# Patient Record
Sex: FEMALE | Race: WHITE | Employment: OTHER | URBAN - METROPOLITAN AREA
[De-identification: names, ages, dates, MRNs, and addresses within clinical notes are randomized per-mention and may not be internally consistent; named-entity substitution may affect disease eponyms.]

---

## 2017-01-03 DIAGNOSIS — E03.9 ACQUIRED HYPOTHYROIDISM: ICD-10-CM

## 2017-01-03 RX ORDER — LEVOTHYROXINE SODIUM 0.05 MG/1
50 TABLET ORAL DAILY
Qty: 90 TABLET | Refills: 2 | Status: SHIPPED | OUTPATIENT
Start: 2017-01-03 | End: 2017-05-17 | Stop reason: SDUPTHER

## 2017-01-04 ENCOUNTER — TELEPHONE (OUTPATIENT)
Dept: FAMILY MEDICINE CLINIC | Age: 82
End: 2017-01-04

## 2017-01-04 DIAGNOSIS — E03.9 ACQUIRED HYPOTHYROIDISM: ICD-10-CM

## 2017-01-04 RX ORDER — LEVOTHYROXINE SODIUM 0.05 MG/1
TABLET ORAL
Qty: 90 TABLET | OUTPATIENT
Start: 2017-01-04

## 2017-01-16 ENCOUNTER — OFFICE VISIT (OUTPATIENT)
Dept: FAMILY MEDICINE CLINIC | Age: 82
End: 2017-01-16

## 2017-01-16 VITALS
WEIGHT: 176 LBS | HEART RATE: 64 BPM | RESPIRATION RATE: 16 BRPM | BODY MASS INDEX: 29.32 KG/M2 | HEIGHT: 65 IN | SYSTOLIC BLOOD PRESSURE: 110 MMHG | DIASTOLIC BLOOD PRESSURE: 60 MMHG

## 2017-01-16 DIAGNOSIS — I10 ESSENTIAL HYPERTENSION: ICD-10-CM

## 2017-01-16 DIAGNOSIS — J47.9 BRONCHIECTASIS WITHOUT COMPLICATION (HCC): Primary | ICD-10-CM

## 2017-01-16 DIAGNOSIS — E78.00 PURE HYPERCHOLESTEROLEMIA: ICD-10-CM

## 2017-01-16 PROCEDURE — 99214 OFFICE O/P EST MOD 30 MIN: CPT | Performed by: FAMILY MEDICINE

## 2017-01-16 RX ORDER — AMOXICILLIN 500 MG/1
500 CAPSULE ORAL
COMMUNITY
End: 2017-01-16 | Stop reason: SDUPTHER

## 2017-01-16 RX ORDER — AMLODIPINE BESYLATE 10 MG/1
10 TABLET ORAL DAILY
Qty: 90 TABLET | Refills: 3 | Status: SHIPPED | OUTPATIENT
Start: 2017-01-16 | End: 2017-09-24 | Stop reason: SDUPTHER

## 2017-01-16 RX ORDER — AMOXICILLIN 500 MG/1
CAPSULE ORAL
Qty: 4 CAPSULE | Refills: 3 | Status: SHIPPED | OUTPATIENT
Start: 2017-01-16 | End: 2017-04-03

## 2017-01-16 RX ORDER — SPIRONOLACTONE 50 MG/1
50 TABLET, FILM COATED ORAL DAILY
Qty: 90 TABLET | Refills: 3 | Status: SHIPPED | OUTPATIENT
Start: 2017-01-16 | End: 2017-09-24 | Stop reason: SDUPTHER

## 2017-01-16 RX ORDER — SIMVASTATIN 20 MG
20 TABLET ORAL NIGHTLY
Qty: 90 TABLET | Refills: 3 | Status: SHIPPED | OUTPATIENT
Start: 2017-01-16 | End: 2017-09-24 | Stop reason: SDUPTHER

## 2017-01-16 ASSESSMENT — ENCOUNTER SYMPTOMS
CONSTIPATION: 0
SINUS PRESSURE: 0
RHINORRHEA: 1
BACK PAIN: 1
COUGH: 1
SHORTNESS OF BREATH: 1

## 2017-01-16 ASSESSMENT — PATIENT HEALTH QUESTIONNAIRE - PHQ9
2. FEELING DOWN, DEPRESSED OR HOPELESS: 0
SUM OF ALL RESPONSES TO PHQ QUESTIONS 1-9: 0
SUM OF ALL RESPONSES TO PHQ9 QUESTIONS 1 & 2: 0
1. LITTLE INTEREST OR PLEASURE IN DOING THINGS: 0

## 2017-01-31 ENCOUNTER — TELEPHONE (OUTPATIENT)
Dept: FAMILY MEDICINE CLINIC | Age: 82
End: 2017-01-31

## 2017-01-31 ENCOUNTER — OFFICE VISIT (OUTPATIENT)
Dept: FAMILY MEDICINE CLINIC | Age: 82
End: 2017-01-31

## 2017-01-31 VITALS
RESPIRATION RATE: 16 BRPM | WEIGHT: 174.5 LBS | HEART RATE: 64 BPM | HEIGHT: 65 IN | DIASTOLIC BLOOD PRESSURE: 60 MMHG | BODY MASS INDEX: 29.07 KG/M2 | SYSTOLIC BLOOD PRESSURE: 122 MMHG

## 2017-01-31 DIAGNOSIS — M53.3 SACRO ILIAL PAIN: Primary | ICD-10-CM

## 2017-01-31 DIAGNOSIS — H81.90 VESTIBULAR DISORDER, UNSPECIFIED LATERALITY: ICD-10-CM

## 2017-01-31 PROCEDURE — 99213 OFFICE O/P EST LOW 20 MIN: CPT | Performed by: FAMILY MEDICINE

## 2017-01-31 PROCEDURE — 96372 THER/PROPH/DIAG INJ SC/IM: CPT | Performed by: FAMILY MEDICINE

## 2017-01-31 RX ORDER — METHYLPREDNISOLONE ACETATE 80 MG/ML
120 INJECTION, SUSPENSION INTRA-ARTICULAR; INTRALESIONAL; INTRAMUSCULAR; SOFT TISSUE ONCE
Status: COMPLETED | OUTPATIENT
Start: 2017-01-31 | End: 2017-01-31

## 2017-01-31 RX ADMIN — METHYLPREDNISOLONE ACETATE 120 MG: 80 INJECTION, SUSPENSION INTRA-ARTICULAR; INTRALESIONAL; INTRAMUSCULAR; SOFT TISSUE at 11:17

## 2017-01-31 ASSESSMENT — ENCOUNTER SYMPTOMS
CONSTIPATION: 0
SHORTNESS OF BREATH: 0
SINUS PRESSURE: 0

## 2017-04-03 ENCOUNTER — OFFICE VISIT (OUTPATIENT)
Dept: PULMONOLOGY | Age: 82
End: 2017-04-03

## 2017-04-03 VITALS
DIASTOLIC BLOOD PRESSURE: 62 MMHG | WEIGHT: 176 LBS | SYSTOLIC BLOOD PRESSURE: 122 MMHG | HEIGHT: 65 IN | RESPIRATION RATE: 12 BRPM | TEMPERATURE: 98.5 F | HEART RATE: 64 BPM | OXYGEN SATURATION: 96 % | BODY MASS INDEX: 29.32 KG/M2

## 2017-04-03 DIAGNOSIS — J84.9 ILD (INTERSTITIAL LUNG DISEASE) (HCC): ICD-10-CM

## 2017-04-03 DIAGNOSIS — R93.89 ABNORMAL CT SCAN, CHEST: Primary | ICD-10-CM

## 2017-04-03 DIAGNOSIS — R06.02 SOB (SHORTNESS OF BREATH): ICD-10-CM

## 2017-04-03 DIAGNOSIS — J30.89 PERENNIAL ALLERGIC RHINITIS, UNSPECIFIED ALLERGIC RHINITIS TRIGGER: ICD-10-CM

## 2017-04-03 DIAGNOSIS — R05.9 COUGH: ICD-10-CM

## 2017-04-03 PROCEDURE — 1036F TOBACCO NON-USER: CPT | Performed by: INTERNAL MEDICINE

## 2017-04-03 PROCEDURE — 1090F PRES/ABSN URINE INCON ASSESS: CPT | Performed by: INTERNAL MEDICINE

## 2017-04-03 PROCEDURE — G8427 DOCREV CUR MEDS BY ELIG CLIN: HCPCS | Performed by: INTERNAL MEDICINE

## 2017-04-03 PROCEDURE — G8399 PT W/DXA RESULTS DOCUMENT: HCPCS | Performed by: INTERNAL MEDICINE

## 2017-04-03 PROCEDURE — 4040F PNEUMOC VAC/ADMIN/RCVD: CPT | Performed by: INTERNAL MEDICINE

## 2017-04-03 PROCEDURE — G8599 NO ASA/ANTIPLAT THER USE RNG: HCPCS | Performed by: INTERNAL MEDICINE

## 2017-04-03 PROCEDURE — 1123F ACP DISCUSS/DSCN MKR DOCD: CPT | Performed by: INTERNAL MEDICINE

## 2017-04-03 PROCEDURE — 99205 OFFICE O/P NEW HI 60 MIN: CPT | Performed by: INTERNAL MEDICINE

## 2017-04-03 PROCEDURE — G8420 CALC BMI NORM PARAMETERS: HCPCS | Performed by: INTERNAL MEDICINE

## 2017-04-03 RX ORDER — FLUTICASONE PROPIONATE 50 MCG
2 SPRAY, SUSPENSION (ML) NASAL DAILY
Qty: 1 BOTTLE | Refills: 6 | Status: SHIPPED | OUTPATIENT
Start: 2017-04-03 | End: 2017-07-12 | Stop reason: SDUPTHER

## 2017-04-03 RX ORDER — LORATADINE 10 MG/1
10 TABLET ORAL DAILY
COMMUNITY
End: 2017-11-13

## 2017-04-06 DIAGNOSIS — J84.9 ILD (INTERSTITIAL LUNG DISEASE) (HCC): ICD-10-CM

## 2017-04-06 DIAGNOSIS — R05.9 COUGH: ICD-10-CM

## 2017-04-06 DIAGNOSIS — R06.02 SOB (SHORTNESS OF BREATH): ICD-10-CM

## 2017-04-06 DIAGNOSIS — R93.89 ABNORMAL CT SCAN, CHEST: ICD-10-CM

## 2017-04-21 ENCOUNTER — TELEPHONE (OUTPATIENT)
Dept: PULMONOLOGY | Age: 82
End: 2017-04-21

## 2017-04-21 DIAGNOSIS — J14 PNEUMONIA OF RIGHT UPPER LOBE DUE TO HAEMOPHILUS INFLUENZAE (HCC): Primary | ICD-10-CM

## 2017-04-21 RX ORDER — LEVOFLOXACIN 500 MG/1
500 TABLET, FILM COATED ORAL DAILY
Qty: 7 TABLET | Refills: 0 | Status: SHIPPED | OUTPATIENT
Start: 2017-04-21 | End: 2017-04-28

## 2017-04-21 RX ORDER — LEVOFLOXACIN 500 MG/1
500 TABLET, FILM COATED ORAL DAILY
Qty: 7 TABLET | Refills: 0 | Status: SHIPPED | OUTPATIENT
Start: 2017-04-21 | End: 2017-04-21 | Stop reason: DRUGHIGH

## 2017-05-01 ENCOUNTER — TELEPHONE (OUTPATIENT)
Dept: FAMILY MEDICINE CLINIC | Age: 82
End: 2017-05-01

## 2017-05-01 DIAGNOSIS — E78.5 DYSLIPIDEMIA: Primary | ICD-10-CM

## 2017-05-08 ENCOUNTER — TELEPHONE (OUTPATIENT)
Dept: PULMONOLOGY | Age: 82
End: 2017-05-08

## 2017-05-08 ENCOUNTER — OFFICE VISIT (OUTPATIENT)
Dept: PULMONOLOGY | Age: 82
End: 2017-05-08

## 2017-05-08 VITALS
OXYGEN SATURATION: 97 % | HEART RATE: 76 BPM | TEMPERATURE: 98.2 F | HEIGHT: 65 IN | BODY MASS INDEX: 30.16 KG/M2 | DIASTOLIC BLOOD PRESSURE: 72 MMHG | WEIGHT: 181 LBS | SYSTOLIC BLOOD PRESSURE: 126 MMHG

## 2017-05-08 DIAGNOSIS — A31.9 ATYPICAL MYCOBACTERIAL INFECTION: ICD-10-CM

## 2017-05-08 DIAGNOSIS — R93.89 ABNORMAL CT SCAN, CHEST: ICD-10-CM

## 2017-05-08 DIAGNOSIS — R89.9 ABNORMAL LABORATORY TEST: Primary | ICD-10-CM

## 2017-05-08 DIAGNOSIS — J98.4 RESTRICTIVE LUNG DISEASE: ICD-10-CM

## 2017-05-08 PROCEDURE — G8427 DOCREV CUR MEDS BY ELIG CLIN: HCPCS | Performed by: INTERNAL MEDICINE

## 2017-05-08 PROCEDURE — G8399 PT W/DXA RESULTS DOCUMENT: HCPCS | Performed by: INTERNAL MEDICINE

## 2017-05-08 PROCEDURE — 99215 OFFICE O/P EST HI 40 MIN: CPT | Performed by: INTERNAL MEDICINE

## 2017-05-08 PROCEDURE — 1036F TOBACCO NON-USER: CPT | Performed by: INTERNAL MEDICINE

## 2017-05-08 PROCEDURE — 4040F PNEUMOC VAC/ADMIN/RCVD: CPT | Performed by: INTERNAL MEDICINE

## 2017-05-08 PROCEDURE — G8417 CALC BMI ABV UP PARAM F/U: HCPCS | Performed by: INTERNAL MEDICINE

## 2017-05-08 PROCEDURE — 1123F ACP DISCUSS/DSCN MKR DOCD: CPT | Performed by: INTERNAL MEDICINE

## 2017-05-08 PROCEDURE — G8599 NO ASA/ANTIPLAT THER USE RNG: HCPCS | Performed by: INTERNAL MEDICINE

## 2017-05-08 PROCEDURE — 1090F PRES/ABSN URINE INCON ASSESS: CPT | Performed by: INTERNAL MEDICINE

## 2017-05-09 ENCOUNTER — NURSE ONLY (OUTPATIENT)
Dept: FAMILY MEDICINE CLINIC | Age: 82
End: 2017-05-09

## 2017-05-09 ENCOUNTER — OFFICE VISIT (OUTPATIENT)
Dept: OTOLARYNGOLOGY | Age: 82
End: 2017-05-09

## 2017-05-09 VITALS
DIASTOLIC BLOOD PRESSURE: 64 MMHG | RESPIRATION RATE: 20 BRPM | BODY MASS INDEX: 29.85 KG/M2 | TEMPERATURE: 98.2 F | WEIGHT: 179.2 LBS | HEART RATE: 64 BPM | HEIGHT: 65 IN | SYSTOLIC BLOOD PRESSURE: 120 MMHG

## 2017-05-09 DIAGNOSIS — J33.9 NASAL POLYP: Primary | ICD-10-CM

## 2017-05-09 DIAGNOSIS — M19.90 ARTHRITIS: Primary | ICD-10-CM

## 2017-05-09 PROCEDURE — 4040F PNEUMOC VAC/ADMIN/RCVD: CPT | Performed by: OTOLARYNGOLOGY

## 2017-05-09 PROCEDURE — 1036F TOBACCO NON-USER: CPT | Performed by: OTOLARYNGOLOGY

## 2017-05-09 PROCEDURE — G8399 PT W/DXA RESULTS DOCUMENT: HCPCS | Performed by: OTOLARYNGOLOGY

## 2017-05-09 PROCEDURE — 1090F PRES/ABSN URINE INCON ASSESS: CPT | Performed by: OTOLARYNGOLOGY

## 2017-05-09 PROCEDURE — 31231 NASAL ENDOSCOPY DX: CPT | Performed by: OTOLARYNGOLOGY

## 2017-05-09 PROCEDURE — G8420 CALC BMI NORM PARAMETERS: HCPCS | Performed by: OTOLARYNGOLOGY

## 2017-05-09 PROCEDURE — 99203 OFFICE O/P NEW LOW 30 MIN: CPT | Performed by: OTOLARYNGOLOGY

## 2017-05-09 PROCEDURE — G8599 NO ASA/ANTIPLAT THER USE RNG: HCPCS | Performed by: OTOLARYNGOLOGY

## 2017-05-09 PROCEDURE — G8427 DOCREV CUR MEDS BY ELIG CLIN: HCPCS | Performed by: OTOLARYNGOLOGY

## 2017-05-09 PROCEDURE — 96372 THER/PROPH/DIAG INJ SC/IM: CPT | Performed by: FAMILY MEDICINE

## 2017-05-09 PROCEDURE — 1123F ACP DISCUSS/DSCN MKR DOCD: CPT | Performed by: OTOLARYNGOLOGY

## 2017-05-09 RX ORDER — METHYLPREDNISOLONE ACETATE 40 MG/ML
40 INJECTION, SUSPENSION INTRA-ARTICULAR; INTRALESIONAL; INTRAMUSCULAR; SOFT TISSUE ONCE
Status: COMPLETED | OUTPATIENT
Start: 2017-05-09 | End: 2017-05-09

## 2017-05-09 RX ORDER — METHYLPREDNISOLONE ACETATE 80 MG/ML
80 INJECTION, SUSPENSION INTRA-ARTICULAR; INTRALESIONAL; INTRAMUSCULAR; SOFT TISSUE ONCE
Status: COMPLETED | OUTPATIENT
Start: 2017-05-09 | End: 2017-05-09

## 2017-05-09 RX ADMIN — METHYLPREDNISOLONE ACETATE 80 MG: 80 INJECTION, SUSPENSION INTRA-ARTICULAR; INTRALESIONAL; INTRAMUSCULAR; SOFT TISSUE at 10:16

## 2017-05-09 RX ADMIN — METHYLPREDNISOLONE ACETATE 40 MG: 40 INJECTION, SUSPENSION INTRA-ARTICULAR; INTRALESIONAL; INTRAMUSCULAR; SOFT TISSUE at 10:16

## 2017-05-09 ASSESSMENT — ENCOUNTER SYMPTOMS
WHEEZING: 0
TROUBLE SWALLOWING: 0
EYE REDNESS: 0
EYE ITCHING: 0
RECTAL PAIN: 0
SINUS PRESSURE: 0
PHOTOPHOBIA: 0
NAUSEA: 0
SORE THROAT: 0
FACIAL SWELLING: 0
VOICE CHANGE: 0
BLOOD IN STOOL: 0
SHORTNESS OF BREATH: 1
VOMITING: 0
APNEA: 0
BACK PAIN: 1
RHINORRHEA: 0
ANAL BLEEDING: 0
COUGH: 1
ABDOMINAL PAIN: 0
COLOR CHANGE: 0
CHEST TIGHTNESS: 0
EYE PAIN: 0
STRIDOR: 0
DIARRHEA: 0
ABDOMINAL DISTENTION: 0
EYE DISCHARGE: 0
CONSTIPATION: 0
CHOKING: 0

## 2017-05-16 ENCOUNTER — TELEPHONE (OUTPATIENT)
Dept: ENDOCRINOLOGY | Age: 82
End: 2017-05-16

## 2017-05-17 ENCOUNTER — OFFICE VISIT (OUTPATIENT)
Dept: ENDOCRINOLOGY | Age: 82
End: 2017-05-17

## 2017-05-17 VITALS
SYSTOLIC BLOOD PRESSURE: 130 MMHG | HEIGHT: 65 IN | RESPIRATION RATE: 14 BRPM | HEART RATE: 59 BPM | BODY MASS INDEX: 29.49 KG/M2 | WEIGHT: 177 LBS | DIASTOLIC BLOOD PRESSURE: 79 MMHG

## 2017-05-17 DIAGNOSIS — E04.1 THYROID NODULE: ICD-10-CM

## 2017-05-17 DIAGNOSIS — E21.3 HYPERPARATHYROIDISM (HCC): ICD-10-CM

## 2017-05-17 DIAGNOSIS — E03.9 HYPOTHYROIDISM (ACQUIRED): Primary | ICD-10-CM

## 2017-05-17 PROCEDURE — 1036F TOBACCO NON-USER: CPT | Performed by: CLINICAL NURSE SPECIALIST

## 2017-05-17 PROCEDURE — 99214 OFFICE O/P EST MOD 30 MIN: CPT | Performed by: CLINICAL NURSE SPECIALIST

## 2017-05-17 PROCEDURE — 1123F ACP DISCUSS/DSCN MKR DOCD: CPT | Performed by: CLINICAL NURSE SPECIALIST

## 2017-05-17 PROCEDURE — G8420 CALC BMI NORM PARAMETERS: HCPCS | Performed by: CLINICAL NURSE SPECIALIST

## 2017-05-17 PROCEDURE — 1090F PRES/ABSN URINE INCON ASSESS: CPT | Performed by: CLINICAL NURSE SPECIALIST

## 2017-05-17 PROCEDURE — G8599 NO ASA/ANTIPLAT THER USE RNG: HCPCS | Performed by: CLINICAL NURSE SPECIALIST

## 2017-05-17 PROCEDURE — 4040F PNEUMOC VAC/ADMIN/RCVD: CPT | Performed by: CLINICAL NURSE SPECIALIST

## 2017-05-17 PROCEDURE — G8399 PT W/DXA RESULTS DOCUMENT: HCPCS | Performed by: CLINICAL NURSE SPECIALIST

## 2017-05-17 PROCEDURE — G8427 DOCREV CUR MEDS BY ELIG CLIN: HCPCS | Performed by: CLINICAL NURSE SPECIALIST

## 2017-05-17 RX ORDER — LEVOTHYROXINE SODIUM 0.05 MG/1
50 TABLET ORAL DAILY
Qty: 90 TABLET | Refills: 1 | Status: SHIPPED | OUTPATIENT
Start: 2017-05-17 | End: 2017-09-19 | Stop reason: SDUPTHER

## 2017-05-17 ASSESSMENT — ENCOUNTER SYMPTOMS
ABDOMINAL PAIN: 0
CHEST TIGHTNESS: 0
DIARRHEA: 0
NAUSEA: 0
VOICE CHANGE: 0
SHORTNESS OF BREATH: 0
CONSTIPATION: 0
WHEEZING: 0
EYE REDNESS: 0
COUGH: 0

## 2017-05-26 ENCOUNTER — TELEPHONE (OUTPATIENT)
Dept: OTOLARYNGOLOGY | Age: 82
End: 2017-05-26

## 2017-05-26 DIAGNOSIS — J33.9 NASAL POLYP: Primary | ICD-10-CM

## 2017-06-08 ENCOUNTER — OFFICE VISIT (OUTPATIENT)
Dept: OTOLARYNGOLOGY | Age: 82
End: 2017-06-08

## 2017-06-08 VITALS
HEIGHT: 65 IN | BODY MASS INDEX: 30.12 KG/M2 | DIASTOLIC BLOOD PRESSURE: 84 MMHG | WEIGHT: 180.8 LBS | HEART RATE: 78 BPM | SYSTOLIC BLOOD PRESSURE: 126 MMHG | RESPIRATION RATE: 16 BRPM | TEMPERATURE: 98.3 F

## 2017-06-08 DIAGNOSIS — J33.9 NASAL POLYP: Primary | ICD-10-CM

## 2017-06-08 PROCEDURE — G8427 DOCREV CUR MEDS BY ELIG CLIN: HCPCS | Performed by: OTOLARYNGOLOGY

## 2017-06-08 PROCEDURE — 4040F PNEUMOC VAC/ADMIN/RCVD: CPT | Performed by: OTOLARYNGOLOGY

## 2017-06-08 PROCEDURE — 1123F ACP DISCUSS/DSCN MKR DOCD: CPT | Performed by: OTOLARYNGOLOGY

## 2017-06-08 PROCEDURE — 1036F TOBACCO NON-USER: CPT | Performed by: OTOLARYNGOLOGY

## 2017-06-08 PROCEDURE — G8399 PT W/DXA RESULTS DOCUMENT: HCPCS | Performed by: OTOLARYNGOLOGY

## 2017-06-08 PROCEDURE — 99212 OFFICE O/P EST SF 10 MIN: CPT | Performed by: OTOLARYNGOLOGY

## 2017-06-08 PROCEDURE — G8599 NO ASA/ANTIPLAT THER USE RNG: HCPCS | Performed by: OTOLARYNGOLOGY

## 2017-06-08 PROCEDURE — G8417 CALC BMI ABV UP PARAM F/U: HCPCS | Performed by: OTOLARYNGOLOGY

## 2017-06-08 PROCEDURE — 1090F PRES/ABSN URINE INCON ASSESS: CPT | Performed by: OTOLARYNGOLOGY

## 2017-06-08 ASSESSMENT — ENCOUNTER SYMPTOMS
VOICE CHANGE: 0
EYE PAIN: 0
SINUS PRESSURE: 0
RHINORRHEA: 0
CHEST TIGHTNESS: 0
EYE ITCHING: 0
CONSTIPATION: 0
EYE DISCHARGE: 0
WHEEZING: 0
NAUSEA: 0
COUGH: 0
VOMITING: 0
STRIDOR: 0
ABDOMINAL PAIN: 0
PHOTOPHOBIA: 0
BLOOD IN STOOL: 0
ABDOMINAL DISTENTION: 0
FACIAL SWELLING: 0
COLOR CHANGE: 0
ANAL BLEEDING: 0
DIARRHEA: 0
SORE THROAT: 0
EYE REDNESS: 0
SHORTNESS OF BREATH: 0
APNEA: 0
RECTAL PAIN: 0
CHOKING: 0
TROUBLE SWALLOWING: 0
BACK PAIN: 0

## 2017-06-21 ENCOUNTER — OFFICE VISIT (OUTPATIENT)
Dept: FAMILY MEDICINE CLINIC | Age: 82
End: 2017-06-21

## 2017-06-21 ENCOUNTER — TELEPHONE (OUTPATIENT)
Dept: FAMILY MEDICINE CLINIC | Age: 82
End: 2017-06-21

## 2017-06-21 VITALS
DIASTOLIC BLOOD PRESSURE: 82 MMHG | HEART RATE: 68 BPM | SYSTOLIC BLOOD PRESSURE: 132 MMHG | RESPIRATION RATE: 20 BRPM | HEIGHT: 65 IN

## 2017-06-21 DIAGNOSIS — M19.90 ACUTE ARTHRITIS: Primary | ICD-10-CM

## 2017-06-21 PROCEDURE — 99213 OFFICE O/P EST LOW 20 MIN: CPT | Performed by: FAMILY MEDICINE

## 2017-06-21 RX ORDER — AZITHROMYCIN 600 MG/1
1 TABLET, FILM COATED ORAL DAILY
Refills: 0 | COMMUNITY
Start: 2017-06-05 | End: 2018-10-25 | Stop reason: ALTCHOICE

## 2017-06-21 RX ORDER — ETHAMBUTOL HYDROCHLORIDE 400 MG/1
3 TABLET, FILM COATED ORAL DAILY
Refills: 0 | COMMUNITY
Start: 2017-06-05 | End: 2018-10-25 | Stop reason: ALTCHOICE

## 2017-06-21 ASSESSMENT — ENCOUNTER SYMPTOMS
SHORTNESS OF BREATH: 0
SINUS PRESSURE: 0
CONSTIPATION: 0

## 2017-07-12 ENCOUNTER — OFFICE VISIT (OUTPATIENT)
Dept: OTOLARYNGOLOGY | Age: 82
End: 2017-07-12

## 2017-07-12 VITALS
HEART RATE: 54 BPM | WEIGHT: 180.3 LBS | HEIGHT: 65 IN | RESPIRATION RATE: 20 BRPM | TEMPERATURE: 98.4 F | BODY MASS INDEX: 30.04 KG/M2 | DIASTOLIC BLOOD PRESSURE: 74 MMHG | SYSTOLIC BLOOD PRESSURE: 116 MMHG

## 2017-07-12 DIAGNOSIS — J33.9 NASAL POLYP: Primary | ICD-10-CM

## 2017-07-12 PROCEDURE — 1090F PRES/ABSN URINE INCON ASSESS: CPT | Performed by: OTOLARYNGOLOGY

## 2017-07-12 PROCEDURE — G8599 NO ASA/ANTIPLAT THER USE RNG: HCPCS | Performed by: OTOLARYNGOLOGY

## 2017-07-12 PROCEDURE — 99212 OFFICE O/P EST SF 10 MIN: CPT | Performed by: OTOLARYNGOLOGY

## 2017-07-12 PROCEDURE — 1123F ACP DISCUSS/DSCN MKR DOCD: CPT | Performed by: OTOLARYNGOLOGY

## 2017-07-12 PROCEDURE — 4040F PNEUMOC VAC/ADMIN/RCVD: CPT | Performed by: OTOLARYNGOLOGY

## 2017-07-12 PROCEDURE — G8427 DOCREV CUR MEDS BY ELIG CLIN: HCPCS | Performed by: OTOLARYNGOLOGY

## 2017-07-12 PROCEDURE — 1036F TOBACCO NON-USER: CPT | Performed by: OTOLARYNGOLOGY

## 2017-07-12 PROCEDURE — G8417 CALC BMI ABV UP PARAM F/U: HCPCS | Performed by: OTOLARYNGOLOGY

## 2017-07-12 PROCEDURE — G8399 PT W/DXA RESULTS DOCUMENT: HCPCS | Performed by: OTOLARYNGOLOGY

## 2017-07-12 RX ORDER — FLUTICASONE PROPIONATE 50 MCG
2 SPRAY, SUSPENSION (ML) NASAL DAILY
Qty: 1 BOTTLE | Refills: 5 | Status: SHIPPED | OUTPATIENT
Start: 2017-07-12 | End: 2018-10-25 | Stop reason: ALTCHOICE

## 2017-07-12 ASSESSMENT — ENCOUNTER SYMPTOMS
RHINORRHEA: 0
EYE ITCHING: 0
APNEA: 0
DIARRHEA: 0
CHOKING: 0
ABDOMINAL DISTENTION: 0
STRIDOR: 0
VOMITING: 0
TROUBLE SWALLOWING: 0
BLOOD IN STOOL: 0
BACK PAIN: 0
EYE DISCHARGE: 0
FACIAL SWELLING: 0
CONSTIPATION: 0
COUGH: 0
RECTAL PAIN: 0
ANAL BLEEDING: 0
SHORTNESS OF BREATH: 0
SINUS PRESSURE: 0
ABDOMINAL PAIN: 0
PHOTOPHOBIA: 0
VOICE CHANGE: 0
EYE REDNESS: 0
SORE THROAT: 0
COLOR CHANGE: 0
WHEEZING: 0
EYE PAIN: 0
CHEST TIGHTNESS: 0
NAUSEA: 0

## 2017-07-24 ENCOUNTER — TELEPHONE (OUTPATIENT)
Dept: FAMILY MEDICINE CLINIC | Age: 82
End: 2017-07-24

## 2017-07-24 ENCOUNTER — OFFICE VISIT (OUTPATIENT)
Dept: FAMILY MEDICINE CLINIC | Age: 82
End: 2017-07-24

## 2017-07-24 VITALS
BODY MASS INDEX: 29.99 KG/M2 | SYSTOLIC BLOOD PRESSURE: 134 MMHG | RESPIRATION RATE: 16 BRPM | WEIGHT: 180 LBS | HEIGHT: 65 IN | HEART RATE: 76 BPM | DIASTOLIC BLOOD PRESSURE: 80 MMHG

## 2017-07-24 DIAGNOSIS — R10.84 GENERALIZED ABDOMINAL PAIN: Primary | ICD-10-CM

## 2017-07-24 PROCEDURE — 99213 OFFICE O/P EST LOW 20 MIN: CPT | Performed by: FAMILY MEDICINE

## 2017-07-24 RX ORDER — OMEPRAZOLE 20 MG/1
20 CAPSULE, DELAYED RELEASE ORAL DAILY
Qty: 30 CAPSULE | Refills: 3 | Status: SHIPPED | OUTPATIENT
Start: 2017-07-24 | End: 2017-11-13 | Stop reason: SDUPTHER

## 2017-07-24 ASSESSMENT — ENCOUNTER SYMPTOMS
SHORTNESS OF BREATH: 0
SINUS PRESSURE: 0
ABDOMINAL PAIN: 1
CONSTIPATION: 0
NAUSEA: 1
COUGH: 1
ABDOMINAL DISTENTION: 1
VOMITING: 0
DIARRHEA: 1

## 2017-07-26 ENCOUNTER — OFFICE VISIT (OUTPATIENT)
Dept: FAMILY MEDICINE CLINIC | Age: 82
End: 2017-07-26

## 2017-07-26 VITALS
SYSTOLIC BLOOD PRESSURE: 134 MMHG | HEART RATE: 76 BPM | HEIGHT: 65 IN | WEIGHT: 179.5 LBS | RESPIRATION RATE: 16 BRPM | DIASTOLIC BLOOD PRESSURE: 68 MMHG | BODY MASS INDEX: 29.91 KG/M2

## 2017-07-26 DIAGNOSIS — R10.84 GENERALIZED ABDOMINAL PAIN: Primary | ICD-10-CM

## 2017-07-26 PROCEDURE — 99213 OFFICE O/P EST LOW 20 MIN: CPT | Performed by: FAMILY MEDICINE

## 2017-07-26 ASSESSMENT — ENCOUNTER SYMPTOMS
ABDOMINAL PAIN: 1
SHORTNESS OF BREATH: 1
SINUS PRESSURE: 0
NAUSEA: 1

## 2017-07-28 ENCOUNTER — OFFICE VISIT (OUTPATIENT)
Dept: FAMILY MEDICINE CLINIC | Age: 82
End: 2017-07-28

## 2017-07-28 VITALS
RESPIRATION RATE: 20 BRPM | SYSTOLIC BLOOD PRESSURE: 112 MMHG | DIASTOLIC BLOOD PRESSURE: 62 MMHG | HEIGHT: 65 IN | HEART RATE: 80 BPM | WEIGHT: 179.13 LBS | BODY MASS INDEX: 29.84 KG/M2

## 2017-07-28 DIAGNOSIS — R10.84 GENERALIZED ABDOMINAL PAIN: Primary | ICD-10-CM

## 2017-07-28 PROCEDURE — 99213 OFFICE O/P EST LOW 20 MIN: CPT | Performed by: FAMILY MEDICINE

## 2017-07-28 ASSESSMENT — ENCOUNTER SYMPTOMS
COUGH: 1
CONSTIPATION: 0
ABDOMINAL PAIN: 1
SHORTNESS OF BREATH: 1
SINUS PRESSURE: 0

## 2017-08-08 ENCOUNTER — TELEPHONE (OUTPATIENT)
Dept: FAMILY MEDICINE CLINIC | Age: 82
End: 2017-08-08

## 2017-08-10 ENCOUNTER — NURSE ONLY (OUTPATIENT)
Dept: FAMILY MEDICINE CLINIC | Age: 82
End: 2017-08-10

## 2017-08-10 DIAGNOSIS — M25.50 POLYARTHRALGIA: Primary | ICD-10-CM

## 2017-08-10 PROCEDURE — 96372 THER/PROPH/DIAG INJ SC/IM: CPT | Performed by: FAMILY MEDICINE

## 2017-08-10 RX ORDER — METHYLPREDNISOLONE ACETATE 40 MG/ML
40 INJECTION, SUSPENSION INTRA-ARTICULAR; INTRALESIONAL; INTRAMUSCULAR; SOFT TISSUE ONCE
Status: COMPLETED | OUTPATIENT
Start: 2017-08-10 | End: 2017-08-10

## 2017-08-10 RX ORDER — METHYLPREDNISOLONE ACETATE 80 MG/ML
80 INJECTION, SUSPENSION INTRA-ARTICULAR; INTRALESIONAL; INTRAMUSCULAR; SOFT TISSUE ONCE
Status: COMPLETED | OUTPATIENT
Start: 2017-08-10 | End: 2017-08-10

## 2017-08-10 RX ADMIN — METHYLPREDNISOLONE ACETATE 40 MG: 40 INJECTION, SUSPENSION INTRA-ARTICULAR; INTRALESIONAL; INTRAMUSCULAR; SOFT TISSUE at 13:23

## 2017-08-10 RX ADMIN — METHYLPREDNISOLONE ACETATE 80 MG: 80 INJECTION, SUSPENSION INTRA-ARTICULAR; INTRALESIONAL; INTRAMUSCULAR; SOFT TISSUE at 13:24

## 2017-08-17 ENCOUNTER — HOSPITAL ENCOUNTER (OUTPATIENT)
Dept: PULMONOLOGY | Age: 82
Discharge: HOME | End: 2017-08-17
Payer: MEDICARE

## 2017-08-17 ENCOUNTER — HOSPITAL ENCOUNTER (OUTPATIENT)
Age: 82
Discharge: HOME | End: 2017-08-17
Payer: MEDICARE

## 2017-08-17 LAB
ALBUMIN SERPL-MCNC: 4.3 G/DL (ref 3.5–5.1)
ALP BLD-CCNC: 59 U/L (ref 38–126)
ALT SERPL-CCNC: 36 U/L (ref 11–66)
ANION GAP SERPL CALCULATED.3IONS-SCNC: 15 MEQ/L (ref 8–16)
ANISOCYTOSIS: ABNORMAL
AST SERPL-CCNC: 44 U/L (ref 5–40)
BASOPHILS # BLD: 0.3 %
BASOPHILS ABSOLUTE: 0 THOU/MM3 (ref 0–0.1)
BILIRUB SERPL-MCNC: 0.2 MG/DL (ref 0.3–1.2)
BILIRUBIN DIRECT: < 0.2 MG/DL (ref 0–0.3)
BUN BLDV-MCNC: 14 MG/DL (ref 7–22)
CALCIUM SERPL-MCNC: 10.1 MG/DL (ref 8.5–10.5)
CHLORIDE BLD-SCNC: 103 MEQ/L (ref 98–111)
CO2: 22 MEQ/L (ref 23–33)
CREAT SERPL-MCNC: 0.8 MG/DL (ref 0.4–1.2)
EOSINOPHIL # BLD: 4.1 %
EOSINOPHILS ABSOLUTE: 0.3 THOU/MM3 (ref 0–0.4)
GFR SERPL CREATININE-BSD FRML MDRD: 69 ML/MIN/1.73M2
GLUCOSE BLD-MCNC: 129 MG/DL (ref 70–108)
HCT VFR BLD CALC: 44.4 % (ref 37–47)
HEMOGLOBIN: 15.1 GM/DL (ref 12–16)
LYMPHOCYTES # BLD: 15.3 %
LYMPHOCYTES ABSOLUTE: 1.2 THOU/MM3 (ref 1–4.8)
MCH RBC QN AUTO: 29.3 PG (ref 27–31)
MCHC RBC AUTO-ENTMCNC: 34.1 GM/DL (ref 33–37)
MCV RBC AUTO: 86 FL (ref 81–99)
MONOCYTES # BLD: 8.8 %
MONOCYTES ABSOLUTE: 0.7 THOU/MM3 (ref 0.4–1.3)
NUCLEATED RED BLOOD CELLS: 0 /100 WBC
PDW BLD-RTO: 15.2 % (ref 11.5–14.5)
PLATELET # BLD: 214 THOU/MM3 (ref 130–400)
PMV BLD AUTO: 8.6 MCM (ref 7.4–10.4)
POTASSIUM SERPL-SCNC: 5 MEQ/L (ref 3.5–5.2)
RBC # BLD: 5.16 MILL/MM3 (ref 4.2–5.4)
RBC # BLD: NORMAL 10*6/UL
SEG NEUTROPHILS: 71.5 %
SEGMENTED NEUTROPHILS ABSOLUTE COUNT: 5.4 THOU/MM3 (ref 1.8–7.7)
SODIUM BLD-SCNC: 140 MEQ/L (ref 135–145)
TOTAL PROTEIN: 7.2 G/DL (ref 6.1–8)
WBC # BLD: 7.6 THOU/MM3 (ref 4.8–10.8)

## 2017-08-17 PROCEDURE — 36415 COLL VENOUS BLD VENIPUNCTURE: CPT

## 2017-08-17 PROCEDURE — 82248 BILIRUBIN DIRECT: CPT

## 2017-08-17 PROCEDURE — 80053 COMPREHEN METABOLIC PANEL: CPT

## 2017-08-17 PROCEDURE — 85025 COMPLETE CBC W/AUTO DIFF WBC: CPT

## 2017-09-05 ENCOUNTER — HOSPITAL ENCOUNTER (OUTPATIENT)
Age: 82
Discharge: HOME | End: 2017-09-05
Payer: MEDICARE

## 2017-09-05 DIAGNOSIS — E21.3 HYPERPARATHYROIDISM (HCC): ICD-10-CM

## 2017-09-05 DIAGNOSIS — E03.9 HYPOTHYROIDISM (ACQUIRED): ICD-10-CM

## 2017-09-05 LAB
ANION GAP SERPL CALCULATED.3IONS-SCNC: 15 MEQ/L (ref 8–16)
BUN BLDV-MCNC: 17 MG/DL (ref 7–22)
CALCIUM SERPL-MCNC: 10.4 MG/DL (ref 8.5–10.5)
CALCIUM SERPL-MCNC: 10.6 MG/DL (ref 8.5–10.5)
CHLORIDE BLD-SCNC: 103 MEQ/L (ref 98–111)
CO2: 25 MEQ/L (ref 23–33)
CREAT SERPL-MCNC: 0.9 MG/DL (ref 0.4–1.2)
GFR SERPL CREATININE-BSD FRML MDRD: 60 ML/MIN/1.73M2
GLUCOSE BLD-MCNC: 114 MG/DL (ref 70–108)
POTASSIUM SERPL-SCNC: 4.1 MEQ/L (ref 3.5–5.2)
PTH INTACT: 90.2 PG/ML (ref 15–65)
SODIUM BLD-SCNC: 143 MEQ/L (ref 135–145)
T4 FREE: 1.05 NG/DL (ref 0.93–1.76)
TSH SERPL DL<=0.05 MIU/L-ACNC: 5.09 UIU/ML (ref 0.4–4.2)
VITAMIN D 25-HYDROXY: 36 NG/ML (ref 30–100)

## 2017-09-05 PROCEDURE — 36415 COLL VENOUS BLD VENIPUNCTURE: CPT

## 2017-09-05 PROCEDURE — 83970 ASSAY OF PARATHORMONE: CPT

## 2017-09-05 PROCEDURE — 82306 VITAMIN D 25 HYDROXY: CPT

## 2017-09-05 PROCEDURE — 84443 ASSAY THYROID STIM HORMONE: CPT

## 2017-09-05 PROCEDURE — 84439 ASSAY OF FREE THYROXINE: CPT

## 2017-09-05 PROCEDURE — 82310 ASSAY OF CALCIUM: CPT

## 2017-09-05 PROCEDURE — 80048 BASIC METABOLIC PNL TOTAL CA: CPT

## 2017-09-05 PROCEDURE — 82652 VIT D 1 25-DIHYDROXY: CPT

## 2017-09-07 ENCOUNTER — HOSPITAL ENCOUNTER (OUTPATIENT)
Dept: ULTRASOUND IMAGING | Age: 82
Discharge: HOME | End: 2017-09-07
Payer: MEDICARE

## 2017-09-07 DIAGNOSIS — H81.10 BENIGN PAROXYSMAL VERTIGO: ICD-10-CM

## 2017-09-07 DIAGNOSIS — E04.1 THYROID NODULE: ICD-10-CM

## 2017-09-07 PROCEDURE — 76536 US EXAM OF HEAD AND NECK: CPT

## 2017-09-08 LAB — VITAMIN D 1,25-DIHYDROXY: 30.9 PG/ML (ref 19.9–79.3)

## 2017-09-19 ENCOUNTER — OFFICE VISIT (OUTPATIENT)
Dept: ENDOCRINOLOGY | Age: 82
End: 2017-09-19
Payer: MEDICARE

## 2017-09-19 VITALS
HEIGHT: 65 IN | BODY MASS INDEX: 28.74 KG/M2 | DIASTOLIC BLOOD PRESSURE: 62 MMHG | SYSTOLIC BLOOD PRESSURE: 138 MMHG | HEART RATE: 74 BPM | RESPIRATION RATE: 16 BRPM | WEIGHT: 172.5 LBS

## 2017-09-19 DIAGNOSIS — E04.1 LEFT THYROID NODULE: ICD-10-CM

## 2017-09-19 DIAGNOSIS — E21.3 HYPERPARATHYROIDISM (HCC): ICD-10-CM

## 2017-09-19 DIAGNOSIS — E03.9 ACQUIRED HYPOTHYROIDISM: Primary | ICD-10-CM

## 2017-09-19 PROCEDURE — 99214 OFFICE O/P EST MOD 30 MIN: CPT | Performed by: INTERNAL MEDICINE

## 2017-09-19 PROCEDURE — G8399 PT W/DXA RESULTS DOCUMENT: HCPCS | Performed by: INTERNAL MEDICINE

## 2017-09-19 PROCEDURE — G8427 DOCREV CUR MEDS BY ELIG CLIN: HCPCS | Performed by: INTERNAL MEDICINE

## 2017-09-19 PROCEDURE — 1036F TOBACCO NON-USER: CPT | Performed by: INTERNAL MEDICINE

## 2017-09-19 PROCEDURE — 1123F ACP DISCUSS/DSCN MKR DOCD: CPT | Performed by: INTERNAL MEDICINE

## 2017-09-19 PROCEDURE — 1090F PRES/ABSN URINE INCON ASSESS: CPT | Performed by: INTERNAL MEDICINE

## 2017-09-19 PROCEDURE — G8599 NO ASA/ANTIPLAT THER USE RNG: HCPCS | Performed by: INTERNAL MEDICINE

## 2017-09-19 PROCEDURE — G8417 CALC BMI ABV UP PARAM F/U: HCPCS | Performed by: INTERNAL MEDICINE

## 2017-09-19 PROCEDURE — 4040F PNEUMOC VAC/ADMIN/RCVD: CPT | Performed by: INTERNAL MEDICINE

## 2017-09-19 RX ORDER — LEVOTHYROXINE SODIUM 0.12 MG/1
62.5 TABLET ORAL DAILY
Qty: 30 TABLET | Refills: 2 | Status: SHIPPED | OUTPATIENT
Start: 2017-09-19 | End: 2017-11-13

## 2017-09-19 ASSESSMENT — ENCOUNTER SYMPTOMS
VOMITING: 0
CONSTIPATION: 0
NAUSEA: 0
HEMOPTYSIS: 0
BLURRED VISION: 0
WHEEZING: 0
COUGH: 1
EYE PAIN: 0
DOUBLE VISION: 0

## 2017-09-24 DIAGNOSIS — I10 ESSENTIAL HYPERTENSION: ICD-10-CM

## 2017-09-24 DIAGNOSIS — E78.00 PURE HYPERCHOLESTEROLEMIA: ICD-10-CM

## 2017-09-26 RX ORDER — SIMVASTATIN 20 MG
TABLET ORAL
Qty: 90 TABLET | Refills: 3 | Status: SHIPPED | OUTPATIENT
Start: 2017-09-26 | End: 2018-09-16 | Stop reason: SDUPTHER

## 2017-09-26 RX ORDER — AMLODIPINE BESYLATE 10 MG/1
TABLET ORAL
Qty: 90 TABLET | Refills: 3 | Status: SHIPPED | OUTPATIENT
Start: 2017-09-26 | End: 2018-09-16 | Stop reason: SDUPTHER

## 2017-09-26 RX ORDER — SPIRONOLACTONE 50 MG/1
TABLET, FILM COATED ORAL
Qty: 90 TABLET | Refills: 3 | Status: SHIPPED | OUTPATIENT
Start: 2017-09-26 | End: 2018-09-16 | Stop reason: SDUPTHER

## 2017-10-16 ENCOUNTER — TELEPHONE (OUTPATIENT)
Dept: ENDOCRINOLOGY | Age: 82
End: 2017-10-16

## 2017-10-16 DIAGNOSIS — E03.9 ACQUIRED HYPOTHYROIDISM: Primary | ICD-10-CM

## 2017-10-16 NOTE — TELEPHONE ENCOUNTER
Pt notified of Dr. Alla Shah recommendations.  Lab order faxed to [facility]. Martina's per patient request.

## 2017-10-20 ENCOUNTER — HOSPITAL ENCOUNTER (OUTPATIENT)
Age: 82
Discharge: HOME | End: 2017-10-20
Payer: MEDICARE

## 2017-10-20 LAB
ALBUMIN SERPL-MCNC: 4.3 G/DL (ref 3.5–5.1)
ALP BLD-CCNC: 60 U/L (ref 38–126)
ALT SERPL-CCNC: 31 U/L (ref 11–66)
ANION GAP SERPL CALCULATED.3IONS-SCNC: 15 MEQ/L (ref 8–16)
ANISOCYTOSIS: ABNORMAL
AST SERPL-CCNC: 39 U/L (ref 5–40)
AVERAGE GLUCOSE: 105 MG/DL (ref 70–126)
BASOPHILS # BLD: 0.1 %
BASOPHILS ABSOLUTE: 0 THOU/MM3 (ref 0–0.1)
BILIRUB SERPL-MCNC: 0.4 MG/DL (ref 0.3–1.2)
BILIRUBIN DIRECT: < 0.2 MG/DL (ref 0–0.3)
BUN BLDV-MCNC: 15 MG/DL (ref 7–22)
CALCIUM SERPL-MCNC: 10 MG/DL (ref 8.5–10.5)
CHLORIDE BLD-SCNC: 103 MEQ/L (ref 98–111)
CO2: 25 MEQ/L (ref 23–33)
CREAT SERPL-MCNC: 0.8 MG/DL (ref 0.4–1.2)
EOSINOPHIL # BLD: 5.3 %
EOSINOPHILS ABSOLUTE: 0.3 THOU/MM3 (ref 0–0.4)
GFR SERPL CREATININE-BSD FRML MDRD: 69 ML/MIN/1.73M2
GLUCOSE BLD-MCNC: 98 MG/DL (ref 70–108)
HBA1C MFR BLD: 5.5 % (ref 4.4–6.4)
HCT VFR BLD CALC: 42.7 % (ref 37–47)
HEMOGLOBIN: 14.2 GM/DL (ref 12–16)
LYMPHOCYTES # BLD: 20.1 %
LYMPHOCYTES ABSOLUTE: 1 THOU/MM3 (ref 1–4.8)
MCH RBC QN AUTO: 28.3 PG (ref 27–31)
MCHC RBC AUTO-ENTMCNC: 33.3 GM/DL (ref 33–37)
MCV RBC AUTO: 85 FL (ref 81–99)
MONOCYTES # BLD: 12.8 %
MONOCYTES ABSOLUTE: 0.6 THOU/MM3 (ref 0.4–1.3)
NUCLEATED RED BLOOD CELLS: 0 /100 WBC
PDW BLD-RTO: 15.5 % (ref 11.5–14.5)
PLATELET # BLD: 187 THOU/MM3 (ref 130–400)
PMV BLD AUTO: 8.7 MCM (ref 7.4–10.4)
POTASSIUM SERPL-SCNC: 4.4 MEQ/L (ref 3.5–5.2)
RBC # BLD: 5.02 MILL/MM3 (ref 4.2–5.4)
RBC # BLD: NORMAL 10*6/UL
SEG NEUTROPHILS: 61.7 %
SEGMENTED NEUTROPHILS ABSOLUTE COUNT: 3.1 THOU/MM3 (ref 1.8–7.7)
SODIUM BLD-SCNC: 143 MEQ/L (ref 135–145)
T4 FREE: 1.34 NG/DL (ref 0.93–1.76)
TOTAL PROTEIN: 6.9 G/DL (ref 6.1–8)
TSH SERPL DL<=0.05 MIU/L-ACNC: 5.07 UIU/ML (ref 0.4–4.2)
WBC # BLD: 5 THOU/MM3 (ref 4.8–10.8)

## 2017-10-20 PROCEDURE — 36415 COLL VENOUS BLD VENIPUNCTURE: CPT

## 2017-10-20 PROCEDURE — 80053 COMPREHEN METABOLIC PANEL: CPT

## 2017-10-20 PROCEDURE — 84439 ASSAY OF FREE THYROXINE: CPT

## 2017-10-20 PROCEDURE — 84443 ASSAY THYROID STIM HORMONE: CPT

## 2017-10-20 PROCEDURE — 85025 COMPLETE CBC W/AUTO DIFF WBC: CPT

## 2017-10-20 PROCEDURE — 82248 BILIRUBIN DIRECT: CPT

## 2017-10-20 PROCEDURE — 83036 HEMOGLOBIN GLYCOSYLATED A1C: CPT

## 2017-10-26 ENCOUNTER — TELEPHONE (OUTPATIENT)
Dept: ENDOCRINOLOGY | Age: 82
End: 2017-10-26

## 2017-10-26 NOTE — TELEPHONE ENCOUNTER
----- Message from Nando Carballo MD sent at 10/20/2017  2:09 PM EDT -----  Increase Synthroid to 75 µg daily. Get free T4 and TSH in a month.

## 2017-11-03 ENCOUNTER — TELEPHONE (OUTPATIENT)
Dept: ENDOCRINOLOGY | Age: 82
End: 2017-11-03

## 2017-11-03 DIAGNOSIS — E03.9 HYPOTHYROIDISM, UNSPECIFIED TYPE: Primary | ICD-10-CM

## 2017-11-03 RX ORDER — LEVOTHYROXINE SODIUM 0.07 MG/1
75 TABLET ORAL DAILY
Qty: 30 TABLET | Refills: 3 | Status: SHIPPED | OUTPATIENT
Start: 2017-11-03 | End: 2018-02-19 | Stop reason: SDUPTHER

## 2017-11-03 NOTE — TELEPHONE ENCOUNTER
----- Message from Jerrell Gibbons MD sent at 10/20/2017  2:09 PM EDT -----  Increase Synthroid to 75 µg daily. Get free T4 and TSH in a month.

## 2017-11-03 NOTE — TELEPHONE ENCOUNTER
Spoke with pt and notified her of recommendations. rx is pending please approve. Lab order mailed to pt.

## 2017-11-13 ENCOUNTER — OFFICE VISIT (OUTPATIENT)
Dept: FAMILY MEDICINE CLINIC | Age: 82
End: 2017-11-13

## 2017-11-13 VITALS
SYSTOLIC BLOOD PRESSURE: 122 MMHG | HEIGHT: 65 IN | BODY MASS INDEX: 28.57 KG/M2 | HEART RATE: 64 BPM | WEIGHT: 171.5 LBS | DIASTOLIC BLOOD PRESSURE: 78 MMHG | RESPIRATION RATE: 18 BRPM

## 2017-11-13 DIAGNOSIS — R10.84 GENERALIZED ABDOMINAL PAIN: Primary | ICD-10-CM

## 2017-11-13 DIAGNOSIS — G89.29 CHRONIC MIDLINE LOW BACK PAIN WITHOUT SCIATICA: ICD-10-CM

## 2017-11-13 DIAGNOSIS — M54.50 CHRONIC MIDLINE LOW BACK PAIN WITHOUT SCIATICA: ICD-10-CM

## 2017-11-13 PROCEDURE — 96372 THER/PROPH/DIAG INJ SC/IM: CPT | Performed by: FAMILY MEDICINE

## 2017-11-13 PROCEDURE — 99213 OFFICE O/P EST LOW 20 MIN: CPT | Performed by: FAMILY MEDICINE

## 2017-11-13 RX ORDER — OMEPRAZOLE 20 MG/1
20 CAPSULE, DELAYED RELEASE ORAL DAILY
Qty: 30 CAPSULE | Refills: 11 | Status: SHIPPED | OUTPATIENT
Start: 2017-11-13 | End: 2018-09-27 | Stop reason: ALTCHOICE

## 2017-11-13 RX ORDER — METHYLPREDNISOLONE ACETATE 80 MG/ML
120 INJECTION, SUSPENSION INTRA-ARTICULAR; INTRALESIONAL; INTRAMUSCULAR; SOFT TISSUE ONCE
Status: COMPLETED | OUTPATIENT
Start: 2017-11-13 | End: 2017-11-13

## 2017-11-13 RX ADMIN — METHYLPREDNISOLONE ACETATE 120 MG: 80 INJECTION, SUSPENSION INTRA-ARTICULAR; INTRALESIONAL; INTRAMUSCULAR; SOFT TISSUE at 11:09

## 2017-11-13 ASSESSMENT — ENCOUNTER SYMPTOMS
SHORTNESS OF BREATH: 0
SINUS PRESSURE: 0
CONSTIPATION: 0
BACK PAIN: 1

## 2017-11-13 NOTE — PROGRESS NOTES
Subjective:      Patient ID: Laurence Ladd is a 80 y.o. female. HPI  1. She states that the abd problem is better  2. There is help with the chronic lower back pain with the depo medrol. It begins to wear off after 2 months  3. The omperazole is helpful in tolerating the TB meds  Review of Systems   Constitutional: Negative for fatigue. HENT: Negative for sinus pressure. Eyes: Negative for visual disturbance. Respiratory: Negative for shortness of breath. Cardiovascular: Negative for chest pain. Gastrointestinal: Negative for constipation. Genitourinary: Negative. Musculoskeletal: Positive for arthralgias and back pain. Skin: Negative for rash. Neurological: Negative for headaches. The patient's medications, allergies, past medical problems, surgical, social, and family histories were reviewed and updated as needed. Objective:   Physical Exam   Constitutional: She is oriented to person, place, and time. She appears well-developed and well-nourished. No distress. HENT:   Head: Normocephalic and atraumatic. Eyes: Conjunctivae are normal. No scleral icterus. Neck: No tracheal deviation present. Cardiovascular: Normal rate. Pulmonary/Chest: Effort normal.   Musculoskeletal: She exhibits no edema. Neurological: She is alert and oriented to person, place, and time. Skin: Skin is warm and dry. Psychiatric: She has a normal mood and affect. Her behavior is normal.   Blood pressure 122/78, pulse 64, resp. rate 18, height 5' 5\" (1.651 m), weight 171 lb 8 oz (77.8 kg), not currently breastfeeding. Assessment:      1. Generalized abdominal pain  omeprazole (PRILOSEC) 20 MG delayed release capsule   2.  Chronic midline low back pain without sciatica  methylPREDNISolone acetate (DEPO-MEDROL) injection 120 mg           Plan:      See me in 6 months

## 2017-11-15 ENCOUNTER — HOSPITAL ENCOUNTER (OUTPATIENT)
Dept: CT IMAGING | Age: 82
Discharge: HOME | End: 2017-11-15
Payer: MEDICARE

## 2017-11-15 DIAGNOSIS — H81.10 BENIGN PAROXYSMAL VERTIGO: ICD-10-CM

## 2017-11-15 DIAGNOSIS — J98.4 RESTRICTIVE LUNG DISEASE: ICD-10-CM

## 2017-11-15 DIAGNOSIS — R93.89 ABNORMAL CT SCAN, CHEST: ICD-10-CM

## 2017-11-15 DIAGNOSIS — R89.9 ABNORMAL LABORATORY TEST: ICD-10-CM

## 2017-11-15 PROCEDURE — 71250 CT THORAX DX C-: CPT

## 2017-12-04 ENCOUNTER — OFFICE VISIT (OUTPATIENT)
Dept: PULMONOLOGY | Age: 82
End: 2017-12-04
Payer: MEDICARE

## 2017-12-04 VITALS
TEMPERATURE: 97.9 F | SYSTOLIC BLOOD PRESSURE: 124 MMHG | HEIGHT: 65 IN | OXYGEN SATURATION: 97 % | BODY MASS INDEX: 28.32 KG/M2 | DIASTOLIC BLOOD PRESSURE: 69 MMHG | RESPIRATION RATE: 14 BRPM | HEART RATE: 65 BPM | WEIGHT: 170 LBS

## 2017-12-04 DIAGNOSIS — A31.0 MYCOBACTERIUM AVIUM INFECTION (HCC): ICD-10-CM

## 2017-12-04 DIAGNOSIS — R93.89 ABNORMAL CT OF THE CHEST: ICD-10-CM

## 2017-12-04 DIAGNOSIS — J84.9 ILD (INTERSTITIAL LUNG DISEASE) (HCC): Primary | ICD-10-CM

## 2017-12-04 PROBLEM — R06.02 SOB (SHORTNESS OF BREATH): Status: RESOLVED | Noted: 2017-04-03 | Resolved: 2017-12-04

## 2017-12-04 PROCEDURE — 4040F PNEUMOC VAC/ADMIN/RCVD: CPT | Performed by: INTERNAL MEDICINE

## 2017-12-04 PROCEDURE — G8417 CALC BMI ABV UP PARAM F/U: HCPCS | Performed by: INTERNAL MEDICINE

## 2017-12-04 PROCEDURE — 1036F TOBACCO NON-USER: CPT | Performed by: INTERNAL MEDICINE

## 2017-12-04 PROCEDURE — G8427 DOCREV CUR MEDS BY ELIG CLIN: HCPCS | Performed by: INTERNAL MEDICINE

## 2017-12-04 PROCEDURE — G8399 PT W/DXA RESULTS DOCUMENT: HCPCS | Performed by: INTERNAL MEDICINE

## 2017-12-04 PROCEDURE — 1123F ACP DISCUSS/DSCN MKR DOCD: CPT | Performed by: INTERNAL MEDICINE

## 2017-12-04 PROCEDURE — 1090F PRES/ABSN URINE INCON ASSESS: CPT | Performed by: INTERNAL MEDICINE

## 2017-12-04 PROCEDURE — G8599 NO ASA/ANTIPLAT THER USE RNG: HCPCS | Performed by: INTERNAL MEDICINE

## 2017-12-04 PROCEDURE — G8482 FLU IMMUNIZE ORDER/ADMIN: HCPCS | Performed by: INTERNAL MEDICINE

## 2017-12-04 PROCEDURE — 99214 OFFICE O/P EST MOD 30 MIN: CPT | Performed by: INTERNAL MEDICINE

## 2017-12-04 NOTE — PROGRESS NOTES
[facility]                                                 Pulmonary medicine clinic follow up note. Patient: Johanne Mims  : 1935      Lung Nodule Screening    [] Qualifies [x] Does not qualify [] Declined [] Completed    Johanne Mims is a 80 y. o.oldfemale came for follow up regarding her chronic cough and abnormal CT chest after having a HRCT of chest. She under went Flexible diagnostic fiberoptic bronchoscopy with fluoroscopy on 2017. She was diagnosed with Mycobacterium avium/intracellulare infection of lungs. She was referred to Dr. Gianni Holcomb. She is currently on treatment. She is currently not using any Oxygen. No recent hospitalizations or emergency room visits. Her cough is associated with clear mucus. No hx of hemoptysis. She never on any inhalers in the past.     She was treated with Levaquin for 7days for her lung infection wtih Haemophilus species along with Viridans streptococcus group. She tolerated Levaquin well. She was initially referred from Dr. Brenda Hartman. She was told by Dr. Peter Trammell in 2017 that she had Bronchiectasis ([facility] [address] She was also evaluated by Lona Brito MD on 2016. She  denies to TB exposure in the past.  She denies any history of exposure to patients with tuberculosis in the past. She denies any recent travel history to endemic places to tuberculosis. No family hx of bronchial asthma. 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 Patient denies history of tobacco smoking. She denies history of exposure to coal, foundry, [place], asbestos or significant farm dust exposure. Patient denies any recent travel. Patient denies history of exposure to birds, pigeons, or chickens in the past. The patient denies to pet animals at home.   She denies to history of recreational or IV drug use. Shedenies history of pulmonary embolism or DVT in the past.    Her PPD test is negative in . Review of Systems:   General/Constitutional: She lost 11lbs of weight from last visit with poor appetite due to her current antibiotic therapy. No fever or chills. HENT: Negative. Eyes: Negative. Upper respiratory tract: No nasal stuffiness with no post nasal drip. She is currently using Flonase  Lower respiratory tract/ lungs: Chronic minimal cough. No hemoptysis. Cardiovascular: No palpitations or chest pain. Gastrointestinal: No nausea or vomiting. Neurological: No focal neurologiacal weakness. Extremities: No edema. Musculoskeletal: No complaints. Genitourinary: No complaints. Hematological: Negative. Psychiatric/Behavioral: Negative. Skin: No itching. Current Medications:          Past Medical History:   Diagnosis Date    Anemia     normal on pre-op 2012 and 13    Backache     h/o    Bronchiectasis ([address] 2013    Bursitis     bilateral shoulders    Constipation     Diverticulosis of colon     HTN (hypertension)     Hypercalcemia     slightly elevated at 10.8 pre-op 13    Hyperlipidemia     Nodular goiter     bx negative    OA (osteoarthritis)     bilateral thumbs - sees Dr. Diehl Daughters Osteopenia 2013    Pneumonia of right upper lobe due to infectious organism ([address]     Pneumonitis     Dr. Buzz Bundy in 2010 - bx negative    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 ([address]     had one parathyroid removed - now follows with Dr. Sascha Rios    Sinusitis     with seasonal allergies       Past Surgical History:   Procedure Laterality Date    ABDOMINAL EXPLORATION SURGERY  2013    Release of KATHERINE TAMAYO-Dr. Harlan Garcia    APPENDECTOMY      CATARACT REMOVAL WITH IMPLANT Bilateral  ?      SECTION  0194,4007    DILATION AND CURETTAGE OF UTERUS  7621,5826,0726    EXCISION OF PARATHYROID MASS Right 2013    rt Pressure Mother     Arthritis Father     High Blood Pressure Father     Other Father      hay fever           Physical Exam     VITALS:    /69   Pulse 65   Temp 97.9 °F (36.6 °C) (Oral)   Resp 14   Ht 5' 5\" (1.651 m)   Wt 170 lb (77.1 kg)   SpO2 97% Comment: R/A at rest  BMI 28.29 kg/m² Neck Circumference - 13.25   Mallampati - I     Nursing note and vitals reviewed. Constitutional: Patient appears moderately built and moderately nourished. No distress. Patient is oriented to person, place, and time. HENT: Hypertrophied nasal turbinates with pale nasal mucosa bilaterally. Head: Normocephalic and atraumatic. Right Ear: External ear normal.   Left Ear: External ear normal.   Mouth/Throat: Oropharynx is clear and moist.  No oral thrush. Eyes: Conjunctivae are normal. Pupils are equal, round, and reactive to light. No scleral icterus. Neck: Neck supple. No JVD present. No tracheal deviation present. Cardiovascular: Normal rate, regular rhythm, normal heart sounds. No murmur heard. Pulmonary/Chest: Effort normal and breath sounds normal. No stridor. No respiratory distress. No wheezes. No rales. Patient exhibits no tenderness. Abdominal: Soft. Patient exhibits no distension. No tenderness. Musculoskeletal: Normal range of motion. Extremities: Patient exhibits no edema and no tenderness. Lymphadenopathy:  No cervical adenopathy. Neurological: Patient is alert and oriented to person, place, and time. Skin: Skin is warm and dry. Patient is not diaphoretic. Psychiatric: Patient  has a normal mood and affect. Patient behavior is normal.     Neck Circumference - 13.25   Mallampati - I    Diagnostic Data:    Radiological Data:  CT chest done on: 5/18/2012          CT chest:  Accession Number:  Procedure Name:             Exam Date/Time:     [id number]      CTA Chest W/WO Contr        1/10/2014 10:15:24 PM    REPORT     CHEST CTA    IMPRESSION:     1.    Chronic biapical air space 4/19/2017  Operation: Flexible diagnostic fiberoptic bronchoscopy with fluoroscopy. During procedure Bronch washings obtained from right from right upper lobe segments including anterior, posterior and apical. Bronchioalveolar lavage obtained from  right upper lobe anterior segment. Cytology brush specimen was obtained from from right upper lobe anterior segment under fluroscopy guidance. Microbiology brush specimen was obtained from from right upper lobe anterior segment under fluroscopy guidence. Bronch  bronchial washings :  5/7/2017 12:46 PM - [facility], Lab Incoming [facility]   5/30/2017 10:15 AM - [facility], Lab [employer]  Source: bronchial washings   Mycobacterium avium/intracellulare     4/25/2017 11:30 AM - [facility], Lab Incoming [place]   Organism (Abnormal) gram negative cocci Aerobic Culture 200 cfu/ml   If further workup is indicated, contact the microbiology   department. Organism (Abnormal) Viridans streptococcus group Aerobic Culture 400 cfu/ml   If further workup is indicated, contact the microbiology   department. Organism (Abnormal) Haemophilus species Aerobic Culture 200 cfu/ml   If further workup is indicated, contact the microbiology   department. 4/20/2017  7:47 AM - [facility], Lab Incoming [place]   Component Results   Component Value Ref Range & Units Status   Macrophages, BAL 23 % Final   Lymphocytes, BAL 14 % Final   Segmented Neutrophils, BAL 41 % Final   Monocytes, BAL 22 % Final   Performed at [facility]       Connective tissue work up results:  SABAS( Antinuclear antibody): Negative  RA ( Rheumatoid factor): <10  ACE( Angiotensin converting enzyme level):22 ( N)  ESR ( Sed rate):9      Component Results   Component Value Ref Range & Units Status   Aspergillus Ab CF < 1 <1:8 Final   INTERPRETIVE INFORMATION: Aspergillus Ab by Complement   Fixation (CF)   Cross-reactions with dimorphic fungi are not unusual within the   genus Aspergillus.  A negative test does not exclude infection, especially in immuno- compromised patients. Best use of test is   with paired sera taken three weeks apart to detect a rise in titer   against a single antigen. Performed by [address], [address] [facility] [phone]   www. Shanna Jimenes MD - Lab. Director      Blastomyces Antibody CF < 1 <1:8 Final   INTERPRETIVE INFORMATION: Blastomyces Ab by Complement   Fixation(CF)   Less than 40 percent of patients have positive tests when active   disease is present. Frequent cross-reactions occur in patients   with histoplasmosis or coccidioidomycosis. Paired sera may detect   rise in titer to single antigen. COCCIDIODES AB CF < 1 <1:2 Final   INTERPRETIVE INFORMATION: Coccidioides Ab by Complement Fixation   (CF)   Any titer suggests past or current infection. However, greater   than 30% of cases with chronic residual pulmonary disease have   negative CF tests. Titers of less than 1:32 (even as low as 1:2)   may indicate past infection or self-limited disease; titers   greater than or equal to 1:32 may indicate disseminated infection. CF serology may be used to follow therapy. Ab in CSF is   considered diagnostic for coccidioidal meningitis, although 10% of   patients with coccidioidal meningitis will not have antibody in   CSF. Histoplasma Ab Mycelial CF < 1 <1:8 Final   Histoplasma Ab Yeast CF < 1 <1:8 Final   INTERPRETIVE INFORMATION: Histoplasma Abs by                             Complement Fixation (CF)   An antibody titer greater than or equal to 1:8 is generally   considered presumptive evidence of histoplasmosis. Greater than   1:32 or rising titers indicate strong presumptive evidence of   histoplasmosis. The yeast phase is regarded as more sensitive. Approximate 90-95   percent of cases have positive titers to one or both antigens. Titers to mycelial antigen are higher in chronic infection.  Cross   reactions, usually at lower titers, may occur with other

## 2017-12-06 ENCOUNTER — HOSPITAL ENCOUNTER (OUTPATIENT)
Age: 82
Discharge: HOME | End: 2017-12-06
Payer: MEDICARE

## 2017-12-06 LAB
ALBUMIN SERPL-MCNC: 4.4 G/DL (ref 3.5–5.1)
ALP BLD-CCNC: 58 U/L (ref 38–126)
ALT SERPL-CCNC: 28 U/L (ref 11–66)
ANION GAP SERPL CALCULATED.3IONS-SCNC: 17 MEQ/L (ref 8–16)
ANISOCYTOSIS: ABNORMAL
AST SERPL-CCNC: 34 U/L (ref 5–40)
BASOPHILS # BLD: 0.6 %
BASOPHILS ABSOLUTE: 0 THOU/MM3 (ref 0–0.1)
BILIRUB SERPL-MCNC: 0.5 MG/DL (ref 0.3–1.2)
BILIRUBIN DIRECT: < 0.2 MG/DL (ref 0–0.3)
BUN BLDV-MCNC: 19 MG/DL (ref 7–22)
CALCIUM SERPL-MCNC: 10.3 MG/DL (ref 8.5–10.5)
CHLORIDE BLD-SCNC: 102 MEQ/L (ref 98–111)
CO2: 26 MEQ/L (ref 23–33)
CREAT SERPL-MCNC: 0.9 MG/DL (ref 0.4–1.2)
EOSINOPHIL # BLD: 5.9 %
EOSINOPHILS ABSOLUTE: 0.3 THOU/MM3 (ref 0–0.4)
GFR SERPL CREATININE-BSD FRML MDRD: 60 ML/MIN/1.73M2
GLUCOSE BLD-MCNC: 106 MG/DL (ref 70–108)
HCT VFR BLD CALC: 44.2 % (ref 37–47)
HEMOGLOBIN: 14.8 GM/DL (ref 12–16)
LYMPHOCYTES # BLD: 17 %
LYMPHOCYTES ABSOLUTE: 1 THOU/MM3 (ref 1–4.8)
MCH RBC QN AUTO: 28.8 PG (ref 27–31)
MCHC RBC AUTO-ENTMCNC: 33.4 GM/DL (ref 33–37)
MCV RBC AUTO: 86.1 FL (ref 81–99)
MONOCYTES # BLD: 11.1 %
MONOCYTES ABSOLUTE: 0.6 THOU/MM3 (ref 0.4–1.3)
NUCLEATED RED BLOOD CELLS: 0 /100 WBC
PDW BLD-RTO: 14.9 % (ref 11.5–14.5)
PLATELET # BLD: 170 THOU/MM3 (ref 130–400)
PMV BLD AUTO: 8.6 MCM (ref 7.4–10.4)
POTASSIUM SERPL-SCNC: 4.7 MEQ/L (ref 3.5–5.2)
RBC # BLD: 5.14 MILL/MM3 (ref 4.2–5.4)
SEG NEUTROPHILS: 65.4 %
SEGMENTED NEUTROPHILS ABSOLUTE COUNT: 3.7 THOU/MM3 (ref 1.8–7.7)
SODIUM BLD-SCNC: 145 MEQ/L (ref 135–145)
TOTAL PROTEIN: 7.2 G/DL (ref 6.1–8)
WBC # BLD: 5.6 THOU/MM3 (ref 4.8–10.8)

## 2017-12-06 PROCEDURE — 80053 COMPREHEN METABOLIC PANEL: CPT

## 2017-12-06 PROCEDURE — 85025 COMPLETE CBC W/AUTO DIFF WBC: CPT

## 2017-12-06 PROCEDURE — 82248 BILIRUBIN DIRECT: CPT

## 2017-12-06 PROCEDURE — 36415 COLL VENOUS BLD VENIPUNCTURE: CPT

## 2018-02-19 DIAGNOSIS — E03.9 HYPOTHYROIDISM, UNSPECIFIED TYPE: ICD-10-CM

## 2018-02-19 RX ORDER — LEVOTHYROXINE SODIUM 0.07 MG/1
75 TABLET ORAL DAILY
Qty: 30 TABLET | Refills: 3 | Status: SHIPPED | OUTPATIENT
Start: 2018-02-19 | End: 2018-04-29 | Stop reason: SDUPTHER

## 2018-02-21 ENCOUNTER — TELEPHONE (OUTPATIENT)
Dept: FAMILY MEDICINE CLINIC | Age: 83
End: 2018-02-21

## 2018-02-21 DIAGNOSIS — H91.13 PRESBYCUSIS OF BOTH EARS: Primary | ICD-10-CM

## 2018-02-28 ENCOUNTER — HOSPITAL ENCOUNTER (OUTPATIENT)
Dept: AUDIOLOGY | Age: 83
Discharge: HOME | End: 2018-02-28
Payer: MEDICARE

## 2018-02-28 PROCEDURE — 92557 COMPREHENSIVE HEARING TEST: CPT | Performed by: AUDIOLOGIST

## 2018-02-28 PROCEDURE — 92567 TYMPANOMETRY: CPT | Performed by: AUDIOLOGIST

## 2018-03-12 ENCOUNTER — HOSPITAL ENCOUNTER (OUTPATIENT)
Dept: AUDIOLOGY | Age: 83
Discharge: HOME | End: 2018-03-12

## 2018-03-12 PROCEDURE — V5261 HEARING AID, DIGIT, BIN, BTE: HCPCS | Performed by: AUDIOLOGIST

## 2018-03-12 PROCEDURE — V5160 DISPENSING FEE BINAURAL: HCPCS | Performed by: AUDIOLOGIST

## 2018-03-12 NOTE — PROGRESS NOTES
ACCOUNT #: [de-identified]    DIAGNOSIS: H90.3    NEW HEARING AID FITTING: Dispensed Ph[employer] B-50 65 T hearing aid(s) for the right and left ear(s). Explained care, use and insertion. Programmed. Two week check scheduled for 03/26/2018.

## 2018-03-20 ENCOUNTER — OFFICE VISIT (OUTPATIENT)
Dept: INTERNAL MEDICINE CLINIC | Age: 83
End: 2018-03-20
Payer: MEDICARE

## 2018-03-20 VITALS
SYSTOLIC BLOOD PRESSURE: 120 MMHG | BODY MASS INDEX: 28.16 KG/M2 | DIASTOLIC BLOOD PRESSURE: 72 MMHG | RESPIRATION RATE: 16 BRPM | WEIGHT: 169 LBS | HEART RATE: 63 BPM | HEIGHT: 65 IN

## 2018-03-20 DIAGNOSIS — E03.9 HYPOTHYROIDISM (ACQUIRED): Primary | ICD-10-CM

## 2018-03-20 DIAGNOSIS — E04.2 MULTINODULAR GOITER: ICD-10-CM

## 2018-03-20 PROCEDURE — 1090F PRES/ABSN URINE INCON ASSESS: CPT | Performed by: CLINICAL NURSE SPECIALIST

## 2018-03-20 PROCEDURE — G8417 CALC BMI ABV UP PARAM F/U: HCPCS | Performed by: CLINICAL NURSE SPECIALIST

## 2018-03-20 PROCEDURE — G8599 NO ASA/ANTIPLAT THER USE RNG: HCPCS | Performed by: CLINICAL NURSE SPECIALIST

## 2018-03-20 PROCEDURE — 1036F TOBACCO NON-USER: CPT | Performed by: CLINICAL NURSE SPECIALIST

## 2018-03-20 PROCEDURE — 99214 OFFICE O/P EST MOD 30 MIN: CPT | Performed by: CLINICAL NURSE SPECIALIST

## 2018-03-20 PROCEDURE — 4040F PNEUMOC VAC/ADMIN/RCVD: CPT | Performed by: CLINICAL NURSE SPECIALIST

## 2018-03-20 PROCEDURE — 1123F ACP DISCUSS/DSCN MKR DOCD: CPT | Performed by: CLINICAL NURSE SPECIALIST

## 2018-03-20 PROCEDURE — G8399 PT W/DXA RESULTS DOCUMENT: HCPCS | Performed by: CLINICAL NURSE SPECIALIST

## 2018-03-20 PROCEDURE — G8427 DOCREV CUR MEDS BY ELIG CLIN: HCPCS | Performed by: CLINICAL NURSE SPECIALIST

## 2018-03-20 PROCEDURE — G8482 FLU IMMUNIZE ORDER/ADMIN: HCPCS | Performed by: CLINICAL NURSE SPECIALIST

## 2018-03-20 ASSESSMENT — ENCOUNTER SYMPTOMS
SHORTNESS OF BREATH: 1
ABDOMINAL PAIN: 0
EYE REDNESS: 0
DIARRHEA: 0
CONSTIPATION: 0
VOICE CHANGE: 0
WHEEZING: 0
COUGH: 0
CHEST TIGHTNESS: 0
NAUSEA: 0

## 2018-03-20 NOTE — PROGRESS NOTES
EXPLORATION SURGERY  2013    Release of KATHERINE TAMAYO-Dr. Young Later    APPENDECTOMY      CATARACT REMOVAL WITH IMPLANT Bilateral  ?   SECTION  1852,2653    DILATION AND CURETTAGE OF UTERUS  3317,8467,1243    EXCISION OF PARATHYROID MASS Right 2013    rt parathyroidectomy (excision of rt inferior parathyroid mass) exploration of neck     FOOT SURGERY      left    FOOT SURGERY Left 2017    Dr Kavon Horton  6-4-12    left knee    JOINT REPLACEMENT  2014    right knee    MALIGNANT SKIN LESION EXCISION Left 2017    [facility] DORSAL FOOT WITH GRAFT & FS    GOSIA AND BSO  1982    TONSILLECTOMY AND ADENOIDECTOMY  1940 ?     TOTAL KNEE ARTHROPLASTY Right 2014    OIO     Family History   Problem Relation Age of Onset    Diabetes Mother     Thyroid Disease Mother     Arthritis Mother     High Blood Pressure Mother     Arthritis Father     High Blood Pressure Father     Other Father      hay fever     Social History   Substance Use Topics    Smoking status: Never Smoker    Smokeless tobacco: Never Used    Alcohol use 0.0 oz/week      Comment: socially-1 glassof wine 2-3 times a month      Current Outpatient Prescriptions   Medication Sig Dispense Refill    levothyroxine (SYNTHROID) 75 MCG tablet Take 1 tablet by mouth Daily 30 tablet 3    omeprazole (PRILOSEC) 20 MG delayed release capsule Take 1 capsule by mouth daily 30 capsule 11    amLODIPine (NORVASC) 10 MG tablet TAKE 1 TABLET BY MOUTH  DAILY 90 tablet 3    simvastatin (ZOCOR) 20 MG tablet TAKE 1 TABLET BY MOUTH  NIGHTLY 90 tablet 3    spironolactone (ALDACTONE) 50 MG tablet TAKE 1 TABLET BY MOUTH  DAILY 90 tablet 3    Probiotic CAPS Take 2 capsules by mouth daily      fluticasone (FLONASE) 50 MCG/ACT nasal spray 2 sprays by Nasal route daily 1 Bottle 5    rifampin (RIFADIN) 300 MG capsule Take 1 capsule by mouth 2 times daily   0    ethambutol (MYAMBUTOL) 400 MG tablet Take 3 tablets by mouth daily  0    azithromycin (ZITHROMAX) 600 MG tablet Take 1 tablet by mouth daily  0    MULTIPLE VITAMIN PO Take  by mouth daily.  Wheat Dextrin (BENEFIBER PO) Take  by mouth daily. No current facility-administered medications for this visit. Allergies   Allergen Reactions    Percocet [Oxycodone-Acetaminophen] Nausea Only and Other (See Comments)     Affects memory    Sulfa Antibiotics Other (See Comments)     hallucinations    Cefuroxime Diarrhea, Nausea And Vomiting and Other (See Comments)     cramping       Subjective:      Review of Systems   Constitutional: Positive for fatigue. Negative for appetite change, chills and unexpected weight change. HENT: Negative for hearing loss, tinnitus and voice change. Eyes: Negative for redness and visual disturbance. Respiratory: Positive for shortness of breath (with exertion - following with pulmonary & ID - on antibotics ). Negative for cough, chest tightness and wheezing. Cardiovascular: Negative for chest pain, palpitations and leg swelling. Gastrointestinal: Negative for abdominal pain, constipation, diarrhea and nausea. Genitourinary: Negative for difficulty urinating, dysuria, frequency and hematuria. Musculoskeletal: Negative for gait problem, myalgias and neck pain. Skin: Negative for rash. Neurological: Negative for dizziness, tremors, facial asymmetry, weakness, numbness and headaches. Hematological: Negative for adenopathy. Psychiatric/Behavioral: Negative for agitation, confusion, sleep disturbance and suicidal ideas. The patient is not nervous/anxious and is not hyperactive. Objective:     Physical Exam   Constitutional: She is oriented to person, place, and time. She appears well-developed and well-nourished. No distress. HENT:   Head: Normocephalic and atraumatic.    Right Ear: External ear normal.   Left Ear: External ear normal.   Nose: Nose normal.   Eyes: cinacalcet levels get worse. I reviewed pathology report obtained from consultation with the [facility] pathologist.  He was no evidence of malignancy. \"  Most recent calcium level in goal times two    Greater than 50% of visit discussing diagnoses as above, reviewing/ordering labs, addressing medication, answering questions. Plan:      Continue levothyroxine 75 mcg daily. Take in AM with water at least one half hour before food or other medication    Repeat thyroid labs now  Will call patient pending lab work  Will repeat ultrasound one year from previous ultrasound completed 9/2017    Will repeat US prior to next visit    Return in about 6 months (around 9/20/2018).        Electronically signed by JASMEET Luke on 3/20/2018 at 4:38 PM

## 2018-03-26 ENCOUNTER — HOSPITAL ENCOUNTER (OUTPATIENT)
Age: 83
Discharge: HOME | End: 2018-03-26
Payer: MEDICARE

## 2018-03-26 ENCOUNTER — HOSPITAL ENCOUNTER (OUTPATIENT)
Dept: AUDIOLOGY | Age: 83
Discharge: HOME | End: 2018-03-26
Payer: MEDICARE

## 2018-03-26 DIAGNOSIS — E03.9 HYPOTHYROIDISM (ACQUIRED): ICD-10-CM

## 2018-03-26 LAB
T4 FREE: 1.22 NG/DL (ref 0.93–1.76)
TSH SERPL DL<=0.05 MIU/L-ACNC: 5.35 UIU/ML (ref 0.4–4.2)

## 2018-03-26 PROCEDURE — 84443 ASSAY THYROID STIM HORMONE: CPT

## 2018-03-26 PROCEDURE — 9990000010 HC NO CHARGE VISIT: Performed by: AUDIOLOGIST

## 2018-03-26 PROCEDURE — 84439 ASSAY OF FREE THYROXINE: CPT

## 2018-03-26 PROCEDURE — 36415 COLL VENOUS BLD VENIPUNCTURE: CPT

## 2018-03-26 NOTE — PROGRESS NOTES
2 week Hearing Aid Check:  The patient is not doing well with the hearing aids. She feels like her left ear is plugged and and experiencing occlusion in both ears. She thinks that she can hear somewhat better but cannot understand any better. We discussed how far the settings are below target. The hearing aids were completely reset. The patient was much happier with the output and quality. Aided testing was completed and revealed fair outcomes. The push button was activated so that long push attenuates the tiffanie 12 dB for loud situations. Also changed to large open domes in the right ear and medium open domes in the left ear. Follow up to be completed in 2 weeks. In the ear verification to be completed in 2 weeks.

## 2018-03-27 ENCOUNTER — TELEPHONE (OUTPATIENT)
Dept: ENDOCRINOLOGY | Age: 83
End: 2018-03-27

## 2018-03-27 ENCOUNTER — TELEPHONE (OUTPATIENT)
Dept: FAMILY MEDICINE CLINIC | Age: 83
End: 2018-03-27

## 2018-03-27 DIAGNOSIS — E03.9 HYPOTHYROIDISM, UNSPECIFIED TYPE: Primary | ICD-10-CM

## 2018-03-27 RX ORDER — LEVOTHYROXINE SODIUM 0.03 MG/1
TABLET ORAL
Qty: 12 TABLET | Refills: 3 | Status: SHIPPED | OUTPATIENT
Start: 2018-03-27 | End: 2018-04-29 | Stop reason: SDUPTHER

## 2018-03-28 ENCOUNTER — NURSE ONLY (OUTPATIENT)
Dept: FAMILY MEDICINE CLINIC | Age: 83
End: 2018-03-28

## 2018-03-28 DIAGNOSIS — M15.9 OSTEOARTHRITIS OF MULTIPLE JOINTS, UNSPECIFIED OSTEOARTHRITIS TYPE: Primary | ICD-10-CM

## 2018-03-28 PROCEDURE — 96372 THER/PROPH/DIAG INJ SC/IM: CPT | Performed by: FAMILY MEDICINE

## 2018-03-28 RX ORDER — METHYLPREDNISOLONE ACETATE 40 MG/ML
40 INJECTION, SUSPENSION INTRA-ARTICULAR; INTRALESIONAL; INTRAMUSCULAR; SOFT TISSUE ONCE
Status: COMPLETED | OUTPATIENT
Start: 2018-03-28 | End: 2018-03-28

## 2018-03-28 RX ORDER — METHYLPREDNISOLONE ACETATE 80 MG/ML
80 INJECTION, SUSPENSION INTRA-ARTICULAR; INTRALESIONAL; INTRAMUSCULAR; SOFT TISSUE ONCE
Status: COMPLETED | OUTPATIENT
Start: 2018-03-28 | End: 2018-03-28

## 2018-03-28 RX ADMIN — METHYLPREDNISOLONE ACETATE 40 MG: 40 INJECTION, SUSPENSION INTRA-ARTICULAR; INTRALESIONAL; INTRAMUSCULAR; SOFT TISSUE at 10:07

## 2018-03-28 RX ADMIN — METHYLPREDNISOLONE ACETATE 80 MG: 80 INJECTION, SUSPENSION INTRA-ARTICULAR; INTRALESIONAL; INTRAMUSCULAR; SOFT TISSUE at 10:08

## 2018-04-09 ENCOUNTER — HOSPITAL ENCOUNTER (OUTPATIENT)
Dept: AUDIOLOGY | Age: 83
Discharge: HOME | End: 2018-04-09

## 2018-04-09 PROCEDURE — 9990000010 HC NO CHARGE VISIT: Performed by: AUDIOLOGIST

## 2018-04-25 ENCOUNTER — HOSPITAL ENCOUNTER (OUTPATIENT)
Age: 83
Discharge: HOME | End: 2018-04-25
Payer: MEDICARE

## 2018-04-25 DIAGNOSIS — E03.9 HYPOTHYROIDISM, UNSPECIFIED TYPE: ICD-10-CM

## 2018-04-25 LAB
T4 FREE: 1.23 NG/DL (ref 0.93–1.76)
TSH SERPL DL<=0.05 MIU/L-ACNC: 4.53 UIU/ML (ref 0.4–4.2)

## 2018-04-25 PROCEDURE — 84439 ASSAY OF FREE THYROXINE: CPT

## 2018-04-25 PROCEDURE — 82306 VITAMIN D 25 HYDROXY: CPT

## 2018-04-25 PROCEDURE — 36415 COLL VENOUS BLD VENIPUNCTURE: CPT

## 2018-04-25 PROCEDURE — 84443 ASSAY THYROID STIM HORMONE: CPT

## 2018-04-26 LAB — VITAMIN D 25-HYDROXY: 35 NG/ML (ref 30–100)

## 2018-04-29 DIAGNOSIS — E03.9 HYPOTHYROIDISM, UNSPECIFIED TYPE: ICD-10-CM

## 2018-04-29 RX ORDER — LEVOTHYROXINE SODIUM 0.03 MG/1
TABLET ORAL
Qty: 12 TABLET | Refills: 3
Start: 2018-04-29 | End: 2018-05-21 | Stop reason: SDUPTHER

## 2018-04-29 RX ORDER — LEVOTHYROXINE SODIUM 0.07 MG/1
75 TABLET ORAL DAILY
Qty: 30 TABLET | Refills: 3
Start: 2018-04-29 | End: 2018-07-16

## 2018-04-30 ENCOUNTER — TELEPHONE (OUTPATIENT)
Dept: ENDOCRINOLOGY | Age: 83
End: 2018-04-30

## 2018-04-30 DIAGNOSIS — E03.9 HYPOTHYROIDISM, UNSPECIFIED TYPE: Primary | ICD-10-CM

## 2018-05-02 ENCOUNTER — TELEPHONE (OUTPATIENT)
Dept: FAMILY MEDICINE CLINIC | Age: 83
End: 2018-05-02

## 2018-05-02 DIAGNOSIS — I10 ESSENTIAL HYPERTENSION: Primary | ICD-10-CM

## 2018-05-08 ENCOUNTER — HOSPITAL ENCOUNTER (OUTPATIENT)
Age: 83
Discharge: HOME | End: 2018-05-08
Payer: MEDICARE

## 2018-05-08 DIAGNOSIS — I10 ESSENTIAL HYPERTENSION: ICD-10-CM

## 2018-05-08 LAB
CHOLESTEROL, TOTAL: 216 MG/DL (ref 100–199)
HDLC SERPL-MCNC: 143 MG/DL
LDL CHOLESTEROL CALCULATED: 62 MG/DL
TRIGL SERPL-MCNC: 57 MG/DL (ref 0–199)

## 2018-05-08 PROCEDURE — 80061 LIPID PANEL: CPT

## 2018-05-08 PROCEDURE — 36415 COLL VENOUS BLD VENIPUNCTURE: CPT

## 2018-05-09 ENCOUNTER — TELEPHONE (OUTPATIENT)
Dept: FAMILY MEDICINE CLINIC | Age: 83
End: 2018-05-09

## 2018-05-09 DIAGNOSIS — E78.5 DYSLIPIDEMIA: Primary | ICD-10-CM

## 2018-05-09 DIAGNOSIS — E55.9 VITAMIN D DEFICIENCY: ICD-10-CM

## 2018-05-16 ENCOUNTER — OFFICE VISIT (OUTPATIENT)
Dept: FAMILY MEDICINE CLINIC | Age: 83
End: 2018-05-16

## 2018-05-16 VITALS
HEIGHT: 65 IN | WEIGHT: 168.25 LBS | HEART RATE: 68 BPM | DIASTOLIC BLOOD PRESSURE: 78 MMHG | SYSTOLIC BLOOD PRESSURE: 138 MMHG | RESPIRATION RATE: 16 BRPM | BODY MASS INDEX: 28.03 KG/M2

## 2018-05-16 DIAGNOSIS — I10 ESSENTIAL HYPERTENSION: Primary | ICD-10-CM

## 2018-05-16 PROCEDURE — 99214 OFFICE O/P EST MOD 30 MIN: CPT | Performed by: FAMILY MEDICINE

## 2018-05-16 RX ORDER — AMOXICILLIN 500 MG/1
CAPSULE ORAL
Qty: 4 CAPSULE | Refills: 3 | Status: SHIPPED | OUTPATIENT
Start: 2018-05-16 | End: 2018-10-25 | Stop reason: ALTCHOICE

## 2018-05-16 RX ORDER — LORATADINE 10 MG/1
10 TABLET ORAL DAILY
COMMUNITY
End: 2018-10-25 | Stop reason: ALTCHOICE

## 2018-05-16 ASSESSMENT — ENCOUNTER SYMPTOMS
CONSTIPATION: 0
SHORTNESS OF BREATH: 1
COUGH: 1
SINUS PRESSURE: 0

## 2018-05-16 ASSESSMENT — PATIENT HEALTH QUESTIONNAIRE - PHQ9
2. FEELING DOWN, DEPRESSED OR HOPELESS: 1
1. LITTLE INTEREST OR PLEASURE IN DOING THINGS: 0
SUM OF ALL RESPONSES TO PHQ QUESTIONS 1-9: 1
SUM OF ALL RESPONSES TO PHQ9 QUESTIONS 1 & 2: 1

## 2018-05-21 ENCOUNTER — TELEPHONE (OUTPATIENT)
Dept: INTERNAL MEDICINE CLINIC | Age: 83
End: 2018-05-21

## 2018-05-21 RX ORDER — LEVOTHYROXINE SODIUM 0.03 MG/1
TABLET ORAL
Qty: 24 TABLET | Refills: 1 | Status: SHIPPED | OUTPATIENT
Start: 2018-05-21 | End: 2018-10-25 | Stop reason: ALTCHOICE

## 2018-05-23 ENCOUNTER — TELEPHONE (OUTPATIENT)
Dept: FAMILY MEDICINE CLINIC | Age: 83
End: 2018-05-23

## 2018-06-11 ENCOUNTER — TELEPHONE (OUTPATIENT)
Dept: INTERNAL MEDICINE CLINIC | Age: 83
End: 2018-06-11

## 2018-06-11 DIAGNOSIS — Z86.39 PERSONAL HISTORY OF ENDOCRINE DISORDER: Primary | ICD-10-CM

## 2018-06-13 ENCOUNTER — HOSPITAL ENCOUNTER (OUTPATIENT)
Age: 83
Discharge: HOME | End: 2018-06-13
Payer: MEDICARE

## 2018-06-13 ENCOUNTER — TELEPHONE (OUTPATIENT)
Dept: INTERNAL MEDICINE CLINIC | Age: 83
End: 2018-06-13

## 2018-06-13 DIAGNOSIS — E03.9 HYPOTHYROIDISM, UNSPECIFIED TYPE: Primary | ICD-10-CM

## 2018-06-13 DIAGNOSIS — Z86.39 PERSONAL HISTORY OF ENDOCRINE DISORDER: ICD-10-CM

## 2018-06-13 DIAGNOSIS — E03.9 HYPOTHYROIDISM, UNSPECIFIED TYPE: ICD-10-CM

## 2018-06-13 LAB
AVERAGE GLUCOSE: 111 MG/DL (ref 70–126)
HBA1C MFR BLD: 5.7 % (ref 4.4–6.4)
T4 FREE: 1.33 NG/DL (ref 0.93–1.76)
TSH SERPL DL<=0.05 MIU/L-ACNC: 3.07 UIU/ML (ref 0.4–4.2)

## 2018-06-13 PROCEDURE — 83036 HEMOGLOBIN GLYCOSYLATED A1C: CPT

## 2018-06-13 PROCEDURE — 84443 ASSAY THYROID STIM HORMONE: CPT

## 2018-06-13 PROCEDURE — 84439 ASSAY OF FREE THYROXINE: CPT

## 2018-06-13 PROCEDURE — 36415 COLL VENOUS BLD VENIPUNCTURE: CPT

## 2018-06-26 ENCOUNTER — TELEPHONE (OUTPATIENT)
Dept: FAMILY MEDICINE CLINIC | Age: 83
End: 2018-06-26

## 2018-07-14 DIAGNOSIS — E03.9 HYPOTHYROIDISM, UNSPECIFIED TYPE: ICD-10-CM

## 2018-07-16 ENCOUNTER — HOSPITAL ENCOUNTER (OUTPATIENT)
Dept: MRI IMAGING | Age: 83
Discharge: HOME | End: 2018-07-16
Payer: MEDICARE

## 2018-07-16 DIAGNOSIS — H54.7 UNSPECIFIED VISUAL LOSS: ICD-10-CM

## 2018-07-16 DIAGNOSIS — H53.40 FUNCTIONAL VISUAL FIELD LOSS: ICD-10-CM

## 2018-07-16 LAB
CREATININE, WHOLE BLOOD: 1 MG/DL (ref 0.5–1.2)
ESTIMATED GFR, PCACC: 56 ML/MIN/1.73M2

## 2018-07-16 PROCEDURE — 70543 MRI ORBT/FAC/NCK W/O &W/DYE: CPT

## 2018-07-16 PROCEDURE — A9579 GAD-BASE MR CONTRAST NOS,1ML: HCPCS | Performed by: OPHTHALMOLOGY

## 2018-07-16 PROCEDURE — 82565 ASSAY OF CREATININE: CPT

## 2018-07-16 PROCEDURE — 70553 MRI BRAIN STEM W/O & W/DYE: CPT

## 2018-07-16 PROCEDURE — 6360000004 HC RX CONTRAST MEDICATION: Performed by: OPHTHALMOLOGY

## 2018-07-16 RX ORDER — LEVOTHYROXINE SODIUM 0.07 MG/1
TABLET ORAL
Qty: 90 TABLET | Refills: 0 | Status: SHIPPED | OUTPATIENT
Start: 2018-07-16 | End: 2018-09-27 | Stop reason: SDUPTHER

## 2018-07-16 RX ADMIN — GADOTERIDOL 15 ML: 279.3 INJECTION, SOLUTION INTRAVENOUS at 16:00

## 2018-08-03 ENCOUNTER — OFFICE VISIT (OUTPATIENT)
Dept: FAMILY MEDICINE CLINIC | Age: 83
End: 2018-08-03

## 2018-08-03 ENCOUNTER — TELEPHONE (OUTPATIENT)
Dept: FAMILY MEDICINE CLINIC | Age: 83
End: 2018-08-03

## 2018-08-03 VITALS
BODY MASS INDEX: 27.18 KG/M2 | HEART RATE: 62 BPM | SYSTOLIC BLOOD PRESSURE: 128 MMHG | WEIGHT: 163.13 LBS | RESPIRATION RATE: 14 BRPM | HEIGHT: 65 IN | DIASTOLIC BLOOD PRESSURE: 60 MMHG

## 2018-08-03 DIAGNOSIS — I10 ESSENTIAL HYPERTENSION: Primary | ICD-10-CM

## 2018-08-03 DIAGNOSIS — I20.9 ANGINA PECTORIS (HCC): ICD-10-CM

## 2018-08-03 DIAGNOSIS — N25.81 SECONDARY HYPERPARATHYROIDISM (HCC): ICD-10-CM

## 2018-08-03 PROCEDURE — 99213 OFFICE O/P EST LOW 20 MIN: CPT | Performed by: FAMILY MEDICINE

## 2018-08-03 PROCEDURE — 1101F PT FALLS ASSESS-DOCD LE1/YR: CPT | Performed by: FAMILY MEDICINE

## 2018-08-03 RX ORDER — ASPIRIN 81 MG/1
81 TABLET ORAL DAILY
COMMUNITY
Start: 2018-08-03 | End: 2018-09-27

## 2018-08-03 ASSESSMENT — ENCOUNTER SYMPTOMS
CONSTIPATION: 0
SHORTNESS OF BREATH: 1
SINUS PRESSURE: 0

## 2018-08-03 NOTE — PROGRESS NOTES
Subjective:      Patient ID: Anne-Marie Kraus is a 80 y.o. female. HPI  1. She has had vision loss due to the ATB she was on for her lung trouble  2. We reviewed how she has seen several doctors  3. We looked at her results from the [address]   Constitutional: Positive for fatigue. HENT: Negative for sinus pressure. Eyes: Positive for visual disturbance. Respiratory: Positive for shortness of breath. Cardiovascular: Negative for chest pain. Gastrointestinal: Negative for constipation. Genitourinary: Negative. Musculoskeletal: Positive for arthralgias. Skin: Negative for rash. Neurological: Positive for weakness. Negative for headaches. The patient's medications, allergies, past medical problems, surgical, social, and family histories were reviewed and updated as needed. Objective:   Physical Exam   Constitutional: She is oriented to person, place, and time. She appears well-developed and well-nourished. No distress. HENT:   Head: Normocephalic and atraumatic. Eyes: Conjunctivae are normal. No scleral icterus. Neck: No tracheal deviation present. Cardiovascular: Normal rate, regular rhythm and normal heart sounds. Pulmonary/Chest: Effort normal and breath sounds normal.   Musculoskeletal: She exhibits no edema. Neurological: She is alert and oriented to person, place, and time. Skin: Skin is warm and dry. Psychiatric: She has a normal mood and affect. Her behavior is normal.   Blood pressure 128/60, pulse 62, resp. rate 14, height 5' 5\" (1.651 m), weight 163 lb 2 oz (74 kg), not currently breastfeeding. Assessment:      Diagnosis Orders   1.  Essential hypertension  aspirin EC 81 MG EC tablet           Plan:      Begin the coated baby Aspirin   See me back as needed

## 2018-08-22 ENCOUNTER — HOSPITAL ENCOUNTER (OUTPATIENT)
Dept: ULTRASOUND IMAGING | Age: 83
Discharge: HOME | End: 2018-08-22
Payer: MEDICARE

## 2018-08-22 DIAGNOSIS — E04.2 MULTINODULAR GOITER: ICD-10-CM

## 2018-08-22 PROCEDURE — 76536 US EXAM OF HEAD AND NECK: CPT

## 2018-09-05 ENCOUNTER — NURSE ONLY (OUTPATIENT)
Dept: FAMILY MEDICINE CLINIC | Age: 83
End: 2018-09-05

## 2018-09-05 DIAGNOSIS — M13.0 POLYARTHRITIS, UNSPECIFIED: Primary | ICD-10-CM

## 2018-09-05 PROCEDURE — 96372 THER/PROPH/DIAG INJ SC/IM: CPT | Performed by: FAMILY MEDICINE

## 2018-09-05 RX ORDER — METHYLPREDNISOLONE ACETATE 80 MG/ML
80 INJECTION, SUSPENSION INTRA-ARTICULAR; INTRALESIONAL; INTRAMUSCULAR; SOFT TISSUE ONCE
Status: COMPLETED | OUTPATIENT
Start: 2018-09-05 | End: 2018-09-05

## 2018-09-05 RX ORDER — METHYLPREDNISOLONE ACETATE 40 MG/ML
40 INJECTION, SUSPENSION INTRA-ARTICULAR; INTRALESIONAL; INTRAMUSCULAR; SOFT TISSUE ONCE
Status: COMPLETED | OUTPATIENT
Start: 2018-09-05 | End: 2018-09-05

## 2018-09-05 RX ADMIN — METHYLPREDNISOLONE ACETATE 80 MG: 80 INJECTION, SUSPENSION INTRA-ARTICULAR; INTRALESIONAL; INTRAMUSCULAR; SOFT TISSUE at 15:32

## 2018-09-05 RX ADMIN — METHYLPREDNISOLONE ACETATE 40 MG: 40 INJECTION, SUSPENSION INTRA-ARTICULAR; INTRALESIONAL; INTRAMUSCULAR; SOFT TISSUE at 15:31

## 2018-09-05 NOTE — PROGRESS NOTES
Administrations This Visit     methylPREDNISolone acetate (DEPO-MEDROL) injection 40 mg     Admin Date  09/05/2018  15:31 Action  Given Dose  40 mg Route  Intramuscular Site  Dorsogluteal Left Administered By  Jeffrey Dear, RONALDO    Ordering Provider:  Rhianna Clancy MD    Lot#:  [id number]    Patient Supplied?:  No    Comments:  [facility] [id number]

## 2018-09-16 DIAGNOSIS — I10 ESSENTIAL HYPERTENSION: ICD-10-CM

## 2018-09-16 DIAGNOSIS — E78.00 PURE HYPERCHOLESTEROLEMIA: ICD-10-CM

## 2018-09-17 RX ORDER — SPIRONOLACTONE 50 MG/1
TABLET, FILM COATED ORAL
Qty: 90 TABLET | Refills: 3 | Status: SHIPPED | OUTPATIENT
Start: 2018-09-17 | End: 2019-10-09 | Stop reason: SDUPTHER

## 2018-09-17 RX ORDER — AMLODIPINE BESYLATE 10 MG/1
TABLET ORAL
Qty: 90 TABLET | Refills: 3 | Status: SHIPPED | OUTPATIENT
Start: 2018-09-17 | End: 2019-10-09 | Stop reason: SDUPTHER

## 2018-09-17 RX ORDER — SIMVASTATIN 20 MG
TABLET ORAL
Qty: 90 TABLET | Refills: 3 | Status: SHIPPED | OUTPATIENT
Start: 2018-09-17 | End: 2019-10-09 | Stop reason: SDUPTHER

## 2018-09-19 ENCOUNTER — HOSPITAL ENCOUNTER (OUTPATIENT)
Age: 83
Discharge: HOME | End: 2018-09-19
Payer: MEDICARE

## 2018-09-19 DIAGNOSIS — Z86.39 PERSONAL HISTORY OF ENDOCRINE DISORDER: ICD-10-CM

## 2018-09-19 DIAGNOSIS — E03.9 HYPOTHYROIDISM, UNSPECIFIED TYPE: ICD-10-CM

## 2018-09-19 LAB
AVERAGE GLUCOSE: 96 MG/DL (ref 70–126)
HBA1C MFR BLD: 5.2 % (ref 4.4–6.4)
T4 FREE: 2.01 NG/DL (ref 0.93–1.76)
TSH SERPL DL<=0.05 MIU/L-ACNC: 0.31 UIU/ML (ref 0.4–4.2)

## 2018-09-19 PROCEDURE — 36415 COLL VENOUS BLD VENIPUNCTURE: CPT

## 2018-09-19 PROCEDURE — 83036 HEMOGLOBIN GLYCOSYLATED A1C: CPT

## 2018-09-19 PROCEDURE — 84439 ASSAY OF FREE THYROXINE: CPT

## 2018-09-19 PROCEDURE — 84443 ASSAY THYROID STIM HORMONE: CPT

## 2018-09-24 ENCOUNTER — TELEPHONE (OUTPATIENT)
Dept: PULMONOLOGY | Age: 83
End: 2018-09-24

## 2018-09-24 NOTE — TELEPHONE ENCOUNTER
Patient needs to have a test done to see her if she has latent TB. Patient is seeing a specialist in [place] [id number] [phone]. Patient states she has lost most of her eyesight due to the medication that Dr. Gabe Evans put her on. Patient states if you need additional information Teo Vaz the physician would be more than happy to speak with you.

## 2018-09-24 NOTE — TELEPHONE ENCOUNTER
I called Ashley Franco at given [employer] Home/mobile phone number. Unfortunately she  is not available to speak with me at this time. I left a message on her answering service to call back to our office as soon as possible.     -Discussed with MD Chet regarding patient condition and management plan. He is aware of the side effect. He stopped her medication.

## 2018-09-24 NOTE — TELEPHONE ENCOUNTER
She had this test done in the past I.e   PPD test: 4/3/17       If she want to have a new testing to check the current status of her PPD she can have it done from Health department near to her home or she can contact Dr. Annamaria Hinojosa office.

## 2018-09-26 PROBLEM — R05.9 COUGH: Status: RESOLVED | Noted: 2017-04-03 | Resolved: 2018-09-26

## 2018-09-27 ENCOUNTER — OFFICE VISIT (OUTPATIENT)
Dept: INTERNAL MEDICINE CLINIC | Age: 83
End: 2018-09-27
Payer: MEDICARE

## 2018-09-27 ENCOUNTER — TELEPHONE (OUTPATIENT)
Dept: INTERNAL MEDICINE CLINIC | Age: 83
End: 2018-09-27

## 2018-09-27 VITALS
HEART RATE: 56 BPM | HEIGHT: 65 IN | WEIGHT: 162 LBS | SYSTOLIC BLOOD PRESSURE: 120 MMHG | DIASTOLIC BLOOD PRESSURE: 76 MMHG | BODY MASS INDEX: 26.99 KG/M2

## 2018-09-27 DIAGNOSIS — I10 ESSENTIAL HYPERTENSION: ICD-10-CM

## 2018-09-27 DIAGNOSIS — E03.9 HYPOTHYROIDISM, UNSPECIFIED TYPE: ICD-10-CM

## 2018-09-27 DIAGNOSIS — E21.0 PRIMARY HYPERPARATHYROIDISM (HCC): Primary | ICD-10-CM

## 2018-09-27 DIAGNOSIS — E83.52 HYPERCALCEMIA: ICD-10-CM

## 2018-09-27 DIAGNOSIS — E78.5 DYSLIPIDEMIA: ICD-10-CM

## 2018-09-27 DIAGNOSIS — Z23 NEED FOR INFLUENZA VACCINATION: ICD-10-CM

## 2018-09-27 DIAGNOSIS — E04.9 NON-TOXIC NODULAR GOITER: ICD-10-CM

## 2018-09-27 DIAGNOSIS — E89.2 S/P PARATHYROIDECTOMY (HCC): ICD-10-CM

## 2018-09-27 DIAGNOSIS — D35.1 PARATHYROID ADENOMA: ICD-10-CM

## 2018-09-27 DIAGNOSIS — E55.9 VITAMIN D DEFICIENCY: ICD-10-CM

## 2018-09-27 PROCEDURE — G8598 ASA/ANTIPLAT THER USED: HCPCS | Performed by: INTERNAL MEDICINE

## 2018-09-27 PROCEDURE — G8417 CALC BMI ABV UP PARAM F/U: HCPCS | Performed by: INTERNAL MEDICINE

## 2018-09-27 PROCEDURE — 1090F PRES/ABSN URINE INCON ASSESS: CPT | Performed by: INTERNAL MEDICINE

## 2018-09-27 PROCEDURE — 4040F PNEUMOC VAC/ADMIN/RCVD: CPT | Performed by: INTERNAL MEDICINE

## 2018-09-27 PROCEDURE — G8399 PT W/DXA RESULTS DOCUMENT: HCPCS | Performed by: INTERNAL MEDICINE

## 2018-09-27 PROCEDURE — 1101F PT FALLS ASSESS-DOCD LE1/YR: CPT | Performed by: INTERNAL MEDICINE

## 2018-09-27 PROCEDURE — G0008 ADMIN INFLUENZA VIRUS VAC: HCPCS | Performed by: INTERNAL MEDICINE

## 2018-09-27 PROCEDURE — G8427 DOCREV CUR MEDS BY ELIG CLIN: HCPCS | Performed by: INTERNAL MEDICINE

## 2018-09-27 PROCEDURE — 1123F ACP DISCUSS/DSCN MKR DOCD: CPT | Performed by: INTERNAL MEDICINE

## 2018-09-27 PROCEDURE — 1036F TOBACCO NON-USER: CPT | Performed by: INTERNAL MEDICINE

## 2018-09-27 PROCEDURE — 99204 OFFICE O/P NEW MOD 45 MIN: CPT | Performed by: INTERNAL MEDICINE

## 2018-09-27 PROCEDURE — 90662 IIV NO PRSV INCREASED AG IM: CPT | Performed by: INTERNAL MEDICINE

## 2018-09-27 RX ORDER — LEVOTHYROXINE SODIUM 0.07 MG/1
TABLET ORAL
Qty: 90 TABLET | Refills: 3 | Status: SHIPPED | OUTPATIENT
Start: 2018-09-27 | End: 2019-08-06 | Stop reason: SDUPTHER

## 2018-09-27 RX ORDER — LEVOTHYROXINE SODIUM 0.03 MG/1
TABLET ORAL
Qty: 24 TABLET | Refills: 3 | Status: CANCELLED | OUTPATIENT
Start: 2018-09-27

## 2018-09-27 NOTE — PROGRESS NOTES
[facility] PROFESSIONAL SERVS  PHYSICIANS [address] [place] [address], [address]  Dept: [phone]  Dept Fax: [phone]      Chief Complaint   Patient presents with    Hypothyroidism       Patient presents for evaluation of Multiple endocrine issues. I have never seen the patient before. This patient is followed regularly by Dr. Jacqueline Riley  Patient used to see Dr. Estrella Cabrales. 51-year-old female followed for hyperparathyroidism, hypothyroidism and multinodular goiter. This patient was originally diagnosed with hyperparathyroidism in 2013 and underwent surgery for removal of the parathyroid adenoma. She did well until 3 years ago  when it was noted that her calcium was elevated. The patient denies having kidney stones and there is no history of hypercalcemia in her family. She had a DEXA scan last year which was normal.  There is no history of fracture. There is no bone pain.     History of multinodular goiter  She is on Synthroid for hypothyroidism  Past Medical History:   Diagnosis Date    Anemia     normal on pre-op 5/2012 and 12/2/13    Backache     h/o    Bronchiectasis ([address] 2013    Bursitis     bilateral shoulders    Constipation     Diverticulosis of colon     HTN (hypertension)     Hypercalcemia     slightly elevated at 10.8 pre-op 12/2/13    Hyperlipidemia     Nodular goiter     bx negative    OA (osteoarthritis)     bilateral thumbs - sees Dr. Akil Nieves Osteopenia 11/6/2013    Pneumonia of right upper lobe due to infectious organism ([address]     Pneumonitis     Dr. Joe Castelan in 7/2010 - bx negative    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 ([address]     had one parathyroid removed - now follows with Dr. Yana Luz    Sinusitis     with seasonal allergies    Vitamin D deficiency 05/2018       Current Outpatient Prescriptions   Medication Sig Dispense Refill    Cholecalciferol (VITAMIN D3) 5000 units TABS Take by mouth daily      levothyroxine (SYNTHROID) 75 MCG tablet take 1 tablet by mouth once daily 90 tablet 3    amLODIPine (NORVASC) 10 MG tablet TAKE 1 TABLET BY MOUTH  DAILY 90 tablet 3    spironolactone (ALDACTONE) 50 MG tablet TAKE 1 TABLET BY MOUTH  DAILY 90 tablet 3    simvastatin (ZOCOR) 20 MG tablet TAKE 1 TABLET BY MOUTH  NIGHTLY 90 tablet 3    levothyroxine (SYNTHROID) 25 MCG tablet 75 mcg daily with extra 25 mcg two days per week 24 tablet 1    loratadine (CLARITIN) 10 MG tablet Take 10 mg by mouth daily      Probiotic CAPS Take 2 capsules by mouth daily      rifampin (RIFADIN) 300 MG capsule Take 1 capsule by mouth 2 times daily   0    ethambutol (MYAMBUTOL) 400 MG tablet Take 3 tablets by mouth daily  0    azithromycin (ZITHROMAX) 600 MG tablet Take 1 tablet by mouth daily  0    MULTIPLE VITAMIN PO Take  by mouth daily.  Wheat Dextrin (BENEFIBER PO) Take  by mouth daily.  amoxicillin (AMOXIL) 500 MG capsule 4 pills 1 hr before dental procedures 4 capsule 3    fluticasone (FLONASE) 50 MCG/ACT nasal spray 2 sprays by Nasal route daily 1 Bottle 5     No current facility-administered medications for this visit. Past Surgical History:   Procedure Laterality Date    ABDOMINAL EXPLORATION SURGERY  2013    Release of KATHERINE TAMAYO-Dr. Terry Franz    APPENDECTOMY      CATARACT REMOVAL WITH IMPLANT Bilateral  ?   SECTION  9168,4769    DILATION AND CURETTAGE OF UTERUS  [id number],[id number],[id number]    EXCISION OF PARATHYROID MASS Right 2013    rt parathyroidectomy (excision of rt inferior parathyroid mass) exploration of neck     FOOT SURGERY      left    FOOT SURGERY Left 2017    Dr Dumas Needs  6-    left knee    JOINT REPLACEMENT  2014    right knee    MALIGNANT SKIN LESION EXCISION Left 2017    [facility] DORSAL FOOT WITH GRAFT & FS    GOSIA AND BSO  1982    TONSILLECTOMY AND ADENOIDECTOMY  1940 ?     TOTAL KNEE ARTHROPLASTY Right 2014    OIO       Allergies Allergen Reactions    Percocet [Oxycodone-Acetaminophen] Nausea Only and Other (See Comments)     Affects memory    Sulfa Antibiotics Other (See Comments)     hallucinations    Cefuroxime Diarrhea, Nausea And Vomiting and Other (See Comments)     cramping       Social History     Social History    Marital status:      Spouse name: N/A    Number of children: N/A    Years of education: N/A     Occupational History    retired      Social History Main Topics    Smoking status: Never Smoker    Smokeless tobacco: Never Used    Alcohol use 0.0 oz/week      Comment: socially-1 glassof wine 2-3 times a month    Drug use: No    Sexual activity: No     Other Topics Concern    Not on file     Social History Narrative    No narrative on file   2 sons  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 [employer]    Family History   Problem Relation Age of Onset    Diabetes Mother     Thyroid Disease Mother     Arthritis Mother     High Blood Pressure Mother     Arthritis Father     High Blood Pressure Father     Other Father         hay fever       Review of Systems - General ROS: negative  Psychological ROS: negative  Hematological and Lymphatic ROS: negative  Respiratory ROS: no cough, shortness of breath, or wheezing  Cardiovascular ROS: no chest pain or dyspnea on exertion  Gastrointestinal ROS: no abdominal pain, change in bowel habits, or black or bloody stools  Genito-Urinary ROS: no dysuria, trouble voiding, or hematuria  Musculoskeletal ROS: negative  Neurological ROS: no TIA or stroke symptoms  Dermatological ROS: negative    Blood pressure 120/76, pulse 56, height 5' 5\" (1.651 m), weight 162 lb (73.5 kg), not currently breastfeeding.     Physical Examination: General appearance - alert, well appearing, and in no distress  HEENT-head normocephalic, atraumatic  Mental status - alert, oriented to person, place, and time  Neck - supple, no significant adenopathy  Chest - clear to auscultation, no wheezes, rales PTH.  It looks like she may have recurrent parathyroid adenoma or incomplete resection  Recent thyroid ultrasound shows multinodular goiter. None of the nodules, meet the size/morphologic criteria for fine-needle aspiration. I told her I would see her every 6 months  I recommend the flu shot for the patient today.  Risks and benefits were discussed and patient was given the vaccine

## 2018-10-12 ENCOUNTER — TELEPHONE (OUTPATIENT)
Dept: PULMONOLOGY | Age: 83
End: 2018-10-12

## 2018-10-18 DIAGNOSIS — J84.9 ILD (INTERSTITIAL LUNG DISEASE) (HCC): ICD-10-CM

## 2018-10-18 DIAGNOSIS — A31.0 MYCOBACTERIUM AVIUM INFECTION (HCC): ICD-10-CM

## 2018-10-18 DIAGNOSIS — R93.89 ABNORMAL CT OF THE CHEST: ICD-10-CM

## 2018-10-22 ENCOUNTER — HOSPITAL ENCOUNTER (OUTPATIENT)
Dept: AUDIOLOGY | Age: 83
Discharge: HOME | End: 2018-10-22

## 2018-10-22 PROCEDURE — 9990000010 HC NO CHARGE VISIT: Performed by: AUDIOLOGIST

## 2018-10-24 ENCOUNTER — TELEPHONE (OUTPATIENT)
Dept: PULMONOLOGY | Age: 83
End: 2018-10-24

## 2018-10-25 ENCOUNTER — OFFICE VISIT (OUTPATIENT)
Dept: PULMONOLOGY | Age: 83
End: 2018-10-25
Payer: MEDICARE

## 2018-10-25 VITALS
BODY MASS INDEX: 26.33 KG/M2 | HEIGHT: 65 IN | HEART RATE: 111 BPM | SYSTOLIC BLOOD PRESSURE: 116 MMHG | TEMPERATURE: 97.7 F | DIASTOLIC BLOOD PRESSURE: 60 MMHG | WEIGHT: 158 LBS | OXYGEN SATURATION: 96 %

## 2018-10-25 DIAGNOSIS — A31.0 MYCOBACTERIUM AVIUM INFECTION (HCC): ICD-10-CM

## 2018-10-25 DIAGNOSIS — R06.02 SOB (SHORTNESS OF BREATH): Primary | ICD-10-CM

## 2018-10-25 DIAGNOSIS — J84.9 ILD (INTERSTITIAL LUNG DISEASE) (HCC): ICD-10-CM

## 2018-10-25 DIAGNOSIS — J47.1 BRONCHIECTASIS WITH (ACUTE) EXACERBATION (HCC): ICD-10-CM

## 2018-10-25 PROCEDURE — G8417 CALC BMI ABV UP PARAM F/U: HCPCS | Performed by: NURSE PRACTITIONER

## 2018-10-25 PROCEDURE — 4040F PNEUMOC VAC/ADMIN/RCVD: CPT | Performed by: NURSE PRACTITIONER

## 2018-10-25 PROCEDURE — G8399 PT W/DXA RESULTS DOCUMENT: HCPCS | Performed by: NURSE PRACTITIONER

## 2018-10-25 PROCEDURE — 1036F TOBACCO NON-USER: CPT | Performed by: NURSE PRACTITIONER

## 2018-10-25 PROCEDURE — 99214 OFFICE O/P EST MOD 30 MIN: CPT | Performed by: NURSE PRACTITIONER

## 2018-10-25 PROCEDURE — G8598 ASA/ANTIPLAT THER USED: HCPCS | Performed by: NURSE PRACTITIONER

## 2018-10-25 PROCEDURE — 1090F PRES/ABSN URINE INCON ASSESS: CPT | Performed by: NURSE PRACTITIONER

## 2018-10-25 PROCEDURE — 1101F PT FALLS ASSESS-DOCD LE1/YR: CPT | Performed by: NURSE PRACTITIONER

## 2018-10-25 PROCEDURE — G8482 FLU IMMUNIZE ORDER/ADMIN: HCPCS | Performed by: NURSE PRACTITIONER

## 2018-10-25 PROCEDURE — G8427 DOCREV CUR MEDS BY ELIG CLIN: HCPCS | Performed by: NURSE PRACTITIONER

## 2018-10-25 PROCEDURE — 1123F ACP DISCUSS/DSCN MKR DOCD: CPT | Performed by: NURSE PRACTITIONER

## 2018-10-25 RX ORDER — AZITHROMYCIN 250 MG/1
TABLET, FILM COATED ORAL
Qty: 1 PACKET | Refills: 0 | Status: SHIPPED | OUTPATIENT
Start: 2018-10-25 | End: 2018-10-29

## 2018-10-25 RX ORDER — PREDNISONE 10 MG/1
TABLET ORAL
Qty: 30 TABLET | Refills: 0 | Status: SHIPPED | OUTPATIENT
Start: 2018-10-25 | End: 2019-01-22

## 2018-10-25 ASSESSMENT — ENCOUNTER SYMPTOMS
WHEEZING: 1
SHORTNESS OF BREATH: 1
COUGH: 1

## 2018-12-03 ENCOUNTER — HOSPITAL ENCOUNTER (OUTPATIENT)
Age: 83
Discharge: HOME | End: 2018-12-03
Payer: MEDICARE

## 2018-12-03 ENCOUNTER — HOSPITAL ENCOUNTER (OUTPATIENT)
Dept: CT IMAGING | Age: 83
Discharge: HOME | End: 2018-12-03
Payer: MEDICARE

## 2018-12-03 ENCOUNTER — HOSPITAL ENCOUNTER (OUTPATIENT)
Dept: PULMONOLOGY | Age: 83
Discharge: HOME | End: 2018-12-03
Payer: MEDICARE

## 2018-12-03 DIAGNOSIS — R93.89 ABNORMAL CT OF THE CHEST: ICD-10-CM

## 2018-12-03 DIAGNOSIS — J84.9 ILD (INTERSTITIAL LUNG DISEASE) (HCC): ICD-10-CM

## 2018-12-03 DIAGNOSIS — E03.9 HYPOTHYROIDISM, UNSPECIFIED TYPE: ICD-10-CM

## 2018-12-03 DIAGNOSIS — E83.52 HYPERCALCEMIA: ICD-10-CM

## 2018-12-03 DIAGNOSIS — I10 ESSENTIAL HYPERTENSION: ICD-10-CM

## 2018-12-03 DIAGNOSIS — A31.0 MYCOBACTERIUM AVIUM INFECTION (HCC): ICD-10-CM

## 2018-12-03 LAB
CALCIUM IONIZED: 1.31 MMOL/L (ref 1.12–1.32)
PTH INTACT: 67.9 PG/ML (ref 15–65)
TSH SERPL DL<=0.05 MIU/L-ACNC: 1.19 UIU/ML (ref 0.4–4.2)

## 2018-12-03 PROCEDURE — 83970 ASSAY OF PARATHORMONE: CPT

## 2018-12-03 PROCEDURE — 36415 COLL VENOUS BLD VENIPUNCTURE: CPT

## 2018-12-03 PROCEDURE — 71250 CT THORAX DX C-: CPT

## 2018-12-03 PROCEDURE — 82330 ASSAY OF CALCIUM: CPT

## 2018-12-03 PROCEDURE — 94726 PLETHYSMOGRAPHY LUNG VOLUMES: CPT

## 2018-12-03 PROCEDURE — 94010 BREATHING CAPACITY TEST: CPT

## 2018-12-03 PROCEDURE — 84443 ASSAY THYROID STIM HORMONE: CPT

## 2018-12-03 PROCEDURE — 94729 DIFFUSING CAPACITY: CPT

## 2018-12-04 ENCOUNTER — OFFICE VISIT (OUTPATIENT)
Dept: PULMONOLOGY | Age: 83
End: 2018-12-04
Payer: MEDICARE

## 2018-12-04 VITALS
BODY MASS INDEX: 26.66 KG/M2 | HEART RATE: 88 BPM | WEIGHT: 160 LBS | SYSTOLIC BLOOD PRESSURE: 118 MMHG | OXYGEN SATURATION: 95 % | HEIGHT: 65 IN | DIASTOLIC BLOOD PRESSURE: 62 MMHG | TEMPERATURE: 97 F

## 2018-12-04 DIAGNOSIS — J84.10 PULMONARY FIBROSIS (HCC): Primary | ICD-10-CM

## 2018-12-04 DIAGNOSIS — J47.9 BRONCHIECTASIS WITHOUT COMPLICATION (HCC): ICD-10-CM

## 2018-12-04 DIAGNOSIS — A31.0 MYCOBACTERIUM AVIUM INFECTION (HCC): ICD-10-CM

## 2018-12-04 DIAGNOSIS — J30.9 ALLERGIC RHINITIS, UNSPECIFIED SEASONALITY, UNSPECIFIED TRIGGER: ICD-10-CM

## 2018-12-04 DIAGNOSIS — J84.10 PULMONARY FIBROSIS (HCC): ICD-10-CM

## 2018-12-04 PROCEDURE — G8598 ASA/ANTIPLAT THER USED: HCPCS | Performed by: INTERNAL MEDICINE

## 2018-12-04 PROCEDURE — 1036F TOBACCO NON-USER: CPT | Performed by: INTERNAL MEDICINE

## 2018-12-04 PROCEDURE — 4040F PNEUMOC VAC/ADMIN/RCVD: CPT | Performed by: INTERNAL MEDICINE

## 2018-12-04 PROCEDURE — G8417 CALC BMI ABV UP PARAM F/U: HCPCS | Performed by: INTERNAL MEDICINE

## 2018-12-04 PROCEDURE — G8427 DOCREV CUR MEDS BY ELIG CLIN: HCPCS | Performed by: INTERNAL MEDICINE

## 2018-12-04 PROCEDURE — 94618 PULMONARY STRESS TESTING: CPT | Performed by: INTERNAL MEDICINE

## 2018-12-04 PROCEDURE — 1090F PRES/ABSN URINE INCON ASSESS: CPT | Performed by: INTERNAL MEDICINE

## 2018-12-04 PROCEDURE — 99215 OFFICE O/P EST HI 40 MIN: CPT | Performed by: INTERNAL MEDICINE

## 2018-12-04 PROCEDURE — G8399 PT W/DXA RESULTS DOCUMENT: HCPCS | Performed by: INTERNAL MEDICINE

## 2018-12-04 PROCEDURE — G8482 FLU IMMUNIZE ORDER/ADMIN: HCPCS | Performed by: INTERNAL MEDICINE

## 2018-12-04 PROCEDURE — 1123F ACP DISCUSS/DSCN MKR DOCD: CPT | Performed by: INTERNAL MEDICINE

## 2018-12-04 PROCEDURE — 1101F PT FALLS ASSESS-DOCD LE1/YR: CPT | Performed by: INTERNAL MEDICINE

## 2018-12-04 RX ORDER — FLUTICASONE PROPIONATE 50 MCG
2 SPRAY, SUSPENSION (ML) NASAL DAILY
Qty: 1 BOTTLE | Refills: 11 | Status: SHIPPED | OUTPATIENT
Start: 2018-12-04 | End: 2019-03-28

## 2018-12-04 RX ORDER — ALBUTEROL SULFATE 90 UG/1
2 AEROSOL, METERED RESPIRATORY (INHALATION) EVERY 6 HOURS PRN
Qty: 1 INHALER | Refills: 11 | Status: SHIPPED | OUTPATIENT
Start: 2018-12-04 | End: 2019-12-08 | Stop reason: SDUPTHER

## 2018-12-04 NOTE — PROGRESS NOTES
hay fever           Physical Exam     VITALS:    /62 (Site: Left Upper Arm, Position: Sitting, Cuff Size: Large Adult)   Pulse 88   Temp 97 °F (36.1 °C)   Ht 5' 5\" (1.651 m)   Wt 160 lb (72.6 kg)   SpO2 95% Comment: on RA  BMI 26.63 kg/m² Neck Circumference - 13.25   Mallampati - I  Nursing note and vitals reviewed. Constitutional: Patient appears moderately built and moderately nourished. No distress. Patient is oriented to person, place, and time. HENT:   Head: Normocephalic and atraumatic. Right Ear: External ear normal.   Left Ear: External ear normal.   Mouth/Throat: Oropharynx is clear and moist.  No oral thrush. Eyes: Conjunctivae are normal. Pupils are equal, round, and reactive to light. No scleral icterus. Neck: Neck supple. No JVD present. No tracheal deviation present. Cardiovascular: Normal rate, regular rhythm, normal heart sounds. No murmur heard. Pulmonary/Chest: Effort normal and breath sounds normal. No stridor. No respiratory distress. Occasional expiratory wheezes. Bilateral basal rales. Patient exhibits no tenderness. Abdominal: Soft. Patient exhibits no distension. No tenderness. Musculoskeletal: Normal range of motion. Extremities: Patient exhibits no edema and no tenderness. Lymphadenopathy:  No cervical adenopathy. Neurological: Patient is alert and oriented to person, place, and time. Skin: Skin is warm and dry. Patient is not diaphoretic. Psychiatric: Patient  has a normal mood and affect. Patient behavior is normal.       Neck Circumference - 13.25   Mallampati - I    Diagnostic Data:    Radiological Data:  CT chest done on: 5/18/2012          CT chest:  Accession Number:  Procedure Name:             Exam Date/Time:     [id number]      CTA Chest W/WO Contr        1/10/2014 10:15:24 PM    REPORT     CHEST CTA    IMPRESSION:     1. Chronic biapical air space disease, most consistent with scar/atelectasis.      2.   No evidence for paired sera taken three weeks apart to detect a rise in titer   against a single antigen. Performed by [facility],   [facility], [address] [facility] [phone]   www. Michela Sofia MD - Lab. Director      Blastomyces Antibody CF < 1 <1:8 Final   INTERPRETIVE INFORMATION: Blastomyces Ab by Complement   Fixation(CF)   Less than 40 percent of patients have positive tests when active   disease is present. Frequent cross-reactions occur in patients   with histoplasmosis or coccidioidomycosis. Paired sera may detect   rise in titer to single antigen. COCCIDIODES AB CF < 1 <1:2 Final   INTERPRETIVE INFORMATION: Coccidioides Ab by Complement Fixation   (CF)   Any titer suggests past or current infection. However, greater   than 30% of cases with chronic residual pulmonary disease have   negative CF tests. Titers of less than 1:32 (even as low as 1:2)   may indicate past infection or self-limited disease; titers   greater than or equal to 1:32 may indicate disseminated infection. CF serology may be used to follow therapy. Ab in CSF is   considered diagnostic for coccidioidal meningitis, although 10% of   patients with coccidioidal meningitis will not have antibody in   CSF. Histoplasma Ab Mycelial CF < 1 <1:8 Final   Histoplasma Ab Yeast CF < 1 <1:8 Final   INTERPRETIVE INFORMATION: Histoplasma Abs by                             Complement Fixation (CF)   An antibody titer greater than or equal to 1:8 is generally   considered presumptive evidence of histoplasmosis. Greater than   1:32 or rising titers indicate strong presumptive evidence of   histoplasmosis. The yeast phase is regarded as more sensitive. Approximate 90-95   percent of cases have positive titers to one or both antigens. Titers to mycelial antigen are higher in chronic infection. Cross   reactions, usually at lower titers, may occur with other fungal   disease. Rising titers suggest progression of infection.  Skin   tests function is normal.   7. This is a low risk scan. Gated Stress FBP Gated Rest FBP   LVEF % 71      Echocardiogram: 8/2/18:    CONCLUSIONS:  - Exam indication: Shortness of Breath  - The left ventricle is normal in size. Left ventricular systolic function is   hyperdynamic. EF = 77 ± 5% (2D biplane) Normal left ventricular diastolic   function.  - The right ventricle is normal in size. Right ventricular systolic function is   normal.  - The patient has not had a prior CC echocardiographic exam for comparison. Electronically signed by Dom Leyva MD on 8/2/2018 at 4:56:15 PM    Six Minute Walk Test done on 67/6/45 at 6:74 PM    Philomena Radha 8[birth year]8/0236    Six minute walk test done in my office today by my medical assistant Miss. Perkins: Sandra's oxygen saturation at rest on room air was 95%. Her oxygen saturation dropped to 92% on room air with exertion after 6 minute 0 seconds. Patient don't need any oxygen supplementation. 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 She was treated by Dr. Radha Jackson for 13months. Her antibiotics were discontinued due to development of optic neutitis. She is currently not on any treatment. Her pulmonary fibrosis is stable by CT chest. How ever her RLC and DLCO getting worse. -Bronchiectasis noted on HRCT. 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  -Chronic cough due to ILD Vs Bronchiectasis Vs atypical mycobacterial infection-   -Allergic rhinitis under control  -HTN (hypertension)-under control.  -Nodular goiter.  -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 ([address] -Hx of sinusitis. Recommendations/Plan:  -Continue Flonase 50mcg/INH 2sprays each nostril daily. Refills given. -Start patient on Q lanette-Redi Haler 80mcg per actuation inhaler 2 puffs bid. Patient educated about the side effects of Qvar-Redi Haler side effects and mechanism of action.  She

## 2018-12-05 ENCOUNTER — TELEPHONE (OUTPATIENT)
Dept: PULMONOLOGY | Age: 83
End: 2018-12-05

## 2018-12-05 DIAGNOSIS — J47.9 BRONCHIECTASIS WITHOUT COMPLICATION (HCC): Primary | ICD-10-CM

## 2018-12-06 ENCOUNTER — TELEPHONE (OUTPATIENT)
Dept: PULMONOLOGY | Age: 83
End: 2018-12-06

## 2018-12-06 DIAGNOSIS — J47.9 BRONCHIECTASIS WITHOUT COMPLICATION (HCC): Primary | ICD-10-CM

## 2018-12-07 ENCOUNTER — TELEPHONE (OUTPATIENT)
Dept: PULMONOLOGY | Age: 83
End: 2018-12-07

## 2018-12-07 DIAGNOSIS — J47.9 BRONCHIECTASIS WITHOUT COMPLICATION (HCC): ICD-10-CM

## 2019-01-22 ENCOUNTER — HOSPITAL ENCOUNTER (OUTPATIENT)
Dept: CARDIAC REHAB | Age: 84
Setting detail: THERAPIES SERIES
Discharge: HOME | End: 2019-01-22
Payer: MEDICARE

## 2019-01-22 VITALS — WEIGHT: 160.5 LBS | HEIGHT: 64 IN | BODY MASS INDEX: 27.4 KG/M2

## 2019-01-22 PROCEDURE — 97530 THERAPEUTIC ACTIVITIES: CPT

## 2019-01-22 PROCEDURE — 97750 PHYSICAL PERFORMANCE TEST: CPT

## 2019-01-29 ENCOUNTER — HOSPITAL ENCOUNTER (OUTPATIENT)
Dept: CARDIAC REHAB | Age: 84
Setting detail: THERAPIES SERIES
End: 2019-01-29
Payer: MEDICARE

## 2019-01-31 ENCOUNTER — HOSPITAL ENCOUNTER (OUTPATIENT)
Dept: CARDIAC REHAB | Age: 84
Setting detail: THERAPIES SERIES
End: 2019-01-31
Payer: MEDICARE

## 2019-02-05 ENCOUNTER — HOSPITAL ENCOUNTER (OUTPATIENT)
Dept: CARDIAC REHAB | Age: 84
Setting detail: THERAPIES SERIES
Discharge: HOME | End: 2019-02-05
Payer: MEDICARE

## 2019-02-05 PROCEDURE — 97150 GROUP THERAPEUTIC PROCEDURES: CPT

## 2019-02-07 ENCOUNTER — HOSPITAL ENCOUNTER (OUTPATIENT)
Dept: CARDIAC REHAB | Age: 84
Setting detail: THERAPIES SERIES
Discharge: HOME | End: 2019-02-07
Payer: MEDICARE

## 2019-02-07 PROCEDURE — 97750 PHYSICAL PERFORMANCE TEST: CPT

## 2019-02-07 PROCEDURE — 97150 GROUP THERAPEUTIC PROCEDURES: CPT

## 2019-02-12 ENCOUNTER — HOSPITAL ENCOUNTER (OUTPATIENT)
Dept: CARDIAC REHAB | Age: 84
Setting detail: THERAPIES SERIES
Discharge: HOME | End: 2019-02-12
Payer: MEDICARE

## 2019-02-12 PROCEDURE — 97150 GROUP THERAPEUTIC PROCEDURES: CPT

## 2019-02-14 ENCOUNTER — HOSPITAL ENCOUNTER (OUTPATIENT)
Dept: CARDIAC REHAB | Age: 84
Setting detail: THERAPIES SERIES
Discharge: HOME | End: 2019-02-14
Payer: MEDICARE

## 2019-02-14 PROCEDURE — 97150 GROUP THERAPEUTIC PROCEDURES: CPT

## 2019-02-19 ENCOUNTER — HOSPITAL ENCOUNTER (OUTPATIENT)
Dept: CARDIAC REHAB | Age: 84
Setting detail: THERAPIES SERIES
Discharge: HOME | End: 2019-02-19
Payer: MEDICARE

## 2019-02-19 PROCEDURE — 97150 GROUP THERAPEUTIC PROCEDURES: CPT

## 2019-02-19 PROCEDURE — 97530 THERAPEUTIC ACTIVITIES: CPT

## 2019-02-21 ENCOUNTER — HOSPITAL ENCOUNTER (OUTPATIENT)
Dept: CARDIAC REHAB | Age: 84
Setting detail: THERAPIES SERIES
Discharge: HOME | End: 2019-02-21
Payer: MEDICARE

## 2019-02-21 PROCEDURE — 99407 BEHAV CHNG SMOKING > 10 MIN: CPT

## 2019-02-21 PROCEDURE — 97150 GROUP THERAPEUTIC PROCEDURES: CPT

## 2019-02-26 ENCOUNTER — HOSPITAL ENCOUNTER (OUTPATIENT)
Dept: CARDIAC REHAB | Age: 84
Setting detail: THERAPIES SERIES
Discharge: HOME | End: 2019-02-26
Payer: MEDICARE

## 2019-02-26 PROCEDURE — 97150 GROUP THERAPEUTIC PROCEDURES: CPT

## 2019-02-28 ENCOUNTER — HOSPITAL ENCOUNTER (OUTPATIENT)
Dept: CARDIAC REHAB | Age: 84
Setting detail: THERAPIES SERIES
End: 2019-02-28
Payer: MEDICARE

## 2019-03-05 ENCOUNTER — HOSPITAL ENCOUNTER (OUTPATIENT)
Dept: CARDIAC REHAB | Age: 84
Setting detail: THERAPIES SERIES
End: 2019-03-05
Payer: MEDICARE

## 2019-03-05 ENCOUNTER — HOSPITAL ENCOUNTER (OUTPATIENT)
Dept: CARDIAC REHAB | Age: 84
Setting detail: THERAPIES SERIES
Discharge: HOME | End: 2019-03-05
Payer: MEDICARE

## 2019-03-05 PROCEDURE — 97150 GROUP THERAPEUTIC PROCEDURES: CPT

## 2019-03-07 ENCOUNTER — HOSPITAL ENCOUNTER (OUTPATIENT)
Dept: CARDIAC REHAB | Age: 84
Setting detail: THERAPIES SERIES
Discharge: HOME | End: 2019-03-07
Payer: MEDICARE

## 2019-03-07 ENCOUNTER — APPOINTMENT (OUTPATIENT)
Dept: CARDIAC REHAB | Age: 84
End: 2019-03-07
Payer: MEDICARE

## 2019-03-07 PROCEDURE — 97150 GROUP THERAPEUTIC PROCEDURES: CPT

## 2019-03-12 ENCOUNTER — APPOINTMENT (OUTPATIENT)
Dept: CARDIAC REHAB | Age: 84
End: 2019-03-12
Payer: MEDICARE

## 2019-03-12 ENCOUNTER — HOSPITAL ENCOUNTER (OUTPATIENT)
Dept: CARDIAC REHAB | Age: 84
Setting detail: THERAPIES SERIES
Discharge: HOME | End: 2019-03-12
Payer: MEDICARE

## 2019-03-12 PROCEDURE — 97150 GROUP THERAPEUTIC PROCEDURES: CPT

## 2019-03-14 ENCOUNTER — HOSPITAL ENCOUNTER (OUTPATIENT)
Dept: CARDIAC REHAB | Age: 84
Setting detail: THERAPIES SERIES
Discharge: HOME | End: 2019-03-14
Payer: MEDICARE

## 2019-03-14 ENCOUNTER — APPOINTMENT (OUTPATIENT)
Dept: CARDIAC REHAB | Age: 84
End: 2019-03-14
Payer: MEDICARE

## 2019-03-14 PROCEDURE — 97150 GROUP THERAPEUTIC PROCEDURES: CPT

## 2019-03-19 ENCOUNTER — HOSPITAL ENCOUNTER (OUTPATIENT)
Dept: CARDIAC REHAB | Age: 84
Setting detail: THERAPIES SERIES
Discharge: HOME | End: 2019-03-19
Payer: MEDICARE

## 2019-03-19 ENCOUNTER — APPOINTMENT (OUTPATIENT)
Dept: CARDIAC REHAB | Age: 84
End: 2019-03-19
Payer: MEDICARE

## 2019-03-19 PROCEDURE — 97150 GROUP THERAPEUTIC PROCEDURES: CPT

## 2019-03-19 PROCEDURE — 99407 BEHAV CHNG SMOKING > 10 MIN: CPT

## 2019-03-21 ENCOUNTER — HOSPITAL ENCOUNTER (OUTPATIENT)
Dept: CARDIAC REHAB | Age: 84
Setting detail: THERAPIES SERIES
End: 2019-03-21
Payer: MEDICARE

## 2019-03-21 ENCOUNTER — APPOINTMENT (OUTPATIENT)
Dept: CARDIAC REHAB | Age: 84
End: 2019-03-21
Payer: MEDICARE

## 2019-03-26 ENCOUNTER — APPOINTMENT (OUTPATIENT)
Dept: CARDIAC REHAB | Age: 84
End: 2019-03-26
Payer: MEDICARE

## 2019-03-26 ENCOUNTER — HOSPITAL ENCOUNTER (OUTPATIENT)
Dept: CARDIAC REHAB | Age: 84
Setting detail: THERAPIES SERIES
Discharge: HOME | End: 2019-03-26
Payer: MEDICARE

## 2019-03-26 PROCEDURE — 97150 GROUP THERAPEUTIC PROCEDURES: CPT

## 2019-03-26 PROCEDURE — 99407 BEHAV CHNG SMOKING > 10 MIN: CPT

## 2019-03-28 ENCOUNTER — OFFICE VISIT (OUTPATIENT)
Dept: INTERNAL MEDICINE CLINIC | Age: 84
End: 2019-03-28
Payer: MEDICARE

## 2019-03-28 ENCOUNTER — APPOINTMENT (OUTPATIENT)
Dept: CARDIAC REHAB | Age: 84
End: 2019-03-28
Payer: MEDICARE

## 2019-03-28 ENCOUNTER — HOSPITAL ENCOUNTER (OUTPATIENT)
Dept: CARDIAC REHAB | Age: 84
Setting detail: THERAPIES SERIES
Discharge: HOME | End: 2019-03-28
Payer: MEDICARE

## 2019-03-28 ENCOUNTER — HOSPITAL ENCOUNTER (OUTPATIENT)
Age: 84
Discharge: HOME | End: 2019-03-28
Payer: MEDICARE

## 2019-03-28 VITALS
DIASTOLIC BLOOD PRESSURE: 80 MMHG | OXYGEN SATURATION: 98 % | SYSTOLIC BLOOD PRESSURE: 126 MMHG | HEART RATE: 76 BPM | BODY MASS INDEX: 28 KG/M2 | HEIGHT: 64 IN | WEIGHT: 164 LBS

## 2019-03-28 DIAGNOSIS — E89.2 S/P PARATHYROIDECTOMY (HCC): ICD-10-CM

## 2019-03-28 DIAGNOSIS — E83.52 HYPERCALCEMIA: ICD-10-CM

## 2019-03-28 DIAGNOSIS — E04.9 NON-TOXIC NODULAR GOITER: ICD-10-CM

## 2019-03-28 DIAGNOSIS — E03.9 HYPOTHYROIDISM, UNSPECIFIED TYPE: ICD-10-CM

## 2019-03-28 DIAGNOSIS — E55.9 VITAMIN D DEFICIENCY: ICD-10-CM

## 2019-03-28 DIAGNOSIS — D35.1 PARATHYROID ADENOMA: ICD-10-CM

## 2019-03-28 DIAGNOSIS — E21.0 PRIMARY HYPERPARATHYROIDISM (HCC): Primary | ICD-10-CM

## 2019-03-28 DIAGNOSIS — E21.0 PRIMARY HYPERPARATHYROIDISM (HCC): ICD-10-CM

## 2019-03-28 LAB
CALCIUM IONIZED: 1.3 MMOL/L (ref 1.12–1.32)
CALCIUM SERPL-MCNC: 10.9 MG/DL (ref 8.5–10.5)
PTH INTACT: 73.7 PG/ML (ref 15–65)
T4 FREE: 1.67 NG/DL (ref 0.93–1.76)
TSH SERPL DL<=0.05 MIU/L-ACNC: 0.67 UIU/ML (ref 0.4–4.2)
VITAMIN D 25-HYDROXY: 84 NG/ML (ref 30–100)

## 2019-03-28 PROCEDURE — 82306 VITAMIN D 25 HYDROXY: CPT

## 2019-03-28 PROCEDURE — 97150 GROUP THERAPEUTIC PROCEDURES: CPT

## 2019-03-28 PROCEDURE — 84443 ASSAY THYROID STIM HORMONE: CPT

## 2019-03-28 PROCEDURE — 83970 ASSAY OF PARATHORMONE: CPT

## 2019-03-28 PROCEDURE — 36415 COLL VENOUS BLD VENIPUNCTURE: CPT

## 2019-03-28 PROCEDURE — 84439 ASSAY OF FREE THYROXINE: CPT

## 2019-03-28 PROCEDURE — 82330 ASSAY OF CALCIUM: CPT

## 2019-03-28 PROCEDURE — 82310 ASSAY OF CALCIUM: CPT

## 2019-03-28 PROCEDURE — 99214 OFFICE O/P EST MOD 30 MIN: CPT | Performed by: NURSE PRACTITIONER

## 2019-03-28 RX ORDER — LORATADINE 10 MG/1
10 TABLET ORAL DAILY
COMMUNITY
End: 2020-01-02

## 2019-03-28 ASSESSMENT — PATIENT HEALTH QUESTIONNAIRE - PHQ9
SUM OF ALL RESPONSES TO PHQ QUESTIONS 1-9: 0
SUM OF ALL RESPONSES TO PHQ9 QUESTIONS 1 & 2: 0
2. FEELING DOWN, DEPRESSED OR HOPELESS: 0
1. LITTLE INTEREST OR PLEASURE IN DOING THINGS: 0
SUM OF ALL RESPONSES TO PHQ QUESTIONS 1-9: 0

## 2019-04-04 ENCOUNTER — HOSPITAL ENCOUNTER (OUTPATIENT)
Dept: CARDIAC REHAB | Age: 84
Setting detail: THERAPIES SERIES
Discharge: HOME | End: 2019-04-04
Payer: MEDICARE

## 2019-04-04 PROCEDURE — 97530 THERAPEUTIC ACTIVITIES: CPT

## 2019-04-05 ENCOUNTER — HOSPITAL ENCOUNTER (OUTPATIENT)
Dept: WOMENS IMAGING | Age: 84
Discharge: HOME | End: 2019-04-05
Payer: MEDICARE

## 2019-04-05 DIAGNOSIS — E21.0 PRIMARY HYPERPARATHYROIDISM (HCC): ICD-10-CM

## 2019-04-05 DIAGNOSIS — Z13.9 VISIT FOR SCREENING: ICD-10-CM

## 2019-04-05 DIAGNOSIS — E55.9 VITAMIN D DEFICIENCY: ICD-10-CM

## 2019-04-05 DIAGNOSIS — D35.1 PARATHYROID ADENOMA: ICD-10-CM

## 2019-04-05 DIAGNOSIS — E89.2 S/P PARATHYROIDECTOMY (HCC): ICD-10-CM

## 2019-04-05 DIAGNOSIS — E83.52 HYPERCALCEMIA: ICD-10-CM

## 2019-04-05 PROCEDURE — 77080 DXA BONE DENSITY AXIAL: CPT

## 2019-04-05 PROCEDURE — 77063 BREAST TOMOSYNTHESIS BI: CPT

## 2019-04-09 ENCOUNTER — HOSPITAL ENCOUNTER (OUTPATIENT)
Dept: CARDIAC REHAB | Age: 84
Setting detail: THERAPIES SERIES
Discharge: HOME | End: 2019-04-09
Payer: MEDICARE

## 2019-04-09 PROCEDURE — 97150 GROUP THERAPEUTIC PROCEDURES: CPT

## 2019-04-11 ENCOUNTER — OFFICE VISIT (OUTPATIENT)
Dept: FAMILY MEDICINE CLINIC | Age: 84
End: 2019-04-11

## 2019-04-11 ENCOUNTER — HOSPITAL ENCOUNTER (OUTPATIENT)
Dept: CARDIAC REHAB | Age: 84
Setting detail: THERAPIES SERIES
Discharge: HOME | End: 2019-04-11
Payer: MEDICARE

## 2019-04-11 VITALS
RESPIRATION RATE: 14 BRPM | HEIGHT: 64 IN | WEIGHT: 165.5 LBS | SYSTOLIC BLOOD PRESSURE: 138 MMHG | BODY MASS INDEX: 28.25 KG/M2 | HEART RATE: 80 BPM | DIASTOLIC BLOOD PRESSURE: 76 MMHG

## 2019-04-11 DIAGNOSIS — M13.0 POLYARTHRITIS, UNSPECIFIED: Primary | ICD-10-CM

## 2019-04-11 DIAGNOSIS — F41.9 ANXIETY: ICD-10-CM

## 2019-04-11 DIAGNOSIS — I20.9 ANGINA PECTORIS (HCC): ICD-10-CM

## 2019-04-11 PROCEDURE — 99213 OFFICE O/P EST LOW 20 MIN: CPT | Performed by: FAMILY MEDICINE

## 2019-04-11 PROCEDURE — G8399 PT W/DXA RESULTS DOCUMENT: HCPCS | Performed by: FAMILY MEDICINE

## 2019-04-11 PROCEDURE — 4040F PNEUMOC VAC/ADMIN/RCVD: CPT | Performed by: FAMILY MEDICINE

## 2019-04-11 PROCEDURE — 1036F TOBACCO NON-USER: CPT | Performed by: FAMILY MEDICINE

## 2019-04-11 PROCEDURE — G8417 CALC BMI ABV UP PARAM F/U: HCPCS | Performed by: FAMILY MEDICINE

## 2019-04-11 PROCEDURE — 97150 GROUP THERAPEUTIC PROCEDURES: CPT

## 2019-04-11 PROCEDURE — G8427 DOCREV CUR MEDS BY ELIG CLIN: HCPCS | Performed by: FAMILY MEDICINE

## 2019-04-11 PROCEDURE — 1123F ACP DISCUSS/DSCN MKR DOCD: CPT | Performed by: FAMILY MEDICINE

## 2019-04-11 PROCEDURE — 1090F PRES/ABSN URINE INCON ASSESS: CPT | Performed by: FAMILY MEDICINE

## 2019-04-11 PROCEDURE — 96372 THER/PROPH/DIAG INJ SC/IM: CPT | Performed by: FAMILY MEDICINE

## 2019-04-11 RX ORDER — BUSPIRONE HYDROCHLORIDE 5 MG/1
5 TABLET ORAL 2 TIMES DAILY
Qty: 60 TABLET | Refills: 11 | Status: SHIPPED | OUTPATIENT
Start: 2019-04-11 | End: 2019-05-11

## 2019-04-11 RX ORDER — METHYLPREDNISOLONE ACETATE 80 MG/ML
120 INJECTION, SUSPENSION INTRA-ARTICULAR; INTRALESIONAL; INTRAMUSCULAR; SOFT TISSUE ONCE
Status: COMPLETED | OUTPATIENT
Start: 2019-04-11 | End: 2019-04-11

## 2019-04-11 RX ADMIN — METHYLPREDNISOLONE ACETATE 120 MG: 80 INJECTION, SUSPENSION INTRA-ARTICULAR; INTRALESIONAL; INTRAMUSCULAR; SOFT TISSUE at 17:01

## 2019-04-11 ASSESSMENT — ENCOUNTER SYMPTOMS
SINUS PRESSURE: 0
CONSTIPATION: 0
SHORTNESS OF BREATH: 1
COUGH: 1

## 2019-04-11 NOTE — PROGRESS NOTES
Administrations This Visit     methylPREDNISolone acetate (DEPO-MEDROL) injection 120 mg     Admin Date  04/11/2019  17:01 Action  Given Dose  120 mg Route  Intramuscular Site  Dorsogluteal Right Administered By  Dio Foster    Ordering Provider:  Sangeeta Lambert MD    NDC:  2227-2042-37    Lot#:  [id number]    :  [employer]    Patient Supplied?:  No
Plan:      Begin the generic buspar and see me in a week        Brenton Montes MD

## 2019-04-12 ENCOUNTER — HOSPITAL ENCOUNTER (OUTPATIENT)
Dept: WOMENS IMAGING | Age: 84
Discharge: HOME | End: 2019-04-12
Payer: MEDICARE

## 2019-04-12 DIAGNOSIS — R92.1 CALCIFICATION OF LEFT BREAST: ICD-10-CM

## 2019-04-12 PROCEDURE — G0279 TOMOSYNTHESIS, MAMMO: HCPCS

## 2019-04-12 PROCEDURE — 77065 DX MAMMO INCL CAD UNI: CPT

## 2019-04-16 ENCOUNTER — HOSPITAL ENCOUNTER (OUTPATIENT)
Dept: CARDIAC REHAB | Age: 84
Setting detail: THERAPIES SERIES
Discharge: HOME | End: 2019-04-16
Payer: MEDICARE

## 2019-04-16 PROCEDURE — 99407 BEHAV CHNG SMOKING > 10 MIN: CPT

## 2019-04-16 PROCEDURE — 97150 GROUP THERAPEUTIC PROCEDURES: CPT

## 2019-04-16 NOTE — PLAN OF CARE
[address] Facility-Based Therapy for Chronic Lung Conditions  INDIVIDUAL TREATMENT PLAN (ITP)      Name: Nancy Reyes. O.B: 1935  Account Number: [de-identified]  AGE: 80 y.o. Diagnosis:  ILD.   PFT:n/a  Patient Goals:  (x) Breathe better  (x) Increase my endurance  (x) Have more energy  (x) Rely less on others  (x) Ease ADLs  (x) Understand & Use meds correctly  (x) Control cough  (x) Make fewer visits to hospital  (n/a) Quit smoking  (x) Feel less anxious/ have more confidence     Glossary: IN=Pulmonary Rehab  PF= Physical Fitness  TM=Treadmill  AD= [employer] Bike  UBE=Upperbody Ergometry  RT=Resistance Training   PLB=Pursed Lip Breathing     INDIVIDUAL TREATMENT PLAN        INITIAL ASSESSMENT     Day #1 INDIVIDUAL TREATMENT PLAN           PLAN     Day #2-30 INDIVIDUAL TREATMENT PLAN        RE ASSESSMENT     Day #31-60 INDIVIDUAL TREATMENT PLAN        RE ASSESSMENT     Day #61-90 INDIVIDUAL TREATMENT PLAN        RE ASSESSMENT     Day # INDIVIDUAL TREATMENT PLAN           DISCHARGE     Last Day      Date: 1/22/19    Date: 2/21/19    Date: 3/19/19    Date: 4/16/19    Date:     Date:    EXERCISE   INITIAL ASSESSMENT     Prescribed Oxygen Use  None, No home O2     Oxygen Titration  (x) Assess for desaturation        Current Home Exercise:  None    EXERCISE  PLAN        Current Oxygen Use  None     Oxygen Titration  (x) Maintaining >87% Spo2  () Not maintaining -  L/min needed on      Current Home Exercise:  None EXERCISE  RE ASSESSMENT     Current Oxygen Use  None     Oxygen Titration  (x) Maintaining >87% Spo2   () Not maintaining -  L/min needed on      Current Home Exercise:  Frequency: 4 x/wk  Intensity: 4 /10  Duration:  20min  Mode: stretching and marching in place    EXERCISE  RE ASSESSMENT     Current Oxygen Use  None    Oxygen Titration  (x) Maintaining >87% Spo2  () Not maintaining -  L/min needed on      Current Home Exercise:  Frequency: within [facility] hospital above. Exercise Prescription     (x) Pt exercising within [facility]        Frequency:  2-3x/wk in [place], 2-5x/wk home activity     Intensity:  3-5 on [place] Dyspnea/ Fatigue scale     Time:  30-45 total minutes up to 55 minutes max     Type:  Aerobic (TM, AD, UBE, NuStep), 6 ST, RT 1-10# 8-15 reps     Progression:  Increase 30s - 2 min/mode for Aerobic activity, and increase Intensity 2-5% each week if RPE <5, RPD <5, SpO2 >87% and pt is within Critical access hospital above. Exercise Prescription     (x) Pt exercising within [facility]        Frequency:  2-3x/wk in [place], 2-5x/wk home activity     Intensity:  3-5 on [place] Dyspnea/ Fatigue scale     Time:  30-45 total minutes up to 55 minutes max     Type:  Aerobic (TM, AD, UBE, NuStep), 6 ST, RT 1-10# 8-15 reps     Progression:  Increase 30s - 2 min/mode for Aerobic activity up to 35 minutes, and increase Intensity 2-5% each week if RPE <5, RPD <5, SpO2 >87% and pt is within Critical [facility] hospital above. Exercise Prescription     () Pt exercising within [facility]        Frequency:  2-3x/wk in [place], 2-5x/wk home activity     Intensity:  3-5 on [place] Dyspnea/ Fatigue scale     Time:  30-45 total minutes up to 55 minutes max     Type:  Aerobic (TM, AD, UBE, NuStep), 6 ST, RT 1-10# 8-15 reps     Progression:  Increase 30s - 2 min/mode for Aerobic activity up to 35 minutes, and increase Intensity 2-5% each week if RPE <5, RPD <5, SpO2 >87% and pt is within Critical access hospital above. Home Program              (x) Given to patient with current levels, recommendation and guidelines.                                                          Initial Levels:  TM:0.7mph,3min  AD:  level,  min  NuStep:28W, 8 min  UBE:  22W,5min  RT  1#, 8 reps    Current Levels:  AD:0.4 level, 5 min  NuStep: 28W,  10min  UBE: 22W level,  5min  RT  1#, 8 reps    Current Levels:  TM:  N/A  AD:  N/A  NuStep:34W,15 min  UBE:26W,5 min  RT  2#,8-15reps Current Levels:  TM: NA  AD: NA  NuStep: 38W, 15min  UBE: 24W, 5min  RT NA Current Levels:  TM:  mph,  min  AD:  level, min  NuStep:  W,  min  UBE:  level,  min  ST:  min  RT  #,  reps    Final Levels:  TM:  mph,  min  AD:  level,  min  NuStep:  W,  min  UBE:  level,  min  ST:  min  RT  #,  reps      Physical Limitations  Initial Assessment     (x) Weakness  () Inflexible  () Poor posture  () Gait abnormality  () Balance  () Orthopedic Issues    Physical Limitation  Plan        (x) Strengthen through squats and RT  (x) Appropriate stretches shown  ()Verbal cues and reminders for posture and gait given  (x) Proper modalities chosen for ortho issues  (x) Give Home activity / stretches/ Warmup/ [place] info    Physical Limitations Reassessment        (x) Pt progressing  () Pt not progressing Physical Limitations Reassessment        (x) Pt progressing  () Pt not progressing Physical Limitations Reassessment        () Pt progressing  () Pt not progressing       Physical Limitations  Discharge        () Issues Addressed  () PT/OT suggested         Exercise Goals   Initial Assessment:     (x) Start to, and commit to exercising regularly        (x) Learn about home activity recommendations              (x) Increase Functional Capacity shown by increasing 6MWD    Exercise Goals   Plan        -Initiate RTR 2x/week  -Encourage home exercise     -Attend Home Activity EDUCATION class              -Slowly, safely, progress in RTR     Exercise Goals   Reassessment        (x) Pt is coming regularly  () Pt is sporadic        () Pt has had class  (x) Pt has not had class              (x) Pt is progressing  () Pt is not progressing Exercise Goals   Reassessment        (x) Pt is coming regularly  () Pt is sporadic        () Pt has had class  () Pt has not had class              (x) Pt is progressing  () Pt is not progressing Exercise Goals   Reassessment        () Pt is coming regularly  () Pt is sporadic        () Pt has had class  () Pt has not had class              () Pt is progressing  () Pt is not progressing    Exercise Goals Discharge        () GOAL Met?  () GOAL not met -           () Pt had EDUCATION class  Date:   () Pt did not have class        () GOAL Met  See 6MWD above  () GOAL not Met:      Exercise Intervention Initial Assessment        () Transportation needed           (x) EDUCATION needed              (x) Motivation needed    Exercise Intervention/ Education   Plan        () [facility] [employer] set up           (x) EDUCATION:  Home activity class to be given           (x) Positive encouragement, support and motivation to be given    Exercise Intervention/ Education Reassessment        () Pt is attending  () Pt is not attending        () Pt has had class  (x) Pt has not had class        (x) Pt is progressing  () Pt not progressing Exercise Intervention/ Education Reassessment        (x) Pt is attending  () Pt is not attending        (x) Pt has had class  () Pt has not had class        (x) Pt is progressing  () Pt not progressing Exercise Intervention/ Education Reassessment        () Pt is attending  () Pt is not attending        () Pt has had class  () Pt has not had class        () Pt is progressing  () Pt not progressing Exercise Intervention/ Edcuation Discharge        () Pt attended most  () Pt cancelled a lot     () Pt has had class  Date: See above  () Pt did not have class     () Pt progressed and did well  () Pt had difficulty-                       NUTRITION   INITIAL ASSESSMENT     Height:  54   Weight: 160.8 lbs  BMI: 27.6        30 day goal: 159 Lbs (2-4lbs)     (x) Overweight  () Underweight  () Normal  () Teeth issues  () GI issues  (x) Nutrition knowledge needed  () DM    NUTRITION   PLAN        Current Weight:  167lbs           Goal achieved?: N  Next 30 day goal: 165 Lbs    NUTRITION  RE ASSESSMENT     Current Weight:  165.4lbs           Goal achieved?:Y  Next 30 day goal: 163Lbs NUTRITION  RE ASSESSMENT     Current Weight:168.6 lbs           Goal achieved?: N  Next 30 day goal: 165 Lbs NUTRITION  RE ASSESSMENT     Current Weight: lbs           Goal achieved?:  Next 30 day goal: Lbs    NUTRITION DISCHARGE        Current Weight:  lbs  BMI:        Goal achieved?:      Nutrition Goals  Initial Assessment        () Pt is DM.   Increase nutrition knowledge and glucose control through diet and exercise        (x) Learn weight strategies to lose weight           (x) Learn about general nutrition              -Achievable weight goal for the end of the program: 150   Lbs (2-4lbs per month recommended)    Nutrition Goals   Plan        Pt is not diabetic  () DM:  Attend nutrition class and learn about quality carbohydrates and exercises role in DM     (x) Attend nutrition class              (xx) Attend nutrition class              (x) Initiate exercise and give pt hints and tips for weight and will have class for information    Nutrition Goals  Reassessment           () DM: Pt has had class  (x) DM: Pt has not had class               () Pt has had class  (x) Pt has not had class         () Pt has had class  (x) Pt has not had class         () Pt progressing  () Pt not progressing       Nutrition Goals  Reassessment        Pt in not diabetic   () DM: Pt has had class  () DM: Pt has not had class               () Pt has had class  () Pt has not had class         () Pt has had class  () Pt has not had class         () Pt progressing  () Pt not progressing Nutrition Goals  Reassessment           () DM: Pt has had class  () DM: Pt has not had class               () Pt has had class  () Pt has not had class         () Pt has had class  () Pt has not had class         () Pt progressing  () Pt not progressing       Nutrition Goals  Discharge           () Pt had class  Date:  () DM: Pt has not had class              () Pt has had class  Date: See above  () Pt has not had class     () Pt has class  Date: See above  () Pt has not had class        () Final weight goal achieved  () Pt did not reach desired weight goal - (readdressed nutrition and exercise strategies score:  21/40 Psychosocial Goals  Reassessment        () Pt has had class  (x) Pt has not had class           () Pt has had class  (x) Pt has not had class        () Pt has had class  (x) Pt has not had class        () Pt is progressing  () Pt is not progressing           60 day CAT score:  24/40    Psychosocial Goals  Reassessment        (x) Pt has had class  () Pt has not had class           (x) Pt has had class  () Pt has not had class        (x) Pt has had class  () Pt has not had class        (x) Pt is progressing  () Pt is not progressing           90 day CAT score:   20/40    Psychosocial Goals  Reassessment        () Pt has had class  () Pt has not had class           () Pt has had class  () Pt has not had class        () Pt has had class  () Pt has not had class        () Pt is progressing  () Pt is not progressing           120 day CAT score:  /40    Psychosocial Goals Discharge                                   () Pt had class  Date: See above  () Pt did not have class        Post Rehab CAT score below                                Post Rehab  CAT score:   / 40                   OUTCOMES     PHQ-9:  Did pt drop a severity level or maintain in the minimal range?  () Y  () N     UCSD SOB  Did pt decrease their number by 5 or more?  () Y  () N     CAT scores:  Did pt drop by 2 or more points from Pre to Post?  () Y  () N      Psychosocial Intervention/ Education Initial Assessment     () Pt is being treated for depression/ anxiety        (x) Pt would benefit from RTRs Psychosocial Issues Class (Stress, Depression, Anxiety, and Intimacy    Psychosocial  Intervention/ Education Plan        () Pt to contact physician if any severe changes occur in mood        (x) Pt will attend RTRs class    Psychosocial Intervention/ Education Re assessment     (x) Pt is coping well  () Pt is not coping well            () Pt has had class  (x) Pt has not had class Psychosocial Intervention/ Education Re assessment     (x) company contacted for change in Rx   Oxygen Goals   Initial Assessment     (x) Wean, Titrate down, or get off O2 if possible    Oxygen Goals   Plan        (x) Closely monitor SpO2 and HR using pulse oximetry for saturation and HR response and titrate if appropriate    Oxygen Goals  Reassessment        (x) RA  () Pt maintaining FiO2  () Pt tolerating lower O2 needs  () Pt needing more O2    Oxygen Goals  Reassessment        (x) RA  () Pt maintaining FiO2  () Pt tolerating lower O2 needs  () Pt needing more O2    Oxygen Goals  Reassessment        () RA  () Pt maintaining FiO2  () Pt tolerating lower O2 needs  () Pt needing more O2       Oxygen Goals   Discharge        () RA  () Pt maintained FiO2  () Pt was found to need less O2 - notification sent to physician  () Pt was found to need more O2, notification sent to physician      Oxygen Intervention/ Education  Initial Assessment     (x) Learn / Review about O2 use, safety and travel        () Pt does not wear or have home oxygen     Oxygen Intervention/ Education  Plan        (x) Attend O2 Use, safety and travel class           (x) Assess for SpO2 with each exercise    Oxygen Intervention/ Education  Reassessment        () Pt has had class  (x) Pt has not had class        (x) Pt is >87%  () Pt has been found to desaturate - physician notified Oxygen Intervention/ Education  Reassessment        () Pt has had class  (x) Pt has not had class        (x) Pt is >87%  () Pt has been found to desaturate - physician notified Oxygen Intervention/ Education  Reassessment        () Pt has had class  () Pt has not had class        () Pt is >87%  () Pt has been found to desaturate - physician notified    Oxygen Intervention/ Education  Discharge        () Pt had class  Date:   () Pt did not have class        () Pt did well  () Pt needed to be put on oxygen                 TOBACCO USE  INITIAL ASSESSMENT     (x) Pt currently not smoking     () Pt currently smoking  () Pt needs Tobacco Cessation counseling     Pt never smoked.      TOBACCO USE  PLAN        (x) N/A        Does pt want to quit:   Does pt have a quit date:        () Tobacco Cessation counseling Education if applicable        () Encouraged to contact physician for tools/ nicotine replacement etc.    TOBACCO USE  RE ASSESSMENT     (x) N/A        Any change in Tobacco use status (x) N  ()Y           () Pt attended counseling education session              () Pt has contacted physician TOBACCO USE  RE ASSESSMENT     (x) N/A        Any change in Tobacco use status () N  ()Y           () Pt attended counseling education session              () Pt has contacted physician TOBACCO USE  RE ASSESSMENT     () N/A        Any change in Tobacco use status () N  ()Y           () Pt attended counseling education session              () Pt has contacted physician TOBACCO USE  DISCHARGE                 Current Tobacco Use Status:               () Pt attended TC counseling education session  Date:            () Pt contacted physician        NRT product/ medication  :      Tobacco Use Goal  Initial Assessment     (x) Complete Cessation or Maintain Cessation of tobacco products    Tobacco Use Goal Intervention and Education Plan     (x) Encourage pt to fight the urge, call quit line, use techniques learned from friends/ class     () Attend Tobacco Cessation class if needed    Tobacco Use  Reassessment           (x) Pt remains tobacco free                 () Pt has had TC counseling session           () Pt still smoking Tobacco Use  Reassessment           (x) Pt remains tobacco free                 () Pt has had TC counseling session           () Pt still smoking Tobacco Use  Reassessment           () Pt remains tobacco free                 () Pt has had TC counseling session           () Pt still smoking Tobacco Use  Discharge           () Pt remains tobacco free                 () Pt had TC counseling  Date:  See above           () Pt still smoking - gave resources for quitting                    MEDICATIONS  (Oral & Inhaled)  INITIAL ASSESSMENT     Pt reports taking meds properly 100% of the time           (x) Pt is on inhaled medications    MEDICATIONS  PLAN           -Review Rxs purpose, schedule, side effects and importance of compliance during Medication class. Also address Cpap, Vents.     MEDICATIONS  RE ASSESSMENT        Pt reports taking meds properly  100% of the time           () Pt has had class  (x) Pt has not had class MEDICATIONS  RE ASSESSMENT        Pt reports taking meds properly 100% of the time           () Pt has had class  (x) Pt has not had class MEDICATIONS  RE ASSESSMENT        Pt reports taking meds properly  % of the time           () Pt has had class  () Pt has not had class MEDICATIONS  DISCHARGE           Pt reports taking meds properly  % of the time           ()Pt had Med class  Date:  () Pt has not had class         Pt has:  (x) Metered Dose Inhaler  (x) Dry Powder Inhaler  (x) Nebulizer- pt has machine but waiting on meds to arrive.    -Review correct use, timing, technique and cleaning of equipment during Medication class () Pt has had class  (x) Pt has not had class () Pt has had class  (x) Pt has not had class () Pt has had class  () Pt has not had class () Pt had class  Date: See above  () Pt has not had class   Medication Goals  Initial Assessment           (x) Take meds properly 100% of the time     (x) Learn about / Review Rxs, and devices Medication Goals  Intervention & Education  Plan        -Follow Rx instructions and ask if questions     -Attend Medication Education class    Medication Goals  Reassessment              () Readdressed questions on any medications     () Pt has had class  (x) Pt has not had class Medication Goals  Reassessment              () Readdressed questions on any medications     () Pt has had class  (x) Pt has not had class Medication Goals  Reassessment              () Readdressed easier/ able to go longer/ not get as SOB ADL Goals Reassessment           () ADLs getting easier  () ADLs not getting easier        () Pt states activities are getting easier/ able to go longer/ not get as SOB    ADL Goals   Discharge           Goal achieved  () Yes  () No           Goal achieved  () Yes  () No                Outcomes:  See Health and Functioning from the CAT tool and Strength and Endurance from 6 MWD above                       EXACERBATION PREVENTION & MANAGEMENT  INITIAL ASSESSMENT     Pt reports;  (x) 0 respiratory infections in last year  () 1   () >2  () Hospitalized in last 12 months    EXACERBATION PREVENTION & MANAGEMENT  PLAN        Address via Disease Self Management and Exacerbation prevention class:     -Hydration for mucus     -Hand Hygiene              -Evaluation of Sputum     -When to call MD  -S/Sx to report  -Decrease exposure to irritants/ exacerbators     -Cleaning of respiratory equipment    EXACERBATION PREVENTION & MANAGEMENT  RE ASSESSMENT     () Pt has had class  (x) Pt has not had class        () Improved hydration     () Correct hand washing techs and alcohol gel usage     () Sputum assessment     () Ability to recognize exacerbation and when to call MD           () Cleaning of respiratory equipment EXACERBATION PREVENTION & MANAGEMENT  RE ASSESSMENT     () Pt has had class  (x) Pt has not had class        () Improved hydration     () Correct hand washing techs and alcohol gel usage     () Sputum assessment     () Ability to recognize exacerbation and when to call MD           () Cleaning of respiratory equipment EXACERBATION PREVENTION & MANAGEMENT  RE ASSESSMENT     () Pt has had class  () Pt has not had class        () Improved hydration     () Correct hand washing techs and alcohol gel usage     () Sputum assessment     () Ability to recognize exacerbation and when to call MD           () Cleaning of respiratory equipment EXACERBATION PREVENTION & MANAGEMENT  DISCHARGE        () Pt has had class  Date:  () Pt did not have class                    () Addressed questions and pt feels comfortable with hydration, hand washing, sputum assessment, exacerbation knowledge, and cleaning of equipment   Exacerbation Prevention & Management Goals  Initial Assessment           (x) Learn ways to stay healthy and not get admitted              (x) Increase knowledge of Lungs, Breathing, Exercise, Nutrition, Mood and controlling anxiety, and stopping the Dyspnea Cycle     Exacerbation Prevention & Management Goals Intervention/ Education  Plan        -Attend class and demonstrate disease self management strategies        -Attend all appropriate classes                            Exacerbation Prevention & Management Goals  Reassessment              () Pt has had class  (x) Pt has not had class           () Pt has had all classes  () Pt has had most classes  (x) Pt has had little/ not staying for education Exacerbation Prevention & Management Goals  Reassessment              () Pt has had class  (x) Pt has not had class           () Pt has had all classes  (x) Pt has had most classes  () Pt has had little/ not staying for education Exacerbation Prevention & Management Goals  Reassessment              () Pt has had class  () Pt has not had class           () Pt has had all classes  () Pt has had most classes  () Pt has had little/ not staying for education Exacerbation Prevention & Management Goals  Discharge              () Pt had class  Date:  See above  () Pt did not have class           () Pts attended all relevant classes and all  Questions were answered                               PHYSICIAN INTERACTION     MD Initial Assessment Review     (x) Initial  Assessment Reviewed  (x) Treatment Plan and Goals support patient needs/ abilities                          Cosigned and dated below:    PHYSICIAN INTERACTION     MD 30 day and Plan Review:        (x) I have seen the patient in rehab during this 30 day reassessment  (x) I agree with the plan  (x) Continue with progression and instruction  () Continue, but with the following changes:        Cosigned and dated below: Maryann Akers MD 30 day Reassessment Review:     (x) I have seen the patient in rehab during this 30 day  reassessment  (x) Continue with the current program  () Continue, but with the following changes:  () Discharge patient        Cosigned and dated below: Maryann Akers MD 30 day Reassessment Review:     (x) I have seen the patient in rehab during this 30 day  reassessment  (x) Continue with the current program  () Continue, but with the following changes:  () Discharge patient        Cosigned and dated below: PHYSICIAN INTERACTION     MD 30 day Reassessment Review:     (x) I have seen the patient in rehab during this 30 day reassessment  (x) Continue with the current program  () Continue, but with the following changes:  () Discharge patient        Cosigned and dated below: Maryann Akers MD 30 day   Discharge Review:     (x) Discharge patient                                         Cosigned and dated below:      Date/Time User Provider Type Action   1/22/2019  4:28 PM Татьяна German MD Physician Cosign   1/22/2019  1:47 PM Joann Tate RCP Respiratory Therapist Sign   1/22/2019  1:40 PM Joann Tate RCP Respiratory Therapist Sign      Cosigned by: Татьяна German MD at 3/4/2019 11:04 AM                 Cosigned by: Татьяна German MD at 3/20/2019 10:41 AM

## 2019-04-18 ENCOUNTER — HOSPITAL ENCOUNTER (OUTPATIENT)
Dept: CARDIAC REHAB | Age: 84
Setting detail: THERAPIES SERIES
Discharge: HOME | End: 2019-04-18
Payer: MEDICARE

## 2019-04-18 ENCOUNTER — OFFICE VISIT (OUTPATIENT)
Dept: FAMILY MEDICINE CLINIC | Age: 84
End: 2019-04-18

## 2019-04-18 VITALS
BODY MASS INDEX: 28.21 KG/M2 | RESPIRATION RATE: 13 BRPM | WEIGHT: 165.25 LBS | DIASTOLIC BLOOD PRESSURE: 68 MMHG | SYSTOLIC BLOOD PRESSURE: 128 MMHG | HEIGHT: 64 IN | HEART RATE: 84 BPM

## 2019-04-18 DIAGNOSIS — I20.9 ANGINA PECTORIS (HCC): ICD-10-CM

## 2019-04-18 DIAGNOSIS — I10 ESSENTIAL HYPERTENSION: Primary | ICD-10-CM

## 2019-04-18 DIAGNOSIS — F41.9 ANXIETY: ICD-10-CM

## 2019-04-18 PROCEDURE — G8399 PT W/DXA RESULTS DOCUMENT: HCPCS | Performed by: FAMILY MEDICINE

## 2019-04-18 PROCEDURE — 4040F PNEUMOC VAC/ADMIN/RCVD: CPT | Performed by: FAMILY MEDICINE

## 2019-04-18 PROCEDURE — 1123F ACP DISCUSS/DSCN MKR DOCD: CPT | Performed by: FAMILY MEDICINE

## 2019-04-18 PROCEDURE — 1090F PRES/ABSN URINE INCON ASSESS: CPT | Performed by: FAMILY MEDICINE

## 2019-04-18 PROCEDURE — G8417 CALC BMI ABV UP PARAM F/U: HCPCS | Performed by: FAMILY MEDICINE

## 2019-04-18 PROCEDURE — 1036F TOBACCO NON-USER: CPT | Performed by: FAMILY MEDICINE

## 2019-04-18 PROCEDURE — G8427 DOCREV CUR MEDS BY ELIG CLIN: HCPCS | Performed by: FAMILY MEDICINE

## 2019-04-18 PROCEDURE — 99213 OFFICE O/P EST LOW 20 MIN: CPT | Performed by: FAMILY MEDICINE

## 2019-04-18 ASSESSMENT — ENCOUNTER SYMPTOMS
SHORTNESS OF BREATH: 0
CONSTIPATION: 0
SINUS PRESSURE: 0

## 2019-04-23 ENCOUNTER — HOSPITAL ENCOUNTER (OUTPATIENT)
Dept: CARDIAC REHAB | Age: 84
Setting detail: THERAPIES SERIES
Discharge: HOME | End: 2019-04-23
Payer: MEDICARE

## 2019-04-23 PROCEDURE — 97150 GROUP THERAPEUTIC PROCEDURES: CPT

## 2019-04-24 ENCOUNTER — HOSPITAL ENCOUNTER (OUTPATIENT)
Dept: WOMENS IMAGING | Age: 84
Discharge: HOME | End: 2019-04-24
Payer: MEDICARE

## 2019-04-24 ENCOUNTER — HOSPITAL ENCOUNTER (OUTPATIENT)
Dept: WOMENS IMAGING | Age: 84
End: 2019-04-24
Payer: MEDICARE

## 2019-04-24 DIAGNOSIS — R92.1 BREAST CALCIFICATIONS: ICD-10-CM

## 2019-04-24 DIAGNOSIS — N63.0 BREAST NODULE: ICD-10-CM

## 2019-04-24 PROCEDURE — 2709999900 HC NON-CHARGEABLE SUPPLY

## 2019-04-24 PROCEDURE — A4648 IMPLANTABLE TISSUE MARKER: HCPCS

## 2019-04-24 PROCEDURE — 88305 TISSUE EXAM BY PATHOLOGIST: CPT

## 2019-04-24 PROCEDURE — 2720000010 HC SURG SUPPLY STERILE

## 2019-04-24 PROCEDURE — 19081 BX BREAST 1ST LESION STRTCTC: CPT

## 2019-04-24 PROCEDURE — G0279 TOMOSYNTHESIS, MAMMO: HCPCS

## 2019-04-24 NOTE — PROGRESS NOTES
[facility] [address]  Pre-Biopsy Assessment      Patient Education    Written information about procedure Yes  left   Procedural steps explained Yes Stereotactic Biopsy   Post-op potential: bruising, hematoma, pain Yes    Self-care: activity, care of dressing Yes    Patient verbalized understanding Yes    Consent signed and witnessed Yes      Hormone Therapy Status: post-menopausal    Recent Medication: N/A                                      Hormone Replacement Therapy: no, hx 20 yrs, last age 79    Previous Breast Biopsy: yes - yrs ago    Previous Diagnosis Cancer: no    Hysterectomy:yes - with ovaries removed age 52    Emotional Status: Nervous    Language or Physical Barriers: none    Comments: n/a      Electronically signed by John Lopez RN on 4/24/2019 at 2:05 PM

## 2019-04-24 NOTE — PROGRESS NOTES
Breast Biopsy Flowsheet/Post-Operative Care    Date of Procedure: 4/24/2019  Physician: Dr. Joe Cha  Technologist: VILLA Henning RT (R)(M)    Biopsy:stereotactic breast biopsy  Lesion type: Non-palpable  Breast: left    Clock face position: Site #1: upper outer, anterior         Primary Method of Detection: Mammogram      Microcalcification's: yes,Increasing Number   Distribution: Grouped    Asymmetry: n/a    Biopsy Method:   Mammotome:    Site # 1    Gauge: 10    # of Passes: 4     Clip: Xiang        Pre-Op Assessment: (BI-RADS)   4. Suspicious Abnormality    Patient Tolerated Procedure: fair, complaints of neck pain and positional vertigo  Complications: minimal bleeding, cancelled 2nd biopsy, nodule not visualized  Comments: n/a    Post Operative Care  Steri strips: Yes  Dressing: Gauze, Tape   Ice Applied to Site:  Yes  Evidence of Bleeding:  No    Pain Verbalized: No      Written Discharge Instructions: Yes  Condition at Discharge: good  Time of Discharge: 1355    Electronically signed by Renetta Manning RN on 4/24/2019 at 2:07 PM

## 2019-04-24 NOTE — PROGRESS NOTES
Formulation and discussion of sedation / procedure plans, risks, benefits, side effects and alternatives with patient and/or responsible adult completed.     Electronically signed by Deejay Baer MD on 4/24/2019 at 1:10 PM

## 2019-04-25 ENCOUNTER — HOSPITAL ENCOUNTER (OUTPATIENT)
Dept: CARDIAC REHAB | Age: 84
Setting detail: THERAPIES SERIES
End: 2019-04-25
Payer: MEDICARE

## 2019-04-30 ENCOUNTER — HOSPITAL ENCOUNTER (OUTPATIENT)
Dept: CARDIAC REHAB | Age: 84
Setting detail: THERAPIES SERIES
Discharge: HOME | End: 2019-04-30
Payer: MEDICARE

## 2019-04-30 PROCEDURE — 97150 GROUP THERAPEUTIC PROCEDURES: CPT

## 2019-04-30 PROCEDURE — 99407 BEHAV CHNG SMOKING > 10 MIN: CPT

## 2019-05-02 ENCOUNTER — HOSPITAL ENCOUNTER (OUTPATIENT)
Dept: CARDIAC REHAB | Age: 84
Setting detail: THERAPIES SERIES
Discharge: HOME | End: 2019-05-02
Payer: MEDICARE

## 2019-05-02 PROCEDURE — 97150 GROUP THERAPEUTIC PROCEDURES: CPT

## 2019-05-06 ENCOUNTER — TELEPHONE (OUTPATIENT)
Dept: FAMILY MEDICINE CLINIC | Age: 84
End: 2019-05-06

## 2019-05-06 NOTE — TELEPHONE ENCOUNTER
I spoke with Isabel Le and will write a note to send to [facility]

## 2019-05-06 NOTE — TELEPHONE ENCOUNTER
Pt dropped off a letter stating that she is going on a trip to [facility] next week and is unable to get a portable nebulizer unit from [address] without a physicians authorization. The original script for her nebulizer is from [facility] but she is not assigned a physician there and her next appt with them isn't until mid-July. She's requesting you fax an approval to [address] at [phone] Attn: Pablo Wei. Or contact her at [phone].

## 2019-05-07 ENCOUNTER — HOSPITAL ENCOUNTER (OUTPATIENT)
Dept: CARDIAC REHAB | Age: 84
Setting detail: THERAPIES SERIES
Discharge: HOME | End: 2019-05-07
Payer: MEDICARE

## 2019-05-07 PROCEDURE — 99407 BEHAV CHNG SMOKING > 10 MIN: CPT

## 2019-05-07 PROCEDURE — 97150 GROUP THERAPEUTIC PROCEDURES: CPT

## 2019-05-07 NOTE — PROGRESS NOTES
PULMONARY REHABILITATION / RTR PROGRAM  EDUCATION SESSION  [address] on [address]  Session: _____    PREVENTING INFECTION - I went over proper handwashing technique and its importance. We also discussed the importance of the flu and pneumonia vaccine and the warning signs of an infection. [address]  and I went over many tips to help him/her be able to do more of the activities they want and need to do. I stressed the importance of \"Plan, Prioritize, Position and Pace\". Also reminded the pt to remember to do  their pursed lip and diaphragmatic breathing as needed to help ease shortness of breath. TRAVEL - We discussed things they need to remember when traveling with chronic lung disease and/or Oxygen. Pt actually has a flight planned in a couple weeks and she feels she is ready. We already had previously discussed her getting a portable nebulizer for her aerosol txs. SLEEP - We discussed tips for sleeping better, signs of a possible sleep disorder and the use of CPAP/BiPAP to treat some sleep disorders. Pt will finishing with rehab next week so I asked her if she had any questions and pt had no questions at this time.        Time Spent:  35 minutes

## 2019-05-09 ENCOUNTER — HOSPITAL ENCOUNTER (OUTPATIENT)
Dept: CARDIAC REHAB | Age: 84
Setting detail: THERAPIES SERIES
Discharge: HOME | End: 2019-05-09
Payer: MEDICARE

## 2019-05-14 ENCOUNTER — HOSPITAL ENCOUNTER (OUTPATIENT)
Dept: CARDIAC REHAB | Age: 84
Setting detail: THERAPIES SERIES
Discharge: HOME | End: 2019-05-14
Payer: MEDICARE

## 2019-05-14 PROCEDURE — 97150 GROUP THERAPEUTIC PROCEDURES: CPT

## 2019-05-14 NOTE — PLAN OF CARE
EXERCISE  DISCHARGE        Current Oxygen Use  None    Oxygen Titration  (x) Maintaining >87% Spo2  () Not maintaining -  L/min needed on      Current Home Exercise:  Frequency: 4x/wk  Intensity: 4 /10  Duration: 15-20 min  Mode: walking          6 Minute Walk Test (6MWT):  O2 used: none, on room air. 675 ft walked = 1.97 METs  SpO2: 92-94%  HR:    RPD: 4  RPE: 4  Number of Breaks:   2   Avg time for break:  1 break 65 sec and other break 58 sec Assist device used:  none              6 Minute Walk Test (6MWT):  O2 used:   ft walked =  METs  SpO2:  %  HR:    RPD:   RPE:  Number of Breaks: Avg time for break:  secs  Assist device used:                  OUTCOMES:  6MWD Improvement:  > 98'?  () Yes  () No      Exercise Prescription     THR: 68 - 109 bpm           Frequency:  2-3x/wk in [place], 1-3x/wk home activity     Intensity:  METs (from 6MWT)        Time:  15-30 total minutes up to 55 minutes max     Type:  Aerobic (TM, AD, UBE, NuStep), 6 ST, RT 1-10# 8-15 reps     Progression:  Increase 30s - 2 min/mode for Aerobic activity, and increase Intensity 2-5% each week if RPE <5, RPD <5, SpO2 >87% and pt is within [facility] above. Exercise Prescription     (x) Pt exercising within THR        Frequency:  2-3x/wk in [place], 1-3x/wk home activity     Intensity:  3-5 on [place] Dyspnea/ Fatigue scale     Time:  15-30 total minutes up to 55 minutes max     Type:  Aerobic (TM, AD, UBE, NuStep), 6 ST, RT 1-10# 8-15 reps     Progression:  Increase 30s - 2 min/mode for Aerobic activity, and increase Intensity 2-5% each week if RPE <5, RPD <5, SpO2 >87% and pt is within [facility] above.  Exercise Prescription     (x) Pt exercising within THR        Frequency:  2-3x/wk in [place], 2-5x/wk home activity     Intensity:  3-5 on [place] Dyspnea/ Fatigue scale     Time:  30-45 total minutes up to 55 minutes max     Type:  Aerobic (TM, AD, UBE, NuStep), 6 ST, RT 1-10# 8-15 reps     Progression:  Increase 30s - 2 min/mode for Aerobic activity, and increase Intensity 2-5% each week if RPE <5, RPD <5, SpO2 >87% and pt is within [facility] above. Exercise Prescription     (x) Pt exercising within THR        Frequency:  2-3x/wk in [place], 2-5x/wk home activity     Intensity:  3-5 on [place] Dyspnea/ Fatigue scale     Time:  30-45 total minutes up to 55 minutes max     Type:  Aerobic (TM, AD, UBE, NuStep), 6 ST, RT 1-10# 8-15 reps     Progression:  Increase 30s - 2 min/mode for Aerobic activity up to 35 minutes, and increase Intensity 2-5% each week if RPE <5, RPD <5, SpO2 >87% and pt is within [facility] above. Home Program              (x) Given to patient with current levels, recommendation and guidelines.                                                          Initial Levels:  TM:0.7mph,3min  AD:  level,  min  NuStep:28W, 8 min  UBE:  22W,5min  RT  1#, 8 reps    Current Levels:  AD:0.4 level, 5 min  NuStep: 28W,  10min  UBE: 22W level,  5min  RT  1#, 8 reps    Current Levels:  TM:  N/A  AD:  N/A  NuStep:34W,15 min  UBE:26W,5 min  RT  2#,8-15reps Current Levels:  TM: NA  AD: NA  NuStep: 38W, 15min  UBE: 24W, 5min  RT NA Final Levels:  TM: NA  AD: NA  NuStep: 46W, 15min  UBE: 28W, 5min  RT NA      Physical Limitations  Initial Assessment     (x) Weakness  () Inflexible  () Poor posture  () Gait abnormality  () Balance  () Orthopedic Issues    Physical Limitation  Plan        (x) Strengthen through squats and RT  (x) Appropriate stretches shown  ()Verbal cues and reminders for posture and gait given  (x) Proper modalities chosen for ortho issues  (x) Give Home activity / stretches/ Warmup/ [place] info    Physical Limitations Reassessment        (x) Pt progressing  () Pt not progressing Physical Limitations Reassessment        (x) Pt progressing  () Pt not progressing Physical Limitations  Discharge        (x) Issues Addressed  () PT/OT suggested         Exercise Goals   Initial Assessment:     (x) Start to, and commit to exercising regularly        (x) Learn about home activity recommendations              (x) Increase Functional Capacity shown by increasing 6MWD    Exercise Goals   Plan        -Initiate RTR 2x/week  -Encourage home exercise     -Attend Home Activity EDUCATION class              -Slowly, safely, progress in RTR     Exercise Goals   Reassessment        (x) Pt is coming regularly  () Pt is sporadic        () Pt has had class  (x) Pt has not had class              (x) Pt is progressing  () Pt is not progressing Exercise Goals   Reassessment        (x) Pt is coming regularly  () Pt is sporadic        () Pt has had class  () Pt has not had class              (x) Pt is progressing  () Pt is not progressing Exercise Goals Discharge        (x) GOAL Met?  () GOAL not met -           (x) Pt had EDUCATION class  Date: 3/26/19  () Pt did not have class        (x) GOAL Met  See 6MWD above  () GOAL not Met:      Exercise Intervention Initial Assessment        () Transportation needed           (x) EDUCATION needed              (x) Motivation needed    Exercise Intervention/ Education   Plan        () [facility] set up           (x) EDUCATION:  Home activity class to be given           (x) Positive encouragement, support and motivation to be given    Exercise Intervention/ Education Reassessment        () Pt is attending  () Pt is not attending        () Pt has had class  (x) Pt has not had class        (x) Pt is progressing  () Pt not progressing Exercise Intervention/ Education Reassessment        (x) Pt is attending  () Pt is not attending        (x) Pt has had class  () Pt has not had class        (x) Pt is progressing  () Pt not progressing Exercise Intervention/ Edcuation Discharge        (x) Pt attended most  () Pt cancelled a lot     (x) Pt has had class  Date: See above  () Pt did not have class     (x) Pt progressed and did well  () Pt had difficulty-                     NUTRITION   INITIAL ASSESSMENT     Height:  54   Weight: 160.8 lbs  BMI: 27.6        30 day goal: 159 Lbs (2-4lbs)     (x) Overweight  () Underweight  () Normal  () Teeth issues  () GI issues  (x) Nutrition knowledge needed  () DM    NUTRITION   PLAN        Current Weight:  167lbs           Goal achieved?: N  Next 30 day goal: 165 Lbs    NUTRITION  RE ASSESSMENT     Current Weight:  165.4lbs           Goal achieved?:Y  Next 30 day goal: 163Lbs NUTRITION  RE ASSESSMENT     Current Weight:168.6 lbs           Goal achieved?: N  Next 30 day goal: 165 Lbs NUTRITION DISCHARGE        Current Weight:  lbs  BMI:        Goal achieved?:      Nutrition Goals  Initial Assessment        () Pt is DM.   Increase nutrition knowledge and glucose control through diet and exercise        (x) Learn weight strategies to lose weight           (x) Learn about general nutrition              -Achievable weight goal for the end of the program: 150   Lbs (2-4lbs per month recommended)    Nutrition Goals   Plan        Pt is not diabetic  () DM:  Attend nutrition class and learn about quality carbohydrates and exercises role in DM     (x) Attend nutrition class              (xx) Attend nutrition class              (x) Initiate exercise and give pt hints and tips for weight and will have class for information    Nutrition Goals  Reassessment           () DM: Pt has had class  (x) DM: Pt has not had class               () Pt has had class  (x) Pt has not had class         () Pt has had class  (x) Pt has not had class         () Pt progressing  () Pt not progressing       Nutrition Goals  Reassessment        Pt in not diabetic   () DM: Pt has had class  () DM: Pt has not had class               () Pt has had class  () Pt has not had class         () Pt has had class  () Pt has not had class         () Pt progressing  () Pt not progressing Nutrition Goals  Discharge           (x) Pt had class  Date: 3/26/19  () DM: Pt has not had class              (x) Pt has had class  Date: See above  () Pt has not had class     (x) Pt has class  Date: See above  () Pt has not had class        () Final weight goal achieved  () Pt did not reach desired weight goal - (readdressed nutrition and exercise strategies for future goal attainment)         Nutrition Intervention/ Education   Initial Assessment     (x) Pt needs Nutrition education class           () Pt states does not need class          Nutrition Intervention/ Education  Plan        (x) Pt will have Nutrition class              () Encourage pt to continue to learn and incorporate self knowledge in to diet choices    Nutrition Intervention/ Education  Reassessment        () Pt has had class  (x) Pt has not had class        () Pt had questions  (x) Pt denies having questions Nutrition Intervention/ Education  Reassessment        (x) Pt has had class  () Pt has not had class        () Pt had questions  (x) Pt denies having questions Nutrition Intervention/ Education   Discharge        (x) Pt has had class  Date: See above  () Pt has not had class - Reason:     (x) Pt had questions that were answered   () Pt denies having questions                  PSYCHOSOCIAL INITIAL ASSESSMENT     Depression:  () Self Reported  () In History  () S/Sx noted  (x) Denies  () On Medication  () Follows physician           (x) Give PHQ-9 Depression screening tool                       Dyspnea:  (x) Give pt UCSD SOB survey     (x) Pt gets SOB during activity and with ADLs           (x) Pt has support from home for the program        () Pt does not have support for the program    PSYCHOSOCIAL  PLAN        (x) Attend Stress/ Depression/ Anxiety Class                          Pre Rehab PHQ-9   Score: 6  1-4 Normal  5-9 Mild  10-14 Mod  15-19 Mod-Sev  >19 Severe        Pre Rehab UCSD SOB Score: 75     (x) Attend classes and attend rehab to increase strength and endurance     -Attend Psychosocial class           () Speak with the patient/ family about ability to commit to the program with undue stress/ anxiety    PSYCHOSOCIAL  RE (x) Pt has had class  () Pt has not had class           (x) Pt has had class  () Pt has not had class        (x) Pt has had class  () Pt has not had class        (x) Pt is progressing  () Pt is not progressing           90 day CAT score:   20/40    Psychosocial Goals Discharge                                   (x) Pt had class  Date: See above  () Pt did not have class        Post Rehab CAT score below                                Post Rehab  CAT score:   18/ 40                 OUTCOMES     PHQ-9:  Did pt drop a severity level or maintain in the minimal range?  (x) Y  () N     UCSD SOB  Did pt decrease their number by 5 or more?  (x) Y  () N     CAT scores:  Did pt drop by 2 or more points from Pre to Post?  (x) Y  () N      Psychosocial Intervention/ Education Initial Assessment     () Pt is being treated for depression/ anxiety        (x) Pt would benefit from RTRs Psychosocial Issues Class (Stress, Depression, Anxiety, and Intimacy    Psychosocial  Intervention/ Education Plan        () Pt to contact physician if any severe changes occur in mood        (x) Pt will attend RTRs class    Psychosocial Intervention/ Education Re assessment     (x) Pt is coping well  () Pt is not coping well            () Pt has had class  (x) Pt has not had class Psychosocial Intervention/ Education Re assessment     (x) Pt is coping well  () Pt is not coping well            (x) Pt has had class  () Pt has not had class Psychosocial Intervention/ Education Discharge     (x) Pt did not need any changes   () Pt needed changes to treatment     (x) Pt had class  Date:  3/26/19  () Pt did not have class            Pain   Initial Assessment     () N/A  Location: arthritis pain in hands  Duration: pretty much all the time  Intensity:  8/10  Type: ache with sharp pain at times    Pain   Plan        () N/A     (x) Pts pain is under control     () Pt encouraged to contact physician for pain control    Pain   Reassessment        () N/A (x) Pts pain is under control     () Pt encouraged to contact physician for pain control Pain   Reassessment        () N/A     (x) Pts pain is under control     () Pt encouraged to contact physician for pain control Pain   Discharge        () N/A     (x) Pts pain is under control     () Pt encouraged to contact physician for pain control               OXYGEN   INITIAL ASSESSMENT     RESTING:  (x) RA  () O2:  L/min  () Continuous  () Breath Actuated  94 % SpO2 / 16 bpm     Prescribed Oxygen Use:  none     DME Company for O2:  n/a       OXYGEN   PLAN        RESTING:  (x) Monitor needs, administer supplemental oxygen if SpO2 <88% OXYGEN   RE ASSESSMENT     RESTING:  (x) Pt SpO2 >87%  () Pt needs higher flow  () Pt needs less flow OXYGEN   RE ASSESSMENT     RESTING:  (x) Pt SpO2 >87%  () Pt needs higher flow  () Pt needs less flow OXYGEN   DISCHARGE        RESTING:  (x) Pt SpO2 remained >87% on * L/min at rest     () Pts doctor and company contacted for change in Rx   Oxygen Goals   Initial Assessment     (x) Wean, Titrate down, or get off O2 if possible    Oxygen Goals   Plan        (x) Closely monitor SpO2 and HR using pulse oximetry for saturation and HR response and titrate if appropriate    Oxygen Goals  Reassessment        (x) RA  () Pt maintaining FiO2  () Pt tolerating lower O2 needs  () Pt needing more O2    Oxygen Goals  Reassessment        (x) RA  () Pt maintaining FiO2  () Pt tolerating lower O2 needs  () Pt needing more O2    Oxygen Goals   Discharge        (x) RA  () Pt maintained FiO2  () Pt was found to need less O2 - notification sent to physician  () Pt was found to need more O2, notification sent to physician      Oxygen Intervention/ Education  Initial Assessment     (x) Learn / Review about O2 use, safety and travel        () Pt does not wear or have home oxygen     Oxygen Intervention/ Education  Plan        (x) Attend O2 Use, safety and travel class           (x) Assess for SpO2 with each exercise    Oxygen Intervention/ Education  Reassessment        () Pt has had class  (x) Pt has not had class        (x) Pt is >87%  () Pt has been found to desaturate - physician notified Oxygen Intervention/ Education  Reassessment        () Pt has had class  (x) Pt has not had class        (x) Pt is >87%  () Pt has been found to desaturate - physician notified Oxygen Intervention/ Education  Discharge        () Pt had class  Date:   (x) Pt did not have class NA        (x) Pt did well  () Pt needed to be put on oxygen               TOBACCO USE  INITIAL ASSESSMENT     (x) Pt currently not smoking     () Pt currently smoking  () Pt needs Tobacco Cessation counseling     Pt never smoked.      TOBACCO USE  PLAN        (x) N/A        Does pt want to quit:   Does pt have a quit date:        () Tobacco Cessation counseling Education if applicable        () Encouraged to contact physician for tools/ nicotine replacement etc.    TOBACCO USE  RE ASSESSMENT     (x) N/A        Any change in Tobacco use status (x) N  ()Y           () Pt attended counseling education session              () Pt has contacted physician TOBACCO USE  RE ASSESSMENT     (x) N/A        Any change in Tobacco use status () N  ()Y           () Pt attended counseling education session              () Pt has contacted physician TOBACCO USE  DISCHARGE                 Current Tobacco Use Status: NA              () Pt attended TC counseling education session  Date:            () Pt contacted physician        NRT product/ medication  :      Tobacco Use Goal  Initial Assessment     (x) Complete Cessation or Maintain Cessation of tobacco products    Tobacco Use Goal Intervention and Education Plan     (x) Encourage pt to fight the urge, call quit line, use techniques learned from friends/ class     () Attend Tobacco Cessation class if needed    Tobacco Use  Reassessment           (x) Pt remains tobacco free                 () Pt has had TC counseling session () Pt still smoking Tobacco Use  Reassessment           (x) Pt remains tobacco free                 () Pt has had TC counseling session           () Pt still smoking Tobacco Use  Discharge           (x) Pt remains tobacco free                 () Pt had TC counseling  Date:  See above           () Pt still smoking - gave resources for quitting                  MEDICATIONS  (Oral & Inhaled)  INITIAL ASSESSMENT     Pt reports taking meds properly 100% of the time           (x) Pt is on inhaled medications    MEDICATIONS  PLAN           -Review Rxs purpose, schedule, side effects and importance of compliance during Medication class. Also address Cpap, Vents.     MEDICATIONS  RE ASSESSMENT        Pt reports taking meds properly  100% of the time           () Pt has had class  (x) Pt has not had class MEDICATIONS  RE ASSESSMENT        Pt reports taking meds properly 100% of the time           () Pt has had class  (x) Pt has not had class MEDICATIONS  DISCHARGE           Pt reports taking meds properly 100 % of the time           (x)Pt had Med class  Date: 4/30/19  () Pt has not had class         Pt has:  (x) Metered Dose Inhaler  (x) Dry Powder Inhaler  (x) Nebulizer- pt has machine but waiting on meds to arrive.    -Review correct use, timing, technique and cleaning of equipment during Medication class () Pt has had class  (x) Pt has not had class () Pt has had class  (x) Pt has not had class (x) Pt had class  Date: See above  () Pt has not had class   Medication Goals  Initial Assessment           (x) Take meds properly 100% of the time     (x) Learn about / Review Rxs, and devices Medication Goals  Intervention & Education  Plan        -Follow Rx instructions and ask if questions     -Attend Medication Education class    Medication Goals  Reassessment              () Readdressed questions on any medications     () Pt has had class  (x) Pt has not had class Medication Goals  Reassessment              () Readdressed questions on any medications     () Pt has had class  (x) Pt has not had class Medication Goals  Discharge              % proper med usage see above         (x) Pt had class  Date: See above  () Pt has not had class                  ADL  INITIAL ASSESSMENT     (x) Impaired ADL ability  () Need for Assist Devices  (x) Generalized weakness, low functional capacity  () Stairs in house                ADL  PLAN        -Initiate NE, RT, and chair squats  -Breathing retraining, PLB, and pacing        -Encourage home activity                   ADL  RE ASSESSMENT     (x) Pt is progressing  () Pt is not progressing           (x) Pt has initiated home activity  () Pt has not yet initiated  home activity-     (x) ADLs getting easier  () ADLs not getting easier    ADL  RE ASSESSMENT     (x) Pt is progressing  () Pt is not progressing           (x) Pt has initiated home activity  () Pt has not yet initiated  home activity-     (x) ADLs getting easier  () ADLs not getting easier ADL  DISCHARGE        (x) Pt showed strength and endurance gains  () Pt did not progress        (x) Pt doing recommended home activity  () Pt not doing home activitiy         ADL Goals   Initial Assessment        (x) Decrease SOB with ADLs              (x) Decreased SOB overall        ADL Goals Intervention/ Education Plan        -Initiate RTR, RT and chair squats and increase pts strength and endurance     -Pacing, Breathing retraining          ADL Goals Reassessment           (x) ADLs getting easier  () ADLs not getting easier        (x) Pt states activities are getting easier/ able to go longer/ not get as SOB ADL Goals Reassessment           (x) ADLs getting easier  () ADLs not getting easier        (x) Pt states activities are getting easier/ able to go longer/ not get as SOB ADL Goals   Discharge           Goal achieved  (x) Yes  () No           Goal achieved  (x) Yes  () No              Outcomes:  See Health and Functioning from the CAT tool and Strength and Endurance from 6 MWD above                     EXACERBATION PREVENTION & MANAGEMENT  INITIAL ASSESSMENT     Pt reports;  (x) 0 respiratory infections in last year  () 1   () >2  () Hospitalized in last 12 months    EXACERBATION PREVENTION & MANAGEMENT  PLAN        Address via Disease Self Management and Exacerbation prevention class:     -Hydration for mucus     -Hand Hygiene              -Evaluation of Sputum     -When to call MD  -S/Sx to report  -Decrease exposure to irritants/ exacerbators     -Cleaning of respiratory equipment    EXACERBATION PREVENTION & MANAGEMENT  RE ASSESSMENT     () Pt has had class  (x) Pt has not had class        () Improved hydration     () Correct hand washing techs and alcohol gel usage     () Sputum assessment     () Ability to recognize exacerbation and when to call MD           () Cleaning of respiratory equipment EXACERBATION PREVENTION & MANAGEMENT  RE ASSESSMENT     () Pt has had class  (x) Pt has not had class        () Improved hydration     () Correct hand washing techs and alcohol gel usage     () Sputum assessment     () Ability to recognize exacerbation and when to call MD           () Cleaning of respiratory equipment EXACERBATION PREVENTION & MANAGEMENT  DISCHARGE        (x) Pt has had class  Date: 5/7/19  () Pt did not have class                    (x) Addressed questions and pt feels comfortable with hydration, hand washing, sputum assessment, exacerbation knowledge, and cleaning of equipment   Exacerbation Prevention & Management Goals  Initial Assessment           (x) Learn ways to stay healthy and not get admitted              (x) Increase knowledge of Lungs, Breathing, Exercise, Nutrition, Mood and controlling anxiety, and stopping the Dyspnea Cycle     Exacerbation Prevention & Management Goals Intervention/ Education  Plan        -Attend class and demonstrate disease self management strategies        -Attend all appropriate classes

## 2019-05-16 ENCOUNTER — HOSPITAL ENCOUNTER (OUTPATIENT)
Dept: CARDIAC REHAB | Age: 84
Setting detail: THERAPIES SERIES
Discharge: HOME | End: 2019-05-16
Payer: MEDICARE

## 2019-05-16 VITALS — HEIGHT: 64 IN | WEIGHT: 165.25 LBS | BODY MASS INDEX: 28.21 KG/M2

## 2019-05-16 PROCEDURE — 97150 GROUP THERAPEUTIC PROCEDURES: CPT

## 2019-05-16 PROCEDURE — 99407 BEHAV CHNG SMOKING > 10 MIN: CPT

## 2019-05-16 NOTE — PLAN OF CARE
[address] Facility-Based Therapy for Chronic Lung Conditions  INDIVIDUAL TREATMENT PLAN (ITP)      Name: Rommel Huerta. O.B: 1935  Account Number: [de-identified]  AGE: 80 y.o. Diagnosis:  ILD.   PFT:n/a  Patient Goals:  (x) Breathe better  (x) Increase my endurance  (x) Have more energy  (x) Rely less on others  (x) Ease ADLs  (x) Understand & Use meds correctly  (x) Control cough  (x) Make fewer visits to hospital  (n/a) Quit smoking  (x) Feel less anxious/ have more confidence     Glossary: NM=Pulmonary Rehab  PF= Physical Fitness  TM=Treadmill  AD= [employer] Bike  UBE=Upperbody Ergometry  RT=Resistance Training   PLB=Pursed Lip Breathing     INDIVIDUAL TREATMENT PLAN        INITIAL ASSESSMENT     Day #1 INDIVIDUAL TREATMENT PLAN           PLAN     Day #2-30 INDIVIDUAL TREATMENT PLAN        RE ASSESSMENT     Day #31-60 INDIVIDUAL TREATMENT PLAN        RE ASSESSMENT     Day #61-90 INDIVIDUAL TREATMENT PLAN           DISCHARGE     Last Day      Date: 1/22/19    Date: 2/21/19    Date: 3/19/19    Date: 4/16/19    Date: 5/14/19   EXERCISE   INITIAL ASSESSMENT     Prescribed Oxygen Use  None, No home O2     Oxygen Titration  (x) Assess for desaturation        Current Home Exercise:  None    EXERCISE  PLAN        Current Oxygen Use  None     Oxygen Titration  (x) Maintaining >87% Spo2  () Not maintaining -  L/min needed on      Current Home Exercise:  None EXERCISE  RE ASSESSMENT     Current Oxygen Use  None     Oxygen Titration  (x) Maintaining >87% Spo2   () Not maintaining -  L/min needed on      Current Home Exercise:  Frequency: 4 x/wk  Intensity: 4 /10  Duration:  20min  Mode: stretching and marching in place    EXERCISE  RE ASSESSMENT     Current Oxygen Use  None    Oxygen Titration  (x) Maintaining >87% Spo2  () Not maintaining -  L/min needed on      Current Home Exercise:  Frequency: 4x/wk  Intensity: 4 /10  Duration: 10-15 min  Mode: walking EXERCISE  DISCHARGE        Current Oxygen Use  None    Oxygen Titration  (x) Maintaining >87% Spo2  () Not maintaining -  L/min needed on      Current Home Exercise:  Frequency: 4x/wk  Intensity: 4 /10  Duration: 15-20 min  Mode: walking          6 Minute Walk Test (6MWT):  O2 used: none, on room air. 675 ft walked = 1.97 METs  SpO2: 92-94%  HR:    RPD: 4  RPE: 4  Number of Breaks:   2   Avg time for break:  1 break 65 sec and other break 58 sec Assist device used:  none              6 Minute Walk Test (6MWT):  O2 used:RA   880 ft walked = 2.27  METs  SpO2: 91 %  HR:  121 b/min  RPD: 3  RPE:4  Number of Breaks: 0  Avg time for break: na secs  Assist device used: na                  OUTCOMES:  6MWD Improvement:  > 98'? (x) Yes  () No      Exercise Prescription     THR: 68 - 109 bpm           Frequency:  2-3x/wk in [place], 1-3x/wk home activity     Intensity:  METs (from 6MWT)        Time:  15-30 total minutes up to 55 minutes max     Type:  Aerobic (TM, AD, UBE, NuStep), 6 ST, RT 1-10# 8-15 reps     Progression:  Increase 30s - 2 min/mode for Aerobic activity, and increase Intensity 2-5% each week if RPE <5, RPD <5, SpO2 >87% and pt is within [facility] above. Exercise Prescription     (x) Pt exercising within THR        Frequency:  2-3x/wk in [place], 1-3x/wk home activity     Intensity:  3-5 on [place] Dyspnea/ Fatigue scale     Time:  15-30 total minutes up to 55 minutes max     Type:  Aerobic (TM, AD, UBE, NuStep), 6 ST, RT 1-10# 8-15 reps     Progression:  Increase 30s - 2 min/mode for Aerobic activity, and increase Intensity 2-5% each week if RPE <5, RPD <5, SpO2 >87% and pt is within [facility] above.  Exercise Prescription     (x) Pt exercising within THR        Frequency:  2-3x/wk in [place], 2-5x/wk home activity     Intensity:  3-5 on [place] Dyspnea/ Fatigue scale     Time:  30-45 total minutes up to 55 minutes max     Type:  Aerobic (TM, AD, UBE, NuStep), 6 ST, RT 1-10# 8-15 reps     Progression:  Increase 30s - 2 min/mode for Aerobic activity, and increase Intensity 2-5% each week if RPE <5, RPD <5, SpO2 >87% and pt is within [facility] above. Exercise Prescription     (x) Pt exercising within THR        Frequency:  2-3x/wk in [place], 2-5x/wk home activity     Intensity:  3-5 on [place] Dyspnea/ Fatigue scale     Time:  30-45 total minutes up to 55 minutes max     Type:  Aerobic (TM, AD, UBE, NuStep), 6 ST, RT 1-10# 8-15 reps     Progression:  Increase 30s - 2 min/mode for Aerobic activity up to 35 minutes, and increase Intensity 2-5% each week if RPE <5, RPD <5, SpO2 >87% and pt is within [facility] above. Home Program              (x) Given to patient with current levels, recommendation and guidelines.                                                          Initial Levels:  TM:0.7mph,3min  AD:  level,  min  NuStep:28W, 8 min  UBE:  22W,5min  RT  1#, 8 reps    Current Levels:  AD:0.4 level, 5 min  NuStep: 28W,  10min  UBE: 22W level,  5min  RT  1#, 8 reps    Current Levels:  TM:  N/A  AD:  N/A  NuStep:34W,15 min  UBE:26W,5 min  RT  2#,8-15reps Current Levels:  TM: NA  AD: NA  NuStep: 38W, 15min  UBE: 24W, 5min  RT NA Final Levels:  TM: NA  AD: NA  NuStep: 46W, 15min  UBE: 28W, 5min  RT NA      Physical Limitations  Initial Assessment     (x) Weakness  () Inflexible  () Poor posture  () Gait abnormality  () Balance  () Orthopedic Issues    Physical Limitation  Plan        (x) Strengthen through squats and RT  (x) Appropriate stretches shown  ()Verbal cues and reminders for posture and gait given  (x) Proper modalities chosen for ortho issues  (x) Give Home activity / stretches/ Warmup/ [place] info    Physical Limitations Reassessment        (x) Pt progressing  () Pt not progressing Physical Limitations Reassessment        (x) Pt progressing  () Pt not progressing Physical Limitations  Discharge        (x) Issues Addressed  () PT/OT suggested         Exercise Goals   Initial Assessment:     (x) Start to, and commit to exercising regularly        (x) Learn about home activity recommendations              (x) Increase Functional Capacity shown by increasing 6MWD    Exercise Goals   Plan        -Initiate RTR 2x/week  -Encourage home exercise     -Attend Home Activity EDUCATION class              -Slowly, safely, progress in RTR     Exercise Goals   Reassessment        (x) Pt is coming regularly  () Pt is sporadic        () Pt has had class  (x) Pt has not had class              (x) Pt is progressing  () Pt is not progressing Exercise Goals   Reassessment        (x) Pt is coming regularly  () Pt is sporadic        () Pt has had class  () Pt has not had class              (x) Pt is progressing  () Pt is not progressing Exercise Goals Discharge        (x) GOAL Met?  () GOAL not met -           (x) Pt had EDUCATION class  Date: 3/26/19  () Pt did not have class        (x) GOAL Met  See 6MWD above  () GOAL not Met:      Exercise Intervention Initial Assessment        () Transportation needed           (x) EDUCATION needed              (x) Motivation needed    Exercise Intervention/ Education   Plan        () [facility] set up           (x) EDUCATION:  Home activity class to be given           (x) Positive encouragement, support and motivation to be given    Exercise Intervention/ Education Reassessment        () Pt is attending  () Pt is not attending        () Pt has had class  (x) Pt has not had class        (x) Pt is progressing  () Pt not progressing Exercise Intervention/ Education Reassessment        (x) Pt is attending  () Pt is not attending        (x) Pt has had class  () Pt has not had class        (x) Pt is progressing  () Pt not progressing Exercise Intervention/ Edcuation Discharge        (x) Pt attended most  () Pt cancelled a lot     (x) Pt has had class  Date: See above  () Pt did not have class     (x) Pt progressed and did well  () Pt had difficulty-                     NUTRITION   INITIAL ASSESSMENT     Height:  54   Weight: 160.8 lbs  BMI: 27.6        30 day goal: 159 Lbs (2-4lbs)     (x) Overweight  () Underweight  () Normal  () Teeth issues  () GI issues  (x) Nutrition knowledge needed  () DM    NUTRITION   PLAN        Current Weight:  167lbs           Goal achieved?: N  Next 30 day goal: 165 Lbs    NUTRITION  RE ASSESSMENT     Current Weight:  165.4lbs           Goal achieved?:Y  Next 30 day goal: 163Lbs NUTRITION  RE ASSESSMENT     Current Weight:168.6 lbs           Goal achieved?: N  Next 30 day goal: 165 Lbs NUTRITION DISCHARGE        Current Weight: 165.4  lbs  BMI:28.4        Goal achieved?:      Nutrition Goals  Initial Assessment        () Pt is DM.   Increase nutrition knowledge and glucose control through diet and exercise        (x) Learn weight strategies to lose weight           (x) Learn about general nutrition              -Achievable weight goal for the end of the program: 150   Lbs (2-4lbs per month recommended)    Nutrition Goals   Plan        Pt is not diabetic  () DM:  Attend nutrition class and learn about quality carbohydrates and exercises role in DM     (x) Attend nutrition class              (xx) Attend nutrition class              (x) Initiate exercise and give pt hints and tips for weight and will have class for information    Nutrition Goals  Reassessment           () DM: Pt has had class  (x) DM: Pt has not had class               () Pt has had class  (x) Pt has not had class         () Pt has had class  (x) Pt has not had class         () Pt progressing  () Pt not progressing       Nutrition Goals  Reassessment        Pt in not diabetic   () DM: Pt has had class  () DM: Pt has not had class               () Pt has had class  () Pt has not had class         () Pt has had class  () Pt has not had class         () Pt progressing  () Pt not progressing Nutrition Goals  Discharge           (x) Pt had class  Date: 3/26/19  () DM: Pt has not had class              (x) Pt has had class  Date: See above  () Pt has not had class     (x) Pt has class  Date: See above  () Pt has not had class        () Final weight goal achieved  () Pt did not reach desired weight goal - (readdressed nutrition and exercise strategies for future goal attainment)         Nutrition Intervention/ Education   Initial Assessment     (x) Pt needs Nutrition education class           () Pt states does not need class          Nutrition Intervention/ Education  Plan        (x) Pt will have Nutrition class              () Encourage pt to continue to learn and incorporate self knowledge in to diet choices    Nutrition Intervention/ Education  Reassessment        () Pt has had class  (x) Pt has not had class        () Pt had questions  (x) Pt denies having questions Nutrition Intervention/ Education  Reassessment        (x) Pt has had class  () Pt has not had class        () Pt had questions  (x) Pt denies having questions Nutrition Intervention/ Education   Discharge        (x) Pt has had class  Date: See above  () Pt has not had class - Reason:     (x) Pt had questions that were answered   () Pt denies having questions                  PSYCHOSOCIAL INITIAL ASSESSMENT     Depression:  () Self Reported  () In History  () S/Sx noted  (x) Denies  () On Medication  () Follows physician           (x) Give PHQ-9 Depression screening tool                       Dyspnea:  (x) Give pt UCSD SOB survey     (x) Pt gets SOB during activity and with ADLs           (x) Pt has support from home for the program        () Pt does not have support for the program    PSYCHOSOCIAL  PLAN        (x) Attend Stress/ Depression/ Anxiety Class                          Pre Rehab PHQ-9   Score: 6  1-4 Normal  5-9 Mild  10-14 Mod  15-19 Mod-Sev  >19 Severe        Pre Rehab UCSD SOB Score: 75     (x) Attend classes and attend rehab to increase strength and endurance     -Attend Psychosocial class           () Speak with the patient/ family about ability to commit to the program with undue stress/ Psychosocial Goals  Reassessment        (x) Pt has had class  () Pt has not had class           (x) Pt has had class  () Pt has not had class        (x) Pt has had class  () Pt has not had class        (x) Pt is progressing  () Pt is not progressing           90 day CAT score:   20/40    Psychosocial Goals Discharge                                   (x) Pt had class  Date: See above  () Pt did not have class        Post Rehab CAT score below                                Post Rehab  CAT score:   18/ 40                 OUTCOMES     PHQ-9:  Did pt drop a severity level or maintain in the minimal range?  (x) Y  () N     UCSD SOB  Did pt decrease their number by 5 or more?  (x) Y  () N     CAT scores:  Did pt drop by 2 or more points from Pre to Post?  (x) Y  () N      Psychosocial Intervention/ Education Initial Assessment     () Pt is being treated for depression/ anxiety        (x) Pt would benefit from RTRs Psychosocial Issues Class (Stress, Depression, Anxiety, and Intimacy    Psychosocial  Intervention/ Education Plan        () Pt to contact physician if any severe changes occur in mood        (x) Pt will attend RTRs class    Psychosocial Intervention/ Education Re assessment     (x) Pt is coping well  () Pt is not coping well            () Pt has had class  (x) Pt has not had class Psychosocial Intervention/ Education Re assessment     (x) Pt is coping well  () Pt is not coping well            (x) Pt has had class  () Pt has not had class Psychosocial Intervention/ Education Discharge     (x) Pt did not need any changes   () Pt needed changes to treatment     (x) Pt had class  Date:  3/26/19  () Pt did not have class            Pain   Initial Assessment     () N/A  Location: arthritis pain in hands  Duration: pretty much all the time  Intensity:  8/10  Type: ache with sharp pain at times    Pain   Plan        () N/A     (x) Pts pain is under control     () Pt encouraged to contact physician for pain control Pain   Reassessment        () N/A     (x) Pts pain is under control     () Pt encouraged to contact physician for pain control Pain   Reassessment        () N/A     (x) Pts pain is under control     () Pt encouraged to contact physician for pain control Pain   Discharge        () N/A     (x) Pts pain is under control     () Pt encouraged to contact physician for pain control               OXYGEN   INITIAL ASSESSMENT     RESTING:  (x) RA  () O2:  L/min  () Continuous  () Breath Actuated  94 % SpO2 / 16 bpm     Prescribed Oxygen Use:  none     DME Company for O2:  n/a       OXYGEN   PLAN        RESTING:  (x) Monitor needs, administer supplemental oxygen if SpO2 <88% OXYGEN   RE ASSESSMENT     RESTING:  (x) Pt SpO2 >87%  () Pt needs higher flow  () Pt needs less flow OXYGEN   RE ASSESSMENT     RESTING:  (x) Pt SpO2 >87%  () Pt needs higher flow  () Pt needs less flow OXYGEN   DISCHARGE        RESTING:  (x) Pt SpO2 remained >87% on * L/min at rest     () Pts doctor and company contacted for change in Rx   Oxygen Goals   Initial Assessment     (x) Wean, Titrate down, or get off O2 if possible    Oxygen Goals   Plan        (x) Closely monitor SpO2 and HR using pulse oximetry for saturation and HR response and titrate if appropriate    Oxygen Goals  Reassessment        (x) RA  () Pt maintaining FiO2  () Pt tolerating lower O2 needs  () Pt needing more O2    Oxygen Goals  Reassessment        (x) RA  () Pt maintaining FiO2  () Pt tolerating lower O2 needs  () Pt needing more O2    Oxygen Goals   Discharge        (x) RA  () Pt maintained FiO2  () Pt was found to need less O2 - notification sent to physician  () Pt was found to need more O2, notification sent to physician      Oxygen Intervention/ Education  Initial Assessment     (x) Learn / Review about O2 use, safety and travel        () Pt does not wear or have home oxygen     Oxygen Intervention/ Education  Plan        (x) Attend O2 Use, safety and travel class (x) Assess for SpO2 with each exercise    Oxygen Intervention/ Education  Reassessment        () Pt has had class  (x) Pt has not had class        (x) Pt is >87%  () Pt has been found to desaturate - physician notified Oxygen Intervention/ Education  Reassessment        () Pt has had class  (x) Pt has not had class        (x) Pt is >87%  () Pt has been found to desaturate - physician notified Oxygen Intervention/ Education  Discharge        () Pt had class  Date:   (x) Pt did not have class NA        (x) Pt did well  () Pt needed to be put on oxygen               TOBACCO USE  INITIAL ASSESSMENT     (x) Pt currently not smoking     () Pt currently smoking  () Pt needs Tobacco Cessation counseling     Pt never smoked.      TOBACCO USE  PLAN        (x) N/A        Does pt want to quit:   Does pt have a quit date:        () Tobacco Cessation counseling Education if applicable        () Encouraged to contact physician for tools/ nicotine replacement etc.    TOBACCO USE  RE ASSESSMENT     (x) N/A        Any change in Tobacco use status (x) N  ()Y           () Pt attended counseling education session              () Pt has contacted physician TOBACCO USE  RE ASSESSMENT     (x) N/A        Any change in Tobacco use status () N  ()Y           () Pt attended counseling education session              () Pt has contacted physician TOBACCO USE  DISCHARGE                 Current Tobacco Use Status: NA              () Pt attended TC counseling education session  Date:            () Pt contacted physician        NRT product/ medication  :      Tobacco Use Goal  Initial Assessment     (x) Complete Cessation or Maintain Cessation of tobacco products    Tobacco Use Goal Intervention and Education Plan     (x) Encourage pt to fight the urge, call quit line, use techniques learned from friends/ class     () Attend Tobacco Cessation class if needed    Tobacco Use  Reassessment           (x) Pt remains tobacco free                 () Pt has had TC counseling session           () Pt still smoking Tobacco Use  Reassessment           (x) Pt remains tobacco free                 () Pt has had TC counseling session           () Pt still smoking Tobacco Use  Discharge           (x) Pt remains tobacco free                 () Pt had TC counseling  Date:  See above           () Pt still smoking - gave resources for quitting                  MEDICATIONS  (Oral & Inhaled)  INITIAL ASSESSMENT     Pt reports taking meds properly 100% of the time           (x) Pt is on inhaled medications    MEDICATIONS  PLAN           -Review Rxs purpose, schedule, side effects and importance of compliance during Medication class. Also address Cpap, Vents.     MEDICATIONS  RE ASSESSMENT        Pt reports taking meds properly  100% of the time           () Pt has had class  (x) Pt has not had class MEDICATIONS  RE ASSESSMENT        Pt reports taking meds properly 100% of the time           () Pt has had class  (x) Pt has not had class MEDICATIONS  DISCHARGE           Pt reports taking meds properly 100 % of the time           (x)Pt had Med class  Date: 4/30/19  () Pt has not had class         Pt has:  (x) Metered Dose Inhaler  (x) Dry Powder Inhaler  (x) Nebulizer- pt has machine but waiting on meds to arrive.    -Review correct use, timing, technique and cleaning of equipment during Medication class () Pt has had class  (x) Pt has not had class () Pt has had class  (x) Pt has not had class (x) Pt had class  Date: See above  () Pt has not had class   Medication Goals  Initial Assessment           (x) Take meds properly 100% of the time     (x) Learn about / Review Rxs, and devices Medication Goals  Intervention & Education  Plan        -Follow Rx instructions and ask if questions     -Attend Medication Education class    Medication Goals  Reassessment              () Readdressed questions on any medications     () Pt has had class  (x) Pt has not had class Medication See Health and Functioning from the CAT tool and Strength and Endurance from 6 MWD above                     EXACERBATION PREVENTION & MANAGEMENT  INITIAL ASSESSMENT     Pt reports;  (x) 0 respiratory infections in last year  () 1   () >2  () Hospitalized in last 12 months    EXACERBATION PREVENTION & MANAGEMENT  PLAN        Address via Disease Self Management and Exacerbation prevention class:     -Hydration for mucus     -Hand Hygiene              -Evaluation of Sputum     -When to call MD  -S/Sx to report  -Decrease exposure to irritants/ exacerbators     -Cleaning of respiratory equipment    EXACERBATION PREVENTION & MANAGEMENT  RE ASSESSMENT     () Pt has had class  (x) Pt has not had class        () Improved hydration     () Correct hand washing techs and alcohol gel usage     () Sputum assessment     () Ability to recognize exacerbation and when to call MD           () Cleaning of respiratory equipment EXACERBATION PREVENTION & MANAGEMENT  RE ASSESSMENT     () Pt has had class  (x) Pt has not had class        () Improved hydration     () Correct hand washing techs and alcohol gel usage     () Sputum assessment     () Ability to recognize exacerbation and when to call MD           () Cleaning of respiratory equipment EXACERBATION PREVENTION & MANAGEMENT  DISCHARGE        (x) Pt has had class  Date: 5/7/19  () Pt did not have class                    (x) Addressed questions and pt feels comfortable with hydration, hand washing, sputum assessment, exacerbation knowledge, and cleaning of equipment   Exacerbation Prevention & Management Goals  Initial Assessment           (x) Learn ways to stay healthy and not get admitted              (x) Increase knowledge of Lungs, Breathing, Exercise, Nutrition, Mood and controlling anxiety, and stopping the Dyspnea Cycle     Exacerbation Prevention & Management Goals Intervention/ Education  Plan        -Attend class and demonstrate disease self management strategies -Attend all appropriate classes                            Exacerbation Prevention & Management Goals  Reassessment              () Pt has had class  (x) Pt has not had class           () Pt has had all classes  () Pt has had most classes  (x) Pt has had little/ not staying for education Exacerbation Prevention & Management Goals  Reassessment              () Pt has had class  (x) Pt has not had class           () Pt has had all classes  (x) Pt has had most classes  () Pt has had little/ not staying for education Exacerbation Prevention & Management Goals  Discharge              (x) Pt had class  Date:  See above  () Pt did not have class           (x) Pts attended all relevant classes and all  Questions were answered                           PHYSICIAN INTERACTION     MD Initial Assessment Review     (x) Initial  Assessment Reviewed  (x) Treatment Plan and Goals support patient needs/ abilities                          Cosigned and dated below:    Nino Carrizales MD 30 day and Plan Review:        (x) I have seen the patient in rehab during this 30 day reassessment  (x) I agree with the plan  (x) Continue with progression and instruction  () Continue, but with the following changes:        Cosigned and dated below: Nino Carrizales MD 30 day Reassessment Review:     (x) I have seen the patient in rehab during this 30 day  reassessment  (x) Continue with the current program  () Continue, but with the following changes:  () Discharge patient        Cosigned and dated below: Nino Carrizales MD 30 day Reassessment Review:     (x) I have seen the patient in rehab during this 30 day  reassessment  (x) Continue with the current program  () Continue, but with the following changes:  () Discharge patient        Cosigned and dated below: Nino Carrizales MD 30 day   Discharge Review:     (x) Discharge patient                                         Cosigned and dated below: Date/Time User Provider Type Action   1/22/2019  4:28 PM Lilian Singh MD Physician Cosign   1/22/2019  1:47 PM Rafael Archer RCP Respiratory Therapist Sign   1/22/2019  1:40 PM Rafael Archer RCP Respiratory Therapist Sign      Cosigned by: Lilian Singh MD at 3/4/2019 11:04 AM                 Cosigned by: Lilian Singh MD at 3/20/2019 10:41 AM       Date/Time User Provider Type Action   4/22/2019  1:32 PM Lilian Singh MD Physician Cosign   4/16/2019  3:41 PM Taylor Morales Exercise Physiologist Sign

## 2019-05-21 ENCOUNTER — HOSPITAL ENCOUNTER (OUTPATIENT)
Dept: CARDIAC REHAB | Age: 84
Setting detail: THERAPIES SERIES
End: 2019-05-21
Payer: MEDICARE

## 2019-05-23 ENCOUNTER — APPOINTMENT (OUTPATIENT)
Dept: CARDIAC REHAB | Age: 84
End: 2019-05-23
Payer: MEDICARE

## 2019-05-28 ENCOUNTER — APPOINTMENT (OUTPATIENT)
Dept: CARDIAC REHAB | Age: 84
End: 2019-05-28
Payer: MEDICARE

## 2019-05-30 ENCOUNTER — APPOINTMENT (OUTPATIENT)
Dept: CARDIAC REHAB | Age: 84
End: 2019-05-30
Payer: MEDICARE

## 2019-07-13 ENCOUNTER — HOSPITAL ENCOUNTER (EMERGENCY)
Age: 84
Discharge: HOME | End: 2019-07-13
Payer: MEDICARE

## 2019-07-13 VITALS
DIASTOLIC BLOOD PRESSURE: 73 MMHG | OXYGEN SATURATION: 94 % | RESPIRATION RATE: 16 BRPM | BODY MASS INDEX: 27.98 KG/M2 | TEMPERATURE: 97.9 F | HEART RATE: 94 BPM | SYSTOLIC BLOOD PRESSURE: 126 MMHG | WEIGHT: 163 LBS

## 2019-07-13 DIAGNOSIS — R10.9 RIGHT FLANK PAIN: Primary | ICD-10-CM

## 2019-07-13 LAB
BILIRUBIN URINE: NEGATIVE
BLOOD, URINE: NEGATIVE
CHARACTER, URINE: CLEAR
COLOR: YELLOW
GLUCOSE, URINE: NEGATIVE MG/DL
KETONES, URINE: NEGATIVE
LEUKOCYTES, UA: ABNORMAL
NITRATE, UA: NEGATIVE
PH UA: 6 (ref 5–9)
PROTEIN UA: NEGATIVE MG/DL
REFLEX TO URINE C & S: ABNORMAL
SPECIFIC GRAVITY UA: 1.01 (ref 1–1.03)
UROBILINOGEN, URINE: 0.2 EU/DL (ref 0–1)

## 2019-07-13 PROCEDURE — 99214 OFFICE O/P EST MOD 30 MIN: CPT

## 2019-07-13 PROCEDURE — 87086 URINE CULTURE/COLONY COUNT: CPT

## 2019-07-13 PROCEDURE — 81003 URINALYSIS AUTO W/O SCOPE: CPT

## 2019-07-13 PROCEDURE — 99213 OFFICE O/P EST LOW 20 MIN: CPT | Performed by: NURSE PRACTITIONER

## 2019-07-13 RX ORDER — BUSPIRONE HYDROCHLORIDE 10 MG/1
5 TABLET ORAL 3 TIMES DAILY
COMMUNITY
End: 2020-04-13

## 2019-07-13 RX ORDER — NAPROXEN 500 MG/1
500 TABLET ORAL 2 TIMES DAILY WITH MEALS
Qty: 20 TABLET | Refills: 0 | Status: SHIPPED | OUTPATIENT
Start: 2019-07-13 | End: 2019-09-30

## 2019-07-13 ASSESSMENT — ENCOUNTER SYMPTOMS
NAUSEA: 0
BACK PAIN: 1
CONSTIPATION: 0
ABDOMINAL PAIN: 0
VOMITING: 0
DIARRHEA: 0

## 2019-07-13 ASSESSMENT — PAIN SCALES - GENERAL: PAINLEVEL_OUTOF10: 7

## 2019-07-13 ASSESSMENT — PAIN DESCRIPTION - DESCRIPTORS: DESCRIPTORS: ACHING

## 2019-07-13 ASSESSMENT — PAIN DESCRIPTION - ORIENTATION: ORIENTATION: RIGHT

## 2019-07-13 ASSESSMENT — PAIN DESCRIPTION - LOCATION: LOCATION: FLANK

## 2019-07-13 ASSESSMENT — PAIN DESCRIPTION - FREQUENCY: FREQUENCY: CONTINUOUS

## 2019-07-13 ASSESSMENT — PAIN DESCRIPTION - PAIN TYPE: TYPE: ACUTE PAIN

## 2019-07-15 ENCOUNTER — TELEPHONE (OUTPATIENT)
Dept: FAMILY MEDICINE CLINIC | Age: 84
End: 2019-07-15

## 2019-07-15 LAB
ORGANISM: ABNORMAL
URINE CULTURE REFLEX: ABNORMAL

## 2019-07-28 ENCOUNTER — HOSPITAL ENCOUNTER (EMERGENCY)
Age: 84
Discharge: HOME | End: 2019-07-28
Payer: MEDICARE

## 2019-07-28 VITALS
WEIGHT: 163 LBS | TEMPERATURE: 98.5 F | DIASTOLIC BLOOD PRESSURE: 76 MMHG | SYSTOLIC BLOOD PRESSURE: 143 MMHG | OXYGEN SATURATION: 96 % | RESPIRATION RATE: 16 BRPM | BODY MASS INDEX: 27.98 KG/M2 | HEART RATE: 99 BPM

## 2019-07-28 DIAGNOSIS — N30.01 ACUTE CYSTITIS WITH HEMATURIA: Primary | ICD-10-CM

## 2019-07-28 LAB
BILIRUBIN URINE: NEGATIVE
BLOOD, URINE: ABNORMAL
CHARACTER, URINE: ABNORMAL
COLOR: YELLOW
GLUCOSE, URINE: NEGATIVE MG/DL
KETONES, URINE: NEGATIVE
LEUKOCYTES, UA: ABNORMAL
NITRATE, UA: NEGATIVE
PH UA: 5.5 (ref 5–9)
PROTEIN UA: 100 MG/DL
REFLEX TO URINE C & S: ABNORMAL
SPECIFIC GRAVITY UA: 1.01 (ref 1–1.03)
UROBILINOGEN, URINE: 0.2 EU/DL (ref 0–1)

## 2019-07-28 PROCEDURE — 99213 OFFICE O/P EST LOW 20 MIN: CPT

## 2019-07-28 PROCEDURE — 87077 CULTURE AEROBIC IDENTIFY: CPT

## 2019-07-28 PROCEDURE — 81003 URINALYSIS AUTO W/O SCOPE: CPT

## 2019-07-28 PROCEDURE — 87186 SC STD MICRODIL/AGAR DIL: CPT

## 2019-07-28 PROCEDURE — 87086 URINE CULTURE/COLONY COUNT: CPT

## 2019-07-28 PROCEDURE — 99214 OFFICE O/P EST MOD 30 MIN: CPT | Performed by: NURSE PRACTITIONER

## 2019-07-28 RX ORDER — CIPROFLOXACIN 500 MG/1
500 TABLET, FILM COATED ORAL 2 TIMES DAILY
Qty: 20 TABLET | Refills: 0 | Status: SHIPPED | OUTPATIENT
Start: 2019-07-28 | End: 2019-08-07

## 2019-07-28 ASSESSMENT — PAIN SCALES - GENERAL: PAINLEVEL_OUTOF10: 6

## 2019-07-28 ASSESSMENT — ENCOUNTER SYMPTOMS
EYES NEGATIVE: 1
ABDOMINAL PAIN: 1
RESPIRATORY NEGATIVE: 1

## 2019-07-28 ASSESSMENT — PAIN DESCRIPTION - PAIN TYPE: TYPE: ACUTE PAIN

## 2019-07-28 ASSESSMENT — PAIN DESCRIPTION - DESCRIPTORS: DESCRIPTORS: DISCOMFORT

## 2019-07-28 NOTE — ED TRIAGE NOTES
Pt walked to room 7. Pt here with complaints of burning when urinating and urgency. . Pt was here 2 weeks ago with same thing. Pt started having symptoms Friday night.

## 2019-07-28 NOTE — ED NOTES
Discharge instructions and prescriptions reviewed with pt. Pt verbalized understanding. Pt ambulated out in stable condition. Assessment unchanged upon discharge.      Fernando Spence RN  07/28/19 6103

## 2019-07-30 ENCOUNTER — TELEPHONE (OUTPATIENT)
Dept: FAMILY MEDICINE CLINIC | Age: 84
End: 2019-07-30

## 2019-07-30 LAB
ORGANISM: ABNORMAL
URINE CULTURE REFLEX: ABNORMAL

## 2019-08-06 ENCOUNTER — HOSPITAL ENCOUNTER (OUTPATIENT)
Dept: ULTRASOUND IMAGING | Age: 84
Discharge: HOME | End: 2019-08-06
Payer: MEDICARE

## 2019-08-06 DIAGNOSIS — E03.9 HYPOTHYROIDISM, UNSPECIFIED TYPE: ICD-10-CM

## 2019-08-06 DIAGNOSIS — E04.9 NON-TOXIC NODULAR GOITER: ICD-10-CM

## 2019-08-06 PROCEDURE — 76536 US EXAM OF HEAD AND NECK: CPT

## 2019-08-07 RX ORDER — LEVOTHYROXINE SODIUM 0.07 MG/1
TABLET ORAL
Qty: 90 TABLET | Refills: 3 | Status: SHIPPED | OUTPATIENT
Start: 2019-08-07 | End: 2019-09-30 | Stop reason: SDUPTHER

## 2019-09-30 ENCOUNTER — PATIENT MESSAGE (OUTPATIENT)
Dept: FAMILY MEDICINE CLINIC | Age: 84
End: 2019-09-30

## 2019-09-30 ENCOUNTER — OFFICE VISIT (OUTPATIENT)
Dept: INTERNAL MEDICINE CLINIC | Age: 84
End: 2019-09-30
Payer: MEDICARE

## 2019-09-30 VITALS
SYSTOLIC BLOOD PRESSURE: 130 MMHG | HEART RATE: 68 BPM | WEIGHT: 163 LBS | HEIGHT: 64 IN | BODY MASS INDEX: 27.83 KG/M2 | DIASTOLIC BLOOD PRESSURE: 80 MMHG

## 2019-09-30 DIAGNOSIS — E89.2 S/P PARATHYROIDECTOMY (HCC): ICD-10-CM

## 2019-09-30 DIAGNOSIS — E21.0 PRIMARY HYPERPARATHYROIDISM (HCC): Primary | ICD-10-CM

## 2019-09-30 DIAGNOSIS — E03.9 HYPOTHYROIDISM, UNSPECIFIED TYPE: ICD-10-CM

## 2019-09-30 DIAGNOSIS — D35.1 PARATHYROID ADENOMA: ICD-10-CM

## 2019-09-30 PROCEDURE — 1090F PRES/ABSN URINE INCON ASSESS: CPT | Performed by: NURSE PRACTITIONER

## 2019-09-30 PROCEDURE — G8599 NO ASA/ANTIPLAT THER USE RNG: HCPCS | Performed by: NURSE PRACTITIONER

## 2019-09-30 PROCEDURE — 99214 OFFICE O/P EST MOD 30 MIN: CPT | Performed by: NURSE PRACTITIONER

## 2019-09-30 PROCEDURE — 1036F TOBACCO NON-USER: CPT | Performed by: NURSE PRACTITIONER

## 2019-09-30 PROCEDURE — G8427 DOCREV CUR MEDS BY ELIG CLIN: HCPCS | Performed by: NURSE PRACTITIONER

## 2019-09-30 PROCEDURE — G8399 PT W/DXA RESULTS DOCUMENT: HCPCS | Performed by: NURSE PRACTITIONER

## 2019-09-30 PROCEDURE — 4040F PNEUMOC VAC/ADMIN/RCVD: CPT | Performed by: NURSE PRACTITIONER

## 2019-09-30 PROCEDURE — G8417 CALC BMI ABV UP PARAM F/U: HCPCS | Performed by: NURSE PRACTITIONER

## 2019-09-30 PROCEDURE — 1123F ACP DISCUSS/DSCN MKR DOCD: CPT | Performed by: NURSE PRACTITIONER

## 2019-09-30 RX ORDER — AMOXICILLIN 500 MG/1
CAPSULE ORAL
Qty: 4 CAPSULE | Refills: 3 | Status: SHIPPED | OUTPATIENT
Start: 2019-09-30 | End: 2019-11-15 | Stop reason: ALTCHOICE

## 2019-09-30 RX ORDER — LEVOTHYROXINE SODIUM 0.07 MG/1
TABLET ORAL
Qty: 90 TABLET | Refills: 3 | Status: SHIPPED | OUTPATIENT
Start: 2019-09-30 | End: 2020-09-08 | Stop reason: SDUPTHER

## 2019-09-30 RX ORDER — CHOLECALCIFEROL (VITAMIN D3) 125 MCG
1 CAPSULE ORAL DAILY
COMMUNITY

## 2019-09-30 ASSESSMENT — ENCOUNTER SYMPTOMS
ABDOMINAL PAIN: 0
CHOKING: 0
TROUBLE SWALLOWING: 0
NAUSEA: 0
VOMITING: 0
SHORTNESS OF BREATH: 0
COUGH: 0
CONSTIPATION: 0
EYE ITCHING: 0
DIARRHEA: 0
SORE THROAT: 0

## 2019-09-30 NOTE — PROGRESS NOTES
[facility]  [address] [facility] [address]  Dept: [phone]  Dept Fax: [phone]  Loc: [phone]     Visit Date:  4/81/8149    Patient:  Kandace De Jesus  YOB: 1935    HPI:     Chief Complaint   Patient presents with    Other     Hyperparathyroidism         Current concerns - Kandace De Jesus presents for follow up of hyperparathyroidism, hypothyroidism, This patient is followed regularly by Dr. Debi Brantley, last seen by me 3/23/19 , previous endo pt for multiple endocrinology concerns. Hyperparathyroidism - S/P parathyroid adenoma with single gland removal 2013 by Dr. Regino Cuevas. Calcium 10.9, I ramez 1.30 3/28/19, PTH 73.7 3/28/19. Last DEXA, 4/6/19 nl bone density, no bone pain, no fragility fractures. MNG - Last thyroid US was 8/6/19, stable from previous. Two intermediate suspicion nodules, but too small for FNA/biopsy. Recheck advised in one year. No coughing/choking, feeling of fullness, new lumps/bumps. Hypothyroidism - She denies any hoarseness of voice, fullness in throat, dysphagia, dysphonia, sandiness of eyes, hair loss or weight changes. She reports fatigue which is worsening, but also questions if this is due to her pulmongy status. Proptosis none. Energy levels are stable. She is taking her Synthroid 75 mcg with water at least 30 minutes prior to eating and 4 hours from any multivitamin/ supplements. Last TSH was 0.666, FT4 1.67 3/28/19. Vitamin D deficiency - On 5000 units daily. Last vitamin D 25OH 84 on 3/28/19. Will recheck. Pulmonary mycobacterium avium complex with bronchiectasis - Continues with [facility] and pulmonology/pulm rehab for lung issues, states breathing difficulty is worsening. States even her breathing treatments via nebulizer exhaust her. She states she was told her lung function will not improve, likely adding to her fatigueability.  States she has a teleappointment in [facility] with reported normal fracture risk, BMD of 1.137g/cm2, T-score of 1.00, and Z-score of 3.10.   02/04/2016 AP L2-L4 region of spine using [employer] X-Ray  Absorptiometry  from [facility] with reported normal fracture risk, BMD of 1.675g/cm2, T-score of 3.70, and Z-score of 5.50. FINDINGS: Bone density evaluation was performed 04/05/2019 on the right total femur area using a Hologic unit. The BMD average for the exam is 0.950  g/cm2. The T-score is 0.10 and the Z-score is 2.30. This matches the [address] [employer]'s criteria for normal bone density and places the patient within normal limits of fracture risk. An additional bone density evaluation was performed 04/05/2019 on  the left total femur area using a Hologic unit. The BMD average for the exam is 0.969  g/cm2. The T-score is 0.20 and the Z-score  is 2.50. This matches the [address] [employer]'s criteria for normal bone density and places the patient within normal limits of fracture risk. An additional bone density evaluation was performed 04/05/2019 on  the AP L2-L4 region of spine using a Hologic unit. The BMD average for the exam is 1.388  g/cm2. The T-score is 2.80 and the  Z-score is 5.70. This matches the [address] [employer]'s criteria for normal bone density and places the patient within normal limits of fracture risk. An additional bone density evaluation was performed 04/05/2019 on  the left mid radius using a Hologic unit. The BMD average for the exam is 0.635  g/cm2. The T-score is -1.00 and the Z-score is  2.60. This matches the [address] [employer]'s criteria for  normal bone density and places the patient within normal limits of fracture risk. BMD change vs previous is 3.3% for the hips. BMD change vs previous is -7.4% for the spine. This is based on dissimilar scan  types or analysis methods.      IMPRESSION: BONE DENSITY WITHIN NORMAL LIMITS Patient is

## 2019-10-08 ENCOUNTER — HOSPITAL ENCOUNTER (OUTPATIENT)
Dept: WOMENS IMAGING | Age: 84
Discharge: HOME | End: 2019-10-08
Payer: MEDICARE

## 2019-10-08 DIAGNOSIS — R92.1 BREAST CALCIFICATIONS: ICD-10-CM

## 2019-10-08 PROCEDURE — G0279 TOMOSYNTHESIS, MAMMO: HCPCS

## 2019-10-09 ENCOUNTER — OFFICE VISIT (OUTPATIENT)
Dept: FAMILY MEDICINE CLINIC | Age: 84
End: 2019-10-09

## 2019-10-09 VITALS
BODY MASS INDEX: 28 KG/M2 | HEART RATE: 90 BPM | DIASTOLIC BLOOD PRESSURE: 62 MMHG | RESPIRATION RATE: 20 BRPM | HEIGHT: 64 IN | SYSTOLIC BLOOD PRESSURE: 114 MMHG | WEIGHT: 164 LBS

## 2019-10-09 DIAGNOSIS — E78.00 PURE HYPERCHOLESTEROLEMIA: ICD-10-CM

## 2019-10-09 DIAGNOSIS — I10 ESSENTIAL HYPERTENSION: ICD-10-CM

## 2019-10-09 PROCEDURE — 1036F TOBACCO NON-USER: CPT | Performed by: FAMILY MEDICINE

## 2019-10-09 PROCEDURE — G8427 DOCREV CUR MEDS BY ELIG CLIN: HCPCS | Performed by: FAMILY MEDICINE

## 2019-10-09 PROCEDURE — 1123F ACP DISCUSS/DSCN MKR DOCD: CPT | Performed by: FAMILY MEDICINE

## 2019-10-09 PROCEDURE — 1090F PRES/ABSN URINE INCON ASSESS: CPT | Performed by: FAMILY MEDICINE

## 2019-10-09 PROCEDURE — 4040F PNEUMOC VAC/ADMIN/RCVD: CPT | Performed by: FAMILY MEDICINE

## 2019-10-09 PROCEDURE — G8399 PT W/DXA RESULTS DOCUMENT: HCPCS | Performed by: FAMILY MEDICINE

## 2019-10-09 PROCEDURE — 99213 OFFICE O/P EST LOW 20 MIN: CPT | Performed by: FAMILY MEDICINE

## 2019-10-09 PROCEDURE — G8417 CALC BMI ABV UP PARAM F/U: HCPCS | Performed by: FAMILY MEDICINE

## 2019-10-09 PROCEDURE — 96372 THER/PROPH/DIAG INJ SC/IM: CPT | Performed by: FAMILY MEDICINE

## 2019-10-09 RX ORDER — AMOXICILLIN 500 MG/1
CAPSULE ORAL
Qty: 4 CAPSULE | Refills: 3 | Status: CANCELLED | OUTPATIENT
Start: 2019-10-09

## 2019-10-09 RX ORDER — METHYLPREDNISOLONE ACETATE 80 MG/ML
160 INJECTION, SUSPENSION INTRA-ARTICULAR; INTRALESIONAL; INTRAMUSCULAR; SOFT TISSUE ONCE
Status: COMPLETED | OUTPATIENT
Start: 2019-10-09 | End: 2019-10-09

## 2019-10-09 RX ORDER — SIMVASTATIN 20 MG
TABLET ORAL
Qty: 90 TABLET | Refills: 3 | Status: SHIPPED | OUTPATIENT
Start: 2019-10-09 | End: 2020-09-08 | Stop reason: SDUPTHER

## 2019-10-09 RX ORDER — SPIRONOLACTONE 50 MG/1
TABLET, FILM COATED ORAL
Qty: 90 TABLET | Refills: 3 | Status: ON HOLD | OUTPATIENT
Start: 2019-10-09 | End: 2020-01-14 | Stop reason: HOSPADM

## 2019-10-09 RX ORDER — AMLODIPINE BESYLATE 10 MG/1
TABLET ORAL
Qty: 90 TABLET | Refills: 3 | Status: SHIPPED | OUTPATIENT
Start: 2019-10-09 | End: 2020-09-08 | Stop reason: SDUPTHER

## 2019-10-09 RX ADMIN — METHYLPREDNISOLONE ACETATE 160 MG: 80 INJECTION, SUSPENSION INTRA-ARTICULAR; INTRALESIONAL; INTRAMUSCULAR; SOFT TISSUE at 16:38

## 2019-10-09 ASSESSMENT — PATIENT HEALTH QUESTIONNAIRE - PHQ9
1. LITTLE INTEREST OR PLEASURE IN DOING THINGS: 0
2. FEELING DOWN, DEPRESSED OR HOPELESS: 0
SUM OF ALL RESPONSES TO PHQ9 QUESTIONS 1 & 2: 0
SUM OF ALL RESPONSES TO PHQ QUESTIONS 1-9: 0
SUM OF ALL RESPONSES TO PHQ QUESTIONS 1-9: 0

## 2019-10-09 ASSESSMENT — ENCOUNTER SYMPTOMS
CONSTIPATION: 0
SINUS PRESSURE: 0
SHORTNESS OF BREATH: 1
COUGH: 1

## 2019-10-21 ENCOUNTER — HOSPITAL ENCOUNTER (OUTPATIENT)
Age: 84
Discharge: HOME | End: 2019-10-21
Payer: MEDICARE

## 2019-10-21 DIAGNOSIS — E21.0 PRIMARY HYPERPARATHYROIDISM (HCC): ICD-10-CM

## 2019-10-21 DIAGNOSIS — E03.9 HYPOTHYROIDISM, UNSPECIFIED TYPE: ICD-10-CM

## 2019-10-21 DIAGNOSIS — D35.1 PARATHYROID ADENOMA: ICD-10-CM

## 2019-10-21 DIAGNOSIS — E89.2 S/P PARATHYROIDECTOMY (HCC): ICD-10-CM

## 2019-10-21 LAB
CALCIUM IONIZED: 1.3 MMOL/L (ref 1.12–1.32)
PTH INTACT: 84.3 PG/ML (ref 15–65)
TSH SERPL DL<=0.05 MIU/L-ACNC: 2 UIU/ML (ref 0.4–4.2)
VITAMIN D 25-HYDROXY: 95 NG/ML (ref 30–100)

## 2019-10-21 PROCEDURE — 84443 ASSAY THYROID STIM HORMONE: CPT

## 2019-10-21 PROCEDURE — 83970 ASSAY OF PARATHORMONE: CPT

## 2019-10-21 PROCEDURE — 36415 COLL VENOUS BLD VENIPUNCTURE: CPT

## 2019-10-21 PROCEDURE — 82330 ASSAY OF CALCIUM: CPT

## 2019-10-21 PROCEDURE — 82306 VITAMIN D 25 HYDROXY: CPT

## 2019-10-22 ENCOUNTER — TELEPHONE (OUTPATIENT)
Dept: INTERNAL MEDICINE CLINIC | Age: 84
End: 2019-10-22

## 2019-10-22 DIAGNOSIS — E83.52 HYPERCALCEMIA: ICD-10-CM

## 2019-10-22 DIAGNOSIS — E21.0 PRIMARY HYPERPARATHYROIDISM (HCC): ICD-10-CM

## 2019-10-22 DIAGNOSIS — E55.9 VITAMIN D DEFICIENCY: ICD-10-CM

## 2019-10-22 DIAGNOSIS — E03.9 HYPOTHYROIDISM, UNSPECIFIED TYPE: Primary | ICD-10-CM

## 2019-11-15 ENCOUNTER — HOSPITAL ENCOUNTER (EMERGENCY)
Age: 84
Discharge: HOME | End: 2019-11-15
Payer: MEDICARE

## 2019-11-15 VITALS
HEART RATE: 79 BPM | SYSTOLIC BLOOD PRESSURE: 151 MMHG | TEMPERATURE: 97.6 F | RESPIRATION RATE: 16 BRPM | WEIGHT: 163 LBS | DIASTOLIC BLOOD PRESSURE: 66 MMHG | BODY MASS INDEX: 27.98 KG/M2 | OXYGEN SATURATION: 96 %

## 2019-11-15 DIAGNOSIS — N39.0 ACUTE UTI (URINARY TRACT INFECTION): Primary | ICD-10-CM

## 2019-11-15 LAB
BILIRUBIN URINE: NEGATIVE
BLOOD, URINE: ABNORMAL
CHARACTER, URINE: ABNORMAL
COLOR: YELLOW
GLUCOSE, URINE: NEGATIVE MG/DL
KETONES, URINE: NEGATIVE
LEUKOCYTES, UA: ABNORMAL
NITRATE, UA: NEGATIVE
PH UA: 6 (ref 5–9)
PROTEIN UA: 30 MG/DL
REFLEX TO URINE C & S: ABNORMAL
SPECIFIC GRAVITY UA: 1.01 (ref 1–1.03)
UROBILINOGEN, URINE: 0.2 EU/DL (ref 0–1)

## 2019-11-15 PROCEDURE — 81003 URINALYSIS AUTO W/O SCOPE: CPT

## 2019-11-15 PROCEDURE — 87086 URINE CULTURE/COLONY COUNT: CPT

## 2019-11-15 PROCEDURE — 99213 OFFICE O/P EST LOW 20 MIN: CPT

## 2019-11-15 RX ORDER — NITROFURANTOIN 25; 75 MG/1; MG/1
100 CAPSULE ORAL 2 TIMES DAILY
Qty: 10 CAPSULE | Refills: 0 | Status: SHIPPED | OUTPATIENT
Start: 2019-11-15 | End: 2019-11-20

## 2019-11-15 ASSESSMENT — ENCOUNTER SYMPTOMS
SHORTNESS OF BREATH: 0
EYE ITCHING: 0
DIARRHEA: 0
EYE DISCHARGE: 0
NAUSEA: 0
SORE THROAT: 0
VOMITING: 0

## 2019-11-16 LAB
ORGANISM: ABNORMAL
URINE CULTURE REFLEX: ABNORMAL

## 2019-12-02 ENCOUNTER — HOSPITAL ENCOUNTER (OUTPATIENT)
Dept: CT IMAGING | Age: 84
Discharge: HOME | End: 2019-12-02
Payer: MEDICARE

## 2019-12-02 ENCOUNTER — HOSPITAL ENCOUNTER (OUTPATIENT)
Dept: PULMONOLOGY | Age: 84
Discharge: HOME | End: 2019-12-02
Payer: MEDICARE

## 2019-12-02 DIAGNOSIS — A31.0 MYCOBACTERIUM AVIUM INFECTION (HCC): ICD-10-CM

## 2019-12-02 DIAGNOSIS — J30.9 ALLERGIC RHINITIS, UNSPECIFIED SEASONALITY, UNSPECIFIED TRIGGER: ICD-10-CM

## 2019-12-02 DIAGNOSIS — J47.9 BRONCHIECTASIS WITHOUT COMPLICATION (HCC): ICD-10-CM

## 2019-12-02 DIAGNOSIS — J84.10 PULMONARY FIBROSIS (HCC): ICD-10-CM

## 2019-12-02 PROCEDURE — 71250 CT THORAX DX C-: CPT

## 2019-12-02 PROCEDURE — 94729 DIFFUSING CAPACITY: CPT

## 2019-12-02 PROCEDURE — 94010 BREATHING CAPACITY TEST: CPT

## 2019-12-02 PROCEDURE — 94726 PLETHYSMOGRAPHY LUNG VOLUMES: CPT

## 2019-12-09 ENCOUNTER — OFFICE VISIT (OUTPATIENT)
Dept: PULMONOLOGY | Age: 84
End: 2019-12-09
Payer: MEDICARE

## 2019-12-09 VITALS
WEIGHT: 164.6 LBS | OXYGEN SATURATION: 98 % | TEMPERATURE: 98 F | SYSTOLIC BLOOD PRESSURE: 122 MMHG | HEIGHT: 64 IN | DIASTOLIC BLOOD PRESSURE: 82 MMHG | HEART RATE: 72 BPM | RESPIRATION RATE: 20 BRPM | BODY MASS INDEX: 28.1 KG/M2

## 2019-12-09 DIAGNOSIS — R93.89 ABNORMAL CT OF THE CHEST: Primary | ICD-10-CM

## 2019-12-09 DIAGNOSIS — J84.10 PULMONARY FIBROSIS (HCC): ICD-10-CM

## 2019-12-09 DIAGNOSIS — J47.9 BRONCHIECTASIS WITHOUT COMPLICATION (HCC): ICD-10-CM

## 2019-12-09 PROCEDURE — G8399 PT W/DXA RESULTS DOCUMENT: HCPCS | Performed by: INTERNAL MEDICINE

## 2019-12-09 PROCEDURE — 1090F PRES/ABSN URINE INCON ASSESS: CPT | Performed by: INTERNAL MEDICINE

## 2019-12-09 PROCEDURE — G8599 NO ASA/ANTIPLAT THER USE RNG: HCPCS | Performed by: INTERNAL MEDICINE

## 2019-12-09 PROCEDURE — 1123F ACP DISCUSS/DSCN MKR DOCD: CPT | Performed by: INTERNAL MEDICINE

## 2019-12-09 PROCEDURE — G8482 FLU IMMUNIZE ORDER/ADMIN: HCPCS | Performed by: INTERNAL MEDICINE

## 2019-12-09 PROCEDURE — G8427 DOCREV CUR MEDS BY ELIG CLIN: HCPCS | Performed by: INTERNAL MEDICINE

## 2019-12-09 PROCEDURE — 99214 OFFICE O/P EST MOD 30 MIN: CPT | Performed by: INTERNAL MEDICINE

## 2019-12-09 PROCEDURE — 1036F TOBACCO NON-USER: CPT | Performed by: INTERNAL MEDICINE

## 2019-12-09 PROCEDURE — 4040F PNEUMOC VAC/ADMIN/RCVD: CPT | Performed by: INTERNAL MEDICINE

## 2019-12-09 PROCEDURE — G8417 CALC BMI ABV UP PARAM F/U: HCPCS | Performed by: INTERNAL MEDICINE

## 2020-01-02 ENCOUNTER — HOSPITAL ENCOUNTER (EMERGENCY)
Age: 85
Discharge: HOME | End: 2020-01-02
Payer: MEDICARE

## 2020-01-02 VITALS
OXYGEN SATURATION: 93 % | HEIGHT: 64 IN | DIASTOLIC BLOOD PRESSURE: 70 MMHG | HEART RATE: 98 BPM | TEMPERATURE: 98.5 F | SYSTOLIC BLOOD PRESSURE: 147 MMHG | RESPIRATION RATE: 22 BRPM | WEIGHT: 162 LBS | BODY MASS INDEX: 27.66 KG/M2

## 2020-01-02 PROCEDURE — 99213 OFFICE O/P EST LOW 20 MIN: CPT

## 2020-01-02 RX ORDER — ONDANSETRON 4 MG/1
4 TABLET, ORALLY DISINTEGRATING ORAL EVERY 8 HOURS PRN
Qty: 20 TABLET | Refills: 0 | Status: SHIPPED | OUTPATIENT
Start: 2020-01-02 | End: 2020-02-25 | Stop reason: ALTCHOICE

## 2020-01-02 ASSESSMENT — ENCOUNTER SYMPTOMS
NAUSEA: 1
ABDOMINAL PAIN: 1
SHORTNESS OF BREATH: 0
VOMITING: 1
COUGH: 1
SORE THROAT: 0
DIARRHEA: 1

## 2020-01-02 NOTE — ED TRIAGE NOTES
Pt to [facility] ambulatory with URI s/s, cough, and abdominal pain. This started on Monday. No fevers.

## 2020-01-02 NOTE — ED PROVIDER NOTES
[facility]  Urgent Care Encounter       CHIEF COMPLAINT       Chief Complaint   Patient presents with    URI    Cough    Abdominal Pain       Nurses Notes reviewed and I agree except as noted in the HPI. HISTORY OF PRESENT ILLNESS   Major Larsen is a 80 y.o. female who presents for evaluation of diarrhea, nausea, vomiting and abdominal aching/cramping is been ongoing for the past 3 days. Patient states that her son was in town visiting for the holidays and that they went out to eat 4 days ago. States that they both developed similar symptoms of abdominal pain, nausea, vomiting and diarrhea the next day. States that her son symptoms have improved but hers have continued to linger. States that the diarrhea has been getting better but she continues to feel nauseous and as though she wants to vomit. States that she has been unable to tolerate keeping any solid foods down at this time. She denies any significant fever or chills. The history is provided by the patient. REVIEW OF SYSTEMS     Review of Systems   Constitutional: Negative for chills and fever. HENT: Negative for congestion and sore throat. Respiratory: Positive for cough (chronic). Negative for shortness of breath. Cardiovascular: Negative for chest pain. Gastrointestinal: Positive for abdominal pain (cramping), diarrhea, nausea and vomiting. Musculoskeletal: Negative for arthralgias and myalgias. Skin: Negative for rash. Allergic/Immunologic: Negative for environmental allergies. Neurological: Positive for headaches.        PAST MEDICAL HISTORY         Diagnosis Date    Anemia     normal on pre-op 5/2012 and 12/2/13    Backache     h/o    Bronchiectasis [facility]) 2013    Bursitis     bilateral shoulders    Constipation     Diverticulosis of colon     HTN (hypertension)     Hypercalcemia     slightly elevated at 10.8 pre-op 12/2/13    Hyperlipidemia     Nodular goiter     bx negative    OA (osteoarthritis)     bilateral thumbs - sees Dr. Terrie Cm Osteopenia 2013    Pneumonia of right upper lobe due to infectious organism ([facility]     Pneumonitis     Dr. Daniella Carrillo in 2010 - bx negative    Pulmonary Mycobacterium avium complex (MAC) infection ([facility]     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 ([facility]     had one parathyroid removed - now follows with Dr. Boris Rebolledo    Sinusitis     with seasonal allergies    Vitamin D deficiency 2018       SURGICALHISTORY     Patient  has a past surgical history that includes Appendectomy (); Cataract removal with implant (Bilateral,  ?);  section (0404,0030); Dilation and curettage of uterus (1600,7005,6721); Foot surgery (); Abdominal exploration surgery (2013); sam and bso (cervix removed) (); Excision of Parathyroid Mass (Right, 2013); Total knee arthroplasty (Right, 2014); Tonsillectomy and adenoidectomy ( ?); joint replacement (12); joint replacement (2014); Hysterectomy; malignant skin lesion excision (Left, 2017); and Foot surgery (Left, 2017). CURRENT MEDICATIONS       Previous Medications    AMLODIPINE (NORVASC) 10 MG TABLET    TAKE 1 TABLET BY MOUTH  DAILY    BUSPIRONE (BUSPAR) 10 MG TABLET    Take 10 mg by mouth 3 times daily    CHOLECALCIFEROL (VITAMIN D3) 5000 UNITS TABS    Take by mouth every other day     LACTOBACILLUS (PROBIOTIC ACIDOPHILUS) CAPS    Take by mouth daily    LEVOTHYROXINE (SYNTHROID) 75 MCG TABLET    take 1 tablet by mouth once daily    MULTIPLE VITAMIN PO    Take  by mouth daily. PROAIR  (90 BASE) MCG/ACT INHALER    INHALE 2 PUFFS INTO THE LUNGS EVERY 6 HOURS IF NEEDED FOR WHEEZING OR SHORTNESS OF BREATH    SIMVASTATIN (ZOCOR) 20 MG TABLET    TAKE 1 TABLET BY MOUTH  NIGHTLY    SPIRONOLACTONE (ALDACTONE) 50 MG TABLET    TAKE 1 TABLET BY MOUTH  DAILY    WHEAT DEXTRIN (BENEFIBER PO)    Take  by mouth daily.          ALLERGIES     Patient is is Pupils are equal.   Neck:      Musculoskeletal: Normal range of motion. Cardiovascular:      Rate and Rhythm: Normal rate and regular rhythm. Heart sounds: No murmur. Pulmonary:      Effort: Pulmonary effort is normal. No respiratory distress. Breath sounds: Normal breath sounds. Abdominal:      General: Bowel sounds are normal.      Palpations: Abdomen is soft. Tenderness: There is generalized tenderness (mild). There is no guarding or rebound. Musculoskeletal:      Right knee: She exhibits normal range of motion. Left knee: She exhibits normal range of motion. Skin:     General: Skin is warm. Findings: No rash. Neurological:      Mental Status: She is alert and oriented to person, place, and time. Psychiatric:         Behavior: Behavior normal.         DIAGNOSTIC RESULTS     Labs:No results found for this visit on 01/02/20. IMAGING:    No orders to display         EKG:  none    URGENT CARE COURSE:     Vitals:    01/02/20 1154   Pulse: 98   Resp: 22   Temp: 98.5 °F (36.9 °C)   TempSrc: Temporal   SpO2: 93%   Weight: 162 lb (73.5 kg)   Height: 5' 4\" (1.626 m)       Medications - No data to display         PROCEDURES:  None    FINAL IMPRESSION      1. Nausea vomiting and diarrhea          DISPOSITION/ PLAN       Discussed with the patient the plan to treat symptomatically by controlling her nausea at this time. She is advised to follow-up with her primary care provider if her diarrhea does not improve over the next 2 to 3 days. I did discuss that if she continues to have nausea and vomiting or any severe abdominal pain or fever that she is to present directly to the emergency department. Patient is agreeable to plan as discussed.     PATIENT REFERRED TO:  Aida Ahmadi MD  [address] [place] [address] / GEOV[place] WRIGHT [facility] [address]      DISCHARGE MEDICATIONS:  New Prescriptions    ONDANSETRON (ZOFRAN ODT) 4 MG DISINTEGRATING TABLET    Place 1 tablet under the tongue every 8 hours as needed for Nausea or Vomiting       Discontinued Medications    LORATADINE (CLARITIN) 10 MG TABLET    Take 10 mg by mouth daily       Current Discharge Medication List          JASMEET Tristan CNP    (Please note that portions of this note were completed with a voice recognition program. Efforts were made to edit the dictations but occasionally words are mis-transcribed.)          JASMEET Tristan CNP  01/02/20 7865

## 2020-01-06 ENCOUNTER — APPOINTMENT (OUTPATIENT)
Dept: GENERAL RADIOLOGY | Age: 85
DRG: 166 | End: 2020-01-06
Payer: MEDICARE

## 2020-01-06 ENCOUNTER — HOSPITAL ENCOUNTER (INPATIENT)
Age: 85
LOS: 8 days | Discharge: NURSING, REHAB OR LONG-TERM CARE | DRG: 166 | End: 2020-01-14
Attending: EMERGENCY MEDICINE | Admitting: INTERNAL MEDICINE
Payer: MEDICARE

## 2020-01-06 ENCOUNTER — OFFICE VISIT (OUTPATIENT)
Dept: FAMILY MEDICINE CLINIC | Age: 85
End: 2020-01-06

## 2020-01-06 VITALS — RESPIRATION RATE: 16 BRPM | HEART RATE: 80 BPM | DIASTOLIC BLOOD PRESSURE: 60 MMHG | SYSTOLIC BLOOD PRESSURE: 108 MMHG

## 2020-01-06 PROBLEM — J18.9 PNEUMONIA: Status: ACTIVE | Noted: 2020-01-06

## 2020-01-06 PROBLEM — J96.01 ACUTE HYPOXEMIC RESPIRATORY FAILURE (HCC): Status: ACTIVE | Noted: 2020-01-06

## 2020-01-06 LAB
ALBUMIN SERPL-MCNC: 3.3 G/DL (ref 3.5–5.1)
ALLEN TEST: POSITIVE
ALP BLD-CCNC: 192 U/L (ref 38–126)
ALT SERPL-CCNC: 37 U/L (ref 11–66)
ANION GAP SERPL CALCULATED.3IONS-SCNC: 18 MEQ/L (ref 8–16)
AST SERPL-CCNC: 45 U/L (ref 5–40)
BACTERIA: ABNORMAL /HPF
BASE EXCESS (CALCULATED): -0.6 MMOL/L (ref -2.5–2.5)
BASE EXCESS MIXED: 0.2 MMOL/L (ref -2–3)
BASE EXCESS MIXED: 0.7 MMOL/L (ref -2–3)
BASOPHILS # BLD: 0.5 %
BASOPHILS ABSOLUTE: 0.2 THOU/MM3 (ref 0–0.1)
BILIRUB SERPL-MCNC: 0.7 MG/DL (ref 0.3–1.2)
BILIRUBIN DIRECT: 0.4 MG/DL (ref 0–0.3)
BILIRUBIN URINE: NEGATIVE
BLOOD, URINE: NEGATIVE
BUN BLDV-MCNC: 28 MG/DL (ref 7–22)
CALCIUM SERPL-MCNC: 10.8 MG/DL (ref 8.5–10.5)
CASTS 2: ABNORMAL /LPF
CASTS UA: ABNORMAL /LPF
CHARACTER, URINE: CLEAR
CHLORIDE BLD-SCNC: 91 MEQ/L (ref 98–111)
CO2: 22 MEQ/L (ref 23–33)
COLLECTED BY:: ABNORMAL
COLLECTED BY:: ABNORMAL
COLLECTED BY:: NORMAL
COLOR: ABNORMAL
CREAT SERPL-MCNC: 1.2 MG/DL (ref 0.4–1.2)
CRYSTALS, UA: ABNORMAL
DEVICE: ABNORMAL
DIFFERENTIAL TYPE: ABNORMAL
EKG ATRIAL RATE: 90 BPM
EKG P AXIS: 24 DEGREES
EKG P-R INTERVAL: 170 MS
EKG Q-T INTERVAL: 344 MS
EKG QRS DURATION: 92 MS
EKG QTC CALCULATION (BAZETT): 420 MS
EKG R AXIS: 62 DEGREES
EKG T AXIS: 16 DEGREES
EKG VENTRICULAR RATE: 90 BPM
EOSINOPHIL # BLD: 0.2 %
EOSINOPHILS ABSOLUTE: 0.1 THOU/MM3 (ref 0–0.4)
EPITHELIAL CELLS, UA: ABNORMAL /HPF
ERYTHROCYTE [DISTWIDTH] IN BLOOD BY AUTOMATED COUNT: 14.7 % (ref 11.5–14.5)
ERYTHROCYTE [DISTWIDTH] IN BLOOD BY AUTOMATED COUNT: 46.7 FL (ref 35–45)
FLU A ANTIGEN: NEGATIVE
FLU B ANTIGEN: NEGATIVE
GFR SERPL CREATININE-BSD FRML MDRD: 43 ML/MIN/1.73M2
GLUCOSE BLD-MCNC: 124 MG/DL (ref 70–108)
GLUCOSE URINE: NEGATIVE MG/DL
HCO3, MIXED: 26 MMOL/L (ref 23–28)
HCO3, MIXED: 26 MMOL/L (ref 23–28)
HCO3: 23 MMOL/L (ref 23–28)
HCT VFR BLD CALC: 43 % (ref 37–47)
HEMOGLOBIN: 13.9 GM/DL (ref 12–16)
IFIO2: 2
IMMATURE GRANS (ABS): 1.46 THOU/MM3 (ref 0–0.07)
IMMATURE GRANULOCYTES: 3.7 %
KETONES, URINE: NEGATIVE
LACTIC ACID, SEPSIS: 1.6 MMOL/L (ref 0.5–1.9)
LACTIC ACID, SEPSIS: 2 MMOL/L (ref 0.5–1.9)
LEUKOCYTE ESTERASE, URINE: ABNORMAL
LYMPHOCYTES # BLD: 1.8 %
LYMPHOCYTES ABSOLUTE: 0.7 THOU/MM3 (ref 1–4.8)
MCH RBC QN AUTO: 28 PG (ref 26–33)
MCHC RBC AUTO-ENTMCNC: 32.3 GM/DL (ref 32.2–35.5)
MCV RBC AUTO: 86.5 FL (ref 81–99)
MISCELLANEOUS 2: ABNORMAL
MONOCYTES # BLD: 6 %
MONOCYTES ABSOLUTE: 2.4 THOU/MM3 (ref 0.4–1.3)
NITRITE, URINE: NEGATIVE
NUCLEATED RED BLOOD CELLS: 0 /100 WBC
O2 SAT, MIXED: 68 %
O2 SAT, MIXED: 78 %
O2 SATURATION: 95 %
OSMOLALITY CALCULATION: 269.5 MOSMOL/KG (ref 275–300)
PATHOLOGIST REVIEW: ABNORMAL
PCO2, MIXED VENOUS: 43 MMHG (ref 41–51)
PCO2, MIXED VENOUS: 43 MMHG (ref 41–51)
PCO2: 34 MMHG (ref 35–45)
PH BLOOD GAS: 7.44 (ref 7.35–7.45)
PH UA: 5 (ref 5–9)
PH, MIXED: 7.38 (ref 7.31–7.41)
PH, MIXED: 7.39 (ref 7.31–7.41)
PLATELET # BLD: 270 THOU/MM3 (ref 130–400)
PLATELET ESTIMATE: ADEQUATE
PMV BLD AUTO: 10.9 FL (ref 9.4–12.4)
PO2 MIXED: 36 MMHG (ref 25–40)
PO2 MIXED: 43 MMHG (ref 25–40)
PO2: 74 MMHG (ref 71–104)
POTASSIUM REFLEX MAGNESIUM: 5 MEQ/L (ref 3.5–5.2)
PRO-BNP: 506.4 PG/ML (ref 0–1800)
PROTEIN UA: ABNORMAL
RBC # BLD: 4.97 MILL/MM3 (ref 4.2–5.4)
RBC URINE: ABNORMAL /HPF
RENAL EPITHELIAL, UA: ABNORMAL
SCAN OF BLOOD SMEAR: NORMAL
SEG NEUTROPHILS: 87.8 %
SEGMENTED NEUTROPHILS ABSOLUTE COUNT: 34.4 THOU/MM3 (ref 1.8–7.7)
SODIUM BLD-SCNC: 131 MEQ/L (ref 135–145)
SPECIFIC GRAVITY, URINE: 1.02 (ref 1–1.03)
TOTAL PROTEIN: 7.8 G/DL (ref 6.1–8)
TOXIC GRANULATION: PRESENT
TROPONIN T: < 0.01 NG/ML
UROBILINOGEN, URINE: 1 EU/DL (ref 0–1)
WBC # BLD: 39.2 THOU/MM3 (ref 4.8–10.8)
WBC UA: ABNORMAL /HPF
YEAST: ABNORMAL

## 2020-01-06 PROCEDURE — 94761 N-INVAS EAR/PLS OXIMETRY MLT: CPT

## 2020-01-06 PROCEDURE — 2580000003 HC RX 258: Performed by: INTERNAL MEDICINE

## 2020-01-06 PROCEDURE — 87040 BLOOD CULTURE FOR BACTERIA: CPT

## 2020-01-06 PROCEDURE — 80048 BASIC METABOLIC PNL TOTAL CA: CPT

## 2020-01-06 PROCEDURE — 99285 EMERGENCY DEPT VISIT HI MDM: CPT

## 2020-01-06 PROCEDURE — 96365 THER/PROPH/DIAG IV INF INIT: CPT

## 2020-01-06 PROCEDURE — 96375 TX/PRO/DX INJ NEW DRUG ADDON: CPT

## 2020-01-06 PROCEDURE — 87804 INFLUENZA ASSAY W/OPTIC: CPT

## 2020-01-06 PROCEDURE — 93010 ELECTROCARDIOGRAM REPORT: CPT | Performed by: INTERNAL MEDICINE

## 2020-01-06 PROCEDURE — 1200000003 HC TELEMETRY R&B

## 2020-01-06 PROCEDURE — 71045 X-RAY EXAM CHEST 1 VIEW: CPT

## 2020-01-06 PROCEDURE — 80076 HEPATIC FUNCTION PANEL: CPT

## 2020-01-06 PROCEDURE — 36415 COLL VENOUS BLD VENIPUNCTURE: CPT

## 2020-01-06 PROCEDURE — 1036F TOBACCO NON-USER: CPT | Performed by: FAMILY MEDICINE

## 2020-01-06 PROCEDURE — 1123F ACP DISCUSS/DSCN MKR DOCD: CPT | Performed by: FAMILY MEDICINE

## 2020-01-06 PROCEDURE — 6360000002 HC RX W HCPCS: Performed by: EMERGENCY MEDICINE

## 2020-01-06 PROCEDURE — 2700000000 HC OXYGEN THERAPY PER DAY

## 2020-01-06 PROCEDURE — 94640 AIRWAY INHALATION TREATMENT: CPT

## 2020-01-06 PROCEDURE — 93005 ELECTROCARDIOGRAM TRACING: CPT | Performed by: EMERGENCY MEDICINE

## 2020-01-06 PROCEDURE — 94760 N-INVAS EAR/PLS OXIMETRY 1: CPT

## 2020-01-06 PROCEDURE — 6360000002 HC RX W HCPCS: Performed by: INTERNAL MEDICINE

## 2020-01-06 PROCEDURE — 4040F PNEUMOC VAC/ADMIN/RCVD: CPT | Performed by: FAMILY MEDICINE

## 2020-01-06 PROCEDURE — 2580000003 HC RX 258: Performed by: EMERGENCY MEDICINE

## 2020-01-06 PROCEDURE — 82803 BLOOD GASES ANY COMBINATION: CPT

## 2020-01-06 PROCEDURE — 83605 ASSAY OF LACTIC ACID: CPT

## 2020-01-06 PROCEDURE — 6370000000 HC RX 637 (ALT 250 FOR IP): Performed by: EMERGENCY MEDICINE

## 2020-01-06 PROCEDURE — G8427 DOCREV CUR MEDS BY ELIG CLIN: HCPCS | Performed by: FAMILY MEDICINE

## 2020-01-06 PROCEDURE — G8399 PT W/DXA RESULTS DOCUMENT: HCPCS | Performed by: FAMILY MEDICINE

## 2020-01-06 PROCEDURE — 83880 ASSAY OF NATRIURETIC PEPTIDE: CPT

## 2020-01-06 PROCEDURE — 81001 URINALYSIS AUTO W/SCOPE: CPT

## 2020-01-06 PROCEDURE — 94669 MECHANICAL CHEST WALL OSCILL: CPT

## 2020-01-06 PROCEDURE — 36600 WITHDRAWAL OF ARTERIAL BLOOD: CPT

## 2020-01-06 PROCEDURE — 99214 OFFICE O/P EST MOD 30 MIN: CPT | Performed by: FAMILY MEDICINE

## 2020-01-06 PROCEDURE — 1090F PRES/ABSN URINE INCON ASSESS: CPT | Performed by: FAMILY MEDICINE

## 2020-01-06 PROCEDURE — 85025 COMPLETE CBC W/AUTO DIFF WBC: CPT

## 2020-01-06 PROCEDURE — 84484 ASSAY OF TROPONIN QUANT: CPT

## 2020-01-06 PROCEDURE — 6370000000 HC RX 637 (ALT 250 FOR IP): Performed by: INTERNAL MEDICINE

## 2020-01-06 PROCEDURE — G8417 CALC BMI ABV UP PARAM F/U: HCPCS | Performed by: FAMILY MEDICINE

## 2020-01-06 RX ORDER — SODIUM CHLORIDE 0.9 % (FLUSH) 0.9 %
10 SYRINGE (ML) INJECTION PRN
Status: DISCONTINUED | OUTPATIENT
Start: 2020-01-06 | End: 2020-01-14 | Stop reason: HOSPADM

## 2020-01-06 RX ORDER — LEVOFLOXACIN 5 MG/ML
750 INJECTION, SOLUTION INTRAVENOUS
Status: DISCONTINUED | OUTPATIENT
Start: 2020-01-06 | End: 2020-01-08

## 2020-01-06 RX ORDER — AMLODIPINE BESYLATE 10 MG/1
10 TABLET ORAL DAILY
Status: DISCONTINUED | OUTPATIENT
Start: 2020-01-07 | End: 2020-01-14 | Stop reason: HOSPADM

## 2020-01-06 RX ORDER — ALBUTEROL SULFATE 2.5 MG/3ML
2.5 SOLUTION RESPIRATORY (INHALATION) 2 TIMES DAILY
Status: ON HOLD | COMMUNITY
End: 2020-01-14 | Stop reason: SDUPTHER

## 2020-01-06 RX ORDER — ONDANSETRON 2 MG/ML
4 INJECTION INTRAMUSCULAR; INTRAVENOUS EVERY 6 HOURS PRN
Status: DISCONTINUED | OUTPATIENT
Start: 2020-01-06 | End: 2020-01-14 | Stop reason: HOSPADM

## 2020-01-06 RX ORDER — IPRATROPIUM BROMIDE AND ALBUTEROL SULFATE 2.5; .5 MG/3ML; MG/3ML
1 SOLUTION RESPIRATORY (INHALATION) ONCE
Status: COMPLETED | OUTPATIENT
Start: 2020-01-06 | End: 2020-01-06

## 2020-01-06 RX ORDER — BUSPIRONE HYDROCHLORIDE 5 MG/1
5 TABLET ORAL 3 TIMES DAILY
Status: DISCONTINUED | OUTPATIENT
Start: 2020-01-06 | End: 2020-01-14 | Stop reason: HOSPADM

## 2020-01-06 RX ORDER — METHYLPREDNISOLONE SODIUM SUCCINATE 125 MG/2ML
125 INJECTION, POWDER, LYOPHILIZED, FOR SOLUTION INTRAMUSCULAR; INTRAVENOUS ONCE
Status: COMPLETED | OUTPATIENT
Start: 2020-01-06 | End: 2020-01-06

## 2020-01-06 RX ORDER — ACETAMINOPHEN 325 MG/1
650 TABLET ORAL EVERY 4 HOURS PRN
Status: DISCONTINUED | OUTPATIENT
Start: 2020-01-06 | End: 2020-01-14 | Stop reason: HOSPADM

## 2020-01-06 RX ORDER — ONDANSETRON 2 MG/ML
4 INJECTION INTRAMUSCULAR; INTRAVENOUS ONCE
Status: DISCONTINUED | OUTPATIENT
Start: 2020-01-06 | End: 2020-01-14 | Stop reason: HOSPADM

## 2020-01-06 RX ORDER — SODIUM CHLORIDE 0.9 % (FLUSH) 0.9 %
10 SYRINGE (ML) INJECTION EVERY 12 HOURS SCHEDULED
Status: DISCONTINUED | OUTPATIENT
Start: 2020-01-06 | End: 2020-01-14 | Stop reason: HOSPADM

## 2020-01-06 RX ORDER — SIMVASTATIN 20 MG
20 TABLET ORAL NIGHTLY
Status: DISCONTINUED | OUTPATIENT
Start: 2020-01-06 | End: 2020-01-14 | Stop reason: HOSPADM

## 2020-01-06 RX ORDER — LEVOFLOXACIN 5 MG/ML
750 INJECTION, SOLUTION INTRAVENOUS EVERY 24 HOURS
Status: DISCONTINUED | OUTPATIENT
Start: 2020-01-06 | End: 2020-01-06

## 2020-01-06 RX ORDER — LACTOBACILLUS RHAMNOSUS GG 10B CELL
1 CAPSULE ORAL DAILY
Status: DISCONTINUED | OUTPATIENT
Start: 2020-01-06 | End: 2020-01-14 | Stop reason: HOSPADM

## 2020-01-06 RX ORDER — SODIUM CHLORIDE FOR INHALATION 7 %
4 VIAL, NEBULIZER (ML) INHALATION 2 TIMES DAILY
Status: ON HOLD | COMMUNITY
End: 2020-10-02 | Stop reason: HOSPADM

## 2020-01-06 RX ORDER — ALBUTEROL SULFATE 2.5 MG/3ML
2.5 SOLUTION RESPIRATORY (INHALATION)
Status: DISCONTINUED | OUTPATIENT
Start: 2020-01-06 | End: 2020-01-07

## 2020-01-06 RX ORDER — VITAMIN B COMPLEX
5000 TABLET ORAL EVERY OTHER DAY
Status: DISCONTINUED | OUTPATIENT
Start: 2020-01-06 | End: 2020-01-14 | Stop reason: HOSPADM

## 2020-01-06 RX ORDER — CETIRIZINE HYDROCHLORIDE 10 MG/1
10 TABLET ORAL DAILY
Status: DISCONTINUED | OUTPATIENT
Start: 2020-01-07 | End: 2020-01-14 | Stop reason: HOSPADM

## 2020-01-06 RX ORDER — LEVOTHYROXINE SODIUM 0.07 MG/1
75 TABLET ORAL DAILY
Status: DISCONTINUED | OUTPATIENT
Start: 2020-01-07 | End: 2020-01-14 | Stop reason: HOSPADM

## 2020-01-06 RX ORDER — LORATADINE 10 MG/1
10 CAPSULE, LIQUID FILLED ORAL DAILY
COMMUNITY
End: 2020-10-30 | Stop reason: ALTCHOICE

## 2020-01-06 RX ORDER — SPIRONOLACTONE 25 MG/1
50 TABLET ORAL DAILY
Status: DISCONTINUED | OUTPATIENT
Start: 2020-01-07 | End: 2020-01-08

## 2020-01-06 RX ADMIN — AZITHROMYCIN DIHYDRATE 500 MG: 500 INJECTION, POWDER, LYOPHILIZED, FOR SOLUTION INTRAVENOUS at 14:54

## 2020-01-06 RX ADMIN — SIMVASTATIN 20 MG: 20 TABLET, FILM COATED ORAL at 20:22

## 2020-01-06 RX ADMIN — METHYLPREDNISOLONE SODIUM SUCCINATE 125 MG: 125 INJECTION, POWDER, FOR SOLUTION INTRAMUSCULAR; INTRAVENOUS at 13:22

## 2020-01-06 RX ADMIN — Medication 10 ML: at 20:22

## 2020-01-06 RX ADMIN — LEVOFLOXACIN 750 MG: 5 INJECTION, SOLUTION INTRAVENOUS at 17:53

## 2020-01-06 RX ADMIN — ALBUTEROL SULFATE 2.5 MG: 2.5 SOLUTION RESPIRATORY (INHALATION) at 17:25

## 2020-01-06 RX ADMIN — ALBUTEROL SULFATE 2.5 MG: 2.5 SOLUTION RESPIRATORY (INHALATION) at 21:54

## 2020-01-06 RX ADMIN — Medication 1 CAPSULE: at 17:53

## 2020-01-06 RX ADMIN — CEFEPIME HYDROCHLORIDE 2 G: 2 INJECTION, POWDER, FOR SOLUTION INTRAVENOUS at 17:52

## 2020-01-06 RX ADMIN — BUSPIRONE HYDROCHLORIDE 5 MG: 5 TABLET ORAL at 20:22

## 2020-01-06 RX ADMIN — IPRATROPIUM BROMIDE AND ALBUTEROL SULFATE 1 AMPULE: .5; 3 SOLUTION RESPIRATORY (INHALATION) at 12:10

## 2020-01-06 RX ADMIN — CEFTRIAXONE SODIUM 1 G: 1 INJECTION, POWDER, FOR SOLUTION INTRAMUSCULAR; INTRAVENOUS at 13:22

## 2020-01-06 RX ADMIN — ENOXAPARIN SODIUM 40 MG: 40 INJECTION SUBCUTANEOUS at 17:53

## 2020-01-06 ASSESSMENT — ENCOUNTER SYMPTOMS
RHINORRHEA: 1
COUGH: 1
SHORTNESS OF BREATH: 1
ABDOMINAL PAIN: 0
RHINORRHEA: 1
SINUS PAIN: 0
CHEST TIGHTNESS: 1
NAUSEA: 1
SORE THROAT: 0
CONSTIPATION: 0
DIARRHEA: 0
VOMITING: 0
NAUSEA: 0
SINUS PRESSURE: 0
SHORTNESS OF BREATH: 1
COUGH: 1

## 2020-01-06 ASSESSMENT — PAIN DESCRIPTION - DESCRIPTORS: DESCRIPTORS: DISCOMFORT

## 2020-01-06 ASSESSMENT — PAIN SCALES - GENERAL
PAINLEVEL_OUTOF10: 0
PAINLEVEL_OUTOF10: 0
PAINLEVEL_OUTOF10: 8
PAINLEVEL_OUTOF10: 0

## 2020-01-06 ASSESSMENT — PAIN DESCRIPTION - LOCATION: LOCATION: CHEST

## 2020-01-06 ASSESSMENT — PAIN DESCRIPTION - ORIENTATION: ORIENTATION: RIGHT

## 2020-01-06 ASSESSMENT — PAIN DESCRIPTION - PROGRESSION: CLINICAL_PROGRESSION: GRADUALLY WORSENING

## 2020-01-06 ASSESSMENT — PAIN DESCRIPTION - PAIN TYPE: TYPE: ACUTE PAIN

## 2020-01-06 ASSESSMENT — PAIN DESCRIPTION - ONSET: ONSET: ON-GOING

## 2020-01-06 ASSESSMENT — PAIN DESCRIPTION - FREQUENCY: FREQUENCY: CONTINUOUS

## 2020-01-06 NOTE — PROGRESS NOTES
Pharmacy Medication History Note      List of current medications patient is taking is complete. Source of information: Patient    Changes made to medication list:  Medications removed (include reason, ex. therapy complete or physician discontinued):  none      Medications added/doses adjusted:  Patient has all meds, but has not been taking several the past week or so due to nausea, these are  Buspar  Vitamin D  Probiotic  Claritin  MVM  NaCl nebs  Benefiber    Other notes (ex. Recent course of antibiotics, Coumadin dosing):  Patient is taking other meds as prescribed  Denies use of other OTC or herbal medications.       Allergies reviewed      Electronically signed by Prakash Oneil on 1/6/2020 at 3:55 PM

## 2020-01-06 NOTE — ED PROVIDER NOTES
Backache, Bronchiectasis ([address] Bursitis, Constipation, Diverticulosis of colon, HTN (hypertension), Hypercalcemia, Hyperlipidemia, Nodular goiter, OA (osteoarthritis), Osteopenia, Pneumonia of right upper lobe due to infectious organism ([address] Pneumonitis, Pulmonary Mycobacterium avium complex (MAC) infection ([address] 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 ([address] Sinusitis, and Vitamin D deficiency. SURGICAL HISTORY      has a past surgical history that includes Appendectomy (); Cataract removal with implant (Bilateral,  ?);  section (5934,7183); Dilation and curettage of uterus (6434,5695,8936); Foot surgery (); Abdominal exploration surgery (2013); sam and bso (cervix removed) (); Excision of Parathyroid Mass (Right, 2013); Total knee arthroplasty (Right, 2014); Tonsillectomy and adenoidectomy ( ?); joint replacement (12); joint replacement (2014); Hysterectomy; malignant skin lesion excision (Left, 2017); and Foot surgery (Left, 2017). CURRENT MEDICATIONS       Previous Medications    ALBUTEROL (PROVENTIL) (2.5 MG/3ML) 0.083% NEBULIZER SOLUTION    Take 2.5 mg by nebulization 2 times daily    AMLODIPINE (NORVASC) 10 MG TABLET    TAKE 1 TABLET BY MOUTH  DAILY    BUSPIRONE (BUSPAR) 10 MG TABLET    Take 5 mg by mouth 3 times daily     CHOLECALCIFEROL (VITAMIN D3) 5000 UNITS TABS    Take by mouth every other day     LACTOBACILLUS (PROBIOTIC ACIDOPHILUS) CAPS    Take by mouth daily    LEVOTHYROXINE (SYNTHROID) 75 MCG TABLET    take 1 tablet by mouth once daily    LORATADINE (CLARITIN) 10 MG CAPSULE    Take 10 mg by mouth daily    MULTIPLE VITAMIN PO    Take  by mouth daily.     ONDANSETRON (ZOFRAN ODT) 4 MG DISINTEGRATING TABLET    Place 1 tablet under the tongue every 8 hours as needed for Nausea or Vomiting    PROAIR  (90 BASE) MCG/ACT INHALER    INHALE 2 PUFFS INTO THE LUNGS EVERY 6 HOURS IF NEEDED FOR WHEEZING OR SHORTNESS OF BREATH SIMVASTATIN (ZOCOR) 20 MG TABLET    TAKE 1 TABLET BY MOUTH  NIGHTLY    SODIUM CHLORIDE, INHALANT, 7 % NEBULIZER SOLUTION    Take 4 mLs by nebulization 2 times daily    SPIRONOLACTONE (ALDACTONE) 50 MG TABLET    TAKE 1 TABLET BY MOUTH  DAILY    WHEAT DEXTRIN (BENEFIBER PO)    Take  by mouth daily. is allergic to percocet [oxycodone-acetaminophen]; sulfa antibiotics; and cefuroxime. FAMILY HISTORY     She indicated that her mother is . She indicated that her father is . She indicated that the status of her neg hx is unknown.   family history includes Arthritis in her father and mother; Diabetes in her mother; High Blood Pressure in her father and mother; Other in her father; Thyroid Disease in her mother. SOCIAL HISTORY      reports that she has never smoked. She has never used smokeless tobacco. She reports current alcohol use. She reports that she does not use drugs. PHYSICAL EXAM     INITIAL VITALS:  height is 5' 4\" (1.626 m) and weight is 160 lb (72.6 kg). Her oral temperature is 98.3 °F (36.8 °C). Her blood pressure is 129/62 and her pulse is 85. Her respiration is 24 and oxygen saturation is 93%. Physical Exam  Vitals signs and nursing note reviewed. Constitutional:       Appearance: She is well-developed. She is not diaphoretic. Interventions: Nasal cannula in place. HENT:      Head: Normocephalic and atraumatic. Right Ear: External ear normal.      Left Ear: External ear normal.   Eyes:      General: No scleral icterus. Right eye: No discharge. Left eye: No discharge. Conjunctiva/sclera: Conjunctivae normal.   Neck:      Musculoskeletal: Normal range of motion and neck supple. Vascular: No JVD. Cardiovascular:      Rate and Rhythm: Normal rate and regular rhythm. Pulmonary:      Effort: Pulmonary effort is normal. No respiratory distress. Breath sounds: No stridor. Rales present.    Abdominal:      General: There is no distension. Palpations: Abdomen is soft. Musculoskeletal: Normal range of motion. Skin:     General: Skin is warm and dry. Findings: No erythema. Neurological:      Mental Status: She is alert and oriented to person, place, and time. Motor: No abnormal muscle tone. Psychiatric:         Behavior: Behavior normal.       DIFFERENTIAL DIAGNOSIS:   Pneumonia, Bronchitis, Chronic lung disease     RESULTS     EKG: All EKG's are interpreted by the Emergency Department Physician who either signs or Co-signs this chart in the absence of acardiologist.    Vent. Rate: 90 bpm  NJ interval: 170 ms  QRS duration:92 ms  QTc: 420 ms  P-R-T axes: 24, 62, 16  NSR. No STEMI  Compared to old EKG on 09-Nov-2016      RADIOLOGY: non-plain film images(s) such as CT, Ultrasound and MRI are read by the radiologist.    XR CHEST PORTABLE   Final Result   Progressive changes in the right upper and now right lower lobe on a background of underlying chronic fibrotic changes. Findings are concerning for superimposed infection. Correlation with symptoms and continued follow-up advised. **This report has been created using voice recognition software. It may contain minor errors which are inherent in voice recognition technology. **      Final report electronically signed by Dr. Jayce Vega on 1/6/2020 12:25 PM          LABS:   Labs Reviewed   CBC WITH AUTO DIFFERENTIAL - Abnormal; Notable for the following components:       Result Value    WBC 39.2 (*)     RDW-CV 14.7 (*)     RDW-SD 46.7 (*)     All other components within normal limits   BASIC METABOLIC PANEL W/ REFLEX TO MG FOR LOW K - Abnormal; Notable for the following components:    Sodium 131 (*)     Chloride 91 (*)     CO2 22 (*)     Glucose 124 (*)     BUN 28 (*)     Calcium 10.8 (*)     All other components within normal limits   HEPATIC FUNCTION PANEL - Abnormal; Notable for the following components:    Alb 3.3 (*)     Bilirubin, Direct 0.4 (*) Alkaline Phosphatase 192 (*)     AST 45 (*)     All other components within normal limits   BLOOD GAS, VENOUS - Abnormal; Notable for the following components:    PO2, Mixed 43 (*)     All other components within normal limits   URINE WITH REFLEXED MICRO - Abnormal; Notable for the following components:    Protein, UA TRACE (*)     Leukocyte Esterase, Urine SMALL (*)     Color, UA DK YELLOW (*)     All other components within normal limits   ANION GAP - Abnormal; Notable for the following components:    Anion Gap 18.0 (*)     All other components within normal limits   GLOMERULAR FILTRATION RATE, ESTIMATED - Abnormal; Notable for the following components:    Est, Glom Filt Rate 43 (*)     All other components within normal limits   OSMOLALITY - Abnormal; Notable for the following components:    Osmolality Calc 269.5 (*)     All other components within normal limits   RAPID INFLUENZA A/B ANTIGENS   CULTURE BLOOD #1   CULTURE BLOOD #2   TROPONIN   BRAIN NATRIURETIC PEPTIDE   LACTATE, SEPSIS   LACTATE, SEPSIS       EMERGENCY DEPARTMENT COURSE:   Vitals:    Vitals:    01/06/20 1140 01/06/20 1212   BP: 129/62    Pulse: 85    Resp: 24    Temp: 98.3 °F (36.8 °C)    TempSrc: Oral    SpO2: (S) 90% 93%   Weight: 160 lb (72.6 kg)    Height: 5' 4\" (1.626 m)      1148 Labs and chest xray ordered    1209 Zofran, Rocephin, Solu-medrol, and Duoneb ordered         MDM  79 yo fatigue cough shortness of breath, chest pain  CXR- PNA, WBC 39 lactate 1.6  Sepsis, PNA  Started Tx CAP with zithromax and rocephin added vanco   Admitted to Dr. Jose Galloway      CRITICAL CARE:   None      CONSULTS:  Dr. Calvin Ronquillo- Hospitalist    PROCEDURES:  None    FINAL IMPRESSION      1. Pneumonia due to organism      2.  Sepsis    DISPOSITION/PLAN   Admission     PATIENT REFERRED TO:  Devin Real MD  [address] [place] [address] [place] WRIGHT [address]            DISCHARGE MEDICATIONS:  New Prescriptions    No medications on file       (Please note that portions of this note were completed with a voice recognition program.Efforts were made to edit the dictations but occasionally words are mis-transcribed.)    Scribe:  Signed by: Rochelle Choi, 1/6/2012:09 PM Scribing for and in the presence of Varun Mix DO    Provider:  I personally performed the services described in the documentation, reviewed and edited the documentation which was dictated to the scribe in mypresence, and it accurately records my words and actions.     Varun Mix DO 1/6/20 1:30 PM                      Varun Mix DO  01/06/20 [id number]

## 2020-01-06 NOTE — ED NOTES
Pt transported to [facility] on cart in stable condition. Floor contacted before transport. Spoke with Rashad Cobian.      Gabby Garcia  01/06/20 2392

## 2020-01-06 NOTE — CARE COORDINATION
ED Care Transition    2020    Patient Name: Bia Rojo   : 1935  MRN: [id number]    LISETH Score: 3  PCP: Aurelia Boudreaux MD   Specialist: yes - Dr. Demetrius Farris, Dr. Priyank Cheatham (pulmonologist [facility]), Dr. George Solis  Active ACC/CTC: no                       Utilization Review:  ED visits:    4 UC visits   Admissions: 1  2020 - current - pneumonia     Problem List:  Patient Active Problem List   Diagnosis    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 ([address]    Hypercalcemia    Osteoarthritis    Non-toxic nodular goiter    Essential hypertension    Diverticulosis of colon    Hypokalemia    Hypothyroidism    Parathyroid adenoma    Osteopenia    Hyponatremia    Pure hypercholesterolemia    Left thyroid nodule    Hyperthyroidism    Lung infiltrate on CT    Angina pectoris (HCC) - Typical    Dyslipidemia    Elevated serum free T4 level    Elevated TSH    Nasal polyp    Abnormal CT scan, chest    Vitamin D deficiency     Summary:  Met with Sandra and her son at bedside, introduced self/role. Presented to ED from Dr. Deidre Stanford office for evaluation of possible pneumonia, productive cough, increased sob. Patient has a history of pulmonary fibrosis and bronchiectasis, followed by Dr. Demetrius Farris and [facility]. Patient reports nebulizer treatments at home were causing nausea therefore stopped taking them. Patient up to date on flu and pneumonia vaccines. Patient resides at home alone, independent, active , able to afford medications, has nebulizer, denies any DME for ADL's. Son here today from [place], will be leaving today. Another son resides in [facility]. Has a cleaning lady. Denies further needs/assistance. Plan is for admission. Educated on pneumonia zone tool. Stressed importance of early symptom recognition and reporting.        Follow up appointments:    Future Appointments   Date Time Provider Nicolás Colon   3/30/2020 11:00 AM JASMEET Tamayo - CNP [facility] - [place] [address]   12/3/2020

## 2020-01-06 NOTE — ED NOTES
Patient resting on cart, family at bedside. Updated on plan of care. Denies any needs at this time.      Emerald Colunga, RN  01/06/20 2122

## 2020-01-06 NOTE — ED TRIAGE NOTES
Patient arrived to room 14 with c/o fatigue and SOB. Patient was at PCP today and told to come here for possible pneumonia. Patient stated she has a chronic lung condition that make her cough normally but this cough is causing pain on the right side.

## 2020-01-06 NOTE — ED NOTES
Transferring Facility:  Call back #:  Per sending provider request: Dr Freeman Gum Status:                              Allergies:  Reason for transfer: pt coming in for poss pna in [place].  reports brown mucus and rales in [place]. Pt also having increased weakness. Son bring pt is via private vehicle. If pt needs to get admitted we can call dr Rudy Willett group for admission.    Vital Signs:  BP:  P:  R:  T:  O2 SATS:    Other Details:        Edita Brady RN  01/06/20 8147

## 2020-01-06 NOTE — ED NOTES
ED nurse-to-nurse bedside report    Chief Complaint   Patient presents with    Fatigue    Shortness of Breath      LOC: alert and orientated to name, place, date  Vital signs   Vitals:    01/06/20 1140 01/06/20 1212   BP: 129/62    Pulse: 85    Resp: 24    Temp: 98.3 °F (36.8 °C)    TempSrc: Oral    SpO2: (S) 90% 93%   Weight: 160 lb (72.6 kg)    Height: 5' 4\" (1.626 m)       Pain: 8  Pain Interventions: N/A   Pain Goal: 6   Oxygen: Yes    Current needs required 2L per NC    Telemetry: Yes  LDAs:   Peripheral IV 01/06/20 Right Antecubital (Active)   Site Assessment Clean;Dry; Intact 1/6/2020 12:29 PM   Line Status Blood return noted;Brisk blood return;Flushed;Specimen collected 1/6/2020 12:29 PM   Dressing Status Clean;Dry; Intact 1/6/2020 12:29 PM   Dressing Intervention New 1/6/2020 12:29 PM     Continuous Infusions:   Mobility: Requires assistance * 1  Pino Fall Risk Score: Fall Risk 10/9/2019 9/30/2019 5/16/2018 1/16/2017 1/19/2016 11/12/2014   2 or more falls in past year? no no no no no no   Fall with injury in past year? no no no no no no     Fall Interventions: patient educated on fall precautions, call light at bedside, son at bedside. Report given to:  Ethel Mchugh RN  01/06/20 6718

## 2020-01-06 NOTE — PROGRESS NOTES
Pharmacy Renal Adjustment    Efren Rodriguez is a 80 y.o. female. Pharmacy renally adjust the following medications per P&T approved policy: Cefepime and levaquin    Recent Labs     01/06/20  1217   BUN 28*       Recent Labs     01/06/20  1217   CREATININE 1.2       Estimated Creatinine Clearance: 34 mL/min (based on SCr of 1.2 mg/dL).     Height:   Ht Readings from Last 1 Encounters:   01/06/20 5' 4\" (1.626 m)     Weight:  Wt Readings from Last 1 Encounters:   01/06/20 161 lb 4.8 oz (73.2 kg)       Baseline SCr: ~ 1.0    Plan: Adjustments based on renal function:          Decrease Cefepime 2 g Q8H to Cefepime 2 g Q12H  Decrease  750 mg Q24H to levaquin 750 mg Q48H                Lucretia Langford, PharmD  1/6/2020  5:20 PM

## 2020-01-06 NOTE — PROGRESS NOTES
Pt admitted to  [address] [place] [address] from ED. Complaints: Shortness of breath. IV none infusing into the antecubital right, condition patent. IV site free of s/s of infection or infiltration. Vital signs obtained. Assessment and data collection initiated. Two nurse skin assessment performed by Lay Saleh and [facility] RN. Oriented to room. Policies and procedures for 6K explained. All questions answered with no further questions at this time. Fall prevention and safety brochure discussed with patient. Bed alarm on. Call light in reach.

## 2020-01-06 NOTE — H&P
- sees Dr. Maura Alvarado Osteopenia 2013    Pneumonia of right upper lobe due to infectious organism ([facility]     Pneumonitis     Dr. Naun Loredo in 2010 - bx negative    Pulmonary Mycobacterium avium complex (MAC) infection ([facility]     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 ([facility] [address] [facility]     had one parathyroid removed - now follows with Dr. José Miguel Toro Sinusitis     with seasonal allergies    Vitamin D deficiency 2018       Past Surgical History:        Procedure Laterality Date    ABDOMINAL EXPLORATION SURGERY  2013    Release of SBO, KATHERINE-Dr. Royce Silva    APPENDECTOMY      CATARACT REMOVAL WITH IMPLANT Bilateral  ?   SECTION  [id number]    DILATION AND CURETTAGE OF UTERUS  [id number]    EXCISION OF PARATHYROID MASS Right 2013    rt parathyroidectomy (excision of rt inferior parathyroid mass) exploration of neck     FOOT SURGERY      left    FOOT SURGERY Left 2017    Dr Emma Howard  6-4    left knee    JOINT REPLACEMENT  2014    right knee    MALIGNANT SKIN LESION EXCISION Left 2017    [facility] DORSAL FOOT WITH GRAFT & FS    GOSIA AND BSO  1982    TONSILLECTOMY AND ADENOIDECTOMY  194 ?  TOTAL KNEE ARTHROPLASTY Right 2014    [facility]       Home Medications:   No current facility-administered medications on file prior to encounter.       Current Outpatient Medications on File Prior to Encounter   Medication Sig Dispense Refill    sodium chloride, Inhalant, 7 % nebulizer solution Take 4 mLs by nebulization 2 times daily       albuterol (PROVENTIL) (2.5 MG/3ML) 0.083% nebulizer solution Take 2.5 mg by nebulization 2 times daily      loratadine (CLARITIN) 10 MG capsule Take 10 mg by mouth daily       ondansetron (ZOFRAN ODT) 4 MG disintegrating tablet Place 1 tablet under the tongue every 8 hours as needed for Nausea or Vomiting 20 tablet 0    PROAIR  (90 Base) MCG/ACT inhaler INHALE 2 PUFFS INTO THE LUNGS EVERY 6 HOURS IF NEEDED FOR WHEEZING OR SHORTNESS OF BREATH 1 Inhaler 11    spironolactone (ALDACTONE) 50 MG tablet TAKE 1 TABLET BY MOUTH  DAILY 90 tablet 3    simvastatin (ZOCOR) 20 MG tablet TAKE 1 TABLET BY MOUTH  NIGHTLY 90 tablet 3    amLODIPine (NORVASC) 10 MG tablet TAKE 1 TABLET BY MOUTH  DAILY 90 tablet 3    Lactobacillus (PROBIOTIC ACIDOPHILUS) CAPS Take 1 capsule by mouth daily       levothyroxine (SYNTHROID) 75 MCG tablet take 1 tablet by mouth once daily 90 tablet 3    busPIRone (BUSPAR) 10 MG tablet Take 5 mg by mouth 3 times daily       Cholecalciferol (VITAMIN D3) 5000 units TABS Take 5,000 Units by mouth every other day       MULTIPLE VITAMIN PO Take 1 tablet by mouth daily          Allergies:  Percocet [oxycodone-acetaminophen]; Sulfa antibiotics; and Cefuroxime    Social History:    reports that she has never smoked. She has never used smokeless tobacco. She reports current alcohol use. She reports that she does not use drugs.     Family History:       Problem Relation Age of Onset    Diabetes Mother     Thyroid Disease Mother     Arthritis Mother     High Blood Pressure Mother     Arthritis Father     High Blood Pressure Father     Other Father         hay fever    Breast Cancer Neg Hx     Ovarian Cancer Neg Hx        Review of systems:    CNS: No seizures, no dizziness, no facial droop,  no paresthesia, numbness or muscle  Weakness, no dysphasia or  Dysphagia,  CARDIOVASCULAR: As per HPI or otherwise negative  RESPIRATORY: As per HPI or otherwise negative    GASTROINTESTINAL SYSTEM: As per HPI or otherwise negative  GENITOURINARY SYSTEM: No dysuria , hematuria, urinary frequency , incontinence,  flank pains , urgency , genital discharge  SKIN / Patsey Kearns:  No rashes, petechiae, , no open wounds , no soft tissue swelling   MUSCULOSKELETAL: No bone pains, no arthralgia, no joint swelling, no myalgia  HEMATOLOGIC: No easy bruising, no bleeding  OPHTHALMOLOGY: No conjuctival 400 thou/mm3    MPV 10.9 9.4 - 12.4 fL    Pathologist Review PCF     Differential Type see below     Seg Neutrophils 87.8 %    Lymphocytes 1.8 %    Monocytes 6.0 %    Eosinophils 0.2 %    Basophils 0.5 %    Immature Granulocytes 3.7 %    Platelet Estimate ADEQUATE Adequate    Segs Absolute 34.4 (H) 1.8 - 7.7 thou/mm3    Lymphocytes Absolute 0.7 (L) 1.0 - 4.8 thou/mm3    Monocytes Absolute 2.4 (H) 0.4 - 1.3 thou/mm3    Eosinophils Absolute 0.1 0.0 - 0.4 thou/mm3    Basophils Absolute 0.2 (H) 0.0 - 0.1 thou/mm3    Immature Grans (Abs) 1.46 (H) 0.00 - 0.07 thou/mm3    nRBC 0 /100 wbc    Toxic Granulation Present Absent   Basic Metabolic Panel w/ Reflex to MG    Collection Time: 01/06/20 12:17 PM   Result Value Ref Range    Sodium 131 (L) 135 - 145 meq/L    Potassium reflex Magnesium 5.0 3.5 - 5.2 meq/L    Chloride 91 (L) 98 - 111 meq/L    CO2 22 (L) 23 - 33 meq/L    Glucose 124 (H) 70 - 108 mg/dL    BUN 28 (H) 7 - 22 mg/dL    CREATININE 1.2 0.4 - 1.2 mg/dL    Calcium 10.8 (H) 8.5 - 10.5 mg/dL   Hepatic Function Panel    Collection Time: 01/06/20 12:17 PM   Result Value Ref Range    Alb 3.3 (L) 3.5 - 5.1 g/dL    Total Bilirubin 0.7 0.3 - 1.2 mg/dL    Bilirubin, Direct 0.4 (H) 0.0 - 0.3 mg/dL    Alkaline Phosphatase 192 (H) 38 - 126 U/L    AST 45 (H) 5 - 40 U/L    ALT 37 11 - 66 U/L    Total Protein 7.8 6.1 - 8.0 g/dL   Troponin    Collection Time: 01/06/20 12:17 PM   Result Value Ref Range    Troponin T < 0.010 ng/ml   Brain Natriuretic Peptide    Collection Time: 01/06/20 12:17 PM   Result Value Ref Range    Pro-.4 0.0 - 1800.0 pg/mL   Blood Gas, Venous    Collection Time: 01/06/20 12:17 PM   Result Value Ref Range    PH MIXED 7.39 7.31 - 7.41    PCO2, MIXED VENOUS 43 41 - 51 mmhg    PO2, Mixed 43 (H) 25 - 40 mmhg    HCO3, Mixed 26 23 - 28 mmol/l    Base Exc, Mixed 0.7 -2.0 - 3.0 mmol/l    O2 Sat, Mixed 78 %    COLLECTED BY: [id number]    Anion Gap    Collection Time: 01/06/20 12:17 PM   Result Value Ref Range pseudomonas in view of known bronchiectasis, pulmonary/ID service consults, legionella and strep pneumonia urinary antigens, rapid influenza screen, blood cultures, sputum gram stain c/s, Incentive spirometry , acapella        DVT prophylaxis: [x] Lovenox                                 [] SCDs                                 [] SQ Heparin                                 [] Encourage ambulation, low risk for DVT, no chemical or mechanical prophylaxis necessary              [] Already on Anticoagulation                Anticipated Disposition upon discharge: [x] Home                                                                         [] Home with Home Health                                                                         [] [facility]                                                                         [] [address] [place] [address],[address]          Electronically signed by Kaushik Viera MD on 1/7/2020 at 12:34 AM

## 2020-01-07 ENCOUNTER — APPOINTMENT (OUTPATIENT)
Dept: CT IMAGING | Age: 85
DRG: 166 | End: 2020-01-07
Payer: MEDICARE

## 2020-01-07 LAB
ALBUMIN SERPL-MCNC: 2.5 G/DL (ref 3.5–5.1)
ALP BLD-CCNC: 151 U/L (ref 38–126)
ALT SERPL-CCNC: 31 U/L (ref 11–66)
ANION GAP SERPL CALCULATED.3IONS-SCNC: 16 MEQ/L (ref 8–16)
AST SERPL-CCNC: 46 U/L (ref 5–40)
BASOPHILS # BLD: 0.4 %
BASOPHILS ABSOLUTE: 0.2 THOU/MM3 (ref 0–0.1)
BILIRUB SERPL-MCNC: 0.4 MG/DL (ref 0.3–1.2)
BUN BLDV-MCNC: 34 MG/DL (ref 7–22)
CALCIUM SERPL-MCNC: 10.1 MG/DL (ref 8.5–10.5)
CHLORIDE BLD-SCNC: 94 MEQ/L (ref 98–111)
CO2: 22 MEQ/L (ref 23–33)
CREAT SERPL-MCNC: 1 MG/DL (ref 0.4–1.2)
EOSINOPHIL # BLD: 0.2 %
EOSINOPHILS ABSOLUTE: 0.1 THOU/MM3 (ref 0–0.4)
ERYTHROCYTE [DISTWIDTH] IN BLOOD BY AUTOMATED COUNT: 14.6 % (ref 11.5–14.5)
ERYTHROCYTE [DISTWIDTH] IN BLOOD BY AUTOMATED COUNT: 47 FL (ref 35–45)
GFR SERPL CREATININE-BSD FRML MDRD: 53 ML/MIN/1.73M2
GLUCOSE BLD-MCNC: 143 MG/DL (ref 70–108)
GLUCOSE BLD-MCNC: 147 MG/DL (ref 70–108)
HCT VFR BLD CALC: 36.4 % (ref 37–47)
HEMOGLOBIN: 11.8 GM/DL (ref 12–16)
IMMATURE GRANS (ABS): 1.84 THOU/MM3 (ref 0–0.07)
IMMATURE GRANULOCYTES: 4.3 %
INR BLD: 1.39 (ref 0.85–1.13)
LACTIC ACID: 1.4 MMOL/L (ref 0.5–2.2)
LYMPHOCYTES # BLD: 2.1 %
LYMPHOCYTES ABSOLUTE: 0.9 THOU/MM3 (ref 1–4.8)
MCH RBC QN AUTO: 28.3 PG (ref 26–33)
MCHC RBC AUTO-ENTMCNC: 32.4 GM/DL (ref 32.2–35.5)
MCV RBC AUTO: 87.3 FL (ref 81–99)
MONOCYTES # BLD: 2.2 %
MONOCYTES ABSOLUTE: 0.9 THOU/MM3 (ref 0.4–1.3)
NUCLEATED RED BLOOD CELLS: 0 /100 WBC
PLATELET # BLD: 240 THOU/MM3 (ref 130–400)
PMV BLD AUTO: 10.8 FL (ref 9.4–12.4)
POTASSIUM REFLEX MAGNESIUM: 5.1 MEQ/L (ref 3.5–5.2)
RBC # BLD: 4.17 MILL/MM3 (ref 4.2–5.4)
SEG NEUTROPHILS: 90.8 %
SEGMENTED NEUTROPHILS ABSOLUTE COUNT: 39 THOU/MM3 (ref 1.8–7.7)
SODIUM BLD-SCNC: 132 MEQ/L (ref 135–145)
TOTAL PROTEIN: 6.6 G/DL (ref 6.1–8)
WBC # BLD: 42.9 THOU/MM3 (ref 4.8–10.8)

## 2020-01-07 PROCEDURE — 83605 ASSAY OF LACTIC ACID: CPT

## 2020-01-07 PROCEDURE — 6360000002 HC RX W HCPCS: Performed by: INTERNAL MEDICINE

## 2020-01-07 PROCEDURE — 97162 PT EVAL MOD COMPLEX 30 MIN: CPT

## 2020-01-07 PROCEDURE — 97165 OT EVAL LOW COMPLEX 30 MIN: CPT

## 2020-01-07 PROCEDURE — 85025 COMPLETE CBC W/AUTO DIFF WBC: CPT

## 2020-01-07 PROCEDURE — 85610 PROTHROMBIN TIME: CPT

## 2020-01-07 PROCEDURE — 97110 THERAPEUTIC EXERCISES: CPT

## 2020-01-07 PROCEDURE — 6370000000 HC RX 637 (ALT 250 FOR IP): Performed by: INTERNAL MEDICINE

## 2020-01-07 PROCEDURE — 2580000003 HC RX 258: Performed by: INTERNAL MEDICINE

## 2020-01-07 PROCEDURE — 82948 REAGENT STRIP/BLOOD GLUCOSE: CPT

## 2020-01-07 PROCEDURE — 94640 AIRWAY INHALATION TREATMENT: CPT

## 2020-01-07 PROCEDURE — 87899 AGENT NOS ASSAY W/OPTIC: CPT

## 2020-01-07 PROCEDURE — 99223 1ST HOSP IP/OBS HIGH 75: CPT | Performed by: INTERNAL MEDICINE

## 2020-01-07 PROCEDURE — 87449 NOS EACH ORGANISM AG IA: CPT

## 2020-01-07 PROCEDURE — 80053 COMPREHEN METABOLIC PANEL: CPT

## 2020-01-07 PROCEDURE — 2700000000 HC OXYGEN THERAPY PER DAY

## 2020-01-07 PROCEDURE — 36415 COLL VENOUS BLD VENIPUNCTURE: CPT

## 2020-01-07 PROCEDURE — 94761 N-INVAS EAR/PLS OXIMETRY MLT: CPT

## 2020-01-07 PROCEDURE — 71250 CT THORAX DX C-: CPT

## 2020-01-07 PROCEDURE — 1200000003 HC TELEMETRY R&B

## 2020-01-07 RX ORDER — IPRATROPIUM BROMIDE AND ALBUTEROL SULFATE 2.5; .5 MG/3ML; MG/3ML
1 SOLUTION RESPIRATORY (INHALATION)
Status: DISCONTINUED | OUTPATIENT
Start: 2020-01-07 | End: 2020-01-14 | Stop reason: HOSPADM

## 2020-01-07 RX ADMIN — Medication 1 CAPSULE: at 10:02

## 2020-01-07 RX ADMIN — SIMVASTATIN 20 MG: 20 TABLET, FILM COATED ORAL at 19:53

## 2020-01-07 RX ADMIN — IPRATROPIUM BROMIDE AND ALBUTEROL SULFATE 1 AMPULE: .5; 3 SOLUTION RESPIRATORY (INHALATION) at 21:51

## 2020-01-07 RX ADMIN — LEVOTHYROXINE SODIUM 75 MCG: 75 TABLET ORAL at 05:44

## 2020-01-07 RX ADMIN — BUSPIRONE HYDROCHLORIDE 5 MG: 5 TABLET ORAL at 14:06

## 2020-01-07 RX ADMIN — BUSPIRONE HYDROCHLORIDE 5 MG: 5 TABLET ORAL at 10:02

## 2020-01-07 RX ADMIN — IPRATROPIUM BROMIDE AND ALBUTEROL SULFATE 1 AMPULE: .5; 3 SOLUTION RESPIRATORY (INHALATION) at 11:35

## 2020-01-07 RX ADMIN — ALBUTEROL SULFATE 2.5 MG: 2.5 SOLUTION RESPIRATORY (INHALATION) at 08:31

## 2020-01-07 RX ADMIN — Medication 10 ML: at 21:54

## 2020-01-07 RX ADMIN — BUSPIRONE HYDROCHLORIDE 5 MG: 5 TABLET ORAL at 21:50

## 2020-01-07 RX ADMIN — CETIRIZINE HYDROCHLORIDE 10 MG: 10 TABLET, FILM COATED ORAL at 10:02

## 2020-01-07 RX ADMIN — Medication 10 ML: at 10:03

## 2020-01-07 RX ADMIN — ENOXAPARIN SODIUM 40 MG: 40 INJECTION SUBCUTANEOUS at 17:33

## 2020-01-07 RX ADMIN — CEFEPIME HYDROCHLORIDE 2 G: 2 INJECTION, POWDER, FOR SOLUTION INTRAVENOUS at 17:33

## 2020-01-07 RX ADMIN — SPIRONOLACTONE 50 MG: 25 TABLET ORAL at 10:02

## 2020-01-07 RX ADMIN — IPRATROPIUM BROMIDE AND ALBUTEROL SULFATE 1 AMPULE: .5; 3 SOLUTION RESPIRATORY (INHALATION) at 16:33

## 2020-01-07 RX ADMIN — AMLODIPINE BESYLATE 10 MG: 10 TABLET ORAL at 10:02

## 2020-01-07 RX ADMIN — CEFEPIME HYDROCHLORIDE 2 G: 2 INJECTION, POWDER, FOR SOLUTION INTRAVENOUS at 05:44

## 2020-01-07 ASSESSMENT — PAIN SCALES - GENERAL
PAINLEVEL_OUTOF10: 0
PAINLEVEL_OUTOF10: 0

## 2020-01-07 NOTE — CARE COORDINATION
1/7/20, 10:04 AM  DISCHARGE PLANNING EVALUATION:    Jocelyn Bain       Admitted from: ER 1/6/2020/ [address]  day: 1   Location: 6-03/003-A Reason for admit: Pneumonia [J18.9] Status: IP   Admit order signed?: yes  PMH:  has a past medical history of Anemia, Backache, Bronchiectasis ([address] Bursitis, Constipation, Diverticulosis of colon, HTN (hypertension), Hypercalcemia, Hyperlipidemia, Nodular goiter, OA (osteoarthritis), Osteopenia, Pneumonia of right upper lobe due to infectious organism ([address] Pneumonitis, Pulmonary Mycobacterium avium complex (MAC) infection ([facility] 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 ([facility] [address] [facility] Sinusitis, and Vitamin D deficiency. Medications:  Scheduled Meds:   ipratropium-albuterol  1 ampule Inhalation Q4H WA    ondansetron  4 mg Intravenous Once    vancomycin  1,000 mg Intravenous Once    lactobacillus  1 capsule Oral Daily    albuterol  2.5 mg Nebulization Q4H While awake    amLODIPine  10 mg Oral Daily    busPIRone  5 mg Oral TID    Vitamin D  5,000 Units Oral Every Other Day    levothyroxine  75 mcg Oral Daily    cetirizine  10 mg Oral Daily    simvastatin  20 mg Oral Nightly    spironolactone  50 mg Oral Daily    sodium chloride flush  10 mL Intravenous 2 times per day    enoxaparin  40 mg Subcutaneous Q24H    cefepime  2 g Intravenous Q12H    And    levofloxacin  750 mg Intravenous Q48H     Continuous Infusions:   Pertinent Info/Orders/Treatment  Plan:WBC 42.9. 95% on 2L O2. IV ATBs, duonebs. High resolution CT chest ordered. ID consult. PT/OT. Diet: DIET CARDIAC;   Smoking status:  reports that she has never smoked.  She has never used smokeless tobacco.   PCP: Rachel Brooks MD  Readmission 30 days or less: no  Readmission Risk Score: 14%    Discharge Planning Evaluation  Current Residence:  Private Residence  Living Arrangements:  Children, Family Members   Support Systems:  None  Current Services PTA:     Potential Assistance Needed:     Potential Assistance Purchasing Medications:  No  Does patient want to participate in local refill/ meds to beds program?  No  Type of Home Care Services:  Aide Services, PT, OT  Patient expects to be discharged to:  Home alone  Expected Discharge date:  01/09/20  Follow Up Appointment: Best Day/ Time:      Patient Goals/Plan/Treatment Preferences: Home alone with \[facility]\"" - [facility]. Transportation/Food Security/Housekeeping Addressed:  No issues identified.     Evaluation: yes

## 2020-01-07 NOTE — CARE COORDINATION
DISCHARGE/PLANNING EVALUATION  1/7/20, 10:32 AM    Reason for Referral: home care needs  Mental Status: pt is alert and oriented   Decision Making: pt is capable of making own decisions   Family/Social/Home Environment: pt resides alone in a condo. Pt reports being independent with personal care. Current Services including food security, transportation and housekeeping: pt is an active , cooks own meals, has a cleaning lady  Current Equipment:nebulizer, shower chair  Payment Source:Medicare/[facility] [employer]   Concerns or Barriers to Discharge: none noted  Post acute provider list with quality measures, geographic area and applicable managed care information provided. Questions regarding selection process answered:offered, declined [place] list - requested [facility], has had in past    Teach Back Method used with pt regarding care plan and benefits of [place]  Patient verbalize understanding of the plan of care and contribute to goal setting. Patient goals, treatment preferences and discharge plan: SW met with pt, discussed discharge needs. Pt is agreeable to [place] and is requesting [facility]. Sticky note left for physician for [place] order.      Electronically signed by CIARA Hurst on 1/7/2020 at 10:32 AM

## 2020-01-07 NOTE — PROGRESS NOTES
INTERNAL MEDICINE SPECIALTIES  Progress Note For Dr Giovanna Cobian       Patient:  Efren Rodriguez  YOB: 1935  Date of Service: 1/7/2020  MRN: [id number]   Acct:  [de-identified]   Primary Care Physician: Bolivar Lopez MD    SUBJECTIVE: Has no new complaints at this time feels perhaps a bit better    Home Medications:   No current facility-administered medications on file prior to encounter.       Current Outpatient Medications on File Prior to Encounter   Medication Sig Dispense Refill    sodium chloride, Inhalant, 7 % nebulizer solution Take 4 mLs by nebulization 2 times daily       albuterol (PROVENTIL) (2.5 MG/3ML) 0.083% nebulizer solution Take 2.5 mg by nebulization 2 times daily      loratadine (CLARITIN) 10 MG capsule Take 10 mg by mouth daily       ondansetron (ZOFRAN ODT) 4 MG disintegrating tablet Place 1 tablet under the tongue every 8 hours as needed for Nausea or Vomiting 20 tablet 0    PROAIR  (90 Base) MCG/ACT inhaler INHALE 2 PUFFS INTO THE LUNGS EVERY 6 HOURS IF NEEDED FOR WHEEZING OR SHORTNESS OF BREATH 1 Inhaler 11    spironolactone (ALDACTONE) 50 MG tablet TAKE 1 TABLET BY MOUTH  DAILY 90 tablet 3    simvastatin (ZOCOR) 20 MG tablet TAKE 1 TABLET BY MOUTH  NIGHTLY 90 tablet 3    amLODIPine (NORVASC) 10 MG tablet TAKE 1 TABLET BY MOUTH  DAILY 90 tablet 3    Lactobacillus (PROBIOTIC ACIDOPHILUS) CAPS Take 1 capsule by mouth daily       levothyroxine (SYNTHROID) 75 MCG tablet take 1 tablet by mouth once daily 90 tablet 3    busPIRone (BUSPAR) 10 MG tablet Take 5 mg by mouth 3 times daily       Cholecalciferol (VITAMIN D3) 5000 units TABS Take 5,000 Units by mouth every other day       MULTIPLE VITAMIN PO Take 1 tablet by mouth daily            Scheduled Meds:   ondansetron  4 mg Intravenous Once    vancomycin  1,000 mg Intravenous Once    lactobacillus  1 capsule Oral Daily    albuterol  2.5 mg Nebulization Q4H While awake    amLODIPine  10 mg Oral Daily    busPIRone  5 mg Oral TID    Vitamin D  5,000 Units Oral Every Other Day    levothyroxine  75 mcg Oral Daily    cetirizine  10 mg Oral Daily    simvastatin  20 mg Oral Nightly    spironolactone  50 mg Oral Daily    sodium chloride flush  10 mL Intravenous 2 times per day    enoxaparin  40 mg Subcutaneous Q24H    cefepime  2 g Intravenous Q12H    And    levofloxacin  750 mg Intravenous Q48H     Continuous Infusions:  PRN Meds:sodium chloride flush, magnesium hydroxide, ondansetron, acetaminophen        Allergies:  Percocet [oxycodone-acetaminophen]; Sulfa antibiotics; and Cefuroxime    OBJECTIVE:    Vitals:   Vitals:    01/07/20 0339   BP: (!) 101/45   Pulse: 79   Resp: 18   Temp: 97.6 °F (36.4 °C)   SpO2: 93%      BMI: Body mass index is 27.69 kg/m². PHYSICAL EXAMINATION:          General appearance:Ill looking elderly female ; No apparent distress, appears stated age and cooperative. HEENT:  Normal cephalic, atraumatic without obvious deformity. Pupils equal, round, and reactive to light. Extra ocular muscles intact. Conjunctivae/corneas clear. Neck: Supple   Respiratory:  Normal respiratory effort., bilateral basilar crackles. Cardiovascular:  Regular rhythm with normal S1/S2 without ,rubs or gallops. Abdomen: Soft, mild LUQ tenderness, non-distended with normal bowel sounds. Musculoskeletal:  No clubbing, cyanosis or edema bilaterally. Full range of motion without deformity. Skin: Skin color, texture, turgor normal.  No rashes or lesions. Neurologic: Alert and oriented x 3, without any focal motor deficits.    Psychiatric:   Thought content appropriate, normal insight     Review of Labs and Diagnostic Testing:    Recent Results (from the past 24 hour(s))   EKG 12 Lead    Collection Time: 01/06/20 11:35 AM   Result Value Ref Range    Ventricular Rate 90 BPM    Atrial Rate 90 BPM    P-R Interval 170 ms    QRS Duration 92 ms    Q-T Interval 344 ms    QTc Calculation (Bazett) 420 ms    P Axis 24 12:17 PM   Result Value Ref Range    Pro-.4 0.0 - 1800.0 pg/mL   Blood Gas, Venous    Collection Time: 01/06/20 12:17 PM   Result Value Ref Range    PH MIXED 7.39 7.31 - 7.41    PCO2, MIXED VENOUS 43 41 - 51 mmhg    PO2, Mixed 43 (H) 25 - 40 mmhg    HCO3, Mixed 26 23 - 28 mmol/l    Base Exc, Mixed 0.7 -2.0 - 3.0 mmol/l    O2 Sat, Mixed 78 %    COLLECTED BY: [id number]    Anion Gap    Collection Time: 01/06/20 12:17 PM   Result Value Ref Range    Anion Gap 18.0 (H) 8.0 - 16.0 meq/L   Glomerular Filtration Rate, Estimated    Collection Time: 01/06/20 12:17 PM   Result Value Ref Range    Est, Glom Filt Rate 43 (A) ml/min/1.73m2   Osmolality    Collection Time: 01/06/20 12:17 PM   Result Value Ref Range    Osmolality Calc 269.5 (L) 275.0 - 300 mOsmol/kg   Scan of Blood Smear    Collection Time: 01/06/20 12:17 PM   Result Value Ref Range    SCAN OF BLOOD SMEAR see below    Urine with Reflexed Micro    Collection Time: 01/06/20 12:21 PM   Result Value Ref Range    Glucose, Ur NEGATIVE NEGATIVE mg/dl    Bilirubin Urine NEGATIVE NEGATIVE    Ketones, Urine NEGATIVE NEGATIVE    Specific Gravity, Urine 1.017 1.002 - 1.03    Blood, Urine NEGATIVE NEGATIVE    pH, UA 5.0 5.0 - 9.0    Protein, UA TRACE (A) NEGATIVE    Urobilinogen, Urine 1.0 0.0 - 1.0 eu/dl    Nitrite, Urine NEGATIVE NEGATIVE    Leukocyte Esterase, Urine SMALL (A) NEGATIVE    Color, UA DK YELLOW (A) STRAW-YELL    Character, Urine CLEAR CLEAR-SL C    RBC, UA 0-2 0-2/hpf /hpf    WBC, UA 0-2 0-4/hpf /hpf    Epi Cells 3-5 3-5/hpf /hpf    Bacteria, UA FEW FEW/NONE S /hpf    Casts UA NONE SEEN NONE SEEN /lpf    Crystals NONE SEEN NONE SEEN    Renal Epithelial, Urine NONE SEEN NONE SEEN    Yeast, UA NONE SEEN NONE SEEN    CASTS 2 NONE SEEN NONE SEEN /lpf    MISCELLANEOUS 2 NONE SEEN    Culture blood #1    Collection Time: 01/06/20 12:27 PM   Result Value Ref Range    Blood Culture, Routine No growth-preliminary     Rapid influenza A/B antigens    Collection Time: 01/06/20 12:30 PM   Result Value Ref Range    Flu A Antigen NEGATIVE NEGATIVE    Flu B Antigen NEGATIVE NEGATIVE   Lactate, Sepsis    Collection Time: 01/06/20  1:00 PM   Result Value Ref Range    Lactic Acid, Sepsis 1.6 0.5 - 1.9 mmol/L   Culture blood #2    Collection Time: 01/06/20  1:40 PM   Result Value Ref Range    Blood Culture, Routine No growth-preliminary     Lactate, Sepsis    Collection Time: 01/06/20  2:18 PM   Result Value Ref Range    Lactic Acid, Sepsis 2.0 (H) 0.5 - 1.9 mmol/L   Blood Gas, Venous    Collection Time: 01/06/20  2:33 PM   Result Value Ref Range    PH MIXED 7.38 7.31 - 7.41    PCO2, MIXED VENOUS 43 41 - 51 mmhg    PO2, Mixed 36 25 - 40 mmhg    HCO3, Mixed 26 23 - 28 mmol/l    Base Exc, Mixed 0.2 -2.0 - 3.0 mmol/l    O2 Sat, Mixed 68 %    COLLECTED BY: [id number]    Blood Gas, Arterial    Collection Time: 01/06/20  9:51 PM   Result Value Ref Range    pH, Blood Gas 7.44 7.35 - 7.45    PCO2 34 (L) 35 - 45 mmhg    PO2 74 71 - 104 mmhg    HCO3 23 23 - 28 mmol/l    Base Excess (Calculated) -0.6 -2.5 - 2.5 mmol/l    O2 Sat 95 %    IFIO2 2     DEVICE Cannula     Dustin Test Positive     COLLECTED BY: [id number]        Radiology:     Xr Chest Portable    Result Date: 1/6/2020  PROCEDURE: XR CHEST PORTABLE CLINICAL INFORMATION: dyspnea. COMPARISON: Chest CT 12/2/2019 TECHNIQUE: Portable. FINDINGS: Fibrotic changes in the right upper lobe with superimposed airspace consolidation/pleural effusion concerning for superimposed infection. Correlation with symptoms and follow-up advised. Ectatic thoracic aorta. Cardiomegaly. Osteopenia. Small right pleural effusions. Progressive changes in the right upper and now right lower lobe on a background of underlying chronic fibrotic changes. Findings are concerning for superimposed infection. Correlation with symptoms and continued follow-up advised. **This report has been created using voice recognition software.  It may contain minor errors which are inherent in

## 2020-01-07 NOTE — PROGRESS NOTES
Veto Manrique 60  INPATIENT OCCUPATIONAL THERAPY  [facility] RENAL TELEMETRY 6K  EVALUATION    Time:    Time In: 8278  Time Out: 1014  Timed Code Treatment Minutes: 9 Minutes  Minutes: 19          Date: 2020  Patient Name: Cody Christopher,   Gender: female      MRN: [id number]  : 1935  (80 y.o.)  Referring Practitioner: Dr. Miroslava Murray  Diagnosis: Pneumonia  Additional Pertinent Hx: Per ER note on 2020: 80 y.o. female who presents to the Emergency Department for the evaluation of productive cough and increased SOB from her baseline over the past 5 to 7 days. The patient has a history of pulmonary fibrosis and bronchiectasis. The patient was sent here today from her PCP office who had concern of pneumonia. Patient arrives here with SpO2 of 89-90% on RA. Patient states she has nebulizer treatments at home, but these were causing her to become nauseas, therefore she stopped using them. Restrictions/Precautions:  Position Activity Restriction  Other position/activity restrictions: O2    Subjective  Chart Reviewed: Yes, Orders, Progress Notes, History and Physical  Patient assessed for rehabilitation services?: Yes  Family / Caregiver Present: No    Subjective: cooperative,  pleasant    Pain:  Pain Assessment  Patient Currently in Pain: No    Social/Functional History:  Lives With: Alone  Type of Home: (condo)  Home Layout: One level  Home Access: Stairs to enter with rails(2)  Home Equipment: Rolling walker   Bathroom Shower/Tub: Walk-in shower  Bathroom Toilet: Handicap height  Bathroom Equipment: Shower chair, Grab bars in shower       ADL Assistance: Independent  Homemaking Assistance: Needs assistance  Ambulation Assistance: Independent  Transfer Assistance: Independent          Additional Comments: Pt reports having to pace her tasks at home d/t fatigue, min SOB; significantly worse now.  Pt does not use AD PLOF, reports she had been so clumsy with it in the past. No O2 at home included skilled education and facilitation of tasks to increase safety and independence with ADL's for improved functional independence and quality of life. Discharge Recommendations:  Continue to assess pending progress    Patient Education:  OT Education: OT Role, Plan of Care, Transfer Training  Barriers to Learning: none    Equipment Recommendations: Other: Pt has needed AE at home. Plan:  Times per week: 3-5x  Current Treatment Recommendations: Self-Care / ADL, Endurance Training, Functional Mobility Training, Safety Education & Training    Goals:  Patient goals : no be so SOB with activity  Short term goals  Time Frame for Short term goals: 2 weeks  Short term goal 1: Tolerate standing 3-4 min with S for increased ease of sinkside grooming  Short term goal 2: Complete BUE AROM exercises with deep breathing to increase UB endurance for BADL  Short term goal 3: Complete BADL routine with S & 0-2 vcs for safety & compensatory strategies prn  Long term goals  Time Frame for Long term goals : No LTG set d/t short ELOS         Following session, patient left in safe position with all fall risk precautions in place.

## 2020-01-07 NOTE — CONSULTS
06/12/2017    Dr Annetta Javier  6-4-12    left knee    JOINT REPLACEMENT  01/2014    right knee    MALIGNANT SKIN LESION EXCISION Left 06/12/2017    BCC DORSAL FOOT WITH GRAFT & FS    GOSIA AND BSO  1982    TONSILLECTOMY AND ADENOIDECTOMY  1940 ?     TOTAL KNEE ARTHROPLASTY Right January 6th, 2014    OIO     Meds    Current Medications    ondansetron  4 mg Intravenous Once    vancomycin  1,000 mg Intravenous Once    lactobacillus  1 capsule Oral Daily    albuterol  2.5 mg Nebulization Q4H While awake    amLODIPine  10 mg Oral Daily    busPIRone  5 mg Oral TID    Vitamin D  5,000 Units Oral Every Other Day    levothyroxine  75 mcg Oral Daily    cetirizine  10 mg Oral Daily    simvastatin  20 mg Oral Nightly    spironolactone  50 mg Oral Daily    sodium chloride flush  10 mL Intravenous 2 times per day    enoxaparin  40 mg Subcutaneous Q24H    cefepime  2 g Intravenous Q12H    And    levofloxacin  750 mg Intravenous Q48H     sodium chloride flush, magnesium hydroxide, ondansetron, acetaminophen  IV Drips/Infusions    Home Medications  Medications Prior to Admission: sodium chloride, Inhalant, 7 % nebulizer solution, Take 4 mLs by nebulization 2 times daily   albuterol (PROVENTIL) (2.5 MG/3ML) 0.083% nebulizer solution, Take 2.5 mg by nebulization 2 times daily  loratadine (CLARITIN) 10 MG capsule, Take 10 mg by mouth daily   ondansetron (ZOFRAN ODT) 4 MG disintegrating tablet, Place 1 tablet under the tongue every 8 hours as needed for Nausea or Vomiting  PROAIR  (90 Base) MCG/ACT inhaler, INHALE 2 PUFFS INTO THE LUNGS EVERY 6 HOURS IF NEEDED FOR WHEEZING OR SHORTNESS OF BREATH  spironolactone (ALDACTONE) 50 MG tablet, TAKE 1 TABLET BY MOUTH  DAILY  simvastatin (ZOCOR) 20 MG tablet, TAKE 1 TABLET BY MOUTH  NIGHTLY  amLODIPine (NORVASC) 10 MG tablet, TAKE 1 TABLET BY MOUTH  DAILY  Lactobacillus (PROBIOTIC ACIDOPHILUS) CAPS, Take 1 capsule by mouth daily Intimate partner violence:     Fear of current or ex partner: Not on file     Emotionally abused: Not on file     Physically abused: Not on file     Forced sexual activity: Not on file   Other Topics Concern    Not on file   Social History Narrative    Not on file       Riview of systems   General/Constitutional:  Recent weight loss: No  Appetite changes: Normal.  Fever:No  Chills:Yes  HENT: Negative. Eyes: Negative. Upper respiratory tract: No nasal stuffiness with no post nasal drip. Lower respiratory tract/ lungs: See HPI for details. No hemoptysis. Cardiovascular: No palpitations or chest pain. Gastrointestinal: No nausea or vomiting. Neurological: No focal neurologiacal weakness. Extremities: No edema. Musculoskeletal: No complaints. Genitourinary: No complaints. Hematological: Negative. Psychiatric/Behavioral: Negative. Skin: No itching. Vitals     height is 5' 4\" (1.626 m) and weight is 161 lb 4.8 oz (73.2 kg). Her oral temperature is 97.6 °F (36.4 °C). Her blood pressure is 101/45 (abnormal) and her pulse is 79. Her respiration is 18 and oxygen saturation is 93%. Body mass index is 27.69 kg/m². SUPPLEMENTAL O2: O2 Flow Rate (L/min): 2 L/min       I/O        Intake/Output Summary (Last 24 hours) at 1/7/2020 0729  Last data filed at 1/6/2020 2022  Gross per 24 hour   Intake 10 ml   Output --   Net 10 ml     I/O last 3 completed shifts: In: 10 [I.V.:10]  Out: -    Patient Vitals for the past 96 hrs (Last 3 readings):   Weight   01/06/20 1645 161 lb 4.8 oz (73.2 kg)   01/06/20 1140 160 lb (72.6 kg)       Exam   General Appearance: ill built, ill nourished in no acute distress on O2 via nasal cannula at 2Lpm  HEENT: Normal, Head is normocephalic, atraumatic. Oropharynx is clear and moist.  No oral thrush. PERRL  Neck - Supple, No JVD present. No tracheal deviation. Lungs - Bilateral air entry present. Good breath sounds on both sides of chest. Occasional expiratory wheezes.  Bilateral chest:  Dec 2, 2019   PROCEDURE: CT CHEST HIGH RESOLUTION   COMPARISON: CT chest high resolution 12/3/2018. 1. Stable groundglass opacities in the right upper lobe, likely secondary to chronic nonspecific interstitial pneumonia. 2. Slightly worsening fibrosis and bronchiectasis, most severe in the right upper lung.            Assessment   -Right side Pneumonia due to community acquired pneumonia ( CAP) Vs Atypical pneumonia  -Acute hypoxic respiratory failure due to pneumonia.  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 She is following with [facility] pulmonary service.  -Hx of Mycobacterium avium/intracellulare infection of lungs. She was treated by Dr. April Pereira for 13months. Her antibiotics were discontinued due to development of optic neutitis. She is currently not on any treatment. -Bronchiectasis noted on HRCT. 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 -Nodular goiter.  -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 ([address] -Hx of sinusitis. Plan   -Follow pending cultures.  -Send and follow sputum cultures and gram stain.  -Follow Urine for legionella and streptococcal antigens.  -Continue current antibiotics per primary.  -Will do CT chest with out contrast to further evaluate her right lower lobe and upper lobe opacity.  -Awaiting ID consult. -Duonebs 3ml via nebs Q4h while awake.  -Acapella Q4h as tolerated. -Titrate Oxygen to keep Spo2 >90%. -Deep Venous Thrombosis Prophylaxis: lovenox. \"Thank you for asking us to see this patient\"     -Discussed with registered nurse taking care of patient regarding patient condition and my management plan. Stanley Toscano educated about my impression and plan. She verbalizes understanding. Questions and concerns addressed.        Electronically signed by   Glenroy Navas MD on

## 2020-01-07 NOTE — PROGRESS NOTES
Message to Dr Peyman Newman sent via perfect serve at [phone] :  pt wbc's today 42.9. Pt remains afebrile, BP this am 103/51.

## 2020-01-08 ENCOUNTER — APPOINTMENT (OUTPATIENT)
Dept: GENERAL RADIOLOGY | Age: 85
DRG: 166 | End: 2020-01-08
Payer: MEDICARE

## 2020-01-08 ENCOUNTER — ANESTHESIA (OUTPATIENT)
Dept: ENDOSCOPY | Age: 85
DRG: 166 | End: 2020-01-08
Payer: MEDICARE

## 2020-01-08 ENCOUNTER — ANESTHESIA EVENT (OUTPATIENT)
Dept: ENDOSCOPY | Age: 85
DRG: 166 | End: 2020-01-08
Payer: MEDICARE

## 2020-01-08 VITALS
SYSTOLIC BLOOD PRESSURE: 119 MMHG | OXYGEN SATURATION: 96 % | RESPIRATION RATE: 11 BRPM | DIASTOLIC BLOOD PRESSURE: 58 MMHG

## 2020-01-08 PROBLEM — E44.0 MODERATE MALNUTRITION (HCC): Chronic | Status: ACTIVE | Noted: 2020-01-08

## 2020-01-08 LAB
ALLEN TEST: POSITIVE
ANION GAP SERPL CALCULATED.3IONS-SCNC: 15 MEQ/L (ref 8–16)
BASE EXCESS (CALCULATED): -0.4 MMOL/L (ref -2.5–2.5)
BASOPHILS # BLD: 0.1 %
BASOPHILS ABSOLUTE: 0 THOU/MM3 (ref 0–0.1)
BUN BLDV-MCNC: 48 MG/DL (ref 7–22)
CALCIUM SERPL-MCNC: 10.4 MG/DL (ref 8.5–10.5)
CHLORIDE BLD-SCNC: 95 MEQ/L (ref 98–111)
CO2: 22 MEQ/L (ref 23–33)
COLLECTED BY:: ABNORMAL
CREAT SERPL-MCNC: 1.2 MG/DL (ref 0.4–1.2)
CYTOLOGY-NON GYN: NORMAL
CYTOLOGY-NON GYN: NORMAL
DEVICE: ABNORMAL
EOSINOPHIL # BLD: 0 %
EOSINOPHILS ABSOLUTE: 0 THOU/MM3 (ref 0–0.4)
ERYTHROCYTE [DISTWIDTH] IN BLOOD BY AUTOMATED COUNT: 15 % (ref 11.5–14.5)
ERYTHROCYTE [DISTWIDTH] IN BLOOD BY AUTOMATED COUNT: 47.5 FL (ref 35–45)
GFR SERPL CREATININE-BSD FRML MDRD: 43 ML/MIN/1.73M2
GLUCOSE BLD-MCNC: 157 MG/DL (ref 70–108)
GLUCOSE BLD-MCNC: 315 MG/DL (ref 70–108)
HCO3: 24 MMOL/L (ref 23–28)
HCT VFR BLD CALC: 35.4 % (ref 37–47)
HEMOGLOBIN: 11.6 GM/DL (ref 12–16)
IFIO2: 2
IMMATURE GRANS (ABS): 2.26 THOU/MM3 (ref 0–0.07)
IMMATURE GRANULOCYTES: 5 %
LEGIONELLA PNEUMOPHILIA AG, URINE: NORMAL
LYMPHOCYTES # BLD: 2.5 %
LYMPHOCYTES ABSOLUTE: 1.1 THOU/MM3 (ref 1–4.8)
MCH RBC QN AUTO: 28.4 PG (ref 26–33)
MCHC RBC AUTO-ENTMCNC: 32.8 GM/DL (ref 32.2–35.5)
MCV RBC AUTO: 86.6 FL (ref 81–99)
MONOCYTES # BLD: 3.5 %
MONOCYTES ABSOLUTE: 1.6 THOU/MM3 (ref 0.4–1.3)
NUCLEATED RED BLOOD CELLS: 0 /100 WBC
O2 SATURATION: 91 %
PCO2: 36 MMHG (ref 35–45)
PH BLOOD GAS: 7.42 (ref 7.35–7.45)
PLATELET # BLD: 278 THOU/MM3 (ref 130–400)
PLATELET ESTIMATE: ADEQUATE
PMV BLD AUTO: 10.9 FL (ref 9.4–12.4)
PO2: 60 MMHG (ref 71–104)
POTASSIUM SERPL-SCNC: 4.9 MEQ/L (ref 3.5–5.2)
POTASSIUM SERPL-SCNC: 5.5 MEQ/L (ref 3.5–5.2)
RBC # BLD: 4.09 MILL/MM3 (ref 4.2–5.4)
SCAN OF BLOOD SMEAR: NORMAL
SEG NEUTROPHILS: 88.9 %
SEGMENTED NEUTROPHILS ABSOLUTE COUNT: 40.2 THOU/MM3 (ref 1.8–7.7)
SODIUM BLD-SCNC: 132 MEQ/L (ref 135–145)
SOURCE, BLOOD GAS: ABNORMAL
STREP PNEUMO AG, UR: NORMAL
WBC # BLD: 45.2 THOU/MM3 (ref 4.8–10.8)

## 2020-01-08 PROCEDURE — 82803 BLOOD GASES ANY COMBINATION: CPT

## 2020-01-08 PROCEDURE — 0B9F8ZX DRAINAGE OF RIGHT LOWER LUNG LOBE, VIA NATURAL OR ARTIFICIAL OPENING ENDOSCOPIC, DIAGNOSTIC: ICD-10-PCS | Performed by: INTERNAL MEDICINE

## 2020-01-08 PROCEDURE — 2500000003 HC RX 250 WO HCPCS

## 2020-01-08 PROCEDURE — 87150 DNA/RNA AMPLIFIED PROBE: CPT

## 2020-01-08 PROCEDURE — 3609027000 HC BRONCHOSCOPY: Performed by: INTERNAL MEDICINE

## 2020-01-08 PROCEDURE — 87075 CULTR BACTERIA EXCEPT BLOOD: CPT

## 2020-01-08 PROCEDURE — 0BD68ZX EXTRACTION OF RIGHT LOWER LOBE BRONCHUS, VIA NATURAL OR ARTIFICIAL OPENING ENDOSCOPIC, DIAGNOSTIC: ICD-10-PCS | Performed by: INTERNAL MEDICINE

## 2020-01-08 PROCEDURE — 87102 FUNGUS ISOLATION CULTURE: CPT

## 2020-01-08 PROCEDURE — 87541 LEGION PNEUMO DNA AMP PROB: CPT

## 2020-01-08 PROCEDURE — 31628 BRONCHOSCOPY/LUNG BX EACH: CPT | Performed by: INTERNAL MEDICINE

## 2020-01-08 PROCEDURE — 88312 SPECIAL STAINS GROUP 1: CPT

## 2020-01-08 PROCEDURE — 3700000000 HC ANESTHESIA ATTENDED CARE: Performed by: INTERNAL MEDICINE

## 2020-01-08 PROCEDURE — 0BB38ZX EXCISION OF RIGHT MAIN BRONCHUS, VIA NATURAL OR ARTIFICIAL OPENING ENDOSCOPIC, DIAGNOSTIC: ICD-10-PCS | Performed by: INTERNAL MEDICINE

## 2020-01-08 PROCEDURE — 0BD58ZX EXTRACTION OF RIGHT MIDDLE LOBE BRONCHUS, VIA NATURAL OR ARTIFICIAL OPENING ENDOSCOPIC, DIAGNOSTIC: ICD-10-PCS | Performed by: INTERNAL MEDICINE

## 2020-01-08 PROCEDURE — 87081 CULTURE SCREEN ONLY: CPT

## 2020-01-08 PROCEDURE — 87206 SMEAR FLUORESCENT/ACID STAI: CPT

## 2020-01-08 PROCEDURE — 88104 CYTOPATH FL NONGYN SMEARS: CPT

## 2020-01-08 PROCEDURE — 87070 CULTURE OTHR SPECIMN AEROBIC: CPT

## 2020-01-08 PROCEDURE — 0BBF8ZX EXCISION OF RIGHT LOWER LUNG LOBE, VIA NATURAL OR ARTIFICIAL OPENING ENDOSCOPIC, DIAGNOSTIC: ICD-10-PCS | Performed by: INTERNAL MEDICINE

## 2020-01-08 PROCEDURE — 87581 M.PNEUMON DNA AMP PROBE: CPT

## 2020-01-08 PROCEDURE — 87116 MYCOBACTERIA CULTURE: CPT

## 2020-01-08 PROCEDURE — 2500000003 HC RX 250 WO HCPCS: Performed by: REGISTERED NURSE

## 2020-01-08 PROCEDURE — 7100000010 HC PHASE II RECOVERY - FIRST 15 MIN: Performed by: INTERNAL MEDICINE

## 2020-01-08 PROCEDURE — 88305 TISSUE EXAM BY PATHOLOGIST: CPT

## 2020-01-08 PROCEDURE — 2709999900 HC NON-CHARGEABLE SUPPLY: Performed by: INTERNAL MEDICINE

## 2020-01-08 PROCEDURE — 80048 BASIC METABOLIC PNL TOTAL CA: CPT

## 2020-01-08 PROCEDURE — 87278 LEGION PNEUMOPHILIA AG IF: CPT

## 2020-01-08 PROCEDURE — 87486 CHLMYD PNEUM DNA AMP PROBE: CPT

## 2020-01-08 PROCEDURE — 99233 SBSQ HOSP IP/OBS HIGH 50: CPT | Performed by: INTERNAL MEDICINE

## 2020-01-08 PROCEDURE — 6370000000 HC RX 637 (ALT 250 FOR IP)

## 2020-01-08 PROCEDURE — 7100000000 HC PACU RECOVERY - FIRST 15 MIN: Performed by: INTERNAL MEDICINE

## 2020-01-08 PROCEDURE — 87205 SMEAR GRAM STAIN: CPT

## 2020-01-08 PROCEDURE — 2580000003 HC RX 258: Performed by: REGISTERED NURSE

## 2020-01-08 PROCEDURE — 6370000000 HC RX 637 (ALT 250 FOR IP): Performed by: NURSE PRACTITIONER

## 2020-01-08 PROCEDURE — 36600 WITHDRAWAL OF ARTERIAL BLOOD: CPT

## 2020-01-08 PROCEDURE — 36415 COLL VENOUS BLD VENIPUNCTURE: CPT

## 2020-01-08 PROCEDURE — 6360000002 HC RX W HCPCS: Performed by: INTERNAL MEDICINE

## 2020-01-08 PROCEDURE — 85025 COMPLETE CBC W/AUTO DIFF WBC: CPT

## 2020-01-08 PROCEDURE — 71045 X-RAY EXAM CHEST 1 VIEW: CPT

## 2020-01-08 PROCEDURE — 2580000003 HC RX 258: Performed by: INTERNAL MEDICINE

## 2020-01-08 PROCEDURE — 2580000003 HC RX 258: Performed by: NURSE PRACTITIONER

## 2020-01-08 PROCEDURE — 0BD48ZX EXTRACTION OF RIGHT UPPER LOBE BRONCHUS, VIA NATURAL OR ARTIFICIAL OPENING ENDOSCOPIC, DIAGNOSTIC: ICD-10-PCS | Performed by: INTERNAL MEDICINE

## 2020-01-08 PROCEDURE — 89051 BODY FLUID CELL COUNT: CPT

## 2020-01-08 PROCEDURE — 88112 CYTOPATH CELL ENHANCE TECH: CPT

## 2020-01-08 PROCEDURE — 87798 DETECT AGENT NOS DNA AMP: CPT

## 2020-01-08 PROCEDURE — 84132 ASSAY OF SERUM POTASSIUM: CPT

## 2020-01-08 PROCEDURE — 3700000001 HC ADD 15 MINUTES (ANESTHESIA): Performed by: INTERNAL MEDICINE

## 2020-01-08 PROCEDURE — 6370000000 HC RX 637 (ALT 250 FOR IP): Performed by: INTERNAL MEDICINE

## 2020-01-08 PROCEDURE — 7100000001 HC PACU RECOVERY - ADDTL 15 MIN: Performed by: INTERNAL MEDICINE

## 2020-01-08 PROCEDURE — 1200000003 HC TELEMETRY R&B

## 2020-01-08 PROCEDURE — 6360000002 HC RX W HCPCS: Performed by: REGISTERED NURSE

## 2020-01-08 PROCEDURE — APPSS30 APP SPLIT SHARED TIME 16-30 MINUTES: Performed by: NURSE PRACTITIONER

## 2020-01-08 PROCEDURE — 88108 CYTOPATH CONCENTRATE TECH: CPT

## 2020-01-08 PROCEDURE — 31624 DX BRONCHOSCOPE/LAVAGE: CPT | Performed by: INTERNAL MEDICINE

## 2020-01-08 PROCEDURE — 94640 AIRWAY INHALATION TREATMENT: CPT

## 2020-01-08 PROCEDURE — 2700000000 HC OXYGEN THERAPY PER DAY

## 2020-01-08 PROCEDURE — 87632 RESP VIRUS 6-11 TARGETS: CPT

## 2020-01-08 PROCEDURE — 31623 DX BRONCHOSCOPE/BRUSH: CPT | Performed by: INTERNAL MEDICINE

## 2020-01-08 PROCEDURE — 82948 REAGENT STRIP/BLOOD GLUCOSE: CPT

## 2020-01-08 RX ORDER — DEXTROSE MONOHYDRATE 25 G/50ML
25 INJECTION, SOLUTION INTRAVENOUS ONCE
Status: COMPLETED | OUTPATIENT
Start: 2020-01-08 | End: 2020-01-08

## 2020-01-08 RX ORDER — AZITHROMYCIN 250 MG/1
250 TABLET, FILM COATED ORAL DAILY
Status: DISCONTINUED | OUTPATIENT
Start: 2020-01-09 | End: 2020-01-14 | Stop reason: HOSPADM

## 2020-01-08 RX ORDER — SODIUM CHLORIDE 450 MG/100ML
INJECTION, SOLUTION INTRAVENOUS CONTINUOUS PRN
Status: DISCONTINUED | OUTPATIENT
Start: 2020-01-08 | End: 2020-01-08 | Stop reason: SDUPTHER

## 2020-01-08 RX ORDER — LIDOCAINE HYDROCHLORIDE 20 MG/ML
INJECTION, SOLUTION INFILTRATION; PERINEURAL PRN
Status: DISCONTINUED | OUTPATIENT
Start: 2020-01-08 | End: 2020-01-08 | Stop reason: SDUPTHER

## 2020-01-08 RX ORDER — SODIUM POLYSTYRENE SULFONATE 15 G/60ML
15 SUSPENSION ORAL; RECTAL ONCE
Status: DISCONTINUED | OUTPATIENT
Start: 2020-01-08 | End: 2020-01-08

## 2020-01-08 RX ADMIN — Medication 1 CAPSULE: at 08:22

## 2020-01-08 RX ADMIN — DEXTROSE MONOHYDRATE 25 G: 25 INJECTION, SOLUTION INTRAVENOUS at 13:26

## 2020-01-08 RX ADMIN — SODIUM CHLORIDE: 4.5 INJECTION, SOLUTION INTRAVENOUS at 14:30

## 2020-01-08 RX ADMIN — LIDOCAINE HYDROCHLORIDE 100 MG: 20 INJECTION, SOLUTION INFILTRATION; PERINEURAL at 14:42

## 2020-01-08 RX ADMIN — CEFEPIME HYDROCHLORIDE 2 G: 2 INJECTION, POWDER, FOR SOLUTION INTRAVENOUS at 17:05

## 2020-01-08 RX ADMIN — INSULIN HUMAN 10 UNITS: 100 INJECTION, SOLUTION PARENTERAL at 13:26

## 2020-01-08 RX ADMIN — Medication 10 ML: at 10:46

## 2020-01-08 RX ADMIN — Medication 10 ML: at 20:37

## 2020-01-08 RX ADMIN — PHENYLEPHRINE HYDROCHLORIDE 200 MCG: 10 INJECTION INTRAVENOUS at 14:58

## 2020-01-08 RX ADMIN — BUSPIRONE HYDROCHLORIDE 5 MG: 5 TABLET ORAL at 20:37

## 2020-01-08 RX ADMIN — BUSPIRONE HYDROCHLORIDE 5 MG: 5 TABLET ORAL at 08:22

## 2020-01-08 RX ADMIN — SIMVASTATIN 20 MG: 20 TABLET, FILM COATED ORAL at 20:37

## 2020-01-08 RX ADMIN — ACETAMINOPHEN 650 MG: 325 TABLET ORAL at 10:44

## 2020-01-08 RX ADMIN — IPRATROPIUM BROMIDE AND ALBUTEROL SULFATE 1 AMPULE: .5; 3 SOLUTION RESPIRATORY (INHALATION) at 11:26

## 2020-01-08 RX ADMIN — IPRATROPIUM BROMIDE AND ALBUTEROL SULFATE 1 AMPULE: .5; 3 SOLUTION RESPIRATORY (INHALATION) at 08:23

## 2020-01-08 RX ADMIN — CETIRIZINE HYDROCHLORIDE 10 MG: 10 TABLET, FILM COATED ORAL at 10:41

## 2020-01-08 RX ADMIN — IPRATROPIUM BROMIDE AND ALBUTEROL SULFATE 1 AMPULE: .5; 3 SOLUTION RESPIRATORY (INHALATION) at 15:09

## 2020-01-08 RX ADMIN — VITAMIN D, TAB 1000IU (100/BT) 5000 UNITS: 25 TAB at 08:22

## 2020-01-08 RX ADMIN — PROPOFOL 200 MG: 10 INJECTION, EMULSION INTRAVENOUS at 14:42

## 2020-01-08 RX ADMIN — IPRATROPIUM BROMIDE AND ALBUTEROL SULFATE 1 AMPULE: .5; 3 SOLUTION RESPIRATORY (INHALATION) at 19:52

## 2020-01-08 RX ADMIN — LEVOTHYROXINE SODIUM 75 MCG: 75 TABLET ORAL at 06:44

## 2020-01-08 ASSESSMENT — PAIN SCALES - GENERAL
PAINLEVEL_OUTOF10: 0
PAINLEVEL_OUTOF10: 2

## 2020-01-08 ASSESSMENT — PAIN - FUNCTIONAL ASSESSMENT: PAIN_FUNCTIONAL_ASSESSMENT: 0-10

## 2020-01-08 NOTE — PROGRESS NOTES
INTERNAL MEDICINE SPECIALTIES  Progress Note For Dr Patricia Simmons       Patient:  Severiano Freud  YOB: 1935  Date of Service: 1/8/2020  MRN: [id number]   Acct:  [de-identified]   Primary Care Physician: Gagandeep Wilkerson MD    SUBJECTIVE: was seeing things during the night, now resolved    Home Medications:   No current facility-administered medications on file prior to encounter.       Current Outpatient Medications on File Prior to Encounter   Medication Sig Dispense Refill    sodium chloride, Inhalant, 7 % nebulizer solution Take 4 mLs by nebulization 2 times daily       albuterol (PROVENTIL) (2.5 MG/3ML) 0.083% nebulizer solution Take 2.5 mg by nebulization 2 times daily      loratadine (CLARITIN) 10 MG capsule Take 10 mg by mouth daily       ondansetron (ZOFRAN ODT) 4 MG disintegrating tablet Place 1 tablet under the tongue every 8 hours as needed for Nausea or Vomiting 20 tablet 0    PROAIR  (90 Base) MCG/ACT inhaler INHALE 2 PUFFS INTO THE LUNGS EVERY 6 HOURS IF NEEDED FOR WHEEZING OR SHORTNESS OF BREATH 1 Inhaler 11    spironolactone (ALDACTONE) 50 MG tablet TAKE 1 TABLET BY MOUTH  DAILY 90 tablet 3    simvastatin (ZOCOR) 20 MG tablet TAKE 1 TABLET BY MOUTH  NIGHTLY 90 tablet 3    amLODIPine (NORVASC) 10 MG tablet TAKE 1 TABLET BY MOUTH  DAILY 90 tablet 3    Lactobacillus (PROBIOTIC ACIDOPHILUS) CAPS Take 1 capsule by mouth daily       levothyroxine (SYNTHROID) 75 MCG tablet take 1 tablet by mouth once daily 90 tablet 3    busPIRone (BUSPAR) 10 MG tablet Take 5 mg by mouth 3 times daily       Cholecalciferol (VITAMIN D3) 5000 units TABS Take 5,000 Units by mouth every other day       MULTIPLE VITAMIN PO Take 1 tablet by mouth daily            Scheduled Meds:   ipratropium-albuterol  1 ampule Inhalation Q4H WA    ondansetron  4 mg Intravenous Once    vancomycin  1,000 mg Intravenous Once    lactobacillus  1 capsule Oral Daily    amLODIPine  10 mg Oral Daily    busPIRone  5 mg Oral TID    Vitamin D  5,000 Units Oral Every Other Day    levothyroxine  75 mcg Oral Daily    cetirizine  10 mg Oral Daily    simvastatin  20 mg Oral Nightly    spironolactone  50 mg Oral Daily    sodium chloride flush  10 mL Intravenous 2 times per day    enoxaparin  40 mg Subcutaneous Q24H    cefepime  2 g Intravenous Q12H    And    levofloxacin  750 mg Intravenous Q48H     Continuous Infusions:  PRN Meds:sodium chloride flush, magnesium hydroxide, ondansetron, acetaminophen        Allergies:  Percocet [oxycodone-acetaminophen]; Sulfa antibiotics; and Cefuroxime    OBJECTIVE:    Vitals:   Vitals:    01/08/20 0315   BP: 133/63   Pulse: 76   Resp: 22   Temp: 97.6 °F (36.4 °C)   SpO2: 90%      BMI: Body mass index is 28.24 kg/m². PHYSICAL EXAMINATION:          General appearance:Ill looking elderly female ; No apparent distress, appears stated age and cooperative. HEENT:  Normal cephalic, atraumatic without obvious deformity. Pupils equal, round, and reactive to light. Extra ocular muscles intact. Conjunctivae/corneas clear. Neck: Supple   Respiratory:  Normal respiratory effort., bilateral basilar crackles. Cardiovascular:  Regular rhythm with normal S1/S2 without ,rubs or gallops. Abdomen: Soft, mild LUQ tenderness, non-distended with normal bowel sounds. Musculoskeletal:  No clubbing, cyanosis or edema bilaterally. Full range of motion without deformity. Skin: Skin color, texture, turgor normal.  No rashes or lesions. Neurologic: Alert and oriented x 3, without any focal motor deficits.    Psychiatric:   Thought content appropriate, normal insight     Review of Labs and Diagnostic Testing:    Recent Results (from the past 24 hour(s))   Legionella antigen, urine    Collection Time: 01/07/20 10:06 AM   Result Value Ref Range    Legionella Pneumophilia Ag, Urine SEE BELOW    Strep Pneumoniae Antigen    Collection Time: 01/07/20 10:06 AM   Result Value Ref Range    Strep pneumo Ag, Ur SEE BELOW Protime-INR    Collection Time: 01/07/20 10:48 AM   Result Value Ref Range    INR 1.39 (H) 0.85 - 1.13   POCT Glucose    Collection Time: 01/07/20 10:44 PM   Result Value Ref Range    POC Glucose 143 (H) 70 - 108 mg/dl       Radiology:     Xr Chest Portable    Result Date: 1/6/2020  PROCEDURE: XR CHEST PORTABLE CLINICAL INFORMATION: dyspnea. COMPARISON: Chest CT 12/2/2019 TECHNIQUE: Portable. FINDINGS: Fibrotic changes in the right upper lobe with superimposed airspace consolidation/pleural effusion concerning for superimposed infection. Correlation with symptoms and follow-up advised. Ectatic thoracic aorta. Cardiomegaly. Osteopenia. Small right pleural effusions. Progressive changes in the right upper and now right lower lobe on a background of underlying chronic fibrotic changes. Findings are concerning for superimposed infection. Correlation with symptoms and continued follow-up advised. **This report has been created using voice recognition software. It may contain minor errors which are inherent in voice recognition technology. ** Final report electronically signed by Dr. Warren Moctezuma on 1/6/2020 12:25 PM    Strep pneumo Ag, Ur 01/07/2020 10:06  [address]   SEE BELOW    Comment:   Specimen Description           URINE   Direct Exam                SEE BELOW   NEGATIVE: Strep pneumoniae antigen not detected   Direct Exam                SEE [address] [address] [facility], [address] [place] [address] [phone]   Report Status              SEE BELOW   FINAL 01/08/2020      Legionella Pneumophilia Ag, Urine 01/07/2020 10:06  [address]   SEE BELOW    Comment:   Specimen Description           URINE   Direct Exam                SEE BELOW   Negative: No L. pneumophila serogroup 1 antigens detected.  A negative   result does not exclude infection with Leginella pnemophila serogroup 1   nor does it rule out other microbial-caused respiratory infections of   disease caused by other serogroups of Legionella pneumophila. Direct Exam                SEE BELOW        ASSESMENT:      Active Problems:    Acute hypoxemic respiratory failure (HCC)    Pneumonia  Resolved Problems:    * No resolved hospital problems. *  History of Bronchiectasis  Pulmonary fibrosis  History of pulmonary Mycobacterium avium complex  Hypertension  Dyslipidemia   Osteoarthritis  Hyperkalemia    PLAN:  Bronchoscopy planned today, check ABGs;  Continue bronchodilators, O2,Cefepime / levaquin to cover community acquired organisms and pseudomonas in view of known bronchiectasis, appreciate pulmonary/ID service consults,blood cultures, Incentive spirometry , acapella. High-resolution chest CT scan planned per Pulmonary Service  Rapid influenza screen , legionella and strep pneumonia urinary antigens were negative. Hold spironolactone for hyperkalemia , 15 g kayexalate, may have been sundowning last evening.  F/u ABGs    DVT prophylaxis: [x] Lovenox                                 [] SCDs                                 [] SQ Heparin                                 [] Encourage ambulation, low risk for DVT, no chemical or mechanical prophylaxis necessary              [] Already on Anticoagulation                Anticipated Disposition upon discharge: [x] Home                                                                         [] Home with Home Health                                                                         [] [facility]                                                                         [] [address] [place] [address],[address]          Electronically signed by Sonia Wolff MD on 1/8/2020 at 6:53 AM

## 2020-01-08 NOTE — PRE SEDATION
and Other (See Comments)     cramping         Past Surgical History:  Past Surgical History:   Procedure Laterality Date    ABDOMINAL EXPLORATION SURGERY  2013    Release of KATHERINE TAMAYO-Dr. Endy Moss    APPENDECTOMY  196    CATARACT REMOVAL WITH IMPLANT Bilateral  ?   SECTION  1453,8944    DILATION AND CURETTAGE OF UTERUS  8229,4890,1663    EXCISION OF PARATHYROID MASS Right 2013    rt parathyroidectomy (excision of rt inferior parathyroid mass) exploration of neck     FOOT SURGERY      left    FOOT SURGERY Left 2017    Dr Ridge Hwang  12    left knee    JOINT REPLACEMENT  2014    right knee    MALIGNANT SKIN LESION EXCISION Left 2017    BCC DORSAL FOOT WITH GRAFT & FS    GOSIA AND BSO  1982    TONSILLECTOMY AND ADENOIDECTOMY  194 ?     TOTAL KNEE ARTHROPLASTY Right 2014    OIO       Medications:   Current Facility-Administered Medications   Medication Dose Route Frequency Provider Last Rate Last Dose    ipratropium-albuterol (DUONEB) nebulizer solution 1 ampule  1 ampule Inhalation Q4H WA Marilynn Hart MD   1 ampule at 20 1126    ondansetron (ZOFRAN) injection 4 mg  4 mg Intravenous Once Otoniel Ghotra MD        vancomycin 1000 mg IVPB in 250 mL D5W addavial  1,000 mg Intravenous Once Wilner Bending, DO        lactobacillus (CULTURELLE) capsule 1 capsule  1 capsule Oral Daily Otoniel Ghotra MD   1 capsule at 20 9207    amLODIPine (NORVASC) tablet 10 mg  10 mg Oral Daily Otoniel Ghotra MD   10 mg at 20 1002    busPIRone (BUSPAR) tablet 5 mg  5 mg Oral TID Otoniel Ghotra MD   5 mg at 20 2440    Vitamin D (CHOLECALCIFEROL) tablet 5,000 Units  5,000 Units Oral Every Other Day Otoniel Ghotra MD   5,000 Units at 20 9835    levothyroxine (SYNTHROID) tablet 75 mcg  75 mcg Oral Daily Otoniel Ghotra MD   75 mcg at 20 9724

## 2020-01-08 NOTE — CONSULTS
[address] [place] [address]                                  CONSULTATION    PATIENT NAME: Anirudh Oscar                    :        1935  MED REC NO:   [id number]                           ROOM:       0003  ACCOUNT NO:   [de-identified]                           ADMIT DATE: 2020  PROVIDER:     Rachele Stephens. Dong Herrera M.D.    Timbo Searin2020    HISTORY OF PRESENT ILLNESS:  She is an 40-year-old female patient  admitted to the hospital due to cough and shortness of breath. She  reports that for the last one week, she has been having cough with  shortness of breath; progressively, it got worse for which reason she  came to the hospital.  This patient has history of bronchiectasis and in  the past was treated for Mycobacterium avium complex. 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 She does not smoke. She did report of having pneumonia when she was an  adult. No contact with chemicals or secondhand smoke. [facility] follows at  the [facility] and she also follows locally with Dr. Rosanne Lay. She is up-to-date with her vaccination. Her immunoglobulin level has  been normal.  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 At the present time, she denies any night sweats, but she  is very short of breath, especially on exertion. She requires more  oxygen. She denies any hemoptysis. She takes inhalers as needed. PAST MEDICAL HISTORY:  Significant for back pain, history of  bronchiectasis as noted above, bursitis, history of diverticulosis,  hypertension, hyperlipidemia, osteoarthritis, osteopenia, history of  Mycobacterium avium complex, history of secondary hyperparathyroidism,  seasonal allergy and vitamin D deficiency.     PAST SURGICAL HISTORY:  Includes abdominal exploratory surgery with  release of adhesions, appendectomy, cataract removal,  section,  dilatation of the uterus, parathyroid mass removal, foot surgery,  hysterectomy, knee replacement, malignant skin lesion removal,  tonsillectomy and adenoidectomy. CURRENT MEDICATIONS:  Include Tylenol, amlodipine, buspirone, cefepime,  Zyrtec, Lovenox, ipratropium, albuterol, Lactobacillus, Levaquin,  levothyroxine, Zofran, spironolactone, vancomycin and vitamin D.    SOCIAL HISTORY:  She is a full code. She lives alone. REVIEW OF SYSTEMS:  Noncontributory. FAMILY HISTORY:  Noncontributory. PHYSICAL EXAMINATION:  VITAL SIGNS:  On exam, she was on nasal oxygen. Temperature 97.6,  respirations 18, pulse 78, blood pressure 109/54. HEENT:  She has slightly pale conjunctivae. Anicteric sclerae. CHEST:  Diminished breath sounds with scattered rhonchi on the right  lung. CARDIOVASCULAR SYSTEM:  Regular. ABDOMEN:  Soft. EXTREMITIES:  No cyanosis or clubbing. CNS:  She is conscious. Oriented to person, place, and time. DIAGNOSTIC DATA:  WBC of 42.9, hemoglobin 11.8, hematocrit 36.4,  platelets 40. Sodium 132, potassium 5.1, chloride 94, bicarb 22, BUN  34, creatinine 1. CAT scan report was reviewed. There is worsening of the lung disease. Actually, as compared from her CAT scan from months ago, there is small  infiltrate on the right lower lung which is new. She has bronchiectatic  changes with retraction of the right upper lung and main bronchus. IMPRESSION:  1. Pneumonia. 2.  History of bronchiectasis. 3.  Previous history of Mycobacterium avium complex. 4.  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 RECOMMENDATION:  We will continue current treatment. We will send  sputum. If unable to obtain, we will consider and discuss with  Pulmonary about doing bronchoscopy with lavage.         Fercho Quiroz M.D.    D: 2020 19:35:07       T: 2020 21:05:00     [email or web address]  Job#: [id number]     Doc#: [id number]    CC:

## 2020-01-08 NOTE — OP NOTE
Bronchoscopy Procedure Note    Patient: Marii Neri  : 1935      Operation: Flexible diagnostic fiberoptic bronchoscopy with fluoroscopy. During procedure Bronch washings obtained from right tracheobronchial tree including right upper, middle and lower lobes. Bronchioalveolar lavage obtained from right lower lobe posterior segment. Trans bronchial lung biopsies were performed by using biopsy forceps from right lower lobe posterior segment under fluroscopy guidance. Cytology brush specimen was obtained from right lower lobe posterior segment under fluroscopy guidance. Microbiology brush specimen was obtained from right lower lobe posterior segment under fluroscopy guidence. Date of Operation: 2020    Indication for procedure: Right side pneumonia of uncertain etiology. She had a hx of Mycobacterium avium/intracellulare infection of lungs. Her treatment was stopped in the middle due to development of optic neutitis. She not able to produce any sputum. The bronchoscopy was performed to obtain diagnostic specimen to aid management as recommended by Dr. Earle Luo from ID service. Pre operative diagnoses:   -Right side Pneumonia due to community acquired pneumonia ( CAP) Vs Atypical pneumonia  -Acute hypoxic respiratory failure due to pneumonia.  -Hx of Interstitial Interstitial lung disease due to previous hx of Mycobacterium avium/intracellulare infection of lungs-. She is following with [facility] pulmonary service.  -Hx of Mycobacterium avium/intracellulare infection of lungs. She was treated by Dr. Ricky Bustos for 13months.   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  -Hx of Interstitial Interstitial lung disease due to previous hx of Mycobacterium avium/intracellulare infection of lungs-. She is following with [facility] pulmonary service.  -Hx of Mycobacterium avium/intracellulare infection of lungs. She was treated by Dr. Elsa Lara for 13months.   -Restrictive lung defect on PFTS due to her ILD       Surgeon: Araceli Solis    Consent: Chastity Weiner is a 80 y.o. female . The risks, benefits, complications, treatment options and expected outcomes were discussed with the patient. Consent obtained from the patient after explaining the risks and complications of the procedure. The possibilities of reaction to medication, pulmonary aspiration, perforation of a viscus, bleeding, failure to diagnose a condition and creating a complication requiring transfusion or operation were discussed with the patient who freely signed the consent. Anesthesia: Patient was given conscious sedation by Ms. Bryanna CRNA. Description of Procedure: The patient was taken to endoscopy suite, identified as Chastity Wenier and the procedure verified as Flexible Fiberoptic Bronchoscopy. A Time Out was held and the above information confirmed. After the induction of topical nasopharyngeal anesthesia, the patient was placed in appropriate position and the Flexible Fibrooptic bronchoscope was passed through the right nostril. The vocal cords were visualized and 1% Lidocaine was topically placed onto the cords. The cords were normal and moving equally with no growths. The scope was then passed into the trachea. Lidocaine 1% was used topically on the pete. The pete is sharp with no growths. Careful inspection of the tracheal lumen was accomplished with no growths. The scope was  advanced into the right main bronchus and then into the Right upper lobe, Right middle lobe, and Right lower lobe bronchi and segmental bronchi. The scope was sequentially passed into the left main and then left upper and lower bronchi and segmental bronchi. Bronchioalveolar lavage performed from right lower lobe posterior segment.  A good amount of Bronchioalveolar lavage I.e ~30 ml was obtained after instilling 120ml of sterile 0.9NS and sent to lab for further analysis. Bronchial washings were done form right tracheobronchial tree including right upper lobe, middle and lower lobe. More than 25 ml of Bronchial washings were obtained and sent to lab for further analysis. Trans bronchial lung biopsies were performed by using biopsy forceps from right lower lobe posterior segment. A total of 4 biopsies obtained and sent to lab for further analysis. Cytology brush specimen was obtained from right lower lobe posterior segment under fluroscopy guidence. The Cytology brush specimen was sent to lab for further analysis. Microbiology brush specimen was obtained from right lower lobe posterior segment under fluroscopy guidence. The Microbiology brush specimen was sent to lab for further analysis. Endobronchial findings:   Trachea: Normal mucosa. Ivis: Normal mucosa  Right main bronchus: Normal mucosa  Right upper lobe bronchus: Inflamed endobronchial mucosa  Right Middle lobe bronchus: Normal mucosa  Right Lower lobe bronchus:Inflamed endobronchial mucosa  Left main bronchus: Normal mucosa  Left upper lobe bronchus: Normal mucosa  Left lower lobe bronchus: Normal mucosa      The Patient was taken to the Endoscopy Recovery area in satisfactory condition. Estimated Blood Loss: Less than 5ml. Complications: None. Recommendation/Plan:    1. F/U on culturs results  2. F/U on cytology results  3. Follow transbronchial lung biopsy reports. 4. Follow post procedure portable chest Xray to R/o Pneumothorax. Attestation: I performed the procedure.     Nader Kim

## 2020-01-08 NOTE — PROGRESS NOTES
[facility]      Patient - Kelsea Chisholm   MRN -  [id number]   [facility]   - 1935      Date of Admission -  2020 11:28 AM  Date of evaluation -  2020  Room - --A   Hospital Day - [address], [place] Primary Care Physician - Moo Benson MD     Problem List      Active Hospital Problems    Diagnosis Date Noted    Acute hypoxemic respiratory failure ([address] [J96.01] 2020    Pneumonia [J18.9] 2020     Reason for Consult    Pneumonia and bronchiectasis  HPI   History Obtained From: Patient and electronic medical record. Kelsea Chisholm is a 80 y.o. female  was initially admitted under hospitalist service. Pulmonary medicine was consulted for further management of Pneumonia. The patient is a 80 y.o. female who presented with worsening of shortness of breath for the last 8 days. Her current illness started as upper respiratory tract infection with cough, cold and sputum  production which is initially white in color and later changed to greenish yellow. She noticed worsening of shortness of breath with decline in functional status. She denied any fever. How ever she gave a hx of chills. Due to worsening of her above symptoms she presented to the Emergency room at [facility]. She was evaluated in the Emergency room by ER physician and admitted under hospitalist service for further evaluation. 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 She was treated by Dr. Nadia Hernandez for 13months. Her antibiotics were discontinued due to development of optic neutitis. She is currently not on any treatment. She is following with [facility] pulmonary service. She under went Flexible diagnostic fiberoptic bronchoscopy with fluoroscopy on 2017. She is having cough: Yes  Duration of cough: for 8 days.    Her cough is associated with sputum production: Yes   The sputum color: yellow  Hemoptysis:No  Diurnal variation: None. Relieving factors: No  Aggravating factors: No    She gives a history of fever: No  Chills & rigors: Yes  Associated night sweats: No.  Recent travel history:No.    Rrecent sick contacts: No.      She is having chest pain:No.    During her initial work up she was found to have abnormal chest Xray with right side pneumonia. Prior to his current hospitalization:  She is currently not using any oxygen supplementation at rest, exercise or during sleep/at night time. Past 24 hrs   -On 2 LPM via NC  -Noted hallucinations overnight, oriented x3  -Afebrile, WBC 45.2  -SOB somewhat improved  All other systems reviewed    PMHx   Past Medical History      Diagnosis Date    Anemia     normal on pre-op 2012 and 13    Backache     h/o    [facility]asis [facility]     Bursitis     bilateral shoulders    Constipation     Diverticulosis of colon     HTN (hypertension)     Hypercalcemia     slightly elevated at 10.8 pre-op 13    Hyperlipidemia     Nodular goiter     bx negative    OA (osteoarthritis)     bilateral thumbs - sees Dr. Magalis Pulliam Osteopenia 2013    Pneumonia of right upper lobe due to infectious organism ([facility]     Pneumonitis     Dr. Shaun Cruz in 2010 - bx negative    Pulmonary Mycobacterium avium complex (MAC) infection ([facility]     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 ([facility]     had one parathyroid removed - now follows with Dr. Antonio Tsang Sinusitis     with seasonal allergies    Vitamin D deficiency 2018      Past Surgical History        Procedure Laterality Date    ABDOMINAL EXPLORATION SURGERY  2013    Release of KATHERINE TAMAYO-Dr. Fran Ocasio    APPENDECTOMY      CATARACT REMOVAL WITH IMPLANT Bilateral  ?      SECTION  3291,7335    DILATION AND CURETTAGE OF UTERUS  8952,1643,6836    EXCISION OF PARATHYROID MASS Right 2013    rt MOUTH  NIGHTLY  amLODIPine (NORVASC) 10 MG tablet, TAKE 1 TABLET BY MOUTH  DAILY  Lactobacillus (PROBIOTIC ACIDOPHILUS) CAPS, Take 1 capsule by mouth daily   levothyroxine (SYNTHROID) 75 MCG tablet, take 1 tablet by mouth once daily  busPIRone (BUSPAR) 10 MG tablet, Take 5 mg by mouth 3 times daily   Cholecalciferol (VITAMIN D3) 5000 units TABS, Take 5,000 Units by mouth every other day   MULTIPLE VITAMIN PO, Take 1 tablet by mouth daily   Diet    Diet NPO Effective Now  Allergies    Percocet [oxycodone-acetaminophen]; Sulfa antibiotics; and Cefuroxime  Family History          Problem Relation Age of Onset    Diabetes Mother     Thyroid Disease Mother     Arthritis Mother     High Blood Pressure Mother     Arthritis Father     High Blood Pressure Father     Other Father         hay fever    Breast Cancer Neg Hx     Ovarian Cancer Neg Hx      Sleep History    Never diagnosed with sleep apnea in the past    Social History     Social History     Socioeconomic History    Marital status:      Spouse name: Not on file    Number of children: Not on file    Years of education: Not on file    Highest education level: Not on file   Occupational History    Occupation: retired   Social Needs    Financial resource strain: Not on file    Food insecurity:     Worry: Not on file     Inability: Not on file   [place] needs:     Medical: Not on file     Non-medical: Not on file   Tobacco Use    Smoking status: Never Smoker    Smokeless tobacco: Never Used   Substance and Sexual Activity    Alcohol use:  Yes     Alcohol/week: 0.0 standard drinks     Comment: socially-1 glassof wine 2-3 times a month    Drug use: No    Sexual activity: Never   Lifestyle    Physical activity:     Days per week: Not on file     Minutes per session: Not on file    Stress: Not on file   Relationships    Social connections:     Talks on phone: Not on file     Gets together: Not on file     Attends [employer] service:

## 2020-01-08 NOTE — ANESTHESIA PRE PROCEDURE
Historical Provider, MD       Current medications:    Current Facility-Administered Medications   Medication Dose Route Frequency Provider Last Rate Last Dose    ipratropium-albuterol (DUONEB) nebulizer solution 1 ampule  1 ampule Inhalation Q4H LAITH Donohue MD   1 ampule at 01/08/20 1126    ondansetron (ZOFRAN) injection 4 mg  4 mg Intravenous Once Erin Alexander MD        vancomycin 1000 mg IVPB in 250 mL D5W addavial  1,000 mg Intravenous Once Brena Monday, DO        lactobacillus (CULTURELLE) capsule 1 capsule  1 capsule Oral Daily Erin Alexander MD   1 capsule at 01/08/20 6946    amLODIPine (NORVASC) tablet 10 mg  10 mg Oral Daily Erin Alexander MD   10 mg at 01/07/20 1002    busPIRone (BUSPAR) tablet 5 mg  5 mg Oral TID Erin Alexander MD   5 mg at 01/08/20 0932    Vitamin D (CHOLECALCIFEROL) tablet 5,000 Units  5,000 Units Oral Every Other Day Erin Alexander MD   5,000 Units at 01/08/20 8233    levothyroxine (SYNTHROID) tablet 75 mcg  75 mcg Oral Daily Erin Alexander MD   75 mcg at 01/08/20 0644    cetirizine (ZYRTEC) tablet 10 mg  10 mg Oral Daily Erin Alexander MD   10 mg at 01/08/20 1041    simvastatin (ZOCOR) tablet 20 mg  20 mg Oral Nightly Erin Alexander MD   20 mg at 01/07/20 1953    sodium chloride flush 0.9 % injection 10 mL  10 mL Intravenous 2 times per day Erin Alexander MD   10 mL at 01/08/20 1046    sodium chloride flush 0.9 % injection 10 mL  10 mL Intravenous PRN Erin Alexander MD        magnesium hydroxide (MILK OF MAGNESIA) 400 MG/5ML suspension 30 mL  30 mL Oral Daily PRN Erin Alexander MD        ondansetron (ZOFRAN) injection 4 mg  4 mg Intravenous Q6H PRN Erin Alexander MD        [Held by provider] enoxaparin (LOVENOX) injection 40 mg  40 mg Subcutaneous Q24H Erin Alexander MD   40 mg at 01/07/20 1733    acetaminophen (TYLENOL) tablet 650 mg  650 mg Oral Q4H PRN Keturah Mallory Rian Tena MD   650 mg at 01/08/20 1044    cefepime (MAXIPIME) 2 g IVPB minibag  2 g Intravenous Q12H Asya Cannon MD   Stopped at 01/07/20 1803    And    levofloxacin (LEVAQUIN) 750 MG/150ML infusion 750 mg  750 mg Intravenous Q48H Asya Cannon MD   Stopped at 01/06/20 1923       Allergies:     Allergies   Allergen Reactions    Percocet [Oxycodone-Acetaminophen] Nausea Only and Other (See Comments)     Affects memory    Sulfa Antibiotics Other (See Comments)     hallucinations    Cefuroxime Diarrhea, Nausea And Vomiting and Other (See Comments)     cramping       Problem List:    Patient Active Problem List   Diagnosis Code    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 ([facility] N25.81    Hypercalcemia E83.52    Osteoarthritis M19.90    Non-toxic nodular goiter E04.9    Essential hypertension I10    Diverticulosis of colon K57.30    Hypokalemia E87.6    Hypothyroidism E03.9    Parathyroid adenoma D35.1    Osteopenia M85.80    Bronchiectasis without complication (HCC) U19.5    Hyponatremia E87.1    Pure hypercholesterolemia E78.00    Left thyroid nodule E04.1    Hyperthyroidism E05.90    Lung infiltrate on CT R91.8    Angina pectoris (HCC) - Typical I20.9    Dyslipidemia E78.5    Elevated serum free T4 level R79.89    Elevated TSH R79.89    Nasal polyp J33.9    Abnormal CT scan, chest R93.89    Vitamin D deficiency E55.9    Acute hypoxemic respiratory failure (HCC) J96.01    Pneumonia J18.9    Moderate malnutrition (HCC) E44.0    Pulmonary fibrosis (HCC) J84.10       Past Medical History:        Diagnosis Date    Anemia     normal on pre-op 5/2012 and 12/2/13    Backache     h/o    Bronchiectasis ([facility] 2013    Bursitis     bilateral shoulders    Constipation     Diverticulosis of colon     HTN (hypertension)     Hypercalcemia     slightly elevated at 10.8 pre-op 12/2/13    Hyperlipidemia     Nodular goiter     bx negative    OA (osteoarthritis)     bilateral thumbs - sees Dr. Matilde Conde  Osteopenia 2013    Pneumonia of right upper lobe due to infectious organism (HCC)     Pneumonitis     Dr. Pietro Glass in 2010 - bx negative    Pulmonary Mycobacterium avium complex (MAC) infection ([facility]     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 ([facility]     had one parathyroid removed - now follows with Dr. Nedra Collado    Sinusitis     with seasonal allergies    Vitamin D deficiency 2018       Past Surgical History:        Procedure Laterality Date    ABDOMINAL EXPLORATION SURGERY  2013    Release of ASHISHOKATHERINE-Dr. Óscar Rico    APPENDECTOMY      CATARACT REMOVAL WITH IMPLANT Bilateral  ?   SECTION  2823,6006    DILATION AND CURETTAGE OF UTERUS  3720,7501,5803    EXCISION OF PARATHYROID MASS Right 2013    rt parathyroidectomy (excision of rt inferior parathyroid mass) exploration of neck     FOOT SURGERY      left    FOOT SURGERY Left 2017    Dr Osiris Gregory  6    left knee    JOINT REPLACEMENT  2014    right knee    MALIGNANT SKIN LESION EXCISION Left 2017    BCC DORSAL FOOT WITH GRAFT & FS    GOSIA AND BSO  1982    TONSILLECTOMY AND ADENOIDECTOMY  194 ?  TOTAL KNEE ARTHROPLASTY Right 2014    OIO       Social History:    Social History     Tobacco Use    Smoking status: Never Smoker    Smokeless tobacco: Never Used   Substance Use Topics    Alcohol use:  Yes     Alcohol/week: 0.0 standard drinks     Comment: socially-1 glassof wine 2-3 times a month                                Counseling given: Not Answered      Vital Signs (Current):   Vitals:    20 0800 20 0823 20 1200 20 1407   BP: (!) 102/48  (!) 107/53 138/63   Pulse: 62  74 80   Resp: 24  20 22   Temp: 36.5 °C (97.7 °F)  36.9 °C (98.4 °F)    TempSrc: Oral  Oral    SpO2: 94% 95% 95% 97%   Weight:       Height:                                                  BP Readings from Last 3 Encounters:   20 138/63   01/06/20 108/60   01/02/20 (!) 147/70       NPO Status: Time of last liquid consumption: 0730                        Time of last solid consumption: 0730                        Date of last liquid consumption: 01/08/20                        Date of last solid food consumption: 01/08/20    BMI:   Wt Readings from Last 3 Encounters:   01/08/20 164 lb 8 oz (74.6 kg)   01/02/20 162 lb (73.5 kg)   12/09/19 164 lb 9.6 oz (74.7 kg)     Body mass index is 28.24 kg/m².     CBC:   Lab Results   Component Value Date    WBC 45.2 01/08/2020    RBC 4.09 01/08/2020    RBC 3.45 06/08/2012    HGB 11.6 01/08/2020    HCT 35.4 01/08/2020    MCV 86.6 01/08/2020    RDW 14.9 12/06/2017     01/08/2020       CMP:   Lab Results   Component Value Date     01/08/2020    K 5.5 01/08/2020    K 5.1 01/07/2020    CL 95 01/08/2020    CO2 22 01/08/2020    BUN 48 01/08/2020    CREATININE 1.2 01/08/2020    LABGLOM 43 01/08/2020    GLUCOSE 157 01/08/2020    GLUCOSE 93 06/08/2012    PROT 6.6 01/07/2020    CALCIUM 10.4 01/08/2020    BILITOT 0.4 01/07/2020    ALKPHOS 151 01/07/2020    AST 46 01/07/2020    ALT 31 01/07/2020       POC Tests:   Recent Labs     01/08/20  1337   POCGLU 315*       Coags:   Lab Results   Component Value Date    INR 1.39 01/07/2020    APTT 26.9 06/08/2012       HCG (If Applicable): No results found for: PREGTESTUR, PREGSERUM, HCG, HCGQUANT     ABGs: No results found for: PHART, PO2ART, LEX1AWM, DDA4FKC, BEART, C1NJVQBZ     Type & Screen (If Applicable):  Lab Results   Component Value Date    [address] POS 05/30/2012       Anesthesia Evaluation  Patient summary reviewed and Nursing notes reviewed no history of anesthetic complications:   Airway: Mallampati: II  TM distance: >3 FB   Neck ROM: full  Mouth opening: > = 3 FB Dental: normal exam         Pulmonary:normal exam    (+) pneumonia: unresolved,  COPD:                             Cardiovascular:    (+) hypertension:, angina: with exertion,

## 2020-01-09 LAB
ANION GAP SERPL CALCULATED.3IONS-SCNC: 13 MEQ/L (ref 8–16)
BAL CHARACTER: ABNORMAL
BAL COLLECTION SITE: ABNORMAL
BAL COLOR: ABNORMAL
BASOPHILS # BLD: 0.6 %
BASOPHILS ABSOLUTE: 0.2 THOU/MM3 (ref 0–0.1)
BUN BLDV-MCNC: 43 MG/DL (ref 7–22)
CALCIUM SERPL-MCNC: 10.6 MG/DL (ref 8.5–10.5)
CHLORIDE BLD-SCNC: 102 MEQ/L (ref 98–111)
CO2: 24 MEQ/L (ref 23–33)
CREAT SERPL-MCNC: 1.2 MG/DL (ref 0.4–1.2)
EOSINOPHIL # BLD: 0.1 %
EOSINOPHILS ABSOLUTE: 0 THOU/MM3 (ref 0–0.4)
ERYTHROCYTE [DISTWIDTH] IN BLOOD BY AUTOMATED COUNT: 15.5 % (ref 11.5–14.5)
ERYTHROCYTE [DISTWIDTH] IN BLOOD BY AUTOMATED COUNT: 48.6 FL (ref 35–45)
GFR SERPL CREATININE-BSD FRML MDRD: 43 ML/MIN/1.73M2
GLUCOSE BLD-MCNC: 91 MG/DL (ref 70–108)
HCT VFR BLD CALC: 36.2 % (ref 37–47)
HEMOGLOBIN: 11.8 GM/DL (ref 12–16)
IMMATURE GRANS (ABS): 2.01 THOU/MM3 (ref 0–0.07)
IMMATURE GRANULOCYTES: 6.9 %
LYMPHOCYTES # BLD: 5.8 %
LYMPHOCYTES ABSOLUTE: 1.7 THOU/MM3 (ref 1–4.8)
LYMPHOCYTES, BAL: 6 % (ref 10–15)
MACROPHAGE/MONOCYTE BAL: 11 % (ref 86–100)
MCH RBC QN AUTO: 28.1 PG (ref 26–33)
MCHC RBC AUTO-ENTMCNC: 32.6 GM/DL (ref 32.2–35.5)
MCV RBC AUTO: 86.2 FL (ref 81–99)
MISC REFERENCE: NORMAL
MISC REFERENCE: NORMAL
MONOCYTES # BLD: 7.4 %
MONOCYTES ABSOLUTE: 2.2 THOU/MM3 (ref 0.4–1.3)
NUCLEATED RED BLOOD CELLS: 0 /100 WBC
PATHOLOGIST REVIEW: ABNORMAL
PLATELET # BLD: 307 THOU/MM3 (ref 130–400)
PMV BLD AUTO: 10.8 FL (ref 9.4–12.4)
POTASSIUM SERPL-SCNC: 4.9 MEQ/L (ref 3.5–5.2)
POTASSIUM SERPL-SCNC: 5.7 MEQ/L (ref 3.5–5.2)
RBC # BLD: 4.2 MILL/MM3 (ref 4.2–5.4)
RBC BAL: 1184 /CUMM
SEG NEUTROPHILS: 79.2 %
SEGMENTED NEUTROPHILS ABSOLUTE COUNT: 23.1 THOU/MM3 (ref 1.8–7.7)
SEGMENTED NEUTROPHILS, BAL: 83 % (ref 0–3)
SODIUM BLD-SCNC: 139 MEQ/L (ref 135–145)
TOTAL NUCLEATED CELLS BAL: 562 /CUMM
TOTAL VOLUME RECEIVED BAL: 35 ML
WBC # BLD: 29.2 THOU/MM3 (ref 4.8–10.8)

## 2020-01-09 PROCEDURE — 6360000002 HC RX W HCPCS: Performed by: INTERNAL MEDICINE

## 2020-01-09 PROCEDURE — 87541 LEGION PNEUMO DNA AMP PROB: CPT

## 2020-01-09 PROCEDURE — APPSS30 APP SPLIT SHARED TIME 16-30 MINUTES: Performed by: NURSE PRACTITIONER

## 2020-01-09 PROCEDURE — 97110 THERAPEUTIC EXERCISES: CPT

## 2020-01-09 PROCEDURE — 6360000002 HC RX W HCPCS

## 2020-01-09 PROCEDURE — 94669 MECHANICAL CHEST WALL OSCILL: CPT

## 2020-01-09 PROCEDURE — 36415 COLL VENOUS BLD VENIPUNCTURE: CPT

## 2020-01-09 PROCEDURE — 87581 M.PNEUMON DNA AMP PROBE: CPT

## 2020-01-09 PROCEDURE — 6370000000 HC RX 637 (ALT 250 FOR IP): Performed by: INTERNAL MEDICINE

## 2020-01-09 PROCEDURE — 99232 SBSQ HOSP IP/OBS MODERATE 35: CPT | Performed by: INTERNAL MEDICINE

## 2020-01-09 PROCEDURE — 97116 GAIT TRAINING THERAPY: CPT

## 2020-01-09 PROCEDURE — 94640 AIRWAY INHALATION TREATMENT: CPT

## 2020-01-09 PROCEDURE — 80048 BASIC METABOLIC PNL TOTAL CA: CPT

## 2020-01-09 PROCEDURE — 87150 DNA/RNA AMPLIFIED PROBE: CPT

## 2020-01-09 PROCEDURE — 2700000000 HC OXYGEN THERAPY PER DAY

## 2020-01-09 PROCEDURE — 1200000003 HC TELEMETRY R&B

## 2020-01-09 PROCEDURE — 87632 RESP VIRUS 6-11 TARGETS: CPT

## 2020-01-09 PROCEDURE — 2580000003 HC RX 258: Performed by: INTERNAL MEDICINE

## 2020-01-09 PROCEDURE — 84132 ASSAY OF SERUM POTASSIUM: CPT

## 2020-01-09 PROCEDURE — 87486 CHLMYD PNEUM DNA AMP PROBE: CPT

## 2020-01-09 PROCEDURE — 94761 N-INVAS EAR/PLS OXIMETRY MLT: CPT

## 2020-01-09 PROCEDURE — 85025 COMPLETE CBC W/AUTO DIFF WBC: CPT

## 2020-01-09 PROCEDURE — 87798 DETECT AGENT NOS DNA AMP: CPT

## 2020-01-09 RX ORDER — SODIUM POLYSTYRENE SULFONATE 15 G/60ML
30 SUSPENSION ORAL; RECTAL ONCE
Status: COMPLETED | OUTPATIENT
Start: 2020-01-09 | End: 2020-01-09

## 2020-01-09 RX ADMIN — Medication 1 CAPSULE: at 08:37

## 2020-01-09 RX ADMIN — SODIUM POLYSTYRENE SULFONATE 30 G: 15 SUSPENSION ORAL; RECTAL at 17:48

## 2020-01-09 RX ADMIN — IPRATROPIUM BROMIDE AND ALBUTEROL SULFATE 1 AMPULE: .5; 3 SOLUTION RESPIRATORY (INHALATION) at 07:22

## 2020-01-09 RX ADMIN — LEVOTHYROXINE SODIUM 75 MCG: 75 TABLET ORAL at 06:43

## 2020-01-09 RX ADMIN — Medication 10 ML: at 08:37

## 2020-01-09 RX ADMIN — ENOXAPARIN SODIUM 40 MG: 40 INJECTION SUBCUTANEOUS at 17:47

## 2020-01-09 RX ADMIN — IPRATROPIUM BROMIDE AND ALBUTEROL SULFATE 1 AMPULE: .5; 3 SOLUTION RESPIRATORY (INHALATION) at 20:36

## 2020-01-09 RX ADMIN — IPRATROPIUM BROMIDE AND ALBUTEROL SULFATE 1 AMPULE: .5; 3 SOLUTION RESPIRATORY (INHALATION) at 12:01

## 2020-01-09 RX ADMIN — Medication 10 ML: at 21:03

## 2020-01-09 RX ADMIN — IPRATROPIUM BROMIDE AND ALBUTEROL SULFATE 1 AMPULE: .5; 3 SOLUTION RESPIRATORY (INHALATION) at 15:46

## 2020-01-09 RX ADMIN — SIMVASTATIN 20 MG: 20 TABLET, FILM COATED ORAL at 21:03

## 2020-01-09 RX ADMIN — CEFEPIME HYDROCHLORIDE 2 G: 2 INJECTION, POWDER, FOR SOLUTION INTRAVENOUS at 17:47

## 2020-01-09 RX ADMIN — CETIRIZINE HYDROCHLORIDE 10 MG: 10 TABLET, FILM COATED ORAL at 08:37

## 2020-01-09 RX ADMIN — AZITHROMYCIN 250 MG: 250 TABLET, FILM COATED ORAL at 08:37

## 2020-01-09 RX ADMIN — BUSPIRONE HYDROCHLORIDE 5 MG: 5 TABLET ORAL at 15:56

## 2020-01-09 RX ADMIN — CEFEPIME HYDROCHLORIDE 2 G: 2 INJECTION, POWDER, FOR SOLUTION INTRAVENOUS at 06:44

## 2020-01-09 RX ADMIN — BUSPIRONE HYDROCHLORIDE 5 MG: 5 TABLET ORAL at 08:37

## 2020-01-09 RX ADMIN — BUSPIRONE HYDROCHLORIDE 5 MG: 5 TABLET ORAL at 21:03

## 2020-01-09 ASSESSMENT — PAIN SCALES - GENERAL
PAINLEVEL_OUTOF10: 0
PAINLEVEL_OUTOF10: 0

## 2020-01-09 NOTE — CARE COORDINATION
1/9/20, 7:23 AM    DISCHARGE ONGOING EVALUATION:     Johnnie Wang day: 3  Location: -03/003-A Reason for admit: Pneumonia [J18.9]   Treatment Plan of Care: WBC 29.2. Bronch completed, cultures pending. Changed to oral zithromax per ID, remains on IV maxipime, duonebs. Using 1L oxygen, 95%.     Barriers to Discharge: medical readiness

## 2020-01-09 NOTE — PROGRESS NOTES
goal 2: sit to stand withMod I to get on and off various surfaces  Short term goal 3: ambulate with /without  feet with S to walk safely in the home  Short term goal 4: ascend/descend 2 steps with HR and SBA to enter home  Long term goals  Time Frame for Long term goals : NA due to short ELOS    Following session, patient left in safe position with all fall risk precautions in place.

## 2020-01-09 NOTE — PROGRESS NOTES
INTERNAL MEDICINE SPECIALTIES  Progress Note For Dr Chiquis Fulton       Patient:  Jennifer Son  YOB: 1935  Date of Service: 1/9/2020  MRN: [id number]   Acct:  [de-identified]   Primary Care Physician: Garry Cardona MD    SUBJECTIVE: feels better than yesterday        Home Medications:   No current facility-administered medications on file prior to encounter.       Current Outpatient Medications on File Prior to Encounter   Medication Sig Dispense Refill    sodium chloride, Inhalant, 7 % nebulizer solution Take 4 mLs by nebulization 2 times daily       albuterol (PROVENTIL) (2.5 MG/3ML) 0.083% nebulizer solution Take 2.5 mg by nebulization 2 times daily      loratadine (CLARITIN) 10 MG capsule Take 10 mg by mouth daily       ondansetron (ZOFRAN ODT) 4 MG disintegrating tablet Place 1 tablet under the tongue every 8 hours as needed for Nausea or Vomiting 20 tablet 0    PROAIR  (90 Base) MCG/ACT inhaler INHALE 2 PUFFS INTO THE LUNGS EVERY 6 HOURS IF NEEDED FOR WHEEZING OR SHORTNESS OF BREATH 1 Inhaler 11    spironolactone (ALDACTONE) 50 MG tablet TAKE 1 TABLET BY MOUTH  DAILY 90 tablet 3    simvastatin (ZOCOR) 20 MG tablet TAKE 1 TABLET BY MOUTH  NIGHTLY 90 tablet 3    amLODIPine (NORVASC) 10 MG tablet TAKE 1 TABLET BY MOUTH  DAILY 90 tablet 3    Lactobacillus (PROBIOTIC ACIDOPHILUS) CAPS Take 1 capsule by mouth daily       levothyroxine (SYNTHROID) 75 MCG tablet take 1 tablet by mouth once daily 90 tablet 3    busPIRone (BUSPAR) 10 MG tablet Take 5 mg by mouth 3 times daily       Cholecalciferol (VITAMIN D3) 5000 units TABS Take 5,000 Units by mouth every other day       MULTIPLE VITAMIN PO Take 1 tablet by mouth daily            Scheduled Meds:   azithromycin  250 mg Oral Daily    ipratropium-albuterol  1 ampule Inhalation Q4H WA    ondansetron  4 mg Intravenous Once    vancomycin  1,000 mg Intravenous Once    lactobacillus  1 capsule Oral Daily    amLODIPine  10 mg Oral Daily  busPIRone  5 mg Oral TID    Vitamin D  5,000 Units Oral Every Other Day    levothyroxine  75 mcg Oral Daily    cetirizine  10 mg Oral Daily    simvastatin  20 mg Oral Nightly    sodium chloride flush  10 mL Intravenous 2 times per day    enoxaparin  40 mg Subcutaneous Q24H    cefepime  2 g Intravenous Q12H     Continuous Infusions:  PRN Meds:phenol, sodium chloride flush, magnesium hydroxide, ondansetron, acetaminophen        Allergies:  Percocet [oxycodone-acetaminophen]; Sulfa antibiotics; and Cefuroxime    OBJECTIVE:    Vitals:   Vitals:    01/08/20 2303   BP: (!) 125/51   Pulse: 95   Resp: 20   Temp: 98.1 °F (36.7 °C)   SpO2: 92%      BMI: Body mass index is 28.24 kg/m². PHYSICAL EXAMINATION:          General appearance:Ill looking elderly female ; No apparent distress, appears stated age and cooperative. HEENT:  Normal cephalic, atraumatic without obvious deformity. Pupils equal, round, and reactive to light. Extra ocular muscles intact. Conjunctivae/corneas clear. Neck: Supple   Respiratory:  Normal respiratory effort., bilateral basilar crackles. Cardiovascular:  Regular rhythm with normal S1/S2 without ,rubs or gallops. Abdomen: Soft, mild LUQ tenderness, non-distended with normal bowel sounds. Musculoskeletal:  No clubbing, cyanosis or edema bilaterally. Full range of motion without deformity. Skin: Skin color, texture, turgor normal.  No rashes or lesions. Neurologic: Alert and oriented x 3, without any focal motor deficits.    Psychiatric:   Thought content appropriate, normal insight     Review of Labs and Diagnostic Testing:    Recent Results (from the past 24 hour(s))   CBC auto differential    Collection Time: 01/08/20  6:52 AM   Result Value Ref Range    WBC 45.2 (HH) 4.8 - 10.8 thou/mm3    RBC 4.09 (L) 4.20 - 5.40 mill/mm3    Hemoglobin 11.6 (L) 12.0 - 16.0 gm/dl    Hematocrit 35.4 (L) 37.0 - 47.0 %    MCV 86.6 81.0 - 99.0 fL    MCH 28.4 26.0 - 33.0 pg    MCHC 32.8 32.2 - 35.5 Collection Time: 01/08/20  2:47 PM   Result Value Ref Range    Gram Stain Result       Moderate segmented neutrophils observed. Rare epithelial cells observed. No organisms observed. Fungus Culture    Collection Time: 01/08/20  2:47 PM   Result Value Ref Range    Fungus Smear No fungus observed     PNEUMOCYSTIS STAIN    Collection Time: 01/08/20  2:47 PM   Result Value Ref Range    Cytology-Non Gyn See NonGyn Rpt    Fungus Culture    Collection Time: 01/08/20  2:50 PM   Result Value Ref Range    Fungus Smear No fungus observed     Respiratory Culture    Collection Time: 01/08/20  2:50 PM   Result Value Ref Range    Gram Stain Result       Many segmented neutrophils observed. Rare epithelial cells observed. No organisms observed. Fungus Culture    Collection Time: 01/08/20  3:03 PM   Result Value Ref Range    Fungus Smear No fungus observed     QUANTITATIVE RESPIRATORY CULTURE    Collection Time: 01/08/20  3:03 PM   Result Value Ref Range    Gram Stain Result       Rare segmented neutrophils observed. Rare epithelial cells observed. No organisms observed. Anaerobic and Aerobic Culture    Collection Time: 01/08/20  3:07 PM   Result Value Ref Range    Aerobic Culture No growth-preliminary      Gram Stain Result       Rare segmented neutrophils observed. No epithelial cells observed. No organisms observed.    Fungus Culture    Collection Time: 01/08/20  3:07 PM   Result Value Ref Range    Fungus Smear No fungus observed     PNEUMOCYSTIS STAIN    Collection Time: 01/08/20  4:53 PM   Result Value Ref Range    Cytology-Non Gyn See NonGyn Rpt    Cell Count with Diff, BAL    Collection Time: 01/08/20  6:05 PM   Result Value Ref Range    BAL Color PINK     BAL Character HAZY     BAL Collection Site RLL     Total Volume Received BAL 35 ml    Total Nucleated Cells  /cumm    RBC BAL 1184 /cumm       Radiology:     Xr Chest Portable    Result Date: 1/6/2020  PROCEDURE: XR CHEST PORTABLE CLINICAL INFORMATION: Culture Unknown No growth-preliminary   FUNGUS CULTURE Unknown Rpt   Fungus Smear Unknown No fungus observed       HRCT CHEST:      Impression   1. Significant progression of interstitial lung disease in the right lung. 2. Probable acute airspace disease involving the right lower lobe and left lung         Results for Tara Edge (MRN [id number]) as of 1/9/2020 15:59   Ref.  Range 1/8/2020 06:52 1/8/2020 18:05 1/9/2020 06:03   WBC Latest Ref Range: 4.8 - 10.8 thou/mm3 45.2 (HH)  29.2 (H)   RBC Latest Ref Range: 4.20 - 5.40 mill/mm3 4.09 (L)  4.20   Hemoglobin Quant Latest Ref Range: 12.0 - 16.0 gm/dl 11.6 (L)  11.8 (L)   Hematocrit Latest Ref Range: 37.0 - 47.0 % 35.4 (L)  36.2 (L)   MCV Latest Ref Range: 81.0 - 99.0 fL 86.6  86.2   MCH Latest Ref Range: 26.0 - 33.0 pg 28.4  28.1   MCHC Latest Ref Range: 32.2 - 35.5 gm/dl 32.8  32.6   MPV Latest Ref Range: 9.4 - 12.4 fL 10.9  10.8   RDW-CV Latest Ref Range: 11.5 - 14.5 % 15.0 (H)  15.5 (H)   RDW-SD Latest Ref Range: 35.0 - 45.0 fL 47.5 (H)  48.6 (H)   Platelet Count Latest Ref Range: 130 - 400 thou/mm3 278  307   Platelet Estimate Latest Ref Range: Adequate  ADEQUATE     Lymphocytes Absolute Latest Ref Range: 1.0 - 4.8 thou/mm3 1.1  1.7   Monocytes Absolute Latest Ref Range: 0.4 - 1.3 thou/mm3 1.6 (H)  2.2 (H)   Eosinophils Absolute Latest Ref Range: 0.0 - 0.4 thou/mm3 0.0  0.0   Basophils Absolute Latest Ref Range: 0.0 - 0.1 thou/mm3 0.0  0.2 (H)   Seg Neutrophils Latest Units: % 88.9  79.2   Segs Absolute Latest Ref Range: 1.8 - 7.7 thou/mm3 40.2 (H)  23.1 (H)   Lymphocytes Latest Units: % 2.5  5.8   Monocytes Latest Units: % 3.5  7.4   Eosinophils Latest Units: % 0.0  0.1   Basophils Latest Units: % 0.1  0.6   Immature Grans (Abs) Latest Ref Range: 0.00 - 0.07 thou/mm3 2.26 (H)  2.01 (H)   Immature Granulocytes Latest Units: % 5.0  6.9   Nucleated Red Blood Cells Latest Units: /100 wbc 0  0   SCAN OF BLOOD SMEAR Unknown see below

## 2020-01-09 NOTE — PROGRESS NOTES
hyperparathyroidism ([address] N25.81    Hypercalcemia E83.52    Osteoarthritis M19.90    Non-toxic nodular goiter E04.9    Essential hypertension I10    Diverticulosis of colon K57.30    Hypokalemia E87.6    Hypothyroidism E03.9    Parathyroid adenoma D35.1    Osteopenia M85.80    Bronchiectasis without complication (HCC) Z55.8    Hyponatremia E87.1    Pure hypercholesterolemia E78.00    Left thyroid nodule E04.1    Hyperthyroidism E05.90    Lung infiltrate on CT R91.8    Angina pectoris (HCC) - Typical I20.9    Dyslipidemia E78.5    Elevated serum free T4 level R79.89    Elevated TSH R79.89    Nasal polyp J33.9    Abnormal CT of the chest R93.89    Vitamin D deficiency E55.9    Acute hypoxemic respiratory failure (HCC) J96.01    Pneumonia J18.9    Moderate malnutrition (HCC) E44.0    Pulmonary fibrosis (HCC) J84.10         ASSESSMENT/PLAN   Pneumonia  Chronic lung disease with Bronchiectasis  Stop Levaquin :may be contributing to the hallucination, will place her on oral zithromax  Discussed with family and nursing staff      Tye Denis MD, [address] 1/8/2020 7:27 PM

## 2020-01-09 NOTE — FLOWSHEET NOTE
01/09/20 1329   Encounter Summary   Services provided to: Patient and family together   Referral/Consult From: [facility] Children;Yazidism/gallo community   Place of [address] No   Continue Visiting   (1/9 yes for spiritual support.)   Complexity of Encounter Moderate   Length of Encounter 45 minutes   Advance Care Planning Yes   Grief and Life Adjustment   Type Adjustment to illness   Assessment Approachable;Calm;Coping   Intervention Explored feelings, thoughts, concerns; Discussed illness/injury and it's impact;Prayer   Outcome Engaged in conversation;Coping   Advance Directives (For Healthcare)   Healthcare Directive Yes, patient has an advance directive for healthcare treatment   Type of Healthcare Directive Living will   Copy in Chart Yes, copy in chart   Chart Copy Status : Updated   Date Reviewed and Current: 01/09/20   Healthcare Agent's Name Kevin Spivey; [address] [place] [address] Agent's Phone Number [phone]; [phone]     S--patient rested in bed, awaiting the results of her pneumonia test that was run. 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 Her son Keron Cedeño, from [facility] [address] is present. Her other son from [facility] is not present. O--patient is alert and oriented to person, place, time and situation. Staff reviewed her Living Will and updated the telephone numbers of her sons. The changes to the telephone numbers were witnessed by staff and nurse Fabienne Lawson. Patient stated she also completed a Durable Power of  for Health care but it may be with the paperwork she completed with her . Staff encouraged her to bring a copy to [facility] when she is able. A--patient remains hopeful that she will return home by this weekend. Her Living Will was faxed to Medical Records and copy placed in her chart. P--prayers were shared and spiritual care remains available.

## 2020-01-09 NOTE — PROGRESS NOTES
[facility] pulmonary service. She under went Flexible diagnostic fiberoptic bronchoscopy with fluoroscopy on 4/19/2017. She is having cough: Yes  Duration of cough: for 8 days. Her cough is associated with sputum production: Yes   The sputum color: yellow  Hemoptysis:No  Diurnal variation: None. Relieving factors: No  Aggravating factors: No    She gives a history of fever: No  Chills & rigors: Yes  Associated night sweats: No.  Recent travel history:No.    Rrecent sick contacts: No.      She is having chest pain:No.    During her initial work up she was found to have abnormal chest Xray with right side pneumonia. Prior to his current hospitalization:  She is currently not using any oxygen supplementation at rest, exercise or during sleep/at night time.       Past 24 hrs   -On 1 LPM via NC  -Afebrile, WBC improved  -Tolerated bronch well yesterday  -No hallucinations overnight or today  -Potassium improved yesterday elevated this AM 5.7  All other systems reviewed    PMHx   Past Medical History      Diagnosis Date    Anemia     normal on pre-op 5/2012 and 12/2/13    Backache     h/o    Bronchiectasis ([address] 2013    Bursitis     bilateral shoulders    Constipation     Diverticulosis of colon     HTN (hypertension)     Hypercalcemia     slightly elevated at 10.8 pre-op 12/2/13    Hyperlipidemia     Nodular goiter     bx negative    OA (osteoarthritis)     bilateral thumbs - sees Dr. Barby Brewster Osteopenia 11/6/2013    Pneumonia of right upper lobe due to infectious organism ([address]     Pneumonitis     Dr. Carole Hammond in 7/2010 - bx negative    Pulmonary Mycobacterium avium complex (MAC) infection ([address]     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 ([address]     had one parathyroid removed - now follows with Dr. Margarita Machuca    Sinusitis     with seasonal allergies    Vitamin D deficiency 05/2018      Past Surgical History        Procedure Laterality Date    ABDOMINAL EXPLORATION SURGERY  01/02/2013    Release of KATHERINE TAMAYO-Dr. Hernandez Bending    APPENDECTOMY      BRONCHOSCOPY N/A 2020    BRONCHOSCOPY FLUOROSCOPY performed by Livingston Lombard, MD at [address] WITH IMPLANT Bilateral  ?   SECTION  2115,8325    DILATION AND CURETTAGE OF UTERUS  [id number]    EXCISION OF PARATHYROID MASS Right 2013    rt parathyroidectomy (excision of rt inferior parathyroid mass) exploration of neck     FOOT SURGERY      left    FOOT SURGERY Left 2017    Dr Musa Simon  12    left knee    JOINT REPLACEMENT  2014    right knee    MALIGNANT SKIN LESION EXCISION Left 2017    BCC DORSAL FOOT WITH GRAFT & FS    GOSIA AND BSO  1982    TONSILLECTOMY AND ADENOIDECTOMY  194 ?     TOTAL KNEE ARTHROPLASTY Right 2014    OIO     Meds    Current Medications    azithromycin  250 mg Oral Daily    ipratropium-albuterol  1 ampule Inhalation Q4H WA    ondansetron  4 mg Intravenous Once    vancomycin  1,000 mg Intravenous Once    lactobacillus  1 capsule Oral Daily    amLODIPine  10 mg Oral Daily    busPIRone  5 mg Oral TID    Vitamin D  5,000 Units Oral Every Other Day    levothyroxine  75 mcg Oral Daily    cetirizine  10 mg Oral Daily    simvastatin  20 mg Oral Nightly    sodium chloride flush  10 mL Intravenous 2 times per day    enoxaparin  40 mg Subcutaneous Q24H    cefepime  2 g Intravenous Q12H     phenol, sodium chloride flush, magnesium hydroxide, ondansetron, acetaminophen  IV Drips/Infusions    Home Medications  Medications Prior to Admission: sodium chloride, Inhalant, 7 % nebulizer solution, Take 4 mLs by nebulization 2 times daily   albuterol (PROVENTIL) (2.5 MG/3ML) 0.083% nebulizer solution, Take 2.5 mg by nebulization 2 times daily  loratadine (CLARITIN) 10 MG capsule, Take 10 mg by mouth daily   ondansetron (ZOFRAN ODT) 4 MG disintegrating tablet, Place 1 tablet under the tongue every 8 hours socially-1 glassof wine 2-3 times a month    Drug use: No    Sexual activity: Never   Lifestyle    Physical activity:     Days per week: Not on file     Minutes per session: Not on file    Stress: Not on file   Relationships    Social connections:     Talks on phone: Not on file     Gets together: Not on file     Attends Restorationist service: Not on file     Active member of club or organization: Not on file     Attends meetings of clubs or organizations: Not on file     Relationship status: Not on file    Intimate partner violence:     Fear of current or ex partner: Not on file     Emotionally abused: Not on file     Physically abused: Not on file     Forced sexual activity: Not on file   Other Topics Concern    Not on file   Social History Narrative    Not on file     Vitals     height is 5' 4\" (1.626 m) and weight is 158 lb 1.6 oz (71.7 kg). Her oral temperature is 97.6 °F (36.4 °C). Her blood pressure is 110/51 (abnormal) and her pulse is 76. Her respiration is 18 and oxygen saturation is 96%. Body mass index is 27.14 kg/m². SUPPLEMENTAL O2: O2 Flow Rate (L/min): 1 L/min       I/O        Intake/Output Summary (Last 24 hours) at 1/9/2020 0941  Last data filed at 1/9/2020 9074  Gross per 24 hour   Intake 1413.65 ml   Output 1600 ml   Net -186.35 ml     I/O last 3 completed shifts: In: 5865 [P.O.:740; I.V.:600]  Out: 1600 [Urine:1600]   Patient Vitals for the past 96 hrs (Last 3 readings):   Weight   01/09/20 0438 158 lb 1.6 oz (71.7 kg)   01/08/20 0315 164 lb 8 oz (74.6 kg)   01/06/20 1645 161 lb 4.8 oz (73.2 kg)       Exam   Physical Exam   Constitutional: No distress on 1 LPM O2 per NC. Patient appears elderly and chronically ill. Head: Normocephalic and atraumatic. Eyes: Conjunctivae are normal. Pupils are equal, round. No scleral icterus. Neck: Neck supple. No tracheal deviation present. Cardiovascular: S1 and S2 with no murmur.   No peripheral edema  Pulmonary/Chest: Normal effort with PFTs: 12/2/19              Sleep studies   None in [facility]. Cultures    Rapid swab for influenza A and B: Negative  Blood cultures were negative X 2 sets. Strep and legionella antigens-Negative    Bronch Cultures   Fungus-No yeast or hyphae  Respiratory culture-No organisms observed  AFB-pending  Pneumocystis-Pending  Legionella-Pending  Anaerobic/aerobic-No prelim growth  EKG     Echocardiogram   Echocardiogram:  Details   Reading Physician Reading Date Result Priority   Abran Krause MD              Account #     [de-identified]        Room Number         7K-07      Accession      [id number]      Date of Study       11/09/2016   Number      Date of Birth  1935     Referring Physician Andrew Rivas MD                                                     [facility]-      Age            81 year(s)     Sonographer         Yareli Varela [facility]                                    Interpreting       Shabnam Carey MD                                 Physician     Conclusions      Summary   Left Ventricular size is Normal .   Normal left ventricular wall thickness.   Systolic function was normal.   Ejection fraction is visually estimated in the range of 55% to 65%.   There were no regional wall motion abnormalities.   Impaired relaxation compatible with diastolic dysfunction. ( reversed E/A   ratio).     Tricuspid valve was not well visualized.   Mild-to-moderate tricuspid regurgitation.   Right ventricular systolic pressure measures 37 mmHg assuming RAP of 5.      Signature      ----------------------------------------------------------------   Electronically signed by Shabnam Carey MD (Interpreting   [id number]) on 11/09/2016 at 04:07 PM   ----------------------------------------------------------------      Radiology    CXR  XR CHEST PORTABLE   1/8/2020   FINDINGS: Fibrotic changes are again seen in the right upper lung with superimposed airspace opacities. It is cardiomegaly.  Pulmonary vasculature is within normal limits. There is ectasia of the thoracic aorta. There is no significant pleural effusion or pneumothorax. Visualized portions of the upper abdomen are within normal limits. The osseous structures are intact. No acute fractures or suspicious osseous lesions. Impression:   1. No evidence of pneumothorax following bronchoscopy and biopsy. 2. Stable appearance of the chest with chronic fibrotic changes and superimposed infiltrates. Jan 6, 2020   PROCEDURE: XR CHEST PORTABLE   Progressive changes in the right upper and now right lower lobe on a background of underlying chronic fibrotic changes. Findings are concerning for superimposed infection. Correlation with symptoms and continued follow-up advised       CT Scans  (See actual reports for details)     HRCT of chest:  Dec 2, 2019   PROCEDURE: CT CHEST HIGH RESOLUTION   COMPARISON: CT chest high resolution 12/3/2018. 1. Stable groundglass opacities in the right upper lobe, likely secondary to chronic nonspecific interstitial pneumonia. 2. Slightly worsening fibrosis and bronchiectasis, most severe in the right upper lung.            Assessment   -Right side Pneumonia due to community acquired pneumonia (CAP) Vs Atypical pneumonia  -Acute hypoxic respiratory failure due to pneumonia.  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  -Hx of Mycobacterium avium/intracellulare infection of lungs was treated by Dr. Hannah Dash for 13months. Her antibiotics were discontinued due to development of optic neuritis. She is currently not on any treatment. -Bronchiectasis noted on HRCT. 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 -Nodular goiter.  -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 ([address] -Hx of sinusitis.     Plan   -Monitor SpO2 wean supplemental O2 to maintain SpO2 >90%  -Will follow pending bronchoscopy cultures  -Continue current antibiotics per ID. -Appreciate ID assistance in managing this patient  -Duonebs 3ml via nebs Q4h while awake.  -Electrolyte management per primary  -Acapella Q4h as tolerated. -Deep Venous Thrombosis Prophylaxis: lovenox.    -Discussed with registered nurse taking care of patient regarding patient condition and my management plan. Efren Rodriguez educated about my impression and plan. She verbalizes understanding. Questions and concerns addressed. Electronically signed by   JASMEET Pratt - CNP on 1/9/2020 at 9:41 AM     Addendum by Dr. Ginger López MD:  I have seen and examined the patient independently. Face to face evaluation and examination was performed. The above evaluation and note has been reviewed. Labs and radiographs were reviewed. I Have discussed with Mr. Johnnie De La Garza CNP about this patient in detail. The above assessment and plan has been reviewed. Please see my modifications mentioned below. My modifications: On 1LPM via nasal cannula. Improving shortness of breath. Follow bronch results. So far all cultures negative.     Jeremy Bergman MD 1/9/2020 5:09 PM

## 2020-01-09 NOTE — PROGRESS NOTES
Progress note: Infectious diseases    Patient - Stanley Toscano,  Age - 80 y.o.    - 1935      Room Number - 6K-03/003-A   MRN -  [id number]   Acct # - [de-identified]  Date of Admission -  2020 11:28 AM    SUBJECTIVE:   No new issues. OBJECTIVE   VITALS    height is 5' 4\" (1.626 m) and weight is 158 lb 1.6 oz (71.7 kg). Her oral temperature is 97.6 °F (36.4 °C). Her blood pressure is 110/51 (abnormal) and her pulse is 76. Her respiration is 18 and oxygen saturation is 96%. Wt Readings from Last 3 Encounters:   20 158 lb 1.6 oz (71.7 kg)   20 162 lb (73.5 kg)   19 164 lb 9.6 oz (74.7 kg)       I/O (24 Hours)    Intake/Output Summary (Last 24 hours) at 2020 1021  Last data filed at 2020 5464  Gross per 24 hour   Intake 1413.65 ml   Output 1600 ml   Net -186.35 ml       General Appearance  Awake, alert, oriented,  not  In acute distress  HEENT - normocephalic, atraumatic, pink conjunctiva,  anicteric sclera  Neck - Supple, no mass  Lungs -  Bilateral   air entry, + rhonchi, no wheeze, on nasal oxygen.   Cardiovascular - Heart sounds are normal.     Abdomen - soft, not distended, nontender,   Neurologic -oriented  Skin - No bruising or bleeding  Extremities - No edema, no cyanosis, clubbing     MEDICATIONS:      azithromycin  250 mg Oral Daily    ipratropium-albuterol  1 ampule Inhalation Q4H WA    ondansetron  4 mg Intravenous Once    lactobacillus  1 capsule Oral Daily    amLODIPine  10 mg Oral Daily    busPIRone  5 mg Oral TID    Vitamin D  5,000 Units Oral Every Other Day    levothyroxine  75 mcg Oral Daily    cetirizine  10 mg Oral Daily    simvastatin  20 mg Oral Nightly    sodium chloride flush  10 mL Intravenous 2 times per day    enoxaparin  40 mg Subcutaneous Q24H    cefepime  2 g Intravenous Q12H       phenol, sodium chloride flush, magnesium hydroxide, ondansetron, acetaminophen      LABS:     CBC:   Recent Labs     01/07/20  0642 01/08/20  0652 01/09/20  0603   WBC 42.9* 45.2* 29.2*   HGB 11.8* 11.6* 11.8*    278 307     BMP:    Recent Labs     01/07/20  0642 01/08/20  1023 01/08/20  1419 01/09/20  0732   * 132*  --  139   K 5.1 5.5* 4.9 5.7*   CL 94* 95*  --  102   CO2 22* 22*  --  24   BUN 34* 48*  --  43*   CREATININE 1.0 1.2  --  1.2   GLUCOSE 147* 157*  --  91     Calcium:  Recent Labs     01/09/20  0732   CALCIUM 10.6*      Recent Labs     01/07/20  2244 01/08/20  1337   POCGLU 143* 315*     HgbA1C: No results for input(s): LABA1C in the last 72 hours. INR:   Recent Labs     01/07/20  1048   INR 1.39*     Hepatic:   Recent Labs     01/06/20  1217 01/07/20  0642   ALKPHOS 192* 151*   ALT 37 31   AST 45* 46*   PROT 7.8 6.6   BILITOT 0.7 0.4   BILIDIR 0.4*  --    LABALBU 3.3* 2.5*     Amylase and Lipase:  Recent Labs     01/07/20  0642   LACTA 1.4     Lactic Acid:   Recent Labs     01/07/20  0642   LACTA 1.4     Troponin: No results for input(s): CKTOTAL, CKMB, TROPONINI in the last 72 hours. BNP: No results for input(s): BNP in the last 72 hours.     CULTURES:   UA:   Recent Labs     01/06/20  1221   PHUR 5.0   COLORU DK YELLOW*   PROTEINU TRACE*   BLOODU NEGATIVE   RBCUA 0-2   WBCUA 0-2   BACTERIA FEW   NITRU NEGATIVE   GLUCOSEU NEGATIVE   BILIRUBINUR NEGATIVE   UROBILINOGEN 1.0   KETUA NEGATIVE     Micro:   Lab Results   Component Value Date    BC No growth-preliminary  01/06/2020          Problem list of patient:     Patient Active Problem List   Diagnosis Code    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 ([facility] [address] N25.81    Hypercalcemia E83.52    Osteoarthritis M19.90    Non-toxic nodular goiter E04.9    Essential hypertension I10    Diverticulosis of colon K57.30    Hypokalemia E87.6    Hypothyroidism E03.9    Parathyroid adenoma D35.1    Osteopenia M85.80    Bronchiectasis without complication (HCC) H13.6    Hyponatremia E87.1    Pure

## 2020-01-10 PROBLEM — R65.10 SIRS (SYSTEMIC INFLAMMATORY RESPONSE SYNDROME) (HCC): Status: ACTIVE | Noted: 2020-01-10

## 2020-01-10 LAB
BASOPHILS # BLD: 0.1 %
BASOPHILS ABSOLUTE: 0 THOU/MM3 (ref 0–0.1)
EOSINOPHIL # BLD: 1.2 %
EOSINOPHILS ABSOLUTE: 0.3 THOU/MM3 (ref 0–0.4)
ERYTHROCYTE [DISTWIDTH] IN BLOOD BY AUTOMATED COUNT: 15.9 % (ref 11.5–14.5)
ERYTHROCYTE [DISTWIDTH] IN BLOOD BY AUTOMATED COUNT: 50.6 FL (ref 35–45)
GRAM STAIN RESULT: NORMAL
GRAM STAIN RESULT: NORMAL
HCT VFR BLD CALC: 37.6 % (ref 37–47)
HEMOGLOBIN: 12 GM/DL (ref 12–16)
IMMATURE GRANS (ABS): 3.31 THOU/MM3 (ref 0–0.07)
IMMATURE GRANULOCYTES: 11.6 %
LYMPHOCYTES # BLD: 6.7 %
LYMPHOCYTES ABSOLUTE: 1.9 THOU/MM3 (ref 1–4.8)
MCH RBC QN AUTO: 27.8 PG (ref 26–33)
MCHC RBC AUTO-ENTMCNC: 31.9 GM/DL (ref 32.2–35.5)
MCV RBC AUTO: 87.2 FL (ref 81–99)
MONOCYTES # BLD: 7.9 %
MONOCYTES ABSOLUTE: 2.2 THOU/MM3 (ref 0.4–1.3)
NUCLEATED RED BLOOD CELLS: 0 /100 WBC
PLATELET # BLD: 282 THOU/MM3 (ref 130–400)
PMV BLD AUTO: 10.4 FL (ref 9.4–12.4)
POTASSIUM SERPL-SCNC: 5.1 MEQ/L (ref 3.5–5.2)
RBC # BLD: 4.31 MILL/MM3 (ref 4.2–5.4)
RESPIRATORY CULTURE: NORMAL
RESPIRATORY CULTURE: NORMAL
SEG NEUTROPHILS: 72.5 %
SEGMENTED NEUTROPHILS ABSOLUTE COUNT: 20.6 THOU/MM3 (ref 1.8–7.7)
WBC # BLD: 28.4 THOU/MM3 (ref 4.8–10.8)

## 2020-01-10 PROCEDURE — 2580000003 HC RX 258: Performed by: INTERNAL MEDICINE

## 2020-01-10 PROCEDURE — 6370000000 HC RX 637 (ALT 250 FOR IP): Performed by: INTERNAL MEDICINE

## 2020-01-10 PROCEDURE — 6360000002 HC RX W HCPCS: Performed by: INTERNAL MEDICINE

## 2020-01-10 PROCEDURE — 97116 GAIT TRAINING THERAPY: CPT

## 2020-01-10 PROCEDURE — 97110 THERAPEUTIC EXERCISES: CPT

## 2020-01-10 PROCEDURE — 2700000000 HC OXYGEN THERAPY PER DAY

## 2020-01-10 PROCEDURE — 97535 SELF CARE MNGMENT TRAINING: CPT

## 2020-01-10 PROCEDURE — 94761 N-INVAS EAR/PLS OXIMETRY MLT: CPT

## 2020-01-10 PROCEDURE — 94640 AIRWAY INHALATION TREATMENT: CPT

## 2020-01-10 PROCEDURE — 1200000003 HC TELEMETRY R&B

## 2020-01-10 PROCEDURE — 36415 COLL VENOUS BLD VENIPUNCTURE: CPT

## 2020-01-10 PROCEDURE — 94669 MECHANICAL CHEST WALL OSCILL: CPT

## 2020-01-10 PROCEDURE — APPSS30 APP SPLIT SHARED TIME 16-30 MINUTES: Performed by: NURSE PRACTITIONER

## 2020-01-10 PROCEDURE — 84132 ASSAY OF SERUM POTASSIUM: CPT

## 2020-01-10 PROCEDURE — 99232 SBSQ HOSP IP/OBS MODERATE 35: CPT | Performed by: INTERNAL MEDICINE

## 2020-01-10 PROCEDURE — 85025 COMPLETE CBC W/AUTO DIFF WBC: CPT

## 2020-01-10 RX ADMIN — IPRATROPIUM BROMIDE AND ALBUTEROL SULFATE 1 AMPULE: .5; 3 SOLUTION RESPIRATORY (INHALATION) at 12:03

## 2020-01-10 RX ADMIN — BUSPIRONE HYDROCHLORIDE 5 MG: 5 TABLET ORAL at 21:47

## 2020-01-10 RX ADMIN — BUSPIRONE HYDROCHLORIDE 5 MG: 5 TABLET ORAL at 09:19

## 2020-01-10 RX ADMIN — IPRATROPIUM BROMIDE AND ALBUTEROL SULFATE 1 AMPULE: .5; 3 SOLUTION RESPIRATORY (INHALATION) at 20:18

## 2020-01-10 RX ADMIN — CETIRIZINE HYDROCHLORIDE 10 MG: 10 TABLET, FILM COATED ORAL at 09:19

## 2020-01-10 RX ADMIN — BUSPIRONE HYDROCHLORIDE 5 MG: 5 TABLET ORAL at 15:40

## 2020-01-10 RX ADMIN — Medication 10 ML: at 21:48

## 2020-01-10 RX ADMIN — Medication 10 ML: at 09:19

## 2020-01-10 RX ADMIN — IPRATROPIUM BROMIDE AND ALBUTEROL SULFATE 1 AMPULE: .5; 3 SOLUTION RESPIRATORY (INHALATION) at 08:49

## 2020-01-10 RX ADMIN — Medication 1 CAPSULE: at 09:19

## 2020-01-10 RX ADMIN — ENOXAPARIN SODIUM 40 MG: 40 INJECTION SUBCUTANEOUS at 17:34

## 2020-01-10 RX ADMIN — CEFEPIME HYDROCHLORIDE 2 G: 2 INJECTION, POWDER, FOR SOLUTION INTRAVENOUS at 06:43

## 2020-01-10 RX ADMIN — SIMVASTATIN 20 MG: 20 TABLET, FILM COATED ORAL at 21:47

## 2020-01-10 RX ADMIN — ACETAMINOPHEN 650 MG: 325 TABLET ORAL at 20:15

## 2020-01-10 RX ADMIN — VITAMIN D, TAB 1000IU (100/BT) 5000 UNITS: 25 TAB at 09:19

## 2020-01-10 RX ADMIN — AZITHROMYCIN 250 MG: 250 TABLET, FILM COATED ORAL at 09:19

## 2020-01-10 RX ADMIN — IPRATROPIUM BROMIDE AND ALBUTEROL SULFATE 1 AMPULE: .5; 3 SOLUTION RESPIRATORY (INHALATION) at 15:54

## 2020-01-10 RX ADMIN — CEFEPIME HYDROCHLORIDE 2 G: 2 INJECTION, POWDER, FOR SOLUTION INTRAVENOUS at 17:34

## 2020-01-10 RX ADMIN — LEVOTHYROXINE SODIUM 75 MCG: 75 TABLET ORAL at 06:42

## 2020-01-10 ASSESSMENT — PAIN SCALES - GENERAL
PAINLEVEL_OUTOF10: 7
PAINLEVEL_OUTOF10: 2
PAINLEVEL_OUTOF10: 2

## 2020-01-10 NOTE — PROGRESS NOTES
Pt alert and oriented. Patient denies pain. Vital signs within normal limits. Shift assessment WDL with exception to right lung sounds. Crackles present in all right lung lobes.

## 2020-01-10 NOTE — FLOWSHEET NOTE
Patient awake in bed, watching tv. States that she finally got rest. Patient denies pain and does not need anything at the moment.

## 2020-01-10 NOTE — PROGRESS NOTES
[facility]  INPATIENT PHYSICAL THERAPY  DAILY NOTE  [facility] RENAL TELEMETRY 6K - 6K-03/003-A      Time In: 0800  Time Out: 08  Timed Code Treatment Minutes: 23 Minutes  Minutes: 23          Date: 1/10/2020  Patient Name: Lennox Ave,  Gender:  female        MRN: [id number]  : 1935  (80 y.o.)     Referring Practitioner: Dr Larson Gravely  Diagnosis: Pneumonia  Additional Pertinent Hx: Per ER note on 2020: 80 y.o. female who presents to the Emergency Department for the evaluation of productive cough and increased SOB from her baseline over the past 5 to 7 days. The patient has a history of pulmonary fibrosis and bronchiectasis. The patient was sent here today from her PCP office who had concern of pneumonia. Patient arrives here with SpO2 of 89-90% on RA. Patient states she has nebulizer treatments at home, but these were causing her to become nauseas, therefore she stopped using them. Prior Level of Function:  Lives With: Alone  Type of Home: (condo)  Home Layout: One level  Home Access: Stairs to enter with rails  Entrance Stairs - Number of Steps: 2 with 1 HR  Home Equipment: Rolling walker   Bathroom Shower/Tub: Walk-in shower  Bathroom Toilet: Handicap height  Bathroom Equipment: Shower chair, Grab bars in shower    ADL Assistance: [address] [place] [address]: Needs assistance  Ambulation Assistance: Independent  Transfer Assistance: Independent  Active : Yes  Additional Comments: Pt reports having to pace her tasks at home d/t fatigue, min SOB; significantly worse now. Pt does not use AD PLOF, reports she had been so clumsy with it in the past. No O2 at home PLOF. Restrictions/Precautions:  Restrictions/Precautions: Fall Risk  Position Activity Restriction  Other position/activity restrictions: O2    SUBJECTIVE: RN approved session. Pt resting in bed at arrival, pleasant and agreeable to session.      PAIN: 0/10:       OBJECTIVE:  Bed Mobility:  Rolling to Left: Stand By Assistance   Rolling to Right: Stand By Assistance   Supine to Sit: Stand By Assistance  Sit to Supine: Stand By Assistance   Scooting: Stand By Assistance    Transfers:  Sit to Stand: Stand By Assistance  Stand to Sit:Stand By Assistance    Ambulation:  Stand By Assistance  Distance: 45ftx1  Surface: Level Tile  Device:Rolling Walker  Gait Deviations: Forward Flexed Posture, Slow Jaimee, Decreased Step Length Bilaterally, Decreased Arm Swing, Decreased Trunk Rotation, Decreased Gait Speed and Decreased Heel Strike Bilaterally    Balance:  Static Sitting Balance:  Stand By Assistance    Exercise:  Patient was guided in supine set(s) 10 reps of exercise to both lower extremities. Ankle pumps, Glut sets, Quad sets, Heelslides, Short arc quads, Hip abduction/adduction and Straight leg raises. Exercises were completed for increased independence with functional mobility. Functional Outcome Measures: Completed  AM-PAC Inpatient Mobility without Stair Climbing Raw Score : 15  AM-PAC Inpatient without Stair Climbing T-Scale Score : 43.03    ASSESSMENT:  Assessment: Patient progressing toward established goals. Activity Tolerance:  Patient tolerance of  treatment: good. Pt return to bed at end of session, states she is to have O2 increased with activity. Equipment Recommendations: Other: cont to assess, may trial RW  Discharge Recommendations:    Continue to assess pending progress, Patient would benefit from continued therapy after discharge    Plan: Times per week: 5 X GM  Specific instructions for Next Treatment: trial RW 1-8  Current Treatment Recommendations: Strengthening, Gait Training, Stair training, Balance Training, Functional Mobility Training, Endurance Training, Transfer Training, Home Exercise Program, Equipment Evaluation, Education, & procurement    Patient Education  Patient Education: Plan of Care, Gait    Goals:  Patient goals : Go home  Short term goals  Time Frame for Short term goals: by discharge  Short term goal 1: supine to sit and return with Mod i to get in and out of bed   Short term goal 2: sit to stand withMod I to get on and off various surfaces  Short term goal 3: ambulate with /without  feet with S to walk safely in the home  Short term goal 4: ascend/descend 2 steps with HR and SBA to enter home  Long term goals  Time Frame for Long term goals : NA due to short ELOS    Following session, patient left in safe position with all fall risk precautions in place.

## 2020-01-10 NOTE — PROGRESS NOTES
service consults, f/blood cultures, Incentive spirometry , acapella. High-resolution chest CT scan as noted above. Rapid influenza screen , legionella and strep pneumonia urinary antigens were negative. Spironolactone on hold for  for hyperkalemia , s/p hyperkalemia    PT/OT.  A.m labs     Plan home health at discharge    Dr Van Dove covers from today to1/13/2020      DVT prophylaxis: [x] Lovenox                                 [] SCDs                                 [] SQ Heparin                                 [] Encourage ambulation, low risk for DVT, no chemical or mechanical prophylaxis necessary              [] Already on Anticoagulation                Anticipated Disposition upon discharge: [x] Home                                                                         [] Home with Home Health                                                                         [] [facility]                                                                         [] [address]          Electronically signed by Miguelangel Justice MD on 1/10/2020 at 9:06 AM

## 2020-01-10 NOTE — PROGRESS NOTES
[facility] RENAL TELEMETRY 6K  Occupational Therapy  Daily Note    Time In: 8967  Time Out: 6669  Timed Code Treatment Minutes: 24 Minutes  Minutes: 24          Date: 1/10/2020  Patient Name: Garret Khan,   Gender: female      Room: [facility]003-A  MRN: [id number]  : 1935  (80 y.o.)  Referring Practitioner: Dr. Judith Torres  Diagnosis: Pneumonia  Additional Pertinent Hx: Per ER note on 2020: 80 y.o. female who presents to the Emergency Department for the evaluation of productive cough and increased SOB from her baseline over the past 5 to 7 days. The patient has a history of pulmonary fibrosis and bronchiectasis. The patient was sent here today from her PCP office who had concern of pneumonia. Patient arrives here with SpO2 of 89-90% on RA. Patient states she has nebulizer treatments at home, but these were causing her to become nauseas, therefore she stopped using them. Restrictions/Precautions:  Restrictions/Precautions: Fall Risk  Position Activity Restriction  Other position/activity restrictions: O2    SUBJECTIVE: RN okayed session. Pt in bed and agreed to OT and requested to get cleaned up. Pt with increased reports of fatigue this date, returned to bed at end of session to rest    PAIN: denies    COGNITION: WFL    ADL:   Grooming: Stand By Assistance. standing briefly   Bathing: Stand By Assistance. standing to wash bottom, pt able to wash all other areas with S while sitting in chair, min vc for breathing technique and EC strategies prn. pt verbalized understanding, increased time needed to complete sponge bath this date. Felicia Blunt BALANCE:  Sitting Balance:  Modified Independent  Standing Balance: Stand By Assistance  3 minutes for ADLs    BED MOBILITY:  Supine to Sit: Supervision increased time needed d/t fatigue this date  Sit to Supine: Stand By Assistance      TRANSFERS:  Sit to Stand:  Stand By Assistance.  from EOB and chair in bathroom  Stand to Sit: Stand By Assistance. to chair in bathroom and EOB    FUNCTIONAL MOBILITY:  Assistive Device: Rolling Walker  Assist Level:  Stand By Assistance. Distance: To and from bathroom  Slow pace no LOB       ASSESSMENT:     Activity Tolerance:  Patient tolerance of  treatment: good. With rest breaks, fatigue noted throughout session      Discharge Recommendations: Continue to assess pending progress  Equipment Recommendations: Other: Pt has needed AE at home. Plan: Times per week: 3-5x  Current Treatment Recommendations: Self-Care / ADL, Endurance Training, Functional Mobility Training, Safety Education & Training    Patient Education  Patient Education: Role of OT, ADL's and Energy Conservation    Goals  Short term goals  Time Frame for Short term goals: 2 weeks  Short term goal 1: Tolerate standing 3-4 min with S for increased ease of sinkside grooming  Short term goal 2: Complete BUE AROM exercises with deep breathing to increase UB endurance for BADL  Short term goal 3: Complete BADL routine with S & 0-2 vcs for safety & compensatory strategies prn  Long term goals  Time Frame for Long term goals : No LTG set d/t short ELOS    Following session, patient left in safe position with all fall risk precautions in place.

## 2020-01-10 NOTE — PROGRESS NOTES
optic neutitis. She is currently not on any treatment. She is following with [facility] pulmonary service. She under went Flexible diagnostic fiberoptic bronchoscopy with fluoroscopy on 4/19/2017. She is having cough: Yes  Duration of cough: for 8 days. Her cough is associated with sputum production: Yes   The sputum color: yellow  Hemoptysis:No  Diurnal variation: None. Relieving factors: No  Aggravating factors: No    She gives a history of fever: No  Chills & rigors: Yes  Associated night sweats: No.  Recent travel history:No.    Rrecent sick contacts: No.      She is having chest pain:No.    During her initial work up she was found to have abnormal chest Xray with right side pneumonia. Prior to his current hospitalization:  She is currently not using any oxygen supplementation at rest, exercise or during sleep/at night time.       Past 24 hrs   -On 1 LPM via NC  -Feels better but states not ready to go home this AM  -Cultures Negative to date  All other systems reviewed    PMHx   Past Medical History      Diagnosis Date    Anemia     normal on pre-op 5/2012 and 12/2/13    Backache     h/o    Bronchiectasis ([address] 2013    Bursitis     bilateral shoulders    Constipation     Diverticulosis of colon     HTN (hypertension)     Hypercalcemia     slightly elevated at 10.8 pre-op 12/2/13    Hyperlipidemia     Nodular goiter     bx negative    OA (osteoarthritis)     bilateral thumbs - sees Dr. Maura Alvarado Osteopenia 11/6/2013    Pneumonia of right upper lobe due to infectious organism ([address]     Pneumonitis     Dr. Naun Loredo in 7/2010 - bx negative    Pulmonary Mycobacterium avium complex (MAC) infection ([address]     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 ([address]     had one parathyroid removed - now follows with Dr. José Miguel Toro Sinusitis     with seasonal allergies    Vitamin D deficiency 05/2018      Past Surgical History        Procedure Laterality Date    ABDOMINAL EXPLORATION SURGERY  01/02/2013 Release of SBO, KATHERINE-Dr. Greer Mart    APPENDECTOMY      BRONCHOSCOPY N/A 2020    BRONCHOSCOPY FLUOROSCOPY performed by Chidi Ba MD at [address] WITH IMPLANT Bilateral  ?   SECTION  5650,1893    DILATION AND CURETTAGE OF UTERUS  5592,6896,5770    EXCISION OF PARATHYROID MASS Right 2013    rt parathyroidectomy (excision of rt inferior parathyroid mass) exploration of neck     FOOT SURGERY      left    FOOT SURGERY Left 2017    Dr Tori Boudreaux  6-4-12    left knee    JOINT REPLACEMENT  2014    right knee    MALIGNANT SKIN LESION EXCISION Left 2017    [facility] DORSAL FOOT WITH GRAFT & FS    GOSIA AND BSO  1982    TONSILLECTOMY AND ADENOIDECTOMY  194 ?     TOTAL KNEE ARTHROPLASTY Right 2014    OIO     Meds    Current Medications    azithromycin  250 mg Oral Daily    ipratropium-albuterol  1 ampule Inhalation Q4H WA    ondansetron  4 mg Intravenous Once    lactobacillus  1 capsule Oral Daily    amLODIPine  10 mg Oral Daily    busPIRone  5 mg Oral TID    Vitamin D  5,000 Units Oral Every Other Day    levothyroxine  75 mcg Oral Daily    cetirizine  10 mg Oral Daily    simvastatin  20 mg Oral Nightly    sodium chloride flush  10 mL Intravenous 2 times per day    enoxaparin  40 mg Subcutaneous Q24H    cefepime  2 g Intravenous Q12H     phenol, sodium chloride flush, magnesium hydroxide, ondansetron, acetaminophen  IV Drips/Infusions    Home Medications  Medications Prior to Admission: sodium chloride, Inhalant, 7 % nebulizer solution, Take 4 mLs by nebulization 2 times daily   albuterol (PROVENTIL) (2.5 MG/3ML) 0.083% nebulizer solution, Take 2.5 mg by nebulization 2 times daily  loratadine (CLARITIN) 10 MG capsule, Take 10 mg by mouth daily   ondansetron (ZOFRAN ODT) 4 MG disintegrating tablet, Place 1 tablet under the tongue every 8 hours as needed for Nausea or times a month    Drug use: No    Sexual activity: Never   Lifestyle    Physical activity:     Days per week: Not on file     Minutes per session: Not on file    Stress: Not on file   Relationships    Social connections:     Talks on phone: Not on file     Gets together: Not on file     Attends [employer] service: Not on file     Active member of club or organization: Not on file     Attends meetings of clubs or organizations: Not on file     Relationship status: Not on file    Intimate partner violence:     Fear of current or ex partner: Not on file     Emotionally abused: Not on file     Physically abused: Not on file     Forced sexual activity: Not on file   Other Topics Concern    Not on file   Social History Narrative    Not on file     Vitals     height is 5' 4\" (1.626 m) and weight is 158 lb 11.2 oz (72 kg). Her oral temperature is 97.8 °F (36.6 °C). Her blood pressure is 124/63 and her pulse is 94. Her respiration is 18 and oxygen saturation is 95%. Body mass index is 27.24 kg/m². SUPPLEMENTAL O2: O2 Flow Rate (L/min): 1 L/min       I/O        Intake/Output Summary (Last 24 hours) at 1/10/2020 0942  Last data filed at 1/10/2020 0442  Gross per 24 hour   Intake 680 ml   Output 1600 ml   Net -920 ml     I/O last 3 completed shifts: In: 1113.7 [P.O.:1040; I.V.:73.7]  Out: 1600 [Urine:1600]   Patient Vitals for the past 96 hrs (Last 3 readings):   Weight   01/10/20 0441 158 lb 11.2 oz (72 kg)   01/09/20 0438 158 lb 1.6 oz (71.7 kg)   01/08/20 0315 164 lb 8 oz (74.6 kg)       Exam   Physical Exam   Constitutional: No distress on 1 LPM O2 per NC. Patient appears elderly and chronically ill. Head: Normocephalic and atraumatic. Mouth/Throat: Oropharynx is clear and moist.  No oral thrush. Eyes: Conjunctivae are normal. Pupils are equal, round. No scleral icterus. Neck: Neck supple. No tracheal deviation present. Cardiovascular: S1 and S2 with no murmur.   No peripheral edema  Pulmonary/Chest: BILIRUBINUR, UROBILINOGEN, KETUA, LABCAST, LABCASTTY, AMORPHOS in the last 72 hours. Invalid input(s): CRYSTALS. PFTs   PFTs: 12/2/19              Sleep studies   None in [facility]. Cultures    Rapid swab for influenza A and B: Negative  Blood cultures were negative X 2 sets. Strep and legionella antigens-Negative    Bronch Cultures   Fungus-No yeast or hyphae  Respiratory culture-No organisms observed  AFB-pending  Pneumocystis-Pending  Legionella-Pending  Anaerobic/aerobic-No prelim growth  Cytology -Pending  EKG     Echocardiogram   Echocardiogram:  Details   Reading Physician Reading Date Result Priority   Lexus Lewis MD              Account #     [de-identified]        Room Number         7K-07      Accession      [id number]      Date of Study       11/09/2016   Number      Date of Birth  1935     Referring Physician Yuri Baron PA-C      Age            81 year(s)     Sonographer         Yareli Varela, [facility]                                    Interpreting       Tania Ocampo MD                                 Physician     Conclusions      Summary   Left Ventricular size is Normal .   Normal left ventricular wall thickness.   Systolic function was normal.   Ejection fraction is visually estimated in the range of 55% to 65%.   There were no regional wall motion abnormalities.   Impaired relaxation compatible with diastolic dysfunction. ( reversed E/A   ratio).       Tricuspid valve was not well visualized.   Mild-to-moderate tricuspid regurgitation.   Right ventricular systolic pressure measures 37 mmHg assuming RAP of 5.      Signature      ----------------------------------------------------------------   Electronically signed by Tania Ocampo MD (Interpreting   [email or web address]) on 11/09/2016 at 04:07 PM   ----------------------------------------------------------------      Radiology    CXR  XR CHEST PORTABLE   1/8/2020

## 2020-01-11 LAB
ANION GAP SERPL CALCULATED.3IONS-SCNC: 14 MEQ/L (ref 8–16)
BASOPHILS # BLD: 0.2 %
BASOPHILS ABSOLUTE: 0.1 THOU/MM3 (ref 0–0.1)
BUN BLDV-MCNC: 21 MG/DL (ref 7–22)
CALCIUM SERPL-MCNC: 9.6 MG/DL (ref 8.5–10.5)
CHLORIDE BLD-SCNC: 99 MEQ/L (ref 98–111)
CO2: 26 MEQ/L (ref 23–33)
CREAT SERPL-MCNC: 0.9 MG/DL (ref 0.4–1.2)
EOSINOPHIL # BLD: 2.1 %
EOSINOPHILS ABSOLUTE: 0.6 THOU/MM3 (ref 0–0.4)
ERYTHROCYTE [DISTWIDTH] IN BLOOD BY AUTOMATED COUNT: 15.8 % (ref 11.5–14.5)
ERYTHROCYTE [DISTWIDTH] IN BLOOD BY AUTOMATED COUNT: 50.4 FL (ref 35–45)
GFR SERPL CREATININE-BSD FRML MDRD: 60 ML/MIN/1.73M2
GLUCOSE BLD-MCNC: 95 MG/DL (ref 70–108)
HCT VFR BLD CALC: 39 % (ref 37–47)
HEMOGLOBIN: 12.3 GM/DL (ref 12–16)
IMMATURE GRANS (ABS): 4.17 THOU/MM3 (ref 0–0.07)
IMMATURE GRANULOCYTES: 14.8 %
LEGIONELLA PNEUMOPHILIA DFA SOURCE: NORMAL
LEGIONELLA, DFA: NEGATIVE
LYMPHOCYTES # BLD: 7.2 %
LYMPHOCYTES ABSOLUTE: 2 THOU/MM3 (ref 1–4.8)
MCH RBC QN AUTO: 27.6 PG (ref 26–33)
MCHC RBC AUTO-ENTMCNC: 31.5 GM/DL (ref 32.2–35.5)
MCV RBC AUTO: 87.6 FL (ref 81–99)
MISC. #1 REFERENCE GROUP TEST: NORMAL
MONOCYTES # BLD: 5.9 %
MONOCYTES ABSOLUTE: 1.7 THOU/MM3 (ref 0.4–1.3)
NUCLEATED RED BLOOD CELLS: 0 /100 WBC
PLATELET # BLD: 330 THOU/MM3 (ref 130–400)
PMV BLD AUTO: 10.3 FL (ref 9.4–12.4)
POTASSIUM SERPL-SCNC: 4.7 MEQ/L (ref 3.5–5.2)
RBC # BLD: 4.45 MILL/MM3 (ref 4.2–5.4)
RHEUMATOID FACTOR: 13 IU/ML (ref 0–13)
SEDIMENTATION RATE, ERYTHROCYTE: 101 MM/HR (ref 0–20)
SEG NEUTROPHILS: 69.8 %
SEGMENTED NEUTROPHILS ABSOLUTE COUNT: 19.6 THOU/MM3 (ref 1.8–7.7)
SODIUM BLD-SCNC: 139 MEQ/L (ref 135–145)
WBC # BLD: 28.1 THOU/MM3 (ref 4.8–10.8)

## 2020-01-11 PROCEDURE — 86038 ANTINUCLEAR ANTIBODIES: CPT

## 2020-01-11 PROCEDURE — 6370000000 HC RX 637 (ALT 250 FOR IP): Performed by: INTERNAL MEDICINE

## 2020-01-11 PROCEDURE — 80048 BASIC METABOLIC PNL TOTAL CA: CPT

## 2020-01-11 PROCEDURE — 86430 RHEUMATOID FACTOR TEST QUAL: CPT

## 2020-01-11 PROCEDURE — 2580000003 HC RX 258: Performed by: INTERNAL MEDICINE

## 2020-01-11 PROCEDURE — 94760 N-INVAS EAR/PLS OXIMETRY 1: CPT

## 2020-01-11 PROCEDURE — 82164 ANGIOTENSIN I ENZYME TEST: CPT

## 2020-01-11 PROCEDURE — 36415 COLL VENOUS BLD VENIPUNCTURE: CPT

## 2020-01-11 PROCEDURE — 94640 AIRWAY INHALATION TREATMENT: CPT

## 2020-01-11 PROCEDURE — 86235 NUCLEAR ANTIGEN ANTIBODY: CPT

## 2020-01-11 PROCEDURE — 6360000002 HC RX W HCPCS: Performed by: INTERNAL MEDICINE

## 2020-01-11 PROCEDURE — 85651 RBC SED RATE NONAUTOMATED: CPT

## 2020-01-11 PROCEDURE — 85025 COMPLETE CBC W/AUTO DIFF WBC: CPT

## 2020-01-11 PROCEDURE — 99233 SBSQ HOSP IP/OBS HIGH 50: CPT | Performed by: INTERNAL MEDICINE

## 2020-01-11 PROCEDURE — 1200000003 HC TELEMETRY R&B

## 2020-01-11 RX ADMIN — IPRATROPIUM BROMIDE AND ALBUTEROL SULFATE 1 AMPULE: .5; 3 SOLUTION RESPIRATORY (INHALATION) at 15:41

## 2020-01-11 RX ADMIN — BUSPIRONE HYDROCHLORIDE 5 MG: 5 TABLET ORAL at 13:06

## 2020-01-11 RX ADMIN — AZITHROMYCIN 250 MG: 250 TABLET, FILM COATED ORAL at 08:04

## 2020-01-11 RX ADMIN — CEFEPIME HYDROCHLORIDE 2 G: 2 INJECTION, POWDER, FOR SOLUTION INTRAVENOUS at 17:31

## 2020-01-11 RX ADMIN — Medication 10 ML: at 21:26

## 2020-01-11 RX ADMIN — SIMVASTATIN 20 MG: 20 TABLET, FILM COATED ORAL at 21:26

## 2020-01-11 RX ADMIN — CEFEPIME HYDROCHLORIDE 2 G: 2 INJECTION, POWDER, FOR SOLUTION INTRAVENOUS at 06:46

## 2020-01-11 RX ADMIN — IPRATROPIUM BROMIDE AND ALBUTEROL SULFATE 1 AMPULE: .5; 3 SOLUTION RESPIRATORY (INHALATION) at 20:03

## 2020-01-11 RX ADMIN — BUSPIRONE HYDROCHLORIDE 5 MG: 5 TABLET ORAL at 21:26

## 2020-01-11 RX ADMIN — ACETAMINOPHEN 650 MG: 325 TABLET ORAL at 08:18

## 2020-01-11 RX ADMIN — CETIRIZINE HYDROCHLORIDE 10 MG: 10 TABLET, FILM COATED ORAL at 08:04

## 2020-01-11 RX ADMIN — Medication 1 CAPSULE: at 08:04

## 2020-01-11 RX ADMIN — LEVOTHYROXINE SODIUM 75 MCG: 75 TABLET ORAL at 05:24

## 2020-01-11 RX ADMIN — IPRATROPIUM BROMIDE AND ALBUTEROL SULFATE 1 AMPULE: .5; 3 SOLUTION RESPIRATORY (INHALATION) at 08:14

## 2020-01-11 RX ADMIN — ENOXAPARIN SODIUM 40 MG: 40 INJECTION SUBCUTANEOUS at 17:31

## 2020-01-11 RX ADMIN — BUSPIRONE HYDROCHLORIDE 5 MG: 5 TABLET ORAL at 08:04

## 2020-01-11 RX ADMIN — IPRATROPIUM BROMIDE AND ALBUTEROL SULFATE 1 AMPULE: .5; 3 SOLUTION RESPIRATORY (INHALATION) at 12:14

## 2020-01-11 RX ADMIN — Medication 10 ML: at 08:18

## 2020-01-11 ASSESSMENT — PAIN SCALES - GENERAL: PAINLEVEL_OUTOF10: 5

## 2020-01-11 NOTE — PROGRESS NOTES
0.9   GLUCOSE 157*  --  91  --   --  95    < > = values in this interval not displayed. Hepatic: No results for input(s): AST, ALT, ALB, BILITOT, ALKPHOS in the last 72 hours. Troponin: No results for input(s): TROPONINI in the last 72 hours. BNP: No results for input(s): BNP in the last 72 hours. Lipids: No results for input(s): CHOL, HDL in the last 72 hours. Invalid input(s): LDLCALCU  INR: No results for input(s): INR in the last 72 hours.     Radiology    Objective:   Vitals: /62   Pulse 88   Temp 97.7 °F (36.5 °C) (Oral)   Resp 18   Ht 5' 4\" (1.626 m)   Wt 158 lb 11.2 oz (72 kg)   SpO2 91% Comment: No oxygen needed at this time  BMI 27.24 kg/m²   HEENT: Head:pupils react  Neck: supple  Lungs: few scattered rales heard bilat   Heart: regular rate and rhythm   Abdomen: soft BS heard   Extremities: warm   Neurologic:  Alert, oriented X3    Impression:   :   Bronchiectasis with pneumonia  Acute hypoxic resp failure per pulm  Pulm fibrosis  HTN  Hyperkalemia improved  Azotemia improved  Hypothyroidism  hyprlipidemia          Plan:    Encouraged pt to use her O2 to improve her symptoms  Complete antibiotics per ID  Increase activity before discharge home   Cont DVT prophylaxis      Filomena Ordonez MD

## 2020-01-11 NOTE — PROGRESS NOTES
[facility]      Patient - Severiano Freud   MRN -  [id number]   [facility]   - 1935      Date of Admission -  2020 11:28 AM  Date of evaluation -  2020  Room - --A   Hospital Day - [address] [place] [address] Vish Motley MD Primary Care Physician - Gagandeep Wilkerson MD     Problem List      Active Hospital Problems    Diagnosis Date Noted    SIRS (systemic inflammatory response syndrome) ([address] [R65.10] 01/10/2020    Moderate malnutrition ([address] [E44.0] 2020    Pulmonary fibrosis ([address] [J84.10]     Acute hypoxemic respiratory failure ([address] [J96.01] 2020    Pneumonia [J18.9] 2020    Abnormal CT of the chest [R93.89]     Bronchiectasis without complication ([address] [X23.5]      Reason for Consult    To follow pneumonia and bronchiectasis. HPI   History Obtained From: Patient and electronic medical record. Severiano Freud is a 80 y.o. female  was initially admitted under hospitalist service. Pulmonary medicine was consulted for further management of Pneumonia. The patient is a 80 y.o. female who presented with worsening of shortness of breath for the last 8 days. Her current illness started as upper respiratory tract infection with cough, cold and sputum  production which is initially white in color and later changed to greenish yellow. She noticed worsening of shortness of breath with decline in functional status. She denied any fever. How ever she gave a hx of chills. Due to worsening of her above symptoms she presented to the Emergency room at [facility]. She was evaluated in the Emergency room by ER physician and admitted under hospitalist service for further evaluation. 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 She was treated by Dr. Micah Skiff for 13months.  Her antibiotics were discontinued due to development of optic neutitis. She is currently not on any treatment. She is following with [facility] pulmonary service. She under went Flexible diagnostic fiberoptic bronchoscopy with fluoroscopy on 2017. Prior to his current hospitalization:  She is currently not using any oxygen supplementation at rest, exercise or during sleep/at night time. Past 24 hrs   -On 1 LPM via NC  -Feels same with no improvement in her shortness of breath per patient from the time of admission.  -All cultures Negative to date.  -No chest pain.  -No AFB seen on stain. All other systems reviewed. PMHx   Past Medical History      Diagnosis Date    Anemia     normal on pre-op 2012 and 13    Backache     h/o    Bronchiectasis [facility]) 2013    Bursitis     bilateral shoulders    Constipation     Diverticulosis of colon     HTN (hypertension)     Hypercalcemia     slightly elevated at 10.8 pre-op 13    Hyperlipidemia     Nodular goiter     bx negative    OA (osteoarthritis)     bilateral thumbs - sees Dr. Allie Lopes Osteopenia 2013    Pneumonia of right upper lobe due to infectious organism [facility]     Pneumonitis     Dr. Marlene Ma in 2010 - bx negative    Pulmonary Mycobacterium avium complex (MAC) infection ([address]     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 ([facility]     had one parathyroid removed - now follows with Dr. Duran Hurd Sinusitis     with seasonal allergies    Vitamin D deficiency 2018      Past Surgical History        Procedure Laterality Date    ABDOMINAL EXPLORATION SURGERY  2013    Release of SBO, KATHERINE-Dr. Ramirez Andres    APPENDECTOMY      BRONCHOSCOPY N/A 2020    BRONCHOSCOPY FLUOROSCOPY performed by Maryann Velásquez MD at [address] WITH IMPLANT Bilateral  ?      SECTION  7997,3214    DILATION AND CURETTAGE OF UTERUS  3376,5135,3502    EXCISION OF PARATHYROID MASS Right 2013    rt parathyroidectomy (excision of rt ACE(  level):    Negative-22                               ESR ( Sed rate): 9                                   PFTs   PFTs: 12/2/19              Sleep studies   None in Epic. Cultures    Rapid swab for influenza A and B: Negative  Blood cultures were negative X 2 sets. Strep and legionella antigens-Negative    Bronch Cultures   Fungus-No yeast or hyphae  Respiratory culture-No organisms observed  AFB-pending  Pneumocystis-Pending  Legionella-Pending  Anaerobic/aerobic-No prelim growth  Cytology -Pending    Bronch and biopsy:   Bronch and biopsy- pending. Cytology- pending. EKG     Echocardiogram   Echocardiogram:  Details   Reading Physician Reading Date Result Priority   Matt Jordan MD              Account #     [de-identified]        Room Number         7K-07      Accession      [id number]      Date of Study       11/09/2016   Number      Date of Birth  1935     Referring Physician Musa Isbell PA-C      Age            81 year(s)     Sonographer         Yareli Varela, [facility]                                    Interpreting       Milo Javier MD                                 Physician     Conclusions      Summary   Left Ventricular size is Normal .   Normal left ventricular wall thickness.   Systolic function was normal.   Ejection fraction is visually estimated in the range of 55% to 65%.   There were no regional wall motion abnormalities.   Impaired relaxation compatible with diastolic dysfunction. ( reversed E/A   ratio).       Tricuspid valve was not well visualized.   Mild-to-moderate tricuspid regurgitation.   Right ventricular systolic pressure measures 37 mmHg assuming RAP of 5.      Signature      ----------------------------------------------------------------   Electronically signed by Milo Javier MD (Interpreting   [id number]) on 11/09/2016 at 04:07 PM   ----------------------------------------------------------------      Radiology    CXR  XR CHEST PORTABLE   1/8/2020   FINDINGS: Fibrotic changes are again seen in the right upper lung with superimposed airspace opacities. It is cardiomegaly. Pulmonary vasculature is within normal limits. There is ectasia of the thoracic aorta. There is no significant pleural effusion or pneumothorax. Visualized portions of the upper abdomen are within normal limits. The osseous structures are intact. No acute fractures or suspicious osseous lesions. Impression:   1. No evidence of pneumothorax following bronchoscopy and biopsy. 2. Stable appearance of the chest with chronic fibrotic changes and superimposed infiltrates. Jan 6, 2020   PROCEDURE: XR CHEST PORTABLE   Progressive changes in the right upper and now right lower lobe on a background of underlying chronic fibrotic changes. Findings are concerning for superimposed infection. Correlation with symptoms and continued follow-up advised       CT Scans  (See actual reports for details)     HRCT of chest:  Dec 2, 2019   PROCEDURE: CT CHEST HIGH RESOLUTION   COMPARISON: CT chest high resolution 12/3/2018. 1. Stable groundglass opacities in the right upper lobe, likely secondary to chronic nonspecific interstitial pneumonia. 2. Slightly worsening fibrosis and bronchiectasis, most severe in the right upper lung.            Assessment   -Right side Pneumonia due to community acquired pneumonia (CAP) Vs Atypical pneumonia- She is clinically improving. How ever patient still feels the same  -Acute hypoxic respiratory failure due to pneumonia- improving  -Hx of Interstitial Interstitial lung disease of uncertain etiology. She had a hx of Mycobacterium avium/intracellulare infection of lungs-She is following with [facility] pulmonary service.  -Hx of Mycobacterium avium/intracellulare infection of lungs was treated by Dr. Sanjana Spaulding for 13months.  Her antibiotics were

## 2020-01-12 LAB
BLOOD CULTURE, ROUTINE: NORMAL
BLOOD CULTURE, ROUTINE: NORMAL

## 2020-01-12 PROCEDURE — 6370000000 HC RX 637 (ALT 250 FOR IP): Performed by: INTERNAL MEDICINE

## 2020-01-12 PROCEDURE — 2580000003 HC RX 258: Performed by: INTERNAL MEDICINE

## 2020-01-12 PROCEDURE — 99232 SBSQ HOSP IP/OBS MODERATE 35: CPT | Performed by: INTERNAL MEDICINE

## 2020-01-12 PROCEDURE — 6360000002 HC RX W HCPCS: Performed by: INTERNAL MEDICINE

## 2020-01-12 PROCEDURE — 1200000003 HC TELEMETRY R&B

## 2020-01-12 PROCEDURE — 94640 AIRWAY INHALATION TREATMENT: CPT

## 2020-01-12 PROCEDURE — 94761 N-INVAS EAR/PLS OXIMETRY MLT: CPT

## 2020-01-12 PROCEDURE — 2700000000 HC OXYGEN THERAPY PER DAY

## 2020-01-12 RX ADMIN — MAGNESIUM HYDROXIDE 30 ML: 400 SUSPENSION ORAL at 08:49

## 2020-01-12 RX ADMIN — Medication 1 CAPSULE: at 08:49

## 2020-01-12 RX ADMIN — IPRATROPIUM BROMIDE AND ALBUTEROL SULFATE 1 AMPULE: .5; 3 SOLUTION RESPIRATORY (INHALATION) at 20:34

## 2020-01-12 RX ADMIN — Medication 10 ML: at 20:22

## 2020-01-12 RX ADMIN — VITAMIN D, TAB 1000IU (100/BT) 5000 UNITS: 25 TAB at 08:49

## 2020-01-12 RX ADMIN — IPRATROPIUM BROMIDE AND ALBUTEROL SULFATE 1 AMPULE: .5; 3 SOLUTION RESPIRATORY (INHALATION) at 16:06

## 2020-01-12 RX ADMIN — Medication 10 ML: at 08:49

## 2020-01-12 RX ADMIN — ENOXAPARIN SODIUM 40 MG: 40 INJECTION SUBCUTANEOUS at 16:59

## 2020-01-12 RX ADMIN — AZITHROMYCIN 250 MG: 250 TABLET, FILM COATED ORAL at 08:49

## 2020-01-12 RX ADMIN — CEFEPIME HYDROCHLORIDE 2 G: 2 INJECTION, POWDER, FOR SOLUTION INTRAVENOUS at 06:15

## 2020-01-12 RX ADMIN — IPRATROPIUM BROMIDE AND ALBUTEROL SULFATE 1 AMPULE: .5; 3 SOLUTION RESPIRATORY (INHALATION) at 11:39

## 2020-01-12 RX ADMIN — BUSPIRONE HYDROCHLORIDE 5 MG: 5 TABLET ORAL at 13:37

## 2020-01-12 RX ADMIN — LEVOTHYROXINE SODIUM 75 MCG: 75 TABLET ORAL at 06:15

## 2020-01-12 RX ADMIN — BUSPIRONE HYDROCHLORIDE 5 MG: 5 TABLET ORAL at 20:22

## 2020-01-12 RX ADMIN — IPRATROPIUM BROMIDE AND ALBUTEROL SULFATE 1 AMPULE: .5; 3 SOLUTION RESPIRATORY (INHALATION) at 07:22

## 2020-01-12 RX ADMIN — ACETAMINOPHEN 650 MG: 325 TABLET ORAL at 11:32

## 2020-01-12 RX ADMIN — CEFEPIME HYDROCHLORIDE 2 G: 2 INJECTION, POWDER, FOR SOLUTION INTRAVENOUS at 16:59

## 2020-01-12 RX ADMIN — SIMVASTATIN 20 MG: 20 TABLET, FILM COATED ORAL at 20:22

## 2020-01-12 RX ADMIN — BUSPIRONE HYDROCHLORIDE 5 MG: 5 TABLET ORAL at 08:49

## 2020-01-12 ASSESSMENT — PAIN SCALES - GENERAL
PAINLEVEL_OUTOF10: 2
PAINLEVEL_OUTOF10: 0

## 2020-01-12 NOTE — PROGRESS NOTES
I spoke with the pts nurse about the Home O2 eval.  Nurse stated there is no discharge plans at this time so Home O2 eval not done at this time yet.

## 2020-01-12 NOTE — PROGRESS NOTES
[facility]      Patient - Yuri Reyes   MRN -  [id number]   [facility]   - 1935      Date of Admission -  2020 11:28 AM  Date of evaluation -  2020  Room - --A   Hospital Day - 6  Consulting - Andre Batista MD Primary Care Physician - Blake Pederson MD     Problem List      Active Hospital Problems    Diagnosis Date Noted    SIRS (systemic inflammatory response syndrome) ([facility] [R65.10] 01/10/2020    Moderate malnutrition ([address] [E44.0] 2020    Pulmonary fibrosis ([address] [J84.10]     Acute hypoxemic respiratory failure ([address] [J96.01] 2020    Pneumonia [J18.9] 2020    Abnormal CT of the chest [R93.89]     Bronchiectasis without complication ([address] [N74.4]      Reason for Consult    To follow pneumonia and ILD. HPI   History Obtained From: Patient and electronic medical record. Yuri Reyes is a 80 y.o. female  was initially admitted under hospitalist service. Pulmonary medicine was consulted for further management of Pneumonia. The patient is a 80 y.o. female who presented with worsening of shortness of breath for the last 8 days. Her current illness started as upper respiratory tract infection with cough, cold and sputum  production which is initially white in color and later changed to greenish yellow. She noticed worsening of shortness of breath with decline in functional status. She denied any fever. How ever she gave a hx of chills. Due to worsening of her above symptoms she presented to the Emergency room at [facility]. She was evaluated in the Emergency room by ER physician and admitted under hospitalist service for further evaluation. 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 She was treated by Dr. Terence Mauricio for 13months.  Her antibiotics were discontinued due to development of optic neutitis. She is currently not on any treatment. She is following with [facility] pulmonary service. She under went Flexible diagnostic fiberoptic bronchoscopy with fluoroscopy on 2017. Prior to his current hospitalization:  She is currently not using any oxygen supplementation at rest, exercise or during sleep/at night time. Past 24 hrs   -On 1 LPM via NC  -Minimal improvement in her shortness of breath.  -All cultures Negative to date.  -No chest pain.  -No AFB seen on stain. All other systems reviewed. PMHx   Past Medical History      Diagnosis Date    Anemia     normal on pre-op 2012 and 13    Backache     h/o    Bronchiectasis [facility]) 2013    Bursitis     bilateral shoulders    Constipation     Diverticulosis of colon     HTN (hypertension)     Hypercalcemia     slightly elevated at 10.8 pre-op 13    Hyperlipidemia     Nodular goiter     bx negative    OA (osteoarthritis)     bilateral thumbs - sees Dr. Lucia Guzman Osteopenia 2013    Pneumonia of right upper lobe due to infectious organism ([facility]     Pneumonitis     Dr. Christianne Cooks in 2010 - bx negative    Pulmonary Mycobacterium avium complex (MAC) infection ([address]     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 ([address]     had one parathyroid removed - now follows with Dr. Mary Mendoza Sinusitis     with seasonal allergies    Vitamin D deficiency 2018      Past Surgical History        Procedure Laterality Date    ABDOMINAL EXPLORATION SURGERY  2013    Release of ASHISHOKATHERINE-Dr. Greer Mart    APPENDECTOMY      BRONCHOSCOPY N/A 2020    BRONCHOSCOPY FLUOROSCOPY performed by Chidi Ba MD at [address] WITH IMPLANT Bilateral  ?      SECTION  0429,4156    DILATION AND CURETTAGE OF UTERUS  9139,2070,0296    EXCISION OF PARATHYROID MASS Right 2013    rt parathyroidectomy (excision of rt inferior parathyroid mass) exploration of neck     FOOT SURGERY 2002    left    FOOT SURGERY Left 06/12/2017    Dr Tatyana Camacho  6-4-12    left knee    JOINT REPLACEMENT  01/2014    right knee    MALIGNANT SKIN LESION EXCISION Left 06/12/2017    BCC DORSAL FOOT WITH GRAFT & FS    GOSIA AND BSO  1982    TONSILLECTOMY AND ADENOIDECTOMY  1940 ?     TOTAL KNEE ARTHROPLASTY Right January 6th, 2014    OIO     Meds    Current Medications    azithromycin  250 mg Oral Daily    ipratropium-albuterol  1 ampule Inhalation Q4H WA    ondansetron  4 mg Intravenous Once    lactobacillus  1 capsule Oral Daily    amLODIPine  10 mg Oral Daily    busPIRone  5 mg Oral TID    Vitamin D  5,000 Units Oral Every Other Day    levothyroxine  75 mcg Oral Daily    cetirizine  10 mg Oral Daily    simvastatin  20 mg Oral Nightly    sodium chloride flush  10 mL Intravenous 2 times per day    enoxaparin  40 mg Subcutaneous Q24H    cefepime  2 g Intravenous Q12H     phenol, sodium chloride flush, magnesium hydroxide, ondansetron, acetaminophen  IV Drips/Infusions    Home Medications  Medications Prior to Admission: sodium chloride, Inhalant, 7 % nebulizer solution, Take 4 mLs by nebulization 2 times daily   albuterol (PROVENTIL) (2.5 MG/3ML) 0.083% nebulizer solution, Take 2.5 mg by nebulization 2 times daily  loratadine (CLARITIN) 10 MG capsule, Take 10 mg by mouth daily   ondansetron (ZOFRAN ODT) 4 MG disintegrating tablet, Place 1 tablet under the tongue every 8 hours as needed for Nausea or Vomiting  PROAIR  (90 Base) MCG/ACT inhaler, INHALE 2 PUFFS INTO THE LUNGS EVERY 6 HOURS IF NEEDED FOR WHEEZING OR SHORTNESS OF BREATH  spironolactone (ALDACTONE) 50 MG tablet, TAKE 1 TABLET BY MOUTH  DAILY  simvastatin (ZOCOR) 20 MG tablet, TAKE 1 TABLET BY MOUTH  NIGHTLY  amLODIPine (NORVASC) 10 MG tablet, TAKE 1 TABLET BY MOUTH  DAILY  Lactobacillus (PROBIOTIC ACIDOPHILUS) CAPS, Take 1 capsule by mouth daily   levothyroxine (SYNTHROID) 75 MCG tablet, take 1 tablet by mouth once daily  busPIRone (BUSPAR) 10 MG tablet, Take 5 mg by mouth 3 times daily   Cholecalciferol (VITAMIN D3) 5000 units TABS, Take 5,000 Units by mouth every other day   MULTIPLE VITAMIN PO, Take 1 tablet by mouth daily   Diet    Dietary Nutrition Supplements: Clear Liquid Oral Supplement  DIET CARDIAC; Allergies    Percocet [oxycodone-acetaminophen]; Sulfa antibiotics; and Cefuroxime  Family History          Problem Relation Age of Onset    Diabetes Mother     Thyroid Disease Mother     Arthritis Mother     High Blood Pressure Mother     Arthritis Father     High Blood Pressure Father     Other Father         hay fever    Breast Cancer Neg Hx     Ovarian Cancer Neg Hx      Sleep History    Never diagnosed with sleep apnea in the past    Social History     Social History     Socioeconomic History    Marital status:      Spouse name: Not on file    Number of children: Not on file    Years of education: Not on file    Highest education level: Not on file   Occupational History    Occupation: retired   Social Needs    Financial resource strain: Not on file    Food insecurity:     Worry: Not on file     Inability: Not on file   [employer] needs:     Medical: Not on file     Non-medical: Not on file   Tobacco Use    Smoking status: Never Smoker    Smokeless tobacco: Never Used   Substance and Sexual Activity    Alcohol use:  Yes     Alcohol/week: 0.0 standard drinks     Comment: socially-1 glassof wine 2-3 times a month    Drug use: No    Sexual activity: Never   Lifestyle    Physical activity:     Days per week: Not on file     Minutes per session: Not on file    Stress: Not on file   Relationships    Social connections:     Talks on phone: Not on file     Gets together: Not on file     Attends [employer] service: Not on file     Active member of club or organization: Not on file     Attends meetings of clubs or organizations: Not on file     Relationship status: Not on file    Intimate partner violence:     Fear of current or ex partner: Not on file     Emotionally abused: Not on file     Physically abused: Not on file     Forced sexual activity: Not on file   Other Topics Concern    Not on file   Social History Narrative    Not on file     Vitals     height is 5' 4\" (1.626 m) and weight is 158 lb 6.4 oz (71.8 kg). Her oral temperature is 97.7 °F (36.5 °C). Her blood pressure is 119/61 and her pulse is 83. Her respiration is 18 and oxygen saturation is 96%. Body mass index is 27.19 kg/m². SUPPLEMENTAL O2: O2 Flow Rate (L/min): 1 L/min       I/O        Intake/Output Summary (Last 24 hours) at 1/12/2020 1620  Last data filed at 1/12/2020 1108  Gross per 24 hour   Intake 890.43 ml   Output 1100 ml   Net -209.57 ml     I/O last 3 completed shifts: In: 890.4 [P.O.:800; I.V.:90.4]  Out: 1100 [Urine:1100]   Patient Vitals for the past 96 hrs (Last 3 readings):   Weight   01/12/20 0400 158 lb 6.4 oz (71.8 kg)   01/10/20 0441 158 lb 11.2 oz (72 kg)   01/09/20 0438 158 lb 1.6 oz (71.7 kg)       Exam   Constitutional: Patient appears in no distress on 1LPM via nasal cannula. Mouth/Throat: Oropharynx is clear and moist.  No oral thrush. Neck: Neck supple. Cardiovascular: S1 and S2 heard. No murmurs. Pulmonary/Chest: Bilateral air entry present. Good breath sounds on both sides, diminished at bases. No wheezes. Bilateral basal rales Right > Left. Abdominal: Soft. No tenderness. Extremities: Patient exhibits no edema. Neurological: Patient is awake and alert.       Labs  - Old records and notes have been reviewed in [facility]   ABG  Lab Results   Component Value Date    PH 7.42 01/08/2020    PO2 60 01/08/2020    PCO2 36 01/08/2020    HCO3 24 01/08/2020    O2SAT 91 01/08/2020     Lab Results   Component Value Date    IFIO2 2 01/08/2020     CBC  Recent Labs     01/10/20  0610 01/11/20  0551   WBC 28.4* 28.1*   RBC 4.31 4.45   HGB 12.0 12.3   HCT 37.6 39.0   MCV 87.2 87.6 MCH 27.8 27.6   MCHC 31.9* 31.5*    330   MPV 10.4 10.3      BMP  Recent Labs     01/09/20  2112 01/10/20  0610 01/11/20  0551   NA  --   --  139   K 4.9 5.1 4.7   CL  --   --  99   CO2  --   --  26   BUN  --   --  21   CREATININE  --   --  0.9   GLUCOSE  --   --  95   CALCIUM  --   --  9.6     LFT  No results for input(s): AST, ALT, ALB, BILITOT, ALKPHOS, LIPASE in the last 72 hours. Invalid input(s): AMYLASE  TROP  Lab Results   Component Value Date    TROPONINT < 0.010 01/06/2020    TROPONINT < 0.010 11/09/2016    TROPONINT < 0.010 11/09/2016     BNP  No results for input(s): BNP in the last 72 hours. Lactic Acid  No results for input(s): LACTA in the last 72 hours. INR  No results for input(s): INR, PROTIME in the last 72 hours. PTT  No results for input(s): APTT in the last 72 hours. Glucose  No results for input(s): POCGLU in the last 72 hours. UA   No results for input(s): SPECGRAV, PHUR, COLORU, CLARITYU, MUCUS, PROTEINU, BLOODU, RBCUA, WBCUA, BACTERIA, NITRU, GLUCOSEU, BILIRUBINUR, UROBILINOGEN, KETUA, LABCAST, LABCASTTY, AMORPHOS in the last 72 hours. Invalid input(s): CRYSTALS. Connective tissue work up results:  Date: 4/03/2017  SABAS( Antinuclear antibody):  Negative-none detected. Fungal serologies- Negative                   RA ( Rheumatoid factor):  Negative. <10                                                          ACE(  level):    Negative-22                               ESR ( Sed rate): 9                                   PFTs   PFTs: 12/2/19              Sleep studies   None in Epic. Cultures    Rapid swab for influenza A and B: Negative  Blood cultures were negative X 2 sets.   Strep and legionella antigens-Negative    Bronch Cultures   Fungus-No yeast or hyphae  Respiratory culture-No organisms observed  AFB-pending  Pneumocystis-Pending  Legionella-Pending  Anaerobic/aerobic-No prelim growth  Cytology -Pending    Bronch and biopsy:   Bronch and biopsy- pending. Cytology- pending. EKG     Echocardiogram   Echocardiogram:  Details   Reading Physician Reading Date Result Priority   Luz Méndez MD              Account #     [de-identified]        Room Number         7K-07      Accession      [id number]      Date of Study       11/09/2016   Number      Date of Birth  1935     Referring Physician Tawny Aparicio PA-C      Age            81 year(s)     Sonographer         Yareli Varela, [facility]                                    Interpreting       Cindy Jaffe MD                                 Physician     Conclusions      Summary   Left Ventricular size is Normal .   Normal left ventricular wall thickness.   Systolic function was normal.   Ejection fraction is visually estimated in the range of 55% to 65%.   There were no regional wall motion abnormalities.   Impaired relaxation compatible with diastolic dysfunction. ( reversed E/A   ratio).     Tricuspid valve was not well visualized.   Mild-to-moderate tricuspid regurgitation.   Right ventricular systolic pressure measures 37 mmHg assuming RAP of 5.      Signature      ----------------------------------------------------------------   Electronically signed by Cindy Jaffe MD (Interpreting   [id number]) on 11/09/2016 at 04:07 PM   ----------------------------------------------------------------      Radiology    CXR  XR CHEST PORTABLE   1/8/2020   FINDINGS: Fibrotic changes are again seen in the right upper lung with superimposed airspace opacities. It is cardiomegaly. Pulmonary vasculature is within normal limits. There is ectasia of the thoracic aorta. There is no significant pleural effusion or pneumothorax. Visualized portions of the upper abdomen are within normal limits. The osseous structures are intact. No acute fractures or suspicious osseous lesions. Impression:   1.  No evidence of pneumothorax following bronchoscopy and biopsy. 2. Stable appearance of the chest with chronic fibrotic changes and superimposed infiltrates. Jan 6, 2020   PROCEDURE: XR CHEST PORTABLE   Progressive changes in the right upper and now right lower lobe on a background of underlying chronic fibrotic changes. Findings are concerning for superimposed infection. Correlation with symptoms and continued follow-up advised       CT Scans  (See actual reports for details)     HRCT of chest:  Dec 2, 2019   PROCEDURE: CT CHEST HIGH RESOLUTION   COMPARISON: CT chest high resolution 12/3/2018. 1. Stable groundglass opacities in the right upper lobe, likely secondary to chronic nonspecific interstitial pneumonia. 2. Slightly worsening fibrosis and bronchiectasis, most severe in the right upper lung.            Assessment   -Right side Pneumonia due to community acquired pneumonia (CAP) Vs Atypical pneumonia- She is clinically improving. How ever patient still feels the same  -Acute hypoxic respiratory failure due to pneumonia- improving  -Hx of Interstitial Interstitial lung disease of uncertain etiology. She had a hx of Mycobacterium avium/intracellulare infection of lungs-She is following with [facility] pulmonary service.  -Hx of Mycobacterium avium/intracellulare infection of lungs was treated by Dr. Welch Or for 13months. Her antibiotics were discontinued due to development of optic neuritis. She is currently not on any treatment. -Bronchiectasis noted on HRCT. 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 -Nodular goiter.  -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 ([address] -Hx of sinusitis. Plan   -Monitor SpO2 wean supplemental O2 to maintain SpO2 >90%  -Will follow pending bronchoscopy biopsy, cytology and cultures  -Continue current antibiotics per ID.   -Appreciate ID assistance in managing this patient  -Duonebs 3ml via nebs Q4h while

## 2020-01-12 NOTE — PROGRESS NOTES
last 72 hours. BNP: No results for input(s): BNP in the last 72 hours. Lipids: No results for input(s): CHOL, HDL in the last 72 hours. Invalid input(s): LDLCALCU  INR: No results for input(s): INR in the last 72 hours.     Radiology    Objective:   Vitals: /70   Pulse 82   Temp 98.1 °F (36.7 °C) (Oral)   Resp 17   Ht 5' 4\" (1.626 m)   Wt 158 lb 6.4 oz (71.8 kg)   SpO2 95%   BMI 27.19 kg/m²   HEENT: Head:pupils react  Neck: supple  Lungs: few  rales heard bilat at both base  Heart: regular rate and rhythm   Abdomen: soft BS heard   Extremities: warm   Neurologic:  Alert, oriented X3    Impression:   :   Bronchiectasis with pneumonia  Acute hypoxic resp failure per pulm  Pulm fibrosis  HTN  Hyperkalemia improved  Azotemia improved  Hypothyroidism  hyprlipidemia          Plan:    Cont antibiotics per ID  F/u on CBC  D/w son lola over the phone   Await labs to r/o connective tissue disorder  Await final cult results from her bronch washings  Cont PT OT      Michele Solorio MD

## 2020-01-13 ENCOUNTER — APPOINTMENT (OUTPATIENT)
Dept: GENERAL RADIOLOGY | Age: 85
DRG: 166 | End: 2020-01-13
Payer: MEDICARE

## 2020-01-13 LAB
AEROBIC CULTURE: NORMAL
AEROBIC CULTURE: NORMAL
ANAEROBIC CULTURE: NORMAL
ANAEROBIC CULTURE: NORMAL
BASOPHILS # BLD: 0.3 %
BASOPHILS ABSOLUTE: 0 THOU/MM3 (ref 0–0.1)
EOSINOPHIL # BLD: 3.2 %
EOSINOPHILS ABSOLUTE: 0.5 THOU/MM3 (ref 0–0.4)
ERYTHROCYTE [DISTWIDTH] IN BLOOD BY AUTOMATED COUNT: 15.7 % (ref 11.5–14.5)
ERYTHROCYTE [DISTWIDTH] IN BLOOD BY AUTOMATED COUNT: 49.9 FL (ref 35–45)
GRAM STAIN RESULT: NORMAL
GRAM STAIN RESULT: NORMAL
HCT VFR BLD CALC: 40 % (ref 37–47)
HEMOGLOBIN: 12.6 GM/DL (ref 12–16)
IMMATURE GRANS (ABS): 1.86 THOU/MM3 (ref 0–0.07)
IMMATURE GRANULOCYTES: 11.3 %
LYMPHOCYTES # BLD: 10.3 %
LYMPHOCYTES ABSOLUTE: 1.7 THOU/MM3 (ref 1–4.8)
MCH RBC QN AUTO: 27.7 PG (ref 26–33)
MCHC RBC AUTO-ENTMCNC: 31.5 GM/DL (ref 32.2–35.5)
MCV RBC AUTO: 87.9 FL (ref 81–99)
MONOCYTES # BLD: 7.2 %
MONOCYTES ABSOLUTE: 1.2 THOU/MM3 (ref 0.4–1.3)
NUCLEATED RED BLOOD CELLS: 0 /100 WBC
PLATELET # BLD: 382 THOU/MM3 (ref 130–400)
PMV BLD AUTO: 10.2 FL (ref 9.4–12.4)
RBC # BLD: 4.55 MILL/MM3 (ref 4.2–5.4)
SEG NEUTROPHILS: 67.7 %
SEGMENTED NEUTROPHILS ABSOLUTE COUNT: 11.1 THOU/MM3 (ref 1.8–7.7)
WBC # BLD: 16.4 THOU/MM3 (ref 4.8–10.8)

## 2020-01-13 PROCEDURE — 6360000002 HC RX W HCPCS: Performed by: INTERNAL MEDICINE

## 2020-01-13 PROCEDURE — APPSS30 APP SPLIT SHARED TIME 16-30 MINUTES: Performed by: NURSE PRACTITIONER

## 2020-01-13 PROCEDURE — 36415 COLL VENOUS BLD VENIPUNCTURE: CPT

## 2020-01-13 PROCEDURE — 6370000000 HC RX 637 (ALT 250 FOR IP): Performed by: INTERNAL MEDICINE

## 2020-01-13 PROCEDURE — 97110 THERAPEUTIC EXERCISES: CPT

## 2020-01-13 PROCEDURE — 97116 GAIT TRAINING THERAPY: CPT

## 2020-01-13 PROCEDURE — 94640 AIRWAY INHALATION TREATMENT: CPT

## 2020-01-13 PROCEDURE — 2580000003 HC RX 258: Performed by: INTERNAL MEDICINE

## 2020-01-13 PROCEDURE — 71046 X-RAY EXAM CHEST 2 VIEWS: CPT

## 2020-01-13 PROCEDURE — 85025 COMPLETE CBC W/AUTO DIFF WBC: CPT

## 2020-01-13 PROCEDURE — 99232 SBSQ HOSP IP/OBS MODERATE 35: CPT | Performed by: INTERNAL MEDICINE

## 2020-01-13 PROCEDURE — 97530 THERAPEUTIC ACTIVITIES: CPT

## 2020-01-13 PROCEDURE — 94760 N-INVAS EAR/PLS OXIMETRY 1: CPT

## 2020-01-13 PROCEDURE — 1200000003 HC TELEMETRY R&B

## 2020-01-13 RX ORDER — DOCUSATE SODIUM 100 MG/1
100 CAPSULE, LIQUID FILLED ORAL 2 TIMES DAILY
Status: DISCONTINUED | OUTPATIENT
Start: 2020-01-13 | End: 2020-01-14 | Stop reason: HOSPADM

## 2020-01-13 RX ORDER — POLYETHYLENE GLYCOL 3350 17 G/17G
17 POWDER, FOR SOLUTION ORAL DAILY
Status: DISCONTINUED | OUTPATIENT
Start: 2020-01-13 | End: 2020-01-14 | Stop reason: HOSPADM

## 2020-01-13 RX ADMIN — LEVOTHYROXINE SODIUM 75 MCG: 75 TABLET ORAL at 06:36

## 2020-01-13 RX ADMIN — CEFEPIME HYDROCHLORIDE 2 G: 2 INJECTION, POWDER, FOR SOLUTION INTRAVENOUS at 06:36

## 2020-01-13 RX ADMIN — CEFEPIME HYDROCHLORIDE 2 G: 2 INJECTION, POWDER, FOR SOLUTION INTRAVENOUS at 18:35

## 2020-01-13 RX ADMIN — Medication 10 ML: at 21:35

## 2020-01-13 RX ADMIN — BUSPIRONE HYDROCHLORIDE 5 MG: 5 TABLET ORAL at 09:05

## 2020-01-13 RX ADMIN — BUSPIRONE HYDROCHLORIDE 5 MG: 5 TABLET ORAL at 21:34

## 2020-01-13 RX ADMIN — IPRATROPIUM BROMIDE AND ALBUTEROL SULFATE 1 AMPULE: .5; 3 SOLUTION RESPIRATORY (INHALATION) at 20:55

## 2020-01-13 RX ADMIN — CETIRIZINE HYDROCHLORIDE 10 MG: 10 TABLET, FILM COATED ORAL at 09:05

## 2020-01-13 RX ADMIN — Medication 10 ML: at 09:05

## 2020-01-13 RX ADMIN — IPRATROPIUM BROMIDE AND ALBUTEROL SULFATE 1 AMPULE: .5; 3 SOLUTION RESPIRATORY (INHALATION) at 11:05

## 2020-01-13 RX ADMIN — Medication 1 CAPSULE: at 09:05

## 2020-01-13 RX ADMIN — SIMVASTATIN 20 MG: 20 TABLET, FILM COATED ORAL at 21:34

## 2020-01-13 RX ADMIN — ENOXAPARIN SODIUM 40 MG: 40 INJECTION SUBCUTANEOUS at 18:35

## 2020-01-13 RX ADMIN — DOCUSATE SODIUM 100 MG: 100 CAPSULE, LIQUID FILLED ORAL at 21:34

## 2020-01-13 RX ADMIN — BUSPIRONE HYDROCHLORIDE 5 MG: 5 TABLET ORAL at 14:44

## 2020-01-13 RX ADMIN — POLYETHYLENE GLYCOL 3350 17 G: 17 POWDER, FOR SOLUTION ORAL at 14:44

## 2020-01-13 RX ADMIN — DOCUSATE SODIUM 100 MG: 100 CAPSULE, LIQUID FILLED ORAL at 14:44

## 2020-01-13 RX ADMIN — AZITHROMYCIN 250 MG: 250 TABLET, FILM COATED ORAL at 09:05

## 2020-01-13 RX ADMIN — MAGNESIUM HYDROXIDE 30 ML: 400 SUSPENSION ORAL at 09:05

## 2020-01-13 RX ADMIN — IPRATROPIUM BROMIDE AND ALBUTEROL SULFATE 1 AMPULE: .5; 3 SOLUTION RESPIRATORY (INHALATION) at 15:45

## 2020-01-13 RX ADMIN — IPRATROPIUM BROMIDE AND ALBUTEROL SULFATE 1 AMPULE: .5; 3 SOLUTION RESPIRATORY (INHALATION) at 07:40

## 2020-01-13 ASSESSMENT — PAIN SCALES - GENERAL: PAINLEVEL_OUTOF10: 0

## 2020-01-13 NOTE — PROGRESS NOTES
[facility] [place] [facility]      Patient - Garret Khan   MRN -  [id number]   [facility]   - 1935      Date of Admission -  2020 11:28 AM  Date of evaluation -  2020  Room - --A   Hospital Day -  Gregorio Childs MD Primary Care Physician - Rosalva Osorio MD     Problem List      Active Hospital Problems    Diagnosis Date Noted    SIRS (systemic inflammatory response syndrome) ([facility] [address] [address] [R65.10] 01/10/2020    Moderate malnutrition ([address] [E44.0] 2020    Pulmonary fibrosis ([address] [J84.10]     Acute hypoxemic respiratory failure ([address] [J96.01] 2020    Pneumonia [J18.9] 2020    Abnormal CT of the chest [R93.89]     Bronchiectasis without complication ([address] [H48.8]      Reason for Consult    To follow pneumonia and ILD  HPI   History Obtained From: Patient and electronic medical record. Garret Khan is a 80 y.o. female  was initially admitted under hospitalist service. Pulmonary medicine was consulted for further management of Pneumonia. The patient is a 80 y.o. female who presented with worsening of shortness of breath for the last 8 days. Her current illness started as upper respiratory tract infection with cough, cold and sputum  production which is initially white in color and later changed to greenish yellow. She noticed worsening of shortness of breath with decline in functional status. She denied any fever. How ever she gave a hx of chills. Due to worsening of her above symptoms she presented to the Emergency room at [facility]. She was evaluated in the Emergency room by ER physician and admitted under hospitalist service for further evaluation. 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 She was treated by Dr. Hanane Alberto for 13months.  Her antibiotics were discontinued due to development of  HYSTERECTOMY      JOINT REPLACEMENT  6-4-12    left knee    JOINT REPLACEMENT  01/2014    right knee    MALIGNANT SKIN LESION EXCISION Left 06/12/2017    BCC DORSAL FOOT WITH GRAFT & FS    GOSIA AND BSO  1982    TONSILLECTOMY AND ADENOIDECTOMY  1940 ?     TOTAL KNEE ARTHROPLASTY Right January 6th, 2014    OIO     Meds    Current Medications    azithromycin  250 mg Oral Daily    ipratropium-albuterol  1 ampule Inhalation Q4H WA    ondansetron  4 mg Intravenous Once    lactobacillus  1 capsule Oral Daily    amLODIPine  10 mg Oral Daily    busPIRone  5 mg Oral TID    Vitamin D  5,000 Units Oral Every Other Day    levothyroxine  75 mcg Oral Daily    cetirizine  10 mg Oral Daily    simvastatin  20 mg Oral Nightly    sodium chloride flush  10 mL Intravenous 2 times per day    enoxaparin  40 mg Subcutaneous Q24H    cefepime  2 g Intravenous Q12H     phenol, sodium chloride flush, magnesium hydroxide, ondansetron, acetaminophen  IV Drips/Infusions    Home Medications  Medications Prior to Admission: sodium chloride, Inhalant, 7 % nebulizer solution, Take 4 mLs by nebulization 2 times daily   albuterol (PROVENTIL) (2.5 MG/3ML) 0.083% nebulizer solution, Take 2.5 mg by nebulization 2 times daily  loratadine (CLARITIN) 10 MG capsule, Take 10 mg by mouth daily   ondansetron (ZOFRAN ODT) 4 MG disintegrating tablet, Place 1 tablet under the tongue every 8 hours as needed for Nausea or Vomiting  PROAIR  (90 Base) MCG/ACT inhaler, INHALE 2 PUFFS INTO THE LUNGS EVERY 6 HOURS IF NEEDED FOR WHEEZING OR SHORTNESS OF BREATH  spironolactone (ALDACTONE) 50 MG tablet, TAKE 1 TABLET BY MOUTH  DAILY  simvastatin (ZOCOR) 20 MG tablet, TAKE 1 TABLET BY MOUTH  NIGHTLY  amLODIPine (NORVASC) 10 MG tablet, TAKE 1 TABLET BY MOUTH  DAILY  Lactobacillus (PROBIOTIC ACIDOPHILUS) CAPS, Take 1 capsule by mouth daily   levothyroxine (SYNTHROID) 75 MCG tablet, take 1 tablet by mouth once daily  busPIRone (BUSPAR) 10 MG tablet, Take 5 mg by mouth 3 times daily   Cholecalciferol (VITAMIN D3) 5000 units TABS, Take 5,000 Units by mouth every other day   MULTIPLE VITAMIN PO, Take 1 tablet by mouth daily   Diet    Dietary Nutrition Supplements: Clear Liquid Oral Supplement  DIET CARDIAC; Allergies    Percocet [oxycodone-acetaminophen]; Sulfa antibiotics; and Cefuroxime  Family History          Problem Relation Age of Onset    Diabetes Mother     Thyroid Disease Mother     Arthritis Mother     High Blood Pressure Mother     Arthritis Father     High Blood Pressure Father     Other Father         hay fever    Breast Cancer Neg Hx     Ovarian Cancer Neg Hx      Sleep History    Never diagnosed with sleep apnea in the past    Social History     Social History     Socioeconomic History    Marital status:      Spouse name: Not on file    Number of children: Not on file    Years of education: Not on file    Highest education level: Not on file   Occupational History    Occupation: retired   Social Needs    Financial resource strain: Not on file    Food insecurity:     Worry: Not on file     Inability: Not on file   Ping Communication needs:     Medical: Not on file     Non-medical: Not on file   Tobacco Use    Smoking status: Never Smoker    Smokeless tobacco: Never Used   Substance and Sexual Activity    Alcohol use:  Yes     Alcohol/week: 0.0 standard drinks     Comment: socially-1 glassof wine 2-3 times a month    Drug use: No    Sexual activity: Never   Lifestyle    Physical activity:     Days per week: Not on file     Minutes per session: Not on file    Stress: Not on file   Relationships    Social connections:     Talks on phone: Not on file     Gets together: Not on file     Attends Jew service: Not on file     Active member of club or organization: Not on file     Attends meetings of clubs or organizations: Not on file     Relationship status: Not on file    Intimate partner violence:     Fear of current or hyphae  Respiratory culture-No organisms observed  AFB-Negative on concentrated smear, Final pending  Pneumocystis-Negative  Legionella-Negative  Anaerobic/aerobic-No prelim growth  PAFs-Negative  Bronch and biopsy:   Biopsy-   FINAL DIAGNOSIS:  Right lower posterior segment of lung, transbronchial biopsy:   Chronic bronchial inflammation. Negative for malignancy. Cytology-   FINAL RESULTS:  A. Lung, right lower lobe posterior subsegment, bronchoalveolar lavage:   Abundant acute inflammation. No malignant cells seen. B. Lung, right tracheobronchial tree, washings:   Abundant acute inflammation and scattered benign bronchial epithelium. No malignant cells seen. C. Lung, right lower lobe posterior segment brush tip, brushing:   Benign bronchial epithelium and acute inflammation. No malignant cells seen. EKG     Echocardiogram   Echocardiogram:  Details   Reading Physician Reading Date Result Priority   Keira Tran MD              Account #     [de-identified]        Room Number         7K-07      Accession      [id number]      Date of Study       11/09/2016   Number      Date of Birth  1935     Referring Physician Susana Burleson PA-C      Age            81 year(s)     Sonographer         [facility]                                    Interpreting       Tiki Morton MD                                 Physician     Conclusions      Summary   Left Ventricular size is Normal .   Normal left ventricular wall thickness.   Systolic function was normal.   Ejection fraction is visually estimated in the range of 55% to 65%.   There were no regional wall motion abnormalities.   Impaired relaxation compatible with diastolic dysfunction. ( reversed E/A   ratio).       Tricuspid valve was not well visualized.   Mild-to-moderate tricuspid regurgitation.   Right ventricular systolic pressure measures 37 mmHg assuming hx of Mycobacterium avium/intracellulare infection of lungs-She is following with [facility] pulmonary service.  -Hx of Mycobacterium avium/intracellulare infection of lungs was treated by Dr. Freeman Randolph for 13months. Her antibiotics were discontinued due to development of optic neuritis. She is currently not on any treatment. -Bronchiectasis noted on HRCT. 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 -Nodular goiter.  -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 ([address] -Hx of sinusitis. Plan   -Monitor SpO2 wean supplemental O2 to maintain SpO2 >90%  -Biopsy and cytology negative   -Continue current antibiotics per ID. -Appreciate ID assistance in managing this patient  -Duonebs 3ml via nebs Q4h while awake.  -Acapella Q4h as tolerated. -Deep Venous Thrombosis Prophylaxis: lovenox.  -Follow SABAS( Antinuclear antibody), ACE( Angiotensin converting enzyme level),Anti Scleroderma (SCL-70), Anti Centromere Ab screen, Anti Lexie-1, Anti RNP ( HALLE antibody), Anti CCP, Anti SSA, Anti SSB and, Anti Smith antibody.  -Will send Hypersensitive pneumonitis extended panel at the time of discharge as out patient.  -Patient planning discharge to [facility]    -Discussed with registered nurse taking care of patient regarding patient condition and my management plan. Dalila Trammell educated about my impression and plan. She verbalizes understanding. Questions and concerns addressed. Electronically signed by   JASMEET Ramírez CNP on 1/13/2020 at 10:53 AM     Addendum by Dr. Judy Chinchilla MD:  I have seen and examined the patient independently. Face to face evaluation and examination was performed. The above evaluation and note has been reviewed. Labs and radiographs were reviewed. I Have discussed with Mr. Tricia Lo CNP about this patient in detail. The above assessment and plan has been reviewed.  Please see my modifications mentioned below. My modifications: On 1LPM via nasal cannula. Follow bronch biopsy. Follow pending connective tissue work up.       Angeline Cronin MD 1/13/2020 12:28 PM

## 2020-01-13 NOTE — PROGRESS NOTES
[address] [address]  PHYSICAL THERAPY MISSED TREATMENT NOTE  ACUTE CARE  [facility] RENAL TELEMETRY 6K          Pt out of room on this second attempt.      Missed Treatment  Virginiaramez Tiwari PTA [id number]

## 2020-01-13 NOTE — PROGRESS NOTES
INTERNAL MEDICINE SPECIALTIES  Progress Note For Dr Shruthi Hartmann       Patient:  Stanley Toscano  YOB: 1935  Date of Service: 1/13/2020  MRN: [id number]   Acct:  [de-identified]   Primary Care Physician: Bobby Mathews MD    SUBJECTIVE:  Has been constipated, required manual disimpaction per nursing staff. Patient desires ECF. Home Medications:   No current facility-administered medications on file prior to encounter.       Current Outpatient Medications on File Prior to Encounter   Medication Sig Dispense Refill    sodium chloride, Inhalant, 7 % nebulizer solution Take 4 mLs by nebulization 2 times daily       albuterol (PROVENTIL) (2.5 MG/3ML) 0.083% nebulizer solution Take 2.5 mg by nebulization 2 times daily      loratadine (CLARITIN) 10 MG capsule Take 10 mg by mouth daily       ondansetron (ZOFRAN ODT) 4 MG disintegrating tablet Place 1 tablet under the tongue every 8 hours as needed for Nausea or Vomiting 20 tablet 0    PROAIR  (90 Base) MCG/ACT inhaler INHALE 2 PUFFS INTO THE LUNGS EVERY 6 HOURS IF NEEDED FOR WHEEZING OR SHORTNESS OF BREATH 1 Inhaler 11    spironolactone (ALDACTONE) 50 MG tablet TAKE 1 TABLET BY MOUTH  DAILY 90 tablet 3    simvastatin (ZOCOR) 20 MG tablet TAKE 1 TABLET BY MOUTH  NIGHTLY 90 tablet 3    amLODIPine (NORVASC) 10 MG tablet TAKE 1 TABLET BY MOUTH  DAILY 90 tablet 3    Lactobacillus (PROBIOTIC ACIDOPHILUS) CAPS Take 1 capsule by mouth daily       levothyroxine (SYNTHROID) 75 MCG tablet take 1 tablet by mouth once daily 90 tablet 3    busPIRone (BUSPAR) 10 MG tablet Take 5 mg by mouth 3 times daily       Cholecalciferol (VITAMIN D3) 5000 units TABS Take 5,000 Units by mouth every other day       MULTIPLE VITAMIN PO Take 1 tablet by mouth daily            Scheduled Meds:   azithromycin  250 mg Oral Daily    ipratropium-albuterol  1 ampule Inhalation Q4H WA    ondansetron  4 mg Intravenous Once    lactobacillus  1 capsule Oral Daily    amLODIPine  10 mg Oral Daily    busPIRone  5 mg Oral TID    Vitamin D  5,000 Units Oral Every Other Day    levothyroxine  75 mcg Oral Daily    cetirizine  10 mg Oral Daily    simvastatin  20 mg Oral Nightly    sodium chloride flush  10 mL Intravenous 2 times per day    enoxaparin  40 mg Subcutaneous Q24H    cefepime  2 g Intravenous Q12H     Continuous Infusions:  PRN Meds:phenol, sodium chloride flush, magnesium hydroxide, ondansetron, acetaminophen        Allergies:  Percocet [oxycodone-acetaminophen]; Sulfa antibiotics; and Cefuroxime    OBJECTIVE:    Vitals:   Vitals:    01/13/20 0330   BP: (!) 112/52   Pulse: 78   Resp: 18   Temp: 98 °F (36.7 °C)   SpO2: 97%      BMI: Body mass index is 27.97 kg/m². PHYSICAL EXAMINATION:          General appearance:Ill looking elderly female ; No apparent distress, appears stated age and cooperative. HEENT:  Normal cephalic, atraumatic without obvious deformity. Pupils equal, round, and reactive to light. Extra ocular muscles intact. Conjunctivae/corneas clear. Neck: Supple   Respiratory:  Normal respiratory effort., bilateral basilar crackles. Cardiovascular:  Regular rhythm with normal S1/S2 without ,rubs or gallops. Abdomen: Soft, mild LUQ tenderness, non-distended with normal bowel sounds. Musculoskeletal:  No clubbing, cyanosis or edema bilaterally. Full range of motion without deformity. Skin: Skin color, texture, turgor normal.  No rashes or lesions. Neurologic: Alert and oriented x 3, without any focal motor deficits.    Psychiatric:   Thought content appropriate, normal insight     Review of Labs and Diagnostic Testing:    Recent Results (from the past 24 hour(s))   CBC Auto Differential    Collection Time: 01/13/20  6:22 AM   Result Value Ref Range    WBC 16.4 (H) 4.8 - 10.8 thou/mm3    RBC 4.55 4.20 - 5.40 mill/mm3    Hemoglobin 12.6 12.0 - 16.0 gm/dl    Hematocrit 40.0 37.0 - 47.0 %    MCV 87.9 81.0 - 99.0 fL    MCH 27.7 26.0 - 33.0 pg    MCHC 01/07/2020 10:06  [address]   SEE BELOW    Comment:   Specimen Description           URINE   Direct Exam                SEE BELOW   Negative: No L. pneumophila serogroup 1 antigens detected.  A negative   result does not exclude infection with Leginella pnemophila serogroup 1   nor does it rule out other microbial-caused respiratory infections of   disease caused by other serogroups of Legionella pneumophila. Direct Exam                SEE BELOW              BRONCHOSCOPY :Results for Tanya Granados (MRN [id number]) as of 1/9/2020 06:32   Ref. Range 1/8/2020 15:07   Gram Stain Result Unknown Rare segmented neutrophils observed. No epithelial cells observed. No organisms observed. Aerobic Culture Unknown No growth-preliminary   FUNGUS CULTURE Unknown Rpt   Fungus Smear Unknown No fungus observed       HRCT CHEST:      Impression   1. Significant progression of interstitial lung disease in the right lung. 2. Probable acute airspace disease involving the right lower lobe and left lung         Results for Tanya Granados (MRN [id number]) as of 1/9/2020 15:59   Ref.  Range 1/8/2020 06:52 1/8/2020 18:05 1/9/2020 06:03   WBC Latest Ref Range: 4.8 - 10.8 thou/mm3 45.2 (HH)  29.2 (H)   RBC Latest Ref Range: 4.20 - 5.40 mill/mm3 4.09 (L)  4.20   Hemoglobin Quant Latest Ref Range: 12.0 - 16.0 gm/dl 11.6 (L)  11.8 (L)   Hematocrit Latest Ref Range: 37.0 - 47.0 % 35.4 (L)  36.2 (L)   MCV Latest Ref Range: 81.0 - 99.0 fL 86.6  86.2   MCH Latest Ref Range: 26.0 - 33.0 pg 28.4  28.1   MCHC Latest Ref Range: 32.2 - 35.5 gm/dl 32.8  32.6   MPV Latest Ref Range: 9.4 - 12.4 fL 10.9  10.8   RDW-CV Latest Ref Range: 11.5 - 14.5 % 15.0 (H)  15.5 (H)   RDW-SD Latest Ref Range: 35.0 - 45.0 fL 47.5 (H)  48.6 (H)   Platelet Count Latest Ref Range: 130 - 400 thou/mm3 278  307   Platelet Estimate Latest Ref Range: Adequate  ADEQUATE     Lymphocytes Absolute Latest Ref Range: 1.0 - 4.8 thou/mm3 1.1  1.7   Monocytes hallucinations,     So far no growth on BAL, no AFB seen,  F/u Cytology , continue bronchodilators, O2, appreciate pulmonary/ID service consults, f/blood cultures, Incentive spirometry , acapella. High-resolution chest CT scan as noted above. Rapid influenza screen , legionella and strep pneumonia urinary antigens were negative. Spironolactone on hold for  for hyperkalemia , s/p hyperkalemia    PT/OT. A.m labs    Colace / miralax for constipation     Plan ECF,  to address  disposition per ID/Pulmonary services. .          DVT prophylaxis: [x] Lovenox                                 [] SCDs                                 [] SQ Heparin                                 [] Encourage ambulation, low risk for DVT, no chemical or mechanical prophylaxis necessary              [] Already on Anticoagulation                Anticipated Disposition upon discharge: [x] Home                                                                         [] Home with Home Health                                                                         [] [facility]                                                                         [] [facility] [address]          Electronically signed by Kim Flores MD on 1/13/2020 at 6:57 AM

## 2020-01-13 NOTE — CARE COORDINATION
1/13/20, 2:05 PM    DISCHARGE ONGOING EVALUATION:     Ria Sean day: 7  Location: [facility]03/003-A Reason for admit: Pneumonia [J18.9]   Treatment Plan of Care: WBC 16.4. Connective tissue work-up pending. Remains on IV ATB, duonebs. 1L O2 in use. Working with PT/OT. Barriers to Discharge: patient has decided on ECF, await acceptance. [employer] message sent to Dr. Ignacio Ventura to determine atb plan, await response. Update: planning oral atb at discharge per Dr. Ignacio Ventura.

## 2020-01-13 NOTE — PROGRESS NOTES
[address] RENAL TELEMETRY 6K  Occupational Therapy  Daily Note  Time:   Time In: 09  Time Out: 1003  Timed Code Treatment Minutes: 23 Minutes  Minutes: 23          Date: 2020  Patient Name: Stanley Toscano,   Gender: female      Room: [facility]003-A  MRN: [id number]  : 1935  (80 y.o.)  Referring Practitioner: Dr. Sue Medicsabas  Diagnosis: Pneumonia  Additional Pertinent Hx: Per ER note on 2020: 80 y.o. female who presents to the Emergency Department for the evaluation of productive cough and increased SOB from her baseline over the past 5 to 7 days. The patient has a history of pulmonary fibrosis and bronchiectasis. The patient was sent here today from her PCP office who had concern of pneumonia. Patient arrives here with SpO2 of 89-90% on RA. Patient states she has nebulizer treatments at home, but these were causing her to become nauseas, therefore she stopped using them. Restrictions/Precautions:  Restrictions/Precautions: Fall Risk  Position Activity Restriction  Other position/activity restrictions: O2    SUBJECTIVE: Nurse [facility] ok'd session, In bed upon arrival, agreeable to OT session, patient reported feeling fatigued from earlier when trying to have BM    PAIN: 0/10:     COGNITION: WFL    ADL:   Grooming: with set-up. brushed hair with set up. Left patient on toilet with nursing due to attempting to have BM  BALANCE:  Standing Balance: Contact Guard Assistance  approx 2 minutes with 1 UE support    BED MOBILITY:  Supine to Sit: Stand By Assistance HOB elevated, used bedrail, increased time    TRANSFERS:  Sit to Stand:  Contact Guard Assistance. EOB and bedside chair  Stand to Sit: Contact Guard Assistance. FUNCTIONAL MOBILITY:  Assistive Device: Rolling Walker  Assist Level:  Contact Guard Assistance.    Distance: to bathroom  No LOB     ADDITIONAL ACTIVITIES:  Pt tolerated BUE AROM this date x10 reps x1 set in chair in order to increase BUE strength and improve

## 2020-01-13 NOTE — PLAN OF CARE
Patient continues on breathing treatments tolerating well.   Will continue to monitor patients respiratory status
Problem: Falls - Risk of:  Goal: Will remain free from falls  Description  Will remain free from falls  1/11/2020 1208 by Stefano Good RN  Note:   Patient continues to use bed alarms and call lights for fall prevention. No falls this shift      Problem: Risk for Impaired Skin Integrity  Goal: Tissue integrity - skin and mucous membranes  Description  Structural intactness and normal physiological function of skin and  mucous membranes. 1/11/2020 1208 by Stefano Good RN  Note:   Pt continues to walk, no skin breakdown this admission     Problem: Discharge Planning:  Goal: Discharged to appropriate level of care  Description  Discharged to appropriate level of care  1/11/2020 1208 by Stefano Good RN  Note:   Patient will be discharged home with [facility] when ready. Will continue to monitor and asses needs      Problem: Pain:  Description  Pain management should include both nonpharmacologic and pharmacologic interventions. Goal: Pain level will decrease  Description  Pain level will decrease  1/11/2020 1208 by Stefano Good RN  Note:   Patient able to verbalize pain on a scale of 0-10. PRN meds available and appropriate pain level established. Problem: Nutrition  Goal: Optimal nutrition therapy  1/11/2020 1208 by Stefano Good RN  Note:   Pt remains on cardiac diet    Care plan reviewed with patient . Patient  verbalize understanding of the plan of care and contribute to goal setting.
Problem: Falls - Risk of:  Goal: Will remain free from falls  Description  Will remain free from falls  1/7/2020 0027 by Sushil Serrano RN  Outcome: Ongoing  Note:   Patient in bed with bed alarm on, call light within reach and being used appropriately. Non-skid socks on. Patient up with one assist to bathroom. Problem: Falls - Risk of:  Goal: Absence of physical injury  Description  Absence of physical injury  1/7/2020 0027 by Sushil Serrano RN  Outcome: Ongoing  Note:   Stay with me and falling star program in place. No signs of physical injury at this time. Problem: Risk for Impaired Skin Integrity  Goal: Tissue integrity - skin and mucous membranes  Description  Structural intactness and normal physiological function of skin and  mucous membranes. 1/7/2020 0027 by Sushil Serrano RN  Outcome: Ongoing  Note:   Patient has small areas of skin breakdown. Patient turns and repositions self in bed. No new skin breakdown at this time. Problem: Discharge Planning:  Goal: Discharged to appropriate level of care  Description  Discharged to appropriate level of care  1/7/2020 0027 by Sushil Serrano RN  Outcome: Ongoing  Note:   Patient plans to return home alone. Problem: Discharge Planning:  Goal: Participates in care planning  Description  Participates in care planning  1/7/2020 0027 by Sushil Serrano RN  Outcome: Ongoing  Note:   Patient able to assist in identifying goals to achieve throughout the shift, updated on plan of care. Problem: Gas Exchange - Impaired:  Goal: Levels of oxygenation will improve  Description  Levels of oxygenation will improve  1/7/2020 0027 by Sushil Serrano RN  Outcome: Ongoing  Note:   Patient's SPO2 90-93% on 2L nasal cannula. Will attempt to wean supplemental O2 as tolerated by patient. Care plan reviewed with patient.   Patient verbalize understanding of the plan of care and contribute to goal setting
Problem: Falls - Risk of:  Goal: Will remain free from falls  Description  Will remain free from falls  1/9/2020 0223 by Jose J Coker RN  Outcome: Ongoing  Note:   Discussed use of alarms and hourly rounding with patient. Belongings within reach including call light. Up with gait belt, non skid footwear and front wheel walker. Stay with me. Problem: Risk for Impaired Skin Integrity  Goal: Tissue integrity - skin and mucous membranes  Description  Structural intactness and normal physiological function of skin and  mucous membranes. 1/9/2020 0223 by Jose J Coker RN  Outcome: Ongoing  Note:   Skin assessed every 4 hours prn. Able to turn self in bed well. Continent of urine and stool. Problem: Discharge Planning:  Goal: Discharged to appropriate level of care  Description  Discharged to appropriate level of care  1/9/2020 0223 by Jose J Coker RN  Outcome: Ongoing  Note:   Plans discharge to home with home health. Continue to assess discharge needs. Patient participates in care planning     Problem: Gas Exchange - Impaired:  Goal: Levels of oxygenation will improve  Description  Levels of oxygenation will improve  1/9/2020 0223 by Jose J Coker RN  Outcome: Ongoing  Note:   Remains on 1 L O2 at rest and 3 L with activity. Problem: Pain:  Goal: Pain level will decrease  Description  Pain level will decrease  1/9/2020 0223 by Jose J Coker RN  Outcome: Ongoing  Note:   0-10 pain scale explained and utilized. Able to voice pain needs. Denies pain this shift. Care plan reviewed with patient. Patient verbalize understanding of the plan of care and contribute to goal setting.
Problem: Falls - Risk of:  Goal: Will remain free from falls  Description  Will remain free from falls  Outcome: Met This Shift  Note:   No falls this shift, 1 assist with walker, oriented to own abilities. Bed alarm on, call light within reach. Problem: Risk for Impaired Skin Integrity  Goal: Tissue integrity - skin and mucous membranes  Description  Structural intactness and normal physiological function of skin and  mucous membranes. Outcome: Ongoing  Note:   No new skin breakdown this shift . Turns self and ambulates frequently. Problem: Discharge Planning:  Goal: Discharged to appropriate level of care  Description  Discharged to appropriate level of care  Outcome: Ongoing  Note:   Patient requesting ECF today,  following. Problem: Gas Exchange - Impaired:  Goal: Levels of oxygenation will improve  Description  Levels of oxygenation will improve  Outcome: Ongoing  Note:   Oxygenation >90% on 1L NC at rest, 4L with ambulation. Problem: Nutrition  Goal: Optimal nutrition therapy  Outcome: Ongoing  Note:   Eating adequately without any dietary complaints. Care plan reviewed with patient and family. Patient and family verbalize understanding of the plan of care and contribute to goal setting.
Problem: Falls - Risk of:  Goal: Will remain free from falls  Description  Will remain free from falls  Outcome: Ongoing  Note:   Discussed use of alarms and hourly rounding with patient. Belongings within reach including call light. Up with gait belt, non skid footwear and front wheel walker. Stay with me. Problem: Risk for Impaired Skin Integrity  Goal: Tissue integrity - skin and mucous membranes  Description  Structural intactness and normal physiological function of skin and  mucous membranes. Outcome: Ongoing  Note:   Skin assessed every 4 hours prn. Able to turn self in bed well. Continent of urine and stool. Problem: Discharge Planning:  Goal: Discharged to appropriate level of care  Description  Discharged to appropriate level of care  Outcome: Ongoing  Note:   Plans discharge to home with home health. Continue to assess discharge needs. Patient participates in care planning     Problem: Gas Exchange - Impaired:  Goal: Levels of oxygenation will improve  Description  Levels of oxygenation will improve  Outcome: Ongoing  Note:   Remains on 1 L O2 at rest and 3 L with activity. Problem: Pain:  Goal: Pain level will decrease  Description  Pain level will decrease  Outcome: Ongoing  Note:   0-10 pain scale explained and utilized. Able to voice pain needs. Denies pain this shift. Care plan reviewed with patient. Patient verbalize understanding of the plan of care and contribute to goal setting.
Problem: Falls - Risk of:  Goal: Will remain free from falls  Description  Will remain free from falls  Outcome: Ongoing  Note:   Patient was reeducated about using the call light appropriately. Pt belongings within reach. Pt cautioned against getting up without nurse or staff in room. Bed is set at lowest position, side rails up as appropriate. Problem: Falls - Risk of:  Goal: Absence of physical injury  Description  Absence of physical injury  Outcome: Ongoing  Note:   There has not been any episodes of physical injury at this time in the shift. Problem: Risk for Impaired Skin Integrity  Goal: Tissue integrity - skin and mucous membranes  Description  Structural intactness and normal physiological function of skin and  mucous membranes. Outcome: Ongoing  Note:   No new problem areas noted to skin. Repositions self throughout the shift. Problem: Discharge Planning:  Goal: Discharged to appropriate level of care  Description  Discharged to appropriate level of care  Outcome: Ongoing  Note:   Patient performing ADL's to the best of their ability. Patient encouraged to continue functioning at their highest level of ability in anticipation of discharge. Problem: Discharge Planning:  Goal: Participates in care planning  Description  Participates in care planning  Outcome: Ongoing  Note:   Pt participates in care planning. Problem: Gas Exchange - Impaired:  Goal: Levels of oxygenation will improve  Description  Levels of oxygenation will improve  Outcome: Ongoing  Note:   On 1L of nasal cannula at this time. Problem: DISCHARGE BARRIERS  Goal: Patient's continuum of care needs are met  Outcome: Ongoing  Note:   Patient performing ADL's to the best of their ability. Patient encouraged to continue functioning at their highest level of ability in anticipation of discharge.          Problem: Pain:  Goal: Pain level will decrease  Description  Pain level will decrease  Outcome:
Problem: Gas Exchange - Impaired:  Goal: Levels of oxygenation will improve  Description  Levels of oxygenation will improve  1/9/2020 0755 by Shena Bear RCP  Outcome: Ongoing
Problem: Impaired respiratory status  Goal: Clear lung sounds  Description  Clear lung sounds     1/10/2020 2027 by Fabiola Blake RCP  Outcome: Ongoing  Note:   Treatment will be continued as ordered to improve aeration.
Problem: Impaired respiratory status  Goal: Clear lung sounds  Description  Clear lung sounds     1/11/2020 0818 by Drake Mejia RCP  Outcome: Ongoing   Continue therapy to improve lung sounds.
Problem: Impaired respiratory status  Goal: Clear lung sounds  Description  Clear lung sounds     Outcome: Ongoing  Note:   PT continues on aerosols for history of bronchiectasis and pt does txs at home.
Problem: Nutrition  Goal: Optimal nutrition therapy  Outcome: Ongoing   Nutrition Problem: Moderate malnutrition, In context of chronic illness  Intervention: Food and/or Nutrient Delivery: (When diet resumed plan ONS start.)  Nutritional Goals: Pt will consume 75% or more of meals when diet resumed.
SW consult received, SW assessment completed, see note 1/7/20.   Home alone with new [place]
Ongoing  Note:   Pt pain free at this time. PRN medication available for pain management. Care plan reviewed with patient. Patient verbalize understanding of the plan of care and contribute to goal setting.
patient. Patient verbalize understanding of the plan of care and contribute to goal setting.

## 2020-01-13 NOTE — CARE COORDINATION
1/13/20, 9:52 AM    DISCHARGE PLANNING EVALUATION    SW was advised by Nick Merida that pt is now considering ECF for short rehab. SW spoke with pt, she is requesting [facility] or [facility]. SW left message with Natali at [facility]. Await call back. 10:04 AM update:  Received call from [facility] [facility]), referral completed, they do have beds. Await their decision.

## 2020-01-13 NOTE — PROGRESS NOTES
[facility]  INPATIENT PHYSICAL THERAPY  DAILY NOTE  STRZ RENAL TELEMETRY 6K - 6K-03/003-A  Time In: 1140  Time Out: 1210  Timed Code Treatment Minutes: 30 Minutes  Minutes: 30          Date: 2020  Patient Name: Jeremy Troy,  Gender:  female        MRN: [id number]  : 1935  (80 y.o.)     Referring Practitioner: Dr Khalif Frost  Diagnosis: Pneumonia  Additional Pertinent Hx: Per ER note on 2020: 80 y.o. female who presents to the Emergency Department for the evaluation of productive cough and increased SOB from her baseline over the past 5 to 7 days. The patient has a history of pulmonary fibrosis and bronchiectasis. The patient was sent here today from her PCP office who had concern of pneumonia. Patient arrives here with SpO2 of 89-90% on RA. Patient states she has nebulizer treatments at home, but these were causing her to become nauseas, therefore she stopped using them. Prior Level of Function:  Lives With: Alone  Type of Home: (condo)  Home Layout: One level  Home Access: Stairs to enter with rails  Entrance Stairs - Number of Steps: 2 with 1 HR  Home Equipment: Rolling walker   Bathroom Shower/Tub: Walk-in shower  Bathroom Toilet: Handicap height  Bathroom Equipment: Shower chair, Grab bars in shower    ADL Assistance: [facility]: Needs assistance  Ambulation Assistance: Independent  Transfer Assistance: Independent  Active : Yes  Additional Comments: Pt reports having to pace her tasks at home d/t fatigue, min SOB; significantly worse now. Pt does not use AD PLOF, reports she had been so clumsy with it in the past. No O2 at home PLOF. Restrictions/Precautions:  Restrictions/Precautions: Fall Risk  Position Activity Restriction  Other position/activity restrictions: O2    SUBJECTIVE: Pt is Very pleasant and cooperative. Motivated.      PAIN: left side pain, on and off, pt reports is likely constipation pains     OBJECTIVE:  Bed Mobility:  Supine to Sit: Contact Guard Assistance, X 1    Transfers:  Sit to Stand: Contact Guard Assistance, X 1  Stand to Sit:Contact Javy Schwab, X 1    Ambulation:  Contact Guard Assistance, X 1  Distance: ~22 feet x 2   Surface: Level Tile  Device:Rolling Walker  Gait Deviations: Forward Flexed Posture, Slow Jaimee, Decreased Gait Speed, Decreased Heel Strike Bilaterally, Narrow Base of Support, Mild Path Deviations, Unsteady Gait, Decreased Terminal Knee Extension and $ L O2 with activity per nurse; pt required lengthy seated RB between bouts with audible SOB       Exercise:  Patient was guided in 10-20 reps of exercise to both lower extremities. Ankle pumps, Glut sets, Long arc quads, Hip abduction/adduction and Seated hip flexion. Exercises were completed for increased independence with functional mobility. Functional Outcome Measures: Completed  AM-PAC Inpatient Mobility without Stair Climbing Raw Score : 15  AM-PAC Inpatient without Stair Climbing T-Scale Score : 43.03    ASSESSMENT:  Assessment: Patient progressing toward established goals. Activity Tolerance:  Patient tolerance of  treatment: good. Equipment Recommendations: Other: cont to assess, may trial RW  Discharge Recommendations:  Continue to assess pending progress, Patient would benefit from continued therapy after discharge- TCU    Plan: Times per week: 5 X GM  Specific instructions for Next Treatment: trial RW 1-8  Current Treatment Recommendations: Strengthening, Gait Training, Stair training, Balance Training, Functional Mobility Training, Endurance Training, Transfer Training, Home Exercise Program, Equipment Evaluation, Education, & procurement    Patient Education  Patient Education: Precautions/Restrictions    Goals:  Patient goals : Go home  Short term goals  Time Frame for Short term goals: by discharge  Short term goal 1: supine to sit and return with Mod i to get in and out of bed   Short term goal 2: sit to stand withMod I to get on and off various surfaces  Short term goal 3: ambulate with /without  feet with S to walk safely in the home  Short term goal 4: ascend/descend 2 steps with HR and SBA to enter home  Long term goals  Time Frame for Long term goals : NA due to short ELOS    Following session, patient left in safe position with all fall risk precautions in place.

## 2020-01-14 VITALS
OXYGEN SATURATION: 90 % | TEMPERATURE: 97.7 F | SYSTOLIC BLOOD PRESSURE: 131 MMHG | WEIGHT: 164.68 LBS | HEIGHT: 64 IN | DIASTOLIC BLOOD PRESSURE: 61 MMHG | RESPIRATION RATE: 18 BRPM | HEART RATE: 100 BPM | BODY MASS INDEX: 28.12 KG/M2

## 2020-01-14 LAB
ANGIOTENSIN CONVERTING ENZYME: 10 U/L (ref 9–67)
CENTROMERE ANTIBODY: 4 U/ML
SCLERODERMA (SCL-70) AB: 24 U/ML

## 2020-01-14 PROCEDURE — 6370000000 HC RX 637 (ALT 250 FOR IP): Performed by: INTERNAL MEDICINE

## 2020-01-14 PROCEDURE — 2580000003 HC RX 258: Performed by: INTERNAL MEDICINE

## 2020-01-14 PROCEDURE — 2700000000 HC OXYGEN THERAPY PER DAY

## 2020-01-14 PROCEDURE — APPSS30 APP SPLIT SHARED TIME 16-30 MINUTES: Performed by: NURSE PRACTITIONER

## 2020-01-14 PROCEDURE — 6360000002 HC RX W HCPCS: Performed by: INTERNAL MEDICINE

## 2020-01-14 PROCEDURE — 97530 THERAPEUTIC ACTIVITIES: CPT

## 2020-01-14 PROCEDURE — 94640 AIRWAY INHALATION TREATMENT: CPT

## 2020-01-14 PROCEDURE — 99232 SBSQ HOSP IP/OBS MODERATE 35: CPT | Performed by: INTERNAL MEDICINE

## 2020-01-14 PROCEDURE — 97116 GAIT TRAINING THERAPY: CPT

## 2020-01-14 PROCEDURE — 94761 N-INVAS EAR/PLS OXIMETRY MLT: CPT

## 2020-01-14 RX ORDER — PSEUDOEPHEDRINE HCL 30 MG
100 TABLET ORAL 2 TIMES DAILY
DISCHARGE
Start: 2020-01-14 | End: 2020-02-25 | Stop reason: ALTCHOICE

## 2020-01-14 RX ORDER — POLYETHYLENE GLYCOL 3350 17 G/17G
17 POWDER, FOR SOLUTION ORAL DAILY
Qty: 527 G | Refills: 1 | DISCHARGE
Start: 2020-01-15 | End: 2020-02-14

## 2020-01-14 RX ORDER — ALBUTEROL SULFATE 90 UG/1
2 AEROSOL, METERED RESPIRATORY (INHALATION) EVERY 6 HOURS PRN
Qty: 1 INHALER | Refills: 11 | DISCHARGE
Start: 2020-01-14 | End: 2022-06-21 | Stop reason: SDUPTHER

## 2020-01-14 RX ORDER — ALBUTEROL SULFATE 2.5 MG/3ML
2.5 SOLUTION RESPIRATORY (INHALATION) EVERY 6 HOURS PRN
Qty: 120 EACH | Refills: 3 | DISCHARGE
Start: 2020-01-14 | End: 2021-05-11

## 2020-01-14 RX ADMIN — Medication 10 ML: at 08:23

## 2020-01-14 RX ADMIN — LEVOTHYROXINE SODIUM 75 MCG: 75 TABLET ORAL at 05:58

## 2020-01-14 RX ADMIN — BUSPIRONE HYDROCHLORIDE 5 MG: 5 TABLET ORAL at 08:23

## 2020-01-14 RX ADMIN — CETIRIZINE HYDROCHLORIDE 10 MG: 10 TABLET, FILM COATED ORAL at 08:23

## 2020-01-14 RX ADMIN — AZITHROMYCIN 250 MG: 250 TABLET, FILM COATED ORAL at 08:23

## 2020-01-14 RX ADMIN — DOCUSATE SODIUM 100 MG: 100 CAPSULE, LIQUID FILLED ORAL at 08:23

## 2020-01-14 RX ADMIN — CEFEPIME HYDROCHLORIDE 2 G: 2 INJECTION, POWDER, FOR SOLUTION INTRAVENOUS at 05:58

## 2020-01-14 RX ADMIN — IPRATROPIUM BROMIDE AND ALBUTEROL SULFATE 1 AMPULE: .5; 3 SOLUTION RESPIRATORY (INHALATION) at 08:09

## 2020-01-14 RX ADMIN — Medication 1 CAPSULE: at 08:23

## 2020-01-14 RX ADMIN — BUSPIRONE HYDROCHLORIDE 5 MG: 5 TABLET ORAL at 14:12

## 2020-01-14 RX ADMIN — POLYETHYLENE GLYCOL 3350 17 G: 17 POWDER, FOR SOLUTION ORAL at 08:23

## 2020-01-14 RX ADMIN — VITAMIN D, TAB 1000IU (100/BT) 5000 UNITS: 25 TAB at 08:23

## 2020-01-14 RX ADMIN — IPRATROPIUM BROMIDE AND ALBUTEROL SULFATE 1 AMPULE: .5; 3 SOLUTION RESPIRATORY (INHALATION) at 11:48

## 2020-01-14 RX ADMIN — IPRATROPIUM BROMIDE AND ALBUTEROL SULFATE 1 AMPULE: .5; 3 SOLUTION RESPIRATORY (INHALATION) at 15:37

## 2020-01-14 ASSESSMENT — PAIN SCALES - GENERAL: PAINLEVEL_OUTOF10: 0

## 2020-01-14 NOTE — PROGRESS NOTES
Report called to Annamaria at [facility]. AVS faxed and son transporting patient in wheelchair in stable condition.

## 2020-01-14 NOTE — PROGRESS NOTES
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 She is currently not on any treatment. She is following with [facility] pulmonary service. She under went Flexible diagnostic fiberoptic bronchoscopy with fluoroscopy on 2017. Prior to his current hospitalization:  She is currently not using any oxygen supplementation at rest, exercise or during sleep/at night time. Past 24 hrs   -On RA  -Reports feeling better today  -Anticipating rehab at [facility] 2/2 deconditioning  All other systems reviewed. PMHx   Past Medical History      Diagnosis Date    Anemia     normal on pre-op 2012 and 13    Backache     h/o    Bronchiectasis [facility])     Bursitis     bilateral shoulders    Constipation     Diverticulosis of colon     HTN (hypertension)     Hypercalcemia     slightly elevated at 10.8 pre-op 13    Hyperlipidemia     Nodular goiter     bx negative    OA (osteoarthritis)     bilateral thumbs - sees Dr. Fowler Apolonia Osteopenia 2013    Pneumonia of right upper lobe due to infectious organism [facility]     Pneumonitis     Dr. Santana Mendoza in 2010 - bx negative    Pulmonary Mycobacterium avium complex (MAC) infection ([address]     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 ([address]     had one parathyroid removed - now follows with Dr. Stefany Stewart Sinusitis     with seasonal allergies    Vitamin D deficiency 2018      Past Surgical History        Procedure Laterality Date    ABDOMINAL EXPLORATION SURGERY  2013    Release of KATHERINE TAMAYO-Dr. Ravinder Beth    APPENDECTOMY      BRONCHOSCOPY N/A 2020    BRONCHOSCOPY FLUOROSCOPY performed by Brayden Leger MD at [address] WITH IMPLANT Bilateral  ?      SECTION  3884,9440    DILATION AND CURETTAGE OF UTERUS  2108,[id number]    EXCISION OF PARATHYROID MASS Right 2013    rt parathyroidectomy (excision of rt inferior parathyroid mass) exploration of neck     FOOT SURGERY      left    FOOT organizations: Not on file     Relationship status: Not on file    Intimate partner violence:     Fear of current or ex partner: Not on file     Emotionally abused: Not on file     Physically abused: Not on file     Forced sexual activity: Not on file   Other Topics Concern    Not on file   Social History Narrative    Not on file     Vitals     height is 5' 4\" (1.626 m) and weight is 164 lb 10.9 oz (74.7 kg). Her oral temperature is 97.8 °F (36.6 °C). Her blood pressure is 122/60 and her pulse is 84. Her respiration is 18 and oxygen saturation is 91%. Body mass index is 28.27 kg/m². SUPPLEMENTAL O2: O2 Flow Rate (L/min): 0.5 L/min       I/O        Intake/Output Summary (Last 24 hours) at 1/14/2020 0941  Last data filed at 1/14/2020 0519  Gross per 24 hour   Intake 482.72 ml   Output 400 ml   Net 82.72 ml     I/O last 3 completed shifts: In: 482.7 [P.O.:440; I.V.:42.7]  Out: 400 [Urine:400]   Patient Vitals for the past 96 hrs (Last 3 readings):   Weight   01/14/20 0508 164 lb 10.9 oz (74.7 kg)   01/13/20 0330 162 lb 14.7 oz (73.9 kg)   01/12/20 0400 158 lb 6.4 oz (71.8 kg)       Exam   Physical Exam   Constitutional: No distress on RA. Head: Normocephalic and atraumatic. Eyes: Conjunctivae are normal. Pupils are equal, round. No scleral icterus. Neck: Neck supple. No tracheal deviation present. Cardiovascular: S1 and S2 with no murmur. No peripheral edema  Pulmonary/Chest: Normal effort with bilateral air entry, faint bibasilar rales R>L. No stridor. No respiratory distress. Patient exhibits no tenderness. Abdominal: Soft. Bowel sounds audible. No distension or tenderness to palp.    Musculoskeletal: Moves all extremities  Neurological: Patient is alert and follows simple commands    Labs  - Old records and notes have been reviewed in [facility]   ABG  Lab Results   Component Value Date    PH 7.42 01/08/2020    PO2 60 01/08/2020    PCO2 36 01/08/2020    HCO3 24 01/08/2020    O2SAT 91 01/08/2020 antigens-Negative    Bronch Cultures   Fungus-No yeast or hyphae  Respiratory culture-No organisms observed  AFB-Negative on concentrated smear, Final pending  Pneumocystis-Negative  Legionella-Negative  Anaerobic/aerobic-No prelim growth  PAFs-Negative  Bronch and biopsy:   Biopsy-   FINAL DIAGNOSIS:  Right lower posterior segment of lung, transbronchial biopsy:   Chronic bronchial inflammation. Negative for malignancy. Cytology-   FINAL RESULTS:  A. Lung, right lower lobe posterior subsegment, bronchoalveolar lavage:   Abundant acute inflammation. No malignant cells seen. B. Lung, right tracheobronchial tree, washings:   Abundant acute inflammation and scattered benign bronchial epithelium. No malignant cells seen. C. Lung, right lower lobe posterior segment brush tip, brushing:   Benign bronchial epithelium and acute inflammation. No malignant cells seen. EKG     Echocardiogram   Echocardiogram:  Details   Reading Physician Reading Date Result Priority   Eriberto Baxter MD              Account #     [de-identified]        Room Number         7K-07      Accession      [id number]      Date of Study       11/09/2016   Number      Date of Birth  1935     Referring Physician Mervat Denny PA-C      Age            81 year(s)     Sonographer         Yareli Varela RDCS                                    Interpreting       Zuleima Diaz MD                                 Physician     Conclusions      Summary   Left Ventricular size is Normal .   Normal left ventricular wall thickness.   Systolic function was normal.   Ejection fraction is visually estimated in the range of 55% to 65%.   There were no regional wall motion abnormalities.   Impaired relaxation compatible with diastolic dysfunction. ( reversed E/A   ratio).       Tricuspid valve was not well visualized.   Mild-to-moderate tricuspid regurgitation.  Mike Tao

## 2020-01-14 NOTE — DISCHARGE SUMMARY
Internal Medicine Specialties     Discharge Summary      Patient Identification:   Major Larsen   : 1935  MRN: [id number]   Account: [de-identified]      Patient's PCP: Yoli Rea MD    Admit Date: 2020     Discharge Date:   2020    Admitting Physician: Annette Moses MD     Discharge Physician: Annette Moses MD     Discharge Diagnoses: Active Problems:    Bronchiectasis without complication (HCC)    Abnormal CT of the chest    Acute hypoxemic respiratory failure (HCC)    Pneumonia    Moderate malnutrition (HCC)    Pulmonary fibrosis (HCC)    SIRS (systemic inflammatory response syndrome) (HCC)  Resolved Problems:    * No resolved hospital problems. *  History of Bronchiectasis  Pulmonary fibrosis  History of pulmonary Mycobacterium avium complex  Hypertension  Dyslipidemia   Osteoarthritis  Hyperkalemia   Moderate protein calorie malnutrition  Toxic encephalopathy secondary to levaquin    The patient was seen and examined on day of discharge and this discharge summary is in conjunction with any daily progress note from day of discharge.     Hospital Course:   Major Larsen is a 80 y.o. female admitted to [address] [place] [address] on 2020 who has a history of bronchiectasis, pulmonary fibrosis, history of pulmonary Mycobacterium avium complex, hypertension dyslipidemia osteoarthritis  Patient follows with Dr. Juanjo Enriquez and the [facility] for her respiratory issues and had presented with a one-week history of a cough productive of yellowish phlegm.  This had been preceded by some nausea and dry heaving mostly.  She acknowledges having associated wheezing and shortness of breath with mild activity.  There has been no hemoptysisSymptoms have progressively worsened.  She acknowledges having some nasal congestion without sore throat fever or chills.  She complained of some pleuritic type chest pain with coughing fits and states that this is the worst coughing spells she signed by Dr Arash Panchal on 1/8/2020 4:28 PM    Xr Chest Portable    Result Date: 1/6/2020  PROCEDURE: XR CHEST PORTABLE CLINICAL INFORMATION: dyspnea. COMPARISON: Chest CT 12/2/2019 TECHNIQUE: Portable. FINDINGS: Fibrotic changes in the right upper lobe with superimposed airspace consolidation/pleural effusion concerning for superimposed infection. Correlation with symptoms and follow-up advised. Ectatic thoracic aorta. Cardiomegaly. Osteopenia. Small right pleural effusions. Progressive changes in the right upper and now right lower lobe on a background of underlying chronic fibrotic changes. Findings are concerning for superimposed infection. Correlation with symptoms and continued follow-up advised. **This report has been created using voice recognition software. It may contain minor errors which are inherent in voice recognition technology. ** Final report electronically signed by Dr. Mignon Cabrera on 1/6/2020 12:25 PM    Ct Chest High Resolution    Result Date: 1/7/2020  PROCEDURE: CT CHEST HIGH RESOLUTION CLINICAL INFORMATION: Please do high resolution CT scan of chest to follow interstitial lung  disease . COMPARISON: 12/2/2019 TECHNIQUE: 2-D multiplanar noncontrast images of the chest. Additional 1 mm thin slice high-resolution protocol images supine projection. All CT scans at this facility use dose modulation, iterative reconstruction, and/or weight-based dosing when appropriate to reduce radiation dose to as low as reasonably achievable. FINDINGS: There has been progression of intralobular septal thickening and pleural thickening in the right lung best seen in the right midlung. There is interval development of large area of groundglass opacities in the right lower lobe in the dependent portion. In addition to posterior basilar atelectasis versus consolidation. This is a background of extensive honeycombing in the upper lung.  Minimal parenchymal scarring and areas of fibrosis are seen in the left lung including left upper and lower lobe. More focal airspace infiltrates in the superior segment left lower lobe and left base. Standard CT chest demonstrates distended azygos vein. No mediastinal or hilar adenopathy by size criteria. Cardiomegaly. No pericardial effusion. No axillary lymphadenopathy. Upper abdomen No adrenal masses. Bones No suspicious bone lesions     1. Significant progression of interstitial lung disease in the right lung. 2. Probable acute airspace disease involving the right lower lobe and left lung **This report has been created using voice recognition software. It may contain minor errors which are inherent in voice recognition technology. ** Final report electronically signed by Dr. Chema Ames on 1/7/2020 2:43 PM         Consults:     IP CONSULT TO PULMONOLOGY  IP CONSULT TO SOCIAL WORK  IP CONSULT TO DIETITIAN  IP CONSULT TO INFECTIOUS DISEASES    Disposition:    [] Home       [] TCU       [] Rehab       [] Psych       [x] SNF       [] [place]       [] Other-    Condition at Discharge: Stable    Code Status:  Full Code     Patient Instructions: Activity: activity as tolerated  Diet: Dietary Nutrition Supplements: Clear Liquid Oral Supplement  DIET CARDIAC;       Follow-up visits:   Jeremy Bergman MD  [address]  [facility]    Schedule an appointment as soon as possible for a visit in 2 weeks  follow up     Romeo Oliveros  [phone]             Discharge Medications:      Isaura Hui Medication Instructions GIN:[id number]    Printed on:01/14/20 8738   Medication Information                      albuterol (PROVENTIL) (2.5 MG/3ML) 0.083% nebulizer solution  Take 3 mLs by nebulization every 6 hours as needed for Wheezing or Shortness of Breath             albuterol sulfate HFA (PROAIR HFA) 108 (90 Base) MCG/ACT inhaler  Inhale 2 puffs into the lungs every 6 hours as needed for Wheezing or Shortness of Breath             amLODIPine (NORVASC) 10 MG tablet  TAKE 1 TABLET BY MOUTH  DAILY             busPIRone (BUSPAR) 10 MG tablet  Take 5 mg by mouth 3 times daily              Cholecalciferol (VITAMIN D3) 5000 units TABS  Take 5,000 Units by mouth every other day              docusate sodium (COLACE, DULCOLAX) 100 MG CAPS  Take 100 mg by mouth 2 times daily             Lactobacillus (PROBIOTIC ACIDOPHILUS) CAPS  Take 1 capsule by mouth daily              levothyroxine (SYNTHROID) 75 MCG tablet  take 1 tablet by mouth once daily             loratadine (CLARITIN) 10 MG capsule  Take 10 mg by mouth daily              MULTIPLE VITAMIN PO  Take 1 tablet by mouth daily              ondansetron (ZOFRAN ODT) 4 MG disintegrating tablet  Place 1 tablet under the tongue every 8 hours as needed for Nausea or Vomiting             polyethylene glycol (GLYCOLAX) packet  Take 17 g by mouth daily             simvastatin (ZOCOR) 20 MG tablet  TAKE 1 TABLET BY MOUTH  NIGHTLY             sodium chloride, Inhalant, 7 % nebulizer solution  Take 4 mLs by nebulization 2 times daily                  Time Spent on discharge is more than 30 minutes in the examination, evaluation, counseling and review of medications and discharge plan. Signed: Thank you Bettina Ruiz MD for the opportunity to be involved in this patient's care.     Electronically signed by Kenneth Ospina MD on 1/14/2020 at 3:01 PM

## 2020-01-14 NOTE — DISCHARGE INSTR - COC
REPLACEMENT  6-4-12    left knee    JOINT REPLACEMENT  01/2014    right knee    MALIGNANT SKIN LESION EXCISION Left 06/12/2017    BCC DORSAL FOOT WITH GRAFT & FS    GOSIA AND BSO  1982    TONSILLECTOMY AND ADENOIDECTOMY  1940 ?     TOTAL KNEE ARTHROPLASTY Right January 6th, 2014    OIO       Immunization History:   Immunization History   Administered Date(s) Administered    FLUZONE 3 YEARS AND OVER 09/05/2014    Influenza Vaccine, unspecified formulation 09/07/2016    Influenza Virus Vaccine 10/11/2012, 09/10/2013, 09/18/2017    Influenza Whole 09/08/2015    Influenza, High Dose (Fluzone 65 yrs and older) 09/27/2018    Influenza, MDCK Quadv, with preserv IM (Flucelvax 4 yrs and older) 08/16/2019    Influenza, Triv, inactivated, subunit, adjuvanted, IM (Fluad 65 yrs and older) 09/12/2019    PPD Test 04/03/2017    Pneumococcal Conjugate 13-valent ([id number]) 11/06/2013    Pneumococcal Polysaccharide ([id number]) 10/24/2007    Td, unspecified formulation 02/24/2001    Tdap (Boostrix, Adacel) 05/17/2013    Zoster Live (Zostavax) 06/05/2008    Zoster Recombinant (Shingrix) 08/16/2019, 10/03/2019       Active Problems:  Patient Active Problem List   Diagnosis Code    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 ([facility] N25.81    Hypercalcemia E83.52    Osteoarthritis M19.90    Non-toxic nodular goiter E04.9    Essential hypertension I10    Diverticulosis of colon K57.30    Hypokalemia E87.6    Hypothyroidism E03.9    Parathyroid adenoma D35.1    Osteopenia M85.80    Bronchiectasis without complication ([facility] U87.3    Hyponatremia E87.1    Pure hypercholesterolemia E78.00    Left thyroid nodule E04.1    Hyperthyroidism E05.90    Lung infiltrate on CT R91.8    Angina pectoris (HCC) - Typical I20.9    Dyslipidemia E78.5    Elevated serum free T4 level R79.89    Elevated TSH R79.89    Nasal polyp J33.9    Abnormal CT of the chest R93.89    Vitamin D deficiency E55.9    Acute hypoxemic respiratory

## 2020-01-14 NOTE — PROGRESS NOTES
Dr. Toby Tavarez notified that patient was unable to get bowel movement out and felt that it was \"stuck\" had to digitally evacuate patient twice this morning on toilet. Order received that tap water enema can be given if needed.

## 2020-01-14 NOTE — PROGRESS NOTES
Left: Stand By Assistance   Supine to Sit: Stand By Assistance   Comment: Pt reporting feeling light headed upon first sitting up at EOB    Transfers:  Sit to Stand: Contact Guard Assistance  Stand to [employer] Assistance    Ambulation:  Contact Guard Assistance  Distance: 40 feet x2   Surface: Level Tile  Device:Rolling Walker  Gait Deviations: Forward Flexed Posture, Slow Jaimee and Decreased Step Length Bilaterally. Pt required one seated rest break due to SOB, O2 sats at or above 93% throughout entire session. Exercise:  Patient was guided in 1 set(s) 12 reps of exercise to both lower extremities. Ankle pumps, Long arc quads, Seated hip flexion and Seated heel/toe raises. Exercises were completed for increased independence with functional mobility. Functional Outcome Measures: Completed  AM-PAC Inpatient Mobility without Stair Climbing Raw Score : 15  AM-PAC Inpatient without Stair Climbing T-Scale Score : 43.03    ASSESSMENT:  Assessment: Patient progressing toward established goals. and Pt tolerated session fairly well. Limited by decreased strength and decreased mobility. Would benefit from continued therapy at discharge. Activity Tolerance:  Patient tolerance of  treatment: good. Equipment Recommendations: Other: cont to assess, may trial RW  Discharge Recommendations:  Continue to assess pending progress, Patient would benefit from continued therapy after discharge    Plan: Times per week: 5 X GM  Specific instructions for Next Treatment: trial RW 1-8  Current Treatment Recommendations: Strengthening, Gait Training, Stair training, Balance Training, Functional Mobility Training, Endurance Training, Transfer Training, Home Exercise Program, Equipment Evaluation, Education, & procurement    Patient Education  Patient Education: Plan of Care, [employer] Mobility, Transfers, Gait    Goals:  Patient goals : Go home  Short term goals  Time Frame for Short term goals: by discharge  Short term goal 1: supine to sit and return with Mod i to get in and out of bed   Short term goal 2: sit to stand withMod I to get on and off various surfaces  Short term goal 3: ambulate with /without  feet with S to walk safely in the home  Short term goal 4: ascend/descend 2 steps with HR and SBA to enter home  Long term goals  Time Frame for Long term goals : NA due to short ELOS    Following session, patient left in safe position with all fall risk precautions in place.

## 2020-01-14 NOTE — PROGRESS NOTES
Intravenous Q12H       phenol, sodium chloride flush, magnesium hydroxide, ondansetron, acetaminophen      LABS:     CBC:   Recent Labs     01/13/20  0622   WBC 16.4*   HGB 12.6        BMP:    No results for input(s): NA, K, CL, CO2, BUN, CREATININE, GLUCOSE in the last 72 hours. Calcium:  No results for input(s): CALCIUM in the last 72 hours. No results for input(s): POCGLU in the last 72 hours. Problem list of patient:     Patient Active Problem List   Diagnosis Code    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 ([facility] [address] N25.81    Hypercalcemia E83.52    Osteoarthritis M19.90    Non-toxic nodular goiter E04.9    Essential hypertension I10    Diverticulosis of colon K57.30    Hypokalemia E87.6    Hypothyroidism E03.9    Parathyroid adenoma D35.1    Osteopenia M85.80    Bronchiectasis without complication (HCC) Q12.9    Hyponatremia E87.1    Pure hypercholesterolemia E78.00    Left thyroid nodule E04.1    Hyperthyroidism E05.90    Lung infiltrate on CT R91.8    Angina pectoris (HCC) - Typical I20.9    Dyslipidemia E78.5    Elevated serum free T4 level R79.89    Elevated TSH R79.89    Nasal polyp J33.9    Abnormal CT of the chest R93.89    Vitamin D deficiency E55.9    Acute hypoxemic respiratory failure (HCC) J96.01    Pneumonia J18.9    Moderate malnutrition ([facility]) E44.0    Pulmonary fibrosis ([facility]) J84.10    SIRS (systemic inflammatory response syndrome) ([facility]) R65.10         ASSESSMENT/PLAN   Pneumonia:clinically better. We will stop antibiotic. Deconditioning: She will go to a nursing home   chronic lung disease with Bronchiectasis  Okay with discharge plan.     Wayne Rosas MD, [address] 1/14/2020 9:31 AM

## 2020-01-15 ENCOUNTER — TELEPHONE (OUTPATIENT)
Dept: FAMILY MEDICINE CLINIC | Age: 85
End: 2020-01-15

## 2020-01-15 LAB
ANA SCREEN: NORMAL
ANTI JO-1 IGG: 0 AU/ML (ref 0–40)
ANTI RNP AB: 1 AU/ML (ref 0–40)
ANTI SSA: NORMAL
ANTI SSB: 0 AU/ML (ref 0–40)
ANTI-SMITH: 0 AU/ML (ref 0–40)

## 2020-01-18 LAB — LEGIONELLA SPECIES CULTURE: NORMAL

## 2020-01-25 NOTE — PROGRESS NOTES
[place] [facility]   Pulmonary medicine clinic follow up note. Patient: Jocelyn Bain  : 1935      Lung Nodule Screening    [] Qualifies [x] Does not qualify [] Declined [] Completed    Jocelyn Bain is a 80 y. o.oldfemale came for follow up regarding her chronic cough and pulmonary fibrosis. She was recently admitted to  [facility] for management of acute hypoxic respiratory failure due to pneumonia Vs exacerbation of her interstitial lung disease. She under went Flexible diagnostic fiberoptic bronchoscopy with fluoroscopy. During procedure Bronch washings obtained from right tracheobronchial tree including right upper, middle and lower lobes. Bronchioalveolar lavage obtained from right lower lobe posterior segment. Trans bronchial lung biopsies were performed by using biopsy forceps from right lower lobe posterior segment under fluroscopy guidance. Cytology brush specimen was obtained from right lower lobe posterior segment under fluroscopy guidance. Microbiology brush specimen was obtained from right lower lobe posterior segment under fluroscopy guidence. She was also treated with antibiotics from ID service. She was discharged to [facility]. She is not using any home O2 at this time. She feels great. She is in the process of being discharged from SNF. She under went Flexible diagnostic fiberoptic bronchoscopy with fluoroscopy on 2017. She was diagnosed with Mycobacterium avium/intracellulare infection of lungs. She was referred to Dr. Silas Vo. She underwent treatment. 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 She was evaluated by [facility]. MD- pulmonologist at [facility] on 18. She was referred to cardiologist at [facility]. Work up by her cardiologist found to be negative. She is currently being followed by Dr. Alveta Ganser, MD S at [facility].       She is bilateral shoulders    Constipation     Diverticulosis of colon     HTN (hypertension)     Hypercalcemia     slightly elevated at 10.8 pre-op 13    Hyperlipidemia     Nodular goiter     bx negative    OA (osteoarthritis)     bilateral thumbs - sees Dr. Magalis Pulliam Osteopenia 2013    Pneumonia of right upper lobe due to infectious organism ([facility]     Pneumonitis     Dr. Shaun Cruz in 2010 - bx negative    Pulmonary Mycobacterium avium complex (MAC) infection ([facility]     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 ([facility]     had one parathyroid removed - now follows with Dr. Antonio Tsang Sinusitis     with seasonal allergies    Vitamin D deficiency 2018       Past Surgical History:   Procedure Laterality Date    ABDOMINAL EXPLORATION SURGERY  2013    Release of SBO, KATHERINE-Dr. Fran Ocasio    APPENDECTOMY      BRONCHOSCOPY N/A 2020    BRONCHOSCOPY FLUOROSCOPY performed by Mihaela Higgins MD at [address] Bilateral  ?   SECTION  [id number]    DILATION AND CURETTAGE OF UTERUS  [id number]    EXCISION OF PARATHYROID MASS Right 2013    rt parathyroidectomy (excision of rt inferior parathyroid mass) exploration of neck     FOOT SURGERY  2002    left    FOOT SURGERY Left 2017    Dr Len Lezama  6-4-    left knee    JOINT REPLACEMENT  2014    right knee    MALIGNANT SKIN LESION EXCISION Left 2017    [facility]CC DORSAL FOOT WITH GRAFT & FS    GOSIA AND BSO  1982    TONSILLECTOMY AND ADENOIDECTOMY  1940 ?     TOTAL KNEE ARTHROPLASTY Right 2014    OIO       Allergies   Allergen Reactions    Percocet [Oxycodone-Acetaminophen] Nausea Only and Other (See Comments)     Affects memory    Sulfa Antibiotics Other (See Comments)     hallucinations    Cefuroxime Diarrhea, Nausea And Vomiting and Other (See Comments)     cramping       Current Outpatient Medications Medication Sig Dispense Refill    albuterol (PROVENTIL) (2.5 MG/3ML) 0.083% nebulizer solution Take 3 mLs by nebulization every 6 hours as needed for Wheezing or Shortness of Breath 120 each 3    albuterol sulfate HFA (PROAIR HFA) 108 (90 Base) MCG/ACT inhaler Inhale 2 puffs into the lungs every 6 hours as needed for Wheezing or Shortness of Breath 1 Inhaler 11    docusate sodium (COLACE, DULCOLAX) 100 MG CAPS Take 100 mg by mouth 2 times daily      polyethylene glycol (GLYCOLAX) packet Take 17 g by mouth daily 527 g 1    sodium chloride, Inhalant, 7 % nebulizer solution Take 4 mLs by nebulization 2 times daily       loratadine (CLARITIN) 10 MG capsule Take 10 mg by mouth daily       ondansetron (ZOFRAN ODT) 4 MG disintegrating tablet Place 1 tablet under the tongue every 8 hours as needed for Nausea or Vomiting 20 tablet 0    simvastatin (ZOCOR) 20 MG tablet TAKE 1 TABLET BY MOUTH  NIGHTLY 90 tablet 3    amLODIPine (NORVASC) 10 MG tablet TAKE 1 TABLET BY MOUTH  DAILY 90 tablet 3    Lactobacillus (PROBIOTIC ACIDOPHILUS) CAPS Take 1 capsule by mouth daily       levothyroxine (SYNTHROID) 75 MCG tablet take 1 tablet by mouth once daily 90 tablet 3    busPIRone (BUSPAR) 10 MG tablet Take 5 mg by mouth 3 times daily       Cholecalciferol (VITAMIN D3) 5000 units TABS Take 5,000 Units by mouth every other day       MULTIPLE VITAMIN PO Take 1 tablet by mouth daily        No current facility-administered medications for this visit.         Family History   Problem Relation Age of Onset    Diabetes Mother     Thyroid Disease Mother     Arthritis Mother     High Blood Pressure Mother     Arthritis Father     High Blood Pressure Father     Other Father         hay fever    Breast Cancer Neg Hx     Ovarian Cancer Neg Hx            Physical Exam     VITALS:    /72 (Site: Left Upper Arm, Position: Sitting, Cuff Size: Medium Adult)   Pulse 96   Ht 5' 4.5\" (1.638 m)   Wt 163 lb 12.8 oz (74.3 kg) no pleural effusion or vascular congestion. 4. There is a new 0.6 cm hypodense nodule within the left lobe of the thyroid gland. A nonemergent scheduled ultrasound examination is recommended. Pulmonary function tests: 1/23/2017. Reviewed    Echocardiogram:  Details   Reading Physician Reading Date Result Priority   Daja Guevara MD           Account #      [de-identified]        Room Number         7K-07      Accession      [id number]      Date of Study       11/09/2016   Number      Date of Birth  1935     Referring Physician Steve Alamo PA-C      Age            80 year(s)     Ramirez Gould, [facility]                                    Interpreting        Jerson Roman MD                                 Physician     Conclusions      Summary   Left Ventricular size is Normal .   Normal left ventricular wall thickness. Systolic function was normal.   Ejection fraction is visually estimated in the range of 55% to 65%. There were no regional wall motion abnormalities. Impaired relaxation compatible with diastolic dysfunction. ( reversed E/A   ratio). Tricuspid valve was not well visualized. Mild-to-moderate tricuspid regurgitation. Right ventricular systolic pressure measures 37 mmHg assuming RAP of 5. Signature      ----------------------------------------------------------------   Electronically signed by Jerson Roman MD (Interpreting   physician) on 11/09/2016 at 04:07 PM   ----------------------------------------------------------------        Date of Operation: 4/19/2017  Operation: Flexible diagnostic fiberoptic bronchoscopy with fluoroscopy. During procedure Bronch washings obtained from right from right upper lobe segments including anterior, posterior and apical. Bronchioalveolar lavage obtained from  right upper lobe anterior segment. Cytology brush specimen was obtained from from right upper lobe anterior segment under fluroscopy guidance. Microbiology brush specimen was obtained from from right upper lobe anterior segment under fluroscopy guidence. Bronch  bronchial washings :  5/7/2017 12:46 PM - Hu, Lab Incoming [facility]   5/30/2017 10:15 AM - [facility], Lab [employer]  Source: bronchial washings   Mycobacterium avium/intracellulare     4/25/2017 11:30 AM - [facility], Lab Incoming [place]   Organism (Abnormal) gram negative cocci Aerobic Culture 200 cfu/ml   If further workup is indicated, contact the microbiology   department. Organism (Abnormal) Viridans streptococcus group Aerobic Culture 400 cfu/ml   If further workup is indicated, contact the microbiology   department. Organism (Abnormal) Haemophilus species Aerobic Culture 200 cfu/ml   If further workup is indicated, contact the microbiology   department. 4/20/2017  7:47 AM - [facility], Lab Incoming [place]   Component Results   Component Value Ref Range & Units Status   Macrophages, BAL 23 % Final   Lymphocytes, BAL 14 % Final   Segmented Neutrophils, BAL 41 % Final   Monocytes, BAL 22 % Final   Performed at [facility]       Connective tissue work up results:  SABAS( Antinuclear antibody): Negative  RA ( Rheumatoid factor): <10  ACE( Angiotensin converting enzyme level):22 ( N)  ESR ( Sed rate):9      Component Results   Component Value Ref Range & Units Status   Aspergillus Ab CF < 1 <1:8 Final   INTERPRETIVE INFORMATION: Aspergillus Ab by Complement   Fixation (CF)   Cross-reactions with dimorphic fungi are not unusual within the   genus Aspergillus. A negative test does not exclude infection,   especially in immuno- compromised patients. Best use of test is   with paired sera taken three weeks apart to detect a rise in titer   against a single antigen. Performed by [facility] , [address] [phone]   www. Wilber Parker MD - Lab.  Director      Blastomyces Antibody CF < 1 <1:8 DISEASE CHARACTERISTICS ASYMMETRICALLY MORE PROMINENT IN THE RIGHT UPPER LOBE, INCLUDING INTRALOBULAR SEPTAL THICKENING AND ASSOCIATED GROUNDGLASS OPACIFICATION WITH MINIMAL NODULARITY. ASSOCIATED BRONCHIECTASIS ALSO. TRACTION RETENTION OF THE RIGHT HILUM SUPERIORLY WITH RIGHTWARD SHIFT OF THE MEDIASTINUM DUE TO THE FIBROTIC CHANGES OF THE RIGHT UPPER LOBE. AREAS OF PREDOMINANTLY SUBPLEURAL INTERSTITIAL FIBROSIS PRESENT IN THE REMAINING LUNG PARENCHYMA THAT IS MUCH LESS IMPRESSIVE. NO EVIDENCE OF INTERVAL IMPROVEMENT. NO SIGNIFICANT INTERVAL PROGRESSION, THOUGH SOME MINIMAL GROUNDGLASS OPACIFICATIONS OF THE RIGHT UPPER LOBE DO APPEAR MORE CONSPICUOUS. DIFFERENTIAL DIAGNOSIS INCLUDES ATYPICAL MYCOBACTERIAL INFECTION, NONSPECIFIC INTERSTITIAL PNEUMONIA, OR CHRONIC AREAS OF DEVELOPING PNEUMONITIS. DISTRIBUTION OF INVOLVEMENT IS GROSSLY INCONSISTENT WITH USUAL INTERSTITIAL PNEUMONIA. Reviewed. HRCT of chest:  KENDRICK CABANDec 3, 2018  Narrative PROCEDURE: CT CHEST HIGH RESOLUTION  . Stable indistinct groundglass opacities in the right upper lung, likely secondary to chronic nonspecific interstitial pneumonia. 2. Stable fibrosis and bronchiectasis, most severe in the right upper lung. Reviewed. PFTS: 12/3/18        Six Minute Walk Test done on 79/4/89 at 2:17 PM    Misael Lisandro [birth year]    Six minute walk test done in my office today by my medical assistant Miss. Perkins: Sandra's oxygen saturation at rest on room air was 95%. Her oxygen saturation dropped to 92% on room air with exertion after 6 minute 0 seconds. Patient don't need any oxygen supplementation. PFTs: 12/2/19        Her TLC and DLCO slightly decreased from previous test done in the past.    HRCT of chest:  Dec 2, 2019   PROCEDURE: CT CHEST HIGH RESOLUTION   COMPARISON: CT chest high resolution 12/3/2018. 1. Stable groundglass opacities in the right upper lobe, likely secondary to chronic nonspecific interstitial pneumonia.  2. Slightly worsening fibrosis and bronchiectasis, most severe in the right upper lung. CXR  1/13/2020   Impression:  Stable right lung infiltrates loss of volume right lung. Mediastinal widening secondary to prominent aortic arch as well as prominent right hilum        Note by [facility] pulmonary staff:  [phone] Pulmonary Attending Staff Note:   I have reviewed the progress note obtained and documented by Dr Taylor Willson and I have personally participated in the key components. I have examined the patient and personally reviewed images. I have discussed the case and management of the patient's care. I have personally reviewed the plan list above and concur. Changes, if any, are noted. Briefly, my assessment is summarized as follows:   Naseem Gasca is a 80year old year old female with bronchiectasis:   1. You are having an excellent response to the nebulized albuterol, salt water, and Acapella. Please continue using this twice daily  2. It is okay to get the breathing test and CT scan of the chest with your local pulmonologist  3. You are probably swallowing air while doing the nebulizer, it is okay to stop using that nebulizer intermittently while the medication is misting. If you continue to have issues, then will discuss at the next virtual visit  4. Exercises very important to maintain your lung health. Please continue to use the exercise bicycle. 5. If you have not already, please stop the ARNUITY ELLIPTA  6. Virtual visit in 3 months    Electronically Signed:  Mina Zaragoza MD [facility]  July 15, 2019. Connective tissue work up results:  Date: 4/03/2017  SABAS( Antinuclear antibody):  Negative-none detected. Fungal serologies- Negative                   RA ( Rheumatoid factor):  Negative. <10                                                          ACE(  level):    Negative-22                               ESR ( Sed rate): 9               Connective Tissue Labs  Date: 1/11/19     SABAS with reflex-None detected.   Anti RNP-1- Normal.  Anti Lexie-0- normal  Anti-SSB-0- normal  AntiSSA-1- normal  AntiCentromere- 4- Normal  Scleroderma AB-24-normal  ACE-10- normal  ESR-101 elevated  Rheumatoid Factor-13 WNL                 Bronch Cultures   Fungus-No yeast or hyphae  Respiratory culture-No organisms observed  AFB-Negative on concentrated smear, Final pending  Pneumocystis-Negative  Legionella-Negative  Anaerobic/aerobic-No prelim growth  PAFs-Negative    Bronch and biopsy:   Biopsy-   FINAL DIAGNOSIS:  Right lower posterior segment of lung, transbronchial biopsy:   Chronic bronchial inflammation.   Negative for malignancy.      Cytology-   FINAL RESULTS:  A. Lung, right lower lobe posterior subsegment, bronchoalveolar lavage:   Abundant acute inflammation.   No malignant cells seen. B. Lung, right tracheobronchial tree, washings:   Abundant acute inflammation and scattered benign bronchial epithelium.   No malignant cells seen. C. Lung, right lower lobe posterior segment brush tip, brushing:   Benign bronchial epithelium and acute inflammation.   No malignant cells seen. Assessment:  -Interstitial Interstitial lung disease due to uncertain etiology. Her work up for connective tissue diseases are negative so far- ? UIP Vs Post inflammatory scarring from her previous hx of Mycobacterium avium/intracellulare infection of lungs- She refused to go for open lung biopsy with VATS. She is clinically remain stable. She is following with [facility] pulmonary service. She want to her further work up including open lung biopsy to be done at [facility]. -She was treated by Dr. Nadia Hernandez for 13months. Her antibiotics were discontinued due to development of optic neutitis. She is currently not on any treatment. Her pulmonary fibrosis is stable by CT chest.   -Bronchiectasis noted on HRCT. She is using Acapella.   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  -Chronic cough due to ILD Vs Bronchiectasis- Improved  -Allergic rhinitis under

## 2020-01-27 ENCOUNTER — OFFICE VISIT (OUTPATIENT)
Dept: PULMONOLOGY | Age: 85
End: 2020-01-27
Payer: MEDICARE

## 2020-01-27 VITALS
DIASTOLIC BLOOD PRESSURE: 72 MMHG | OXYGEN SATURATION: 93 % | SYSTOLIC BLOOD PRESSURE: 134 MMHG | BODY MASS INDEX: 27.29 KG/M2 | WEIGHT: 163.8 LBS | HEART RATE: 96 BPM | HEIGHT: 65 IN

## 2020-01-27 PROCEDURE — 1123F ACP DISCUSS/DSCN MKR DOCD: CPT | Performed by: INTERNAL MEDICINE

## 2020-01-27 PROCEDURE — 99215 OFFICE O/P EST HI 40 MIN: CPT | Performed by: INTERNAL MEDICINE

## 2020-01-27 PROCEDURE — G8482 FLU IMMUNIZE ORDER/ADMIN: HCPCS | Performed by: INTERNAL MEDICINE

## 2020-01-27 PROCEDURE — 4040F PNEUMOC VAC/ADMIN/RCVD: CPT | Performed by: INTERNAL MEDICINE

## 2020-01-27 PROCEDURE — G8427 DOCREV CUR MEDS BY ELIG CLIN: HCPCS | Performed by: INTERNAL MEDICINE

## 2020-01-27 PROCEDURE — 1111F DSCHRG MED/CURRENT MED MERGE: CPT | Performed by: INTERNAL MEDICINE

## 2020-01-27 PROCEDURE — 1090F PRES/ABSN URINE INCON ASSESS: CPT | Performed by: INTERNAL MEDICINE

## 2020-01-27 PROCEDURE — G8417 CALC BMI ABV UP PARAM F/U: HCPCS | Performed by: INTERNAL MEDICINE

## 2020-01-27 PROCEDURE — 1036F TOBACCO NON-USER: CPT | Performed by: INTERNAL MEDICINE

## 2020-01-27 PROCEDURE — G8399 PT W/DXA RESULTS DOCUMENT: HCPCS | Performed by: INTERNAL MEDICINE

## 2020-01-27 NOTE — PROGRESS NOTES
Neck Circumference -   13.25  Mallampati - 1    Lung Nodule Screening     [] Qualifies    [x] Does not qualify   [] Declined    [] Completed

## 2020-02-10 LAB
FUNGUS IDENTIFIED: NORMAL
FUNGUS SMEAR: NORMAL

## 2020-02-24 LAB
AFB CULTURE & SMEAR: NORMAL
AFB CULTURE & SMEAR: NORMAL

## 2020-02-25 ENCOUNTER — OFFICE VISIT (OUTPATIENT)
Dept: FAMILY MEDICINE CLINIC | Age: 85
End: 2020-02-25

## 2020-02-25 ENCOUNTER — TELEPHONE (OUTPATIENT)
Dept: FAMILY MEDICINE CLINIC | Age: 85
End: 2020-02-25

## 2020-02-25 VITALS
RESPIRATION RATE: 20 BRPM | HEIGHT: 65 IN | HEART RATE: 74 BPM | DIASTOLIC BLOOD PRESSURE: 68 MMHG | SYSTOLIC BLOOD PRESSURE: 122 MMHG | BODY MASS INDEX: 26.99 KG/M2 | WEIGHT: 162 LBS

## 2020-02-25 PROCEDURE — G8399 PT W/DXA RESULTS DOCUMENT: HCPCS | Performed by: FAMILY MEDICINE

## 2020-02-25 PROCEDURE — 1090F PRES/ABSN URINE INCON ASSESS: CPT | Performed by: FAMILY MEDICINE

## 2020-02-25 PROCEDURE — 1036F TOBACCO NON-USER: CPT | Performed by: FAMILY MEDICINE

## 2020-02-25 PROCEDURE — G8427 DOCREV CUR MEDS BY ELIG CLIN: HCPCS | Performed by: FAMILY MEDICINE

## 2020-02-25 PROCEDURE — 99213 OFFICE O/P EST LOW 20 MIN: CPT | Performed by: FAMILY MEDICINE

## 2020-02-25 PROCEDURE — 1123F ACP DISCUSS/DSCN MKR DOCD: CPT | Performed by: FAMILY MEDICINE

## 2020-02-25 PROCEDURE — 4040F PNEUMOC VAC/ADMIN/RCVD: CPT | Performed by: FAMILY MEDICINE

## 2020-02-25 PROCEDURE — G8417 CALC BMI ABV UP PARAM F/U: HCPCS | Performed by: FAMILY MEDICINE

## 2020-02-25 ASSESSMENT — ENCOUNTER SYMPTOMS
CONSTIPATION: 0
WHEEZING: 0
SHORTNESS OF BREATH: 1
COUGH: 1
NAUSEA: 0
CHEST TIGHTNESS: 0
SORE THROAT: 0
EYE PAIN: 0
ABDOMINAL PAIN: 0

## 2020-02-25 ASSESSMENT — PATIENT HEALTH QUESTIONNAIRE - PHQ9
SUM OF ALL RESPONSES TO PHQ QUESTIONS 1-9: 0
2. FEELING DOWN, DEPRESSED OR HOPELESS: 0
1. LITTLE INTEREST OR PLEASURE IN DOING THINGS: 0
SUM OF ALL RESPONSES TO PHQ9 QUESTIONS 1 & 2: 0
SUM OF ALL RESPONSES TO PHQ QUESTIONS 1-9: 0

## 2020-02-25 NOTE — TELEPHONE ENCOUNTER
[facility] from [facility] PT called and said that when she arrived that the pt felt shaky and had chills. She said that she checked her pulse and the high has been 97 and the low 78 and has been bouncing around. The pt told her that she just does not feel right. [facility] called back and scheduled appointment for pt.

## 2020-02-25 NOTE — PROGRESS NOTES
Subjective:      Patient ID: Jennifer Son is a 80 y.o. female. Bronchiectasis and  Pulmonary  Fibrosis    Note     Cough   This is a chronic problem. The current episode started more than 1 month ago. The cough is non-productive. Associated symptoms include chills, nasal congestion, postnasal drip and shortness of breath. Pertinent negatives include no chest pain, ear pain, fever, headaches, rash, sore throat or wheezing. Associated symptoms comments:   Achy . She has tried nothing for the symptoms. The treatment provided no relief. Past Medical History:   Diagnosis Date    Anemia     normal on pre-op 5/2012 and 12/2/13    Backache     h/o    Bronchiectasis [facility]) 2013    Bursitis     bilateral shoulders    Constipation     Diverticulosis of colon     HTN (hypertension)     Hypercalcemia     slightly elevated at 10.8 pre-op 12/2/13    Hyperlipidemia     Nodular goiter     bx negative    OA (osteoarthritis)     bilateral thumbs - sees Dr. Barby Brewster Osteopenia 11/6/2013    Pneumonia of right upper lobe due to infectious organism ([facility] [address]     Pneumonitis     Dr. Carole Hammond in 7/2010 - bx negative    Pulmonary Mycobacterium avium complex (MAC) infection ([address]     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 ([facility] [address]     had one parathyroid removed - now follows with Dr. Brunson  Sinusitis     with seasonal allergies    Vitamin D deficiency 05/2018       Review of Systems   Constitutional: Positive for chills. Negative for appetite change, fatigue and fever. HENT: Positive for postnasal drip. Negative for congestion, ear pain and sore throat. Eyes: Negative for pain and visual disturbance. Respiratory: Positive for cough and shortness of breath. Negative for chest tightness and wheezing. Dyspnea  A both  The normal   Cardiovascular: Negative for chest pain, palpitations and leg swelling. Gastrointestinal: Negative for abdominal pain, constipation and nausea.    Genitourinary: Negative for dysuria and frequency. Musculoskeletal: Negative for arthralgias, joint swelling, neck pain and neck stiffness. Skin: Negative for rash. Neurological: Negative for dizziness, weakness, numbness and headaches. Hematological: Negative for adenopathy. Does not bruise/bleed easily. Psychiatric/Behavioral: Negative for behavioral problems and sleep disturbance. The patient is not nervous/anxious. /68 (Site: Right Upper Arm, Position: Sitting, Cuff Size: Medium Adult)   Pulse 74   Resp 20   Ht 5' 4.5\" (1.638 m)   Wt 162 lb (73.5 kg)   BMI 27.38 kg/m²   Objective:   Physical Exam  Vitals signs and nursing note reviewed. Constitutional:       Appearance: She is well-developed. HENT:      Head: Normocephalic and atraumatic. Right Ear: External ear normal.      Left Ear: External ear normal.      Nose: Congestion present. Mouth/Throat:      Pharynx: Posterior oropharyngeal erythema present. Eyes:      General: No scleral icterus. Conjunctiva/sclera: Conjunctivae normal.      Pupils: Pupils are equal, round, and reactive to light. Neck:      Musculoskeletal: Normal range of motion and neck supple. Thyroid: No thyromegaly. Vascular: No JVD. Cardiovascular:      Rate and Rhythm: Normal rate and regular rhythm. Heart sounds: Normal heart sounds. Pulmonary:      Effort: Pulmonary effort is normal.      Breath sounds: Normal breath sounds. No wheezing or rales. Comments:   No  rhonci and  Dry    Rales  In  Bases and  No wheeze   Abdominal:      General: Bowel sounds are normal. There is no distension. Palpations: Abdomen is soft. There is no mass. Tenderness: There is no abdominal tenderness. Musculoskeletal: Normal range of motion. General: No tenderness. Lymphadenopathy:      Cervical: No cervical adenopathy. Skin:     General: Skin is warm and dry. Findings: No rash.    Neurological:      Mental Status: She is alert and oriented to person, place, and time. Cranial Nerves: No cranial nerve deficit. Deep Tendon Reflexes: Reflexes are normal and symmetric. Assessment:       Diagnosis Orders   1. Bronchiectasis without complication ([address]     2. Viral syndrome           Plan:      Current Outpatient Medications   Medication Sig Dispense Refill    albuterol (PROVENTIL) (2.5 MG/3ML) 0.083% nebulizer solution Take 3 mLs by nebulization every 6 hours as needed for Wheezing or Shortness of Breath 120 each 3    albuterol sulfate HFA (PROAIR HFA) 108 (90 Base) MCG/ACT inhaler Inhale 2 puffs into the lungs every 6 hours as needed for Wheezing or Shortness of Breath 1 Inhaler 11    sodium chloride, Inhalant, 7 % nebulizer solution Take 4 mLs by nebulization 2 times daily       simvastatin (ZOCOR) 20 MG tablet TAKE 1 TABLET BY MOUTH  NIGHTLY 90 tablet 3    amLODIPine (NORVASC) 10 MG tablet TAKE 1 TABLET BY MOUTH  DAILY 90 tablet 3    Lactobacillus (PROBIOTIC ACIDOPHILUS) CAPS Take 1 capsule by mouth daily       levothyroxine (SYNTHROID) 75 MCG tablet take 1 tablet by mouth once daily 90 tablet 3    busPIRone (BUSPAR) 10 MG tablet Take 5 mg by mouth 3 times daily       Cholecalciferol (VITAMIN D3) 5000 units TABS Take 5,000 Units by mouth every other day       MULTIPLE VITAMIN PO Take 1 tablet by mouth daily       loratadine (CLARITIN) 10 MG capsule Take 10 mg by mouth daily        No current facility-administered medications for this visit.       Aerosols  To  Tid   To  Qid  And  P[ulmonary  Toilet and  If   Worse    Prednisone       Pulse  Ox  93%        Edin Jaeger MD

## 2020-03-02 ENCOUNTER — NURSE ONLY (OUTPATIENT)
Dept: LAB | Age: 85
End: 2020-03-02

## 2020-03-02 LAB
PTH INTACT: 40.7 PG/ML (ref 15–65)
TSH SERPL DL<=0.05 MIU/L-ACNC: 2.77 UIU/ML (ref 0.4–4.2)
VITAMIN D 25-HYDROXY: 83 NG/ML (ref 30–100)

## 2020-03-03 LAB — CALCIUM IONIZED: NORMAL

## 2020-03-18 PROBLEM — Z85.858: Status: ACTIVE | Noted: 2020-03-18

## 2020-08-18 ENCOUNTER — NURSE ONLY (OUTPATIENT)
Dept: LAB | Age: 85
End: 2020-08-18

## 2020-08-18 LAB
BASOPHILS # BLD: 0.9 %
BASOPHILS ABSOLUTE: 0.1 THOU/MM3 (ref 0–0.1)
CHOLESTEROL, TOTAL: 171 MG/DL (ref 100–199)
EOSINOPHIL # BLD: 4.7 %
EOSINOPHILS ABSOLUTE: 0.5 THOU/MM3 (ref 0–0.4)
ERYTHROCYTE [DISTWIDTH] IN BLOOD BY AUTOMATED COUNT: 14.2 % (ref 11.5–14.5)
ERYTHROCYTE [DISTWIDTH] IN BLOOD BY AUTOMATED COUNT: 46 FL (ref 35–45)
HCT VFR BLD CALC: 48.8 % (ref 37–47)
HDLC SERPL-MCNC: 92 MG/DL
HEMOGLOBIN: 15.3 GM/DL (ref 12–16)
IMMATURE GRANS (ABS): 0.04 THOU/MM3 (ref 0–0.07)
IMMATURE GRANULOCYTES: 0.4 %
LDL CHOLESTEROL CALCULATED: 62 MG/DL
LYMPHOCYTES # BLD: 15.5 %
LYMPHOCYTES ABSOLUTE: 1.6 THOU/MM3 (ref 1–4.8)
MCH RBC QN AUTO: 27.8 PG (ref 26–33)
MCHC RBC AUTO-ENTMCNC: 31.4 GM/DL (ref 32.2–35.5)
MCV RBC AUTO: 88.7 FL (ref 81–99)
MONOCYTES # BLD: 9.5 %
MONOCYTES ABSOLUTE: 1 THOU/MM3 (ref 0.4–1.3)
NUCLEATED RED BLOOD CELLS: 0 /100 WBC
PLATELET # BLD: 271 THOU/MM3 (ref 130–400)
PMV BLD AUTO: 10.7 FL (ref 9.4–12.4)
RBC # BLD: 5.5 MILL/MM3 (ref 4.2–5.4)
SEG NEUTROPHILS: 69 %
SEGMENTED NEUTROPHILS ABSOLUTE COUNT: 7.3 THOU/MM3 (ref 1.8–7.7)
TRIGL SERPL-MCNC: 84 MG/DL (ref 0–199)
WBC # BLD: 10.6 THOU/MM3 (ref 4.8–10.8)

## 2020-08-21 ENCOUNTER — TELEPHONE (OUTPATIENT)
Dept: FAMILY MEDICINE CLINIC | Age: 85
End: 2020-08-21

## 2020-08-21 NOTE — TELEPHONE ENCOUNTER
----- Message from Marsha Cisneros MD sent at 8/20/2020  9:03 PM EDT -----  Let her know the CBC and lipids were fine.  Check the fasting LDL again in late February

## 2020-08-21 NOTE — TELEPHONE ENCOUNTER
----- Message from Yenifer Willett MD sent at 8/20/2020  9:03 PM EDT -----  Let her know the CBC and lipids were fine.  Check the fasting LDL again in late February

## 2020-08-25 ENCOUNTER — HOSPITAL ENCOUNTER (OUTPATIENT)
Dept: CT IMAGING | Age: 85
Discharge: HOME | End: 2020-08-25
Payer: MEDICARE

## 2020-08-25 PROCEDURE — 71250 CT THORAX DX C-: CPT

## 2020-08-27 ENCOUNTER — PATIENT MESSAGE (OUTPATIENT)
Dept: PULMONOLOGY | Age: 85
End: 2020-08-27

## 2020-09-08 ENCOUNTER — OFFICE VISIT (OUTPATIENT)
Dept: FAMILY MEDICINE CLINIC | Age: 85
End: 2020-09-08

## 2020-09-08 VITALS
SYSTOLIC BLOOD PRESSURE: 112 MMHG | TEMPERATURE: 97.8 F | HEIGHT: 64 IN | RESPIRATION RATE: 16 BRPM | BODY MASS INDEX: 27.81 KG/M2 | DIASTOLIC BLOOD PRESSURE: 68 MMHG | HEART RATE: 68 BPM

## 2020-09-08 PROCEDURE — 4040F PNEUMOC VAC/ADMIN/RCVD: CPT | Performed by: FAMILY MEDICINE

## 2020-09-08 PROCEDURE — 99213 OFFICE O/P EST LOW 20 MIN: CPT | Performed by: FAMILY MEDICINE

## 2020-09-08 PROCEDURE — G0438 PPPS, INITIAL VISIT: HCPCS | Performed by: FAMILY MEDICINE

## 2020-09-08 PROCEDURE — 1123F ACP DISCUSS/DSCN MKR DOCD: CPT | Performed by: FAMILY MEDICINE

## 2020-09-08 RX ORDER — SIMVASTATIN 20 MG
TABLET ORAL
Qty: 90 TABLET | Refills: 3 | Status: SHIPPED | OUTPATIENT
Start: 2020-09-08 | End: 2021-08-24 | Stop reason: SDUPTHER

## 2020-09-08 RX ORDER — PERPHENAZINE 8 MG
600 TABLET ORAL 2 TIMES DAILY
Status: ON HOLD | COMMUNITY
Start: 2020-07-16 | End: 2020-10-02 | Stop reason: HOSPADM

## 2020-09-08 RX ORDER — LEVOTHYROXINE SODIUM 0.07 MG/1
TABLET ORAL
Qty: 90 TABLET | Refills: 3 | Status: SHIPPED | OUTPATIENT
Start: 2020-09-08 | End: 2021-08-24 | Stop reason: SDUPTHER

## 2020-09-08 RX ORDER — AMLODIPINE BESYLATE 10 MG/1
TABLET ORAL
Qty: 90 TABLET | Refills: 3 | Status: ON HOLD | OUTPATIENT
Start: 2020-09-08 | End: 2020-10-02 | Stop reason: HOSPADM

## 2020-09-08 RX ORDER — AMOXICILLIN 500 MG/1
CAPSULE ORAL
Qty: 4 CAPSULE | Refills: 3 | Status: SHIPPED | OUTPATIENT
Start: 2020-09-08 | End: 2020-10-19

## 2020-09-08 ASSESSMENT — LIFESTYLE VARIABLES
HOW OFTEN DURING THE LAST YEAR HAVE YOU NEEDED AN ALCOHOLIC DRINK FIRST THING IN THE MORNING TO GET YOURSELF GOING AFTER A NIGHT OF HEAVY DRINKING: 0
HOW OFTEN DURING THE LAST YEAR HAVE YOU HAD A FEELING OF GUILT OR REMORSE AFTER DRINKING: 0
HOW OFTEN DURING THE LAST YEAR HAVE YOU FOUND THAT YOU WERE NOT ABLE TO STOP DRINKING ONCE YOU HAD STARTED: 0
HOW OFTEN DO YOU HAVE SIX OR MORE DRINKS ON ONE OCCASION: 0
HAVE YOU OR SOMEONE ELSE BEEN INJURED AS A RESULT OF YOUR DRINKING: 0
AUDIT-C TOTAL SCORE: 1
HAS A RELATIVE, FRIEND, DOCTOR, OR ANOTHER HEALTH PROFESSIONAL EXPRESSED CONCERN ABOUT YOUR DRINKING OR SUGGESTED YOU CUT DOWN: 0
HOW OFTEN DO YOU HAVE A DRINK CONTAINING ALCOHOL: 1
HOW OFTEN DURING THE LAST YEAR HAVE YOU BEEN UNABLE TO REMEMBER WHAT HAPPENED THE NIGHT BEFORE BECAUSE YOU HAD BEEN DRINKING: 0
HOW MANY STANDARD DRINKS CONTAINING ALCOHOL DO YOU HAVE ON A TYPICAL DAY: 0
AUDIT TOTAL SCORE: 1
HOW OFTEN DURING THE LAST YEAR HAVE YOU FAILED TO DO WHAT WAS NORMALLY EXPECTED FROM YOU BECAUSE OF DRINKING: 0

## 2020-09-08 ASSESSMENT — PATIENT HEALTH QUESTIONNAIRE - PHQ9
SUM OF ALL RESPONSES TO PHQ QUESTIONS 1-9: 2
SUM OF ALL RESPONSES TO PHQ QUESTIONS 1-9: 2

## 2020-09-08 NOTE — PATIENT INSTRUCTIONS
Personalized Preventive Plan for Sanjana March - [birth year]  Medicare offers a range of preventive health benefits. Some of the tests and screenings are paid in full while other may be subject to a deductible, co-insurance, and/or copay. Some of these benefits include a comprehensive review of your medical history including lifestyle, illnesses that may run in your family, and various assessments and screenings as appropriate. After reviewing your medical record and screening and assessments performed today your provider may have ordered immunizations, labs, imaging, and/or referrals for you. A list of these orders (if applicable) as well as your Preventive Care list are included within your After Visit Summary for your review. Other Preventive Recommendations:    · A preventive eye exam performed by an eye specialist is recommended every 1-2 years to screen for glaucoma; cataracts, macular degeneration, and other eye disorders. · A preventive dental visit is recommended every 6 months. · Try to get at least 150 minutes of exercise per week or 10,000 steps per day on a pedometer . · Order or download the FREE \"Exercise & Physical Activity: Your Everyday Guide\" from [employer]. Call 3-320.905.1836 or search [employer] online. · You need 5056-2689 mg of calcium and 5867-5276 IU of vitamin D per day. It is possible to meet your calcium requirement with diet alone, but a vitamin D supplement is usually necessary to meet this goal.  · When exposed to the sun, use a sunscreen that protects against both UVA and UVB radiation with an SPF of 30 or greater. Reapply every 2 to 3 hours or after sweating, drying off with a towel, or swimming. · Always wear a seat belt when traveling in a car. Always wear a helmet when riding a bicycle or motorcycle.

## 2020-09-08 NOTE — TELEPHONE ENCOUNTER
From: Juliet Awan  Sent: 9/5/2020 1:32 PM EDT  To: [facility] Pulmonary Medicine Clinical Support Pool  Subject: RE: Test Results Question    If you will send me an email with the link to [email or web address] so that I can do it on my computer , that would be best. I am often unable to even swipe my smart phone open to answer. shaan.

## 2020-09-08 NOTE — PROGRESS NOTES
Medicare Annual Wellness Visit  Name: Julio Guallpa Date: 2020   MRN: [id number] Sex: Female   Age: 80 y.o. Ethnicity: Non-/Non    : 1935 Race: Cm Boggs is here for Medicare AWV    Screenings for behavioral, psychosocial and functional/safety risks, and cognitive dysfunction are all negative except as indicated below. These results, as well as other patient data from the [address] form, are documented in Flowsheets linked to this Encounter. Allergies   Allergen Reactions    Percocet [Oxycodone-Acetaminophen] Nausea Only and Other (See Comments)     Affects memory    Sulfa Antibiotics Other (See Comments)     hallucinations    Cefuroxime Diarrhea, Nausea And Vomiting and Other (See Comments)     cramping       Prior to Visit Medications    Medication Sig Taking?  Authorizing Provider   Acetylcysteine (NAC) 500 MG CAPS Take 600 mg by mouth 2 times daily Yes Historical Provider, MD   busPIRone (BUSPAR) 10 MG tablet take 1/2 tablet by mouth twice a day Yes Gricelda Romo MD   albuterol (PROVENTIL) (2.5 MG/3ML) 0.083% nebulizer solution Take 3 mLs by nebulization every 6 hours as needed for Wheezing or Shortness of Breath Yes JASMEET Nelson CNP   albuterol sulfate HFA (PROAIR HFA) 108 (90 Base) MCG/ACT inhaler Inhale 2 puffs into the lungs every 6 hours as needed for Wheezing or Shortness of Breath Yes JASMEET Morales CNP   sodium chloride, Inhalant, 7 % nebulizer solution Take 4 mLs by nebulization 2 times daily  Yes Historical Provider, MD   loratadine (CLARITIN) 10 MG capsule Take 10 mg by mouth daily  Yes Historical Provider, MD   simvastatin (ZOCOR) 20 MG tablet TAKE 1 TABLET BY MOUTH  NIGHTLY Yes Gricelda Romo MD   amLODIPine (NORVASC) 10 MG tablet TAKE 1 TABLET BY MOUTH  DAILY Yes Gricelda Romo MD   Lactobacillus (PROBIOTIC ACIDOPHILUS) CAPS Take 1 capsule by mouth daily  Yes Historical Provider, MD   levothyroxine (SYNTHROID) 75 MCG tablet take 1 tablet by mouth once daily Yes Shaheen Bunch APRN - CNP   Cholecalciferol (VITAMIN D3) 5000 units TABS Take 5,000 Units by mouth every other day  Yes Historical Provider, MD   MULTIPLE VITAMIN PO Take 1 tablet by mouth daily  Yes Historical Provider, MD       Past Medical History:   Diagnosis Date    Anemia     normal on pre-op 2012 and 13    Backache     h/o    Bronchiectasis ([address]     Bursitis     bilateral shoulders    Constipation     Diverticulosis of colon     HTN (hypertension)     Hypercalcemia     slightly elevated at 10.8 pre-op 13    Hyperlipidemia     Nodular goiter     bx negative    OA (osteoarthritis)     bilateral thumbs - sees Dr. Alexa Morton Osteopenia 2013    Parathyroid adenoma 2013    Pneumonia of right upper lobe due to infectious organism ([facility]     Pneumonitis     Dr. Michael Domingo in 2010 - bx negative    Pulmonary Mycobacterium avium complex (MAC) infection ([facility]     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 ([address]     had one parathyroid removed - now follows with Dr. Bates March    Sinusitis     with seasonal allergies    Vitamin D deficiency 2018       Past Surgical History:   Procedure Laterality Date    ABDOMINAL EXPLORATION SURGERY  2013    Release of KATHERINE TAMAYO-Dr. Edward Rossi    APPENDECTOMY      BRONCHOSCOPY N/A 2020    BRONCHOSCOPY FLUOROSCOPY performed by Shellie Montalvo MD at [address] WITH IMPLANT Bilateral  ?      SECTION  1306,7240    DILATION AND CURETTAGE OF UTERUS  3411,2399,0610    EXCISION OF PARATHYROID MASS Right 2013    rt parathyroidectomy (excision of rt inferior parathyroid mass) exploration of neck     FOOT SURGERY      left    FOOT SURGERY Left 2017    Dr Katelyn Boyce  12    left knee    JOINT REPLACEMENT  2014    right knee    MALIGNANT SKIN LESION EXCISION Left 06/12/2017    BCC DORSAL FOOT WITH GRAFT & FS    GOSIA AND BSO  1982    TONSILLECTOMY AND ADENOIDECTOMY  1940 ?  TOTAL KNEE ARTHROPLASTY Right January 6th, 2014    OIO       Family History   Problem Relation Age of Onset    Diabetes Mother     Thyroid Disease Mother     Arthritis Mother     High Blood Pressure Mother     Arthritis Father     High Blood Pressure Father     Other Father         hay fever    Breast Cancer Neg Hx     Ovarian Cancer Neg Hx        CareTeam (Including outside providers/suppliers regularly involved in providing care):   Patient Care Team:  Severiano Pickett MD as PCP - General (Family Medicine)  Severiano Pickett MD as PCP - [facility]  Amarilis Soliz MD as Consulting Physician (Gastroenterology)  Sven Hampton MD as Consulting Physician (Internal Medicine)  Yolanda Friend MD as Consulting Physician (Endocrinology)  Fito Lee MD as Consulting Physician (Orthopedic Surgery)  León Boyce MD as Consulting Physician (Pulmonology)  Darcy Burgess DPM as Consulting Physician (Podiatry)  Felicia Ashford as Consulting Physician (Dermatology)    Wt Readings from Last 3 Encounters:   02/25/20 162 lb (73.5 kg)   01/27/20 163 lb 12.8 oz (74.3 kg)   01/14/20 164 lb 10.9 oz (74.7 kg)     Vitals:    09/08/20 1540   BP: 112/68   Site: Right Upper Arm   Position: Sitting   Cuff Size: Medium Adult   Pulse: 68   Resp: 16   Temp: 97.8 °F (36.6 °C)   TempSrc: Infrared   Height: 5' 4\" (1.626 m)     Body mass index is 27.81 kg/m². She is not sure how much the buspar is helping and would like to stop it  She feels more SOB today    Based upon direct observation of the patient, evaluation of cognition reveals recent and remote memory intact.     General Appearance: alert and oriented to person, place and time, well-developed and well-nourished, in no acute distress  Skin: warm and dry, no rash or erythema  Head: normocephalic and atraumatic  Eyes: pupils equal, round, and reactive to light, extraocular eye movements intact, conjunctivae normal  Neck: neck supple and non tender without mass, no thyromegaly or thyroid nodules, no cervical lymphadenopathy   Pulmonary/Chest: rales present- throughout  Cardiovascular: normal rate, normal S1 and S2 and no murmurs  Extremities: no cyanosis, no clubbing and no edema  Musculoskeletal: normal range of motion, no joint swelling, deformity or tenderness  Neurologic: gait and coordination normal and speech normal    Patient's complete Health Risk Assessment and screening values have been reviewed and are found in Flowsheets. The following problems were reviewed today and where indicated follow up appointments were made and/or referrals ordered. Positive Risk Factor Screenings with Interventions:     General Health:  General  In general, how would you say your health is?: Fair  In the past 7 days, have you experienced any of the following? New or Increased Pain, New or Increased Fatigue, Loneliness, Social Isolation, Stress or Anger?: (!) Loneliness, Social Isolation  Do you get the social and emotional support that you need?: Yes  Do you have a Living Will?: Yes  General Health Risk Interventions:  · Loneliness: from the virus  · Social isolation: patient declines any further intervention for this issue    Health Habits/Nutrition:  Health Habits/Nutrition  Do you exercise for at least 20 minutes 2-3 times per week?: (!) No  Have you lost any weight without trying in the past 3 months?: (!) Yes  Do you eat fewer than 2 meals per day?: No  Have you seen a dentist within the past year?: Yes  Body mass index is 27.81 kg/m².   Health Habits/Nutrition Interventions:  · Inadequate physical activity:  patient is not ready to increase his/her physical activity level at this time  · Nutritional issues:  patient is not ready to address his/her nutritional/weight issues at this time    Hearing/Vision:  No exam data present  Hearing/Vision  Do you or your family notice any trouble with your hearing?: No  Do you have difficulty driving, watching TV, or doing any of your daily activities because of your eyesight?: No  Have you had an eye exam within the past year?: (!) No  Hearing/Vision Interventions:  · Vision concerns:  patient encouraged to make appointment with his/her eye specialist    Safety:  Safety  Do you have working smoke detectors?: Yes  Have all throw rugs been removed or fastened?: (!) No  Do you have non-slip mats or surfaces in all bathtubs/showers?: Yes  Do all of your stairways have a railing or banister?: Yes  Are your doorways, halls and stairs free of clutter?: Yes  Do you always fasten your seatbelt when you are in a car?: Yes  Safety Interventions:  · we discussed safey measures    ADL:  ADLs  In the past 7 days, did you need help from others to perform any of the following everyday activities? Eating, dressing, grooming, bathing, toileting, or walking/balance?: None  In the past 7 days, did you need help from others to take care of any of the following?  Laundry, housekeeping, banking/finances, shopping, telephone use, food preparation, transportation, or taking medications?: (!) Shopping  ADL Interventions:  · she does on line ordering    Personalized Preventive Plan   Current Health Maintenance Status  Immunization History   Administered Date(s) Administered    FLUZONE 3 YEARS AND OVER 09/05/2014    Influenza Vaccine, unspecified formulation 09/07/2016    Influenza Virus Vaccine 10/11/2012, 09/10/2013, 09/18/2017    Influenza Whole 09/08/2015    Influenza, High Dose (Fluzone 65 yrs and older) 09/27/2018    Influenza, MDCK Quadv, with preserv IM (Flucelvax 4 yrs and older) 08/16/2019    Influenza, Triv, inactivated, subunit, adjuvanted, IM (Fluad 65 yrs and older) 09/12/2019, 09/08/2020    PPD Test 04/03/2017    Pneumococcal Conjugate 13-valent ([id number]) 11/06/2013    Pneumococcal Polysaccharide ([id number]) 10/24/2007    Td, unspecified formulation 02/24/2001    Tdap (Boostrix, Adacel) 05/17/2013    Zoster Live (Zostavax) 06/05/2008    Zoster Recombinant (Shingrix) 08/16/2019, 10/03/2019        Health Maintenance   Topic Date Due    Annual Wellness Visit (AWV)  05/29/2019    Potassium monitoring  01/11/2021    Creatinine monitoring  01/11/2021    TSH testing  03/02/2021    Lipid screen  08/18/2021    Breast cancer screen  10/08/2021    DTaP/Tdap/Td vaccine (2 - Td) 05/17/2023    DEXA (modify frequency per FRAX score)  Completed    Flu vaccine  Completed    Shingles Vaccine  Completed    Pneumococcal 65+ years Vaccine  Completed    Hepatitis A vaccine  Aged Out    Hepatitis B vaccine  Aged Out    Hib vaccine  Aged Out    Meningococcal (ACWY) vaccine  Aged Out     Recommendations for [employer] Due: see orders and patient instructions/AVS.  . Recommended screening schedule for the next 5-10 years is provided to the patient in written form: see Patient Ga Bejarano was seen today for medicare awv.     Diagnoses and all orders for this visit:    Essential hypertension    Hypothyroidism, unspecified type    Pure hypercholesterolemia

## 2020-09-17 ENCOUNTER — OFFICE VISIT (OUTPATIENT)
Dept: PULMONOLOGY | Age: 85
End: 2020-09-17
Payer: MEDICARE

## 2020-09-17 VITALS
WEIGHT: 153.8 LBS | DIASTOLIC BLOOD PRESSURE: 88 MMHG | OXYGEN SATURATION: 95 % | HEIGHT: 64 IN | HEART RATE: 91 BPM | RESPIRATION RATE: 32 BRPM | BODY MASS INDEX: 26.26 KG/M2 | SYSTOLIC BLOOD PRESSURE: 136 MMHG | TEMPERATURE: 98.9 F

## 2020-09-17 PROCEDURE — 99215 OFFICE O/P EST HI 40 MIN: CPT | Performed by: NURSE PRACTITIONER

## 2020-09-17 PROCEDURE — 4040F PNEUMOC VAC/ADMIN/RCVD: CPT | Performed by: NURSE PRACTITIONER

## 2020-09-17 PROCEDURE — 94618 PULMONARY STRESS TESTING: CPT | Performed by: NURSE PRACTITIONER

## 2020-09-17 PROCEDURE — 94664 DEMO&/EVAL PT USE INHALER: CPT | Performed by: NURSE PRACTITIONER

## 2020-09-17 PROCEDURE — 95012 NITRIC OXIDE EXP GAS DETER: CPT | Performed by: NURSE PRACTITIONER

## 2020-09-17 PROCEDURE — 1036F TOBACCO NON-USER: CPT | Performed by: NURSE PRACTITIONER

## 2020-09-17 PROCEDURE — 1123F ACP DISCUSS/DSCN MKR DOCD: CPT | Performed by: NURSE PRACTITIONER

## 2020-09-17 PROCEDURE — G8399 PT W/DXA RESULTS DOCUMENT: HCPCS | Performed by: NURSE PRACTITIONER

## 2020-09-17 PROCEDURE — G8427 DOCREV CUR MEDS BY ELIG CLIN: HCPCS | Performed by: NURSE PRACTITIONER

## 2020-09-17 PROCEDURE — G8417 CALC BMI ABV UP PARAM F/U: HCPCS | Performed by: NURSE PRACTITIONER

## 2020-09-17 PROCEDURE — 1090F PRES/ABSN URINE INCON ASSESS: CPT | Performed by: NURSE PRACTITIONER

## 2020-09-17 RX ORDER — FLUTICASONE PROPIONATE 250 UG/1
1 POWDER, METERED RESPIRATORY (INHALATION) DAILY
Qty: 1 EACH | Refills: 3 | Status: SHIPPED | OUTPATIENT
Start: 2020-09-17 | End: 2020-09-17 | Stop reason: SDUPTHER

## 2020-09-17 RX ORDER — FLUTICASONE PROPIONATE 250 UG/1
1 POWDER, METERED RESPIRATORY (INHALATION) DAILY
Qty: 1 EACH | Refills: 3 | Status: ON HOLD | OUTPATIENT
Start: 2020-09-17 | End: 2020-09-28 | Stop reason: HOSPADM

## 2020-09-17 ASSESSMENT — ENCOUNTER SYMPTOMS
CHEST TIGHTNESS: 0
ABDOMINAL PAIN: 0
DIARRHEA: 0
COUGH: 0
VOMITING: 0
NAUSEA: 0
EYES NEGATIVE: 1
SHORTNESS OF BREATH: 1
WHEEZING: 0

## 2020-09-17 NOTE — PROGRESS NOTES
[facility]     Patient: Gris Sparks, 80 y.o.   : 1935    Pt of Dr. Neelam Bridges     Chief Complaint   Patient presents with    Follow-up     F/U with  CT Chest         HPI  Kayleen Collet is here for follow up for pulmonary fibrosis. 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 CTD labs negative. Declined to go for lung biopsy in past.   Follows with [facility]. Had virtual visit last week. Her physician is requesting a walk test.   Completed her yearly CT early, for review today . Found to have eosinophilia on CBC ,  trialed low dose daily prednisone in past with no benefit, started on prednisone burst for 5 days and felt great. Uses ProAir PRN - which is seldom, is using Albuterol nebs and sodium chloride inhalant in nebulizer twice daiy   Seasonal allergies in Spring. , has been using Flonase with no benefit, plans to stop use  Chronic cough, intermittent through day, worse in mornings, not always productive but when it is , clear mucous. No chest tightness or feeling of mucous in chest.   No increased fatigue, hemoptysis or fever. \"  I just basically have no energy\"  Legs get heavy and weak with minimal walking.    Not on supplemental O2   No recent oral antibiotics    Past Medical hx   PMH:  Past Medical History:   Diagnosis Date    Anemia     normal on pre-op 2012 and 13    Backache     h/o    Bronchiectasis ([address]     Bursitis     bilateral shoulders    Constipation     Diverticulosis of colon     HTN (hypertension)     Hypercalcemia     slightly elevated at 10.8 pre-op 13    Hyperlipidemia     Nodular goiter     bx negative    OA (osteoarthritis)     bilateral thumbs - sees Dr. David Friedman Osteopenia 2013    Parathyroid adenoma 2013    Pneumonia of right upper lobe due to infectious organism ([address]     Pneumonitis     Dr. Mott Running in 2010 - bx negative    Pulmonary Mycobacterium avium complex (MAC) infection ([facility]     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 ([facility]     had one parathyroid removed - now follows with Dr. Melissa Coulter    Sinusitis     with seasonal allergies    Vitamin D deficiency 2018     SURGICAL HISTORY:  Past Surgical History:   Procedure Laterality Date    ABDOMINAL EXPLORATION SURGERY  2013    Release of ASHISHOKATHERINE-Dr. Forrest Espino    APPENDECTOMY  196    BRONCHOSCOPY N/A 2020    BRONCHOSCOPY FLUOROSCOPY performed by Abril Escobar MD at [address] Bilateral  ?   SECTION  8349,3633    DILATION AND CURETTAGE OF UTERUS  9760,8774,6720    EXCISION OF PARATHYROID MASS Right 2013    rt parathyroidectomy (excision of rt inferior parathyroid mass) exploration of neck     FOOT SURGERY      left    FOOT SURGERY Left 2017    Dr Estela Che  6-4-12    left knee    JOINT REPLACEMENT  2014    right knee    MALIGNANT SKIN LESION EXCISION Left 2017    BCC DORSAL FOOT WITH GRAFT & FS    GOSIA AND BSO  1982    TONSILLECTOMY AND ADENOIDECTOMY  1940 ?  TOTAL KNEE ARTHROPLASTY Right 2014    OIO     SOCIAL HISTORY:  Social History     Tobacco Use    Smoking status: Never Smoker    Smokeless tobacco: Never Used   Substance Use Topics    Alcohol use:  Yes     Alcohol/week: 0.0 standard drinks     Comment: socially-1 glassof wine 2-3 times a month    Drug use: No     ALLERGIES:  Allergies   Allergen Reactions    Levaquin [Levofloxacin] Other (See Comments)     Hallucinations    Percocet [Oxycodone-Acetaminophen] Nausea Only and Other (See Comments)     Affects memory    Sulfa Antibiotics Other (See Comments)     hallucinations    Cefuroxime Diarrhea, Nausea And Vomiting and Other (See Comments)     cramping     FAMILY HISTORY:  Family History   Problem Relation Age of Onset    Diabetes Mother     Thyroid Disease Mother    [facility] Arthritis Mother     High Blood Pressure Mother     Arthritis Father     High Blood Pressure Father     Other Father         hay fever    Breast Cancer Neg Hx     Ovarian Cancer Neg Hx      CURRENT MEDICATIONS:  Current Outpatient Medications   Medication Sig Dispense Refill    FLOVENT DISKUS 250 MCG/BLIST AEPB inhaler Inhale 1 puff into the lungs daily Rinse mouth after its use. 1 each 3    Acetylcysteine (NAC) 500 MG CAPS Take 600 mg by mouth 2 times daily      amLODIPine (NORVASC) 10 MG tablet TAKE 1 TABLET BY MOUTH  DAILY 90 tablet 3    levothyroxine (SYNTHROID) 75 MCG tablet take 1 tablet by mouth once daily 90 tablet 3    simvastatin (ZOCOR) 20 MG tablet TAKE 1 TABLET BY MOUTH  NIGHTLY 90 tablet 3    amoxicillin (AMOXIL) 500 MG capsule 4 pills 1 hr before dental procedures 4 capsule 3    albuterol (PROVENTIL) (2.5 MG/3ML) 0.083% nebulizer solution Take 3 mLs by nebulization every 6 hours as needed for Wheezing or Shortness of Breath 120 each 3    albuterol sulfate HFA (PROAIR HFA) 108 (90 Base) MCG/ACT inhaler Inhale 2 puffs into the lungs every 6 hours as needed for Wheezing or Shortness of Breath 1 Inhaler 11    sodium chloride, Inhalant, 7 % nebulizer solution Take 4 mLs by nebulization 2 times daily       loratadine (CLARITIN) 10 MG capsule Take 10 mg by mouth daily       Lactobacillus (PROBIOTIC ACIDOPHILUS) CAPS Take 1 capsule by mouth daily       Cholecalciferol (VITAMIN D3) 5000 units TABS Take 5,000 Units by mouth every other day       MULTIPLE VITAMIN PO Take 1 tablet by mouth daily        No current facility-administered medications for this visit. Alejandro DAVIES   Review of Systems   Constitutional: Positive for appetite change (no appetite ), fatigue and unexpected weight change (weight loss, unintentional). Negative for activity change, chills and fever. HENT: Positive for postnasal drip. Eyes: Negative. Respiratory: Positive for shortness of breath.  Negative for cough, chest tightness and wheezing. Cardiovascular: Negative for chest pain, palpitations and leg swelling. Gastrointestinal: Negative for abdominal pain, diarrhea, nausea and vomiting. Genitourinary: Negative. Musculoskeletal: Negative. Skin: Negative. Allergic/Immunologic: Positive for environmental allergies. Neurological: Negative. Hematological: Does not bruise/bleed easily. Psychiatric/Behavioral: Negative for sleep disturbance and suicidal ideas. Physical exam   /88 (Site: Left Upper Arm, Position: Sitting, Cuff Size: Medium Adult)   Pulse 91   Temp 98.9 °F (37.2 °C) Comment: Temporal  Resp (!) 32   Ht 5' 4\" (1.626 m)   Wt 153 lb 12.8 oz (69.8 kg)   SpO2 95%   BMI 26.40 kg/m²      Wt Readings from Last 3 Encounters:   09/17/20 153 lb 12.8 oz (69.8 kg)   02/25/20 162 lb (73.5 kg)   01/27/20 163 lb 12.8 oz (74.3 kg)     Physical Exam  Vitals signs and nursing note reviewed. Constitutional:       General: She is not in acute distress. Appearance: She is well-developed. HENT:      Mouth/Throat:      Lips: Pink. Mouth: Mucous membranes are moist.      Pharynx: Oropharynx is clear. No oropharyngeal exudate or posterior oropharyngeal erythema. Eyes:      Conjunctiva/sclera: Conjunctivae normal.   Neck:      Vascular: No JVD. Cardiovascular:      Rate and Rhythm: Normal rate and regular rhythm. Heart sounds: No murmur. No friction rub. Pulmonary:      Effort: No accessory muscle usage or respiratory distress. Breath sounds: Examination of the right-lower field reveals rales. Rales present. No wheezing or rhonchi. Comments: Dry rales in RLL . Increased WOB with talking and walking   Chest:      Chest wall: No tenderness. Musculoskeletal:      Right lower leg: No edema. Left lower leg: No edema. Skin:     General: Skin is warm and dry. Capillary Refill: Capillary refill takes less than 2 seconds. Nails: There is no clubbing. Neurological:      Mental Status: She is alert. Psychiatric:         Mood and Affect: Mood normal.         Behavior: Behavior normal.         Thought Content: Thought content normal.         Judgment: Judgment normal.          Test results   Lung Nodule Screening     [] Qualifies    [x]Does not qualify   [] Declined    [] Completed    Ct chest high resolution 8/25/2020     Impression    1. Irregular groundglass and confluent densities are demonstrated within the periphery of the right lung left middle and upper lobes. This is most pronounced within the right apex. This may represent sequela from pulmonary fibrosis         2. There is right upper lobe bronchiectasis demonstrated.         **This report has been created using voice recognition software.  It may contain minor errors which are inherent in voice recognition technology. **         Final report electronically signed by Dr. Matty Almaguer on 8/25/2020 1:44 PM                           ECHO completed in 2018 at [facility]  CONCLUSIONS:  - Exam indication: Shortness of Breath  - The left ventricle is normal in size. Left ventricular systolic function is   hyperdynamic. EF = 77 ± 5% (2D biplane) Normal left ventricular diastolic   function.  - The right ventricle is normal in size. Right ventricular systolic function is   normal.  - The patient has not had a prior  echocardiographic exam for comparison. Electronically signed by Dmitri Mcdaniels MD on 8/2/2018 at 4:56:15 PM            Six Minute Walk Test  Yogesh Cotton [birth year]    Six minute walk test done in my office today by my medical assistant. Sandra's oxygen saturation at rest on room air was 94%. Her oxygen saturation dropped to 88% on room air with exertion for only a few seconds then came back up, otherwise her lowest SPO2 was 90% on room air    Patient ambulated a total of 432  feet , she had to stop several times for leg weakness,with mild increase in  SOB and no events.  She seemed very winded with walking. Resting heart rate was 90   and  98   upon completion of the walk. Fractional Exhaled Nitric Oxide Level today 30, indicating Yes Th2 driven airway inflammation. Based on this patient would, likely benefit from corticosteroids. Assessment      Diagnosis Orders   1. ILD (interstitial lung disease) ([address]  POCT Nitric Oxide    6 Minute Walk Test    COVID-19 Ambulatory   2. SOB (shortness of breath)  POCT Nitric Oxide    6 Minute Walk Test    COVID-19 Ambulatory   3. Pulmonary fibrosis ([address]  COVID-19 Ambulatory   4. Bronchiectasis without complication ([facility]  [id number] Ambulatory   5. Unintentional weight loss     6. Poor appetite           Plan   - based on elevated FENO start Flovent diskus 250 mcg 1 puff daily, educated on proper use of diskus device with use of demo inhaler. Educated on potential side effects and need to rinse after use. -does not qualify for oxygen based on 6 min walk  -HRCT stable, repeat in 1 year  -keep scheduled PFT in 3 months and RTW for f/u after. COVID testing prior to PFT per protocol   -continue albuterol nebs and saline nebs as prescribed by [facility]    Will see Mansi Olguin in: 3 months     Total time spent interviewing the patient, evaluating lab data and X-ray data and processing orders was 60 min . I personally spent more than 50% of the appointment time face to face with the patient providing counseling and coordinating the patient's care.       Electronically signed by JASMEET Dotson CNP on 9/17/2020 at 2:40 PM

## 2020-09-21 ENCOUNTER — TELEPHONE (OUTPATIENT)
Dept: PULMONOLOGY | Age: 85
End: 2020-09-21

## 2020-09-21 NOTE — TELEPHONE ENCOUNTER
Pt called in with concerns of new medication (Flovent) Saturday and Sunday pt started to go into a coughing spell,pt says that  It also causes chest tightness,and chest soreness,  cough is not productive but lasts over 30/40 minutes. Any advice?

## 2020-09-25 ENCOUNTER — APPOINTMENT (OUTPATIENT)
Dept: GENERAL RADIOLOGY | Age: 85
DRG: 193 | End: 2020-09-25
Payer: MEDICARE

## 2020-09-25 ENCOUNTER — HOSPITAL ENCOUNTER (INPATIENT)
Age: 85
LOS: 7 days | Discharge: NURSING, REHAB OR LONG-TERM CARE | DRG: 193 | End: 2020-10-02
Attending: EMERGENCY MEDICINE | Admitting: INTERNAL MEDICINE
Payer: MEDICARE

## 2020-09-25 PROBLEM — J15.9 COMMUNITY ACQUIRED BACTERIAL PNEUMONIA: Status: ACTIVE | Noted: 2020-09-25

## 2020-09-25 LAB
ALBUMIN SERPL-MCNC: 3.5 G/DL (ref 3.5–5.1)
ALP BLD-CCNC: 84 U/L (ref 38–126)
ALT SERPL-CCNC: 18 U/L (ref 11–66)
ANION GAP SERPL CALCULATED.3IONS-SCNC: 14 MEQ/L (ref 8–16)
AST SERPL-CCNC: 20 U/L (ref 5–40)
BASE EXCESS (CALCULATED): 0.1 MMOL/L (ref -2.5–2.5)
BASOPHILS # BLD: 0.3 %
BASOPHILS ABSOLUTE: 0 THOU/MM3 (ref 0–0.1)
BILIRUB SERPL-MCNC: 0.4 MG/DL (ref 0.3–1.2)
BUN BLDV-MCNC: 24 MG/DL (ref 7–22)
C-REACTIVE PROTEIN: 22.97 MG/DL (ref 0–1)
CALCIUM SERPL-MCNC: 10.1 MG/DL (ref 8.5–10.5)
CHLORIDE BLD-SCNC: 100 MEQ/L (ref 98–111)
CO2: 23 MEQ/L (ref 23–33)
COLLECTED BY:: ABNORMAL
CREAT SERPL-MCNC: 1.1 MG/DL (ref 0.4–1.2)
DEVICE: ABNORMAL
EKG ATRIAL RATE: 97 BPM
EKG P AXIS: 28 DEGREES
EKG P-R INTERVAL: 158 MS
EKG Q-T INTERVAL: 326 MS
EKG QRS DURATION: 78 MS
EKG QTC CALCULATION (BAZETT): 414 MS
EKG R AXIS: 42 DEGREES
EKG T AXIS: 49 DEGREES
EKG VENTRICULAR RATE: 97 BPM
EOSINOPHIL # BLD: 1.7 %
EOSINOPHILS ABSOLUTE: 0.2 THOU/MM3 (ref 0–0.4)
ERYTHROCYTE [DISTWIDTH] IN BLOOD BY AUTOMATED COUNT: 13.9 % (ref 11.5–14.5)
ERYTHROCYTE [DISTWIDTH] IN BLOOD BY AUTOMATED COUNT: 43.6 FL (ref 35–45)
FERRITIN: 515 NG/ML (ref 10–291)
GFR SERPL CREATININE-BSD FRML MDRD: 47 ML/MIN/1.73M2
GLUCOSE BLD-MCNC: 170 MG/DL (ref 70–108)
HCO3: 25 MMOL/L (ref 23–28)
HCT VFR BLD CALC: 43.2 % (ref 37–47)
HEMOGLOBIN: 13.9 GM/DL (ref 12–16)
IMMATURE GRANS (ABS): 0.06 THOU/MM3 (ref 0–0.07)
IMMATURE GRANULOCYTES: 0.5 %
LACTIC ACID: 1.4 MMOL/L (ref 0.5–2.2)
LYMPHOCYTES # BLD: 5.1 %
LYMPHOCYTES ABSOLUTE: 0.6 THOU/MM3 (ref 1–4.8)
MCH RBC QN AUTO: 27.6 PG (ref 26–33)
MCHC RBC AUTO-ENTMCNC: 32.2 GM/DL (ref 32.2–35.5)
MCV RBC AUTO: 85.9 FL (ref 81–99)
MONOCYTES # BLD: 9.2 %
MONOCYTES ABSOLUTE: 1.1 THOU/MM3 (ref 0.4–1.3)
NUCLEATED RED BLOOD CELLS: 0 /100 WBC
O2 SATURATION: 91 %
OSMOLALITY CALCULATION: 281.8 MOSMOL/KG (ref 275–300)
PCO2: 39 MMHG (ref 35–45)
PH BLOOD GAS: 7.41 (ref 7.35–7.45)
PLATELET # BLD: 235 THOU/MM3 (ref 130–400)
PMV BLD AUTO: 10.4 FL (ref 9.4–12.4)
PO2: 61 MMHG (ref 71–104)
POTASSIUM SERPL-SCNC: 4.1 MEQ/L (ref 3.5–5.2)
PRO-BNP: 864.5 PG/ML (ref 0–1800)
PROCALCITONIN: 0.12 NG/ML (ref 0.01–0.09)
RBC # BLD: 5.03 MILL/MM3 (ref 4.2–5.4)
SARS-COV-2, NAAT: NOT DETECTED
SEDIMENTATION RATE, ERYTHROCYTE: 85 MM/HR (ref 0–20)
SEG NEUTROPHILS: 83.2 %
SEGMENTED NEUTROPHILS ABSOLUTE COUNT: 10.1 THOU/MM3 (ref 1.8–7.7)
SODIUM BLD-SCNC: 137 MEQ/L (ref 135–145)
SOURCE, BLOOD GAS: ABNORMAL
TOTAL PROTEIN: 7.4 G/DL (ref 6.1–8)
TROPONIN T: < 0.01 NG/ML
WBC # BLD: 12.1 THOU/MM3 (ref 4.8–10.8)

## 2020-09-25 PROCEDURE — 6370000000 HC RX 637 (ALT 250 FOR IP): Performed by: INTERNAL MEDICINE

## 2020-09-25 PROCEDURE — 80053 COMPREHEN METABOLIC PANEL: CPT

## 2020-09-25 PROCEDURE — 93005 ELECTROCARDIOGRAM TRACING: CPT | Performed by: EMERGENCY MEDICINE

## 2020-09-25 PROCEDURE — 2060000000 HC ICU INTERMEDIATE R&B

## 2020-09-25 PROCEDURE — 86140 C-REACTIVE PROTEIN: CPT

## 2020-09-25 PROCEDURE — 84484 ASSAY OF TROPONIN QUANT: CPT

## 2020-09-25 PROCEDURE — 2580000003 HC RX 258: Performed by: EMERGENCY MEDICINE

## 2020-09-25 PROCEDURE — 82728 ASSAY OF FERRITIN: CPT

## 2020-09-25 PROCEDURE — 85025 COMPLETE CBC W/AUTO DIFF WBC: CPT

## 2020-09-25 PROCEDURE — 83880 ASSAY OF NATRIURETIC PEPTIDE: CPT

## 2020-09-25 PROCEDURE — 99284 EMERGENCY DEPT VISIT MOD MDM: CPT

## 2020-09-25 PROCEDURE — 6370000000 HC RX 637 (ALT 250 FOR IP): Performed by: EMERGENCY MEDICINE

## 2020-09-25 PROCEDURE — 71045 X-RAY EXAM CHEST 1 VIEW: CPT

## 2020-09-25 PROCEDURE — 87040 BLOOD CULTURE FOR BACTERIA: CPT

## 2020-09-25 PROCEDURE — 85651 RBC SED RATE NONAUTOMATED: CPT

## 2020-09-25 PROCEDURE — 36415 COLL VENOUS BLD VENIPUNCTURE: CPT

## 2020-09-25 PROCEDURE — 36600 WITHDRAWAL OF ARTERIAL BLOOD: CPT

## 2020-09-25 PROCEDURE — 82803 BLOOD GASES ANY COMBINATION: CPT

## 2020-09-25 PROCEDURE — 94640 AIRWAY INHALATION TREATMENT: CPT

## 2020-09-25 PROCEDURE — 84145 PROCALCITONIN (PCT): CPT

## 2020-09-25 PROCEDURE — 94761 N-INVAS EAR/PLS OXIMETRY MLT: CPT

## 2020-09-25 PROCEDURE — U0002 COVID-19 LAB TEST NON-CDC: HCPCS

## 2020-09-25 PROCEDURE — 99285 EMERGENCY DEPT VISIT HI MDM: CPT

## 2020-09-25 PROCEDURE — 6360000002 HC RX W HCPCS: Performed by: EMERGENCY MEDICINE

## 2020-09-25 PROCEDURE — 83605 ASSAY OF LACTIC ACID: CPT

## 2020-09-25 RX ORDER — LEVOTHYROXINE SODIUM 0.07 MG/1
75 TABLET ORAL DAILY
Status: DISCONTINUED | OUTPATIENT
Start: 2020-09-26 | End: 2020-10-02 | Stop reason: HOSPADM

## 2020-09-25 RX ORDER — ALBUTEROL SULFATE 90 UG/1
6 AEROSOL, METERED RESPIRATORY (INHALATION) ONCE
Status: COMPLETED | OUTPATIENT
Start: 2020-09-25 | End: 2020-09-25

## 2020-09-25 RX ORDER — SODIUM CHLORIDE 0.9 % (FLUSH) 0.9 %
10 SYRINGE (ML) INJECTION PRN
Status: DISCONTINUED | OUTPATIENT
Start: 2020-09-25 | End: 2020-10-02 | Stop reason: HOSPADM

## 2020-09-25 RX ORDER — AMLODIPINE BESYLATE 10 MG/1
10 TABLET ORAL DAILY
Status: DISCONTINUED | OUTPATIENT
Start: 2020-09-26 | End: 2020-10-01

## 2020-09-25 RX ORDER — SODIUM CHLORIDE FOR INHALATION 7 %
4 VIAL, NEBULIZER (ML) INHALATION 2 TIMES DAILY
Status: DISCONTINUED | OUTPATIENT
Start: 2020-09-26 | End: 2020-09-28

## 2020-09-25 RX ORDER — ACETAMINOPHEN 325 MG/1
650 TABLET ORAL EVERY 4 HOURS PRN
Status: DISCONTINUED | OUTPATIENT
Start: 2020-09-25 | End: 2020-10-02 | Stop reason: HOSPADM

## 2020-09-25 RX ORDER — MULTIVITAMIN WITH IRON
1 TABLET ORAL DAILY
Status: DISCONTINUED | OUTPATIENT
Start: 2020-09-26 | End: 2020-10-02 | Stop reason: HOSPADM

## 2020-09-25 RX ORDER — POLYETHYLENE GLYCOL 3350 17 G/17G
17 POWDER, FOR SOLUTION ORAL DAILY PRN
Status: DISCONTINUED | OUTPATIENT
Start: 2020-09-25 | End: 2020-10-02 | Stop reason: HOSPADM

## 2020-09-25 RX ORDER — ALBUTEROL SULFATE 2.5 MG/3ML
2.5 SOLUTION RESPIRATORY (INHALATION) EVERY 4 HOURS PRN
Status: DISCONTINUED | OUTPATIENT
Start: 2020-09-25 | End: 2020-09-26

## 2020-09-25 RX ORDER — LACTOBACILLUS RHAMNOSUS GG 10B CELL
1 CAPSULE ORAL DAILY
Status: DISCONTINUED | OUTPATIENT
Start: 2020-09-26 | End: 2020-10-02 | Stop reason: HOSPADM

## 2020-09-25 RX ORDER — FLUTICASONE PROPIONATE 110 UG/1
4 AEROSOL, METERED RESPIRATORY (INHALATION) 2 TIMES DAILY
Status: DISCONTINUED | OUTPATIENT
Start: 2020-09-26 | End: 2020-09-26

## 2020-09-25 RX ORDER — ONDANSETRON 2 MG/ML
4 INJECTION INTRAMUSCULAR; INTRAVENOUS EVERY 6 HOURS PRN
Status: DISCONTINUED | OUTPATIENT
Start: 2020-09-25 | End: 2020-10-02 | Stop reason: HOSPADM

## 2020-09-25 RX ORDER — SODIUM CHLORIDE 0.9 % (FLUSH) 0.9 %
10 SYRINGE (ML) INJECTION EVERY 12 HOURS SCHEDULED
Status: DISCONTINUED | OUTPATIENT
Start: 2020-09-25 | End: 2020-10-02 | Stop reason: HOSPADM

## 2020-09-25 RX ORDER — VITAMIN B COMPLEX
5000 TABLET ORAL EVERY OTHER DAY
Status: DISCONTINUED | OUTPATIENT
Start: 2020-09-27 | End: 2020-10-02 | Stop reason: HOSPADM

## 2020-09-25 RX ORDER — ATORVASTATIN CALCIUM 10 MG/1
10 TABLET, FILM COATED ORAL DAILY
Status: DISCONTINUED | OUTPATIENT
Start: 2020-09-26 | End: 2020-10-02 | Stop reason: HOSPADM

## 2020-09-25 RX ORDER — PROMETHAZINE HYDROCHLORIDE 25 MG/1
12.5 TABLET ORAL EVERY 6 HOURS PRN
Status: DISCONTINUED | OUTPATIENT
Start: 2020-09-25 | End: 2020-10-02 | Stop reason: HOSPADM

## 2020-09-25 RX ORDER — SODIUM CHLORIDE 0.9 % (FLUSH) 0.9 %
10 SYRINGE (ML) INJECTION PRN
Status: DISCONTINUED | OUTPATIENT
Start: 2020-09-25 | End: 2020-09-25 | Stop reason: SDUPTHER

## 2020-09-25 RX ORDER — CETIRIZINE HYDROCHLORIDE 10 MG/1
10 TABLET ORAL DAILY
Status: DISCONTINUED | OUTPATIENT
Start: 2020-09-26 | End: 2020-10-02 | Stop reason: HOSPADM

## 2020-09-25 RX ORDER — SODIUM CHLORIDE 0.9 % (FLUSH) 0.9 %
10 SYRINGE (ML) INJECTION EVERY 12 HOURS SCHEDULED
Status: DISCONTINUED | OUTPATIENT
Start: 2020-09-25 | End: 2020-09-25 | Stop reason: SDUPTHER

## 2020-09-25 RX ADMIN — CEFEPIME 1 G: 1 INJECTION, POWDER, FOR SOLUTION INTRAMUSCULAR; INTRAVENOUS at 21:30

## 2020-09-25 RX ADMIN — ACETAMINOPHEN 650 MG: 325 TABLET ORAL at 23:24

## 2020-09-25 RX ADMIN — ALBUTEROL SULFATE 6 PUFF: 90 AEROSOL, METERED RESPIRATORY (INHALATION) at 17:52

## 2020-09-25 RX ADMIN — AZITHROMYCIN 500 MG: 500 INJECTION, POWDER, LYOPHILIZED, FOR SOLUTION INTRAVENOUS at 21:09

## 2020-09-25 ASSESSMENT — PAIN DESCRIPTION - PROGRESSION
CLINICAL_PROGRESSION: NOT CHANGED
CLINICAL_PROGRESSION: GRADUALLY IMPROVING
CLINICAL_PROGRESSION: NOT CHANGED

## 2020-09-25 ASSESSMENT — ENCOUNTER SYMPTOMS
EYE DISCHARGE: 0
ABDOMINAL PAIN: 0
BACK PAIN: 0
DIARRHEA: 0
SHORTNESS OF BREATH: 1
SORE THROAT: 0
EYE REDNESS: 0
WHEEZING: 0
RHINORRHEA: 0
VOMITING: 0
CONSTIPATION: 0
EYE ITCHING: 0
ABDOMINAL DISTENTION: 0
COUGH: 1
NAUSEA: 0
STRIDOR: 0
CHEST TIGHTNESS: 0
PHOTOPHOBIA: 0
EYE PAIN: 0

## 2020-09-25 ASSESSMENT — PAIN DESCRIPTION - ORIENTATION
ORIENTATION: LOWER;MID
ORIENTATION: LOWER;MID

## 2020-09-25 ASSESSMENT — PAIN DESCRIPTION - PAIN TYPE
TYPE: ACUTE PAIN

## 2020-09-25 ASSESSMENT — PAIN SCALES - GENERAL
PAINLEVEL_OUTOF10: 6
PAINLEVEL_OUTOF10: 3
PAINLEVEL_OUTOF10: 4

## 2020-09-25 ASSESSMENT — PAIN DESCRIPTION - LOCATION
LOCATION: BACK;HEAD
LOCATION: BACK;HEAD
LOCATION: CHEST

## 2020-09-25 ASSESSMENT — PAIN DESCRIPTION - ONSET
ONSET: ON-GOING

## 2020-09-25 ASSESSMENT — PAIN DESCRIPTION - FREQUENCY
FREQUENCY: CONTINUOUS

## 2020-09-25 ASSESSMENT — PAIN DESCRIPTION - DESCRIPTORS
DESCRIPTORS: DISCOMFORT
DESCRIPTORS: ACHING
DESCRIPTORS: ACHING

## 2020-09-25 ASSESSMENT — PAIN - FUNCTIONAL ASSESSMENT
PAIN_FUNCTIONAL_ASSESSMENT: ACTIVITIES ARE NOT PREVENTED
PAIN_FUNCTIONAL_ASSESSMENT: ACTIVITIES ARE NOT PREVENTED

## 2020-09-25 NOTE — ED NOTES
Patient is resting in bed with easy and unlabored respirations. Lab at bedside for 2nd culture draw. Call light in reach. Side rails up x2. Patient denies further complaints or concerns. Will monitor.         Anika Villasenor RN  09/25/20 8229

## 2020-09-25 NOTE — ED NOTES
Patient ambulated 15 feet with mobile pulse ox. Patient's saturation drops from 99% to 83%. Patient requests to sit down. Patient immediately given wheel chair and brought back to room. Patient is now resting in bed with easy and unlabored respirations. Call light in reach. Side rails up x2. Patient denies further complaints or concerns. Will monitor.         Maya Jones RN  09/25/20 [id number]

## 2020-09-25 NOTE — ED PROVIDER NOTES
[address] ENCOUNTER      PATIENT NAME: William Hathaway  MRN: [id number]  : 1935  LERMA: 2020  PROVIDER: Ovi Irizarry MD      CHIEF COMPLAINT       Chief Complaint   Patient presents with    Chest Pain    Shortness of Breath       Nurses Notes reviewed and I agreeexcept as noted in the HPI. HISTORY OF PRESENT ILLNESS    William Hathaway is a 80 y.o. female who presents to Emergency Department with Chest Pain and Shortness of Breath      79-year-old female with past medical history of chronic cough, lung fibrosis, Mycobacterium avium complex infection in 2017, chronic shortness of breath, and bronchiectasis followed by Dr. Sebastian Hughes locally and also by [facility] presented to ED complaining of increasing shortness of breath in the last 5 days duration with increasing persistent dry cough. Patient feels she could not catch her breath. She felt constant chest sore in the last 5 days. No fever and no chills. No nausea and no vomiting. No abdominal pain. Chest pain intensity at 5/10 worse on coughing. Chest pain was from retrosternal area. No diarrhea. No leg swelling. She denied recent COVID-19 exposure. She lives at home by herself. This HPI was provided by the patient. REVIEW OF SYSTEMS     Review of Systems   Constitutional: Negative for activity change, appetite change, chills, fatigue, fever and unexpected weight change. HENT: Negative for congestion, ear discharge, ear pain, hearing loss, nosebleeds, rhinorrhea and sore throat. Eyes: Negative for photophobia, pain, discharge, redness and itching. Respiratory: Positive for cough and shortness of breath. Negative for chest tightness, wheezing and stridor. Cardiovascular: Positive for chest pain. Negative for palpitations and leg swelling. Gastrointestinal: Negative for abdominal distention, abdominal pain, constipation, diarrhea, nausea and vomiting.    Endocrine: Negative for cold intolerance, heat intolerance, polydipsia and polyphagia. Genitourinary: Negative for dysuria, flank pain, frequency and hematuria. Musculoskeletal: Negative for arthralgias, back pain, gait problem, myalgias, neck pain and neck stiffness. Skin: Negative for pallor, rash and wound. Allergic/Immunologic: Negative for environmental allergies and food allergies. Neurological: Negative for dizziness, tremors, syncope, weakness and headaches. Psychiatric/Behavioral: Negative for agitation, behavioral problems, confusion, self-injury, sleep disturbance and suicidal ideas. PAST MEDICAL HISTORY    has a past medical history of Anemia, Backache, Bronchiectasis ([address] Bursitis, Constipation, Diverticulosis of colon, HTN (hypertension), Hypercalcemia, Hyperlipidemia, Nodular goiter, OA (osteoarthritis), Osteopenia, Parathyroid adenoma, Pneumonia of right upper lobe due to infectious organism ([address] Pneumonitis, Pulmonary Mycobacterium avium complex (MAC) infection ([address] 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 ([address] Sinusitis, and Vitamin D deficiency. SURGICAL HISTORY      has a past surgical history that includes Appendectomy (); Cataract removal with implant (Bilateral,  ?);  section (8738,5117); Dilation and curettage of uterus (6265,3841,7234); Foot surgery (); Abdominal exploration surgery (2013); sam and bso (cervix removed) (); Excision of Parathyroid Mass (Right, 2013); Total knee arthroplasty (Right, 2014); Tonsillectomy and adenoidectomy (194 ?); joint replacement (12); joint replacement (2014); Hysterectomy; malignant skin lesion excision (Left, 2017); Foot surgery (Left, 2017); and bronchoscopy (N/A, 2020).     CURRENT MEDICATIONS       Current Discharge Medication List      CONTINUE these medications which have NOT CHANGED    Details   amLODIPine (NORVASC) 10 MG tablet TAKE 1 TABLET BY MOUTH  DAILY  Qty: 90 tablet, Refills: 3    Associated Diagnoses: Essential hypertension      levothyroxine (SYNTHROID) 75 MCG tablet take 1 tablet by mouth once daily  Qty: 90 tablet, Refills: 3    Associated Diagnoses: Hypothyroidism, unspecified type      simvastatin (ZOCOR) 20 MG tablet TAKE 1 TABLET BY MOUTH  NIGHTLY  Qty: 90 tablet, Refills: 3    Associated Diagnoses: Pure hypercholesterolemia      albuterol (PROVENTIL) (2.5 MG/3ML) 0.083% nebulizer solution Take 3 mLs by nebulization every 6 hours as needed for Wheezing or Shortness of Breath  Qty: 120 each, Refills: 3      loratadine (CLARITIN) 10 MG capsule Take 10 mg by mouth daily       Lactobacillus (PROBIOTIC ACIDOPHILUS) CAPS Take 1 capsule by mouth daily       Cholecalciferol (VITAMIN D3) 5000 units TABS Take 5,000 Units by mouth every other day       MULTIPLE VITAMIN PO Take 1 tablet by mouth daily       FLOVENT DISKUS 250 MCG/BLIST AEPB inhaler Inhale 1 puff into the lungs daily Rinse mouth after its use. Qty: 1 each, Refills: 3      Acetylcysteine (NAC) 500 MG CAPS Take 600 mg by mouth 2 times daily      amoxicillin (AMOXIL) 500 MG capsule 4 pills 1 hr before dental procedures  Qty: 4 capsule, Refills: 3      albuterol sulfate HFA (PROAIR HFA) 108 (90 Base) MCG/ACT inhaler Inhale 2 puffs into the lungs every 6 hours as needed for Wheezing or Shortness of Breath  Qty: 1 Inhaler, Refills: 11    Associated Diagnoses: Pulmonary fibrosis ([facility] Allergic rhinitis, unspecified seasonality, unspecified trigger; Bronchiectasis without complication ([facility] Mycobacterium avium infection ([facility]      sodium chloride, Inhalant, 7 % nebulizer solution Take 4 mLs by nebulization 2 times daily              ALLERGIES     is allergic to levaquin [levofloxacin]; percocet [oxycodone-acetaminophen]; rifampin; sulfa antibiotics; and cefuroxime. FAMILY HISTORY     She indicated that her mother is . She indicated that her father is .  She indicated that the status of her neg hx is unknown.   family history includes Arthritis in her father and mother; Diabetes in her mother; High Blood Pressure in her father and mother; Other in her father; Thyroid Disease in her mother. SOCIAL HISTORY      reports that she has never smoked. She has never used smokeless tobacco. She reports current alcohol use. She reports that she does not use drugs. PHYSICAL EXAM     INITIAL VITALS:  height is 5' 4\" (1.626 m) and weight is 153 lb (69.4 kg). Her oral temperature is 98 °F (36.7 °C). Her blood pressure is 119/67 and her pulse is 98. Her respiration is 18 and oxygen saturation is 92%. Physical Exam  Vitals signs and nursing note reviewed. Constitutional:       Appearance: She is well-developed. She is not diaphoretic. HENT:      Head: Normocephalic and atraumatic. Nose: Nose normal.   Eyes:      General: No scleral icterus. Right eye: No discharge. Left eye: No discharge. Conjunctiva/sclera: Conjunctivae normal.      Pupils: Pupils are equal, round, and reactive to light. Neck:      Musculoskeletal: Normal range of motion and neck supple. Vascular: No JVD. Trachea: No tracheal deviation. Cardiovascular:      Rate and Rhythm: Normal rate and regular rhythm. Heart sounds: Normal heart sounds. No murmur. No friction rub. No gallop. Pulmonary:      Effort: Pulmonary effort is normal. Tachypnea present. No respiratory distress. Breath sounds: No stridor. Examination of the right-upper field reveals rhonchi. Examination of the left-upper field reveals rhonchi. Examination of the right-middle field reveals rhonchi. Examination of the left-middle field reveals rhonchi. Examination of the right-lower field reveals rales. Examination of the left-lower field reveals rales. Rhonchi and rales present. No wheezing. Chest:      Chest wall: No tenderness. Abdominal:      General: Bowel sounds are normal. There is no distension.       Palpations: Abdomen is soft. There is no mass. Tenderness: There is no abdominal tenderness. There is no guarding or rebound. Hernia: No hernia is present. Musculoskeletal:         General: No tenderness or deformity. Lymphadenopathy:      Cervical: No cervical adenopathy. Skin:     General: Skin is warm and dry. Capillary Refill: Capillary refill takes less than 2 seconds. Coloration: Skin is not pale. Findings: No erythema or rash. Neurological:      Mental Status: She is alert and oriented to person, place, and time. Cranial Nerves: No cranial nerve deficit. Sensory: No sensory deficit. Motor: No abnormal muscle tone. Coordination: Coordination normal.      Deep Tendon Reflexes: Reflexes normal.   Psychiatric:         Behavior: Behavior normal.         Thought Content: Thought content normal.         Judgment: Judgment normal.           DIFFERENTIAL DIAGNOSIS:   Bronchitis, pneumonia, COPD exacerbation, CHF, ACS, Asthma, PE, Anxiety    DIAGNOSTIC RESULTS     EKG: All EKG's are interpreted by the Emergency Department Physician who either signs or Co-signsthis chart in the absence of a cardiologist.  Interpreted by me  Normal sinus rhythm  Ventricular rate 97 bpm  AK interval 158 ms  QRS duration 78 ms   ms  Normal EKG    RADIOLOGY: non-plain film images(s) such as CT, Ultrasound and MRI are read by the radiologist.    XR CHEST PORTABLE   Final Result    IMPRESSION:    Right upper and to a lesser extent bibasilar coarsened mixed airspace and interstitial opacities. Appearance is similar to prior with likely a background of chronic interstitial changes. Superimposed acute infiltrate is not excluded. Correlation with    symptoms and follow-up as clinically warranted. **This report has been created using voice recognition software. It may contain minor errors which are inherent in voice recognition technology. **      Final report electronically signed by Dr. Cheryle Hunt 6.1 - 8.0 g/dL    Alb 3.5 3.5 - 5.1 g/dL    Total Bilirubin 0.4 0.3 - 1.2 mg/dL    ALT 18 11 - 66 U/L   Brain Natriuretic Peptide   Result Value Ref Range    Pro-.5 0.0 - 1800.0 pg/mL   Troponin   Result Value Ref Range    Troponin T < 0.010 ng/ml   Blood Gas, Arterial   Result Value Ref Range    pH, Blood Gas 7.41 7.35 - 7.45    PCO2 39 35 - 45 mmhg    PO2 61 (L) 71 - 104 mmhg    HCO3 25 23 - 28 mmol/l    Base Excess (Calculated) 0.1 -2.5 - 2.5 mmol/l    O2 Sat 91 %    DEVICE Room Air     Source: R Brach     COLLECTED BY: [id number]    C-reactive protein   Result Value Ref Range    CRP 22.97 (H) 0.00 - 1.00 mg/dl   Sedimentation Rate   Result Value Ref Range    Sed Rate 85 (H) 0 - 20 mm/hr   Ferritin   Result Value Ref Range    Ferritin 515 (H) 10 - 291 ng/mL   COVID-19   Result Value Ref Range    SARS-CoV-2, NAAT NOT DETECTED NOT DETECTED   Glomerular Filtration Rate, Estimated   Result Value Ref Range    Est, Glom Filt Rate 47 (A) ml/min/1.73m2   Osmolality   Result Value Ref Range    Osmolality Calc 281.8 275.0 - 300.0 mOsmol/kg   Anion Gap   Result Value Ref Range    Anion Gap 14.0 8.0 - 16.0 meq/L   Lactic acid, plasma   Result Value Ref Range    Lactic Acid 1.4 0.5 - 2.2 mmol/L   Procalcitonin   Result Value Ref Range    Procalcitonin 0.12 (H) 0.01 - 0.09 ng/mL   Comprehensive Metabolic Panel w/ Reflex to MG   Result Value Ref Range    Glucose 103 70 - 108 mg/dL    CREATININE 1.0 0.4 - 1.2 mg/dL    BUN 21 7 - 22 mg/dL    Sodium 135 135 - 145 meq/L    Potassium reflex Magnesium 4.5 3.5 - 5.2 meq/L    Chloride 100 98 - 111 meq/L    CO2 21 (L) 23 - 33 meq/L    Calcium 9.6 8.5 - 10.5 mg/dL    AST 19 5 - 40 U/L    Alkaline Phosphatase 81 38 - 126 U/L    Total Protein 6.0 (L) 6.1 - 8.0 g/dL    Alb 3.5 3.5 - 5.1 g/dL    Total Bilirubin 0.7 0.3 - 1.2 mg/dL    ALT 17 11 - 66 U/L   Procalcitonin   Result Value Ref Range    Procalcitonin 0.13 (H) 0.01 - 0.09 ng/mL   Lactic acid, plasma   Result Value Ref Range MAKINGS:    Albuterol six puffs were given. Covid later was back as negative. She has hypoxia now clinically    Labs: WBC 12. CRP 23. ESR 85. Ferritin 515. Procalcitonin 0.13. CXR: Right upper and to a lesser extent bibasilar coarsened mixed airspace and interstitial opacities. Appearance is similar to prior with likely a background of chronic interstitial changes. Superimposed acute infiltrate is not excluded    She has pneumonia. Viral vs bacterial. It seems viral pneumonia is more likely. ED workups show CAP change, less likely this is MAC infection as she has sudden onset and chest x-rays are not typical for MAC. These were concurred by ID on call Dr. Bhavesh Welch. Zithromax and Cefepime IV were administered in ED. Discussed and admitted by Dr. Geoff Jacobo. Note was done later due to Epic down time on 9/25/2020. Some medications and orders may be reflected on paper charting. CRITICAL CARE:   None    CONSULTS:  Dr. Rose Gaffney    PROCEDURES:  None    FINAL IMPRESSION      1. Community acquired pneumonia of right lung, unspecified part of lung    2. Acute on chronic respiratory failure with hypoxia (HCC)          DISPOSITION/PLAN   Admit    PATIENT REFERRED TO:  No follow-up provider specified.     DISCHARGE MEDICATIONS:  Current Discharge Medication List          (Please note that portions of this note were completed with a voice recognition program.  Efforts were made to edit the dictations but occasionally words aremis-transcribed.)    MD Moises Smith MD  09/26/20 [id number]

## 2020-09-25 NOTE — ED NOTES
Bed: 017A  Expected date:   Expected time:   Means of arrival: [facility]  Comments:     Chuy Lee RN  09/25/20 [id number]

## 2020-09-26 ENCOUNTER — APPOINTMENT (OUTPATIENT)
Dept: GENERAL RADIOLOGY | Age: 85
DRG: 193 | End: 2020-09-26
Payer: MEDICARE

## 2020-09-26 LAB
ALBUMIN SERPL-MCNC: 3.5 G/DL (ref 3.5–5.1)
ALP BLD-CCNC: 81 U/L (ref 38–126)
ALT SERPL-CCNC: 17 U/L (ref 11–66)
ANION GAP SERPL CALCULATED.3IONS-SCNC: 14 MEQ/L (ref 8–16)
AST SERPL-CCNC: 19 U/L (ref 5–40)
BASOPHILS # BLD: 0.4 %
BASOPHILS ABSOLUTE: 0 THOU/MM3 (ref 0–0.1)
BILIRUB SERPL-MCNC: 0.7 MG/DL (ref 0.3–1.2)
BUN BLDV-MCNC: 21 MG/DL (ref 7–22)
CALCIUM SERPL-MCNC: 9.6 MG/DL (ref 8.5–10.5)
CHLORIDE BLD-SCNC: 100 MEQ/L (ref 98–111)
CO2: 21 MEQ/L (ref 23–33)
CREAT SERPL-MCNC: 1 MG/DL (ref 0.4–1.2)
EOSINOPHIL # BLD: 3 %
EOSINOPHILS ABSOLUTE: 0.3 THOU/MM3 (ref 0–0.4)
ERYTHROCYTE [DISTWIDTH] IN BLOOD BY AUTOMATED COUNT: 14.1 % (ref 11.5–14.5)
ERYTHROCYTE [DISTWIDTH] IN BLOOD BY AUTOMATED COUNT: 43.7 FL (ref 35–45)
FLU A ANTIGEN: NEGATIVE
FLU B ANTIGEN: NEGATIVE
GFR SERPL CREATININE-BSD FRML MDRD: 53 ML/MIN/1.73M2
GLUCOSE BLD-MCNC: 103 MG/DL (ref 70–108)
HCT VFR BLD CALC: 42.8 % (ref 37–47)
HEMOGLOBIN: 13.7 GM/DL (ref 12–16)
IMMATURE GRANS (ABS): 0.08 THOU/MM3 (ref 0–0.07)
IMMATURE GRANULOCYTES: 0.7 %
LACTIC ACID: 0.9 MMOL/L (ref 0.5–2.2)
LEGIONELLA PNEUMOPHILIA AG, URINE: NEGATIVE
LYMPHOCYTES # BLD: 11.5 %
LYMPHOCYTES ABSOLUTE: 1.3 THOU/MM3 (ref 1–4.8)
MCH RBC QN AUTO: 27.7 PG (ref 26–33)
MCHC RBC AUTO-ENTMCNC: 32 GM/DL (ref 32.2–35.5)
MCV RBC AUTO: 86.5 FL (ref 81–99)
MONOCYTES # BLD: 11 %
MONOCYTES ABSOLUTE: 1.2 THOU/MM3 (ref 0.4–1.3)
MRSA SCREEN RT-PCR: NEGATIVE
NUCLEATED RED BLOOD CELLS: 0 /100 WBC
PLATELET # BLD: 232 THOU/MM3 (ref 130–400)
PMV BLD AUTO: 10.8 FL (ref 9.4–12.4)
POTASSIUM REFLEX MAGNESIUM: 4.5 MEQ/L (ref 3.5–5.2)
PROCALCITONIN: 0.13 NG/ML (ref 0.01–0.09)
RBC # BLD: 4.95 MILL/MM3 (ref 4.2–5.4)
SEG NEUTROPHILS: 73.4 %
SEGMENTED NEUTROPHILS ABSOLUTE COUNT: 8.1 THOU/MM3 (ref 1.8–7.7)
SODIUM BLD-SCNC: 135 MEQ/L (ref 135–145)
STREP PNEUMO AG, UR: NEGATIVE
TOTAL PROTEIN: 6 G/DL (ref 6.1–8)
VANCOMYCIN RESISTANT ENTEROCOCCUS: NEGATIVE
WBC # BLD: 11.1 THOU/MM3 (ref 4.8–10.8)

## 2020-09-26 PROCEDURE — 83605 ASSAY OF LACTIC ACID: CPT

## 2020-09-26 PROCEDURE — 87899 AGENT NOS ASSAY W/OPTIC: CPT

## 2020-09-26 PROCEDURE — 87081 CULTURE SCREEN ONLY: CPT

## 2020-09-26 PROCEDURE — 93010 ELECTROCARDIOGRAM REPORT: CPT | Performed by: INTERNAL MEDICINE

## 2020-09-26 PROCEDURE — 6370000000 HC RX 637 (ALT 250 FOR IP): Performed by: INTERNAL MEDICINE

## 2020-09-26 PROCEDURE — 6360000002 HC RX W HCPCS: Performed by: INTERNAL MEDICINE

## 2020-09-26 PROCEDURE — 87641 MR-STAPH DNA AMP PROBE: CPT

## 2020-09-26 PROCEDURE — 94760 N-INVAS EAR/PLS OXIMETRY 1: CPT

## 2020-09-26 PROCEDURE — 85025 COMPLETE CBC W/AUTO DIFF WBC: CPT

## 2020-09-26 PROCEDURE — 87449 NOS EACH ORGANISM AG IA: CPT

## 2020-09-26 PROCEDURE — 84145 PROCALCITONIN (PCT): CPT

## 2020-09-26 PROCEDURE — 87804 INFLUENZA ASSAY W/OPTIC: CPT

## 2020-09-26 PROCEDURE — 99223 1ST HOSP IP/OBS HIGH 75: CPT | Performed by: INTERNAL MEDICINE

## 2020-09-26 PROCEDURE — 36415 COLL VENOUS BLD VENIPUNCTURE: CPT

## 2020-09-26 PROCEDURE — 73030 X-RAY EXAM OF SHOULDER: CPT

## 2020-09-26 PROCEDURE — 2580000003 HC RX 258: Performed by: INTERNAL MEDICINE

## 2020-09-26 PROCEDURE — 94640 AIRWAY INHALATION TREATMENT: CPT

## 2020-09-26 PROCEDURE — 80053 COMPREHEN METABOLIC PANEL: CPT

## 2020-09-26 PROCEDURE — 87500 VANOMYCIN DNA AMP PROBE: CPT

## 2020-09-26 PROCEDURE — 2060000000 HC ICU INTERMEDIATE R&B

## 2020-09-26 PROCEDURE — 2700000000 HC OXYGEN THERAPY PER DAY

## 2020-09-26 RX ORDER — ALBUTEROL SULFATE 2.5 MG/3ML
2.5 SOLUTION RESPIRATORY (INHALATION)
Status: DISCONTINUED | OUTPATIENT
Start: 2020-09-26 | End: 2020-09-26

## 2020-09-26 RX ORDER — BENZONATATE 100 MG/1
100 CAPSULE ORAL 3 TIMES DAILY PRN
Status: DISCONTINUED | OUTPATIENT
Start: 2020-09-26 | End: 2020-09-27

## 2020-09-26 RX ORDER — ALBUTEROL SULFATE 2.5 MG/3ML
2.5 SOLUTION RESPIRATORY (INHALATION)
Status: DISCONTINUED | OUTPATIENT
Start: 2020-09-26 | End: 2020-09-30

## 2020-09-26 RX ORDER — BUDESONIDE 0.5 MG/2ML
0.5 INHALANT ORAL 2 TIMES DAILY
Status: DISCONTINUED | OUTPATIENT
Start: 2020-09-26 | End: 2020-10-02 | Stop reason: HOSPADM

## 2020-09-26 RX ADMIN — ACETAMINOPHEN 650 MG: 325 TABLET ORAL at 03:38

## 2020-09-26 RX ADMIN — Medication 10 ML: at 00:27

## 2020-09-26 RX ADMIN — CEFTRIAXONE SODIUM 1 G: 1 INJECTION, POWDER, FOR SOLUTION INTRAMUSCULAR; INTRAVENOUS at 15:42

## 2020-09-26 RX ADMIN — ACETAMINOPHEN 650 MG: 325 TABLET ORAL at 13:43

## 2020-09-26 RX ADMIN — ALBUTEROL SULFATE 2.5 MG: 2.5 SOLUTION RESPIRATORY (INHALATION) at 16:14

## 2020-09-26 RX ADMIN — Medication 10 ML: at 20:15

## 2020-09-26 RX ADMIN — Medication 1 CAPSULE: at 08:44

## 2020-09-26 RX ADMIN — SODIUM CHLORIDE 4 ML: 7 NEBU SOLN,3 % NEBU at 10:40

## 2020-09-26 RX ADMIN — ACETAMINOPHEN 650 MG: 325 TABLET ORAL at 20:15

## 2020-09-26 RX ADMIN — CEFEPIME HYDROCHLORIDE 2 G: 2 INJECTION, POWDER, FOR SOLUTION INTRAVENOUS at 05:45

## 2020-09-26 RX ADMIN — ACETAMINOPHEN 650 MG: 325 TABLET ORAL at 07:53

## 2020-09-26 RX ADMIN — ALBUTEROL SULFATE 2.5 MG: 2.5 SOLUTION RESPIRATORY (INHALATION) at 21:46

## 2020-09-26 RX ADMIN — ALBUTEROL SULFATE 2.5 MG: 2.5 SOLUTION RESPIRATORY (INHALATION) at 10:40

## 2020-09-26 RX ADMIN — SODIUM CHLORIDE 4 ML: 7 NEBU SOLN,3 % NEBU at 21:46

## 2020-09-26 RX ADMIN — ONDANSETRON 4 MG: 2 INJECTION INTRAMUSCULAR; INTRAVENOUS at 22:05

## 2020-09-26 RX ADMIN — BUDESONIDE 500 MCG: 0.5 INHALANT RESPIRATORY (INHALATION) at 21:46

## 2020-09-26 RX ADMIN — AMLODIPINE BESYLATE 10 MG: 10 TABLET ORAL at 08:44

## 2020-09-26 RX ADMIN — ONDANSETRON 4 MG: 2 INJECTION INTRAMUSCULAR; INTRAVENOUS at 07:58

## 2020-09-26 RX ADMIN — Medication 10 ML: at 08:10

## 2020-09-26 RX ADMIN — BUDESONIDE 500 MCG: 0.5 INHALANT RESPIRATORY (INHALATION) at 10:40

## 2020-09-26 RX ADMIN — ATORVASTATIN CALCIUM 10 MG: 10 TABLET, FILM COATED ORAL at 08:44

## 2020-09-26 RX ADMIN — LEVOTHYROXINE SODIUM 75 MCG: 75 TABLET ORAL at 05:49

## 2020-09-26 RX ADMIN — ENOXAPARIN SODIUM 40 MG: 40 INJECTION SUBCUTANEOUS at 00:26

## 2020-09-26 RX ADMIN — AZITHROMYCIN 500 MG: 500 INJECTION, POWDER, LYOPHILIZED, FOR SOLUTION INTRAVENOUS at 20:15

## 2020-09-26 ASSESSMENT — PAIN SCALES - GENERAL
PAINLEVEL_OUTOF10: 5
PAINLEVEL_OUTOF10: 5
PAINLEVEL_OUTOF10: 3
PAINLEVEL_OUTOF10: 3
PAINLEVEL_OUTOF10: 7
PAINLEVEL_OUTOF10: 3

## 2020-09-26 ASSESSMENT — PAIN DESCRIPTION - DESCRIPTORS
DESCRIPTORS: ACHING
DESCRIPTORS: ACHING

## 2020-09-26 ASSESSMENT — PAIN DESCRIPTION - LOCATION
LOCATION: BACK;HEAD
LOCATION: BACK;HEAD

## 2020-09-26 ASSESSMENT — PAIN DESCRIPTION - PAIN TYPE
TYPE: ACUTE PAIN
TYPE: ACUTE PAIN

## 2020-09-26 ASSESSMENT — PAIN DESCRIPTION - PROGRESSION
CLINICAL_PROGRESSION: NOT CHANGED
CLINICAL_PROGRESSION: NOT CHANGED

## 2020-09-26 ASSESSMENT — PAIN DESCRIPTION - ONSET
ONSET: ON-GOING
ONSET: ON-GOING

## 2020-09-26 ASSESSMENT — PAIN DESCRIPTION - FREQUENCY
FREQUENCY: CONTINUOUS
FREQUENCY: CONTINUOUS

## 2020-09-26 ASSESSMENT — PAIN DESCRIPTION - ORIENTATION
ORIENTATION: LOWER;MID
ORIENTATION: LOWER;MID

## 2020-09-26 NOTE — PLAN OF CARE
Problem: Pain:  Goal: Pain level will decrease  Description: Pain level will decrease  Outcome: Ongoing  Note: Patient voices pain 7/10. Patients pain goal is 0/10. PRN pain medications given as ordered. Patients pain goal is a 0. Non-pharmacological interventions include: rest and repositioning. Problem: Falls - Risk of:  Goal: Will remain free from falls  Description: Will remain free from falls  Outcome: Ongoing  Note: Patient absent of falls this shift, fall band intact, bed alarm set, falling star magnet in place. Problem: Falls - Risk of:  Goal: Absence of physical injury  Description: Absence of physical injury  Outcome: Ongoing  Note: Patient absent of physical injury this shift. Problem: Breathing Pattern - Ineffective:  Goal: Ability to achieve and maintain a regular respiratory rate will improve  Description: Ability to achieve and maintain a regular respiratory rate will improve  Outcome: Ongoing  Note: Patient states her SOB has improved slightly since admission. Patient gets more SOB with walking. Care plan reviewed with patient. Patient verbalizes understanding of the care plan and contributed to goal setting.

## 2020-09-26 NOTE — CONSULTS
[facility]    Patient - Zoltan George   MRN -  [id number]   [facility] - [[facility]-identified[facility]   - 1935      Date of Admission -  2020  4:36 PM  Date of evaluation -  2020  Room - [facility] Day - 1  Consulting - Giles Alvarado MD Primary Care Physician - Raphael Corado MD   Chief Complaint   Shortness of breath and cough  Active Hospital Problem List      Active Hospital Problems    Diagnosis Date Noted    Community acquired bacterial pneumonia [J15.9]      HPI   Zoltan George is a 80 y.o. female admitted for possible Pneumonia. Patient is an 80year old female who presented to [facility] ED with 3 day hx of worsening shortness of breath. She states that her shortness of breath started after receiving flovent from our pulmonary office and using it for 2 days. She states that after using the flovent she had a coughing spell that lasted 30 minutes, the next day she had a coughing spell that lasted for 40 minutes after using the flovent. She states that since that time she feels that her breathing has been worse and is not improving. She denies any cough currently. She denies sputum production, fevers, chills, night sweats. Patient has a significant hx of pulmonary disease including Bronchiectasis, Hx of MAC infection, and Pulmonary fibrosis which is followed here in [facility] with Dr. Leif Quezada as well as a the [facility]. Per chart review, patient was seen at [address] [place] [address] and was given oral prednisone for bronchiectasis with the intent of trying ICS if symptoms improved with oral steroids. Per patient her breathing was the best it has ever been while taking the prednisone. Pulmonary medicine was consulted for further evaluation of patients breathing and possible pneumonia. No prior smoking history.        Past Medical History         Diagnosis Date    Anemia     normal on pre-op 2012 and 13    Backache     h/o    Bronchiectasis [facility])     Bursitis     bilateral shoulders    Constipation     Diverticulosis of colon     HTN (hypertension)     Hypercalcemia     slightly elevated at 10.8 pre-op 13    Hyperlipidemia     Nodular goiter     bx negative    OA (osteoarthritis)     bilateral thumbs - sees Dr. Sheridan Grade Osteopenia 2013    Parathyroid adenoma 2013    Pneumonia of right upper lobe due to infectious organism [address] [address]     Pneumonitis     Dr. Bismark Manning in 2010 - bx negative    Pulmonary Mycobacterium avium complex (MAC) infection ([address]     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 ([address]     had one parathyroid removed - now follows with Dr. Krish Hoffman Sinusitis     with seasonal allergies    Vitamin D deficiency 2018      Past Surgical History           Procedure Laterality Date    ABDOMINAL EXPLORATION SURGERY  2013    Release of SBO, KATHERINE-Dr. Thao Burgess    APPENDECTOMY      BRONCHOSCOPY N/A 2020    BRONCHOSCOPY FLUOROSCOPY performed by Abbie Barrera MD at [address] Bilateral  ?   SECTION  [id number]    DILATION AND CURETTAGE OF UTERUS  [id number]    EXCISION OF PARATHYROID MASS Right 2013    rt parathyroidectomy (excision of rt inferior parathyroid mass) exploration of neck     FOOT SURGERY      left    FOOT SURGERY Left 2017    Dr Tayla Nash  12    left knee    JOINT REPLACEMENT  2014    right knee    MALIGNANT SKIN LESION EXCISION Left 2017    [facility]CC DORSAL FOOT WITH GRAFT & FS    GOSIA AND BSO      TONSILLECTOMY AND ADENOIDECTOMY  1940 ?  TOTAL KNEE ARTHROPLASTY Right 2014    OIO     Diet    DIET CARDIAC; Allergies    Levaquin [levofloxacin]; Percocet [oxycodone-acetaminophen];  Rifampin; Sulfa antibiotics; and Cefuroxime  Social History     Social History     Socioeconomic History    Marital status:      Spouse name: Not hemoptysis. Cardiovascular: No palpitations, chest pain or edema. Gastrointestinal: No nausea or vomiting. Neurological: No focal neurological weakness. Extremities: No tenderness. Musculoskeletal: no complaints  Genitourinary: No complaints. Hematological: Negative. Denies easy buising  Skin: No itching. Meds    Current Medications    cefepime  2 g Intravenous Q12H    enoxaparin  40 mg Subcutaneous Daily    amLODIPine  10 mg Oral Daily    [START ON 9/27/2020] Vitamin D  5,000 Units Oral Every Other Day    fluticasone  4 puff Inhalation BID    lactobacillus  1 capsule Oral Daily    levothyroxine  75 mcg Oral Daily    cetirizine  10 mg Oral Daily    multivitamin  1 tablet Oral Daily    atorvastatin  10 mg Oral Daily    sodium chloride (Inhalant)  4 mL Nebulization BID    sodium chloride flush  10 mL Intravenous 2 times per day    azithromycin  500 mg Intravenous Q24H     acetaminophen, albuterol, sodium chloride flush, polyethylene glycol, promethazine **OR** ondansetron  IV Drips/Infusions    Vitals    Vitals    height is 5' 4\" (1.626 m) and weight is 153 lb (69.4 kg). Her axillary temperature is 96.7 °F (35.9 °C). Her blood pressure is 129/63 and her pulse is 94. Her respiration is 22 and oxygen saturation is 91%. O2 Flow Rate (L/min): 2 L/min  I/O    Intake/Output Summary (Last 24 hours) at 9/26/2020 0919  Last data filed at 9/26/2020 0412  Gross per 24 hour   Intake 0 ml   Output 150 ml   Net -150 ml     Patient Vitals for the past 96 hrs (Last 3 readings):   Weight   09/25/20 1644 153 lb (69.4 kg)     Exam   Constitutional: Patient appears moderately built and moderately nourished. Patient in no acute distress. Saturating well on 2L O2 via NC. Head: Normocephalic and atraumatic. Mouth/Throat: Oropharynx is clear and moist.  No oral thrush. Eyes: Conjunctivae are normal. Pupils are equal, round, and reactive to light. No scleral icterus. Neck: Neck supple.  No JVD or tracheal deviation present. Cardiovascular: Regular rate, regular rhythm, S1 and S2 with no murmur. No peripheral edema  Pulmonary/Chest: Normal effort with clear breath sounds. No stridor. No respiratory distress. Abdominal: Soft. Bowel sounds audible. No distension or tenderness to palp  Musculoskeletal: Moves all extremities  Lymphadenopathy:  No cervical adenopathy. Neurological: Patient is alert and oriented to person, place, and time. Skin: Skin is warm and dry. Labs   ABG  Lab Results   Component Value Date    PH 7.41 09/25/2020    PO2 61 09/25/2020    PCO2 39 09/25/2020    HCO3 25 09/25/2020    O2SAT 91 09/25/2020     Lab Results   Component Value Date    IFIO2 2 01/08/2020     CBC  Recent Labs     09/25/20  1716 09/26/20  0533   WBC 12.1* 11.1*   RBC 5.03 4.95   HGB 13.9 13.7   HCT 43.2 42.8   MCV 85.9 86.5   MCH 27.6 27.7   MCHC 32.2 32.0*    232   MPV 10.4 10.8      BMP  Recent Labs     09/25/20  1716 09/26/20  0533    135   K 4.1 4.5    100   CO2 23 21*   BUN 24* 21   CREATININE 1.1 1.0   GLUCOSE 170* 103   CALCIUM 10.1 9.6     LFT  Recent Labs     09/25/20  1716 09/26/20  0533   AST 20 19   ALT 18 17   BILITOT 0.4 0.7   ALKPHOS 84 81     TROP  Lab Results   Component Value Date    TROPONINT < 0.010 09/25/2020    TROPONINT < 0.010 01/06/2020    TROPONINT < 0.010 11/09/2016     BNP  Lab Results   Component Value Date    PROBNP 864.5 09/25/2020    PROBNP 506.4 01/06/2020    PROBNP 52.4 11/09/2016     D-Dimer  No results found for: DDIMER  Lactic Acid  Recent Labs     09/25/20  1835 09/26/20  0534   LACTA 1.4 0.9     INR  No results for input(s): INR, PROTIME in the last 72 hours. PTT  No results for input(s): APTT in the last 72 hours. Glucose  No results for input(s): POCGLU in the last 72 hours.   UA No results for input(s): SPECGRAV, [address], [address] [place] [address] 37, CLARITYU, MUCUS, PROTEINU, BLOODU, [facility], [address], BACTERIA, NITRU, [facility], [facility], [address], LABCAST, LABCASTTY, AMORPHOS in the last 72 hours. Invalid input(s): CRYSTALS. PFTs         Echo    ECHO completed in 2018 at [facility]  CONCLUSIONS:  - Exam indication: Shortness of Breath  - The left ventricle is normal in size. Left ventricular systolic function is   hyperdynamic. EF = 77 ± 5% (2D biplane) Normal left ventricular diastolic   function.  - The right ventricle is normal in size. Right ventricular systolic function is   normal.  - The patient has not had a prior  echocardiographic exam for comparison. Electronically signed by Vida Marcus MD on 8/2/2018 at 4:56:15 PM  Cultures    Procalcitonin  Lab Results   Component Value Date    PROCAL 0.13 09/26/2020    PROCAL 0.12 09/25/2020     SARS-CoV-2, NAAT  NOT DETECTED  NOT DETECTED  Final  09/25/2020  6:16 PM      Blood Culture, Routine  09/25/2020  6:29 PM  [facility]    No growth-preliminary      Blood Culture, Routine  09/25/2020  6:48 PM  [address] [facility]    No growth-preliminary      Strep Pneumo antigen- pending  Legionella antigen- pending    MRSA PCR- negative    Radiology    CXR  9/25/2020   IMPRESSION:    Right upper and to a lesser extent bibasilar coarsened mixed airspace and interstitial opacities. Appearance is similar to prior with likely a background of chronic interstitial changes. Superimposed acute infiltrate is not excluded. Correlation with    symptoms and follow-up as clinically warranted. CT Scans  Ct chest high resolution 8/25/2020      Impression    1. Irregular groundglass and confluent densities are demonstrated within the periphery of the right lung left middle and upper lobes. This is most pronounced within the right apex. This may represent sequela from pulmonary fibrosis         2. There is right upper lobe bronchiectasis demonstrated.         **This report has been created using voice recognition software.  It may contain minor errors which are inherent in voice recognition technology. **      Final report electronically signed by Dr. Calos Isbell on 8/25/2020 1:44 PM                       (See actual reports for details)    Assessment   Acute hypoxic respiratory failure-patient requiring 2 L O2 via nasal cannula. Likely secondary to acute exacerbation of bronchiectasis. Bronchiectasis- secondary to MAC infection in 2017. [facility]. Likely in acute exacerbation secondary to possible pneumonia versus medication induced versus worsening pulmonary fibrosis. Patient is afebrile and denies changes in sputum production. On nebulized albuterol, Zithromax and cefepime for possible pneumonia. Pulmonary fibrosis-likely secondary to MAC infection in 2017. Following at the [facility]. She is currently on nebulized albuterol and nebulized saline at home. per chart review their recommendations were to trial inhaled corticosteroids. Patient was tried on Flovent resulting in coughing fits. Will switch to nebulized corticosteroid. Eventually patient will most likely require pulmonary rehab. History of Mycobacterium avium complex in 2017  Hypertension  Dyslipidemia  Osteoarthritis    Recommendations   -We will discontinue Flovent inhaler. We will trial patient on Pulmicort nebulizer solution twice daily. If patient tolerates we will continue medication at discharge.   -Agree with empiric antibiotics, may de-escalate pending cultures. ID consulted, appreciate recommendations.   -Continue nebulized albuterol every 4 hours as needed  -Culture results pending, strep pneumo antigen and Legionella antigen both pending. Follow results  -Procalcitonin 0.13.   -Continue supplemental oxygen as needed  -Wean oxygen therapy as indicated keeping oxygen saturations greater than 90%  -Continue incentive spirometer and Acapella    Thank you for the consult and allowing us to participate in the care of your patient.      Case discussed with nurse and patient/family. Questions and concerns addressed. Meds and Orders reviewed. Electronically signed by   Jose Friedman DO on 9/26/2020 at 9:19 AM    Addendum by Dr. Gracie Etienne MD:  I have seen and examined the patient independently. Face to face evaluation and examination was performed. The above evaluation and note has been reviewed. Labs and radiographs were reviewed. I have discussed with Dr.Johnathan OLGA Holliday DO about this patient in detail. The above assessment and plan has been reviewed. Please see my modifications mentioned below. My modifications:  She is having chronic cough from her ILD. CT chest dated reviewed. Aug 25, 2020   PROCEDURE: CT CHEST HIGH RESOLUTION       No significant change from CT chest dated Jan,2020    She underwent bronch on 1/8/2020:  Flexible diagnostic fiberoptic bronchoscopy with fluoroscopy. During procedure Bronch washings obtained from right tracheobronchial tree including right upper, middle and lower lobes. Bronchioalveolar lavage obtained from right lower lobe posterior segment. Trans bronchial lung biopsies were performed by using biopsy forceps from right lower lobe posterior segment under fluroscopy guidance. Cytology brush specimen was obtained from right lower lobe posterior segment under fluroscopy guidance. Microbiology brush specimen was obtained from right lower lobe posterior segment under fluroscopy guidence. Connective tissue work up results:  Date: 4/03/2017  SABAS( Antinuclear antibody):  Negative-none detected.    Fungal serologies- Negative                   RA ( Rheumatoid factor):  Negative. <10                                                          ACE(  level):    Negative-22                               ESR ( Sed rate): 9               Connective Tissue Labs  Date: 1/11/19     SABAS with reflex-None detected.   Anti RNP-1- Normal.  Anti Lexie-0- normal  Anti-SSB-0- normal  AntiSSA-1- normal  AntiCentromere- 4- Normal  Scleroderma

## 2020-09-26 NOTE — ED NOTES
ED to inpatient nurses report    Chief Complaint   Patient presents with    Chest Pain    Shortness of Breath      Present to ED from home  LOC: alert and orientated to name, place, date  Vital signs   Vitals:    09/25/20 1826 09/25/20 1953 09/25/20 2057 09/25/20 2131   BP: 124/65 (!) 139/106 (!) 125/99 122/70   Pulse: 91 94 88 89   Resp: 24 27 24 26   Temp:       TempSrc:       SpO2: 95% 95% 94% 94%   Weight:       Height:          Oxygen Baseline 94    Current needs required room air    LDAs:   Peripheral IV 09/25/20 Left Upper arm (Active)   Site Assessment Clean;Dry; Intact 09/25/20 2131   Line Status Infusing 09/25/20 2131   Dressing Status Clean;Dry; Intact 09/25/20 2131   Dressing Intervention New 09/25/20 1827     Mobility: Requires assistance * 1  Pending ED orders: None  Present condition: Stable    Electronically signed by Angel Reyes RN on 9/25/2020 at 9:58 PM       Angel Reyes RN  09/25/20 5424

## 2020-09-26 NOTE — ED NOTES
Patient is resting in bed with easy and unlabored respirations. Call light in reach. Side rails up x2. Patient denies further complaints or concerns. Will monitor.         Angel Reyes RN  09/25/20 7085

## 2020-09-26 NOTE — H&P
right upper lobe due to infectious organism ([facility]     Pneumonitis     Dr. Dipak Lopez in 2010 - bx negative    Pulmonary Mycobacterium avium complex (MAC) infection ([facility]     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 ([facility]     had one parathyroid removed - now follows with Dr. Dominga Taylor    Sinusitis     with seasonal allergies    Vitamin D deficiency 2018       Past Surgical History:        Procedure Laterality Date    ABDOMINAL EXPLORATION SURGERY  2013    Release of ASHISHOKATHERINE-Dr. Tressa Andrew    APPENDECTOMY      BRONCHOSCOPY N/A 2020    BRONCHOSCOPY FLUOROSCOPY performed by Marcello Odom MD at [address] Bilateral  ?   SECTION  [id number]    DILATION AND CURETTAGE OF UTERUS  [id number]    EXCISION OF PARATHYROID MASS Right 2013    rt parathyroidectomy (excision of rt inferior parathyroid mass) exploration of neck     FOOT SURGERY      left    FOOT SURGERY Left 2017    Dr Kelin Stringer  6-4-12    left knee    JOINT REPLACEMENT  2014    right knee    MALIGNANT SKIN LESION EXCISION Left 2017    [facility] DORSAL FOOT WITH GRAFT & FS    GOSIA AND BSO  1982    TONSILLECTOMY AND ADENOIDECTOMY  194 ?  TOTAL KNEE ARTHROPLASTY Right 2014    [facility]IO       Home Medications:   No current facility-administered medications on file prior to encounter.       Current Outpatient Medications on File Prior to Encounter   Medication Sig Dispense Refill    amLODIPine (NORVASC) 10 MG tablet TAKE 1 TABLET BY MOUTH  DAILY 90 tablet 3    levothyroxine (SYNTHROID) 75 MCG tablet take 1 tablet by mouth once daily 90 tablet 3    simvastatin (ZOCOR) 20 MG tablet TAKE 1 TABLET BY MOUTH  NIGHTLY 90 tablet 3    albuterol (PROVENTIL) (2.5 MG/3ML) 0.083% nebulizer solution Take 3 mLs by nebulization every 6 hours as needed for Wheezing or Shortness of Breath 120 each 3    loratadine (CLARITIN) dysphagia or odynophagia  GENITOURINARY SYSTEM: No dysuria , hematuria, urinary frequency , incontinence,  flank pains , urgency , genital discharge  SKIN / [place]:  No rashes, petechiae, , no open wounds , no soft tissue swelling   MUSCULOSKELETAL: No bone pains, no arthralgia, no joint swelling, no myalgia  HEMATOLOGIC: No easy bruising, no bleeding  OPHTHALMOLOGY: No conjuctival injection, no discharge, no pain, no blurring of    vision, no loss of vision, no diplopia  EAR: No loss of hearing , tinnitus, ear discharge , no ear pain          Vitals:   Vitals:    09/26/20 0412   BP: 125/77   Pulse: 78   Resp: 20   Temp: 97.9 °F (36.6 °C)   SpO2: 91%      BMI: Body mass index is 26.26 kg/m². PHYSICAL EXAMINATION:       PHYSICAL EXAM:  General appearance:Ill looking elderly female ; No apparent distress, appears stated age and cooperative. HEENT:  Normal cephalic, atraumatic without obvious deformity. Pupils equal, round, and reactive to light. Extra ocular muscles intact. Conjunctivae/corneas clear. Neck: Supple   Respiratory:  Normal respiratory effort., bilateral basilar crackles. Cardiovascular:  Regular rhythm with normal S1/S2 without ,rubs or gallops. Abdomen: Soft, nontender , non-distended with normal bowel sounds. Musculoskeletal:  No clubbing, cyanosis or edema bilaterally. Tender left shoulder to palpatiuon , no pain with internal or external rotation  Skin: Skin color, texture, turgor normal.  No rashes or lesions. Neurologic: Alert and oriented x 3, without any focal motor deficits.    Psychiatric:   Thought content appropriate, normal insight         Review of Labs and Diagnostic Testing:    Recent Results (from the past 24 hour(s))   EKG 12 Lead    Collection Time: 09/25/20  4:40 PM   Result Value Ref Range    Ventricular Rate 97 BPM    Atrial Rate 97 BPM    P-R Interval 158 ms    QRS Duration 78 ms    Q-T Interval 326 ms    QTc Calculation (Bazett) 414 ms    P Axis 28 degrees    R Axis 42 degrees    T Axis 49 degrees   CBC Auto Differential    Collection Time: 09/25/20  5:16 PM   Result Value Ref Range    WBC 12.1 (H) 4.8 - 10.8 thou/mm3    RBC 5.03 4.20 - 5.40 mill/mm3    Hemoglobin 13.9 12.0 - 16.0 gm/dl    Hematocrit 43.2 37.0 - 47.0 %    MCV 85.9 81.0 - 99.0 fL    MCH 27.6 26.0 - 33.0 pg    MCHC 32.2 32.2 - 35.5 gm/dl    RDW-CV 13.9 11.5 - 14.5 %    RDW-SD 43.6 35.0 - 45.0 fL    Platelets 662 950 - 381 thou/mm3    MPV 10.4 9.4 - 12.4 fL    Seg Neutrophils 83.2 %    Lymphocytes 5.1 %    Monocytes 9.2 %    Eosinophils 1.7 %    Basophils 0.3 %    Immature Granulocytes 0.5 %    Segs Absolute 10.1 (H) 1.8 - 7.7 thou/mm3    Lymphocytes Absolute 0.6 (L) 1.0 - 4.8 thou/mm3    Monocytes Absolute 1.1 0.4 - 1.3 thou/mm3    Eosinophils Absolute 0.2 0.0 - 0.4 thou/mm3    Basophils Absolute 0.0 0.0 - 0.1 thou/mm3    Immature Grans (Abs) 0.06 0.00 - 0.07 thou/mm3    nRBC 0 /100 wbc   Comprehensive Metabolic Panel    Collection Time: 09/25/20  5:16 PM   Result Value Ref Range    Glucose 170 (H) 70 - 108 mg/dL    CREATININE 1.1 0.4 - 1.2 mg/dL    BUN 24 (H) 7 - 22 mg/dL    Sodium 137 135 - 145 meq/L    Potassium 4.1 3.5 - 5.2 meq/L    Chloride 100 98 - 111 meq/L    CO2 23 23 - 33 meq/L    Calcium 10.1 8.5 - 10.5 mg/dL    AST 20 5 - 40 U/L    Alkaline Phosphatase 84 38 - 126 U/L    Total Protein 7.4 6.1 - 8.0 g/dL    Alb 3.5 3.5 - 5.1 g/dL    Total Bilirubin 0.4 0.3 - 1.2 mg/dL    ALT 18 11 - 66 U/L   Brain Natriuretic Peptide    Collection Time: 09/25/20  5:16 PM   Result Value Ref Range    Pro-.5 0.0 - 1800.0 pg/mL   Troponin    Collection Time: 09/25/20  5:16 PM   Result Value Ref Range    Troponin T < 0.010 ng/ml   C-reactive protein    Collection Time: 09/25/20  5:16 PM   Result Value Ref Range    CRP 22.97 (H) 0.00 - 1.00 mg/dl   Sedimentation Rate    Collection Time: 09/25/20  5:16 PM   Result Value Ref Range    Sed Rate 85 (H) 0 - 20 mm/hr   Ferritin    Collection Time: 09/25/20  5:16 PM   Result Value Ref Range    Ferritin 515 (H) 10 - 291 ng/mL   Glomerular Filtration Rate, Estimated    Collection Time: 09/25/20  5:16 PM   Result Value Ref Range    Est, Glom Filt Rate 47 (A) ml/min/1.73m2   Osmolality    Collection Time: 09/25/20  5:16 PM   Result Value Ref Range    Osmolality Calc 281.8 275.0 - 300.0 mOsmol/kg   Anion Gap    Collection Time: 09/25/20  5:16 PM   Result Value Ref Range    Anion Gap 14.0 8.0 - 16.0 meq/L   Procalcitonin    Collection Time: 09/25/20  5:16 PM   Result Value Ref Range    Procalcitonin 0.12 (H) 0.01 - 0.09 ng/mL   Blood Gas, Arterial    Collection Time: 09/25/20  5:50 PM   Result Value Ref Range    pH, Blood Gas 7.41 7.35 - 7.45    PCO2 39 35 - 45 mmhg    PO2 61 (L) 71 - 104 mmhg    HCO3 25 23 - 28 mmol/l    Base Excess (Calculated) 0.1 -2.5 - 2.5 mmol/l    O2 Sat 91 %    DEVICE Room Air     Source: [facility]     COLLECTED BY: [id number]    COVID-19    Collection Time: 09/25/20  6:16 PM   Result Value Ref Range    SARS-CoV-2, NAAT NOT DETECTED NOT DETECTED   Lactic acid, plasma    Collection Time: 09/25/20  6:35 PM   Result Value Ref Range    Lactic Acid 1.4 0.5 - 2.2 mmol/L   MRSA by PCR    Collection Time: 09/26/20 12:14 AM   Result Value Ref Range    MRSA SCREEN RT-PCR NEGATIVE    VRE Screen by PCR    Collection Time: 09/26/20 12:14 AM    Specimen: Rectal Swab   Result Value Ref Range    Vancomycin Resistant Enterococcus NEGATIVE    CBC auto differential    Collection Time: 09/26/20  5:33 AM   Result Value Ref Range    WBC 11.1 (H) 4.8 - 10.8 thou/mm3    RBC 4.95 4.20 - 5.40 mill/mm3    Hemoglobin 13.7 12.0 - 16.0 gm/dl    Hematocrit 42.8 37.0 - 47.0 %    MCV 86.5 81.0 - 99.0 fL    MCH 27.7 26.0 - 33.0 pg    MCHC 32.0 (L) 32.2 - 35.5 gm/dl    RDW-CV 14.1 11.5 - 14.5 %    RDW-SD 43.7 35.0 - 45.0 fL    Platelets 197 088 - 611 thou/mm3    MPV 10.8 9.4 - 12.4 fL    Seg Neutrophils 73.4 %    Lymphocytes 11.5 %    Monocytes 11.0 %    Eosinophils 3.0 %    Basophils 0.4 %    Immature Granulocytes 0.7 %    Segs Absolute 8.1 (H) 1.8 - 7.7 thou/mm3    Lymphocytes Absolute 1.3 1.0 - 4.8 thou/mm3    Monocytes Absolute 1.2 0.4 - 1.3 thou/mm3    Eosinophils Absolute 0.3 0.0 - 0.4 thou/mm3    Basophils Absolute 0.0 0.0 - 0.1 thou/mm3    Immature Grans (Abs) 0.08 (H) 0.00 - 0.07 thou/mm3    nRBC 0 /100 wbc   Lactic acid, plasma    Collection Time: 09/26/20  5:34 AM   Result Value Ref Range    Lactic Acid 0.9 0.5 - 2.2 mmol/L   Rapid influenza A/B antigens    Collection Time: 09/26/20  5:40 AM    Specimen: Nasopharyngeal   Result Value Ref Range    Flu A Antigen Negative NEGATIVE    Flu B Antigen Negative NEGATIVE       Radiology:     Xr Chest Portable    Result Date: 9/25/2020  PROCEDURE: XR CHEST PORTABLE CLINICAL INFORMATION: SOB. COMPARISON: January 13, 2020. TECHNIQUE: Portable chest. FINDINGS: Right upper and to a lesser extent bibasilar coarsened mixed airspace and interstitial opacities. Appearance is similar to prior with likely a background of chronic interstitial changes. Superimposed acute findings are not excluded. There is prominent right hilum is redemonstrated. Ectatic thoracic aorta. Cardiomegaly. No significant pleural effusion. No acute osseous findings. IMPRESSION: Right upper and to a lesser extent bibasilar coarsened mixed airspace and interstitial opacities. Appearance is similar to prior with likely a background of chronic interstitial changes. Superimposed acute infiltrate is not excluded. Correlation with symptoms and follow-up as clinically warranted. **This report has been created using voice recognition software. It may contain minor errors which are inherent in voice recognition technology. ** Final report electronically signed by Dr. Chuck Foreman on 9/25/2020 6:08 PM        EKG: Normal sinus rhythm  Normal ECG  When compared with ECG of 06-JAN-2020 11:35,  No significant change was found    Active Problems:    Community acquired bacterial pneumonia  Resolved Problems: * No resolved hospital problems. *   PROBLEMS:  Bronchiectasis   ([address]  Pulmonary fibrosis (HCC)  History of pulmonary Mycobacterium avium complex  Hypertension  Dyslipidemia   Osteoarthritis  Left shoulder strain      Plan:  Start bronchodilators, O2, broad spectrum antibiotics to cover community acquired organisms, ID / Pulmonary consults, legionella and strep pneumonia urinary antigens, rapid influenza screen, blood cultures, sputum gram stain c/s, Incentive spirometry , acapella  Plan x ray left shoulder.  If unrevealing  And symptoms persist may need MRI      DVT prophylaxis: [x] Lovenox                                 [] SCDs                                 [] SQ Heparin                                 [] Encourage ambulation, low risk for DVT, no chemical or mechanical prophylaxis necessary              [] Already on Anticoagulation                Anticipated Disposition upon discharge: [x] Home                                                                         [] Home with Home Health                                                                         [] [facility]                                                                         [] [address]          Electronically signed by Fidela Litten, MD on 9/26/2020 at 7:00 AM

## 2020-09-26 NOTE — PROGRESS NOTES
Pharmacy Renal Adjustment    Rico Stokes is a 80 y.o. female. Pharmacy renally adjusted the following medications per P&T approved policy: Cefepime    Recent Labs     09/25/20  1716   BUN 24*       Recent Labs     09/25/20  1716   CREATININE 1.1       Estimated Creatinine Clearance: 36 mL/min (based on SCr of 1.1 mg/dL). Calculated CrCl: 32 ml/min    Height:   Ht Readings from Last 1 Encounters:   09/25/20 5' 4\" (1.626 m)     Weight:  Wt Readings from Last 1 Encounters:   09/25/20 153 lb (69.4 kg)     Plan: Adjustments based on renal function:          Decrease Cefepime to 2 gram iv every 12 hours.     Patti Hamilton PharmD  9/26/2020   12:52 AM

## 2020-09-27 ENCOUNTER — APPOINTMENT (OUTPATIENT)
Dept: MRI IMAGING | Age: 85
DRG: 193 | End: 2020-09-27
Payer: MEDICARE

## 2020-09-27 LAB
BASOPHILS # BLD: 0.6 %
BASOPHILS ABSOLUTE: 0.1 THOU/MM3 (ref 0–0.1)
EOSINOPHIL # BLD: 3.8 %
EOSINOPHILS ABSOLUTE: 0.5 THOU/MM3 (ref 0–0.4)
ERYTHROCYTE [DISTWIDTH] IN BLOOD BY AUTOMATED COUNT: 13.7 % (ref 11.5–14.5)
ERYTHROCYTE [DISTWIDTH] IN BLOOD BY AUTOMATED COUNT: 42.4 FL (ref 35–45)
HCT VFR BLD CALC: 41.7 % (ref 37–47)
HEMOGLOBIN: 13.4 GM/DL (ref 12–16)
IMMATURE GRANS (ABS): 0.05 THOU/MM3 (ref 0–0.07)
IMMATURE GRANULOCYTES: 0.4 %
LYMPHOCYTES # BLD: 9.7 %
LYMPHOCYTES ABSOLUTE: 1.2 THOU/MM3 (ref 1–4.8)
MCH RBC QN AUTO: 27.6 PG (ref 26–33)
MCHC RBC AUTO-ENTMCNC: 32.1 GM/DL (ref 32.2–35.5)
MCV RBC AUTO: 85.8 FL (ref 81–99)
MONOCYTES # BLD: 9.9 %
MONOCYTES ABSOLUTE: 1.2 THOU/MM3 (ref 0.4–1.3)
NUCLEATED RED BLOOD CELLS: 0 /100 WBC
PLATELET # BLD: 234 THOU/MM3 (ref 130–400)
PMV BLD AUTO: 10.7 FL (ref 9.4–12.4)
RBC # BLD: 4.86 MILL/MM3 (ref 4.2–5.4)
SEG NEUTROPHILS: 75.6 %
SEGMENTED NEUTROPHILS ABSOLUTE COUNT: 9.2 THOU/MM3 (ref 1.8–7.7)
WBC # BLD: 12.2 THOU/MM3 (ref 4.8–10.8)

## 2020-09-27 PROCEDURE — 6370000000 HC RX 637 (ALT 250 FOR IP): Performed by: INTERNAL MEDICINE

## 2020-09-27 PROCEDURE — 2060000000 HC ICU INTERMEDIATE R&B

## 2020-09-27 PROCEDURE — 2580000003 HC RX 258: Performed by: INTERNAL MEDICINE

## 2020-09-27 PROCEDURE — 36415 COLL VENOUS BLD VENIPUNCTURE: CPT

## 2020-09-27 PROCEDURE — 94640 AIRWAY INHALATION TREATMENT: CPT

## 2020-09-27 PROCEDURE — 6360000002 HC RX W HCPCS: Performed by: INTERNAL MEDICINE

## 2020-09-27 PROCEDURE — 94760 N-INVAS EAR/PLS OXIMETRY 1: CPT

## 2020-09-27 PROCEDURE — 99232 SBSQ HOSP IP/OBS MODERATE 35: CPT | Performed by: INTERNAL MEDICINE

## 2020-09-27 PROCEDURE — 85025 COMPLETE CBC W/AUTO DIFF WBC: CPT

## 2020-09-27 PROCEDURE — 73221 MRI JOINT UPR EXTREM W/O DYE: CPT

## 2020-09-27 RX ORDER — LIDOCAINE 4 G/G
2 PATCH TOPICAL DAILY
Status: DISCONTINUED | OUTPATIENT
Start: 2020-09-27 | End: 2020-10-02 | Stop reason: HOSPADM

## 2020-09-27 RX ORDER — BENZONATATE 100 MG/1
100 CAPSULE ORAL 3 TIMES DAILY
Status: DISCONTINUED | OUTPATIENT
Start: 2020-09-27 | End: 2020-10-02 | Stop reason: HOSPADM

## 2020-09-27 RX ADMIN — CEFTRIAXONE SODIUM 1 G: 1 INJECTION, POWDER, FOR SOLUTION INTRAMUSCULAR; INTRAVENOUS at 14:23

## 2020-09-27 RX ADMIN — BENZONATATE 100 MG: 100 CAPSULE ORAL at 14:28

## 2020-09-27 RX ADMIN — BENZONATATE 100 MG: 100 CAPSULE ORAL at 19:59

## 2020-09-27 RX ADMIN — AMLODIPINE BESYLATE 10 MG: 10 TABLET ORAL at 09:52

## 2020-09-27 RX ADMIN — AZITHROMYCIN 500 MG: 500 INJECTION, POWDER, LYOPHILIZED, FOR SOLUTION INTRAVENOUS at 19:58

## 2020-09-27 RX ADMIN — ACETAMINOPHEN 650 MG: 325 TABLET ORAL at 12:45

## 2020-09-27 RX ADMIN — ENOXAPARIN SODIUM 40 MG: 40 INJECTION SUBCUTANEOUS at 23:44

## 2020-09-27 RX ADMIN — Medication 5000 UNITS: at 09:52

## 2020-09-27 RX ADMIN — Medication 10 ML: at 09:53

## 2020-09-27 RX ADMIN — ALBUTEROL SULFATE 2.5 MG: 2.5 SOLUTION RESPIRATORY (INHALATION) at 08:18

## 2020-09-27 RX ADMIN — ALBUTEROL SULFATE 2.5 MG: 2.5 SOLUTION RESPIRATORY (INHALATION) at 15:45

## 2020-09-27 RX ADMIN — ACETAMINOPHEN 650 MG: 325 TABLET ORAL at 09:52

## 2020-09-27 RX ADMIN — Medication 10 ML: at 19:58

## 2020-09-27 RX ADMIN — LEVOTHYROXINE SODIUM 75 MCG: 75 TABLET ORAL at 05:33

## 2020-09-27 RX ADMIN — ATORVASTATIN CALCIUM 10 MG: 10 TABLET, FILM COATED ORAL at 09:52

## 2020-09-27 RX ADMIN — ALBUTEROL SULFATE 2.5 MG: 2.5 SOLUTION RESPIRATORY (INHALATION) at 22:26

## 2020-09-27 RX ADMIN — ACETAMINOPHEN 650 MG: 325 TABLET ORAL at 19:58

## 2020-09-27 RX ADMIN — BUDESONIDE 500 MCG: 0.5 INHALANT RESPIRATORY (INHALATION) at 22:26

## 2020-09-27 RX ADMIN — ACETAMINOPHEN 650 MG: 325 TABLET ORAL at 00:27

## 2020-09-27 RX ADMIN — THERA TABS 1 TABLET: TAB at 09:53

## 2020-09-27 RX ADMIN — ENOXAPARIN SODIUM 40 MG: 40 INJECTION SUBCUTANEOUS at 00:28

## 2020-09-27 RX ADMIN — ACETAMINOPHEN 650 MG: 325 TABLET ORAL at 04:37

## 2020-09-27 RX ADMIN — POLYETHYLENE GLYCOL 3350 17 G: 17 POWDER, FOR SOLUTION ORAL at 20:01

## 2020-09-27 RX ADMIN — BUDESONIDE 500 MCG: 0.5 INHALANT RESPIRATORY (INHALATION) at 08:18

## 2020-09-27 RX ADMIN — Medication 1 CAPSULE: at 09:53

## 2020-09-27 ASSESSMENT — PAIN DESCRIPTION - PAIN TYPE
TYPE: CHRONIC PAIN
TYPE: CHRONIC PAIN
TYPE: ACUTE PAIN

## 2020-09-27 ASSESSMENT — PAIN DESCRIPTION - ORIENTATION
ORIENTATION: LEFT

## 2020-09-27 ASSESSMENT — PAIN DESCRIPTION - DESCRIPTORS
DESCRIPTORS: ACHING

## 2020-09-27 ASSESSMENT — PAIN DESCRIPTION - ONSET
ONSET: GRADUAL
ONSET: ON-GOING
ONSET: GRADUAL

## 2020-09-27 ASSESSMENT — PAIN SCALES - GENERAL
PAINLEVEL_OUTOF10: 8
PAINLEVEL_OUTOF10: 5
PAINLEVEL_OUTOF10: 2
PAINLEVEL_OUTOF10: 5
PAINLEVEL_OUTOF10: 6
PAINLEVEL_OUTOF10: 6

## 2020-09-27 ASSESSMENT — PAIN - FUNCTIONAL ASSESSMENT
PAIN_FUNCTIONAL_ASSESSMENT: ACTIVITIES ARE NOT PREVENTED

## 2020-09-27 ASSESSMENT — PAIN DESCRIPTION - PROGRESSION
CLINICAL_PROGRESSION: NOT CHANGED

## 2020-09-27 ASSESSMENT — PAIN DESCRIPTION - FREQUENCY
FREQUENCY: CONTINUOUS

## 2020-09-27 ASSESSMENT — PAIN DESCRIPTION - LOCATION
LOCATION: SHOULDER

## 2020-09-27 NOTE — PROGRESS NOTES
amLODIPine  10 mg Oral Daily    Vitamin D  5,000 Units Oral Every Other Day    lactobacillus  1 capsule Oral Daily    levothyroxine  75 mcg Oral Daily    cetirizine  10 mg Oral Daily    multivitamin  1 tablet Oral Daily    atorvastatin  10 mg Oral Daily    sodium chloride (Inhalant)  4 mL Nebulization BID    sodium chloride flush  10 mL Intravenous 2 times per day    azithromycin  500 mg Intravenous Q24H     Continuous Infusions:  PRN Meds:benzonatate, acetaminophen, sodium chloride flush, polyethylene glycol, promethazine **OR** ondansetron        Allergies:  Levaquin [levofloxacin]; Percocet [oxycodone-acetaminophen]; Rifampin; Sulfa antibiotics; and Cefuroxime    OBJECTIVE:    Vitals:   Vitals:    09/27/20 0300   BP: (!) 113/56   Pulse: 88   Resp: 18   Temp: 98.2 °F (36.8 °C)   SpO2: 94%      BMI: Body mass index is 27.77 kg/m². PHYSICAL EXAMINATION:          General appearance:Ill looking elderly female ;  No apparent distress, appears stated age and cooperative. HEENT:  Normal cephalic, atraumatic without obvious deformity. Pupils equal, round, and reactive to light.  Extra ocular muscles intact. Conjunctivae/corneas clear. Neck: Supple   Respiratory:  Normal respiratory effort., bilateral basilar crackles. Cardiovascular:  Regular rhythm with normal S1/S2 without ,rubs or gallops. Abdomen: Soft, nontender , non-distended with normal bowel sounds. Musculoskeletal:  No clubbing, cyanosis or edema bilaterally. Tender left shoulder to palpation and with abduction . Reduced ROM  Skin: Skin color, texture, turgor normal.  No rashes or lesions. Neurologic: Alert and oriented x 3, without any focal motor deficits.    Psychiatric:   Thought content appropriate, normal insight    Review of Labs and Diagnostic Testing:    Recent Results (from the past 24 hour(s))   CBC auto differential    Collection Time: 09/27/20  5:48 AM   Result Value Ref Range    WBC 12.2 (H) 4.8 - 10.8 thou/mm3    RBC 4.86 4.20 - 5.40 mill/mm3    Hemoglobin 13.4 12.0 - 16.0 gm/dl    Hematocrit 41.7 37.0 - 47.0 %    MCV 85.8 81.0 - 99.0 fL    MCH 27.6 26.0 - 33.0 pg    MCHC 32.1 (L) 32.2 - 35.5 gm/dl    RDW-CV 13.7 11.5 - 14.5 %    RDW-SD 42.4 35.0 - 45.0 fL    Platelets 833 956 - 195 thou/mm3    MPV 10.7 9.4 - 12.4 fL    Seg Neutrophils 75.6 %    Lymphocytes 9.7 %    Monocytes 9.9 %    Eosinophils 3.8 %    Basophils 0.6 %    Immature Granulocytes 0.4 %    Segs Absolute 9.2 (H) 1.8 - 7.7 thou/mm3    Lymphocytes Absolute 1.2 1.0 - 4.8 thou/mm3    Monocytes Absolute 1.2 0.4 - 1.3 thou/mm3    Eosinophils Absolute 0.5 (H) 0.0 - 0.4 thou/mm3    Basophils Absolute 0.1 0.0 - 0.1 thou/mm3    Immature Grans (Abs) 0.05 0.00 - 0.07 thou/mm3    nRBC 0 /100 wbc       Radiology:     Xr Shoulder Left (min 2 Views)    Result Date: 9/26/2020  PROCEDURE: XR SHOULDER LEFT (MIN 2 VIEWS) CLINICAL INFORMATION: Left shoulder pain TECHNIQUE: 4 views of the left shoulder COMPARISON: None FINDINGS: There is no fracture or dislocation. Joint spaces are preserved. No soft tissue or osseous abnormalities are identified. No fracture or dislocation. Final report electronically signed by Dr. Дмитрий Rya on 9/26/2020 3:09 PM    Xr Chest Portable    Result Date: 9/25/2020  PROCEDURE: XR CHEST PORTABLE CLINICAL INFORMATION: SOB. COMPARISON: January 13, 2020. TECHNIQUE: Portable chest. FINDINGS: Right upper and to a lesser extent bibasilar coarsened mixed airspace and interstitial opacities. Appearance is similar to prior with likely a background of chronic interstitial changes. Superimposed acute findings are not excluded. There is prominent right hilum is redemonstrated. Ectatic thoracic aorta. Cardiomegaly. No significant pleural effusion. No acute osseous findings. IMPRESSION: Right upper and to a lesser extent bibasilar coarsened mixed airspace and interstitial opacities. Appearance is similar to prior with likely a background of chronic interstitial changes. Superimposed acute infiltrate is not excluded. Correlation with symptoms and follow-up as clinically warranted. **This report has been created using voice recognition software. It may contain minor errors which are inherent in voice recognition technology. ** Final report electronically signed by Dr. Chuck Foreman on 9/25/2020 6:08 PM        ASSESMENT:      Active Problems:    Community acquired bacterial pneumonia  Resolved Problems:    * No resolved hospital problems. *  OTHER PROBLEMS:  Bronchiectasis   ([address]  Pulmonary fibrosis (HCC)  History of pulmonary Mycobacterium avium complex  Hypertension  Dyslipidemia   Osteoarthritis  Left shoulder injury      PLAN:  Continue bronchodilators, O2, broad spectrum antibiotics to cover community acquired organisms, ID / Pulmonary consults,f/u  legionella and strep pneumonia urinary antigens, , blood cultures, sputum gram stain c/s, Incentive spirometry , acapella, rapid influenza screen was negative.     X rays unrevealing plan MRI left shoulder    DVT prophylaxis: [x] Lovenox                                 [] SCDs                                 [] SQ Heparin                                 [] Encourage ambulation, low risk for DVT, no chemical or mechanical prophylaxis necessary              [] Already on Anticoagulation                Anticipated Disposition upon discharge: [x] Home                                                                         [] Home with Home Health                                                                         [] [facility]                                                                         [] [address]          Electronically signed by Carmen Morton MD on 9/27/2020 at 8:58 AM

## 2020-09-27 NOTE — PROGRESS NOTES
[facility]    Patient - Richard Zendejas   MRN -  [id number]   [facility] # - [de-identified]   - 1935      Date of Admission -  2020  4:36 PM  Date of evaluation -  2020  Room - [facility] Day - 2  Consulting - Chuyita Zavala MD Primary Care Physician - Tye Corcoran MD   Chief Complaint   Cough and shortness of breath. Active Hospital Problem List      Active Hospital Problems    Diagnosis Date Noted    Community acquired bacterial pneumonia [J15.9]      HPI   Richard Zendejas is a 80 y.o. female admitted for possible Pneumonia. Patient is an 80year old female who presented to [facility] ED with 3 day hx of worsening shortness of breath. She states that her shortness of breath started after receiving flovent from our pulmonary office and using it for 2 days. She states that after using the flovent she had a coughing spell that lasted 30 minutes, the next day she had a coughing spell that lasted for 40 minutes after using the flovent. She states that since that time she feels that her breathing has been worse and is not improving. She denies any cough currently. She denies sputum production, fevers, chills, night sweats. Patient has a significant hx of pulmonary disease including Bronchiectasis, Hx of MAC infection, and Pulmonary fibrosis which is followed here in [facility] with Dr. Héctor Hightower as well as a the [facility]. Per chart review, patient was seen at [address] [place] [address] and was given oral prednisone for bronchiectasis with the intent of trying ICS if symptoms improved with oral steroids. Per patient her breathing was the best it has ever been while taking the prednisone. Pulmonary medicine was consulted for further evaluation of patients breathing and possible pneumonia. No prior smoking history. Subjective/Events Past 24 hours/ROS   She is on 2LPM via nasal cannula. Cough is improving. Tolerating Brovona nebs well.   No chest [levofloxacin]; Percocet [oxycodone-acetaminophen]; Rifampin; Sulfa antibiotics; and Cefuroxime  Social History     Social History     Socioeconomic History    Marital status:      Spouse name: Not on file    Number of children: Not on file    Years of education: Not on file    Highest education level: Not on file   Occupational History    Occupation: retired   Social Needs    Financial resource strain: Not on file    Food insecurity     Worry: Not on file     Inability: Not on file   [employer] needs     Medical: Not on file     Non-medical: Not on file   Tobacco Use    Smoking status: Never Smoker    Smokeless tobacco: Never Used   Substance and Sexual Activity    Alcohol use:  Yes     Alcohol/week: 0.0 standard drinks     Comment: socially-1 glassof wine 2-3 times a month    Drug use: No    Sexual activity: Never   Lifestyle    Physical activity     Days per week: Not on file     Minutes per session: Not on file    Stress: Not on file   Relationships    Social connections     Talks on phone: Not on file     Gets together: Not on file     Attends [employer] service: Not on file     Active member of club or organization: Not on file     Attends meetings of clubs or organizations: Not on file     Relationship status: Not on file    Intimate partner violence     Fear of current or ex partner: Not on file     Emotionally abused: Not on file     Physically abused: Not on file     Forced sexual activity: Not on file   Other Topics Concern    Not on file   Social History Narrative    Not on file     Family History          Problem Relation Age of Onset    Diabetes Mother     Thyroid Disease Mother     Arthritis Mother     High Blood Pressure Mother     Arthritis Father     High Blood Pressure Father     Other Father         hay fever    Breast Cancer Neg Hx     Ovarian Cancer Neg Hx      Sleep History    n/a  Meds    Current Medications    lidocaine  2 patch Transdermal Daily    benzonatate  100 mg Oral TID    budesonide  0.5 mg Nebulization BID    cefTRIAXone (ROCEPHIN) IV  1 g Intravenous Q24H    albuterol  2.5 mg Nebulization Q4H WA    enoxaparin  40 mg Subcutaneous Daily    amLODIPine  10 mg Oral Daily    Vitamin D  5,000 Units Oral Every Other Day    lactobacillus  1 capsule Oral Daily    levothyroxine  75 mcg Oral Daily    cetirizine  10 mg Oral Daily    multivitamin  1 tablet Oral Daily    atorvastatin  10 mg Oral Daily    sodium chloride (Inhalant)  4 mL Nebulization BID    sodium chloride flush  10 mL Intravenous 2 times per day    azithromycin  500 mg Intravenous Q24H     acetaminophen, sodium chloride flush, polyethylene glycol, promethazine **OR** ondansetron  IV Drips/Infusions    Vitals    Vitals    height is 5' 4\" (1.626 m) and weight is 161 lb 12.8 oz (73.4 kg). Her oral temperature is 97.1 °F (36.2 °C). Her blood pressure is 115/64 and her pulse is 87. Her respiration is 18 and oxygen saturation is 94%. O2 Flow Rate (L/min): 2 L/min  I/O    Intake/Output Summary (Last 24 hours) at 9/27/2020 1403  Last data filed at 9/27/2020 0830  Gross per 24 hour   Intake 856.14 ml   Output 200 ml   Net 656.14 ml     Patient Vitals for the past 96 hrs (Last 3 readings):   Weight   09/27/20 0530 161 lb 12.8 oz (73.4 kg)   09/25/20 1644 153 lb (69.4 kg)     Exam   Constitutional: Patient appears in no distress on 2LPM via nasal cannula. HENT:    Head: Normocephalic and atraumatic. Right Ear: External ear normal.   Left Ear: External ear normal.   Mouth/Throat: Oropharynx is clear and moist.  No oral thrush. Eyes: Pupils are equal, round, and reactive to light. Neck: Neck supple. Cardiovascular: Normal rate, regular rhythm, normal heart sounds. No murmur heard. Pulmonary/Chest: Effort normal and good breath sounds. No stridor. No respiratory distress. No wheezes. No rales. Abdominal: Soft. Patient exhibits no distension. No tenderness.    Musculoskeletal: Normal range of motion. Extremities: Patient exhibits no edema and no tenderness. Lymphadenopathy:  No cervical adenopathy. Neurological: Patient is alert and oriented to person, place, and time. Skin: Skin is warm and dry. Psychiatric: Patient  has a normal mood and affect. Labs   ABG  Lab Results   Component Value Date    PH 7.41 09/25/2020    PO2 61 09/25/2020    PCO2 39 09/25/2020    HCO3 25 09/25/2020    O2SAT 91 09/25/2020     Lab Results   Component Value Date    IFIO2 2 01/08/2020     CBC  Recent Labs     09/25/20  1716 09/26/20  0533 09/27/20  0548   WBC 12.1* 11.1* 12.2*   RBC 5.03 4.95 4.86   HGB 13.9 13.7 13.4   HCT 43.2 42.8 41.7   MCV 85.9 86.5 85.8   MCH 27.6 27.7 27.6   MCHC 32.2 32.0* 32.1*    232 234   MPV 10.4 10.8 10.7      BMP  Recent Labs     09/25/20  1716 09/26/20  0533    135   K 4.1 4.5    100   CO2 23 21*   BUN 24* 21   CREATININE 1.1 1.0   GLUCOSE 170* 103   CALCIUM 10.1 9.6     LFT  Recent Labs     09/25/20  1716 09/26/20  0533   AST 20 19   ALT 18 17   BILITOT 0.4 0.7   ALKPHOS 84 81     TROP  Lab Results   Component Value Date    TROPONINT < 0.010 09/25/2020    TROPONINT < 0.010 01/06/2020    TROPONINT < 0.010 11/09/2016     BNP  Lab Results   Component Value Date    PROBNP 864.5 09/25/2020    PROBNP 506.4 01/06/2020    PROBNP 52.4 11/09/2016     D-Dimer  No results found for: DDIMER  Lactic Acid  Recent Labs     09/25/20  1835 09/26/20  0534   LACTA 1.4 0.9     INR  No results for input(s): INR, PROTIME in the last 72 hours. PTT  No results for input(s): APTT in the last 72 hours. Glucose  No results for input(s): POCGLU in the last 72 hours. UA No results for input(s): SPECGRAV, PHUR, COLORU, CLARITYU, MUCUS, PROTEINU, BLOODU, RBCUA, WBCUA, BACTERIA, NITRU, GLUCOSEU, BILIRUBINUR, UROBILINOGEN, KETUA, LABCAST, LABCASTTY, AMORPHOS in the last 72 hours. Invalid input(s): CRYSTALS.     She underwent bronch on 1/8/2020:  Flexible diagnostic fiberoptic bronchoscopy with fluoroscopy. During procedure Bronch washings obtained from right tracheobronchial tree including right upper, middle and lower lobes. Bronchioalveolar lavage obtained from right lower lobe posterior segment. Trans bronchial lung biopsies were performed by using biopsy forceps from right lower lobe posterior segment under fluroscopy guidance. Cytology brush specimen was obtained from right lower lobe posterior segment under fluroscopy guidance. Microbiology brush specimen was obtained from right lower lobe posterior segment under fluroscopy guidence. Connective tissue work up results:  Date: 4/03/2017  SABAS( Antinuclear antibody):  Negative-none detected.    Fungal serologies- Negative                   RA ( Rheumatoid factor):  Negative. <10                                                          ACE(  level):    Negative-22                               ESR ( Sed rate): 9               Connective Tissue Labs  Date: 1/11/19     SABAS with reflex-None detected. Anti RNP-1- Normal.  Anti Lexie-0- normal  Anti-SSB-0- normal  AntiSSA-1- normal  AntiCentromere- 4- Normal  Scleroderma AB-24-normal  ACE-10- normal  ESR-101 elevated  Rheumatoid Factor-13 WNL                  Bronch Cultures   Fungus-No yeast or hyphae  Respiratory culture-No organisms observed  AFB-Negative on concentrated smear, Final pending  Pneumocystis-Negative  Legionella-Negative  Anaerobic/aerobic-No prelim growth  PAFs-Negative     Bronch and biopsy:   Biopsy-   FINAL DIAGNOSIS:  Right lower posterior segment of lung, transbronchial biopsy:   Chronic bronchial inflammation.   Negative for malignancy.      Cytology-   FINAL RESULTS:  A. Lung, right lower lobe posterior subsegment, bronchoalveolar lavage:   Abundant acute inflammation.   No malignant cells seen. B. Lung, right tracheobronchial tree, washings:   Abundant acute inflammation and scattered benign bronchial epithelium.   No malignant cells seen.     Sadiq Medel, right lower lobe posterior segment brush tip, brushing:   Benign bronchial epithelium and acute inflammation.   No malignant cells seen.       PFTs         Echo    ECHO completed in 2018 at [facility]  CONCLUSIONS:  - Exam indication: Shortness of Breath  - The left ventricle is normal in size. Left ventricular systolic function is   hyperdynamic. EF = 77 ± 5% (2D biplane) Normal left ventricular diastolic   function.  - The right ventricle is normal in size. Right ventricular systolic function is   normal.  - The patient has not had a prior  echocardiographic exam for comparison. Electronically signed by Sergei Urias MD on 8/2/2018 at 4:56:15 PM  Cultures    Procalcitonin  Lab Results   Component Value Date    PROCAL 0.13 09/26/2020    PROCAL 0.12 09/25/2020     SARS-CoV-2, NAAT  NOT DETECTED  NOT DETECTED  Final  09/25/2020  6:16 PM      Blood Culture, Routine  09/25/2020  6:29 PM  [address] [facility]    No growth-preliminary      Blood Culture, Routine  09/25/2020  6:48 PM  [address] [facility] [facility]    No growth-preliminary      Strep Pneumo antigen- pending  Legionella antigen- pending    MRSA PCR- negative    Radiology    CXR  9/25/2020   IMPRESSION:    Right upper and to a lesser extent bibasilar coarsened mixed airspace and interstitial opacities. Appearance is similar to prior with likely a background of chronic interstitial changes. Superimposed acute infiltrate is not excluded. Correlation with    symptoms and follow-up as clinically warranted. CT Scans  Ct chest high resolution 8/25/2020      Impression    1. Irregular groundglass and confluent densities are demonstrated within the periphery of the right lung left middle and upper lobes. This is most pronounced within the right apex. This may represent sequela from pulmonary fibrosis         2.  There is right upper lobe bronchiectasis demonstrated.         **This report has been created using voice recognition software.  It may contain minor errors which are inherent in voice recognition technology. **         Final report electronically signed by Dr. Gloria Newsome on 8/25/2020 1:44 PM                       (See actual reports for details)    Assessment   Acute hypoxic respiratory failure-patient requiring 2 L O2 via nasal cannula. Likely secondary to acute exacerbation of bronchiectasis. Bronchiectasis- secondary to MAC infection in 2017. Follows with [facility] of pulmonary medicine as well as [facility]. Likely in acute exacerbation secondary to possible pneumonia versus medication induced versus worsening pulmonary fibrosis. Patient is afebrile and denies changes in sputum production. On nebulized albuterol, Zithromax and cefepime for possible pneumonia. Pulmonary fibrosis-likely secondary to MAC infection in 2017. Following at the [facility]. She is currently on nebulized albuterol and nebulized saline at home. per chart review their recommendations were to trial inhaled corticosteroids. Patient was tried on Flovent resulting in coughing fits. Will switch to nebulized corticosteroid. Eventually patient will most likely require pulmonary rehab. History of Mycobacterium avium complex in 2017  Hypertension  Dyslipidemia  Osteoarthritis    Recommendations   -Continue antibiotics per primary service  -Continue nebulized albuterol every 4 hours as needed  -Follow pending cultures.  -Continue pulmicort 0.5mg via neb bid. She did not tolerated Flovent inhaler with worsening of cough. -Change Tessalon 100mg po tid for cough. Ludy Sic educated about my impression and plan. She verbalizes understanding.  - Needs home O2 eval at the time of discharge  -Continue incentive spirometer and Acapella    Case discussed with nurse and patient/family. Questions and concerns addressed. Meds and Orders reviewed.     Electronically signed by   Jessica Ardon MD on 9/27/2020 at 2:03 PM

## 2020-09-27 NOTE — CONSULTS
[facility]    Patient - Paul Benítez   MRN -  [id number]   Acct # - [de-identified]   - 1935      Date of Admission -  2020  4:36 PM  Date of evaluation -  2020  Room - [facility] Day - 2  Consulting - Zuleima Hazel MD Primary Care Physician - Peyton Larson MD   Chief Complaint   Cough and shortness of breath. Active Hospital Problem List      Active Hospital Problems    Diagnosis Date Noted    Community acquired bacterial pneumonia [J15.9]      HPI   Paul Benítez is a 80 y.o. female admitted for possible Pneumonia. Patient is an 80year old female who presented to [facility] ED with 3 day hx of worsening shortness of breath. She states that her shortness of breath started after receiving flovent from our pulmonary office and using it for 2 days. She states that after using the flovent she had a coughing spell that lasted 30 minutes, the next day she had a coughing spell that lasted for 40 minutes after using the flovent. She states that since that time she feels that her breathing has been worse and is not improving. She denies any cough currently. She denies sputum production, fevers, chills, night sweats. Patient has a significant hx of pulmonary disease including Bronchiectasis, Hx of MAC infection, and Pulmonary fibrosis which is followed here in [place] with Dr. Hermila Lee as well as a [facility]. Per chart review, patient was seen at [facility] [place] [place] and was given oral prednisone for bronchiectasis with the intent of trying ICS if symptoms improved with oral steroids. Per patient her breathing was the best it has ever been while taking the prednisone. Pulmonary medicine was consulted for further evaluation of patients breathing and possible pneumonia. No prior smoking history. Subjective/Events Past 24 hours/ROS   She is on 2LPM via nasal cannula. Cough is improving. Tolerating Brov[place] nebs well.   No chest pain.  Afebrile  Rest of the body systems were reviewed. Past Medical History         Diagnosis Date    Anemia     normal on pre-op 2012 and 13    Backache     h/o    Bronchiectasis [facility]) 2013    Bursitis     bilateral shoulders    Constipation     Diverticulosis of colon     HTN (hypertension)     Hypercalcemia     slightly elevated at 10.8 pre-op 13    Hyperlipidemia     Nodular goiter     bx negative    OA (osteoarthritis)     bilateral thumbs - sees Dr. Jason Jennings Osteopenia 2013    Parathyroid adenoma 2013    Pneumonia of right upper lobe due to infectious organism ([address]     Pneumonitis     Dr. Amy Daniels in 2010 - bx negative    Pulmonary Mycobacterium avium complex (MAC) infection ([address]     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 ([address]     had one parathyroid removed - now follows with Dr. Kapil Carrasco Sinusitis     with seasonal allergies    Vitamin D deficiency 2018      Past Surgical History           Procedure Laterality Date    ABDOMINAL EXPLORATION SURGERY  2013    Release of SBO, KATHERINE-Dr. Loraine Peguero    APPENDECTOMY      BRONCHOSCOPY N/A 2020    BRONCHOSCOPY FLUOROSCOPY performed by Jessica Ardon MD at [address] WITH IMPLANT Bilateral  ?   SECTION  [id number]    DILATION AND CURETTAGE OF UTERUS  [id number]    EXCISION OF PARATHYROID MASS Right 2013    rt parathyroidectomy (excision of rt inferior parathyroid mass) exploration of neck     FOOT SURGERY      left    FOOT SURGERY Left 2017    Dr Andrzej Lea  6-4-12    left knee    JOINT REPLACEMENT  2014    right knee    MALIGNANT SKIN LESION EXCISION Left 2017    BCC DORSAL FOOT WITH GRAFT & FS    GOSIA AND BSO  1982    TONSILLECTOMY AND ADENOIDECTOMY  1940 ?  TOTAL KNEE ARTHROPLASTY Right 2014    OIO     Diet    DIET CARDIAC;   Allergies    Levaquin motion. Extremities: Patient exhibits no edema and no tenderness. Lymphadenopathy:  No cervical adenopathy. Neurological: Patient is alert and oriented to person, place, and time. Skin: Skin is warm and dry. Psychiatric: Patient  has a normal mood and affect. Labs   ABG  Lab Results   Component Value Date    PH 7.41 09/25/2020    PO2 61 09/25/2020    PCO2 39 09/25/2020    HCO3 25 09/25/2020    O2SAT 91 09/25/2020     Lab Results   Component Value Date    IFIO2 2 01/08/2020     CBC  Recent Labs     09/25/20  1716 09/26/20  0533 09/27/20  0548   WBC 12.1* 11.1* 12.2*   RBC 5.03 4.95 4.86   HGB 13.9 13.7 13.4   HCT 43.2 42.8 41.7   MCV 85.9 86.5 85.8   MCH 27.6 27.7 27.6   MCHC 32.2 32.0* 32.1*    232 234   MPV 10.4 10.8 10.7      BMP  Recent Labs     09/25/20  1716 09/26/20  0533    135   K 4.1 4.5    100   CO2 23 21*   BUN 24* 21   CREATININE 1.1 1.0   GLUCOSE 170* 103   CALCIUM 10.1 9.6     LFT  Recent Labs     09/25/20  1716 09/26/20  0533   AST 20 19   ALT 18 17   BILITOT 0.4 0.7   ALKPHOS 84 81     TROP  Lab Results   Component Value Date    TROPONINT < 0.010 09/25/2020    TROPONINT < 0.010 01/06/2020    TROPONINT < 0.010 11/09/2016     BNP  Lab Results   Component Value Date    PROBNP 864.5 09/25/2020    PROBNP 506.4 01/06/2020    PROBNP 52.4 11/09/2016     D-Dimer  No results found for: DDIMER  Lactic Acid  Recent Labs     09/25/20  1835 09/26/20  0534   LACTA 1.4 0.9     INR  No results for input(s): INR, PROTIME in the last 72 hours. PTT  No results for input(s): APTT in the last 72 hours. Glucose  No results for input(s): POCGLU in the last 72 hours. UA No results for input(s): SPECGRAV, PHUR, COLORU, CLARITYU, MUCUS, PROTEINU, BLOODU, RBCUA, WBCUA, BACTERIA, NITRU, GLUCOSEU, BILIRUBINUR, UROBILINOGEN, KETUA, LABCAST, LABCASTTY, AMORPHOS in the last 72 hours. Invalid input(s): CRYSTALS.     She underwent bronch on 1/8/2020:  Flexible diagnostic fiberoptic bronchoscopy with fluoroscopy. During procedure Bronch washings obtained from right tracheobronchial tree including right upper, middle and lower lobes. Bronchioalveolar lavage obtained from right lower lobe posterior segment. Trans bronchial lung biopsies were performed by using biopsy forceps from right lower lobe posterior segment under fluroscopy guidance. Cytology brush specimen was obtained from right lower lobe posterior segment under fluroscopy guidance. Microbiology brush specimen was obtained from right lower lobe posterior segment under fluroscopy guidence. Connective tissue work up results:  Date: 4/03/2017  SABAS( Antinuclear antibody):  Negative-none detected.    Fungal serologies- Negative                   RA ( Rheumatoid factor):  Negative. <10                                                          ACE(  level):    Negative-22                               ESR ( Sed rate): 9               Connective Tissue Labs  Date: 1/11/19     SABAS with reflex-None detected. Anti RNP-1- Normal.  Anti Lexie-0- normal  Anti-SSB-0- normal  AntiSSA-1- normal  AntiCentromere- 4- Normal  Scleroderma AB-24-normal  ACE-10- normal  ESR-101 elevated  Rheumatoid Factor-13 WNL                  Bronch Cultures   Fungus-No yeast or hyphae  Respiratory culture-No organisms observed  AFB-Negative on concentrated smear, Final pending  Pneumocystis-Negative  Legionella-Negative  Anaerobic/aerobic-No prelim growth  PAFs-Negative     Bronch and biopsy:   Biopsy-   FINAL DIAGNOSIS:  Right lower posterior segment of lung, transbronchial biopsy:   Chronic bronchial inflammation.   Negative for malignancy.      Cytology-   FINAL RESULTS:  A. Lung, right lower lobe posterior subsegment, bronchoalveolar lavage:   Abundant acute inflammation.   No malignant cells seen. B. Lung, right tracheobronchial tree, washings:   Abundant acute inflammation and scattered benign bronchial epithelium.   No malignant cells seen.     C. Lung, right lower lobe posterior segment brush tip, brushing:   Benign bronchial epithelium and acute inflammation.   No malignant cells seen.       PFTs         Echo    ECHO completed in 2018 at [facility]  CONCLUSIONS:  - Exam indication: Shortness of Breath  - The left ventricle is normal in size. Left ventricular systolic function is   hyperdynamic. EF = 77 ± 5% (2D biplane) Normal left ventricular diastolic   function.  - The right ventricle is normal in size. Right ventricular systolic function is   normal.  - The patient has not had a prior  echocardiographic exam for comparison. Electronically signed by Estela Roth MD on 8/2/2018 at 4:56:15 PM  Cultures    Procalcitonin  Lab Results   Component Value Date    PROCAL 0.13 09/26/2020    PROCAL 0.12 09/25/2020     SARS-CoV-2, NAAT  NOT DETECTED  NOT DETECTED  Final  09/25/2020  6:16 PM      Blood Culture, Routine  09/25/2020  6:29 PM  [facility]    No growth-preliminary      Blood Culture, Routine  09/25/2020  6:48 PM  [facility]    No growth-preliminary      Strep Pneumo antigen- pending  Legionella antigen- pending    MRSA PCR- negative    Radiology    CXR  9/25/2020   IMPRESSION:    Right upper and to a lesser extent bibasilar coarsened mixed airspace and interstitial opacities. Appearance is similar to prior with likely a background of chronic interstitial changes. Superimposed acute infiltrate is not excluded. Correlation with    symptoms and follow-up as clinically warranted. CT Scans  Ct chest high resolution 8/25/2020      Impression    1. Irregular groundglass and confluent densities are demonstrated within the periphery of the right lung left middle and upper lobes. This is most pronounced within the right apex. This may represent sequela from pulmonary fibrosis         2.  There is right upper lobe bronchiectasis demonstrated.         **This report has been created using voice recognition software.  It may contain minor errors which are inherent in voice recognition technology. **         Final report electronically signed by Dr. Juju Cash on 8/25/2020 1:44 PM                       (See actual reports for details)    Assessment   Acute hypoxic respiratory failure-patient requiring 2 L O2 via nasal cannula. Likely secondary to acute exacerbation of bronchiectasis. Bronchiectasis- secondary to MAC infection in 2017. Follows with [facility] of pulmonary medicine as well as [facility] clinic. Likely in acute exacerbation secondary to possible pneumonia versus medication induced versus worsening pulmonary fibrosis. Patient is afebrile and denies changes in sputum production. On nebulized albuterol, Zithromax and cefepime for possible pneumonia. Pulmonary fibrosis-likely secondary to MAC infection in 2017. Following at the [facility] clinic. She is currently on nebulized albuterol and nebulized saline at home. per chart review their recommendations were to trial inhaled corticosteroids. Patient was tried on Flovent resulting in coughing fits. Will switch to nebulized corticosteroid. Eventually patient will most likely require pulmonary rehab. History of Mycobacterium avium complex in 2017  Hypertension  Dyslipidemia  Osteoarthritis    Recommendations   -Continue antibiotics per primary service  -Continue nebulized albuterol every 4 hours as needed  -Follow pending cultures.  -Continue pulmicort 0.5mg via neb bid. She did not tolerated Flovent inhaler with worsening of cough. -Change Tessalon 100mg po tid for cough. Shaan Diez educated about my impression and plan. She verbalizes understanding.  - Needs home O2 eval at the time of discharge  -Continue incentive spirometer and Acapella    Case discussed with nurse and patient/family. Questions and concerns addressed. Meds and Orders reviewed.     Electronically signed by   Phoebe Ramos MD on 9/27/2020 at 1:54 PM

## 2020-09-28 LAB
BASOPHILS # BLD: 0.4 %
BASOPHILS ABSOLUTE: 0 THOU/MM3 (ref 0–0.1)
EOSINOPHIL # BLD: 4.6 %
EOSINOPHILS ABSOLUTE: 0.5 THOU/MM3 (ref 0–0.4)
ERYTHROCYTE [DISTWIDTH] IN BLOOD BY AUTOMATED COUNT: 13.7 % (ref 11.5–14.5)
ERYTHROCYTE [DISTWIDTH] IN BLOOD BY AUTOMATED COUNT: 44.4 FL (ref 35–45)
HCT VFR BLD CALC: 39.3 % (ref 37–47)
HEMOGLOBIN: 12.1 GM/DL (ref 12–16)
IMMATURE GRANS (ABS): 0.05 THOU/MM3 (ref 0–0.07)
IMMATURE GRANULOCYTES: 0.5 %
LYMPHOCYTES # BLD: 10.6 %
LYMPHOCYTES ABSOLUTE: 1.1 THOU/MM3 (ref 1–4.8)
MCH RBC QN AUTO: 27.5 PG (ref 26–33)
MCHC RBC AUTO-ENTMCNC: 30.8 GM/DL (ref 32.2–35.5)
MCV RBC AUTO: 89.3 FL (ref 81–99)
MONOCYTES # BLD: 9.8 %
MONOCYTES ABSOLUTE: 1 THOU/MM3 (ref 0.4–1.3)
MRSA SCREEN: NORMAL
NUCLEATED RED BLOOD CELLS: 0 /100 WBC
PLATELET # BLD: 200 THOU/MM3 (ref 130–400)
PMV BLD AUTO: 10.2 FL (ref 9.4–12.4)
RBC # BLD: 4.4 MILL/MM3 (ref 4.2–5.4)
SEG NEUTROPHILS: 74.1 %
SEGMENTED NEUTROPHILS ABSOLUTE COUNT: 7.5 THOU/MM3 (ref 1.8–7.7)
WBC # BLD: 10.1 THOU/MM3 (ref 4.8–10.8)

## 2020-09-28 PROCEDURE — 99232 SBSQ HOSP IP/OBS MODERATE 35: CPT | Performed by: INTERNAL MEDICINE

## 2020-09-28 PROCEDURE — 6370000000 HC RX 637 (ALT 250 FOR IP): Performed by: INTERNAL MEDICINE

## 2020-09-28 PROCEDURE — APPSS30 APP SPLIT SHARED TIME 16-30 MINUTES: Performed by: NURSE PRACTITIONER

## 2020-09-28 PROCEDURE — 2580000003 HC RX 258: Performed by: INTERNAL MEDICINE

## 2020-09-28 PROCEDURE — 36415 COLL VENOUS BLD VENIPUNCTURE: CPT

## 2020-09-28 PROCEDURE — 6360000002 HC RX W HCPCS: Performed by: INTERNAL MEDICINE

## 2020-09-28 PROCEDURE — 94760 N-INVAS EAR/PLS OXIMETRY 1: CPT

## 2020-09-28 PROCEDURE — 2700000000 HC OXYGEN THERAPY PER DAY

## 2020-09-28 PROCEDURE — 2060000000 HC ICU INTERMEDIATE R&B

## 2020-09-28 PROCEDURE — 94640 AIRWAY INHALATION TREATMENT: CPT

## 2020-09-28 PROCEDURE — 6370000000 HC RX 637 (ALT 250 FOR IP): Performed by: NURSE PRACTITIONER

## 2020-09-28 PROCEDURE — 85025 COMPLETE CBC W/AUTO DIFF WBC: CPT

## 2020-09-28 RX ORDER — BUDESONIDE 0.5 MG/2ML
0.5 INHALANT ORAL 2 TIMES DAILY
Qty: 60 AMPULE | Refills: 3 | Status: SHIPPED | OUTPATIENT
Start: 2020-09-28 | End: 2020-09-28

## 2020-09-28 RX ORDER — BUDESONIDE 0.5 MG/2ML
0.5 INHALANT ORAL 2 TIMES DAILY
Qty: 60 AMPULE | Refills: 3 | Status: SHIPPED | OUTPATIENT
Start: 2020-09-28 | End: 2021-03-03 | Stop reason: SDUPTHER

## 2020-09-28 RX ADMIN — AMLODIPINE BESYLATE 10 MG: 10 TABLET ORAL at 09:49

## 2020-09-28 RX ADMIN — Medication 1 CAPSULE: at 09:49

## 2020-09-28 RX ADMIN — BUDESONIDE 500 MCG: 0.5 INHALANT RESPIRATORY (INHALATION) at 08:13

## 2020-09-28 RX ADMIN — DICLOFENAC 4 G: 10 GEL TOPICAL at 09:49

## 2020-09-28 RX ADMIN — BUDESONIDE 500 MCG: 0.5 INHALANT RESPIRATORY (INHALATION) at 21:33

## 2020-09-28 RX ADMIN — DICLOFENAC 4 G: 10 GEL TOPICAL at 23:37

## 2020-09-28 RX ADMIN — ALBUTEROL SULFATE 2.5 MG: 2.5 SOLUTION RESPIRATORY (INHALATION) at 21:33

## 2020-09-28 RX ADMIN — ALBUTEROL SULFATE 2.5 MG: 2.5 SOLUTION RESPIRATORY (INHALATION) at 08:08

## 2020-09-28 RX ADMIN — BENZONATATE 100 MG: 100 CAPSULE ORAL at 21:56

## 2020-09-28 RX ADMIN — AZITHROMYCIN 500 MG: 500 INJECTION, POWDER, LYOPHILIZED, FOR SOLUTION INTRAVENOUS at 21:43

## 2020-09-28 RX ADMIN — ACETAMINOPHEN 650 MG: 325 TABLET ORAL at 23:37

## 2020-09-28 RX ADMIN — THERA TABS 1 TABLET: TAB at 09:49

## 2020-09-28 RX ADMIN — CEFTRIAXONE SODIUM 1 G: 1 INJECTION, POWDER, FOR SOLUTION INTRAMUSCULAR; INTRAVENOUS at 16:37

## 2020-09-28 RX ADMIN — BENZONATATE 100 MG: 100 CAPSULE ORAL at 16:37

## 2020-09-28 RX ADMIN — ENOXAPARIN SODIUM 40 MG: 40 INJECTION SUBCUTANEOUS at 23:37

## 2020-09-28 RX ADMIN — LEVOTHYROXINE SODIUM 75 MCG: 75 TABLET ORAL at 06:01

## 2020-09-28 RX ADMIN — Medication 10 ML: at 21:43

## 2020-09-28 RX ADMIN — ATORVASTATIN CALCIUM 10 MG: 10 TABLET, FILM COATED ORAL at 09:49

## 2020-09-28 RX ADMIN — CETIRIZINE HYDROCHLORIDE 10 MG: 10 TABLET ORAL at 09:49

## 2020-09-28 RX ADMIN — BENZONATATE 100 MG: 100 CAPSULE ORAL at 09:49

## 2020-09-28 RX ADMIN — ALBUTEROL SULFATE 2.5 MG: 2.5 SOLUTION RESPIRATORY (INHALATION) at 16:50

## 2020-09-28 RX ADMIN — Medication 10 ML: at 09:49

## 2020-09-28 RX ADMIN — ACETAMINOPHEN 650 MG: 325 TABLET ORAL at 04:54

## 2020-09-28 RX ADMIN — ALBUTEROL SULFATE 2.5 MG: 2.5 SOLUTION RESPIRATORY (INHALATION) at 12:41

## 2020-09-28 ASSESSMENT — PAIN DESCRIPTION - DESCRIPTORS
DESCRIPTORS: ACHING
DESCRIPTORS: ACHING

## 2020-09-28 ASSESSMENT — PAIN DESCRIPTION - PROGRESSION
CLINICAL_PROGRESSION: NOT CHANGED

## 2020-09-28 ASSESSMENT — PAIN DESCRIPTION - LOCATION
LOCATION: SHOULDER
LOCATION: SHOULDER

## 2020-09-28 ASSESSMENT — PAIN SCALES - GENERAL
PAINLEVEL_OUTOF10: 4
PAINLEVEL_OUTOF10: 6
PAINLEVEL_OUTOF10: 6

## 2020-09-28 ASSESSMENT — PAIN DESCRIPTION - ORIENTATION
ORIENTATION: LEFT
ORIENTATION: LEFT

## 2020-09-28 ASSESSMENT — PAIN DESCRIPTION - PAIN TYPE
TYPE: CHRONIC PAIN
TYPE: CHRONIC PAIN

## 2020-09-28 ASSESSMENT — PAIN DESCRIPTION - FREQUENCY
FREQUENCY: CONTINUOUS
FREQUENCY: CONTINUOUS

## 2020-09-28 ASSESSMENT — PAIN DESCRIPTION - ONSET
ONSET: GRADUAL
ONSET: GRADUAL

## 2020-09-28 NOTE — PROGRESS NOTES
INTERNAL MEDICINE SPECIALTIES  Progress Note For Dr Elsa Larios       Patient:  Milly Hunt  YOB: 1935  Date of Service: 9/28/2020  MRN: [id number]   Acct:  [de-identified]   Primary Care Physician: Jarrell Gage MD    SUBJECTIVE:   feeling better but feels weak  and deconditioned    Home Medications:   No current facility-administered medications on file prior to encounter. Current Outpatient Medications on File Prior to Encounter   Medication Sig Dispense Refill    amLODIPine (NORVASC) 10 MG tablet TAKE 1 TABLET BY MOUTH  DAILY 90 tablet 3    levothyroxine (SYNTHROID) 75 MCG tablet take 1 tablet by mouth once daily 90 tablet 3    simvastatin (ZOCOR) 20 MG tablet TAKE 1 TABLET BY MOUTH  NIGHTLY 90 tablet 3    albuterol (PROVENTIL) (2.5 MG/3ML) 0.083% nebulizer solution Take 3 mLs by nebulization every 6 hours as needed for Wheezing or Shortness of Breath 120 each 3    loratadine (CLARITIN) 10 MG capsule Take 10 mg by mouth daily       Lactobacillus (PROBIOTIC ACIDOPHILUS) CAPS Take 1 capsule by mouth daily       Cholecalciferol (VITAMIN D3) 5000 units TABS Take 5,000 Units by mouth every other day       MULTIPLE VITAMIN PO Take 1 tablet by mouth daily       FLOVENT DISKUS 250 MCG/BLIST AEPB inhaler Inhale 1 puff into the lungs daily Rinse mouth after its use.  1 each 3    Acetylcysteine (NAC) 500 MG CAPS Take 600 mg by mouth 2 times daily      amoxicillin (AMOXIL) 500 MG capsule 4 pills 1 hr before dental procedures 4 capsule 3    albuterol sulfate HFA (PROAIR HFA) 108 (90 Base) MCG/ACT inhaler Inhale 2 puffs into the lungs every 6 hours as needed for Wheezing or Shortness of Breath 1 Inhaler 11    sodium chloride, Inhalant, 7 % nebulizer solution Take 4 mLs by nebulization 2 times daily            Scheduled Meds:   lidocaine  2 patch Transdermal Daily    benzonatate  100 mg Oral TID    budesonide  0.5 mg Nebulization BID    cefTRIAXone (ROCEPHIN) IV  1 g Intravenous Q24H    albuterol  2.5 mg Nebulization Q4H WA    enoxaparin  40 mg Subcutaneous Daily    amLODIPine  10 mg Oral Daily    Vitamin D  5,000 Units Oral Every Other Day    lactobacillus  1 capsule Oral Daily    levothyroxine  75 mcg Oral Daily    cetirizine  10 mg Oral Daily    multivitamin  1 tablet Oral Daily    atorvastatin  10 mg Oral Daily    sodium chloride (Inhalant)  4 mL Nebulization BID    sodium chloride flush  10 mL Intravenous 2 times per day    azithromycin  500 mg Intravenous Q24H     Continuous Infusions:  PRN Meds:acetaminophen, sodium chloride flush, polyethylene glycol, promethazine **OR** ondansetron        Allergies:  Levaquin [levofloxacin]; Percocet [oxycodone-acetaminophen]; Rifampin; Sulfa antibiotics; and Cefuroxime    OBJECTIVE:    Vitals:   Vitals:    09/28/20 0430   BP: (!) 105/48   Pulse: 81   Resp: 16   Temp: 97.8 °F (36.6 °C)   SpO2: 93%      BMI: Body mass index is 26.5 kg/m². PHYSICAL EXAMINATION:          General appearance:Ill looking elderly female ;  No apparent distress, appears stated age and cooperative. HEENT:  Normal cephalic, atraumatic without obvious deformity. Pupils equal, round, and reactive to light.  Extra ocular muscles intact. Conjunctivae/corneas clear. Neck: Supple   Respiratory:  Normal respiratory effort., bilateral basilar crackles. Cardiovascular:  Regular rhythm with normal S1/S2 without ,rubs or gallops. Abdomen: Soft, nontender , non-distended with normal bowel sounds. Musculoskeletal:  No clubbing, cyanosis or edema bilaterally. Tender left shoulder to palpation and with abduction . Reduced ROM  Skin: Skin color, texture, turgor normal.  No rashes or lesions. Neurologic: Alert and oriented x 3, without any focal motor deficits.    Psychiatric:   Thought content appropriate, normal insight    Review of Labs and Diagnostic Testing:    Recent Results (from the past 24 hour(s))   CBC auto differential    Collection Time: 09/28/20  6:02 AM   Result prior with likely a background of chronic interstitial changes. Superimposed acute infiltrate is not excluded. Correlation with symptoms and follow-up as clinically warranted. **This report has been created using voice recognition software. It may contain minor errors which are inherent in voice recognition technology. ** Final report electronically signed by Dr. Frederick Mitchell on 9/25/2020 6:08 PM        ASSESMENT:      Active Problems:    Community acquired bacterial pneumonia  Resolved Problems:    * No resolved hospital problems. *  OTHER PROBLEMS:  Bronchiectasis   ([address]  Pulmonary fibrosis (HCC)  History of pulmonary Mycobacterium avium complex  Hypertension  Dyslipidemia   Osteoarthritis  Left shoulder injury      PLAN:  Continue bronchodilators, O2, broad spectrum antibiotics to cover community acquired organisms, ID / Pulmonary  service is following. Has negative legionella and strep pneumonia urinary antigens, follow-up blood cultures, sputum gram stain c/s, Incentive spirometry , acapella, rapid influenza screen was negative. MRI left shoulder consistent with left glenoid labral tear,  Supraspinatus tendinosis and long head of the biceps tendon tenosynovitis, will have orthopedic  service see.   Continue Lidoderm patch for now      DVT prophylaxis: [x] Lovenox                                 [] SCDs                                 [] SQ Heparin                                 [] Encourage ambulation, low risk for DVT, no chemical or mechanical prophylaxis necessary              [] Already on Anticoagulation                Anticipated Disposition upon discharge: [x] Home                                                                         [] Home with Home Health                                                                         [] JL uis Barron                                                                         [] Mireya Krishna          Electronically signed by Zuleima Hazel MD on 9/28/2020 at 8:02 AM

## 2020-09-28 NOTE — CONSULTS
Orthopedic Consult    Requesting Physician: Dr. Darell Desir:  Left shoulder pain    HISTORY OF PRESENT ILLNESS:      The patient is a 80 y.o. female who is currently admitted to medicine increasing SOB and cough, ortho consulted for evaluation of left shoulder pain. Patient states onset of pain was last evening, she denies fall or previous injury. She relates the pain to laying on a cart in the ER for an extending period of time. She states pain is in the back of the shoulder, it is non-radiating. SHe denies numbness and tingling or difficulty with ROM. MRI of the shoulder demonstrates fluid in the long head of the biceps concerning for tenosynovitis. Patient denies fever and chills.        Past Medical History:    Past Medical History:   Diagnosis Date    Anemia     normal on pre-op 5/2012 and 12/2/13    Backache     h/o    Bronchiectasis ([address] 2013    Bursitis     bilateral shoulders    Constipation     Diverticulosis of colon     HTN (hypertension)     Hypercalcemia     slightly elevated at 10.8 pre-op 12/2/13    Hyperlipidemia     Nodular goiter     bx negative    OA (osteoarthritis)     bilateral thumbs - sees Dr. Jayashree Conroy Osteopenia 11/6/2013    Parathyroid adenoma 4/30/2013    Pneumonia of right upper lobe due to infectious organism (HCC)     Pneumonitis     Dr. Zander Salinas in 7/2010 - bx negative    Pulmonary Mycobacterium avium complex (MAC) infection ([address]     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 ([address]     had one parathyroid removed - now follows with Dr. Deborah Nation Sinusitis     with seasonal allergies    Vitamin D deficiency 05/2018       Past Surgical History:    Past Surgical History:   Procedure Laterality Date    ABDOMINAL EXPLORATION SURGERY  01/02/2013    Release of KATHERINE TAMAYO-Dr. Amara Farris    APPENDECTOMY  1961    BRONCHOSCOPY N/A 1/8/2020    BRONCHOSCOPY FLUOROSCOPY performed by Judah Levy MD at [address] Bilateral 1990 ?   SECTION  4240,5434    DILATION AND CURETTAGE OF UTERUS  [id number],8940    EXCISION OF PARATHYROID MASS Right 2013    rt parathyroidectomy (excision of rt inferior parathyroid mass) exploration of neck     FOOT SURGERY      left    FOOT SURGERY Left 2017    Dr Noble Mark  12    left knee    JOINT REPLACEMENT  2014    right knee    MALIGNANT SKIN LESION EXCISION Left 2017    [facility] DORSAL FOOT WITH GRAFT & FS    GOSIA AND BSO  1982    TONSILLECTOMY AND ADENOIDECTOMY  1940 ?     TOTAL KNEE ARTHROPLASTY Right 2014    [facility]       Medications Prior to Admission:   Current Facility-Administered Medications   Medication Dose Route Frequency Provider Last Rate Last Dose    diclofenac sodium (VOLTAREN) 1 % gel 4 g  4 g Topical 4x Daily PRN JASMEET Horner - CNP        lidocaine 4 % external patch 2 patch  2 patch Transdermal Daily Ronn Gant MD   2 patch at 20 1241    benzonatate (TESSALON) capsule 100 mg  100 mg Oral TID Isabelle Carranza MD   100 mg at 20 1959    budesonide (PULMICORT) nebulizer suspension 500 mcg  0.5 mg Nebulization BID Isabelle Carranza MD   500 mcg at 20 0813    cefTRIAXone (ROCEPHIN) 1 g IVPB in 50 mL D5W minibag  1 g Intravenous Q24H Isabelle Carranza MD   Stopped at 20 1713    albuterol (PROVENTIL) nebulizer solution 2.5 mg  2.5 mg Nebulization Q4H [facility] MD Riccardo   2.5 mg at 20 0808    acetaminophen (TYLENOL) tablet 650 mg  650 mg Oral Q4H PRN Ronn Gant MD   650 mg at 20 0454    enoxaparin (LOVENOX) injection 40 mg  40 mg Subcutaneous Daily Ronn Gant MD   40 mg at 20 2344    amLODIPine (NORVASC) tablet 10 mg  10 mg Oral Daily Ronn Gant MD   10 mg at 20 1717    Vitamin D (CHOLECALCIFEROL) tablet 5,000 Units  5,000 Units Oral Every Other Day Carolyn Russo Sha Dey MD   5,000 Units at 09/27/20 8691    lactobacillus (CULTURELLE) capsule 1 capsule  1 capsule Oral Daily Santy Lopes MD   1 capsule at 09/27/20 0953    levothyroxine (SYNTHROID) tablet 75 mcg  75 mcg Oral Daily Santy Lopes MD   75 mcg at 09/28/20 0601    cetirizine (ZYRTEC) tablet 10 mg  10 mg Oral Daily Santy Lopes MD        multivitamin 1 tablet  1 tablet Oral Daily Santy Lopes MD   1 tablet at 09/27/20 0953    atorvastatin (LIPITOR) tablet 10 mg  10 mg Oral Daily Santy Lopes MD   10 mg at 09/27/20 3907    sodium chloride (Inhalant) 7 % nebulizer solution 4 mL  4 mL Nebulization BID Santy Lopes MD   4 mL at 09/26/20 2146    sodium chloride flush 0.9 % injection 10 mL  10 mL Intravenous 2 times per day Santy Lopes MD   10 mL at 09/27/20 1958    sodium chloride flush 0.9 % injection 10 mL  10 mL Intravenous PRN Santy Lopes MD        polyethylene glycol (GLYCOLAX) packet 17 g  17 g Oral Daily PRN Santy Lopes MD   17 g at 09/27/20 2001    promethazine (PHENERGAN) tablet 12.5 mg  12.5 mg Oral Q6H PRN Santy Lopes MD        Or    ondansetron (ZOFRAN) injection 4 mg  4 mg Intravenous Q6H PRN Santy Lopes MD   4 mg at 09/26/20 2205    azithromycin (ZITHROMAX) 500 mg in D5W 250ml addavial  500 mg Intravenous Q24H Santy Lopse MD   Stopped at 09/27/20 2100       Allergies:  Levaquin [levofloxacin]; Percocet [oxycodone-acetaminophen];  Rifampin; Sulfa antibiotics; and Cefuroxime    Social History:   Social History     Tobacco Use   Smoking Status Never Smoker   Smokeless Tobacco Never Used     Social History     Substance and Sexual Activity   Alcohol Use Yes    Alcohol/week: 0.0 standard drinks    Comment: socially-1 glassof wine 2-3 times a month     Social History     Substance and Sexual Activity   Drug Use No       Family History:  Family History   Problem Relation Age of Onset    Diabetes Mother     Thyroid Disease Mother     Arthritis Mother     High Blood Pressure Mother     Arthritis Father     High Blood Pressure Father     Other Father         hay fever    Breast Cancer Neg Hx     Ovarian Cancer Neg Hx        REVIEW OF SYSTEMS:  Constitutional: Denies any fever, chills. Derm: Denies any rash or skin color change. Eyes: Denies blurred or decreased in vision. Ent: Denies any tinnitus or vertigo. Resp: Denies any cough or shortness of breath. CV: Denies any syncope, palpitations or chest pain. GI:  Denies any abdominal pain, nausea, vomiting, constipation or diarrhea. : Denies any hematuria, hesitancy, or dysuria. Heme/Lymph: Denies any bleeding. Musculoskeletal: Positive for left shoulder pain  Neuro: Denies any dizziness, paresthesia or weakness. PHYSICAL EXAM:  Patient Vitals for the past 24 hrs:   BP Temp Temp src Pulse Resp SpO2 Height Weight   09/28/20 0430 (!) 105/48 97.8 °F (36.6 °C) Oral 81 16 93 % 5' 4\" (1.626 m) 154 lb 6.4 oz (70 kg)   09/27/20 2330 (!) 107/51 98 °F (36.7 °C) Oral 77 18 96 % -- --   09/27/20 2226 -- -- -- -- 20 95 % -- --   09/27/20 2000 (!) 118/58 98.3 °F (36.8 °C) Oral 86 18 94 % -- --   09/27/20 1619 (!) 121/57 97.1 °F (36.2 °C) Oral 81 18 95 % -- --   09/27/20 1110 115/64 97.1 °F (36.2 °C) Oral 87 18 94 % -- --     General appearance:  Alert and oriented x 3. No apparent distress, appears stated age and cooperative. HEENT:  Normal cephalic, atraumatic without obvious deformity. Conjunctivae/corneas clear. Neck: Supple, with full range of motion. Respiratory:  Normal respiratory effort. No audible Wheezes or Rhonchi. Cardiovascular:  Regular rate and rhythm. Abdomen: Soft, non-tender, non-distended. Musculoskeletal:  LUE; small effusion, no associated warmth or erythema. TTP posterior shoulder. ROM stable, full and pain free. Skin: Skin color, texture, turgor normal.  No rashes or lesions.   Neurologic:  Neurovascularly intact without any focal sensory/motor deficits. Sensation intact. DATA:  CBC:   Lab Results   Component Value Date    WBC 10.1 09/28/2020    HGB 12.1 09/28/2020     09/28/2020     BMP:    Lab Results   Component Value Date     09/26/2020    K 4.5 09/26/2020     09/26/2020    CO2 21 09/26/2020    BUN 21 09/26/2020    CREATININE 1.0 09/26/2020    CALCIUM 9.6 09/26/2020    GLUCOSE 103 09/26/2020    GLUCOSE 93 06/08/2012     PT/INR:    Lab Results   Component Value Date    INR 1.39 01/07/2020     Troponin:    Lab Results   Component Value Date    TROPONINI 0.008 12/31/2012     No results for input(s): LIPASE, AMYLASE in the last 72 hours. Recent Labs     09/25/20  1716 09/26/20  0533   AST 20 19   ALT 18 17   BILITOT 0.4 0.7   ALKPHOS 84 81       Radiology:   Xr Shoulder Left (min 2 Views)    Result Date: 9/26/2020  PROCEDURE: XR SHOULDER LEFT (MIN 2 VIEWS) CLINICAL INFORMATION: Left shoulder pain TECHNIQUE: 4 views of the left shoulder COMPARISON: None FINDINGS: There is no fracture or dislocation. Joint spaces are preserved. No soft tissue or osseous abnormalities are identified. No fracture or dislocation. Final report electronically signed by Dr. Leonid Navarrete on 9/26/2020 3:09 PM    Xr Chest Portable    Result Date: 9/25/2020  PROCEDURE: XR CHEST PORTABLE CLINICAL INFORMATION: SOB. COMPARISON: January 13, 2020. TECHNIQUE: Portable chest. FINDINGS: Right upper and to a lesser extent bibasilar coarsened mixed airspace and interstitial opacities. Appearance is similar to prior with likely a background of chronic interstitial changes. Superimposed acute findings are not excluded. There is prominent right hilum is redemonstrated. Ectatic thoracic aorta. Cardiomegaly. No significant pleural effusion. No acute osseous findings. IMPRESSION: Right upper and to a lesser extent bibasilar coarsened mixed airspace and interstitial opacities.  Appearance is similar to prior with likely a background of chronic interstitial changes. Superimposed acute infiltrate is not excluded. Correlation with symptoms and follow-up as clinically warranted. **This report has been created using voice recognition software. It may contain minor errors which are inherent in voice recognition technology. ** Final report electronically signed by Dr. George Dumont on 9/25/2020 6:08 PM    Mri Shoulder Left Wo Contrast    Result Date: 9/28/2020  PROCEDURE: MRI SHOULDER LEFT WO CONTRAST CLINICAL INFORMATION: Left shoulder pain and limited range of motion for 3 days. COMPARISON: Left shoulder series dated 9/26/2020. TECHNIQUE: Routine MRI shoulder without contrast. FINDINGS: POSTOPERATIVE CHANGES: None. ALIGNMENT: Anatomic. MARROW SIGNAL/MARROW EDEMA/FRACTURE: 1. There is fatty and hemopoietic marrow signal. 2. There is no marrow edema. 3. There is no fracture. ACROMIOCLAVICULAR ARTICULATION: 1. There is a small amount of fluid within the acromioclavicular articulation which most likely is physiologic. 2. There are no degenerative or arthritic changes at the acromioclavicular articulation. 3. There is a concave undersurface of the acromion (type II). 4. There is no os acromiale. GLENOHUMERAL ARTICULATION: 1. There are no degenerative changes. 2. There is no chondral defect along the humeral head and glenoid. OTHER: 1. There is a 0.6 x 0.7 x 0.9 cm subcortical cyst at the lesser tuberosity and a 0.9 x 1.3 x 0.9 cm subcortical cyst along the anterior aspect of the greater tuberosity. ACROMIOHUMERAL INTERVAL: Unremarkable. CORACOHUMERAL INTERVAL: Unremarkable. ROTATOR CUFF: 1. There is tendinosis of the supraspinatus tendon. 2. The infraspinatus, teres minor and subscapularis tendons are intact and unremarkable. 3. There is no partial or full thickness tear of the rotator cuff. MUSCULATURE: Unremarkable. LONG HEAD BICEPS TENDON: 1. There is tendinosis of the intra-articular portion of the long head of the biceps tendon.  There is fluid within the long head of the biceps tendon sheath which is larger than expected for the amount of fluid within the glenohumeral articulation consistent with a tenosynovitis. GLENOID LABRUM: 1. There is abnormal signal within and abnormal morphologic appearance of the superior glenoid labrum consistent with a superior glenoid labral tear, anterior to posterior. 2. The anterior glenoid labrum is intact and unremarkable. 3. There is abnormal morphologic appearance of the superior aspect of the posterior glenoid labrum consistent with a tear. The inferior aspect of the posterior glenoid labrum is intact and unremarkable. CORACOCLAVICULAR/CORACOACROMIAL/CORACOHUMERAL LIGAMENTS: Intact and unremarkable. SUPERIOR/MIDDLE/INFERIOR GLENOHUMERAL LIGAMENTS: Intact and unremarkable. JOINT FLUID: 1. There is a small amount of fluid within the glenohumeral articulation which is septated within the subscapularis recess consistent with a synovitis. SUBACROMIAL-SUBDELTOID BURSA: 1. There is a small amount of fluid within the subacromial-subdeltoid bursa which may be reactive or related to a bursitis. SOFT TISSUE MASS/PATHOLOGICALLY ENLARGED LYMPHADENOPATHY: None. 1. There is tendinosis of the supraspinatus tendon. There is no partial or full-thickness tear of the rotator cuff. 2. There is tendinosis of the intra-articular portion of the long head of the biceps tendon. There is fluid within the long head of the biceps tendon sheath which is larger than expected for the amount of fluid within the glenohumeral articulation consistent with a tenosynovitis. 3. There is abnormal signal within and abnormal morphologic appearance of the superior glenoid labrum consistent with a superior glenoid labral tear, anterior to posterior. 4. There is abnormal morphologic appearance of the superior aspect of the posterior glenoid labrum consistent with a tear. The inferior aspect of the posterior glenoid labrum is intact and unremarkable.  5. There is a small amount of fluid within the glenohumeral articulation which is septated within the subscapularis recess consistent with a synovitis. 6. There is a small amount of fluid within the subacromial-subdeltoid bursa which may be reactive or related to a bursitis. **This report has been created using voice recognition software. It may contain minor errors which are inherent in voice recognition technology. ** Final report electronically signed by Dr. Perez Batres on 9/28/2020 7:26 AM      ASSESSMENT:Active Problems:    Community acquired bacterial pneumonia  Resolved Problems:    * No resolved hospital problems. *     Left shoulder tendonitis    PLAN as discussed with Dr. Ronaldo Burch:  Conservative management. Diclofenec gel as needed. Continue medical management  PT/OT  [facility] CID at [address] [place] [address] as needed if continue to have pain or new symptoms. Procedure  Left shoulder fluid aspiration. Cleansed with alcohol, using sterile technique a 21 g needle inserted into the bursa. No fluid able to be obtained. Hemostasis obtained.        Electronically signed by JASMEET Corrales CNP on 9/28/2020 at 8:35 AM

## 2020-09-28 NOTE — CARE COORDINATION
9/28/20, 12:13 PM EDT  DISCHARGE PLANNING EVALUATION:    Sadie Farris       Admitted from: ED 9/25/2020/ [facility] day: 3   Location: [address] [place] [address]A Reason for admit: Community acquired bacterial pneumonia [J15.9] Status: IP  Admit order signed?: no, sticky note for Attending  PMH:  has a past medical history of Anemia, Backache, Bronchiectasis ([address] Bursitis, Constipation, Diverticulosis of colon, HTN (hypertension), Hypercalcemia, Hyperlipidemia, Nodular goiter, OA (osteoarthritis), Osteopenia, Parathyroid adenoma, Pneumonia of right upper lobe due to infectious organism ([address] Pneumonitis, Pulmonary Mycobacterium avium complex (MAC) infection ([address] 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 ([address] Sinusitis, and Vitamin D deficiency. Procedure:  9/25 CXR  Right upper and to a lesser extent bibasilar coarsened mixed airspace and interstitial opacities. 9/28 MRI Left Shoulder  1. There is tendinosis of the supraspinatus tendon. There is no partial or full-thickness tear of the rotator cuff. 2. There is tendinosis of the intra-articular portion of the long head of the biceps tendon. There is fluid within the long head of the biceps tendon sheath which is larger than expected for the amount of fluid within the glenohumeral articulation   consistent with a tenosynovitis. 3. There is abnormal signal within and abnormal morphologic appearance of the superior glenoid labrum consistent with a superior glenoid labral tear, anterior to posterior. 4. There is abnormal morphologic appearance of the superior aspect of the posterior glenoid labrum consistent with a tear. The inferior aspect of the posterior glenoid labrum is intact and unremarkable. 5. There is a small amount of fluid within the glenohumeral articulation which is septated within the subscapularis recess consistent with a synovitis. 6. There is a small amount of fluid within the subacromial-subdeltoid bursa which may be reactive or related to a bursitis. Medications:  Scheduled Meds:   lidocaine  2 patch Transdermal Daily    benzonatate  100 mg Oral TID    budesonide  0.5 mg Nebulization BID    cefTRIAXone (ROCEPHIN) IV  1 g Intravenous Q24H    albuterol  2.5 mg Nebulization Q4H [place]    enoxaparin  40 mg Subcutaneous Daily    amLODIPine  10 mg Oral Daily    Vitamin D  5,000 Units Oral Every Other Day    lactobacillus  1 capsule Oral Daily    levothyroxine  75 mcg Oral Daily    cetirizine  10 mg Oral Daily    multivitamin  1 tablet Oral Daily    atorvastatin  10 mg Oral Daily    sodium chloride (Inhalant)  4 mL Nebulization BID    sodium chloride flush  10 mL Intravenous 2 times per day    azithromycin  500 mg Intravenous Q24H     Continuous Infusions:   Pertinent Info/Orders/Treatment Plan:  Client admitted with Pneumonia (history Pulmonary Fibrosis/Bronchiectasis treated with IV AB, Oxygen 2L; Pulmonary/Ortho (left shoulder/labrum tear; sgy not planned at this time)/ID following  Diet: DIET CARDIAC;   Smoking status:  reports that she has never smoked.  She has never used smokeless tobacco.   PCP: Lux Markham MD  Readmission 30 days or less: no  Readmission Risk Score: 13%    Discharge Planning Evaluation  Current Residence:  Private Residence  Living Arrangements:  Alone   Support Systems:  Children  Current Services PTA:     Potential Assistance Needed:  N/A  Potential Assistance Purchasing Medications:  No  Does patient want to participate in local refill/ meds to beds program?  No  Type of Home Care Services:  None  Patient expects to be discharged to:  home alone  Expected Discharge date:  10/02/20  Follow Up Appointment: Best Day/ Time: Tuesday PM    Patient Goals/Plan/Treatment Preferences: met with client; lives alone, has nebulizer; [facility] in past, [facility] past, [facility], monitor for home oxygen eval if not weaned off (patient wants portable oxygen if needed); will ask physician, if needs IV AB, patient does not have transportation, \[facility] [place] [facility]\"" - [facility] in past, has cleaning lady, therapy following; await therapy input  Transportation/Food Security/Housekeeping Addressed:  No issues identified.     Evaluation: no

## 2020-09-28 NOTE — FLOWSHEET NOTE
[place] [id number] PROGRESS NOTE      Patient: Diana Garcia  Room #: 3H-23/724-P            YOB: 1935  Age: 80 y.o. 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 She is an 80year old female who is in bed on 4k. No family was present at this time. She is in bed but was in good spirits, stating, \"I was suppose to go home but I am glad I'm not because I am too weak. \"    Interventions:  After conversation, we had prayer for her continued healing    Outcomes:    encouragement  Plan: 1. Care Plan:  Continue spiritual and emotional care for patient and family. Including prayers.      Electronically signed by Pastor English on 9/28/2020 at 3:18 PM.  [facility]  [phone]

## 2020-09-28 NOTE — PROGRESS NOTES
Ortho seen today for L shoulder pain     Rest of the body systems were reviewed. Past Medical History         Diagnosis Date    Anemia     normal on pre-op 2012 and 13    Backache     h/o    Bronchiectasis [facility]) 2013    Bursitis     bilateral shoulders    Constipation     Diverticulosis of colon     HTN (hypertension)     Hypercalcemia     slightly elevated at 10.8 pre-op 13    Hyperlipidemia     Nodular goiter     bx negative    OA (osteoarthritis)     bilateral thumbs - sees Dr. Lucretia Mosley Osteopenia 2013    Parathyroid adenoma 2013    Pneumonia of right upper lobe due to infectious organism [facility]     Pneumonitis     Dr. Wilmer Sotomayor in 2010 - bx negative    Pulmonary Mycobacterium avium complex (MAC) infection ([facility]     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 ([facility]     had one parathyroid removed - now follows with Dr. Devin Recinos Sinusitis     with seasonal allergies    Vitamin D deficiency 2018      Past Surgical History           Procedure Laterality Date    ABDOMINAL EXPLORATION SURGERY  2013    Release of ASHISHO, KATHERINE-Dr. Maddie Kwan    APPENDECTOMY      BRONCHOSCOPY N/A 2020    BRONCHOSCOPY FLUOROSCOPY performed by Rubén Carrillo MD at [address] WITH IMPLANT Bilateral  ?   SECTION  1610,6420    DILATION AND CURETTAGE OF UTERUS  8623,3873,0419    EXCISION OF PARATHYROID MASS Right 2013    rt parathyroidectomy (excision of rt inferior parathyroid mass) exploration of neck     FOOT SURGERY      left    FOOT SURGERY Left 2017    Dr Mima Barnes  6-4-12    left knee    JOINT REPLACEMENT  2014    right knee    MALIGNANT SKIN LESION EXCISION Left 2017    BCC DORSAL FOOT WITH GRAFT & FS    GOSIA AND BSO  1982    TONSILLECTOMY AND ADENOIDECTOMY  1940 ?     TOTAL KNEE ARTHROPLASTY Right 2014    [facility]     Diet    DIET CARDIAC; Allergies    Levaquin [levofloxacin]; Percocet [oxycodone-acetaminophen]; Rifampin; Sulfa antibiotics; and Cefuroxime  Social History     Social History     Socioeconomic History    Marital status:      Spouse name: Not on file    Number of children: Not on file    Years of education: Not on file    Highest education level: Not on file   Occupational History    Occupation: retired   Social Needs    Financial resource strain: Not on file    Food insecurity     Worry: Not on file     Inability: Not on file   [employer] needs     Medical: Not on file     Non-medical: Not on file   Tobacco Use    Smoking status: Never Smoker    Smokeless tobacco: Never Used   Substance and Sexual Activity    Alcohol use:  Yes     Alcohol/week: 0.0 standard drinks     Comment: socially-1 glassof wine 2-3 times a month    Drug use: No    Sexual activity: Never   Lifestyle    Physical activity     Days per week: Not on file     Minutes per session: Not on file    Stress: Not on file   Relationships    Social connections     Talks on phone: Not on file     Gets together: Not on file     Attends [employer] service: Not on file     Active member of club or organization: Not on file     Attends meetings of clubs or organizations: Not on file     Relationship status: Not on file    Intimate partner violence     Fear of current or ex partner: Not on file     Emotionally abused: Not on file     Physically abused: Not on file     Forced sexual activity: Not on file   Other Topics Concern    Not on file   Social History Narrative    Not on file     Family History          Problem Relation Age of Onset    Diabetes Mother     Thyroid Disease Mother     Arthritis Mother     High Blood Pressure Mother     Arthritis Father     High Blood Pressure Father     Other Father         hay fever    Breast Cancer Neg Hx     Ovarian Cancer Neg Hx      Sleep History    n/a  Meds    Current Medications    lidocaine 2 patch Transdermal Daily    benzonatate  100 mg Oral TID    budesonide  0.5 mg Nebulization BID    cefTRIAXone (ROCEPHIN) IV  1 g Intravenous Q24H    albuterol  2.5 mg Nebulization Q4H WA    enoxaparin  40 mg Subcutaneous Daily    amLODIPine  10 mg Oral Daily    Vitamin D  5,000 Units Oral Every Other Day    lactobacillus  1 capsule Oral Daily    levothyroxine  75 mcg Oral Daily    cetirizine  10 mg Oral Daily    multivitamin  1 tablet Oral Daily    atorvastatin  10 mg Oral Daily    sodium chloride (Inhalant)  4 mL Nebulization BID    sodium chloride flush  10 mL Intravenous 2 times per day    azithromycin  500 mg Intravenous Q24H     diclofenac sodium, acetaminophen, sodium chloride flush, polyethylene glycol, promethazine **OR** ondansetron  IV Drips/Infusions    Vitals    Vitals    height is 5' 4\" (1.626 m) and weight is 154 lb 6.4 oz (70 kg). Her oral temperature is 97.9 °F (36.6 °C). Her blood pressure is 120/57 (abnormal) and her pulse is 91. Her respiration is 16 and oxygen saturation is 90%. O2 Flow Rate (L/min): 2 L/min  I/O    Intake/Output Summary (Last 24 hours) at 9/28/2020 1334  Last data filed at 9/28/2020 0913  Gross per 24 hour   Intake 934.34 ml   Output --   Net 934.34 ml     Patient Vitals for the past 96 hrs (Last 3 readings):   Weight   09/28/20 0430 154 lb 6.4 oz (70 kg)   09/27/20 0530 161 lb 12.8 oz (73.4 kg)   09/25/20 1644 153 lb (69.4 kg)     Exam   Constitutional: Patient appears in no distress on 2LPM via nasal cannula. Mouth/Throat: Oropharynx is clear and moist.  No oral thrush. Neck: Neck supple. No JVD  Cardiovascular: Normal rate, regular rhythm, normal heart sounds. No murmur heard. Pulmonary/Chest: Effort normal and diminished breath sounds with crackles. No stridor. No respiratory distress. No wheezes. Abdominal: Soft. Patient exhibits no distension. No tenderness. Musculoskeletal: Normal range of motion.  No cyanosis or clubbing   Extremities: tracheobronchial tree including right upper, middle and lower lobes. Bronchioalveolar lavage obtained from right lower lobe posterior segment. Trans bronchial lung biopsies were performed by using biopsy forceps from right lower lobe posterior segment under fluroscopy guidance. Cytology brush specimen was obtained from right lower lobe posterior segment under fluroscopy guidance. Microbiology brush specimen was obtained from right lower lobe posterior segment under fluroscopy guidence. Connective tissue work up results:  Date: 4/03/2017  SABAS( Antinuclear antibody):  Negative-none detected.    Fungal serologies- Negative                   RA ( Rheumatoid factor):  Negative. <10                                                          ACE(  level):    Negative-22                               ESR ( Sed rate): 9               Connective Tissue Labs  Date: 1/11/19     SABAS with reflex-None detected. Anti RNP-1- Normal.  Anti Lexie-0- normal  Anti-SSB-0- normal  AntiSSA-1- normal  AntiCentromere- 4- Normal  Scleroderma AB-24-normal  ACE-10- normal  ESR-101 elevated  Rheumatoid Factor-13 WNL                  Bronch Cultures   Fungus-No yeast or hyphae  Respiratory culture-No organisms observed  AFB-Negative on concentrated smear, Final pending  Pneumocystis-Negative  Legionella-Negative  Anaerobic/aerobic-No prelim growth  PAFs-Negative     Bronch and biopsy:   Biopsy-   FINAL DIAGNOSIS:  Right lower posterior segment of lung, transbronchial biopsy:   Chronic bronchial inflammation.   Negative for malignancy.      Cytology-   FINAL RESULTS:  A. Lung, right lower lobe posterior subsegment, bronchoalveolar lavage:   Abundant acute inflammation.   No malignant cells seen. B. Lung, right tracheobronchial tree, washings:   Abundant acute inflammation and scattered benign bronchial epithelium.   No malignant cells seen.     C. Lung, right lower lobe posterior segment brush tip, brushing:   Benign bronchial epithelium and acute inflammation.   No malignant cells seen.       PFTs         Echo    ECHO completed in 2018 at [facility]  CONCLUSIONS:  - Exam indication: Shortness of Breath  - The left ventricle is normal in size. Left ventricular systolic function is   hyperdynamic. EF = 77 ± 5% (2D biplane) Normal left ventricular diastolic   function.  - The right ventricle is normal in size. Right ventricular systolic function is   normal.  - The patient has not had a prior  echocardiographic exam for comparison. Electronically signed by Odilia Swanson MD on 8/2/2018 at 4:56:15 PM  Cultures    Procalcitonin  Lab Results   Component Value Date    PROCAL 0.13 09/26/2020    PROCAL 0.12 09/25/2020     SARS-CoV-2, NAAT  NOT DETECTED  NOT DETECTED  Final  09/25/2020  6:16 PM      Blood Culture, Routine  09/25/2020  6:29 PM  [facility]    No growth-preliminary      Blood Culture, Routine  09/25/2020  6:48 PM  [facility]    No growth-preliminary      Strep Pneumo antigen- (-)  Legionella antigen- (-)    MRSA PCR- negative    Radiology    CXR  9/25/2020   IMPRESSION:    Right upper and to a lesser extent bibasilar coarsened mixed airspace and interstitial opacities. Appearance is similar to prior with likely a background of chronic interstitial changes. Superimposed acute infiltrate is not excluded. Correlation with    symptoms and follow-up as clinically warranted. CT Scans  Ct chest high resolution 8/25/2020      Impression    1. Irregular groundglass and confluent densities are demonstrated within the periphery of the right lung left middle and upper lobes. This is most pronounced within the right apex. This may represent sequela from pulmonary fibrosis         2. There is right upper lobe bronchiectasis demonstrated.         **This report has been created using voice recognition software.  It may contain minor errors which are inherent in voice recognition technology. **         Final report electronically signed by Dr. Hodan Castañeda on 8/25/2020 1:44 PM                       9/28/2020   MRI SHOULDER LEFT WO CONTRAST   1. There is tendinosis of the supraspinatus tendon. There is no partial or full-thickness tear of the rotator cuff. 2. There is tendinosis of the intra-articular portion of the long head of the biceps tendon. There is fluid within the long head of the biceps tendon sheath which is larger than expected for the amount of fluid within the glenohumeral articulation consistent with a tenosynovitis. 3. There is abnormal signal within and abnormal morphologic appearance of the superior glenoid labrum consistent with a superior glenoid labral tear, anterior to posterior. 4. There is abnormal morphologic appearance of the superior aspect of the posterior glenoid labrum consistent with a tear. The inferior aspect of the posterior glenoid labrum is intact and unremarkable. 5. There is a small amount of fluid within the glenohumeral articulation which is septated within the subscapularis recess consistent with a synovitis. 6. There is a small amount of fluid within the subacromial-subdeltoid bursa which may be reactive or related to a bursitis. (See actual reports for details)    Assessment   Acute hypoxic respiratory failure-patient requiring 2 L O2 via nasal cannula. Likely secondary to acute exacerbation of bronchiectasis. Bronchiectasis- secondary to MAC infection in 2017. Follows with centers of pulmonary medicine as well as [facility]. Likely in acute exacerbation secondary to possible pneumonia versus medication induced versus worsening pulmonary fibrosis. Patient is afebrile and denies changes in sputum production. On nebulized albuterol, Zithromax and cefepime for possible pneumonia. Pulmonary fibrosis-likely secondary to MAC infection in 2017. Following at the [facility]. She is currently on nebulized albuterol and nebulized saline at home.   per negative. Follow up as above.     Baldemar Curtis MD 9/28/2020 7:04 PM

## 2020-09-28 NOTE — PLAN OF CARE
Problem: Breathing Pattern - Ineffective:  Goal: Ability to achieve and maintain a regular respiratory rate will improve  Description: Ability to achieve and maintain a regular respiratory rate will improve  Outcome: Ongoing     Problem: Impaired respiratory status  Goal: Clear lung sounds  9/28/2020 0831 by Beatriz Joel RCP  Outcome: Ongoing

## 2020-09-28 NOTE — CONSULTS
CONSULTATION NOTE :ID       Patient - Diana Garcia,  Age - 80 y.o.    - 1935      Room Number - 4D-51/798-M   N -  [id number]   Acct # - [de-identified]  Date of Admission -  2020  4:36 PM  Patient's PCP: Enid Mccormick MD     Requesting Physician: Brian Landers MD    REASON FOR CONSULTATION   pneumonia  CHIEF COMPLAINT   Cough and shortness of breath    HISTORY OF PRESENT ILLNESS       This is a very pleasant 80 y.o. female who was admitted to the hospital with a chief complaints of cough and shortness of breath. She is known to me. I saw her last year. She has hx of COPD/Bronchiectasis. Treated in the past by Dr David Mcdonald for [facility]. 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 She follows with Dr Anika Escobar and also follows at the [facility]. She was brought to hospital due to cough and shortness of breath of three days duration. She has no chest pain,no hemoptysis. She is on iv rocephin and zithromax. She is on oxygen. She lives alone. Her vision  Has been improving. Unfortunately she gets shortness of breath and has chair in each room in her house.     PAST MEDICAL  HISTORY       Past Medical History:   Diagnosis Date    Anemia     normal on pre-op 2012 and 13    Backache     h/o    Bronchiectasis ([address]     Bursitis     bilateral shoulders    Constipation     Diverticulosis of colon     HTN (hypertension)     Hypercalcemia     slightly elevated at 10.8 pre-op 13    Hyperlipidemia     Nodular goiter     bx negative    OA (osteoarthritis)     bilateral thumbs - sees Dr. Barby Guevara Osteopenia 2013    Parathyroid adenoma 2013    Pneumonia of right upper lobe due to infectious organism ([address]     Pneumonitis     Dr. Jarad Remy in 2010 - bx negative    Pulmonary Mycobacterium avium complex (MAC) infection ([address]     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 ([address]     had one parathyroid removed - now follows with Dr. Ruiz Irizarry    Sinusitis     with seasonal allergies    Vitamin D deficiency 2018       PAST SURGICAL HISTORY     Past Surgical History:   Procedure Laterality Date    ABDOMINAL EXPLORATION SURGERY  2013    Release of KATHERINE TAMAYO-Dr. Maddie Kwan    APPENDECTOMY      BRONCHOSCOPY N/A 2020    BRONCHOSCOPY FLUOROSCOPY performed by Rubén Carrillo MD at [address] Bilateral  ?   SECTION  5659,0268    DILATION AND CURETTAGE OF UTERUS  4382,9454,5629    EXCISION OF PARATHYROID MASS Right 2013    rt parathyroidectomy (excision of rt inferior parathyroid mass) exploration of neck     FOOT SURGERY  2002    left    FOOT SURGERY Left 2017    Dr Mima Barnes  6-4-12    left knee    JOINT REPLACEMENT  2014    right knee    MALIGNANT SKIN LESION EXCISION Left 2017    BCC DORSAL FOOT WITH GRAFT & FS    GOSIA AND BSO  1982    TONSILLECTOMY AND ADENOIDECTOMY  194 ?     TOTAL KNEE ARTHROPLASTY Right 2014    [facility]         MEDICATIONS:       Scheduled Meds:   lidocaine  2 patch Transdermal Daily    benzonatate  100 mg Oral TID    budesonide  0.5 mg Nebulization BID    cefTRIAXone (ROCEPHIN) IV  1 g Intravenous Q24H    albuterol  2.5 mg Nebulization Q4H WA    enoxaparin  40 mg Subcutaneous Daily    amLODIPine  10 mg Oral Daily    Vitamin D  5,000 Units Oral Every Other Day    lactobacillus  1 capsule Oral Daily    levothyroxine  75 mcg Oral Daily    cetirizine  10 mg Oral Daily    multivitamin  1 tablet Oral Daily    atorvastatin  10 mg Oral Daily    sodium chloride (Inhalant)  4 mL Nebulization BID    sodium chloride flush  10 mL Intravenous 2 times per day    azithromycin  500 mg Intravenous Q24H     Continuous Infusions:  PRN Meds:diclofenac sodium, acetaminophen, sodium chloride flush, polyethylene glycol, promethazine **OR** ondansetron  Allergies:   ALLERGIES: Levaquin [levofloxacin]; Percocet [oxycodone-acetaminophen]; Rifampin; Sulfa antibiotics; and Cefuroxime        SOCIAL HISTORY:     TOBACCO:   reports that she has never smoked. She has never used smokeless tobacco.     ETOH:   reports current alcohol use. Patient currently lives alone      FAMILY HISTORY:         Problem Relation Age of Onset    Diabetes Mother     Thyroid Disease Mother     Arthritis Mother     High Blood Pressure Mother     Arthritis Father     High Blood Pressure Father     Other Father         hay fever    Breast Cancer Neg Hx     Ovarian Cancer Neg Hx        REVIEW OF SYSTEMS:     Constitutional: no fever, no night sweats,+ fatigue, no weight loss. Head: no head ache , no head injury, no migranes. Eye:impaired vision  Ears: no hearing difficulty, no tinnitus  Mouth/throat: no ulceration, dental caries , dysphagia, no hoarseness and voice change  Respiratory: +o cough no chest pain,+shortness of breath,no wheezing  CVS: no palpitation, no chest pain,   GI: no abdominal pain, no nausea , no vomiting, no constipation,no diarrhea. ARNULFO: no dysuria, frequency and urgency, no hematuria, no kidney stones  Musculoskeletal:+ joint pain, swelling , stiffness,  Endocrine: no polyuria, polydipsia, no cold or heat intolerance. Hematology: +anemia, no easy brusing or bleeding, no hx of clotting disorder  Dermatology: no skin rash, no skin lesions, no pruritis,  Neurological:no headaches,no dizziness, no seizure, no numbness. Psychiatry: no depression, no anxiety,no panic attacks, no suicide ideation    PHYSICAL EXAM:     BP (!) 120/57   Pulse 91   Temp 97.9 °F (36.6 °C) (Oral)   Resp 16   Ht 5' 4\" (1.626 m)   Wt 154 lb 6.4 oz (70 kg)   SpO2 90%   BMI 26.50 kg/m²   General apperance:  Awake, alert, in mild distress. HEENT: pink conjunctiva, unicteric sclera, moist oral mucosa. Chest:  Diminished breath sound with crackles   Cardiovascular:  RRR ,S1S2, no murmur or gallop.   Abdomen: shortness of breath and limited mobility at home  Left shoulder tendinites: on conservative treatment         Thank you Theresa Delarosa MD for allowing me to participate in this patient's care.     Alessandra Heard MD,FACP 9/28/2020 1:29 PM

## 2020-09-29 LAB — SARS-COV-2, NAAT: NOT DETECTED

## 2020-09-29 PROCEDURE — 2580000003 HC RX 258: Performed by: INTERNAL MEDICINE

## 2020-09-29 PROCEDURE — 6360000002 HC RX W HCPCS: Performed by: INTERNAL MEDICINE

## 2020-09-29 PROCEDURE — 6370000000 HC RX 637 (ALT 250 FOR IP): Performed by: INTERNAL MEDICINE

## 2020-09-29 PROCEDURE — 99232 SBSQ HOSP IP/OBS MODERATE 35: CPT | Performed by: INTERNAL MEDICINE

## 2020-09-29 PROCEDURE — 94640 AIRWAY INHALATION TREATMENT: CPT

## 2020-09-29 PROCEDURE — U0002 COVID-19 LAB TEST NON-CDC: HCPCS

## 2020-09-29 PROCEDURE — APPSS30 APP SPLIT SHARED TIME 16-30 MINUTES: Performed by: NURSE PRACTITIONER

## 2020-09-29 PROCEDURE — 2700000000 HC OXYGEN THERAPY PER DAY

## 2020-09-29 PROCEDURE — 2060000000 HC ICU INTERMEDIATE R&B

## 2020-09-29 PROCEDURE — 6370000000 HC RX 637 (ALT 250 FOR IP)

## 2020-09-29 RX ORDER — AZITHROMYCIN 250 MG/1
250 TABLET, FILM COATED ORAL EVERY EVENING
Status: DISCONTINUED | OUTPATIENT
Start: 2020-09-29 | End: 2020-10-02 | Stop reason: HOSPADM

## 2020-09-29 RX ADMIN — ENOXAPARIN SODIUM 40 MG: 40 INJECTION SUBCUTANEOUS at 23:17

## 2020-09-29 RX ADMIN — ALBUTEROL SULFATE 2.5 MG: 2.5 SOLUTION RESPIRATORY (INHALATION) at 14:21

## 2020-09-29 RX ADMIN — BENZONATATE 100 MG: 100 CAPSULE ORAL at 08:39

## 2020-09-29 RX ADMIN — MAGNESIUM HYDROXIDE 30 ML: 2400 SUSPENSION ORAL at 08:44

## 2020-09-29 RX ADMIN — CEFTRIAXONE SODIUM 1 G: 1 INJECTION, POWDER, FOR SOLUTION INTRAMUSCULAR; INTRAVENOUS at 15:16

## 2020-09-29 RX ADMIN — AZITHROMYCIN DIHYDRATE 250 MG: 250 TABLET, FILM COATED ORAL at 20:10

## 2020-09-29 RX ADMIN — LEVOTHYROXINE SODIUM 75 MCG: 75 TABLET ORAL at 06:17

## 2020-09-29 RX ADMIN — BUDESONIDE 500 MCG: 0.5 INHALANT RESPIRATORY (INHALATION) at 09:14

## 2020-09-29 RX ADMIN — BUDESONIDE 500 MCG: 0.5 INHALANT RESPIRATORY (INHALATION) at 22:13

## 2020-09-29 RX ADMIN — Medication 10 ML: at 20:10

## 2020-09-29 RX ADMIN — Medication 1 CAPSULE: at 08:39

## 2020-09-29 RX ADMIN — DICLOFENAC 4 G: 10 GEL TOPICAL at 20:12

## 2020-09-29 RX ADMIN — BENZONATATE 100 MG: 100 CAPSULE ORAL at 15:16

## 2020-09-29 RX ADMIN — ALBUTEROL SULFATE 2.5 MG: 2.5 SOLUTION RESPIRATORY (INHALATION) at 18:06

## 2020-09-29 RX ADMIN — BENZONATATE 100 MG: 100 CAPSULE ORAL at 20:10

## 2020-09-29 RX ADMIN — ALBUTEROL SULFATE 2.5 MG: 2.5 SOLUTION RESPIRATORY (INHALATION) at 22:13

## 2020-09-29 RX ADMIN — THERA TABS 1 TABLET: TAB at 08:39

## 2020-09-29 RX ADMIN — AMLODIPINE BESYLATE 10 MG: 10 TABLET ORAL at 08:40

## 2020-09-29 RX ADMIN — CETIRIZINE HYDROCHLORIDE 10 MG: 10 TABLET ORAL at 08:39

## 2020-09-29 RX ADMIN — ALBUTEROL SULFATE 2.5 MG: 2.5 SOLUTION RESPIRATORY (INHALATION) at 09:14

## 2020-09-29 RX ADMIN — ATORVASTATIN CALCIUM 10 MG: 10 TABLET, FILM COATED ORAL at 08:39

## 2020-09-29 RX ADMIN — DICLOFENAC 4 G: 10 GEL TOPICAL at 08:41

## 2020-09-29 RX ADMIN — Medication 10 ML: at 08:40

## 2020-09-29 RX ADMIN — Medication 5000 UNITS: at 08:39

## 2020-09-29 NOTE — CARE COORDINATION
9/29/20, 12:49 PM EDT  Client plans new [employer] [facility] likely in am per notes; Kyleigh Bean following  Patient goals/plan/ treatment preferences discussed by  and . Patient goals/plan/ treatment preferences reviewed with patient/ family. Patient/ family verbalize understanding of discharge plan and are in agreement with goal/plan/treatment preferences. Understanding was demonstrated using the teach back method. AVS provided by RN at time of discharge, which includes all necessary medical information pertaining to the patients current course of illness, treatment, post-discharge goals of care, and treatment preferences.

## 2020-09-29 NOTE — DISCHARGE INSTR - COC
Continuity of Care Form    Patient Name: Willie Laboy   :  1935  MRN:  [id number]    Admit date:  2020  Discharge date:  10/2/2020    Code Status Order: Full Code   Advance Directives:   [facility] Documentation       Date/Time Healthcare Directive Type of Healthcare Directive Copy in [address] Agent's Name Healthcare Agent's Phone Number    20 0048  --  --  --  --  --  [phone]    20 2316  Yes, patient has an advance directive for healthcare treatment  Living will  No, copy requested from medical records  Adult Teresa Morton  --            Admitting Physician:  Adalberto Smith MD  PCP: Cal Santillan MD    Discharging Nurse:  [address] Unit/Room#: 4K-24/024-A  Discharging Unit Phone Number: [phone]    Emergency Contact:   Extended Emergency Contact Information  Primary Emergency Contact: Jose Benavides   [address] Phone: [phone]  Relation: Child  Secondary Emergency Contact: [address] [facility] Phone: [phone]  Mobile Phone: [phone]  Relation: Child    Past Surgical History:  Past Surgical History:   Procedure Laterality Date    ABDOMINAL EXPLORATION SURGERY  2013    Release of [place] TAMAYO 89 N/A 2020    BRONCHOSCOPY FLUOROSCOPY performed by Edgar Dalton MD at [address] [place] [address] 153 Bilateral 1990 ?      SECTION  1085,8237    DILATION AND CURETTAGE OF UTERUS  0943,7475,2154    EXCISION OF PARATHYROID MASS Right 2013    rt parathyroidectomy (excision of rt inferior parathyroid mass) exploration of neck     FOOT SURGERY      left    FOOT SURGERY Left 2017    Dr Serena Pop  6-4-12    left knee    JOINT REPLACEMENT  2014    right knee    MALIGNANT SKIN LESION EXCISION Left 2017    [facility]CC DORSAL FOOT WITH GRAFT & FS    GOSIA AND BSO  1982    TONSILLECTOMY AND ADENOIDECTOMY  1940 ?     TOTAL KNEE ARTHROPLASTY Right January 6th, 2014    [facility]       Immunization History:   Immunization History   Administered Date(s) Administered    FLUZONE 3 YEARS AND OVER 09/05/2014    Influenza Vaccine, unspecified formulation 09/07/2016    Influenza Virus Vaccine 10/11/2012, 09/10/2013, 09/18/2017    Influenza Whole 09/08/2015    Influenza, High Dose (Fluzone 65 yrs and older) 09/27/2018    Influenza, MDCK Quadv, with preserv IM (Flucelvax 4 yrs and older) 08/16/2019    Influenza, Triv, inactivated, subunit, adjuvanted, IM (Fluad 65 yrs and older) 09/12/2019, 09/08/2020, 09/08/2020    PPD Test 04/03/2017    Pneumococcal Conjugate 13-valent ([id number]) 11/06/2013    Pneumococcal Polysaccharide ([id number]) 10/24/2007    Td, unspecified formulation 02/24/2001    Tdap (Boostrix, Adacel) 05/17/2013    Zoster Live (Zostavax) 06/05/2008    Zoster Recombinant (Shingrix) 08/16/2019, 10/03/2019       Active Problems:  Patient Active Problem List   Diagnosis Code    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 ([facility] N25.81    Hypercalcemia E83.52    Osteoarthritis M19.90    Non-toxic nodular goiter E04.9    Essential hypertension I10    Diverticulosis of colon K57.30    Hypokalemia E87.6    Hypothyroidism E03.9    Osteopenia M85.80    Bronchiectasis without complication (HCC) P41.3    Hyponatremia E87.1    Pure hypercholesterolemia E78.00    Left thyroid nodule E04.1    Hyperthyroidism E05.90    Lung infiltrate on CT R91.8    Angina pectoris (HCC) - Typical I20.9    Dyslipidemia E78.5    Elevated serum free T4 level R79.89    Elevated TSH R79.89    Nasal polyp J33.9    Abnormal CT of the chest R93.89    Vitamin D deficiency E55.9    Acute hypoxemic respiratory failure (HCC) J96.01    Pneumonia J18.9    Moderate malnutrition (HCC) E44.0    Pulmonary fibrosis (HCC) J84.10    SIRS (systemic inflammatory response syndrome) ([facility] R65.10    [facility] acquired bacterial pneumonia J15.9       Isolation/Infection:   Isolation            No Isolation          Patient Infection Status       Infection Onset Added Last Indicated Last Indicated By Review Planned Expiration Resolved Resolved By    COVID-19 Rule Out 09/29/20 09/29/20 09/29/20 COVID-19 (Ordered) 10/06/20 10/13/20      Resolved    COVID-19 Rule Out 09/25/20 09/25/20 09/25/20 COVID-19 (Ordered)   09/25/20 Rule-Out Test Resulted            Nurse Assessment:  Last Vital Signs: /62   Pulse 78   Temp 97.9 °F (36.6 °C) (Oral)   Resp 16   Ht 5' 4\" (1.626 m)   Wt 155 lb 3.2 oz (70.4 kg)   SpO2 97%   BMI 26.64 kg/m²     Last documented pain score (0-10 scale): Pain Level: 6  Last Weight:   Wt Readings from Last 1 Encounters:   09/29/20 155 lb 3.2 oz (70.4 kg)     Mental Status:  oriented    IV Access:  - None    Nursing Mobility/ADLs:  Walking   Independent  Transfer  Independent  Bathing  Assisted  Dressing  Assisted  Toileting  Independent  Feeding  [address]  Assisted  Med Delivery   whole    Wound Care Documentation and Therapy:        Elimination:  Continence: Bowel: Yes  Bladder: Yes  Urinary Catheter: None   Colostomy/Ileostomy/Ileal Conduit: No       Date of Last BM: 9/30/2020    Intake/Output Summary (Last 24 hours) at 9/29/2020 1035  Last data filed at 9/29/2020 0329  Gross per 24 hour   Intake 1186.1 ml   Output 0 ml   Net 1186.1 ml     I/O last 3 completed shifts: In: 1366.1 [P.O.:1050; I.V.:316.1]  Out: 0     Safety Concerns: At Risk for Falls    Impairments/Disabilities:      Vision, eyeglasses  Nutrition Therapy:  Current Nutrition Therapy:   - Oral Diet:  Cardiac    Routes of Feeding: Oral  Liquids: No Restrictions  Daily Fluid Restriction: no  Last Modified Barium Swallow with Video (Video Swallowing Test): not done    Treatments at the Time of Hospital Discharge:   Respiratory Treatments: see MAR  Oxygen Therapy:  is on oxygen at 1 L/min per nasal cannula.  with rest

## 2020-09-29 NOTE — PLAN OF CARE
Problem: Impaired respiratory status  Goal: Clear lung sounds  9/29/2020 0919 by Francy Leung, RCP  Outcome: Ongoing  Continue aerosols to improve aeration of lungs.

## 2020-09-29 NOTE — PLAN OF CARE
Problem: Pain:  Goal: Control of acute pain  Description: Control of acute pain  Outcome: Ongoing  Note: Pt complains of pain of a 6 on a scale of 1-10 with 10 being the worst. Pt described the left shoulder pain as aching. Pain is relieved by ICE, muscle rub, and tylenol. Continue rest, repositioning, and emotional support. Problem: Falls - Risk of:  Goal: Will remain free from falls  Description: Will remain free from falls  Outcome: Ongoing  Note: Pt remains free of falls during the night. Bed in the lowest position, items and call light placed within reach, side rails up x2, and bed alarm placed on. Continue hourly rounding. Problem: Breathing Pattern - Ineffective:  Goal: Ability to achieve and maintain a regular respiratory rate will improve  Description: Ability to achieve and maintain a regular respiratory rate will improve  Outcome: Ongoing  Note: Pt has dyspnea with exertion. Lung sounds are clear and diminished. Continue to monitor. Problem: Impaired respiratory status  Goal: Clear lung sounds  9/29/2020 0530 by [id number] [place],[address]  Outcome: Ongoing  Note: Lung sounds are clear and diminished bilaterally. Continue to monitor. Problem: Nutrition Deficit:  Goal: Ability to achieve adequate nutritional intake will improve  Description: Ability to achieve adequate nutritional intake will improve  Outcome: Ongoing  Note: Pt has adequate nutritional intake during the day of care.

## 2020-09-29 NOTE — PROGRESS NOTES
Received a consult per protocol.   Text message sent to Dr. Annalisa Segovia and he is supportive of palliative care referral.

## 2020-09-29 NOTE — PLAN OF CARE
Continue breathing treatments to help improve respiratory status.   Problem: Impaired respiratory status  Goal: Clear lung sounds  9/28/2020 2136 by Shakeel Maya RCP  Outcome: Ongoing

## 2020-09-29 NOTE — CARE COORDINATION
DISCHARGE/PLANNING EVALUATION  9/29/20, 10:15 AM EDT    Reason for Referral:  Shayne Escobar for rehab\"  Mental Status:  Alert and oriented  Decision Making:  Makes her own decisions  Family/Social/Home Environment:  SW spoke to patient about her needs. [facility]. She has 2 sons but both live out of state. She had a  but she canceled this due to [place] and has been managing her own needs. She was not using any DME to ambulate. She was still driving but really did not go anywhere. She states she was ordering her groceries on line and then picking them up. Current Services including food security, transportation and housekeeping:  See above. She had been at [facility] in Jan for 2 weeks and wants this again. After that discharge she had [facility]  Current Equipment:  none  Payment Source:  Medicare and [facility]  Concerns or Barriers to Discharge:  He patient feels the need to be at [facility] for a short rehab stay. SW spoke to [facility] with [facility] and they have a bed. SW did explain this to the patient and the rules due to COVID and her Medicare coverage  Post acute provider list with quality measures, geographic area and applicable managed care information provided. Questions regarding selection process answered:   N/A    Teach Back Method used with patient regarding care plan and need  Patient verbalize understanding of the plan of care and contribute to goal setting. Patient goals, treatment preferences and discharge plan:  Plan discharge to [facility].     Electronically signed by CIARA Souza on 9/29/2020 at 10:15 AM

## 2020-09-29 NOTE — PROGRESS NOTES
[facility] Palliative Care           Progress Note    Patient Name:  Pebbles Burgess  Medical Record Number:  [id number]  YOB: 1935    Date:  9/29/2020  Time:  12:11 PM  Completed By:  Yomaira Mendez RN    Reason for Palliative Care Evaluation:  Goals of care      Advance Directives  [] [facility]  DNR Form  [x] Living Will  [] Medical POA    Current Code Status:changed to DNR CCA  [x] Full Resuscitation  [] DNR-Comfort Care-Arrest  [] DNR-Comfort Care  [] Limited   [] No CPR   [] No shock   [] No ET intubation/reintubation   [] No resuscitative medications   [] Other limitation:    Performance Status:  60    Family/Patient Discussion:  Patient resting in bed. Patient is alert and oriented to all spheres and conversation is appropriate. Palliative care introduced. Discussion about advance directives (patient has living will on file only) and patient indicates that she thought she had completed these many years ago. Patient is interested in completing advance directives at this time. Discussion about code status levels and what each level entails. Discussion about complications of resuscitative measures of rib fractures, brain and organ damage. Patient indicates that she would not want resuscitated if her heart/breathing were to stop but that she would want short term intubation if needed. Discussed [place] DNR form and its use and patient wishes to complete this. Did discuss progression of the patient's lung disease and encouraged her to discuss with her children so that they would be aware that she would not want long term ventilation. Much emotional support provided. Plan/Follow-Up:  Updated Lazaro Ma RN. Updated Dr. Dennie Chow and order received for code status change. [place] DNR form signed by patient and placed in [facility] for MD signature. Please call palliative care if further needs arise.     Yomaira Mendez RN  9/29/2020,  12:11 PM

## 2020-09-29 NOTE — PROGRESS NOTES
[facility]    Patient - Willie Laboy   MRN -  [id number]   [facility]   - 1935      Date of Admission -  2020  4:36 PM  Date of evaluation -  2020  Room - [facility] Day - 4  Consulting - Adalberto Smith MD Primary Care Physician - Cal Santillan MD   Chief Complaint   Cough and SOB    Active Hospital Problem List      Active Hospital Problems    Diagnosis Date Noted    Community acquired bacterial pneumonia [J15.9]      HPI   Willie Laboy is a 80 y.o. female admitted for possible Pneumonia. Patient is an 80year old female who presented to [facility] ED with 3 day hx of worsening shortness of breath. She states that her shortness of breath started after receiving flovent from our pulmonary office and using it for 2 days. She states that after using the flovent she had a coughing spell that lasted 30 minutes, the next day she had a coughing spell that lasted for 40 minutes after using the flovent. She states that since that time she feels that her breathing has been worse and is not improving. She denies any cough currently. She denies sputum production, fevers, chills, night sweats. Patient has a significant hx of pulmonary disease including Bronchiectasis, Hx of MAC infection, and Pulmonary fibrosis which is followed here in [place] with Dr. Bernard Bruno as well as a the [facility]. Per chart review, patient was seen at [address] [place] [address] and was given oral prednisone for bronchiectasis with the intent of trying ICS if symptoms improved with oral steroids. Per patient her breathing was the best it has ever been while taking the prednisone. Pulmonary medicine was consulted for further evaluation of patients breathing and possible pneumonia. No prior smoking history. Subjective/Events Past 24 hours/ROS   She is on 1LPM via nasal cannula- 94%  Tolerating Pulmicort nebs well. SOB/cough improved.    DNR CCA now  ECF tomorrow     Rest of the body systems were reviewed. Past Medical History         Diagnosis Date    Anemia     normal on pre-op 2012 and 13    Backache     h/o    Bronchiectasis [facility]) 2013    Bursitis     bilateral shoulders    Constipation     Diverticulosis of colon     HTN (hypertension)     Hypercalcemia     slightly elevated at 10.8 pre-op 13    Hyperlipidemia     Nodular goiter     bx negative    OA (osteoarthritis)     bilateral thumbs - sees Dr. Lee Melendez Osteopenia 2013    Parathyroid adenoma 2013    Pneumonia of right upper lobe due to infectious organism ([address]     Pneumonitis     Dr. Betty Newman in 2010 - bx negative    Pulmonary Mycobacterium avium complex (MAC) infection [facility]     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 ([facility]     had one parathyroid removed - now follows with Dr. Hany Monae Sinusitis     with seasonal allergies    Vitamin D deficiency 2018      Past Surgical History           Procedure Laterality Date    ABDOMINAL EXPLORATION SURGERY  2013    Release of SBO, KATHERINE-Dr. Bea Razo    APPENDECTOMY      BRONCHOSCOPY N/A 2020    BRONCHOSCOPY FLUOROSCOPY performed by Melissa Champion MD at [address] WITH IMPLANT Bilateral  ?   SECTION  [id number]    DILATION AND CURETTAGE OF UTERUS  [id number]    EXCISION OF PARATHYROID MASS Right 2013    rt parathyroidectomy (excision of rt inferior parathyroid mass) exploration of neck     FOOT SURGERY      left    FOOT SURGERY Left 2017    Dr Shasha Green  6-4-12    left knee    JOINT REPLACEMENT  2014    right knee    MALIGNANT SKIN LESION EXCISION Left 2017    BCC DORSAL FOOT WITH GRAFT & FS    GOSIA AND BSO  1982    TONSILLECTOMY AND ADENOIDECTOMY  1940 ?  TOTAL KNEE ARTHROPLASTY Right 2014    OIO     Diet    DIET CARDIAC;   Allergies    Levaquin [levofloxacin]; Percocet [oxycodone-acetaminophen]; Rifampin; Sulfa antibiotics; and Cefuroxime  Social History     Social History     Socioeconomic History    Marital status:      Spouse name: Not on file    Number of children: Not on file    Years of education: Not on file    Highest education level: Not on file   Occupational History    Occupation: retired   Social Needs    Financial resource strain: Not on file    Food insecurity     Worry: Not on file     Inability: Not on file   [employer] needs     Medical: Not on file     Non-medical: Not on file   Tobacco Use    Smoking status: Never Smoker    Smokeless tobacco: Never Used   Substance and Sexual Activity    Alcohol use:  Yes     Alcohol/week: 0.0 standard drinks     Comment: socially-1 glassof wine 2-3 times a month    Drug use: No    Sexual activity: Never   Lifestyle    Physical activity     Days per week: Not on file     Minutes per session: Not on file    Stress: Not on file   Relationships    Social connections     Talks on phone: Not on file     Gets together: Not on file     Attends Rastafarian service: Not on file     Active member of club or organization: Not on file     Attends meetings of clubs or organizations: Not on file     Relationship status: Not on file    Intimate partner violence     Fear of current or ex partner: Not on file     Emotionally abused: Not on file     Physically abused: Not on file     Forced sexual activity: Not on file   Other Topics Concern    Not on file   Social History Narrative    Not on file     Family History          Problem Relation Age of Onset    Diabetes Mother     Thyroid Disease Mother     Arthritis Mother     High Blood Pressure Mother     Arthritis Father     High Blood Pressure Father     Other Father         hay fever    Breast Cancer Neg Hx     Ovarian Cancer Neg Hx      Sleep History    n/a  Meds    Current Medications    azithromycin  250 mg Oral QPM    lidocaine  2 patch Transdermal Daily    benzonatate  100 mg Oral TID    budesonide  0.5 mg Nebulization BID    cefTRIAXone (ROCEPHIN) IV  1 g Intravenous Q24H    albuterol  2.5 mg Nebulization Q4H WA    enoxaparin  40 mg Subcutaneous Daily    amLODIPine  10 mg Oral Daily    Vitamin D  5,000 Units Oral Every Other Day    lactobacillus  1 capsule Oral Daily    levothyroxine  75 mcg Oral Daily    cetirizine  10 mg Oral Daily    multivitamin  1 tablet Oral Daily    atorvastatin  10 mg Oral Daily    sodium chloride flush  10 mL Intravenous 2 times per day     magnesium hydroxide, diclofenac sodium, acetaminophen, sodium chloride flush, polyethylene glycol, promethazine **OR** ondansetron  IV Drips/Infusions    Vitals    Vitals    height is 5' 4\" (1.626 m) and weight is 155 lb 3.2 oz (70.4 kg). Her oral temperature is 98.1 °F (36.7 °C). Her blood pressure is 113/53 (abnormal) and her pulse is 82. Her respiration is 22 and oxygen saturation is 94%. O2 Flow Rate (L/min): 1 L/min  I/O    Intake/Output Summary (Last 24 hours) at 9/29/2020 1359  Last data filed at 9/29/2020 0914  Gross per 24 hour   Intake 806.1 ml   Output 0 ml   Net 806.1 ml     Patient Vitals for the past 96 hrs (Last 3 readings):   Weight   09/29/20 0330 155 lb 3.2 oz (70.4 kg)   09/28/20 0430 154 lb 6.4 oz (70 kg)   09/27/20 0530 161 lb 12.8 oz (73.4 kg)     Exam   Constitutional: Patient appears in no distress on 1LPM via nasal cannula. Mouth/Throat: Oropharynx is clear and moist.  No oral thrush. Neck: Neck supple. No JVD  Cardiovascular: Normal rate, regular rhythm, normal heart sounds. No murmur heard. Pulmonary/Chest: Effort normal with rhonchorous breath sounds. No stridor. No respiratory distress. No wheezes. Abdominal: Soft. Patient exhibits no distension. No tenderness. Musculoskeletal: Normal range of motion. No cyanosis or clubbing   Extremities: Patient exhibits no edema and no tenderness.   Neurological: Patient is alert and oriented to person, place, and time. Skin: Skin is warm and dry. Psychiatric: Patient  has a normal mood and affect. Labs   ABG  Lab Results   Component Value Date    PH 7.41 09/25/2020    PO2 61 09/25/2020    PCO2 39 09/25/2020    HCO3 25 09/25/2020    O2SAT 91 09/25/2020     Lab Results   Component Value Date    IFIO2 2 01/08/2020     CBC  Recent Labs     09/27/20  0548 09/28/20  0602   WBC 12.2* 10.1   RBC 4.86 4.40   HGB 13.4 12.1   HCT 41.7 39.3   MCV 85.8 89.3   MCH 27.6 27.5   MCHC 32.1* 30.8*    200   MPV 10.7 10.2      BMP  No results for input(s): NA, K, CL, CO2, BUN, CREATININE, GLUCOSE, MG, PHOS, CALCIUM, IONCA, MG in the last 72 hours. LFT  No results for input(s): AST, ALT, ALB, BILITOT, ALKPHOS, LIPASE in the last 72 hours. Invalid input(s): AMYLASE  TROP  Lab Results   Component Value Date    TROPONINT < 0.010 09/25/2020    TROPONINT < 0.010 01/06/2020    TROPONINT < 0.010 11/09/2016     BNP  Lab Results   Component Value Date    PROBNP 864.5 09/25/2020    PROBNP 506.4 01/06/2020    PROBNP 52.4 11/09/2016     D-Dimer  No results found for: DDIMER  Lactic Acid  No results for input(s): LACTA in the last 72 hours. INR  No results for input(s): INR, PROTIME in the last 72 hours. PTT  No results for input(s): APTT in the last 72 hours. Glucose  No results for input(s): POCGLU in the last 72 hours. UA No results for input(s): SPECGRAV, PHUR, COLORU, CLARITYU, MUCUS, PROTEINU, BLOODU, RBCUA, WBCUA, BACTERIA, NITRU, GLUCOSEU, BILIRUBINUR, UROBILINOGEN, KETUA, LABCAST, LABCASTTY, AMORPHOS in the last 72 hours. Invalid input(s): CRYSTALS. She underwent bronch on 1/8/2020:  Flexible diagnostic fiberoptic bronchoscopy with fluoroscopy. During procedure Bronch washings obtained from right tracheobronchial tree including right upper, middle and lower lobes. Bronchioalveolar lavage obtained from right lower lobe posterior segment.  Trans bronchial lung biopsies were performed by using biopsy ventricular systolic function is   hyperdynamic. EF = 77 ± 5% (2D biplane) Normal left ventricular diastolic   function.  - The right ventricle is normal in size. Right ventricular systolic function is   normal.  - The patient has not had a prior CC echocardiographic exam for comparison. Electronically signed by Trang Tabor MD on 8/2/2018 at 4:56:15 PM  Cultures    Procalcitonin  Lab Results   Component Value Date    PROCAL 0.13 09/26/2020    PROCAL 0.12 09/25/2020     SARS-CoV-2, NAAT  NOT DETECTED  NOT DETECTED  Final  09/25/2020  6:16 PM      Blood Culture, Routine  09/25/2020  6:29 PM  [address] [facility]    No growth-preliminary      Blood Culture, Routine  09/25/2020  6:48 PM  [address] [facility]    No growth-preliminary      Strep Pneumo antigen- (-)  Legionella antigen- (-)    MRSA PCR- negative    Radiology    CXR  9/25/2020   IMPRESSION:    Right upper and to a lesser extent bibasilar coarsened mixed airspace and interstitial opacities. Appearance is similar to prior with likely a background of chronic interstitial changes. Superimposed acute infiltrate is not excluded. Correlation with    symptoms and follow-up as clinically warranted. CT Scans  Ct chest high resolution 8/25/2020      Impression    1. Irregular groundglass and confluent densities are demonstrated within the periphery of the right lung left middle and upper lobes. This is most pronounced within the right apex. This may represent sequela from pulmonary fibrosis         2. There is right upper lobe bronchiectasis demonstrated.         **This report has been created using voice recognition software.  It may contain minor errors which are inherent in voice recognition technology. **         Final report electronically signed by Dr. Wendy Perera on 8/25/2020 1:44 PM                       9/28/2020   MRI SHOULDER LEFT WO CONTRAST   1. There is tendinosis of the supraspinatus tendon.  There is no partial or full-thickness tear of the rotator cuff. 2. There is tendinosis of the intra-articular portion of the long head of the biceps tendon. There is fluid within the long head of the biceps tendon sheath which is larger than expected for the amount of fluid within the glenohumeral articulation consistent with a tenosynovitis. 3. There is abnormal signal within and abnormal morphologic appearance of the superior glenoid labrum consistent with a superior glenoid labral tear, anterior to posterior. 4. There is abnormal morphologic appearance of the superior aspect of the posterior glenoid labrum consistent with a tear. The inferior aspect of the posterior glenoid labrum is intact and unremarkable. 5. There is a small amount of fluid within the glenohumeral articulation which is septated within the subscapularis recess consistent with a synovitis. 6. There is a small amount of fluid within the subacromial-subdeltoid bursa which may be reactive or related to a bursitis. (See actual reports for details)    Assessment   Acute hypoxic respiratory failure-patient requiring 2 L O2 via nasal cannula. Likely secondary to acute exacerbation of bronchiectasis. Bronchiectasis- secondary to MAC infection in 2017. Follows with centers of pulmonary medicine as well as [facility]. Likely in acute exacerbation secondary to possible pneumonia versus medication induced versus worsening pulmonary fibrosis. Patient is afebrile and denies changes in sputum production. On nebulized albuterol, Zithromax and cefepime for possible pneumonia. Pulmonary fibrosis-likely secondary to MAC infection in 2017. Following at the [facility]. She is currently on nebulized albuterol and nebulized saline at home. per chart review their recommendations were to trial inhaled corticosteroids. Patient was tried on Flovent resulting in coughing fits. Will switch to nebulized corticosteroid.   Eventually patient will most likely require pulmonary rehab. History of Mycobacterium avium complex in 2017  Hypertension  Dyslipidemia  Osteoarthritis  Full Code     Recommendations   -Continue nebulized albuterol every 4 hours as needed  -All cultures (-)  -ATB per ID  -Continue pulmicort 0.5mg via neb bid. She did not tolerated Flovent inhaler with worsening of cough. Pulmicort was ran through patients outside pharmacy and was covered will be available for patient after returning home from [facility]  -ContinueTessalon 100mg po tid for cough. Shaan Keen educated about my impression and plan. She verbalizes understanding.  - Needs home O2 eval at the time of discharge  -Continue incentive spirometer and Acapella  -Ortho now following for left shoulder pain   -PT/OT ordered. -DNR CCA now  -Plan is ECF tomorrow     -Follow up at [address], [address] for Pulmonary Medicine with Loreta Rebolledo CNP in 1 months with CXR 2-view 1-2 days prior to appt- [facility]   -Will keep appt with Kb Tobias CNP on 12/7/20 @ 1:30 PM with PFT scheduled for 12/3/20 @ 1:15 PM    Case discussed with nurse and patient/family. Questions and concerns addressed. Meds and Orders reviewed. Electronically signed by   Lang Crigler, APRN - CNP on 9/29/2020 at 1:59 PM    Addendum by Dr. Jose Atkins MD:  I have seen and examined the patient independently. Face to face evaluation and examination was performed. The above evaluation and note has been reviewed. Labs and radiographs were reviewed. I Have discussed with Ms. Lang Crigler, APRN- CNP about this patient in detail. The above assessment and plan has been reviewed. Please see my modifications mentioned below. My modifications:  She feels better. Improving cough. Follow up as above.     Phoebe Ramos MD 9/29/2020 4:27 PM

## 2020-09-29 NOTE — PROGRESS NOTES
2 times per day    azithromycin  500 mg Intravenous Q24H       magnesium hydroxide, diclofenac sodium, acetaminophen, sodium chloride flush, polyethylene glycol, promethazine **OR** ondansetron      LABS:     CBC:   Recent Labs     09/27/20  0548 09/28/20  0602   WBC 12.2* 10.1   HGB 13.4 12.1    200        CULTURES:   UA: No results for input(s): SPECGRAV, PHUR, COLORU, CLARITYU, MUCUS, PROTEINU, BLOODU, RBCUA, WBCUA, BACTERIA, NITRU, GLUCOSEU, BILIRUBINUR, UROBILINOGEN, KETUA, LABCAST, LABCASTTY, AMORPHOS in the last 72 hours. Invalid input(s): CRYSTALS  Micro:   Lab Results   Component Value Date    BC No growth-preliminary  09/25/2020          Problem list of patient:     Patient Active Problem List   Diagnosis Code    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 ([facility] N25.81    Hypercalcemia E83.52    Osteoarthritis M19.90    Non-toxic nodular goiter E04.9    Essential hypertension I10    Diverticulosis of colon K57.30    Hypokalemia E87.6    Hypothyroidism E03.9    Osteopenia M85.80    Bronchiectasis without complication (HCC) M72.7    Hyponatremia E87.1    Pure hypercholesterolemia E78.00    Left thyroid nodule E04.1    Hyperthyroidism E05.90    Lung infiltrate on CT R91.8    Angina pectoris (HCC) - Typical I20.9    Dyslipidemia E78.5    Elevated serum free T4 level R79.89    Elevated TSH R79.89    Nasal polyp J33.9    Abnormal CT of the chest R93.89    Vitamin D deficiency E55.9    Acute hypoxemic respiratory failure (HCC) J96.01    Pneumonia J18.9    Moderate malnutrition (HCC) E44.0    Pulmonary fibrosis (HCC) J84.10    SIRS (systemic inflammatory response syndrome) ([facility]) R65.10    Community acquired bacterial pneumonia J15.9         ASSESSMENT/PLAN   Pneumonia with underlying Bronchiectasis. Will arrange to go to [facility]. COVID-19 test prior to discharged to [facility].   Plan for discharge to [facility] tomorrow      Jacqueline Cobb MD, [address] 9/29/2020 9:56 AM

## 2020-09-29 NOTE — PROGRESS NOTES
 Vitamin D  5,000 Units Oral Every Other Day    lactobacillus  1 capsule Oral Daily    levothyroxine  75 mcg Oral Daily    cetirizine  10 mg Oral Daily    multivitamin  1 tablet Oral Daily    atorvastatin  10 mg Oral Daily    sodium chloride flush  10 mL Intravenous 2 times per day     Continuous Infusions:  PRN Meds:magnesium hydroxide, diclofenac sodium, acetaminophen, sodium chloride flush, polyethylene glycol, promethazine **OR** ondansetron        Allergies:  Levaquin [levofloxacin]; Percocet [oxycodone-acetaminophen]; Rifampin; Sulfa antibiotics; and Cefuroxime    OBJECTIVE:    Vitals:   Vitals:    09/29/20 1057   BP: (!) 113/53   Pulse: 82   Resp: 22   Temp: 98.1 °F (36.7 °C)   SpO2: 94%      BMI: Body mass index is 26.64 kg/m². PHYSICAL EXAMINATION:          General appearance:Ill looking elderly female ;  No apparent distress, appears stated age and cooperative. HEENT:  Normal cephalic, atraumatic without obvious deformity. Pupils equal, round, and reactive to light.  Extra ocular muscles intact. Conjunctivae/corneas clear. Neck: Supple   Respiratory:  Normal respiratory effort., bilateral basilar crackles. Cardiovascular:  Regular rhythm with normal S1/S2 without ,rubs or gallops. Abdomen: Soft, nontender , non-distended with normal bowel sounds. Musculoskeletal:  No clubbing, cyanosis or edema bilaterally. Tender left shoulder to palpation and with abduction . Reduced ROM  Skin: Skin color, texture, turgor normal.  No rashes or lesions. Neurologic: Alert and oriented x 3, without any focal motor deficits. Psychiatric:   Thought content appropriate, normal insight    Review of Labs and Diagnostic Testing:    No results found for this or any previous visit (from the past 24 hour(s)).     Radiology:     Xr Shoulder Left (min 2 Views)    Result Date: 9/26/2020  PROCEDURE: XR SHOULDER LEFT (MIN 2 VIEWS) CLINICAL INFORMATION: Left shoulder pain TECHNIQUE: 4 views of the left shoulder COMPARISON: None FINDINGS: There is no fracture or dislocation. Joint spaces are preserved. No soft tissue or osseous abnormalities are identified. No fracture or dislocation. Final report electronically signed by Dr. Penelope Pierson on 9/26/2020 3:09 PM    Xr Chest Portable    Result Date: 9/25/2020  PROCEDURE: XR CHEST PORTABLE CLINICAL INFORMATION: SOB. COMPARISON: January 13, 2020. TECHNIQUE: Portable chest. FINDINGS: Right upper and to a lesser extent bibasilar coarsened mixed airspace and interstitial opacities. Appearance is similar to prior with likely a background of chronic interstitial changes. Superimposed acute findings are not excluded. There is prominent right hilum is redemonstrated. Ectatic thoracic aorta. Cardiomegaly. No significant pleural effusion. No acute osseous findings. IMPRESSION: Right upper and to a lesser extent bibasilar coarsened mixed airspace and interstitial opacities. Appearance is similar to prior with likely a background of chronic interstitial changes. Superimposed acute infiltrate is not excluded. Correlation with symptoms and follow-up as clinically warranted. **This report has been created using voice recognition software. It may contain minor errors which are inherent in voice recognition technology. ** Final report electronically signed by Dr. Yaquelin Macias on 9/25/2020 6:08 PM        ASSESMENT:      Active Problems:    Community acquired bacterial pneumonia  Resolved Problems:    * No resolved hospital problems. *  OTHER PROBLEMS:  Bronchiectasis   ([address]  Pulmonary fibrosis (HCC)  History of pulmonary Mycobacterium avium complex  Hypertension  Dyslipidemia   Osteoarthritis  Left shoulder injury      PLAN:  Continue bronchodilators, O2, broad spectrum antibiotics to cover community acquired organisms, ID / Pulmonary  service is following.  Has negative legionella and strep pneumonia urinary antigens, follow-up blood cultures, sputum gram stain c/s, Incentive spirometry , acapella, rapid influenza screen was negative. MRI left shoulder consistent with left glenoid labral tear,  Supraspinatus tendinosis and long head of the biceps tendon tenosynovitis,  Seen by orthopedic  Service,  May use diclofenac gel to the left shoulder.    Continue Lidoderm patch for now     Arrange for COVID-19 screen in anticipation for ECF discharge tomorrow    DVT prophylaxis: [x] Lovenox                                 [] SCDs                                 [] SQ Heparin                                 [] Encourage ambulation, low risk for DVT, no chemical or mechanical prophylaxis necessary              [] Already on Anticoagulation                Anticipated Disposition upon discharge: [x] Home                                                                         [] Home with Home Health                                                                         [] [facility]                                                                         [] [address]          Electronically signed by Geni Yang MD on 9/29/2020 at 12:40 PM

## 2020-09-29 NOTE — FLOWSHEET NOTE
[facility] 88 PROGRESS NOTE      Patient: Ricardo Garrido  Room #: 5O-96/265-O            YOB: 1935  Age: 80 y.o. Gender: female            Admit Date & Time: 9/25/2020  4:36 PM    Assessment:  Rona Knowles is an 81 yo  mother of 2 adult sons Michelle Wall and Mitzi Aguilar. One live in [place], the other in [place]. 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 She was  once for 27 years prior to divorce due to 's infidelity. She has not had other significant relationships since then . Her sons are as attentive and supportive as they can be from a distance and she definitely feels the loneliness since Daly Pena took hold. She does have a smart phone and computer for video calls \"but it isn't the same. \"   She is expecting discharge tomorrow to [facility] for rehab. \"I live alone . I just don';t think I;m ready to be by myself yet. \"    Consult for ADs. She does already have LW on file. She states she has HCPOA. She has DNR-CCA in place as of yesterday. Interventions:  Reviewed the LW; it is accurate in reflecting her wishes. Requested copy of HCPOA at her convenience. Spent some time listening to her story. Thanked her for her service to [employer] [facility]. Outcomes:  Patient was glad to know her ADs are in place and will get copy of POA to us. Plan:    1. No additional needs at his time.      Electronically signed by Liban Rodriguez on 9/29/2020 at 5:55 PM.  [address] [place] [address]  [phone]             09/29/20 1640   Encounter Summary   Services provided to: Patient   Referral/Consult From: Nurse   Continue Visiting Yes  (9/29)   Complexity of Encounter Moderate   Length of Encounter 45 minutes   Spiritual Assessment Completed Yes   Advance Care Planning Yes   Spiritual/Islam   Type Spiritual support   Assessment Approachable   Intervention Active listening;Explored feelings, thoughts, concerns;Nurtured hope;Prayer;Discussed illness/injury and it's impact; Discussed belief system/Yazdanism practices/gallo   Outcome Connection/belonging;Comfort;Expressed gratitude;Engaged in conversation;Expressed feelings/needs/concerns;Receptive; Hopeful   Advance Directives (For Healthcare)   Type of Healthcare Directive Living will   Copy in Chart Yes, copy in chart   Chart Copy Status : Active;Current; Reviewed   Date Reviewed and Current: 09/29/20

## 2020-09-30 PROBLEM — I48.92 NEW ONSET ATRIAL FLUTTER (HCC): Status: ACTIVE | Noted: 2020-09-30

## 2020-09-30 PROBLEM — J18.9 COMMUNITY ACQUIRED PNEUMONIA OF RIGHT LUNG: Status: ACTIVE | Noted: 2020-09-25

## 2020-09-30 PROBLEM — I48.3 TYPICAL ATRIAL FLUTTER (HCC): Status: ACTIVE | Noted: 2020-09-30

## 2020-09-30 LAB
ANION GAP SERPL CALCULATED.3IONS-SCNC: 9 MEQ/L (ref 8–16)
BUN BLDV-MCNC: 14 MG/DL (ref 7–22)
CALCIUM SERPL-MCNC: 10.3 MG/DL (ref 8.5–10.5)
CHLORIDE BLD-SCNC: 100 MEQ/L (ref 98–111)
CO2: 30 MEQ/L (ref 23–33)
CREAT SERPL-MCNC: 0.8 MG/DL (ref 0.4–1.2)
EKG ATRIAL RATE: 300 BPM
EKG P AXIS: 94 DEGREES
EKG Q-T INTERVAL: 370 MS
EKG QRS DURATION: 106 MS
EKG QTC CALCULATION (BAZETT): 432 MS
EKG R AXIS: 161 DEGREES
EKG T AXIS: 77 DEGREES
EKG VENTRICULAR RATE: 82 BPM
GFR SERPL CREATININE-BSD FRML MDRD: 68 ML/MIN/1.73M2
GLUCOSE BLD-MCNC: 118 MG/DL (ref 70–108)
LV EF: 55 %
LVEF MODALITY: NORMAL
MAGNESIUM: 2.1 MG/DL (ref 1.6–2.4)
POTASSIUM SERPL-SCNC: 5 MEQ/L (ref 3.5–5.2)
SODIUM BLD-SCNC: 139 MEQ/L (ref 135–145)
T4 FREE: 1.34 NG/DL (ref 0.93–1.76)
TROPONIN T: < 0.01 NG/ML
TSH SERPL DL<=0.05 MIU/L-ACNC: 4.17 UIU/ML (ref 0.4–4.2)

## 2020-09-30 PROCEDURE — 51798 US URINE CAPACITY MEASURE: CPT

## 2020-09-30 PROCEDURE — 83735 ASSAY OF MAGNESIUM: CPT

## 2020-09-30 PROCEDURE — 6370000000 HC RX 637 (ALT 250 FOR IP): Performed by: INTERNAL MEDICINE

## 2020-09-30 PROCEDURE — 99232 SBSQ HOSP IP/OBS MODERATE 35: CPT | Performed by: INTERNAL MEDICINE

## 2020-09-30 PROCEDURE — 93005 ELECTROCARDIOGRAM TRACING: CPT | Performed by: INTERNAL MEDICINE

## 2020-09-30 PROCEDURE — 36415 COLL VENOUS BLD VENIPUNCTURE: CPT

## 2020-09-30 PROCEDURE — 6360000002 HC RX W HCPCS: Performed by: INTERNAL MEDICINE

## 2020-09-30 PROCEDURE — 84484 ASSAY OF TROPONIN QUANT: CPT

## 2020-09-30 PROCEDURE — 2580000003 HC RX 258: Performed by: INTERNAL MEDICINE

## 2020-09-30 PROCEDURE — 80048 BASIC METABOLIC PNL TOTAL CA: CPT

## 2020-09-30 PROCEDURE — 93010 ELECTROCARDIOGRAM REPORT: CPT | Performed by: INTERNAL MEDICINE

## 2020-09-30 PROCEDURE — 2060000000 HC ICU INTERMEDIATE R&B

## 2020-09-30 PROCEDURE — 94760 N-INVAS EAR/PLS OXIMETRY 1: CPT

## 2020-09-30 PROCEDURE — 93306 TTE W/DOPPLER COMPLETE: CPT

## 2020-09-30 PROCEDURE — 2700000000 HC OXYGEN THERAPY PER DAY

## 2020-09-30 PROCEDURE — 94640 AIRWAY INHALATION TREATMENT: CPT

## 2020-09-30 PROCEDURE — 84443 ASSAY THYROID STIM HORMONE: CPT

## 2020-09-30 PROCEDURE — 99223 1ST HOSP IP/OBS HIGH 75: CPT | Performed by: NUCLEAR MEDICINE

## 2020-09-30 PROCEDURE — 84439 ASSAY OF FREE THYROXINE: CPT

## 2020-09-30 RX ORDER — ALBUTEROL SULFATE 2.5 MG/3ML
2.5 SOLUTION RESPIRATORY (INHALATION) EVERY 6 HOURS PRN
Status: DISCONTINUED | OUTPATIENT
Start: 2020-09-30 | End: 2020-10-02 | Stop reason: HOSPADM

## 2020-09-30 RX ADMIN — BUDESONIDE 500 MCG: 0.5 INHALANT RESPIRATORY (INHALATION) at 20:24

## 2020-09-30 RX ADMIN — CETIRIZINE HYDROCHLORIDE 10 MG: 10 TABLET ORAL at 08:40

## 2020-09-30 RX ADMIN — ENOXAPARIN SODIUM 70 MG: 80 INJECTION SUBCUTANEOUS at 23:21

## 2020-09-30 RX ADMIN — METOPROLOL TARTRATE 12.5 MG: 25 TABLET ORAL at 20:41

## 2020-09-30 RX ADMIN — BENZONATATE 100 MG: 100 CAPSULE ORAL at 20:40

## 2020-09-30 RX ADMIN — CEFTRIAXONE SODIUM 1 G: 1 INJECTION, POWDER, FOR SOLUTION INTRAMUSCULAR; INTRAVENOUS at 13:13

## 2020-09-30 RX ADMIN — AZITHROMYCIN DIHYDRATE 250 MG: 250 TABLET, FILM COATED ORAL at 17:48

## 2020-09-30 RX ADMIN — DICLOFENAC 4 G: 10 GEL TOPICAL at 08:43

## 2020-09-30 RX ADMIN — ATORVASTATIN CALCIUM 10 MG: 10 TABLET, FILM COATED ORAL at 08:41

## 2020-09-30 RX ADMIN — ALBUTEROL SULFATE 2.5 MG: 2.5 SOLUTION RESPIRATORY (INHALATION) at 07:41

## 2020-09-30 RX ADMIN — Medication 1 CAPSULE: at 08:40

## 2020-09-30 RX ADMIN — BENZONATATE 100 MG: 100 CAPSULE ORAL at 13:13

## 2020-09-30 RX ADMIN — Medication 10 ML: at 08:42

## 2020-09-30 RX ADMIN — ENOXAPARIN SODIUM 70 MG: 80 INJECTION SUBCUTANEOUS at 12:00

## 2020-09-30 RX ADMIN — AMLODIPINE BESYLATE 10 MG: 10 TABLET ORAL at 08:40

## 2020-09-30 RX ADMIN — BENZONATATE 100 MG: 100 CAPSULE ORAL at 08:41

## 2020-09-30 RX ADMIN — Medication 10 ML: at 20:40

## 2020-09-30 RX ADMIN — ALBUTEROL SULFATE 2.5 MG: 2.5 SOLUTION RESPIRATORY (INHALATION) at 12:40

## 2020-09-30 RX ADMIN — BUDESONIDE 500 MCG: 0.5 INHALANT RESPIRATORY (INHALATION) at 07:48

## 2020-09-30 RX ADMIN — LEVOTHYROXINE SODIUM 75 MCG: 75 TABLET ORAL at 06:33

## 2020-09-30 RX ADMIN — THERA TABS 1 TABLET: TAB at 08:40

## 2020-09-30 ASSESSMENT — PAIN SCALES - GENERAL
PAINLEVEL_OUTOF10: 0
PAINLEVEL_OUTOF10: 0

## 2020-09-30 NOTE — CARE COORDINATION
9/30/20, 10:01 AM EDT    DISCHARGE ON GOING EVALUATION    [facility] day: 5  [facility]: [facility] hospital24/024-A Reason for admit: Community acquired bacterial pneumonia [J15.9]   Procedure:   9/25 CXR  Right upper and to a lesser extent bibasilar coarsened mixed airspace and interstitial opacities. 9/28 MRI Left Shoulder  1. There is tendinosis of the supraspinatus tendon. There is no partial or full-thickness tear of the rotator cuff. 2. There is tendinosis of the intra-articular portion of the long head of the biceps tendon. There is fluid within the long head of the biceps tendon sheath which is larger than expected for the amount of fluid within the glenohumeral articulation   consistent with a tenosynovitis. 3. There is abnormal signal within and abnormal morphologic appearance of the superior glenoid labrum consistent with a superior glenoid labral tear, anterior to posterior. 4. There is abnormal morphologic appearance of the superior aspect of the posterior glenoid labrum consistent with a tear. The inferior aspect of the posterior glenoid labrum is intact and unremarkable. 5. There is a small amount of fluid within the glenohumeral articulation which is septated within the subscapularis recess consistent with a synovitis. 6. There is a small amount of fluid within the subacromial-subdeltoid bursa which may be reactive or related to a bursitis.    9/30 ECHO planned  Treatment Plan of Care:   Client admitted with Pneumonia (history Pulmonary Fibrosis/Bronchiectasis; Pulmonary/ID/Palliative Care following; Oxygen 1L continued  Barriers to Discharge: A-fib/flutter last 24h; monitor; await Cardiology input, IV AB continued  PCP: Nani Huertas MD  Readmission Risk Score: 14%  Patient Goals/Plan/Treatment Preferences:   lives alone, has nebulizer; [facility] in past, J[facility] CARRINGTON in past and plans this again when medically cleared, WBAT LUE; therapy following

## 2020-09-30 NOTE — PLAN OF CARE
Problem: Impaired respiratory status  Goal: Clear lung sounds  9/29/2020 2216 by Gabo Morton RCP  Outcome: Ongoing   Continue with Albuterol and Pulmicort.

## 2020-09-30 NOTE — PROGRESS NOTES
Progress note: Infectious diseases    Patient - Pebbles Burgess,  Age - 80 y.o.    - 1935      Room Number - 6U-98/740-F   MRN -  [id number]   Acct # - [de-identified]  Date of Admission -  2020  4:36 PM    SUBJECTIVE:   She had atrial fibrillation. OBJECTIVE   VITALS    height is 5' 4\" (1.626 m) and weight is 155 lb 3.2 oz (70.4 kg). Her oral temperature is 98.8 °F (37.1 °C). Her blood pressure is 107/58 (abnormal) and her pulse is 87. Her respiration is 20 and oxygen saturation is 92%.        Wt Readings from Last 3 Encounters:   20 155 lb 3.2 oz (70.4 kg)   20 153 lb 12.8 oz (69.8 kg)   20 162 lb (73.5 kg)       I/O (24 Hours)    Intake/Output Summary (Last 24 hours) at 2020 1244  Last data filed at 2020  Gross per 24 hour   Intake 330 ml   Output 0 ml   Net 330 ml       General Appearance  Awake, alert, oriented,  not  In acute distress  HEENT - normocephalic, atraumatic, pink conjunctiva,  anicteric sclera  Neck - Supple, no mass  Lungs -  Bilateral   air entry,+ rhonchi, no wheeze  Cardiovascular - Heart sounds are normal.     Abdomen - soft, not distended, nontender,   Neurologic -oriented  Skin - No bruising or bleeding  Extremities - No edema, no cyanosis, clubbing     MEDICATIONS:      metoprolol tartrate  12.5 mg Oral BID    enoxaparin  1 mg/kg Subcutaneous Q12H    magnesium replacement protocol   Other RX Placeholder    azithromycin  250 mg Oral QPM    lidocaine  2 patch Transdermal Daily    benzonatate  100 mg Oral TID    budesonide  0.5 mg Nebulization BID    cefTRIAXone (ROCEPHIN) IV  1 g Intravenous Q24H    albuterol  2.5 mg Nebulization Q4H WA    amLODIPine  10 mg Oral Daily    Vitamin D  5,000 Units Oral Every Other Day    lactobacillus  1 capsule Oral Daily    levothyroxine  75 mcg Oral Daily    cetirizine  10 mg Oral Daily    multivitamin  1 tablet Oral Daily    atorvastatin  10 mg Oral Daily    sodium chloride flush  10 mL Intravenous 2 times per day       magnesium hydroxide, diclofenac sodium, acetaminophen, sodium chloride flush, polyethylene glycol, promethazine **OR** ondansetron      LABS:     CBC:   Recent Labs     09/28/20  0602   WBC 10.1   HGB 12.1           CULTURES:   UA: No results for input(s): SPECGRAV, PHUR, COLORU, CLARITYU, MUCUS, PROTEINU, BLOODU, RBCUA, WBCUA, BACTERIA, NITRU, GLUCOSEU, BILIRUBINUR, UROBILINOGEN, KETUA, LABCAST, LABCASTTY, AMORPHOS in the last 72 hours. 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    Essential hypertension I10    Diverticulosis of colon K57.30    Hypokalemia E87.6    Hypothyroidism E03.9    Osteopenia M85.80    Bronchiectasis without complication (HCC) E03.0    Hyponatremia E87.1    Pure hypercholesterolemia E78.00    Left thyroid nodule E04.1    Hyperthyroidism E05.90    Lung infiltrate on CT R91.8    Angina pectoris (HCC) - Typical I20.9    Dyslipidemia E78.5    Elevated serum free T4 level R79.89    Elevated TSH R79.89    Nasal polyp J33.9    Abnormal CT of the chest R93.89    Vitamin D deficiency E55.9    Acute hypoxemic respiratory failure (HCC) J96.01    Pneumonia J18.9    Moderate malnutrition (HCC) E44.0    Pulmonary fibrosis (HCC) J84.10    SIRS (systemic inflammatory response syndrome) (HCC) R65.10    Community acquired bacterial pneumonia J15.9    New onset atrial flutter (HCC) I48.92         ASSESSMENT/PLAN   Pneumonia with underlying Bronchiectasis.   Atrial fibrillation  Continue current treatment  Simone Bennett MD, FACP 9/30/2020 12:44 PM

## 2020-09-30 NOTE — CARE COORDINATION
9/30/20, 9:13 AM EDT    DISCHARGE PLANNING EVALUATION      TASH spoke to Cindy Baugh with [facility] admissions, gave clinical update, likely no discharge today

## 2020-09-30 NOTE — PLAN OF CARE
Problem: Impaired respiratory status  Goal: Clear lung sounds  Outcome: Ongoing   Breath sounds clear and diminished, continuing albuterol as ordered.

## 2020-09-30 NOTE — PROGRESS NOTES
INTERNAL MEDICINE SPECIALTIES  Progress Note For Dr Sha Dey       Patient:  Kiran Granger  YOB: 1935  Date of Service: 9/30/2020  MRN: [id number]   Acct:  [de-identified]   Primary Care Physician: Sukhdev Lopez MD    SUBJECTIVE:    Developed atrial flutter with variable conduction and controlled rate during the night. Now back in normal sinus rhythm        Home Medications:   No current facility-administered medications on file prior to encounter.       Current Outpatient Medications on File Prior to Encounter   Medication Sig Dispense Refill    amLODIPine (NORVASC) 10 MG tablet TAKE 1 TABLET BY MOUTH  DAILY 90 tablet 3    levothyroxine (SYNTHROID) 75 MCG tablet take 1 tablet by mouth once daily 90 tablet 3    simvastatin (ZOCOR) 20 MG tablet TAKE 1 TABLET BY MOUTH  NIGHTLY 90 tablet 3    albuterol (PROVENTIL) (2.5 MG/3ML) 0.083% nebulizer solution Take 3 mLs by nebulization every 6 hours as needed for Wheezing or Shortness of Breath 120 each 3    loratadine (CLARITIN) 10 MG capsule Take 10 mg by mouth daily       Lactobacillus (PROBIOTIC ACIDOPHILUS) CAPS Take 1 capsule by mouth daily       Cholecalciferol (VITAMIN D3) 5000 units TABS Take 5,000 Units by mouth every other day       MULTIPLE VITAMIN PO Take 1 tablet by mouth daily       Acetylcysteine (NAC) 500 MG CAPS Take 600 mg by mouth 2 times daily      amoxicillin (AMOXIL) 500 MG capsule 4 pills 1 hr before dental procedures 4 capsule 3    albuterol sulfate HFA (PROAIR HFA) 108 (90 Base) MCG/ACT inhaler Inhale 2 puffs into the lungs every 6 hours as needed for Wheezing or Shortness of Breath 1 Inhaler 11    sodium chloride, Inhalant, 7 % nebulizer solution Take 4 mLs by nebulization 2 times daily            Scheduled Meds:   metoprolol tartrate  12.5 mg Oral BID    azithromycin  250 mg Oral QPM    lidocaine  2 patch Transdermal Daily    benzonatate  100 mg Oral TID    budesonide  0.5 mg Nebulization BID    cefTRIAXone Collection Time: 09/30/20  2:45 AM   Result Value Ref Range    Ventricular Rate 82 BPM    Atrial Rate 300 BPM    QRS Duration 106 ms    Q-T Interval 370 ms    QTc Calculation (Bazett) 432 ms    P Axis 94 degrees    R Axis 161 degrees    T Axis 77 degrees   TSH with Reflex    Collection Time: 09/30/20  4:42 AM   Result Value Ref Range    TSH 4.170 0.400 - 4.200 uIU/mL   T4, Free    Collection Time: 09/30/20  4:42 AM   Result Value Ref Range    T4 Free 1.34 0.93 - 1.76 ng/dL       Radiology:     Xr Shoulder Left (min 2 Views)    Result Date: 9/26/2020  PROCEDURE: XR SHOULDER LEFT (MIN 2 VIEWS) CLINICAL INFORMATION: Left shoulder pain TECHNIQUE: 4 views of the left shoulder COMPARISON: None FINDINGS: There is no fracture or dislocation. Joint spaces are preserved. No soft tissue or osseous abnormalities are identified. No fracture or dislocation. Final report electronically signed by Dr. Amelie Storm on 9/26/2020 3:09 PM    Xr Chest Portable    Result Date: 9/25/2020  PROCEDURE: XR CHEST PORTABLE CLINICAL INFORMATION: SOB. COMPARISON: January 13, 2020. TECHNIQUE: Portable chest. FINDINGS: Right upper and to a lesser extent bibasilar coarsened mixed airspace and interstitial opacities. Appearance is similar to prior with likely a background of chronic interstitial changes. Superimposed acute findings are not excluded. There is prominent right hilum is redemonstrated. Ectatic thoracic aorta. Cardiomegaly. No significant pleural effusion. No acute osseous findings. IMPRESSION: Right upper and to a lesser extent bibasilar coarsened mixed airspace and interstitial opacities. Appearance is similar to prior with likely a background of chronic interstitial changes. Superimposed acute infiltrate is not excluded. Correlation with symptoms and follow-up as clinically warranted. **This report has been created using voice recognition software.  It may contain minor errors which are inherent in voice recognition [x] [facility]                                                                         [] [address]          Electronically signed by Trang Trammell MD on 9/30/2020 at 8:06 AM

## 2020-09-30 NOTE — PROGRESS NOTES
[facility]    Patient - Glenn Juan   MRN -  [id number]   [facility]   - 1935      Date of Admission -  2020  4:36 PM  Date of evaluation -  2020  Room - [facility] Day - 5  Consulting - Ronn Gant MD Primary Care Physician - Lux Markham MD   Chief Complaint   Shortness of breath and cough  Active Hospital Problem List      Active Hospital Problems    Diagnosis Date Noted    New onset atrial flutter [facility]) [I48.92] 2020    Community acquired bacterial pneumonia [J15.9]      HPI   Glenn Juan is a 80 y.o. female admitted for possible Pneumonia. Patient is an 80year old female who presented to [address] [place] [address] ED with 3 day hx of worsening shortness of breath. She states that her shortness of breath started after receiving flovent from our pulmonary office and using it for 2 days. She states that after using the flovent she had a coughing spell that lasted 30 minutes, the next day she had a coughing spell that lasted for 40 minutes after using the flovent. She states that since that time she feels that her breathing has been worse and is not improving. She denies any cough currently. She denies sputum production, fevers, chills, night sweats. Patient has a significant hx of pulmonary disease including Bronchiectasis, Hx of MAC infection, and Pulmonary fibrosis which is followed here in [facility] with Dr. Seble Lay as well as a the [facility]. Per chart review, patient was [place] [facility] and was given oral prednisone for bronchiectasis with the intent of trying ICS if symptoms improved with oral steroids. Per patient her breathing was the best it has ever been while taking the prednisone. Pulmonary medicine was consulted for further evaluation of patients breathing and possible pneumonia. No prior smoking history.     Subjective/Events Past 24 hours/ROS   - patient had event of atrial flutter overnight and cardiology was consulted  - Planned for Echo today  - Patient is on 1LPM via NC with SpO2=95%  - Patient stated that her shortness of breath and cough seem to improving as the days progress  - DNR-CCA  -All cultures are negative to date    Rest of the body systems were reviewed. Past Medical History         Diagnosis Date    Anemia     normal on pre-op 2012 and 13    Backache     h/o    Bronchiectasis [facility])     Bursitis     bilateral shoulders    Constipation     Diverticulosis of colon     HTN (hypertension)     Hypercalcemia     slightly elevated at 10.8 pre-op 13    Hyperlipidemia     Nodular goiter     bx negative    OA (osteoarthritis)     bilateral thumbs - sees Dr. Adriana Fields Osteopenia 2013    Parathyroid adenoma 2013    Pneumonia of right upper lobe due to infectious organism ([address]     Pneumonitis     Dr. Roberto Miner in 2010 - bx negative    Pulmonary Mycobacterium avium complex (MAC) infection ([address]     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 ([address]     had one parathyroid removed - now follows with Dr. Edwardo Wade Sinusitis     with seasonal allergies    Vitamin D deficiency 2018      Past Surgical History           Procedure Laterality Date    ABDOMINAL EXPLORATION SURGERY  2013    Release of SBO, KATHERINE-Dr. Chris Colin    APPENDECTOMY      BRONCHOSCOPY N/A 2020    BRONCHOSCOPY FLUOROSCOPY performed by Claire Pablo MD at [address] WITH IMPLANT Bilateral  ?      SECTION  1688,9475    DILATION AND CURETTAGE OF UTERUS  4962,[id number],8717    EXCISION OF PARATHYROID MASS Right 2013    rt parathyroidectomy (excision of rt inferior parathyroid mass) exploration of neck     FOOT SURGERY      left    FOOT SURGERY Left 2017    Dr Coello Borne  12    left knee    JOINT REPLACEMENT  2014    right knee    MALIGNANT SKIN LESION EXCISION Left 06/12/2017    BCC DORSAL FOOT WITH GRAFT & FS    GOSIA AND BSO  1982    TONSILLECTOMY AND ADENOIDECTOMY  1940 ?  TOTAL KNEE ARTHROPLASTY Right January 6th, 2014    OIO     Diet    DIET CARDIAC; Allergies    Levaquin [levofloxacin]; Percocet [oxycodone-acetaminophen]; Rifampin; Sulfa antibiotics; and Cefuroxime  Social History     Social History     Socioeconomic History    Marital status:      Spouse name: Not on file    Number of children: Not on file    Years of education: Not on file    Highest education level: Not on file   Occupational History    Occupation: retired   Social Needs    Financial resource strain: Not on file    Food insecurity     Worry: Not on file     Inability: Not on file   [employer] needs     Medical: Not on file     Non-medical: Not on file   Tobacco Use    Smoking status: Never Smoker    Smokeless tobacco: Never Used   Substance and Sexual Activity    Alcohol use:  Yes     Alcohol/week: 0.0 standard drinks     Comment: socially-1 glassof wine 2-3 times a month    Drug use: No    Sexual activity: Never   Lifestyle    Physical activity     Days per week: Not on file     Minutes per session: Not on file    Stress: Not on file   Relationships    Social connections     Talks on phone: Not on file     Gets together: Not on file     Attends [employer]ism service: Not on file     Active member of club or organization: Not on file     Attends meetings of clubs or organizations: Not on file     Relationship status: Not on file    Intimate partner violence     Fear of current or ex partner: Not on file     Emotionally abused: Not on file     Physically abused: Not on file     Forced sexual activity: Not on file   Other Topics Concern    Not on file   Social History Narrative    Not on file     Family History          Problem Relation Age of Onset    Diabetes Mother     Thyroid Disease Mother     Arthritis Mother     High Blood Pressure Mother     Arthritis Mouth/Throat:      Mouth: Mucous membranes are moist.      Pharynx: Oropharynx is clear. Eyes:      Conjunctiva/sclera: Conjunctivae normal.      Pupils: Pupils are equal, round, and reactive to light. Neck:      Musculoskeletal: Normal range of motion and neck supple. Cardiovascular:      Rate and Rhythm: Normal rate and regular rhythm. Pulses: Normal pulses. Heart sounds: Normal heart sounds. Pulmonary:      Effort: Pulmonary effort is normal.      Breath sounds: Normal breath sounds. Abdominal:      General: Bowel sounds are normal.      Palpations: Abdomen is soft. Musculoskeletal: Normal range of motion. Skin:     General: Skin is warm and dry. Capillary Refill: Capillary refill takes less than 2 seconds. Neurological:      General: No focal deficit present. Mental Status: She is alert and oriented to person, place, and time. Mental status is at baseline. Psychiatric:         Mood and Affect: Mood normal.         Behavior: Behavior normal.         Thought Content: Thought content normal.         Judgment: Judgment normal.           Labs   ABG  Lab Results   Component Value Date    PH 7.41 09/25/2020    PO2 61 09/25/2020    PCO2 39 09/25/2020    HCO3 25 09/25/2020    O2SAT 91 09/25/2020     Lab Results   Component Value Date    IFIO2 2 01/08/2020     CBC  Recent Labs     09/28/20  0602   WBC 10.1   RBC 4.40   HGB 12.1   HCT 39.3   MCV 89.3   MCH 27.5   MCHC 30.8*      MPV 10.2      BMP  No results for input(s): NA, K, CL, CO2, BUN, CREATININE, GLUCOSE, MG, PHOS, CALCIUM, IONCA, MG in the last 72 hours. LFT  No results for input(s): AST, ALT, ALB, BILITOT, ALKPHOS, LIPASE in the last 72 hours. Invalid input(s):   AMYLASE  TROP  Lab Results   Component Value Date    TROPONINT < 0.010 09/25/2020    TROPONINT < 0.010 01/06/2020    TROPONINT < 0.010 11/09/2016     BNP  Lab Results   Component Value Date    PROBNP 864.5 09/25/2020    PROBNP 506.4 01/06/2020 PROBNP 52.4 11/09/2016     D-Dimer  No results found for: DDIMER  Lactic Acid  No results for input(s): LACTA in the last 72 hours. INR  No results for input(s): INR, PROTIME in the last 72 hours. PTT  No results for input(s): APTT in the last 72 hours. Glucose  No results for input(s): POCGLU in the last 72 hours. UA No results for input(s): SPECGRAV, PHUR, COLORU, CLARITYU, MUCUS, PROTEINU, BLOODU, RBCUA, WBCUA, BACTERIA, NITRU, GLUCOSEU, BILIRUBINUR, UROBILINOGEN, KETUA, LABCAST, LABCASTTY, AMORPHOS in the last 72 hours. Invalid input(s): CRYSTALS. She underwent bronch on 1/8/2020:  Flexible diagnostic fiberoptic bronchoscopy with fluoroscopy. During procedure Bronch washings obtained from right tracheobronchial tree including right upper, middle and lower lobes. Bronchioalveolar lavage obtained from right lower lobe posterior segment. Trans bronchial lung biopsies were performed by using biopsy forceps from right lower lobe posterior segment under fluroscopy guidance. Cytology brush specimen was obtained from right lower lobe posterior segment under fluroscopy guidance. Microbiology brush specimen was obtained from right lower lobe posterior segment under fluroscopy guidence. Connective tissue work up results:  Date: 4/03/2017  SABAS( Antinuclear antibody):  Negative-none detected.    Fungal serologies- Negative                   RA ( Rheumatoid factor):  Negative. <10                                                          ACE(  level):    Negative-22                               ESR ( Sed rate): 9               Connective Tissue Labs  Date: 1/11/19     SABAS with reflex-None detected.   Anti RNP-1- Normal.  Anti Lexie-0- normal  Anti-SSB-0- normal  AntiSSA-1- normal  AntiCentromere- 4- Normal  Scleroderma AB-24-normal  ACE-10- normal  ESR-101 elevated  Rheumatoid Factor-13 WNL                  Bronch Cultures   Fungus-No yeast or hyphae  Respiratory culture-No organisms observed  AFB-Negative on concentrated smear, Final pending  Pneumocystis-Negative  Legionella-Negative  Anaerobic/aerobic-No prelim growth  PAFs-Negative     Bronch and biopsy:   Biopsy-   FINAL DIAGNOSIS:  Right lower posterior segment of lung, transbronchial biopsy:   Chronic bronchial inflammation.   Negative for malignancy.      Cytology-   FINAL RESULTS:  A. Lung, right lower lobe posterior subsegment, bronchoalveolar lavage:   Abundant acute inflammation.   No malignant cells seen. B. Lung, right tracheobronchial tree, washings:   Abundant acute inflammation and scattered benign bronchial epithelium.   No malignant cells seen. C. Lung, right lower lobe posterior segment brush tip, brushing:   Benign bronchial epithelium and acute inflammation.   No malignant cells seen.       PFTs         Echo    ECHO completed in 2018 at [facility]  CONCLUSIONS:  - Exam indication: Shortness of Breath  - The left ventricle is normal in size. Left ventricular systolic function is   hyperdynamic. EF = 77 ± 5% (2D biplane) Normal left ventricular diastolic   function.  - The right ventricle is normal in size. Right ventricular systolic function is   normal.  - The patient has not had a prior  echocardiographic exam for comparison. Electronically signed by Jose Angel Deluna MD on 8/2/2018 at 4:56:15 PM  Cultures    Procalcitonin  Lab Results   Component Value Date    PROCAL 0.13 09/26/2020    PROCAL 0.12 09/25/2020     SARS-CoV-2, NAAT  NOT DETECTED  NOT DETECTED  Final  09/25/2020  6:16 PM      Blood Culture, Routine  09/25/2020  6:29 PM  [facility]    No growth-preliminary      Blood Culture, Routine  09/25/2020  6:48 PM  [facility]    No growth-preliminary      Strep Pneumo antigen- (-)  Legionella antigen- (-)    MRSA PCR- negative    Radiology    CXR  9/25/2020   IMPRESSION:    Right upper and to a lesser extent bibasilar coarsened mixed airspace and interstitial opacities.  Appearance is similar to prior with details)    Assessment   New onset atrial flutter - Patient experienced atrial flutter overnight (9/29/2020). EKG obtained cardiology was consulted, PCP was notified, and ECHO ordered. Patient had previous oncologic stress test on 11/9/2016 which was unremarkable. Patient also had 2D echocardiogram at that time which showed EF equal to 55 to 65% with impaired relaxation compatible with diastolic dysfunction. Patient had follow-up ECHO on in 2018 at [facility] which revealed EF=77%. HFpEF - ECHO 11/9/2016 with EF 55 to 04% and diastolic dysfunction. Acute hypoxic respiratory failure - Patient was requiring 2 L of oxygen via nasal cannula to maintain adequate oxygen saturation. The patient does not use oxygen at home. This is likely secondary to exacerbation of bronchiectasis. Bronchiectasis - Secondary to past history of MAC infection in 2017. The patient follows with centers of pulmonary medicine as well as the [facility]. Etiology of exacerbation is unclear but likely secondary to pneumonia versus medication induced versus worsening pulmonary fibrosis. Initiated on azithromycin, cefepime, and ceftriaxone. Pulmonary fibrosis -patient follows with [facility] with a suspected etiology secondary to MAC infection that occurred in 2017. She is currently on nebulized albuterol and saline at home. Patient trialed Flovent at home which resulted in excessive coughing leading to discontinuation. Hx of MAC infection: 2017  Hx of hypertension  Hx of dyslipidemia  Hx of osteoarthritis    Recommendations   - Continue nebulized albuterol as needed.   - Antibiotics per infectious disease  - Continue Pulmicort 0.5 mg via nebulizer twice daily.  -Tessalon 100 mg by mouth 3 times daily for cough  - Home O2 evaluation at the time of discharge will be needed  - Continue encouragement of incentive spirometry and Acapella  - Await recommendations from cardiology consult today  - Plan is for ECF upon discharge    -Follow up at [facility] [address] for Pulmonary Medicine with Steve Osei CNP in 1 months with CXR 2-view 1-2 days prior to appt- testing ordered in [address] [place] [address] Rd   -Will keep appt with Riccardo Olmstead CNP on 12/7/20 @ 1:30 PM with PFT scheduled for 12/3/20 @ 1:15 PM    Case discussed with nurse and patient/family. Questions and concerns addressed. Meds and Orders reviewed.     Electronically signed by Katia Sullivan DO on 9/30/2020 at 9:13 AM

## 2020-10-01 LAB
BLOOD CULTURE, ROUTINE: NORMAL
BLOOD CULTURE, ROUTINE: NORMAL

## 2020-10-01 PROCEDURE — 97166 OT EVAL MOD COMPLEX 45 MIN: CPT

## 2020-10-01 PROCEDURE — 2700000000 HC OXYGEN THERAPY PER DAY

## 2020-10-01 PROCEDURE — 6370000000 HC RX 637 (ALT 250 FOR IP): Performed by: INTERNAL MEDICINE

## 2020-10-01 PROCEDURE — 94760 N-INVAS EAR/PLS OXIMETRY 1: CPT

## 2020-10-01 PROCEDURE — APPSS30 APP SPLIT SHARED TIME 16-30 MINUTES: Performed by: NURSE PRACTITIONER

## 2020-10-01 PROCEDURE — 6360000002 HC RX W HCPCS: Performed by: INTERNAL MEDICINE

## 2020-10-01 PROCEDURE — 99232 SBSQ HOSP IP/OBS MODERATE 35: CPT | Performed by: INTERNAL MEDICINE

## 2020-10-01 PROCEDURE — 97535 SELF CARE MNGMENT TRAINING: CPT

## 2020-10-01 PROCEDURE — 6370000000 HC RX 637 (ALT 250 FOR IP): Performed by: PHYSICIAN ASSISTANT

## 2020-10-01 PROCEDURE — 2580000003 HC RX 258: Performed by: INTERNAL MEDICINE

## 2020-10-01 PROCEDURE — 99232 SBSQ HOSP IP/OBS MODERATE 35: CPT | Performed by: PHYSICIAN ASSISTANT

## 2020-10-01 PROCEDURE — 94640 AIRWAY INHALATION TREATMENT: CPT

## 2020-10-01 PROCEDURE — 2060000000 HC ICU INTERMEDIATE R&B

## 2020-10-01 RX ORDER — AMLODIPINE BESYLATE 5 MG/1
5 TABLET ORAL DAILY
Status: DISCONTINUED | OUTPATIENT
Start: 2020-10-02 | End: 2020-10-02 | Stop reason: HOSPADM

## 2020-10-01 RX ADMIN — ACETAMINOPHEN 650 MG: 325 TABLET ORAL at 23:18

## 2020-10-01 RX ADMIN — Medication 10 ML: at 20:20

## 2020-10-01 RX ADMIN — Medication 10 ML: at 07:33

## 2020-10-01 RX ADMIN — DICLOFENAC 4 G: 10 GEL TOPICAL at 23:19

## 2020-10-01 RX ADMIN — BUDESONIDE 500 MCG: 0.5 INHALANT RESPIRATORY (INHALATION) at 17:43

## 2020-10-01 RX ADMIN — ENOXAPARIN SODIUM 70 MG: 80 INJECTION SUBCUTANEOUS at 11:11

## 2020-10-01 RX ADMIN — CEFTRIAXONE SODIUM 1 G: 1 INJECTION, POWDER, FOR SOLUTION INTRAMUSCULAR; INTRAVENOUS at 13:04

## 2020-10-01 RX ADMIN — ENOXAPARIN SODIUM 70 MG: 80 INJECTION SUBCUTANEOUS at 23:18

## 2020-10-01 RX ADMIN — ATORVASTATIN CALCIUM 10 MG: 10 TABLET, FILM COATED ORAL at 07:32

## 2020-10-01 RX ADMIN — ALBUTEROL SULFATE 2.5 MG: 2.5 SOLUTION RESPIRATORY (INHALATION) at 09:58

## 2020-10-01 RX ADMIN — BENZONATATE 100 MG: 100 CAPSULE ORAL at 13:04

## 2020-10-01 RX ADMIN — Medication 5000 UNITS: at 07:32

## 2020-10-01 RX ADMIN — BENZONATATE 100 MG: 100 CAPSULE ORAL at 20:20

## 2020-10-01 RX ADMIN — AMLODIPINE BESYLATE 10 MG: 10 TABLET ORAL at 07:32

## 2020-10-01 RX ADMIN — Medication 1 CAPSULE: at 07:32

## 2020-10-01 RX ADMIN — BENZONATATE 100 MG: 100 CAPSULE ORAL at 07:32

## 2020-10-01 RX ADMIN — THERA TABS 1 TABLET: TAB at 07:32

## 2020-10-01 RX ADMIN — AZITHROMYCIN DIHYDRATE 250 MG: 250 TABLET, FILM COATED ORAL at 17:41

## 2020-10-01 RX ADMIN — BUDESONIDE 500 MCG: 0.5 INHALANT RESPIRATORY (INHALATION) at 10:04

## 2020-10-01 RX ADMIN — LEVOTHYROXINE SODIUM 75 MCG: 75 TABLET ORAL at 07:19

## 2020-10-01 RX ADMIN — CETIRIZINE HYDROCHLORIDE 10 MG: 10 TABLET ORAL at 07:32

## 2020-10-01 RX ADMIN — METOPROLOL TARTRATE 12.5 MG: 25 TABLET ORAL at 20:21

## 2020-10-01 ASSESSMENT — PAIN DESCRIPTION - PROGRESSION: CLINICAL_PROGRESSION: GRADUALLY WORSENING

## 2020-10-01 ASSESSMENT — PAIN - FUNCTIONAL ASSESSMENT: PAIN_FUNCTIONAL_ASSESSMENT: ACTIVITIES ARE NOT PREVENTED

## 2020-10-01 ASSESSMENT — PAIN SCALES - GENERAL
PAINLEVEL_OUTOF10: 0
PAINLEVEL_OUTOF10: 0
PAINLEVEL_OUTOF10: 3
PAINLEVEL_OUTOF10: 0

## 2020-10-01 ASSESSMENT — PAIN DESCRIPTION - DESCRIPTORS: DESCRIPTORS: ACHING;DISCOMFORT

## 2020-10-01 ASSESSMENT — PAIN DESCRIPTION - ONSET: ONSET: GRADUAL

## 2020-10-01 ASSESSMENT — PAIN DESCRIPTION - PAIN TYPE: TYPE: ACUTE PAIN

## 2020-10-01 ASSESSMENT — PAIN DESCRIPTION - ORIENTATION: ORIENTATION: LEFT

## 2020-10-01 ASSESSMENT — PAIN DESCRIPTION - FREQUENCY: FREQUENCY: INTERMITTENT

## 2020-10-01 ASSESSMENT — PAIN DESCRIPTION - LOCATION: LOCATION: SHOULDER

## 2020-10-01 NOTE — CARE COORDINATION
9/30/20, 10:01 AM EDT    DISCHARGE ON GOING [address] [address] Memory day: 6  Location: [facility]24/024-A Reason for admit: Community acquired bacterial pneumonia [J15.9]   Procedure:   9/28 MRI Left Shoulder  1. There is tendinosis of the supraspinatus tendon. There is no partial or full-thickness tear of the rotator cuff. 2. There is tendinosis of the intra-articular portion of the long head of the biceps tendon. There is fluid within the long head of the biceps tendon sheath which is larger than expected for the amount of fluid within the glenohumeral articulation   consistent with a tenosynovitis. 3. There is abnormal signal within and abnormal morphologic appearance of the superior glenoid labrum consistent with a superior glenoid labral tear, anterior to posterior. 4. There is abnormal morphologic appearance of the superior aspect of the posterior glenoid labrum consistent with a tear. The inferior aspect of the posterior glenoid labrum is intact and unremarkable. 5. There is a small amount of fluid within the glenohumeral articulation which is septated within the subscapularis recess consistent with a synovitis. 6. There is a small amount of fluid within the subacromial-subdeltoid bursa which may be reactive or related to a bursitis. 9/30 ECHO    Ejection fraction is visually estimated at 55%. Overall left ventricular function is normal.   Aortic valve appears tricuspid. Aortic valve leaflets are somewhat thickened. Aortic valve leaflets are Mildly calcified. There is a small localized posterior pericardial effusion noted.    Treatment Plan of Care:   Client admitted with Pneumonia (history Pulmonary Fibrosis/Bronchiectasis; Pulmonary/ID/Palliative Care following; Oxygen 1L continued  Barriers to Discharge: A-fib/flutter and pauses, longest pause 3.96 seconds last 24h per report-discussed at rounds; monitor; Cardiology plans Eliquis and possible cardioversion after pneumonia improved; IV AB, Oxygen 1L continued  PCP: Nohelia Cole MD  Readmission Risk Score: 14%  Patient Goals/Plan/Treatment Preferences:   lives alone, has nebulizer; plans [facility] when medically cleared, WBAT LUE; therapy following

## 2020-10-01 NOTE — CONSULTS
[address] [place] [address]                                  CONSULTATION    PATIENT NAME: Darien Valdez                    :        1935  MED REC NO:   [id number]                           ROOM:       [id number]  ACCOUNT NO:   [de-identified]                           ADMIT DATE: 2020  PROVIDER:     MARIBEL Claudio DATE:  2020    CARDIOLOGY CONSULTATION    REASON FOR CONSULTATION:  Atrial flutter. REQUESTING PROVIDER:  Hospitalist Service. HISTORY OF PRESENT ILLNESS:  This is a pleasant 79-year-old lady who  apparently has not had any obvious cardiac history, has a history of  pulmonary fibrosis and was admitted for pneumonia. The patient has been  treated for pneumonia and apparently was found to be in atrial flutter  which is presumably new to the patient. She denies any knowledge of any  previous rhythm disturbance. She might have had some symptoms of  palpitations. Denies any chest discomfort; however, she does have  shortness of breath, however not aware of any specific cardiac problems  as described above. REVIEW OF SYSTEMS:  No fever, no chills, no weight loss up until lately  with pneumonia. No hematuria or dysuria. No abdominal pain, nausea,  vomiting, or diarrhea. No obvious psych problems or suicidal ideation. No skin rashes. No obvious dizziness, lightheadedness or loss of  conscious. Shortness of breath is chronic. No recent trauma. No  bleeding problem. No HEENT-related problem. PAST MEDICAL HISTORY:  1.  Pulmonary fibrosis. 2.  Hypertension. 3.  Hypothyroidism. 4.  Hyperlipidemia. ALLERGIES:  LEVAQUIN, SULFA, REQUIP, CEFUROXIME. CURRENT MEDICATIONS:  Norvasc 10 a day, Lipitor 10 a day, metoprolol 25  twice a day, Synthroid. SOCIAL HISTORY:  No tobacco, no drugs, no alcohol. FAMILY HISTORY:  Noncontributory.     PHYSICAL EXAMINATION:  VITAL SIGNS:  Showed a blood pressure of 130/80, heart rate of 70. GENERAL APPEARANCE:  Pleasant lady in no acute distress. EYES AND EARS:  No discharge. NECK:  No JVD. No bruits. No masses. LUNGS:  Decreased air entry. No crackles, no wheezes. HEART:  Normal S1, S2. Systolic murmur grade 1/6. ABDOMEN:  Soft, nontender. Positive bowel sounds. No organomegaly. EXTREMITIES:  No significant edema. NEUROLOGIC:  Grossly intact. Awake, alert. No focal deficits. PSYCH:  No evidence of active psychosis. SKIN:  No rashes. LABORATORY DATA:  Showed sodium 139, potassium 5, BUN 14, creatinine  0.8. White count 10.1, hemoglobin 12.1, hematocrit 39.3, platelets 596. EKG showed atrial flutter. IMPRESSION:  This is a patient who comes in with pneumonia and is in  atrial flutter which is new to her. She has a CHADS-VASc of 3. At this  point, I did have an extensive discussion with the patient. We are  going to continue with a rate control for the time being and obtain an  echocardiogram.  On the other hand, I am going to start anticoagulation  orally with Eliquis. She does understand risks and benefits and  bleeding risk. She will be reassessed in the next two weeks and we will  consider cardioversion if deemed necessary after she recovers from her  pneumonia. She understands the situation and is willing to proceed with  the medical treatment recommended. Thank you for allowing me to participate in the care of this patient.         Valente Atkins M.D.    D: 09/30/2020 17:02:24       T: 09/30/2020 17:10:36     [id number]  Job#: [id number]     Doc#: [id number]    CC:

## 2020-10-01 NOTE — PROGRESS NOTES
INTERNAL MEDICINE SPECIALTIES  Progress Note For Dr Dennie Chow       Patient:  Pebbles Burgess  YOB: 1935  Date of Service: 10/1/2020  MRN: [id number]   Acct:  [de-identified]   Primary Care Physician: Sky Sawyer MD    SUBJECTIVE: Patient has been in and out of atrial flutter, had a 4 sec pause at spontaneous conversion to NSR. Home Medications:   No current facility-administered medications on file prior to encounter.       Current Outpatient Medications on File Prior to Encounter   Medication Sig Dispense Refill    amLODIPine (NORVASC) 10 MG tablet TAKE 1 TABLET BY MOUTH  DAILY 90 tablet 3    levothyroxine (SYNTHROID) 75 MCG tablet take 1 tablet by mouth once daily 90 tablet 3    simvastatin (ZOCOR) 20 MG tablet TAKE 1 TABLET BY MOUTH  NIGHTLY 90 tablet 3    albuterol (PROVENTIL) (2.5 MG/3ML) 0.083% nebulizer solution Take 3 mLs by nebulization every 6 hours as needed for Wheezing or Shortness of Breath 120 each 3    loratadine (CLARITIN) 10 MG capsule Take 10 mg by mouth daily       Lactobacillus (PROBIOTIC ACIDOPHILUS) CAPS Take 1 capsule by mouth daily       Cholecalciferol (VITAMIN D3) 5000 units TABS Take 5,000 Units by mouth every other day       MULTIPLE VITAMIN PO Take 1 tablet by mouth daily       Acetylcysteine (NAC) 500 MG CAPS Take 600 mg by mouth 2 times daily      amoxicillin (AMOXIL) 500 MG capsule 4 pills 1 hr before dental procedures 4 capsule 3    albuterol sulfate HFA (PROAIR HFA) 108 (90 Base) MCG/ACT inhaler Inhale 2 puffs into the lungs every 6 hours as needed for Wheezing or Shortness of Breath 1 Inhaler 11    sodium chloride, Inhalant, 7 % nebulizer solution Take 4 mLs by nebulization 2 times daily            Scheduled Meds:   [START ON 10/2/2020] amLODIPine  5 mg Oral Daily    metoprolol tartrate  12.5 mg Oral BID    enoxaparin  1 mg/kg Subcutaneous Q12H    magnesium replacement protocol   Other RX Placeholder    azithromycin  250 mg Oral QPM    lidocaine  2 patch Transdermal Daily    benzonatate  100 mg Oral TID    budesonide  0.5 mg Nebulization BID    cefTRIAXone (ROCEPHIN) IV  1 g Intravenous Q24H    Vitamin D  5,000 Units Oral Every Other Day    lactobacillus  1 capsule Oral Daily    levothyroxine  75 mcg Oral Daily    cetirizine  10 mg Oral Daily    multivitamin  1 tablet Oral Daily    atorvastatin  10 mg Oral Daily    sodium chloride flush  10 mL Intravenous 2 times per day     Continuous Infusions:  PRN Meds:albuterol, magnesium hydroxide, diclofenac sodium, acetaminophen, sodium chloride flush, polyethylene glycol, promethazine **OR** ondansetron        Allergies:  Levaquin [levofloxacin]; Percocet [oxycodone-acetaminophen]; Rifampin; Sulfa antibiotics; and Cefuroxime    OBJECTIVE:    Vitals:   Vitals:    10/01/20 1110   BP: (!) 125/57   Pulse: 92   Resp: 20   Temp: 98.4 °F (36.9 °C)   SpO2: 93%      BMI: Body mass index is 26.02 kg/m². PHYSICAL EXAMINATION:          General appearance:Ill looking elderly female ;  No apparent distress, appears stated age and cooperative. HEENT:  Normal cephalic, atraumatic without obvious deformity. Pupils equal, round, and reactive to light.  Extra ocular muscles intact. Conjunctivae/corneas clear. Neck: Supple   Respiratory:  Normal respiratory effort., bilateral basilar crackles. Cardiovascular:  irregular rhythm with normal S1/S2 without ,rubs or gallops. Abdomen: Soft, nontender , non-distended with normal bowel sounds. Musculoskeletal:  No clubbing, cyanosis or edema bilaterally. less tender left shoulder . Reduced ROM  Skin: Skin color, texture, turgor normal.  No rashes or lesions. Neurologic: Alert and oriented x 3, without any focal motor deficits. Psychiatric:   Thought content appropriate, normal insight    Review of Labs and Diagnostic Testing:    No results found for this or any previous visit (from the past 24 hour(s)).     Radiology:     Xr Shoulder Left (min 2 Views)    Result Date: 9/26/2020  PROCEDURE: XR SHOULDER LEFT (MIN 2 VIEWS) CLINICAL INFORMATION: Left shoulder pain TECHNIQUE: 4 views of the left shoulder COMPARISON: None FINDINGS: There is no fracture or dislocation. Joint spaces are preserved. No soft tissue or osseous abnormalities are identified. No fracture or dislocation. Final report electronically signed by Dr. Lisa Chavira on 9/26/2020 3:09 PM    Xr Chest Portable    Result Date: 9/25/2020  PROCEDURE: XR CHEST PORTABLE CLINICAL INFORMATION: SOB. COMPARISON: January 13, 2020. TECHNIQUE: Portable chest. FINDINGS: Right upper and to a lesser extent bibasilar coarsened mixed airspace and interstitial opacities. Appearance is similar to prior with likely a background of chronic interstitial changes. Superimposed acute findings are not excluded. There is prominent right hilum is redemonstrated. Ectatic thoracic aorta. Cardiomegaly. No significant pleural effusion. No acute osseous findings. IMPRESSION: Right upper and to a lesser extent bibasilar coarsened mixed airspace and interstitial opacities. Appearance is similar to prior with likely a background of chronic interstitial changes. Superimposed acute infiltrate is not excluded. Correlation with symptoms and follow-up as clinically warranted. **This report has been created using voice recognition software. It may contain minor errors which are inherent in voice recognition technology. ** Final report electronically signed by Dr. Loly Justice on 9/25/2020 6:08 PM      ECHO 9/30/2020  Summary   Ejection fraction is visually estimated at 55%. Overall left ventricular function is normal.   Aortic valve appears tricuspid. Aortic valve leaflets are somewhat thickened. Aortic valve leaflets are Mildly calcified. There is a small localized posterior pericardial effusion noted.     ASSESMENT:      Active Problems:    Bronchiectasis with (acute) exacerbation (HCC)    Cough in adult    Community acquired pneumonia of right lung    Typical atrial flutter (HCC)  Resolved Problems:    * No resolved hospital problems. *  OTHER PROBLEMS:  Bronchiectasis   ([address]  Pulmonary fibrosis (HCC)  History of pulmonary Mycobacterium avium complex  Hypertension  Dyslipidemia   Osteoarthritis  Left shoulder injury      PLAN:  Patient status post 4 sec sinus pause at spontaneous conversion from atrial flutter to NSR,  Has been seen by the Cardiology service,TSH/FT4 were normal ,ECHO is as noted above, trended troponin have been negative,  Has been on low dose lopressor with blood pressure and pulse parameters, cardiologist has recommended continuing this for now and maintain the patient on a 2 week Holter and okayed for discharge  [id number] score - 4,  Started on eliquis for stroke prevention, risks of bleeding was discussed with the patient. Continue bronchodilators, O2, broad spectrum antibiotics to cover community acquired organisms, ID / Pulmonary  service is following. Has negative legionella and strep pneumonia urinary antigens, follow-up blood cultures, sputum gram stain c/s, Incentive spirometry , acapella, rapid influenza screen was negative. MRI left shoulder consistent with left glenoid labral tear,  Supraspinatus tendinosis and long head of the biceps tendon tenosynovitis,  Seen by orthopedic  Service,  May use diclofenac gel to the left shoulder. Continue Lidoderm patch for now    COVID-19 screen was negative however will need to repeat as it has gone beyond the 48 hour window for ECF discharge .       DVT prophylaxis: [] Lovenox                                 [] SCDs                                 [] SQ Heparin                                 [] Encourage ambulation, low risk for DVT, no chemical or mechanical prophylaxis necessary              [x] Already on Anticoagulation                Anticipated Disposition upon discharge: [] Home                                                                         [] Home with Home Health                                                                         [x] [facility]                                                                         [] [address]          Electronically signed by Zhang Vo MD on 10/1/2020 at 1:44 PM

## 2020-10-01 NOTE — PROGRESS NOTES
TONSILLECTOMY AND ADENOIDECTOMY  1940 ?  TOTAL KNEE ARTHROPLASTY Right January 6th, 2014    OIO     Diet    DIET CARDIAC; Allergies    Levaquin [levofloxacin]; Percocet [oxycodone-acetaminophen]; Rifampin; Sulfa antibiotics; and Cefuroxime  Social History     Social History     Socioeconomic History    Marital status:      Spouse name: Not on file    Number of children: Not on file    Years of education: Not on file    Highest education level: Not on file   Occupational History    Occupation: retired   Social Needs    Financial resource strain: Not on file    Food insecurity     Worry: Not on file     Inability: Not on file   [employer] needs     Medical: Not on file     Non-medical: Not on file   Tobacco Use    Smoking status: Never Smoker    Smokeless tobacco: Never Used   Substance and Sexual Activity    Alcohol use:  Yes     Alcohol/week: 0.0 standard drinks     Comment: socially-1 glassof wine 2-3 times a month    Drug use: No    Sexual activity: Never   Lifestyle    Physical activity     Days per week: Not on file     Minutes per session: Not on file    Stress: Not on file   Relationships    Social connections     Talks on phone: Not on file     Gets together: Not on file     Attends Worship service: Not on file     Active member of club or organization: Not on file     Attends meetings of clubs or organizations: Not on file     Relationship status: Not on file    Intimate partner violence     Fear of current or ex partner: Not on file     Emotionally abused: Not on file     Physically abused: Not on file     Forced sexual activity: Not on file   Other Topics Concern    Not on file   Social History Narrative    Not on file     Family History          Problem Relation Age of Onset    Diabetes Mother     Thyroid Disease Mother     Arthritis Mother     High Blood Pressure Mother     Arthritis Father     High Blood Pressure Father     Other Father         hay fever    edema  Pulmonary/Chest: Normal effort with bilateral air entry, bilateral bibasilar crackles. No stridor. No respiratory distress. Patient exhibits no tenderness. No wheezing   Abdominal: Soft. Bowel sounds audible. No distension or tenderness to palp. Musculoskeletal: Moves all extremities  Neurological: Patient is alert and oriented to person, place, and time. Skin: Warm and dry. Labs   ABG  Lab Results   Component Value Date    PH 7.41 09/25/2020    PO2 61 09/25/2020    PCO2 39 09/25/2020    HCO3 25 09/25/2020    O2SAT 91 09/25/2020     Lab Results   Component Value Date    IFIO2 2 01/08/2020     CBC  No results for input(s): WBC, RBC, HGB, HCT, MCV, MCH, MCHC, RDW, PLT, MPV in the last 72 hours. BMP  Recent Labs     09/30/20  0900      K 5.0      CO2 30   BUN 14   CREATININE 0.8   GLUCOSE 118*   MG 2.1   CALCIUM 10.3     LFT  No results for input(s): AST, ALT, ALB, BILITOT, ALKPHOS, LIPASE in the last 72 hours. Invalid input(s): AMYLASE  TROP  Lab Results   Component Value Date    TROPONINT < 0.010 09/30/2020    TROPONINT < 0.010 09/25/2020    TROPONINT < 0.010 01/06/2020     BNP  Lab Results   Component Value Date    PROBNP 864.5 09/25/2020    PROBNP 506.4 01/06/2020    PROBNP 52.4 11/09/2016     D-Dimer  No results found for: DDIMER  Lactic Acid  No results for input(s): LACTA in the last 72 hours. INR  No results for input(s): INR, PROTIME in the last 72 hours. PTT  No results for input(s): APTT in the last 72 hours. Glucose  No results for input(s): POCGLU in the last 72 hours. UA No results for input(s): SPECGRAV, PHUR, COLORU, CLARITYU, MUCUS, PROTEINU, BLOODU, RBCUA, WBCUA, BACTERIA, NITRU, GLUCOSEU, BILIRUBINUR, UROBILINOGEN, KETUA, LABCAST, LABCASTTY, AMORPHOS in the last 72 hours. Invalid input(s): CRYSTALS. She underwent bronch on 1/8/2020:  Flexible diagnostic fiberoptic bronchoscopy with fluoroscopy.  During procedure Bronch washings obtained from right tracheobronchial tree including right upper, middle and lower lobes. Bronchioalveolar lavage obtained from right lower lobe posterior segment. Trans bronchial lung biopsies were performed by using biopsy forceps from right lower lobe posterior segment under fluroscopy guidance. Cytology brush specimen was obtained from right lower lobe posterior segment under fluroscopy guidance. Microbiology brush specimen was obtained from right lower lobe posterior segment under fluroscopy guidence. Connective tissue work up results:  Date: 4/03/2017  SABAS( Antinuclear antibody):  Negative-none detected.    Fungal serologies- Negative                   RA ( Rheumatoid factor):  Negative. <10                                                          ACE(  level):    Negative-22                               ESR ( Sed rate): 9               Connective Tissue Labs  Date: 1/11/19     SABAS with reflex-None detected. Anti RNP-1- Normal.  Anti Lexie-0- normal  Anti-SSB-0- normal  AntiSSA-1- normal  AntiCentromere- 4- Normal  Scleroderma AB-24-normal  ACE-10- normal  ESR-101 elevated  Rheumatoid Factor-13 WNL                  Bronch Cultures   Fungus-No yeast or hyphae  Respiratory culture-No organisms observed  AFB-Negative on concentrated smear, Final pending  Pneumocystis-Negative  Legionella-Negative  Anaerobic/aerobic-No prelim growth  PAFs-Negative     Bronch and biopsy:   Biopsy-   FINAL DIAGNOSIS:  Right lower posterior segment of lung, transbronchial biopsy:   Chronic bronchial inflammation.   Negative for malignancy.      Cytology-   FINAL RESULTS:  A. Lung, right lower lobe posterior subsegment, bronchoalveolar lavage:   Abundant acute inflammation.   No malignant cells seen. B. Lung, right tracheobronchial tree, washings:   Abundant acute inflammation and scattered benign bronchial epithelium.   No malignant cells seen.     C. Lung, right lower lobe posterior segment brush tip, brushing:   Benign bronchial epithelium and acute inflammation.   No malignant cells seen.       PFTs         Echo    09/30/2020   ECHOCARDIOGRAM COMPLETE 2D W DOPPLER W COLOR. Conclusions      Summary   Ejection fraction is visually estimated at 55%. Overall left ventricular function is normal.   Aortic valve appears tricuspid. Aortic valve leaflets are somewhat thickened. Aortic valve leaflets are Mildly calcified. There is a small localized posterior pericardial effusion noted. Signature      ----------------------------------------------------------------   Electronically signed by Hodan Bowman MD (Interpreting   physician) on 09/30/2020 at 03:42 PM      ECHO completed in 2018 at [facility]  CONCLUSIONS:  - Exam indication: Shortness of Breath  - The left ventricle is normal in size. Left ventricular systolic function is   hyperdynamic. EF = 77 ± 5% (2D biplane) Normal left ventricular diastolic   function.  - The right ventricle is normal in size. Right ventricular systolic function is   normal.  - The patient has not had a prior  echocardiographic exam for comparison. Electronically signed by Jessi Dallas MD on 8/2/2018 at 4:56:15 PM  Cultures    Procalcitonin  Lab Results   Component Value Date    PROCAL 0.13 09/26/2020    PROCAL 0.12 09/25/2020     SARS-CoV-2, NAAT  NOT DETECTED  NOT DETECTED  Final  09/25/2020  6:16 PM      Blood Culture, Routine  09/25/2020  6:29 PM  [facility]    No growth-preliminary      Blood Culture, Routine  09/25/2020  6:48 PM  [facility]    No growth-preliminary      Strep Pneumo antigen- (-)  Legionella antigen- (-)    MRSA PCR- negative    Radiology    CXR  9/25/2020   IMPRESSION:    Right upper and to a lesser extent bibasilar coarsened mixed airspace and interstitial opacities. Appearance is similar to prior with likely a background of chronic interstitial changes. Superimposed acute infiltrate is not excluded.  Correlation with    symptoms and follow-up as clinically warranted. CT Scans  Ct chest high resolution 8/25/2020      Impression    1. Irregular groundglass and confluent densities are demonstrated within the periphery of the right lung left middle and upper lobes. This is most pronounced within the right apex. This may represent sequela from pulmonary fibrosis         2. There is right upper lobe bronchiectasis demonstrated.         **This report has been created using voice recognition software.  It may contain minor errors which are inherent in voice recognition technology. **         Final report electronically signed by Dr. Ronn Rodriguez on 8/25/2020 1:44 PM                       9/28/2020   MRI SHOULDER LEFT WO CONTRAST   1. There is tendinosis of the supraspinatus tendon. There is no partial or full-thickness tear of the rotator cuff. 2. There is tendinosis of the intra-articular portion of the long head of the biceps tendon. There is fluid within the long head of the biceps tendon sheath which is larger than expected for the amount of fluid within the glenohumeral articulation consistent with a tenosynovitis. 3. There is abnormal signal within and abnormal morphologic appearance of the superior glenoid labrum consistent with a superior glenoid labral tear, anterior to posterior. 4. There is abnormal morphologic appearance of the superior aspect of the posterior glenoid labrum consistent with a tear. The inferior aspect of the posterior glenoid labrum is intact and unremarkable. 5. There is a small amount of fluid within the glenohumeral articulation which is septated within the subscapularis recess consistent with a synovitis. 6. There is a small amount of fluid within the subacromial-subdeltoid bursa which may be reactive or related to a bursitis. (See actual reports for details)    Assessment   New onset atrial flutter - Patient experienced atrial flutter overnight (9/29/2020).   EKG obtained cardiology was consulted, PCP was notified, and ECHO ordered. Patient had previous oncologic stress test on 11/9/2016 which was unremarkable. Patient also had 2D echocardiogram at that time which showed EF equal to 55 to 65% with impaired relaxation compatible with diastolic dysfunction. Patient had follow-up ECHO on in 2018 at [facility] which revealed EF=77%. HFpEF - ECHO 11/9/2016 with EF 55 to 99% and diastolic dysfunction. Acute hypoxic respiratory failure - Patient was requiring 2 L of oxygen via nasal cannula to maintain adequate oxygen saturation. The patient does not use oxygen at home. This is likely secondary to exacerbation of bronchiectasis. Bronchiectasis - Secondary to past history of MAC infection in 2017. The patient follows with centers of pulmonary medicine as well as the [facility] clinic. Etiology of exacerbation is unclear but likely secondary to pneumonia versus medication induced versus worsening pulmonary fibrosis. Initiated on azithromycin, cefepime, and ceftriaxone. Pulmonary fibrosis -patient follows with [facility] with a suspected etiology secondary to MAC infection that occurred in 2017. She is currently on nebulized albuterol and saline at home. Patient trialed Flovent at home which resulted in excessive coughing leading to discontinuation. Hx of MAC infection: 2017  Hx of hypertension  Hx of dyslipidemia  Hx of osteoarthritis    Recommendations   - Continue nebulized albuterol as needed.   - Antibiotics per infectious disease  - Continue Pulmicort 0.5 mg via nebulizer twice daily.  -Tessalon 100 mg by mouth 3 times daily for cough  - Home O2 evaluation at the time of discharge from the [facility]  - Continue encouragement of incentive spirometry and Acapella  - Cardiology following event monitor as outpatient   - Plan is for ECF upon discharge  - RX for Pulmicort nebs already to patients home pharmacy upon DC from [facility]    -Follow up at [address] for Pulmonary Medicine with Maida Gonzalez Darnell Dudley CNP in 1 months with CXR 2-view 1-2 days prior to appt- testing ordered in Epic   -Will keep appt with Vicky Cannon CNP on 12/7/20 @ 1:30 PM with PFT scheduled for 12/3/20 @ 1:15 PM    Case discussed with nurse and patient/family. Questions and concerns addressed. Meds and Orders reviewed. Electronically signed by JASMEET Sinclair CNP on 10/1/2020 at 2:14 PM     Addendum by Dr. Seble Lay MD:  I have seen and examined the patient independently. Face to face evaluation and examination was performed. The above evaluation and note has been reviewed. Labs and radiographs were reviewed. I Have discussed with Ms. DIRK Sinclair CNP about this patient in detail. The above assessment and plan has been reviewed. Please see my modifications mentioned below. My modifications: On 1LPM via nasal cannula. Feels better. Improving shortness of breath. Follow up as above.     Isabelle Carranza MD 10/1/2020 5:13 PM

## 2020-10-01 NOTE — PROGRESS NOTES
Cardiology Progress Note      Patient:  William Hathaway  YOB: 1935  MRN: [id number]   Acct: [de-identified]  Admit Date:  9/25/2020  Primary Cardiologist: none    Note per dr Karen Tao:  Atrial flutter.     REQUESTING PROVIDER:  Hospitalist Service.     HISTORY OF PRESENT ILLNESS:  This is a pleasant 59-year-old lady who  apparently has not had any obvious cardiac history, has a history of  pulmonary fibrosis and was admitted for pneumonia. The patient has been  treated for pneumonia and apparently was found to be in atrial flutter  which is presumably new to the patient. She denies any knowledge of any  previous rhythm disturbance. She might have had some symptoms of  palpitations. Denies any chest discomfort; however, she does have  shortness of breath, however not aware of any specific cardiac problems  as described above\"    Subjective (Events in last 24 hours): pt awake and alert. f NAD. No cp. Still with some sob.   On O2 at 5 l/min      Objective:   BP (!) 125/57   Pulse 92   Temp 98.4 °F (36.9 °C) (Oral)   Resp 20   Ht 5' 4\" (1.626 m)   Wt 151 lb 9.6 oz (68.8 kg)   SpO2 93%   BMI 26.02 kg/m²        TELEMETRY: nsr, overnight aflutter converted to nsr with 4 sec pause    Physical Exam:  General Appearance: alert and oriented to person, place and time, in no acute distress  Cardiovascular: normal rate, regular rhythm, normal S1 and S2, no murmurs, rubs, clicks, or gallops, distal pulses intact, no carotid bruits, no JVD  Pulmonary/Chest: bibasilar crackles, R>L  Abdomen: soft, non-tender, non-distended, normal bowel sounds, no masses Extremities: no cyanosis, clubbing or edema, pulse   Skin: warm and dry, no rash or erythema  Head: normocephalic and atraumatic  Eyes: pupils equal, round, and reactive to light  Neck: supple and non-tender without mass, no thyromegaly   Musculoskeletal: normal range of motion, no joint swelling, deformity or tenderness  Neurological: K 4.5 09/26/2020     09/30/2020    CO2 30 09/30/2020    BUN 14 09/30/2020    CREATININE 0.8 09/30/2020    LABGLOM 68 09/30/2020    GLUCOSE 118 09/30/2020    GLUCOSE 93 06/08/2012    CALCIUM 10.3 09/30/2020       Hepatic Function Panel:    Lab Results   Component Value Date    ALKPHOS 81 09/26/2020    ALT 17 09/26/2020    AST 19 09/26/2020    PROT 6.0 09/26/2020    BILITOT 0.7 09/26/2020    BILIDIR 0.4 01/06/2020    LABALBU 3.5 09/26/2020    LABALBU 3.5 06/08/2012       Magnesium:    Lab Results   Component Value Date    MG 2.1 09/30/2020       PT/INR:    Lab Results   Component Value Date    INR 1.39 01/07/2020       HgBA1c:    Lab Results   Component Value Date    LABA1C 5.2 09/19/2018       FLP:    Lab Results   Component Value Date    TRIG 84 08/18/2020    HDL 92 08/18/2020    LDLCALC 62 08/18/2020    LDLDIRECT 73.35 05/01/2017       TSH:    Lab Results   Component Value Date    TSH 4.170 09/30/2020         Assessment:    New onset Paroxysmal atrial flutter of unknown duration   chadsvasc = 4  Pause of 4 seconds with conversion of aflutter to nsr  PNA  Pulmonary fibrosis  Ef 55 per TTE 9/30/20  HTN  HLP      Plan:    Keep mag >2 and K >4  Normal TSH 9/30/20  Cont statin/BB  Decrease norvasc to 5 daily  Recommend 4 [facility] eliquis 5 bid upon discharge - pt understands risk of bleeding and accepts risk to reduce risk of embolic phenomenon  Event monitor 2 weeks  F/up dr Imani Astudillo 3 weeks         Electronically signed by Samantha Romberg, PA-C on 10/1/2020 at 12:54 PM

## 2020-10-01 NOTE — PLAN OF CARE
Problem: Discharge Planning:  Goal: Discharged to appropriate level of care  Description: Discharged to appropriate level of care  Outcome: Ongoing  Note: Patient was from home alone, but is now planning on going to [facility] when medically stable. Problem: Pain Control  Goal: Maintain pain level at or below patient's acceptable level (or 5 if patient is unable to determine acceptable level)  Outcome: Ongoing  Note: Pain Assessment: 0-10  Pain Level: 0   Patient's Stated Pain Goal: No pain   Is pain goal met at this time? Yes     Non-Pharmaceutical Pain Intervention(s): Rest, Repositioned, Emotional support     Problem: Cardiovascular  Goal: No DVT, peripheral vascular complications  Outcome: Ongoing  Note: No signs or symptoms of DVT. Patient getting Lovenox injections every 12 hours. Goal: Hemodynamic stability  Outcome: Ongoing  Note: Vital signs have remained stable and within normal limits. Vitals being monitored every 4 hours and as needed. Continuous cardiac monitor in place. Heart rhythm was atrial fib, but the patient converted herself to NSR tonight. Problem: Respiratory  Goal: No pulmonary complications  Outcome: Ongoing  Note: Patient on 1L NC with oxygen saturations above 92%. Patient gets SOB with ambulation. Lungs are clear in the upper lobes and diminished with crackles in the bases. Patient wears no oxygen at home- will attempt to wean down her supplemental oxygen at she tolerates. Goal: O2 Sat > 90%  Outcome: Ongoing  Note: Patient on 1L NC with oxygen saturations above 92%. Problem: GI  Goal: No bowel complications  Outcome: Ongoing  Note: No BM tonight. Abdomen is soft, rounded and non-tender. Active bowel sounds heard in all 4 quadrants. Patient is passing gas. Problem:   Goal: Adequate urinary output  Outcome: Ongoing  Note: Patient voiding and adequate amount tonight. Patient voiding clear, yellow urine.       Problem: Nutrition  Goal: Optimal nutrition

## 2020-10-01 NOTE — PROGRESS NOTES
Oral Daily    multivitamin  1 tablet Oral Daily    atorvastatin  10 mg Oral Daily    sodium chloride flush  10 mL Intravenous 2 times per day       albuterol, magnesium hydroxide, diclofenac sodium, acetaminophen, sodium chloride flush, polyethylene glycol, promethazine **OR** ondansetron      LABS:     CBC:   No results for input(s): WBC, HGB, PLT in the last 72 hours. CULTURES:   UA: No results for input(s): SPECGRAV, PHUR, COLORU, CLARITYU, MUCUS, PROTEINU, BLOODU, RBCUA, WBCUA, BACTERIA, NITRU, GLUCOSEU, BILIRUBINUR, UROBILINOGEN, KETUA, LABCAST, LABCASTTY, AMORPHOS in the last 72 hours. Invalid input(s): CRYSTALS  Micro:   Lab Results   Component Value Date    BC No growth-preliminary No growth  09/25/2020          Problem list of patient:     Patient Active Problem List   Diagnosis Code    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 ([facility] [address] N25.81    Hypercalcemia E83.52    Osteoarthritis M19.90    Non-toxic nodular goiter E04.9    Essential hypertension I10    Diverticulosis of colon K57.30    Hypokalemia E87.6    Hypothyroidism E03.9    Osteopenia M85.80    Bronchiectasis with (acute) exacerbation ([facility]) J47.1    Hyponatremia E87.1    Pure hypercholesterolemia E78.00    Left thyroid nodule E04.1    Hyperthyroidism E05.90    Lung infiltrate on CT R91.8    Angina pectoris ([facility]) - Typical I20.9    Dyslipidemia E78.5    Elevated serum free T4 level R79.89    Elevated TSH R79.89    Cough in adult R05    Nasal polyp J33.9    Abnormal CT of the chest R93.89    Vitamin D deficiency E55.9    Acute hypoxemic respiratory failure (HCC) J96.01    Pneumonia J18.9    Moderate malnutrition ([facility]) E44.0    Pulmonary fibrosis (HCC) J84.10    SIRS (systemic inflammatory response syndrome) ([facility]) R65.10    Community acquired pneumonia of right lung J18.9    Typical atrial flutter ([facility]) I48.3         ASSESSMENT/PLAN   Pneumonia with underlying Bronchiectasis.   Atrial fibrillation: rate controlled  Continue therapy  Will stop antibiotic at am  Geovany Mulligan MD, FACP 10/1/2020 2:03 PM

## 2020-10-01 NOTE — PROGRESS NOTES
[facility] 60  INPATIENT OCCUPATIONAL THERAPY  [facility] ICU STEPDOWN TELEMETRY 4K  EVALUATION    Time:    Time In: 1441  Time Out: 1517  Timed Code Treatment Minutes: 21 Minutes  Minutes: 36          Date: 10/1/2020  Patient Name: Brook Wilkerson,   Gender: female      MRN: [id number]  : 1935  (80 y.o.)  Referring Practitioner: ARSH Dyer CNP  Diagnosis: community acquired bacterial pneumonia  Additional Pertinent Hx: Per ER note on 2020: 77-year-old female with past medical history of chronic cough, lung fibrosis, Mycobacterium avium complex infection in 2017, chronic shortness of breath, and bronchiectasis followed by Dr. Leonardo Casiano locally and also by [facility] presented to ED complaining of increasing shortness of breath in the last 5 days duration with increasing persistent dry cough. Patient feels she could not catch her breath. She felt constant chest sore in the last 5 days. No fever and no chills. No nausea and no vomiting. No abdominal pain. Chest pain intensity at 5/10 worse on coughing. Chest pain was from retrosternal area. No diarrhea. No leg swelling. She denied recent COVID-19 exposure. She lives at home by herself.     Restrictions/Precautions:  Restrictions/Precautions: Fall Risk  Position Activity Restriction  Other position/activity restrictions: 1L O2 at rest, 3L O2 with activity    Subjective  Chart Reviewed: Yes, Progress Notes, History and Physical  Patient assessed for rehabilitation services?: Yes  Family / Caregiver Present: No    Subjective: cooperative    Pain:  Pain Assessment  Patient Currently in Pain: No    Social/Functional History:  Lives With: Alone  Type of Home: House  Home Layout: One level  Home Access: Stairs to enter with rails(2)  Home Equipment: Cane   Bathroom Shower/Tub: Walk-in shower  Bathroom Toilet: Handicap height  Bathroom Equipment: Shower chair, Hand-held shower       ADL Assistance: [address] [place] [address] Avenue: Needs assistance(had A for cleaning prior to COVID)  Ambulation Assistance: Independent  Transfer Assistance: Independent          Additional Comments: Pt was not using AD PLOF or home O2. Cognition/Orientation:     Overall Cognitive Status: WFL    ADL's:  Grooming: Stand by assistance  UE Bathing: Stand by assistance(while seated in recliner)  LE Bathing: Moderate assistance(A for B feet)  UE Dressing: Moderate assistance(hospital gown)  LE Dressing: Stand by assistance(for slipping shoes on)  Toileting: Stand by assistance      **sponge bath completed sinkside    Functional Mobility:  Bed mobility  Supine to Sit: Stand by assistance(HOB elevated)    Functional Mobility  Functional - Mobility Device: Rolling Walker  Activity: To/from bathroom  Assist Level: Stand by assistance  Apparatus Needs: O2     Balance:  Balance  Sitting Balance: Stand by assistance  Standing Balance: Stand by assistance    Transfers:  Sit to stand: Contact guard assistance  Stand to sit: Stand by assistance       Upper Extremity Assessment:   LUE AROM : WFL  RUE AROM : WFL    LUE Strength  Gross LUE Strength: WFL  RUE Strength  Gross RUE Strength: WFL    Sensation  Overall Sensation Status: WFL       Activity Tolerance: Patient limited by fatigue(SOB with fatigue) Mod SOB with activity w/ 86% on 1L O2 with activity, increased to 3L & able to [employer] Assessment:  Assessment: Pt demo decreased ADL & functional mobility status over PLOF s/p admission for pneumonia. Continued OT recommended to faciliate improved endurance & balance for ADLs as well as educate on adaptative strategies. Performance deficits / Impairments: Decreased functional mobility , Decreased ADL status, Decreased endurance, Decreased balance  Prognosis: Fair  REQUIRES OT FOLLOW UP: Yes  Decision Making: Medium Complexity  Safety Devices in place: Yes  Type of devices:  All fall risk precautions in place, Gait belt, Call light within reach, Chair alarm in place, Nurse notified    Treatment Initiated: Treatment and education initiated within context of evaluation. Evaluation time included review of current medical information, gathering information related to past medical, social and functional history, completion of standardized testing, formal and informal observation of tasks, assessment of data and development of plan of care and goals. Treatment time included skilled education and facilitation of tasks to increase safety and independence with ADL's for improved functional independence and quality of life. Discharge Recommendations:  Subacute/Skilled Nursing Facility    Patient Education:  OT Education: OT Role, Plan of Care, ADL Adaptive Strategies, Transfer Training, Energy Conservation  Barriers to Learning: none    Equipment Recommendations: Other: Monitor pending progress    Plan:  Times per week: 3-5x  Current Treatment Recommendations: Balance Training, Self-Care / ADL, Safety Education & Training, Functional Mobility Training, Endurance Training    Goals:  Patient goals : go home  Short term goals  Time Frame for Short term goals: until discharge  Short term goal 1: Complete various t/fs including toilet with S & 0-2 vcs for safety  Short term goal 2: Complete LE dressing with S & LH AE prn  Short term goal 3: Complete mobility to/from bathroom with RW, S, & 0-2 vcs for safety/manevering O2 tubing  Short term goal 4: Demo good awareness of energy conservation strategies for ADLs as evidenced by id 2 without vcs  Long term goals  Time Frame for Long term goals : No LTG set d/t short ELOS  See long-term goal time frame for expected duration of plan of care. If no long-term goals established, a short length of stay is anticipated. Following session, patient left in safe position with all fall risk precautions in place.

## 2020-10-02 VITALS
RESPIRATION RATE: 18 BRPM | HEIGHT: 64 IN | BODY MASS INDEX: 25.86 KG/M2 | SYSTOLIC BLOOD PRESSURE: 100 MMHG | OXYGEN SATURATION: 94 % | TEMPERATURE: 98.2 F | DIASTOLIC BLOOD PRESSURE: 68 MMHG | HEART RATE: 73 BPM | WEIGHT: 151.5 LBS

## 2020-10-02 PROCEDURE — 6370000000 HC RX 637 (ALT 250 FOR IP): Performed by: INTERNAL MEDICINE

## 2020-10-02 PROCEDURE — 97116 GAIT TRAINING THERAPY: CPT

## 2020-10-02 PROCEDURE — 6370000000 HC RX 637 (ALT 250 FOR IP): Performed by: PHYSICIAN ASSISTANT

## 2020-10-02 PROCEDURE — 97162 PT EVAL MOD COMPLEX 30 MIN: CPT

## 2020-10-02 PROCEDURE — 94640 AIRWAY INHALATION TREATMENT: CPT

## 2020-10-02 PROCEDURE — 2580000003 HC RX 258: Performed by: INTERNAL MEDICINE

## 2020-10-02 PROCEDURE — 6360000002 HC RX W HCPCS: Performed by: INTERNAL MEDICINE

## 2020-10-02 PROCEDURE — 97530 THERAPEUTIC ACTIVITIES: CPT

## 2020-10-02 PROCEDURE — 94761 N-INVAS EAR/PLS OXIMETRY MLT: CPT

## 2020-10-02 PROCEDURE — 0296T HC EXT ECG RECORDING 2-21 DAY HOOKUP: CPT

## 2020-10-02 PROCEDURE — 99232 SBSQ HOSP IP/OBS MODERATE 35: CPT | Performed by: INTERNAL MEDICINE

## 2020-10-02 RX ORDER — AMLODIPINE BESYLATE 5 MG/1
5 TABLET ORAL DAILY
Qty: 30 TABLET | Refills: 3 | DISCHARGE
Start: 2020-10-03 | End: 2021-02-08 | Stop reason: SDUPTHER

## 2020-10-02 RX ORDER — LIDOCAINE 4 G/G
2 PATCH TOPICAL DAILY
DISCHARGE
Start: 2020-10-03 | End: 2020-10-30 | Stop reason: ALTCHOICE

## 2020-10-02 RX ORDER — BENZONATATE 100 MG/1
100 CAPSULE ORAL 3 TIMES DAILY
Qty: 21 CAPSULE | Refills: 0 | DISCHARGE
Start: 2020-10-02 | End: 2020-10-09

## 2020-10-02 RX ORDER — POLYETHYLENE GLYCOL 3350 17 G/17G
17 POWDER, FOR SOLUTION ORAL DAILY PRN
Qty: 527 G | Refills: 1 | DISCHARGE
Start: 2020-10-02 | End: 2020-10-30 | Stop reason: ALTCHOICE

## 2020-10-02 RX ADMIN — BENZONATATE 100 MG: 100 CAPSULE ORAL at 08:41

## 2020-10-02 RX ADMIN — BENZONATATE 100 MG: 100 CAPSULE ORAL at 13:59

## 2020-10-02 RX ADMIN — ATORVASTATIN CALCIUM 10 MG: 10 TABLET, FILM COATED ORAL at 08:41

## 2020-10-02 RX ADMIN — CETIRIZINE HYDROCHLORIDE 10 MG: 10 TABLET ORAL at 08:41

## 2020-10-02 RX ADMIN — AMLODIPINE BESYLATE 5 MG: 5 TABLET ORAL at 08:41

## 2020-10-02 RX ADMIN — DICLOFENAC 4 G: 10 GEL TOPICAL at 08:42

## 2020-10-02 RX ADMIN — METOPROLOL TARTRATE 12.5 MG: 25 TABLET ORAL at 08:40

## 2020-10-02 RX ADMIN — Medication 1 CAPSULE: at 08:40

## 2020-10-02 RX ADMIN — BUDESONIDE 500 MCG: 0.5 INHALANT RESPIRATORY (INHALATION) at 08:07

## 2020-10-02 RX ADMIN — Medication 10 ML: at 08:40

## 2020-10-02 RX ADMIN — ENOXAPARIN SODIUM 70 MG: 80 INJECTION SUBCUTANEOUS at 12:16

## 2020-10-02 RX ADMIN — LEVOTHYROXINE SODIUM 75 MCG: 75 TABLET ORAL at 05:04

## 2020-10-02 RX ADMIN — THERA TABS 1 TABLET: TAB at 08:41

## 2020-10-02 ASSESSMENT — PAIN SCALES - GENERAL
PAINLEVEL_OUTOF10: 0
PAINLEVEL_OUTOF10: 0

## 2020-10-02 NOTE — DISCHARGE SUMMARY
Internal Medicine Specialties     Discharge Summary      Patient Identification:   William Hathaway   : 1935  MRN: [id number]   Account: [de-identified]      Patient's PCP: Nani Huertas MD    Admit Date: 2020     Discharge Date:  10/02/2020    Admitting Physician: Philippe Diaz MD     Discharge Physician: Philippe iDaz MD     Discharge Diagnoses: Active Problems:    Bronchiectasis with (acute) exacerbation (HCC)    Cough in adult    Community acquired pneumonia of right lung    Typical atrial flutter (HCC)  Resolved Problems:    * No resolved hospital problems. *   OTHER PROBLEMS:  Pulmonary fibrosis (HCC)  History of pulmonary Mycobacterium avium complex  Hypertension  Dyslipidemia   Osteoarthritis  Left shoulder injury    The patient was seen and examined on day of discharge and this discharge summary is in conjunction with any daily progress note from day of discharge. Hospital Course:   William Hathaway is a 80 y.o. female admitted to [address] [place] [address] on 2020 who has a history of bronchiectasis, pulmonary fibrosis, history of pulmonary Mycobacterium avium complex, hypertension dyslipidemia osteoarthritis  Patient follows with Dr. Sebastian Hughes and the [facility] for her respiratory issues and presented [facility] with increasing shortness of breath in the last 5 days . She had also had a  persistent dry cough, this has been associated with a constant soreness across her chest over the last 5 days, this has been initiatede and made worse with coughing . She has experienced no fever or chills.  No nausea  But had vomiting after repititive coughing.  She has had no abdominal pain or diarrhea.   She generally stays at home by herself and She denied recent COVID-19 exposure.    A COVID-19 screen obtain the ED was negative  While she was in the ED she tried to reposition herself and had strained her left shoulder, this has been painful all night.        She was maintained on bronchodilators, oxygen, started broad-spectrum antibiotics and infectious disease/pulmonary consultation placed. Legionella and strep pneumoniae urinary antigens were negative, rapid influenza screen was negative. Blood cultures have so far been unrevealing. Incentive spirometry/use of Acapella encouraged. She had complained of excruciating pain in her left shoulder worse with movement after she had tried to push herself up to a sitting position while in the emergency room. An x-ray of the left shoulder was unrevealing and the symptoms had persisted. An MRI of the left shoulder was therefore obtained and was consistent with left glenoid labral tear,  Supraspinatus tendinosis and long head of the biceps tendon tenosynovitis, she was seen by the orthopedic  Service,  May use diclofenac gel to the left shoulder. she however responded well to Lidoderm patch. She had developed atrial flutter with controlled ventricular rate, was placed on low-dose beta-blocker  Has been seen by the Cardiology service,TSH/FT4 were normal , ECHO is as noted below, trended troponin have been negative. Had the atrial flutter has been intermittent and patient converts spontaneously to normal sinus rhythm. Ventricular rate has been controlled. At one point patient had a 4  sinus pause at the point of conversion from atrial flutter to in NSR    Patient is status post 4 sec sinus pause at spontaneous conversion from atrial flutter to NSR,Cardiologist has recommended continuing Current management for now and have the patient wear a 2 week Holter monitor and has okayed her for discharge. Her [id number] score - 4, so she was started on eliquis for stroke prevention, risks of bleeding was discussed with the patient. Labs:  For convenience and continuity at follow-up the following most recent labs are provided:      CBC:    Lab Results   Component Value Date    WBC 10.1 09/28/2020    HGB 12.1 09/28/2020    HCT 39.3 09/28/2020     09/28/2020       Renal:    Lab Results   Component Value Date     09/30/2020    K 5.0 09/30/2020    K 4.5 09/26/2020     09/30/2020    CO2 30 09/30/2020    BUN 14 09/30/2020    CREATININE 0.8 09/30/2020    CALCIUM 10.3 09/30/2020    PHOS 4.1 06/16/2016         Significant Diagnostic Studies    Radiology:   Xr Shoulder Left (min 2 Views)    Result Date: 9/26/2020  PROCEDURE: XR SHOULDER LEFT (MIN 2 VIEWS) CLINICAL INFORMATION: Left shoulder pain TECHNIQUE: 4 views of the left shoulder COMPARISON: None FINDINGS: There is no fracture or dislocation. Joint spaces are preserved. No soft tissue or osseous abnormalities are identified. No fracture or dislocation. Final report electronically signed by Dr. Telma Lua on 9/26/2020 3:09 PM    Xr Chest Portable    Result Date: 9/25/2020  PROCEDURE: XR CHEST PORTABLE CLINICAL INFORMATION: SOB. COMPARISON: January 13, 2020. TECHNIQUE: Portable chest. FINDINGS: Right upper and to a lesser extent bibasilar coarsened mixed airspace and interstitial opacities. Appearance is similar to prior with likely a background of chronic interstitial changes. Superimposed acute findings are not excluded. There is prominent right hilum is redemonstrated. Ectatic thoracic aorta. Cardiomegaly. No significant pleural effusion. No acute osseous findings. IMPRESSION: Right upper and to a lesser extent bibasilar coarsened mixed airspace and interstitial opacities. Appearance is similar to prior with likely a background of chronic interstitial changes. Superimposed acute infiltrate is not excluded. Correlation with symptoms and follow-up as clinically warranted. **This report has been created using voice recognition software. It may contain minor errors which are inherent in voice recognition technology. ** Final report electronically signed by Dr. Tonie Apple on 9/25/2020 6:08 PM    Mri Shoulder Left Wo Contrast    Result Date: 9/28/2020  PROCEDURE: MRI morphologic appearance of the superior aspect of the posterior glenoid labrum consistent with a tear. The inferior aspect of the posterior glenoid labrum is intact and unremarkable. CORACOCLAVICULAR/CORACOACROMIAL/CORACOHUMERAL LIGAMENTS: Intact and unremarkable. SUPERIOR/MIDDLE/INFERIOR GLENOHUMERAL LIGAMENTS: Intact and unremarkable. JOINT FLUID: 1. There is a small amount of fluid within the glenohumeral articulation which is septated within the subscapularis recess consistent with a synovitis. SUBACROMIAL-SUBDELTOID BURSA: 1. There is a small amount of fluid within the subacromial-subdeltoid bursa which may be reactive or related to a bursitis. SOFT TISSUE MASS/PATHOLOGICALLY ENLARGED LYMPHADENOPATHY: None. 1. There is tendinosis of the supraspinatus tendon. There is no partial or full-thickness tear of the rotator cuff. 2. There is tendinosis of the intra-articular portion of the long head of the biceps tendon. There is fluid within the long head of the biceps tendon sheath which is larger than expected for the amount of fluid within the glenohumeral articulation consistent with a tenosynovitis. 3. There is abnormal signal within and abnormal morphologic appearance of the superior glenoid labrum consistent with a superior glenoid labral tear, anterior to posterior. 4. There is abnormal morphologic appearance of the superior aspect of the posterior glenoid labrum consistent with a tear. The inferior aspect of the posterior glenoid labrum is intact and unremarkable. 5. There is a small amount of fluid within the glenohumeral articulation which is septated within the subscapularis recess consistent with a synovitis. 6. There is a small amount of fluid within the subacromial-subdeltoid bursa which may be reactive or related to a bursitis. **This report has been created using voice recognition software. It may contain minor errors which are inherent in voice recognition technology. ** Final report electronically signed by Dr. Emerald Ulloa on 9/28/2020 7:26 AM         Consults:     STR ED TO IP CONSULT  STR ED TO IP CONSULT  IP CONSULT TO INFECTIOUS DISEASES  IP CONSULT TO PULMONOLOGY  IP CONSULT TO ORTHOPEDIC SURGERY  PALLIATIVE CARE EVAL  IP CONSULT TO SOCIAL WORK  IP CONSULT TO SPIRITUAL SERVICES  IP CONSULT TO CARDIOLOGY    Disposition:    [] Home       [] TCU       [] Rehab       [] Psych       [x] SNF       [] [place]       [] Other-    Condition at Discharge: Stable    Code Status:  DNR-CCA     Patient Instructions: Activity: activity as tolerated  Diet: DIET CARDIAC; Follow-up visits:   Ella Pedroza, APRN - CNP  [address]  [phone]    On 10/30/2020  Chest XRAY needed 1-2 days prior to appointment , pulmonology, your appointment time is at 11 AM, Please arrive 15 minutes prior to appointment, bring medications, photo ID, and insurance card. Edwar Bardales [id number]  [address] [address], [address]  [address]  [facility] [address] [address] [address]  [phone]    On 10/19/2020  Cardiology, your appointment time is at 12:15 PM, Please arrive 15 minutes prior to appointment, bring medications, photo ID, and insurance card. Susu Reyes [address] [id number] [facility]  [address]   [phone]      Follow up as needed.      [address] [place] [address] [address]  [address] [facility]  [phone]             Discharge Medications:      Zander Garcia   Home Medication Instructions [facility]:[id number]    Printed on:10/02/20 1337   Medication Information                      albuterol (PROVENTIL) (2.5 MG/3ML) 0.083% nebulizer solution  Take 3 mLs by nebulization every 6 hours as needed for Wheezing or Shortness of Breath             albuterol sulfate HFA (PROAIR HFA) 108 (90 Base) MCG/ACT inhaler  Inhale 2 puffs into the lungs every 6 hours as needed for Wheezing or Shortness of Breath             amLODIPine (NORVASC) 5 MG tablet  Take 1 tablet by mouth daily             amoxicillin (AMOXIL) 500 MG capsule  4 pills 1 hr before dental procedures             apixaban (ELIQUIS) 5 MG TABS tablet  Take 1 tablet by mouth 2 times daily             benzonatate (TESSALON) 100 MG capsule  Take 1 capsule by mouth 3 times daily for 7 days             budesonide (PULMICORT) 0.5 MG/2ML nebulizer suspension  Take 2 mLs by nebulization 2 times daily             Cholecalciferol (VITAMIN D3) 5000 units TABS  Take 5,000 Units by mouth every other day              diclofenac sodium (VOLTAREN) 1 % GEL  Apply 4 g topically 4 times daily as needed for Pain             Lactobacillus (PROBIOTIC ACIDOPHILUS) CAPS  Take 1 capsule by mouth daily              levothyroxine (SYNTHROID) 75 MCG tablet  take 1 tablet by mouth once daily             lidocaine 4 % external patch  Place 2 patches onto the skin daily             loratadine (CLARITIN) 10 MG capsule  Take 10 mg by mouth daily              metoprolol tartrate (LOPRESSOR) 25 MG tablet  Take 0.5 tablets by mouth 2 times daily             MULTIPLE VITAMIN PO  Take 1 tablet by mouth daily              polyethylene glycol (GLYCOLAX) 17 g packet  Take 17 g by mouth daily as needed for Constipation             simvastatin (ZOCOR) 20 MG tablet  TAKE 1 TABLET BY MOUTH  NIGHTLY             sodium chloride, Inhalant, 7 % nebulizer solution  Take 4 mLs by nebulization 2 times daily                  Time Spent on discharge is more than 35 min in the examination, evaluation, counseling and review of medications and discharge plan. Signed: Thank you Addison Kelly MD for the opportunity to be involved in this patient's care.     Electronically signed by Blaine Bennett MD on 10/2/2020 at 1:37 PM

## 2020-10-02 NOTE — PLAN OF CARE
Problem: Discharge Planning:  Goal: Discharged to appropriate level of care  Description: Discharged to appropriate level of care  Outcome: Ongoing  Note: Patient plans to be discharged tomorrow to [facility]. Problem: Pain Control  Goal: Maintain pain level at or below patient's acceptable level (or 5 if patient is unable to determine acceptable level)  Outcome: Ongoing  Note: Pain Assessment: 0-10  Pain Level: 0   Patient's Stated Pain Goal: No pain   Is pain goal met at this time? Yes     Non-Pharmaceutical Pain Intervention(s): Rest, Repositioned, Emotional support     Problem: Cardiovascular  Goal: No DVT, peripheral vascular complications  Outcome: Ongoing  Note: Patient getting Lovenox injections every 12 hours. Goal: Hemodynamic stability  Outcome: Ongoing  Note: Vital signs have remained stable and within normal limits. Vitals being monitored every 4 hours and as needed. Continuous cardiac monitor in place. Patient's heart rhythm flipping between NSR to afib to aflutter. Problem: Respiratory  Goal: No pulmonary complications  Outcome: Ongoing  Note: Patient on 1L NC at rest and 3L's NC with ambulation. Patient's oxygen saturations above 92%. Patient gets SOB with ambulation. Lungs are clear in the upper lobes and diminished with crackles in the bases. Goal: O2 Sat > 90%  Outcome: Ongoing  Note: Patient on 1L NC at rest and 3L's NC with ambulation. Patient's oxygen saturations above 92%. Problem: GI  Goal: No bowel complications  Outcome: Ongoing  Note: No BM tonight. Abdomen is soft, rounded and non-tender. Active bowel sounds heard in all 4 quadrants. Patient is passing gas. Problem:   Goal: Adequate urinary output  Outcome: Ongoing  Note: Patient voiding and adequate amount tonight. Patient voiding clear, yellow urine. Problem: Nutrition  Goal: Optimal nutrition therapy  Outcome: Ongoing  Note: Patient on a cardiac diet.      Problem: Skin Integrity/Risk  Goal:

## 2020-10-02 NOTE — PROGRESS NOTES
[address] [facility],[address] ICU STEPDOWN TELEMETRY 4K - [id number]    Time In: 0470  Time Out: 1338  Timed Code Treatment Minutes: 23 Minutes  Minutes: 33        Date: 10/2/2020  Patient Name: Cherie Salinas,  Gender:  female        MRN: [id number]  : 1935  (80 y.o.)      Referring Practitioner: JULIO CÉSAR Gomes  Diagnosis: Community aquired bacterial pneumonia  Additional Pertinent Hx: Cherie Salinas is a 80 y.o. female admitted for possible Pneumonia. Patient is an 80year old female who presented to [facility] ED with 3 day hx of worsening shortness of breath. She states that her shortness of breath started after receiving flovent from our pulmonary office and using it for 2 days. She states that after using the flovent she had a coughing spell that lasted 30 minutes, the next day she had a coughing spell that lasted for 40 minutes after using the flovent. She states that since that time she feels that her breathing has been worse and is not improving. She denies any cough currently. She denies sputum production, fevers, chills, night sweats. Patient has a significant hx of pulmonary disease including Bronchiectasis, Hx of MAC infection, and Pulmonary fibrosis which is followed here in [address] [place] [address] with Dr. Nestor Beverly as well as a [facility]. Per chart review, patient was seen at [address] [place] [address] and was given oral prednisone for bronchiectasis with the intent of trying ICS if symptoms improved with oral steroids. Per patient her breathing was the best it has ever been while taking the prednisone. Pulmonary medicine was consulted for further evaluation of patients breathing and possible pneumonia.  Per Pulmonology consult     Restrictions/Precautions:  Restrictions/Precautions: Fall Risk  Position Activity Restriction  Other position/activity restrictions: 1L O2 at rest, 3L O2 with activity    Subjective:  Chart Reviewed: Yes  Patient assessed for rehabilitation services?: Yes  Family / Caregiver Present: No  Subjective: RN approved treatment session. Pt supine in recliner upon arrival and was agreeable to therapy. Pt states she is doing well today and hopes to be discharged soon    General:  Overall Orientation Status: Within Normal Limits  Follows Commands: Within Functional Limits    Vision: Within Functional Limits    Hearing: Within functional limits       Pain: Denies    Social/Functional History:    Lives With: Alone  Type of Home: House(stand alone condo)  Home Layout: One level  Home Access: Stairs to enter with rails  Entrance Stairs - Number of Steps: 2  Entrance Stairs - Rails: Right  Home Equipment: Cane(pt states has not used cane in a long time)     Bathroom Shower/Tub: Walk-in shower  Bathroom Toilet: Handicap height  Bathroom Equipment: Shower chair, Hand-held shower       ADL Assistance: Independent  Homemaking Assistance: Needs assistance(had  come to her house but stopped because of COVID)  Ambulation Assistance: Independent  Transfer Assistance: Independent    Active : Yes  Mode of Transportation: Car     Additional Comments: Pt was not using AD PLOF or home O2. OBJECTIVE:  Range of Motion:  Right Lower Extremity: WNL  Left Lower Extremity: WNL    Strength:  Right Lower Extremity: Grossly 4/5  Left Lower Extremity: Grossly 4/5    Balance:  Static Sitting Balance:  Stand By Assistance  Static Standing Balance: Contact Guard Assistance  Dynamic Standing Balance: Contact Guard Assistance    Bed Mobility:  Not Tested   **Due to being in recliner upon arrival    Transfers:  Sit to Stand: Contact Guard Assistance  Stand to Sit:Stand By Assistance, with verbal cues   **verbal cues to reach back for armrests to slow descent, pt able to follow verbal cueing. Sit to stand 1x each from recliner, toilet and bed.      Ambulation:  Contact Guard Assistance, with verbal cues   Distance: 20ft to bathroom  Surface: Level Tile  Device:Rolling Walker  Gait Deviations: Forward Flexed Posture and Unsteady Gait  **Pt required verbal cues to decrease gait speed to increase stability. CGA for safety     Stand By Assistance, Contact Guard Assistance  Distance: 10ft to bed, 100ft around room  Surface: Level Tile  Device:Rolling Walker  Gait Deviations: Forward Flexed Posture and Unsteady Gait  **Pt required verbal cues to decrease gait speed to increase stability. CGA for safety for walk to bed for a ~2-3min rest break so pt could catch her breath then SBA for walk around the room. Pt with significant SOB after walk around room       Exercise:  Patient was guided in 1 set(s) 10 reps of exercise to both lower extremities. Ankle pumps, Heelslides, Hip abduction/adduction, Standing marches and Standing hip flexion. Exercises were completed for increased independence with functional mobility. **All standing exercises performed with RW and with CGA for safety. Significant SOB after exercises. Functional Outcome Measures: Completed  AM-PAC Inpatient Mobility Raw Score : 23  AM-PAC Inpatient T-Scale Score : 56.93    ASSESSMENT:  Activity Tolerance:  Patient tolerance of  treatment: fair. Due to quick fatigue with ambulation       Treatment Initiated: Treatment and education initiated within context of evaluation. Evaluation time included review of current medical information, gathering information related to past medical, social and functional history, completion of standardized testing, formal and informal observation of tasks, assessment of data and development of plan of care and goals. Treatment time included skilled education and facilitation of tasks to increase safety and independence with functional mobility for improved independence and quality of life. see education and gait     Assessment:   Body structures, Functions, Activity limitations: Decreased strength, Decreased endurance, Decreased balance  Assessment: Pt is an 79 yo female that presents with severe SOB with activity. Pt is on 1L O2 and rest and 3L O2 with activity. Pt baseline is not using supplemental O2. Pt required multiple rest breaks with noted SOB during supine exercises and with ambulation. Pt demo decrease in baseline function in strength, endurance and balance and would benefit from continued skilled PT to address these impairments for return to OF. Prognosis: Good    REQUIRES PT FOLLOW UP: Yes    Discharge Recommendations:  Discharge Recommendations: [address]    Patient Education:  PT Education: Goals, PT Role, Plan of Care, Energy Conservation, General Safety   **Pt educated on taking frequent rest breaks. Educated multiple times breath through her nose for the supplemental oxygen as pt is a habitual mouth breather. Equipment Recommendations:  Equipment Needed: No    Plan:  Times per week: 3-5x GM  Current Treatment Recommendations: Strengthening, Balance Training, Endurance Training, Transfer Training, Gait Training, Stair training, Home Exercise Program, Safety Education & Training, Patient/Caregiver Education & Training    Goals:  Patient goals : to go to [facility]  Short term goals  Time Frame for Short term goals: at discharge  Short term goal 1: Pt demo supine to sit with S for ease getting in and out of bed  Short term goal 2: Pt demo sit to stand with S for ease gettting off different surfaces  Short term goal 3: Pt to ambulate >250ft with S and RW for ease with community distances  Short term goal 4: Pt demo 2 stairs with R handrail for ease getting in and out of house  Long term goals  Time Frame for Long term goals : N/A due to short ELOS    Following session, patient left in safe position with all fall risk precautions in place.

## 2020-10-02 NOTE — PROGRESS NOTES
[facility]    Patient - Brook Wilkerson   MRN -  [id number]   [facility]   - 1935      Date of Admission -  2020  4:36 PM  Date of evaluation -  10/2/2020  [facility] Day - 7  Consulting - Ricardo Leung MD Primary Care Physician - Karen Handy MD   Chief Complaint   SOB and cough  Active Hospital Problem List      Active Hospital Problems    Diagnosis Date Noted    Typical atrial flutter [facility]) [I48.3] 2020    Community acquired pneumonia of right lung [J18.9] 2020    Cough in adult [R05] 2017    Bronchiectasis with (acute) exacerbation [facility]) [G28.8]      DOMINGUEZ Wilkerson is a 80 y.o. female admitted for possible Pneumonia. Patient is an 80year old female who presented to [facility] ED with 3 day hx of worsening shortness of breath. She states that her shortness of breath started after receiving flovent from our pulmonary office and using it for 2 days. She states that after using the flovent she had a coughing spell that lasted 30 minutes, the next day she had a coughing spell that lasted for 40 minutes after using the flovent. She states that since that time she feels that her breathing has been worse and is not improving. She denies any cough currently. She denies sputum production, fevers, chills, night sweats. Patient has a significant hx of pulmonary disease including Bronchiectasis, Hx of MAC infection, and Pulmonary fibrosis which is followed here in [facility] with Dr. Leonardo Casiano as well as a [facility]. Per chart review, patient was seen at [facility] and was given oral prednisone for bronchiectasis with the intent of trying ICS if symptoms improved with oral steroids. Per patient her breathing was the best it has ever been while taking the prednisone. Pulmonary medicine was consulted for further evaluation of patients breathing and possible pneumonia.      No prior smoking right knee    MALIGNANT SKIN LESION EXCISION Left 06/12/2017    BCC DORSAL FOOT WITH GRAFT & FS    GOSIA AND BSO  1982    TONSILLECTOMY AND ADENOIDECTOMY  1940 ?  TOTAL KNEE ARTHROPLASTY Right January 6th, 2014    OIO     Diet    DIET CARDIAC; Allergies    Levaquin [levofloxacin]; Percocet [oxycodone-acetaminophen]; Rifampin; Sulfa antibiotics; and Cefuroxime  Social History     Social History     Socioeconomic History    Marital status:      Spouse name: Not on file    Number of children: Not on file    Years of education: Not on file    Highest education level: Not on file   Occupational History    Occupation: retired   Social Needs    Financial resource strain: Not on file    Food insecurity     Worry: Not on file     Inability: Not on file   [employer] needs     Medical: Not on file     Non-medical: Not on file   Tobacco Use    Smoking status: Never Smoker    Smokeless tobacco: Never Used   Substance and Sexual Activity    Alcohol use:  Yes     Alcohol/week: 0.0 standard drinks     Comment: socially-1 glassof wine 2-3 times a month    Drug use: No    Sexual activity: Never   Lifestyle    Physical activity     Days per week: Not on file     Minutes per session: Not on file    Stress: Not on file   Relationships    Social connections     Talks on phone: Not on file     Gets together: Not on file     Attends Orthodox service: Not on file     Active member of club or organization: Not on file     Attends meetings of clubs or organizations: Not on file     Relationship status: Not on file    Intimate partner violence     Fear of current or ex partner: Not on file     Emotionally abused: Not on file     Physically abused: Not on file     Forced sexual activity: Not on file   Other Topics Concern    Not on file   Social History Narrative    Not on file     Family History          Problem Relation Age of Onset    Diabetes Mother     Thyroid Disease Mother     Arthritis Mother  High Blood Pressure Mother     Arthritis Father     High Blood Pressure Father     Other Father         hay fever    Breast Cancer Neg Hx     Ovarian Cancer Neg Hx      Sleep History    n/a  Meds    Current Medications    amLODIPine  5 mg Oral Daily    metoprolol tartrate  12.5 mg Oral BID    enoxaparin  1 mg/kg Subcutaneous Q12H    magnesium replacement protocol   Other RX Placeholder    azithromycin  250 mg Oral QPM    lidocaine  2 patch Transdermal Daily    benzonatate  100 mg Oral TID    budesonide  0.5 mg Nebulization BID    cefTRIAXone (ROCEPHIN) IV  1 g Intravenous Q24H    Vitamin D  5,000 Units Oral Every Other Day    lactobacillus  1 capsule Oral Daily    levothyroxine  75 mcg Oral Daily    cetirizine  10 mg Oral Daily    multivitamin  1 tablet Oral Daily    atorvastatin  10 mg Oral Daily    sodium chloride flush  10 mL Intravenous 2 times per day     albuterol, magnesium hydroxide, diclofenac sodium, acetaminophen, sodium chloride flush, polyethylene glycol, promethazine **OR** ondansetron  IV Drips/Infusions    Vitals    Vitals    height is 5' 4\" (1.626 m) and weight is 151 lb 8 oz (68.7 kg). Her oral temperature is 97.9 °F (36.6 °C). Her blood pressure is 125/57 (abnormal) and her pulse is 82. Her respiration is 18 and oxygen saturation is 90%. O2 Flow Rate (L/min): 1 L/min  I/O    Intake/Output Summary (Last 24 hours) at 10/2/2020 0840  Last data filed at 10/2/2020 0457  Gross per 24 hour   Intake 1029 ml   Output 550 ml   Net 479 ml     Patient Vitals for the past 96 hrs (Last 3 readings):   Weight   10/02/20 0457 151 lb 8 oz (68.7 kg)   10/01/20 0322 151 lb 9.6 oz (68.8 kg)   09/29/20 0330 155 lb 3.2 oz (70.4 kg)     Exam   Physical Exam   Constitutional: OOB to chair INAD on room air. Patient appears elderly  Mouth/Throat: Oropharynx is clear and moist.  No oral thrush. Eyes: Conjunctivae are normal. Pupils are equal, round. No scleral icterus. Neck: Neck supple.  No tracheal deviation present. No cervical lymphadenopathy  Cardiovascular: S1 and S2 with no murmur. No peripheral edema  Pulmonary/Chest: Normal effort with bilateral air entry, lung sounds diminished diffusely, no wheezes/rales/rhonchi. No stridor. No respiratory distress. Patient exhibits no tenderness. Abdominal: Soft. Bowel sounds audible. No distension or tenderness to palp. Musculoskeletal: Moves all extremities; no cyanosis/clubbing  Neurological: Patient is awake and alert. Skin: Warm and dry. Labs   ABG  Lab Results   Component Value Date    PH 7.41 09/25/2020    PO2 61 09/25/2020    PCO2 39 09/25/2020    HCO3 25 09/25/2020    O2SAT 91 09/25/2020     Lab Results   Component Value Date    IFIO2 2 01/08/2020     CBC  No results for input(s): WBC, RBC, HGB, HCT, MCV, MCH, MCHC, RDW, PLT, MPV in the last 72 hours. BMP  Recent Labs     09/30/20  0900      K 5.0      CO2 30   BUN 14   CREATININE 0.8   GLUCOSE 118*   MG 2.1   CALCIUM 10.3     LFT  No results for input(s): AST, ALT, ALB, BILITOT, ALKPHOS, LIPASE in the last 72 hours. Invalid input(s): AMYLASE  TROP  Lab Results   Component Value Date    TROPONINT < 0.010 09/30/2020    TROPONINT < 0.010 09/25/2020    TROPONINT < 0.010 01/06/2020     BNP  Lab Results   Component Value Date    PROBNP 864.5 09/25/2020    PROBNP 506.4 01/06/2020    PROBNP 52.4 11/09/2016     D-Dimer  No results found for: DDIMER  Lactic Acid  No results for input(s): LACTA in the last 72 hours. INR  No results for input(s): INR, PROTIME in the last 72 hours. PTT  No results for input(s): APTT in the last 72 hours. Glucose  No results for input(s): POCGLU in the last 72 hours. UA No results for input(s): SPECGRAV, PHUR, COLORU, CLARITYU, MUCUS, PROTEINU, BLOODU, RBCUA, WBCUA, BACTERIA, NITRU, GLUCOSEU, BILIRUBINUR, UROBILINOGEN, KETUA, LABCAST, LABCASTTY, AMORPHOS in the last 72 hours. Invalid input(s): CRYSTALS.     She underwent bronch on malignant cells seen. C. Lung, right lower lobe posterior segment brush tip, brushing:   Benign bronchial epithelium and acute inflammation.   No malignant cells seen.       PFTs         Echo    09/30/2020   ECHOCARDIOGRAM COMPLETE 2D W DOPPLER W COLOR. Conclusions      Summary   Ejection fraction is visually estimated at 55%. Overall left ventricular function is normal.   Aortic valve appears tricuspid. Aortic valve leaflets are somewhat thickened. Aortic valve leaflets are Mildly calcified. There is a small localized posterior pericardial effusion noted. Signature      ----------------------------------------------------------------   Electronically signed by Benedetta Severs MD (Interpreting   physician) on 09/30/2020 at 03:42 PM      ECHO completed in 2018 at [facility]  CONCLUSIONS:  - Exam indication: Shortness of Breath  - The left ventricle is normal in size. Left ventricular systolic function is   hyperdynamic. EF = 77 ± 5% (2D biplane) Normal left ventricular diastolic   function.  - The right ventricle is normal in size. Right ventricular systolic function is   normal.  - The patient has not had a prior  echocardiographic exam for comparison. Electronically signed by Fritz Knott MD on 8/2/2018 at 4:56:15 PM  Cultures    Procalcitonin  Lab Results   Component Value Date    PROCAL 0.13 09/26/2020    PROCAL 0.12 09/25/2020     SARS-CoV-2, NAAT  NOT DETECTED  NOT DETECTED  Final  09/25/2020  6:16 PM      Blood Culture, Routine  09/25/2020  6:29 PM  [facility]    No growth-preliminary      Blood Culture, Routine  09/25/2020  6:48 PM  [facility]    No growth-preliminary      Strep Pneumo antigen- (-)  Legionella antigen- (-)    MRSA PCR- negative    Radiology    CXR  9/25/2020   IMPRESSION:    Right upper and to a lesser extent bibasilar coarsened mixed airspace and interstitial opacities.  Appearance is similar to prior with likely a background of chronic interstitial changes. Superimposed acute infiltrate is not excluded. Correlation with    symptoms and follow-up as clinically warranted. CT Scans  Ct chest high resolution 8/25/2020      Impression    1. Irregular groundglass and confluent densities are demonstrated within the periphery of the right lung left middle and upper lobes. This is most pronounced within the right apex. This may represent sequela from pulmonary fibrosis         2. There is right upper lobe bronchiectasis demonstrated.         **This report has been created using voice recognition software.  It may contain minor errors which are inherent in voice recognition technology. **         Final report electronically signed by Dr. Ronn Rodriguez on 8/25/2020 1:44 PM                       9/28/2020   MRI SHOULDER LEFT WO CONTRAST   1. There is tendinosis of the supraspinatus tendon. There is no partial or full-thickness tear of the rotator cuff. 2. There is tendinosis of the intra-articular portion of the long head of the biceps tendon. There is fluid within the long head of the biceps tendon sheath which is larger than expected for the amount of fluid within the glenohumeral articulation consistent with a tenosynovitis. 3. There is abnormal signal within and abnormal morphologic appearance of the superior glenoid labrum consistent with a superior glenoid labral tear, anterior to posterior. 4. There is abnormal morphologic appearance of the superior aspect of the posterior glenoid labrum consistent with a tear. The inferior aspect of the posterior glenoid labrum is intact and unremarkable. 5. There is a small amount of fluid within the glenohumeral articulation which is septated within the subscapularis recess consistent with a synovitis. 6. There is a small amount of fluid within the subacromial-subdeltoid bursa which may be reactive or related to a bursitis.      (See actual reports for details)    Assessment   New onset atrial flutter - Patient experienced atrial flutter overnight (9/29/2020). EKG obtained cardiology was consulted, PCP was notified, and ECHO ordered. Patient had previous oncologic stress test on 11/9/2016 which was unremarkable. Patient also had 2D echocardiogram at that time which showed EF equal to 55 to 65% with impaired relaxation compatible with diastolic dysfunction. Patient had follow-up ECHO on in 2018 at [facility] which revealed EF=77%. HFpEF - ECHO 11/9/2016 with EF 55 to 86% and diastolic dysfunction. Acute hypoxic respiratory failure - Patient was requiring 2 L of oxygen via nasal cannula to maintain adequate oxygen saturation. The patient does not use oxygen at home. This is likely secondary to exacerbation of bronchiectasis. Bronchiectasis - Secondary to past history of MAC infection in 2017. The patient follows with centers of pulmonary medicine as well as the [place] clinic. Etiology of exacerbation is unclear but likely secondary to pneumonia versus medication induced versus worsening pulmonary fibrosis. Initiated on azithromycin, cefepime, and ceftriaxone. Pulmonary fibrosis -patient follows with [facility] with a suspected etiology secondary to MAC infection that occurred in 2017. She is currently on nebulized albuterol and saline at home. Patient trialed Flovent at home which resulted in excessive coughing leading to discontinuation. Hx of MAC infection: 2017  Hx of hypertension  Hx of dyslipidemia  Hx of osteoarthritis    Recommendations   - Continue nebulized albuterol as needed.   - Antibiotics per infectious disease  - Continue Pulmicort 0.5 mg via nebulizer twice daily.  -Tessalon 100 mg by mouth 3 times daily for cough  - Home O2 evaluation at the time of discharge from the [facility]  - Continue encouragement of incentive spirometry and Acapella  - Cardiology following event monitor as outpatient   - Plan is for ECF upon discharge  - RX for Pulmicort nebs already to patients home pharmacy upon DC from ECF  -Continue 2-3LPM O2 with activity    -Follow up at [address] for Pulmonary Medicine with Marcus Negrete CNP in 1 months with CXR 2-view 1-2 days prior to appt- testing ordered in [address] [place] [address] Rd   -Will keep appt with Andrey Aguayo CNP on 12/7/20 @ 1:30 PM with PFT scheduled for 12/3/20 @ 1:15 PM    Case discussed with nurse and patient/family. Questions and concerns addressed. Meds and Orders reviewed. Electronically signed by Ama Weiss on 10/2/2020 at 8:40 AM     Addendum by Dr. Latricia Reed MD:  I have seen and examined the patient independently. Face to face evaluation and examination was performed. The above evaluation and note has been reviewed. Labs and radiographs were reviewed. I Have discussed with Dr.Brittany Berry- Medical student about this patient in detail. The above assessment and plan has been reviewed. Please see my modifications mentioned below. My modifications:  She is on 1LPM via nasal cannula. Improved cough. Follow up as above.     Claire Pablo MD 10/2/2020 5:19 PM

## 2020-10-02 NOTE — PROGRESS NOTES
INTERNAL MEDICINE SPECIALTIES  Progress Note For Dr Arcelia Coelho       Patient:  Bridgette Franks  YOB: 1935  Date of Service: 10/2/2020  MRN: [id number]   Acct:  [de-identified]   Primary Care Physician: Jarett Stubbs MD    SUBJECTIVE: Has no complaints    Home Medications:   No current facility-administered medications on file prior to encounter.       Current Outpatient Medications on File Prior to Encounter   Medication Sig Dispense Refill    amLODIPine (NORVASC) 10 MG tablet TAKE 1 TABLET BY MOUTH  DAILY 90 tablet 3    levothyroxine (SYNTHROID) 75 MCG tablet take 1 tablet by mouth once daily 90 tablet 3    simvastatin (ZOCOR) 20 MG tablet TAKE 1 TABLET BY MOUTH  NIGHTLY 90 tablet 3    albuterol (PROVENTIL) (2.5 MG/3ML) 0.083% nebulizer solution Take 3 mLs by nebulization every 6 hours as needed for Wheezing or Shortness of Breath 120 each 3    loratadine (CLARITIN) 10 MG capsule Take 10 mg by mouth daily       Lactobacillus (PROBIOTIC ACIDOPHILUS) CAPS Take 1 capsule by mouth daily       Cholecalciferol (VITAMIN D3) 5000 units TABS Take 5,000 Units by mouth every other day       MULTIPLE VITAMIN PO Take 1 tablet by mouth daily       Acetylcysteine (NAC) 500 MG CAPS Take 600 mg by mouth 2 times daily      amoxicillin (AMOXIL) 500 MG capsule 4 pills 1 hr before dental procedures 4 capsule 3    albuterol sulfate HFA (PROAIR HFA) 108 (90 Base) MCG/ACT inhaler Inhale 2 puffs into the lungs every 6 hours as needed for Wheezing or Shortness of Breath 1 Inhaler 11    sodium chloride, Inhalant, 7 % nebulizer solution Take 4 mLs by nebulization 2 times daily            Scheduled Meds:   amLODIPine  5 mg Oral Daily    metoprolol tartrate  12.5 mg Oral BID    enoxaparin  1 mg/kg Subcutaneous Q12H    magnesium replacement protocol   Other RX Placeholder    azithromycin  250 mg Oral QPM    lidocaine  2 patch Transdermal Daily    benzonatate  100 mg Oral TID    budesonide  0.5 mg Nebulization BID  cefTRIAXone (ROCEPHIN) IV  1 g Intravenous Q24H    Vitamin D  5,000 Units Oral Every Other Day    lactobacillus  1 capsule Oral Daily    levothyroxine  75 mcg Oral Daily    cetirizine  10 mg Oral Daily    multivitamin  1 tablet Oral Daily    atorvastatin  10 mg Oral Daily    sodium chloride flush  10 mL Intravenous 2 times per day     Continuous Infusions:  PRN Meds:albuterol, magnesium hydroxide, diclofenac sodium, acetaminophen, sodium chloride flush, polyethylene glycol, promethazine **OR** ondansetron        Allergies:  Levaquin [levofloxacin]; Percocet [oxycodone-acetaminophen]; Rifampin; Sulfa antibiotics; and Cefuroxime    OBJECTIVE:    Vitals:   Vitals:    10/02/20 0807   BP:    Pulse:    Resp:    Temp:    SpO2: 94%      BMI: Body mass index is 26 kg/m². PHYSICAL EXAMINATION:          General appearance:Ill looking elderly female ;  No apparent distress, appears stated age and cooperative. HEENT:  Normal cephalic, atraumatic without obvious deformity. Pupils equal, round, and reactive to light.  Extra ocular muscles intact. Conjunctivae/corneas clear. Neck: Supple   Respiratory:  Normal respiratory effort., bilateral basilar crackles. Cardiovascular:  irregular rhythm with normal S1/S2 without ,rubs or gallops. Abdomen: Soft, nontender , non-distended with normal bowel sounds. Musculoskeletal:  No clubbing, cyanosis or edema bilaterally. less tender left shoulder . Reduced ROM  Skin: Skin color, texture, turgor normal.  No rashes or lesions. Neurologic: Alert and oriented x 3, without any focal motor deficits. Psychiatric:   Thought content appropriate, normal insight    Review of Labs and Diagnostic Testing:    No results found for this or any previous visit (from the past 24 hour(s)).     Radiology:     Xr Shoulder Left (min 2 Views)    Result Date: 9/26/2020  PROCEDURE: XR SHOULDER LEFT (MIN 2 VIEWS) CLINICAL INFORMATION: Left shoulder pain TECHNIQUE: 4 views of the left shoulder ([facility] [address]  Pulmonary fibrosis ([facility] [address]  History of pulmonary Mycobacterium avium complex  Hypertension  Dyslipidemia   Osteoarthritis  Left shoulder injury      PLAN:  Remains stable through the night, she is status post 4 sec sinus pause at spontaneous conversion from atrial flutter to NSR yesterday,  Has been seen by the Cardiology service,TSH/FT4 were normal ,ECHO is as noted above, trended troponin have been negative,  Has been on low dose lopressor with blood pressure and pulse parameters, cardiologist has recommended continuing this for now and maintain the patient on a 2 week Holter and okayed for discharge  [id number] score - 4,  Started on eliquis for stroke prevention, risks of bleeding was discussed with the patient. Continue bronchodilators, O2, broad spectrum antibiotics to cover community acquired organisms, ID / Pulmonary  service is following. Has negative legionella and strep pneumonia urinary antigens, follow-up blood cultures, sputum gram stain c/s, Incentive spirometry , acapella, rapid influenza screen was negative. MRI left shoulder consistent with left glenoid labral tear,  Supraspinatus tendinosis and long head of the biceps tendon tenosynovitis,  Seen by orthopedic  Service,  May use diclofenac gel to the left shoulder.  Continue Lidoderm patch for now    COVID-19 screen was negative prior ,  ECF discharge today    DVT prophylaxis: [] Lovenox                                 [] SCDs                                 [] SQ Heparin                                 [] Encourage ambulation, low risk for DVT, no chemical or mechanical prophylaxis necessary              [x] Already on Anticoagulation                Anticipated Disposition upon discharge: [] Home                                                                         [] Home with Home Health                                                                         [x] [facility] [] [facility] [address]          Electronically signed by Philippe Diaz MD on 10/2/2020 at 8:14 AM

## 2020-10-02 NOTE — PROGRESS NOTES
CLINICAL PHARMACY: [address] [facility] [place] Patient?: No  Total # of Interventions Recommended: 0   -   Total # Interventions Accepted: 0  Intervention Severity:   - Level 1 Intervention Present?: No   - Level 2 #: 0   - Level 3 #: 0   Time Spent (min): 15    Additional Documentation:    Sylvia Peña PharmD, BCPS   10/2/2020  2:30 PM

## 2020-10-02 NOTE — CARE COORDINATION
10/2/20, 3:15 PM EDT    Patient goals/plan/ treatment preferences discussed by  and . Patient goals/plan/ treatment preferences reviewed with patient/ family. Patient/ family verbalize understanding of discharge plan and are in agreement with goal/plan/treatment preferences. Understanding was demonstrated using the teach back method. AVS provided by RN at time of discharge, which includes all necessary medical information pertaining to the patients current course of illness, treatment, post-discharge goals of care, and treatment preferences. Services After Discharge  Services At/After Discharge: Skilled Therapy, Nursing Services(Jef Hoven)   IMM Letter  IMM Letter given to Patient/Family/Significant other/Guardian/POA/by[de-identified] CM  IMM Letter date given[de-identified] 10/02/20  IMM Letter time given[de-identified] 200     Spoke with Natali at [facility] and she stated that they are ready for the patient. Faxed AVS and last dose MAR to facility. RN has number to call report. Patient is ready to go and friend is on her way to transport. RN to let patient take a facility oxygen tank and return it to hospital.  Figueroa Aguilar RN will work out with friend transporting patient. No further needs voiced.

## 2020-10-09 NOTE — PROGRESS NOTES
Physician Progress Note      PATIENT:               Nj Mendiola  CSN #:                  [id number]  :                       1935  ADMIT DATE:       2020 4:36 PM  Steve Giles [place] DATE:        10/2/2020 4:10 PM  RESPONDING  PROVIDER #:        Louise Romero MD          QUERY TEXT:    Pt admitted with Pneumonia. Pt noted to have WBC 12.2, procal 0.12 and CRP   22.97. If possible, please document in the progress notes and discharge   summary if you are evaluating and /or treating any of the following: The medical record reflects the following:  Risk Factors: Elderly  Clinical Indicators: Lactic acid 1.4, WBC 12.2, procal 0.12, CRP 22.97, no   fever  Treatment: IV atb's, Lab monitoring, imaging    Thank You! Prakash De La Torre RN  RN Clinical   (P) [phone] (S) [phone]  Options provided:  -- Sepsis, present on admission  -- No Sepsis, Pneumonia only  -- Sepsis was ruled out  -- Other - I will add my own diagnosis  -- Disagree - Not applicable / Not valid  -- Disagree - Clinically unable to determine / Unknown  -- Refer to Clinical Documentation Reviewer    PROVIDER RESPONSE TEXT:    This patient has Pneumonia only, patient is not septic.     Query created by: Barbara Caal on 10/8/2020 7:02 AM      Electronically signed by:  Louise Romero MD 10/9/2020 7:20 AM

## 2020-10-19 ENCOUNTER — OFFICE VISIT (OUTPATIENT)
Dept: CARDIOLOGY CLINIC | Age: 85
End: 2020-10-19
Payer: MEDICARE

## 2020-10-19 VITALS
WEIGHT: 155.8 LBS | SYSTOLIC BLOOD PRESSURE: 121 MMHG | DIASTOLIC BLOOD PRESSURE: 70 MMHG | HEART RATE: 76 BPM | HEIGHT: 64 IN | BODY MASS INDEX: 26.6 KG/M2

## 2020-10-19 PROCEDURE — 1111F DSCHRG MED/CURRENT MED MERGE: CPT | Performed by: NUCLEAR MEDICINE

## 2020-10-19 PROCEDURE — G8399 PT W/DXA RESULTS DOCUMENT: HCPCS | Performed by: NUCLEAR MEDICINE

## 2020-10-19 PROCEDURE — 4040F PNEUMOC VAC/ADMIN/RCVD: CPT | Performed by: NUCLEAR MEDICINE

## 2020-10-19 PROCEDURE — G8427 DOCREV CUR MEDS BY ELIG CLIN: HCPCS | Performed by: NUCLEAR MEDICINE

## 2020-10-19 PROCEDURE — G8482 FLU IMMUNIZE ORDER/ADMIN: HCPCS | Performed by: NUCLEAR MEDICINE

## 2020-10-19 PROCEDURE — 1036F TOBACCO NON-USER: CPT | Performed by: NUCLEAR MEDICINE

## 2020-10-19 PROCEDURE — 1090F PRES/ABSN URINE INCON ASSESS: CPT | Performed by: NUCLEAR MEDICINE

## 2020-10-19 PROCEDURE — G8417 CALC BMI ABV UP PARAM F/U: HCPCS | Performed by: NUCLEAR MEDICINE

## 2020-10-19 PROCEDURE — 99214 OFFICE O/P EST MOD 30 MIN: CPT | Performed by: NUCLEAR MEDICINE

## 2020-10-19 PROCEDURE — 1123F ACP DISCUSS/DSCN MKR DOCD: CPT | Performed by: NUCLEAR MEDICINE

## 2020-10-19 RX ORDER — BENZONATATE 100 MG/1
100 CAPSULE ORAL 3 TIMES DAILY PRN
COMMUNITY
End: 2020-12-29

## 2020-10-19 RX ORDER — PREDNISONE 20 MG/1
20 TABLET ORAL DAILY
COMMUNITY
End: 2020-10-30

## 2020-10-19 NOTE — PROGRESS NOTES
[facility],[address]  [address] [place] [id number]  Dept: [phone]  Dept Fax: [phone]  Loc: [phone]    Visit Date:     Brook Wilkerson is a 80 y.o. female who presents todayfor:  Chief Complaint   Patient presents with    Follow-Up from [address] [place] [address] 151 of Breath    Atrial Fibrillation   sent from the NH due to mild CHF on CXR  No known CAD before  Does have as A fib   New onset A fib   Rate control   She does have pulmonary fibrosis   On home O2  No chest pain  BP is stable   No dizziness  No syncope        HPI:  HPI  Past Medical History:   Diagnosis Date    Anemia     normal on pre-op 2012 and 13    Backache     h/o    Bronchiectasis ([address]     Bursitis     bilateral shoulders    Constipation     Diverticulosis of colon     HTN (hypertension)     Hypercalcemia     slightly elevated at 10.8 pre-op 13    Hyperlipidemia     Nodular goiter     bx negative    OA (osteoarthritis)     bilateral thumbs - sees Dr. Sheridan Grade Osteopenia 2013    Parathyroid adenoma 2013    Pneumonia of right upper lobe due to infectious organism     Pneumonitis     Dr. Bismark Manning in 2010 - bx negative    Pulmonary Mycobacterium avium complex (MAC) infection ([address]     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 ([address]     had one parathyroid removed - now follows with Dr. Krish Hoffman Sinusitis     with seasonal allergies    Vitamin D deficiency 2018      Past Surgical History:   Procedure Laterality Date    ABDOMINAL EXPLORATION SURGERY  2013    Release of KATHERINE TAMAYO-Dr. Thao Burgess    APPENDECTOMY      BRONCHOSCOPY N/A 2020    BRONCHOSCOPY FLUOROSCOPY performed by Abbie Barrera MD at [address] WITH IMPLANT Bilateral  ?      SECTION  3813,2711    DILATION AND CURETTAGE OF UTERUS  8049,5774,1615    EXCISION OF PARATHYROID MASS Right 05/29/2013    rt parathyroidectomy (excision of rt inferior parathyroid mass) exploration of neck     FOOT SURGERY  2002    left    FOOT SURGERY Left 06/12/2017    Dr Thelma Smith  6-4-12    left knee    JOINT REPLACEMENT  01/2014    right knee    MALIGNANT SKIN LESION EXCISION Left 06/12/2017    BCC DORSAL FOOT WITH GRAFT & FS    GOSIA AND BSO  1982    TONSILLECTOMY AND ADENOIDECTOMY  1940 ?  TOTAL KNEE ARTHROPLASTY Right January 6th, 2014    [facility]     Family History   Problem Relation Age of Onset    Diabetes Mother     Thyroid Disease Mother     Arthritis Mother     High Blood Pressure Mother     Arthritis Father     High Blood Pressure Father     Other Father         hay fever    Breast Cancer Neg Hx     Ovarian Cancer Neg Hx      Social History     Tobacco Use    Smoking status: Never Smoker    Smokeless tobacco: Never Used   Substance Use Topics    Alcohol use:  Yes     Alcohol/week: 0.0 standard drinks     Comment: socially-1 glassof wine 2-3 times a month      Current Outpatient Medications   Medication Sig Dispense Refill    predniSONE (DELTASONE) 20 MG tablet Take 20 mg by mouth daily      benzonatate (TESSALON) 100 MG capsule Take 100 mg by mouth 3 times daily as needed for Cough      metoprolol tartrate (LOPRESSOR) 25 MG tablet Take 0.5 tablets by mouth 2 times daily 60 tablet 3    amLODIPine (NORVASC) 5 MG tablet Take 1 tablet by mouth daily 30 tablet 3    diclofenac sodium (VOLTAREN) 1 % GEL Apply 4 g topically 4 times daily as needed for Pain 1 Tube 3    lidocaine 4 % external patch Place 2 patches onto the skin daily      polyethylene glycol (GLYCOLAX) 17 g packet Take 17 g by mouth daily as needed for Constipation 527 g 1    apixaban (ELIQUIS) 5 MG TABS tablet Take 1 tablet by mouth 2 times daily 180 tablet 1    budesonide (PULMICORT) 0.5 MG/2ML nebulizer suspension Take 2 mLs by nebulization 2 times daily 60 ampule 3    levothyroxine (SYNTHROID) 75 MCG tablet take 1 tablet by mouth once daily 90 tablet 3    simvastatin (ZOCOR) 20 MG tablet TAKE 1 TABLET BY MOUTH  NIGHTLY 90 tablet 3    albuterol (PROVENTIL) (2.5 MG/3ML) 0.083% nebulizer solution Take 3 mLs by nebulization every 6 hours as needed for Wheezing or Shortness of Breath 120 each 3    albuterol sulfate HFA (PROAIR HFA) 108 (90 Base) MCG/ACT inhaler Inhale 2 puffs into the lungs every 6 hours as needed for Wheezing or Shortness of Breath 1 Inhaler 11    loratadine (CLARITIN) 10 MG capsule Take 10 mg by mouth daily       Lactobacillus (PROBIOTIC ACIDOPHILUS) CAPS Take 1 capsule by mouth daily       Cholecalciferol (VITAMIN D3) 5000 units TABS Take 5,000 Units by mouth every other day       MULTIPLE VITAMIN PO Take 1 tablet by mouth daily        No current facility-administered medications for this visit.       Allergies   Allergen Reactions    Levaquin [Levofloxacin] Other (See Comments)     Hallucinations    Percocet [Oxycodone-Acetaminophen] Nausea Only and Other (See Comments)     Affects memory    Rifampin Other (See Comments)     Lost half of vision    Sulfa Antibiotics Other (See Comments)     hallucinations    Cefuroxime Diarrhea, Nausea And Vomiting and Other (See Comments)     cramping     Health Maintenance   Topic Date Due    Lipid screen  08/18/2021    Annual Wellness Visit (AWV)  09/09/2021    TSH testing  09/30/2021    Potassium monitoring  09/30/2021    Creatinine monitoring  09/30/2021    Breast cancer screen  10/08/2021    DTaP/Tdap/Td vaccine (2 - Td) 05/17/2023    DEXA (modify frequency per FRAX score)  Completed    Flu vaccine  Completed    Shingles Vaccine  Completed    Pneumococcal 65+ years Vaccine  Completed    Hepatitis A vaccine  Aged Out    Hepatitis B vaccine  Aged Out    Hib vaccine  Aged Out    Meningococcal (ACWY) vaccine  Aged Out       Subjective:  Review of Systems  General:   No fever, no chills, some fatigue or weight loss  Pulmonary:    some baseline dyspnea, no wheezing  Cardiac:    Denies recent chest pain,   GI:     No nausea or vomiting, no abdominal pain  Neuro:     No dizziness or light headedness,   Musculoskeletal:  No recent active issues  Extremities:   No edema, no obvious claudication       Objective:  Physical Exam  /70   Pulse 76   Ht 5' 4\" (1.626 m)   Wt 155 lb 12.8 oz (70.7 kg)   BMI 26.74 kg/m²   General:   Well developed, well nourished  Lungs:    Decreased air   Heart:    Normal S1 S2, Slight murmur. no rubs, no gallops  Abdomen:   Soft, non tender, no organomegalies, positive bowel sounds  Extremities:   No edema, no cyanosis, good peripheral pulses  Neurological:   Awake, alert, oriented. No obvious focal deficits  Musculoskelatal:  No obvious deformities    Assessment:      Diagnosis Orders   1. Permanent atrial fibrillation ([address]     2. Essential hypertension     3. Pulmonary fibrosis ([address]     likely the CHF on the CXR is more fibrosis   Doubt CHF yet could be possible   Clinically no change to suspect it       Plan:  No follow-ups on file. As above  Monitor the symptoms  Diuretics if needed  Continue risk factor modification and medical management  Thank you for allowing me to participate in the care of your patient. Please don't hesitate to contact me regarding any further issues related to the patient care    Orders Placed:  No orders of the defined types were placed in this encounter. Medications Prescribed:  No orders of the defined types were placed in this encounter. Discussed use, benefit, and side effects of prescribed medications. All patient questions answered. Pt voicedunderstanding. Instructed to continue current medications, diet and exercise. Continue risk factor modification and medical management. Patient agreed with treatment plan. Follow up as directed.     Electronically signedby Orlando Ferrell MD on 10/19/2020 at 12:35 PM

## 2020-10-24 NOTE — PROCEDURES
[address] [place] [address]                                 EVENT MONITOR    PATIENT NAME: Trice Field                    :        1935  MED REC NO:   [id number]                           ROOM:       0024  ACCOUNT NO:   [de-identified]                           ADMIT DATE: 2020  PROVIDER:     Misael Troy M.D.    TEST TYPE:  Event monitor. CLINICAL HISTORY AND INDICATION:  This is a patient with atrial flutter,  palpitation. EVENT MONITOR DESCRIPTION:  Event monitor was attached to the patient  between 10/02/2020 and 10/15/2020. EVENT MONITOR FINDINGS:  Baseline rhythm showed sinus rhythm. There  were several episodes of narrow complex tachycardia with variable heart  rate suggestive of atrial fibrillation with rapid ventricular response. There were episodes of what looks like atrial flutter with acceptable  heart rate. No prolonged pauses and no V-tach. CONCLUSION:  1. Sinus rhythm alternating with several episodes of what looks like  atrial fibrillation with rapid ventricular response with atrial flutter,  rate controlled as well. 2.  No prolonged pauses. 3.  Abnormal event monitor.         Nereida Henning M.D.    D: 10/23/2020 11:40:47       T: 10/23/2020 12:43:53     [email or web address]  Job#: [id number]     Doc#: [id number]    CC:

## 2020-10-30 ENCOUNTER — OFFICE VISIT (OUTPATIENT)
Dept: PULMONOLOGY | Age: 85
End: 2020-10-30
Payer: MEDICARE

## 2020-10-30 VITALS
WEIGHT: 153.2 LBS | HEIGHT: 64 IN | BODY MASS INDEX: 26.15 KG/M2 | HEART RATE: 89 BPM | DIASTOLIC BLOOD PRESSURE: 66 MMHG | OXYGEN SATURATION: 99 % | SYSTOLIC BLOOD PRESSURE: 114 MMHG | TEMPERATURE: 96.8 F

## 2020-10-30 PROCEDURE — G8482 FLU IMMUNIZE ORDER/ADMIN: HCPCS | Performed by: NURSE PRACTITIONER

## 2020-10-30 PROCEDURE — G8399 PT W/DXA RESULTS DOCUMENT: HCPCS | Performed by: NURSE PRACTITIONER

## 2020-10-30 PROCEDURE — G8417 CALC BMI ABV UP PARAM F/U: HCPCS | Performed by: NURSE PRACTITIONER

## 2020-10-30 PROCEDURE — 1111F DSCHRG MED/CURRENT MED MERGE: CPT | Performed by: NURSE PRACTITIONER

## 2020-10-30 PROCEDURE — 4040F PNEUMOC VAC/ADMIN/RCVD: CPT | Performed by: NURSE PRACTITIONER

## 2020-10-30 PROCEDURE — 99213 OFFICE O/P EST LOW 20 MIN: CPT | Performed by: NURSE PRACTITIONER

## 2020-10-30 PROCEDURE — G8427 DOCREV CUR MEDS BY ELIG CLIN: HCPCS | Performed by: NURSE PRACTITIONER

## 2020-10-30 PROCEDURE — 1123F ACP DISCUSS/DSCN MKR DOCD: CPT | Performed by: NURSE PRACTITIONER

## 2020-10-30 PROCEDURE — 1036F TOBACCO NON-USER: CPT | Performed by: NURSE PRACTITIONER

## 2020-10-30 PROCEDURE — 1090F PRES/ABSN URINE INCON ASSESS: CPT | Performed by: NURSE PRACTITIONER

## 2020-10-30 ASSESSMENT — ENCOUNTER SYMPTOMS
WHEEZING: 0
COUGH: 1
EYES NEGATIVE: 1
GASTROINTESTINAL NEGATIVE: 1
ALLERGIC/IMMUNOLOGIC NEGATIVE: 1
SHORTNESS OF BREATH: 1

## 2020-10-30 NOTE — PROGRESS NOTES
[facility]    Patient: Yaa Perez, 80 y.o.   : 1935  10/30/2020    Pt of Dr. Rosalba Garcia   Patient presents with    Follow-Up from Hospital     dc'd  20 from [facility] CXR      Other     [address] is here for follow up for SOB and cough with history of PNA and ILD. Hospitalized in September for PNA- CXR reviewed today improving but with some mild pulmonary edema  Presently on home O2 2LPM continuously   Recent CXR showing some mild pulmonary edema  Patient presents today with SOB with mainly exertion cough at times mainly NP - no SOB at rest  No fever or chills   Using Pulmicort neb BID and Albuterol PRN for Sob/wheezing     Progress History:   Since last visit any new medical issues? No  New ER or hospital visits? No  Any new or changes in medicines? No  Using inhalers? Yes   Are they helpful?  Yes   Vaccines UTD  Past Medical hx   PMH:  Past Medical History:   Diagnosis Date    Anemia     normal on pre-op 2012 and 13    Backache     h/o    Bronchiectasis ([address]     Bursitis     bilateral shoulders    Constipation     Diverticulosis of colon     HTN (hypertension)     Hypercalcemia     slightly elevated at 10.8 pre-op 13    Hyperlipidemia     Nodular goiter     bx negative    OA (osteoarthritis)     bilateral thumbs - sees Dr. Garima Reeves Osteopenia 2013    Parathyroid adenoma 2013    Pneumonia of right upper lobe due to infectious organism     Pneumonitis     Dr. Gutierrez Dugan in 2010 - bx negative    Pulmonary Mycobacterium avium complex (MAC) infection ([address]     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 ([address]     had one parathyroid removed - now follows with Dr. Steve Vega Sinusitis     with seasonal allergies    Vitamin D deficiency 2018     SURGICAL HISTORY:  Past Surgical History:   Procedure Laterality Date    ABDOMINAL EXPLORATION SURGERY  2013    Release of SBO, KATHERINE-Dr. Tressa Andrew    APPENDECTOMY      BRONCHOSCOPY N/A 2020    BRONCHOSCOPY FLUOROSCOPY performed by Marcello Odom MD at [address] WITH IMPLANT Bilateral  ?   SECTION  0641,8912    DILATION AND CURETTAGE OF UTERUS  3895,7807,4524    EXCISION OF PARATHYROID MASS Right 2013    rt parathyroidectomy (excision of rt inferior parathyroid mass) exploration of neck     FOOT SURGERY  2002    left    FOOT SURGERY Left 2017    Dr Kelin Stringer  6-4-12    left knee    JOINT REPLACEMENT  2014    right knee    MALIGNANT SKIN LESION EXCISION Left 2017    BCC DORSAL FOOT WITH GRAFT & FS    GOSIA AND BSO  1982    TONSILLECTOMY AND ADENOIDECTOMY  1940 ?  TOTAL KNEE ARTHROPLASTY Right 2014    [facility]     SOCIAL HISTORY:  Social History     Tobacco Use    Smoking status: Never Smoker    Smokeless tobacco: Never Used   Substance Use Topics    Alcohol use:  Yes     Alcohol/week: 0.0 standard drinks     Comment: socially-1 glassof wine 2-3 times a month    Drug use: No     ALLERGIES:  Allergies   Allergen Reactions    Levaquin [Levofloxacin] Other (See Comments)     Hallucinations    Percocet [Oxycodone-Acetaminophen] Nausea Only and Other (See Comments)     Affects memory    Rifampin Other (See Comments)     Lost half of vision    Sulfa Antibiotics Other (See Comments)     hallucinations    Cefuroxime Diarrhea, Nausea And Vomiting and Other (See Comments)     cramping     FAMILY HISTORY:  Family History   Problem Relation Age of Onset    Diabetes Mother     Thyroid Disease Mother     Arthritis Mother     High Blood Pressure Mother     Arthritis Father     High Blood Pressure Father     Other Father         hay fever    Breast Cancer Neg Hx     Ovarian Cancer Neg Hx      CURRENT MEDICATIONS:  Current Outpatient Medications   Medication Sig Dispense Refill    metoprolol tartrate (LOPRESSOR) 25 MG tablet Take 0.5 tablets by mouth 2 times daily 90 tablet 3    benzonatate (TESSALON) 100 MG capsule Take 100 mg by mouth 3 times daily as needed for Cough      amLODIPine (NORVASC) 5 MG tablet Take 1 tablet by mouth daily 30 tablet 3    apixaban (ELIQUIS) 5 MG TABS tablet Take 1 tablet by mouth 2 times daily 180 tablet 1    budesonide (PULMICORT) 0.5 MG/2ML nebulizer suspension Take 2 mLs by nebulization 2 times daily 60 ampule 3    levothyroxine (SYNTHROID) 75 MCG tablet take 1 tablet by mouth once daily 90 tablet 3    simvastatin (ZOCOR) 20 MG tablet TAKE 1 TABLET BY MOUTH  NIGHTLY 90 tablet 3    albuterol (PROVENTIL) (2.5 MG/3ML) 0.083% nebulizer solution Take 3 mLs by nebulization every 6 hours as needed for Wheezing or Shortness of Breath 120 each 3    albuterol sulfate HFA (PROAIR HFA) 108 (90 Base) MCG/ACT inhaler Inhale 2 puffs into the lungs every 6 hours as needed for Wheezing or Shortness of Breath 1 Inhaler 11    Lactobacillus (PROBIOTIC ACIDOPHILUS) CAPS Take 1 capsule by mouth daily       Cholecalciferol (VITAMIN D3) 5000 units TABS Take 5,000 Units by mouth every other day       MULTIPLE VITAMIN PO Take 1 tablet by mouth daily       diclofenac sodium (VOLTAREN) 1 % GEL Apply 4 g topically 4 times daily as needed for Pain (Patient not taking: Reported on 10/30/2020) 1 Tube 3    lidocaine 4 % external patch Place 2 patches onto the skin daily (Patient not taking: Reported on 10/30/2020)      polyethylene glycol (GLYCOLAX) 17 g packet Take 17 g by mouth daily as needed for Constipation (Patient not taking: Reported on 10/30/2020) 527 g 1    loratadine (CLARITIN) 10 MG capsule Take 10 mg by mouth daily        No current facility-administered medications for this visit. ROS   Review of Systems   Constitutional: Positive for appetite change (decreased at times). Negative for chills and fever. HENT: Negative. Negative for congestion. Eyes: Negative. Respiratory: Positive for cough (intermittent and NP) and shortness of breath (with exertion ). Negative for wheezing. Cardiovascular: Negative. Negative for chest pain and leg swelling. Gastrointestinal: Negative. Endocrine: Negative. Genitourinary: Negative. Musculoskeletal: Negative. Allergic/Immunologic: Negative. Neurological: Negative. Hematological: Negative. Psychiatric/Behavioral: Negative. Negative for sleep disturbance. Physical exam   /66 (Site: Left Upper Arm, Position: Sitting, Cuff Size: Medium Adult)   Pulse 89   Temp 96.8 °F (36 °C)   Ht 5' 4\" (1.626 m)   Wt 153 lb 3.2 oz (69.5 kg)   SpO2 99% Comment: on 2 liters O2  BMI 26.30 kg/m²    Wt Readings from Last 3 Encounters:   10/30/20 153 lb 3.2 oz (69.5 kg)   10/19/20 155 lb 12.8 oz (70.7 kg)   10/02/20 151 lb 8 oz (68.7 kg)       Physical Exam  Vitals signs and nursing note reviewed. Constitutional:       Appearance: She is well-developed. HENT:      Head: Normocephalic and atraumatic. Eyes:      Conjunctiva/sclera: Conjunctivae normal.      Pupils: Pupils are equal, round, and reactive to light. Neck:      Musculoskeletal: Normal range of motion and neck supple. Vascular: No JVD. Cardiovascular:      Rate and Rhythm: Normal rate and regular rhythm. Heart sounds: Normal heart sounds. No murmur. No friction rub. No gallop. Pulmonary:      Effort: Pulmonary effort is normal. No respiratory distress. Breath sounds: Normal breath sounds. No wheezing or rales. Comments: Bilateral bibasilar crackles heard  Abdominal:      General: Bowel sounds are normal.      Palpations: Abdomen is soft. Musculoskeletal: Normal range of motion. Skin:     General: Skin is warm and dry. Capillary Refill: Capillary refill takes less than 2 seconds. Neurological:      Mental Status: She is alert and oriented to person, place, and time.    Psychiatric:         Behavior: Behavior normal. Thought Content: Thought content normal.         Judgment: Judgment normal.          results   Lung Nodule Screening     [] Qualifies    [x] Does not qualify   [] Declined    [] Completed  The USPSTF recommends annual screening for lung cancer with low-dose computed tomography (LDCT) in adults aged 54 to [de-identified] years who have a 30 pack-year smoking history and currently smoke or have quit within the past 15 years. Screening should be discontinued once a person has not smoked for 15 years or develops a health problem that substantially limits life expectancy or the ability or willingness to have curative lung surgery. CXR reviewed from CD on other computer- mild pulmonary congestion noted not worrisome for any infectious process noted. Assessment      Diagnosis Orders   1. Community acquired pneumonia of right lung, unspecified part of lung      resolving    2. ILD (interstitial lung disease) ([facility]     3.  Hospital discharge follow-up           Plan   -CXR reviewed  -Patient doing well would continue current inhaler/nebulizer treatment  -Keep follow up with Chavez Allison CNP with PFT prior  -Advised to maintain pneumonia vaccine with PCP and to take flu vaccine this coming season.  -Advised patient to call office with any changes, questions, or concerns regarding respiratory status    Will see Pita Freeman back in: PRN if needed    Cierra Ann CNP  10/30/2020

## 2020-11-17 ENCOUNTER — PATIENT MESSAGE (OUTPATIENT)
Dept: FAMILY MEDICINE CLINIC | Age: 85
End: 2020-11-17

## 2020-11-17 NOTE — TELEPHONE ENCOUNTER
From: Dominik Smoker  To: Sheri Haas MD  Sent: 11/17/2020 11:25 AM EST  Subject: Non-Urgent Medical Question    I've been having what I thought were severe allergy symptoms, congestion, watery eyes, etc. long past my usual allergy season & now both ears are stuffed affecting my hearing. I don't have a fever, but more coughing than usual and a lot of sinus drainage. I am so afraid of risking Covid exposure & also am having a difficult time with my portable oxygen alone, but don't want to get pneumonia a 3rd time this year or anything respiratory. Is there anything I can take &/or do for the ear problem?  Thank you, David Mejia

## 2020-11-18 RX ORDER — AZITHROMYCIN 250 MG/1
TABLET, FILM COATED ORAL
Qty: 1 PACKET | Refills: 0 | Status: SHIPPED | OUTPATIENT
Start: 2020-11-18 | End: 2020-11-28

## 2020-12-07 ENCOUNTER — PATIENT MESSAGE (OUTPATIENT)
Dept: CARDIOLOGY CLINIC | Age: 85
End: 2020-12-07

## 2020-12-07 ENCOUNTER — TELEPHONE (OUTPATIENT)
Dept: CARDIOLOGY CLINIC | Age: 85
End: 2020-12-07

## 2020-12-07 NOTE — TELEPHONE ENCOUNTER
Pt states she has been taking it for 35 years. Dr Isabela Díaz stopped Spironolactone. Can she restart?

## 2020-12-07 NOTE — TELEPHONE ENCOUNTER
From: Brittany Goldberg  To: Garry Hooker MD  Sent: 12/7/2020 9:12 AM EST  Subject: Non-Urgent Medical Question    The last 3 evenings my ankles are very swollen. Is this reason for concern?

## 2020-12-07 NOTE — TELEPHONE ENCOUNTER
Pt sent a message through My Chart stating the last 3 evenings her ankles have been swollen. Called pt. She has not been eating anything out of the ordinary with increased amount of salt. She has not been standing more than usual.  Pt states she is always SOB and has not increased. Please advise.

## 2020-12-09 RX ORDER — SPIRONOLACTONE 50 MG/1
50 TABLET, FILM COATED ORAL DAILY
COMMUNITY
End: 2021-02-05 | Stop reason: SDUPTHER

## 2020-12-29 ENCOUNTER — OFFICE VISIT (OUTPATIENT)
Dept: FAMILY MEDICINE CLINIC | Age: 85
End: 2020-12-29

## 2020-12-29 VITALS
DIASTOLIC BLOOD PRESSURE: 72 MMHG | HEIGHT: 64 IN | WEIGHT: 147 LBS | RESPIRATION RATE: 18 BRPM | SYSTOLIC BLOOD PRESSURE: 122 MMHG | OXYGEN SATURATION: 98 % | TEMPERATURE: 96 F | BODY MASS INDEX: 25.1 KG/M2 | HEART RATE: 76 BPM

## 2020-12-29 DIAGNOSIS — M48.56XA NONTRAUMATIC COMPRESSION FRACTURE OF L5 VERTEBRA, INITIAL ENCOUNTER (HCC): Primary | ICD-10-CM

## 2020-12-29 PROBLEM — J47.9 BRONCHIECTASIS WITHOUT ACUTE EXACERBATION (HCC): Status: ACTIVE | Noted: 2019-03-01

## 2020-12-29 PROCEDURE — 4040F PNEUMOC VAC/ADMIN/RCVD: CPT | Performed by: FAMILY MEDICINE

## 2020-12-29 PROCEDURE — 1123F ACP DISCUSS/DSCN MKR DOCD: CPT | Performed by: FAMILY MEDICINE

## 2020-12-29 PROCEDURE — G8417 CALC BMI ABV UP PARAM F/U: HCPCS | Performed by: FAMILY MEDICINE

## 2020-12-29 PROCEDURE — G8399 PT W/DXA RESULTS DOCUMENT: HCPCS | Performed by: FAMILY MEDICINE

## 2020-12-29 PROCEDURE — G8427 DOCREV CUR MEDS BY ELIG CLIN: HCPCS | Performed by: FAMILY MEDICINE

## 2020-12-29 PROCEDURE — 1090F PRES/ABSN URINE INCON ASSESS: CPT | Performed by: FAMILY MEDICINE

## 2020-12-29 PROCEDURE — 1036F TOBACCO NON-USER: CPT | Performed by: FAMILY MEDICINE

## 2020-12-29 PROCEDURE — 99213 OFFICE O/P EST LOW 20 MIN: CPT | Performed by: FAMILY MEDICINE

## 2020-12-29 RX ORDER — TRAMADOL HYDROCHLORIDE 50 MG/1
50 TABLET ORAL EVERY 6 HOURS PRN
Qty: 28 TABLET | Refills: 0 | Status: SHIPPED | OUTPATIENT
Start: 2020-12-29 | End: 2021-01-11

## 2020-12-29 RX ORDER — AZITHROMYCIN 250 MG/1
250 TABLET, FILM COATED ORAL DAILY
COMMUNITY
Start: 2020-12-23 | End: 2021-08-24 | Stop reason: SDUPTHER

## 2020-12-29 RX ORDER — SODIUM CHLORIDE FOR INHALATION 7 %
4 VIAL, NEBULIZER (ML) INHALATION 2 TIMES DAILY
COMMUNITY
Start: 2020-12-23 | End: 2021-03-25

## 2020-12-29 ASSESSMENT — ENCOUNTER SYMPTOMS
SHORTNESS OF BREATH: 1
CONSTIPATION: 0
BACK PAIN: 1
SINUS PRESSURE: 0

## 2020-12-29 NOTE — PROGRESS NOTES
Subjective:      Patient ID: Angelo Herrera is a 80 y.o. female. HPI  1. She states she went to bed 12/23 and woke 12/24/20 with terrible low back pain  2. She states it is worse with movement  3. Tylenol doesn't help  Review of Systems   Constitutional: Positive for fatigue. HENT: Negative for sinus pressure. Eyes: Negative for visual disturbance. Respiratory: Positive for shortness of breath. Cardiovascular: Negative for chest pain. Gastrointestinal: Negative for constipation. Genitourinary: Negative. Musculoskeletal: Positive for arthralgias, back pain and gait problem. Skin: Negative for rash. Neurological: Positive for weakness. Negative for headaches. The patient's medications, allergies, past medical problems, surgical, social, and family histories were reviewed and updated as needed. Objective:   Physical Exam  Constitutional:       General: She is not in acute distress. Appearance: She is well-developed. HENT:      Head: Normocephalic and atraumatic. Eyes:      General: No scleral icterus. Conjunctiva/sclera: Conjunctivae normal.   Neck:      Trachea: No tracheal deviation. Cardiovascular:      Rate and Rhythm: Normal rate. Pulmonary:      Effort: Pulmonary effort is normal.   Musculoskeletal:        Arms:    Skin:     General: Skin is warm and dry. Neurological:      Mental Status: She is alert and oriented to person, place, and time. Psychiatric:         Behavior: Behavior normal.     Blood pressure 122/72, pulse 76, temperature 96 °F (35.6 °C), temperature source Skin, resp. rate 18, height 5' 4\" (1.626 m), weight 147 lb (66.7 kg), SpO2 98 %, not currently breastfeeding. Assessment:       Diagnosis Orders   1. Nontraumatic compression fracture of L5 vertebra, initial encounter ([facility])  traMADol (ULTRAM) 50 MG tablet           Plan:      See Dr Jeffrey Ayers tomorrow at 8:20 at [facility].  Be there at 8:00        Janusz Lantigua MD

## 2021-01-08 DIAGNOSIS — M48.56XA NONTRAUMATIC COMPRESSION FRACTURE OF L5 VERTEBRA, INITIAL ENCOUNTER (HCC): ICD-10-CM

## 2021-01-11 RX ORDER — PREDNISONE 20 MG/1
20 TABLET ORAL DAILY
Qty: 10 TABLET | Refills: 0 | Status: SHIPPED | OUTPATIENT
Start: 2021-01-11 | End: 2021-01-21

## 2021-01-11 RX ORDER — TRAMADOL HYDROCHLORIDE 50 MG/1
TABLET ORAL
Qty: 28 TABLET | Refills: 0 | Status: SHIPPED | OUTPATIENT
Start: 2021-01-11 | End: 2021-01-18

## 2021-01-11 NOTE — TELEPHONE ENCOUNTER
Date of last visit:  12/29/2020  Date of next visit:  Visit date not found    Requested Prescriptions     Pending Prescriptions Disp Refills    traMADol (ULTRAM) 50 MG tablet [Pharmacy Med Name: TRAMADOL HCL 50 MG TABLET] 28 tablet      Sig: take 1 tablet by mouth every 6 hours if needed for pain - TO LAST 7 DAYS - TAKE THE LOWEST DOSE POSSIBLE TO MANAGE PAIN

## 2021-01-13 ENCOUNTER — TELEPHONE (OUTPATIENT)
Dept: FAMILY MEDICINE CLINIC | Age: 86
End: 2021-01-13

## 2021-01-13 NOTE — TELEPHONE ENCOUNTER
----- Message from Yuli Tolbert MD sent at 1/11/2021  2:32 PM EST -----  Did she see Dr Siddhartha Ferguson at [facility] the morning following the appointment with me?
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
## 2021-01-25 ENCOUNTER — TELEPHONE (OUTPATIENT)
Dept: CARDIOLOGY CLINIC | Age: 86
End: 2021-01-25

## 2021-01-25 NOTE — TELEPHONE ENCOUNTER
Pre op Risk Assessment    Procedure Lumbar Epidural   Physician IOS  Date of surgery/procedure TBD    Last OV 10-  Last Stress 11-9-2016  Last Echo 9-  Last Cath None in Epic  Last Stent None in Epic  Is patient on blood thinners Eliquis  Hold Meds/how many days     Fax# [phone]

## 2021-02-05 RX ORDER — APIXABAN 5 MG/1
TABLET, FILM COATED ORAL
Qty: 180 TABLET | Refills: 0 | Status: SHIPPED | OUTPATIENT
Start: 2021-02-05 | End: 2021-05-05 | Stop reason: SDUPTHER

## 2021-02-05 RX ORDER — SPIRONOLACTONE 50 MG/1
50 TABLET, FILM COATED ORAL DAILY
Qty: 90 TABLET | Refills: 1 | Status: SHIPPED | OUTPATIENT
Start: 2021-02-05 | End: 2021-05-05 | Stop reason: SDUPTHER

## 2021-02-08 RX ORDER — AMLODIPINE BESYLATE 5 MG/1
5 TABLET ORAL DAILY
Qty: 90 TABLET | Refills: 0 | Status: SHIPPED | OUTPATIENT
Start: 2021-02-08 | End: 2021-04-26

## 2021-02-08 NOTE — TELEPHONE ENCOUNTER
Date of last visit:  12/29/2020  Date of next visit:  Visit date not found    Requested Prescriptions     Pending Prescriptions Disp Refills    amLODIPine (NORVASC) 5 MG tablet 90 tablet 3     Sig: Take 1 tablet by mouth daily

## 2021-03-03 DIAGNOSIS — J84.10 PULMONARY FIBROSIS (HCC): ICD-10-CM

## 2021-03-03 DIAGNOSIS — J47.9 BRONCHIECTASIS WITHOUT COMPLICATION (HCC): ICD-10-CM

## 2021-03-03 RX ORDER — BUDESONIDE 0.5 MG/2ML
0.5 INHALANT ORAL 2 TIMES DAILY
Qty: 60 AMPULE | Refills: 3 | Status: SHIPPED | OUTPATIENT
Start: 2021-03-03 | End: 2021-03-04 | Stop reason: SDUPTHER

## 2021-03-04 DIAGNOSIS — J84.10 PULMONARY FIBROSIS (HCC): ICD-10-CM

## 2021-03-04 DIAGNOSIS — J47.9 BRONCHIECTASIS WITHOUT COMPLICATION (HCC): ICD-10-CM

## 2021-03-04 RX ORDER — BUDESONIDE 0.5 MG/2ML
0.5 INHALANT ORAL 2 TIMES DAILY
Qty: 60 AMPULE | Refills: 3 | Status: SHIPPED | OUTPATIENT
Start: 2021-03-04 | End: 2021-03-11 | Stop reason: SDUPTHER

## 2021-03-11 DIAGNOSIS — J47.9 BRONCHIECTASIS WITHOUT COMPLICATION (HCC): ICD-10-CM

## 2021-03-11 DIAGNOSIS — J84.10 PULMONARY FIBROSIS (HCC): ICD-10-CM

## 2021-03-11 RX ORDER — BUDESONIDE 0.5 MG/2ML
0.5 INHALANT ORAL 2 TIMES DAILY
Qty: 60 AMPULE | Refills: 3 | Status: SHIPPED | OUTPATIENT
Start: 2021-03-11 | End: 2021-08-24 | Stop reason: SDUPTHER

## 2021-03-11 NOTE — TELEPHONE ENCOUNTER
Date of last visit:  10/30/2020  Date of next visit:  Visit date not found    Requested Prescriptions     Pending Prescriptions Disp Refills    budesonide (PULMICORT) 0.5 MG/2ML nebulizer suspension 60 ampule 3     Sig: Take 2 mLs by nebulization 2 times daily

## 2021-03-12 ENCOUNTER — TELEPHONE (OUTPATIENT)
Dept: CARDIOLOGY CLINIC | Age: 86
End: 2021-03-12

## 2021-03-12 NOTE — TELEPHONE ENCOUNTER
Pre op Risk Assessment    Procedure Lumbar Epidural   Physician OIO  Date of surgery/procedure TBD    Last OV 10-19-20  Last Stress 11-9-16  Last Echo 9-25-20  Last Cath None In Epic  Is patient on blood thinners Eliquis  Hold Meds/how many days ?     Fax [phone]

## 2021-03-15 ENCOUNTER — PATIENT MESSAGE (OUTPATIENT)
Dept: PULMONOLOGY | Age: 86
End: 2021-03-15

## 2021-03-15 NOTE — TELEPHONE ENCOUNTER
From: Brigitte Hoover  To: JASMEET Hawley - CNP  Sent: 3/15/2021 10:46 AM EDT  Subject: Prescription Question    [facility] says they still have not received the Medicare info from you needed to refill my Pulmicort Rx.  I just used my last vial. Thank you, Markus Nation

## 2021-03-19 ENCOUNTER — TELEPHONE (OUTPATIENT)
Dept: FAMILY MEDICINE CLINIC | Age: 86
End: 2021-03-19

## 2021-03-19 NOTE — TELEPHONE ENCOUNTER
Have  Not  Picked  Up pulmicort as form needs done per  Dr Salvador Boudreaux so suggest do partial refill of just 10 or fifteen ampules till done Situation: Patient triggered for HRTIC program  Patient is enrolled in High Risk Transitions in Care program.  Background:   Medical Record reviewed.  Mely Dee was discharged on 9/12/18.  HRTIC Episode  Of Care scheduled to end on 10/12/18.  Assessment:   · Contacted  regarding recent hospitalization.  · Since discharge, patient is feeling dizzy and nauseous - it has not improved even with the lowering of her ammonia level.  She will have an ammonia level drawn tomorrow before her appointment with Dr. Nixon.  Dago is concerned that having her ammonia level checked every 5 days is not often enough.  He will speak to Ashley Jim about that after tomorrow's level comes back.  · Activity: stayed the same  · Their were no questions or concerns regarding the medications prescribed.  · Pain:  None   · Fever / Chills / SOB / Nausea / Vomiting / Diarrhea:  None   · Appetite: Normal  · There were no further questions or concerns at this time.    Future Appointments   Date Time Provider Department Center   9/25/2018 11:00 AM Zack Khan MD [facility]   9/26/2018  1:00 PM Emy Gonzáles RN VNAHHN1 [facility]   9/28/2018 To Be Determined Emy Gonzáles RN [facility]NAHHN1 [facility]   10/2/2018  8:30 AM [facility] IV3 [facility]T1 [facility]   10/4/2018  9:45 AM Angi Yan MD MCOON1 [facility]   10/25/2018 11:40 AM Cony Calderon DO OS[facility]AL [facility]   11/7/2018  2:00 PM [facility]   12/17/2018  1:20 PM Abdulkadir Winkler MD OSWIM1 [facility]   12/18/2018  9:00 [facility]   4/10/2019 11:00 AM Preston Hough MD [facility]   5/1/2019 11:15 AM Zoë Mejía MD OSWEP [facility]         Recommendation: Next HRTIC Phone call scheduled.  Patient was invited to call back with any non-emergency questions or concerns.  For emergent needs, please call clinic phone number or 911.  There were no further questions or concerns at this time.  Please call if you have any  questions.  Breanna Mejia, RN, CPHQ, [facility]  Transition of Care Nurse  [phone]

## 2021-03-22 NOTE — TELEPHONE ENCOUNTER
Looked on desk for paperwork and didn't see form. Called pharmacy for form to be faxed so dr. Carly Arango can fill out/.   Spoke to [place] who forwarded me to pharmacist. Re Gage if don't get form in 10 minutes call again

## 2021-03-29 ENCOUNTER — TELEPHONE (OUTPATIENT)
Dept: FAMILY MEDICINE CLINIC | Age: 86
End: 2021-03-29

## 2021-05-05 ENCOUNTER — OFFICE VISIT (OUTPATIENT)
Dept: CARDIOLOGY CLINIC | Age: 86
End: 2021-05-05
Payer: MEDICARE

## 2021-05-05 VITALS
HEIGHT: 64 IN | HEART RATE: 88 BPM | SYSTOLIC BLOOD PRESSURE: 118 MMHG | DIASTOLIC BLOOD PRESSURE: 79 MMHG | BODY MASS INDEX: 25.23 KG/M2

## 2021-05-05 DIAGNOSIS — R42 DIZZINESS: ICD-10-CM

## 2021-05-05 DIAGNOSIS — I48.21 PERMANENT ATRIAL FIBRILLATION (HCC): ICD-10-CM

## 2021-05-05 DIAGNOSIS — I10 ESSENTIAL HYPERTENSION: Primary | ICD-10-CM

## 2021-05-05 PROCEDURE — 1090F PRES/ABSN URINE INCON ASSESS: CPT | Performed by: NUCLEAR MEDICINE

## 2021-05-05 PROCEDURE — 1036F TOBACCO NON-USER: CPT | Performed by: NUCLEAR MEDICINE

## 2021-05-05 PROCEDURE — 1123F ACP DISCUSS/DSCN MKR DOCD: CPT | Performed by: NUCLEAR MEDICINE

## 2021-05-05 PROCEDURE — G8399 PT W/DXA RESULTS DOCUMENT: HCPCS | Performed by: NUCLEAR MEDICINE

## 2021-05-05 PROCEDURE — 4040F PNEUMOC VAC/ADMIN/RCVD: CPT | Performed by: NUCLEAR MEDICINE

## 2021-05-05 PROCEDURE — 99214 OFFICE O/P EST MOD 30 MIN: CPT | Performed by: NUCLEAR MEDICINE

## 2021-05-05 PROCEDURE — G8417 CALC BMI ABV UP PARAM F/U: HCPCS | Performed by: NUCLEAR MEDICINE

## 2021-05-05 PROCEDURE — G8427 DOCREV CUR MEDS BY ELIG CLIN: HCPCS | Performed by: NUCLEAR MEDICINE

## 2021-05-05 RX ORDER — AMLODIPINE BESYLATE 5 MG/1
5 TABLET ORAL DAILY
Qty: 90 TABLET | Refills: 1 | Status: SHIPPED | OUTPATIENT
Start: 2021-05-05 | End: 2021-05-06

## 2021-05-05 RX ORDER — SPIRONOLACTONE 50 MG/1
50 TABLET, FILM COATED ORAL DAILY
Qty: 90 TABLET | Refills: 1 | Status: ON HOLD | OUTPATIENT
Start: 2021-05-05 | End: 2022-03-22

## 2021-05-05 NOTE — PROGRESS NOTES
[address] [facility] [address]  [facility] [address]  Dept: [phone]  Dept Fax: [phone]  Loc: [phone]    Visit Date: 8348    Nito Torres is a 80 y.o. female who presents todayfor:  Chief Complaint   Patient presents with    6 Month Follow-Up    Shortness of Breath    Atrial Fibrillation    Hypertension   had some dizziness since started meds  Diagnosed with A fib   Has pulmonary fibrosis   Some dizziness lately   Possible BP related   No chest pain  Baseline dyspnea  On home O2  No bleeding   No syncope        HPI:  HPI  Past Medical History:   Diagnosis Date    Anemia     normal on pre-op 2012 and 13    Backache     h/o    [facility]ctasis ([facility]     Bursitis     bilateral shoulders    Constipation     Diverticulosis of colon     HTN (hypertension)     Hypercalcemia     slightly elevated at 10.8 pre-op 13    Hyperlipidemia     Nodular goiter     bx negative    OA (osteoarthritis)     bilateral thumbs - sees Dr. Thao Still Osteopenia 2013    Parathyroid adenoma 2013    Pneumonia of right upper lobe due to infectious organism     Pneumonitis     Dr. Donaldo Workman in 2010 - bx negative    Pulmonary Mycobacterium avium complex (MAC) infection ([facility] [address] [facility]     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 ([address]     had one parathyroid removed - now follows with Dr. Heavenly Moran    Sinusitis     with seasonal allergies    Vitamin D deficiency 2018      Past Surgical History:   Procedure Laterality Date    ABDOMINAL EXPLORATION SURGERY  2013    Release of KATHERINE TAMAYO-Dr. Naun Koehler    APPENDECTOMY      BRONCHOSCOPY N/A 2020    BRONCHOSCOPY FLUOROSCOPY performed by Pinky Lynn MD at [address] WITH IMPLANT Bilateral  ?      SECTION  5646,9723    DILATION AND CURETTAGE OF UTERUS  5630,[id number],3993    EXCISION OF PARATHYROID MASS Right 2013 rt parathyroidectomy (excision of rt inferior parathyroid mass) exploration of neck     FOOT SURGERY  2002    left    FOOT SURGERY Left 06/12/2017    Dr Radha Elder  6-4-12    left knee    JOINT REPLACEMENT  01/2014    right knee    MALIGNANT SKIN LESION EXCISION Left 06/12/2017    BCC DORSAL FOOT WITH GRAFT & FS    GOSIA AND BSO  1982    TONSILLECTOMY AND ADENOIDECTOMY  1940 ?  TOTAL KNEE ARTHROPLASTY Right January 6th, 2014    [facility]     Family History   Problem Relation Age of Onset    Diabetes Mother     Thyroid Disease Mother     Arthritis Mother     High Blood Pressure Mother     Arthritis Father     High Blood Pressure Father     Other Father         hay fever    Breast Cancer Neg Hx     Ovarian Cancer Neg Hx      Social History     Tobacco Use    Smoking status: Never Smoker    Smokeless tobacco: Never Used   Substance Use Topics    Alcohol use:  Yes     Alcohol/week: 0.0 standard drinks     Comment: socially-1 glassof wine 2-3 times a month      Current Outpatient Medications   Medication Sig Dispense Refill    amLODIPine (NORVASC) 5 MG tablet TAKE 1 TABLET BY MOUTH  DAILY (Patient taking differently: Take 10 mg by mouth daily ) 90 tablet 1    budesonide (PULMICORT) 0.5 MG/2ML nebulizer suspension Take 2 mLs by nebulization 2 times daily 60 ampule 3    ELIQUIS 5 MG TABS tablet TAKE 1 TABLET BY MOUTH  TWICE DAILY 180 tablet 0    spironolactone (ALDACTONE) 50 MG tablet Take 1 tablet by mouth daily 90 tablet 1    azithromycin (ZITHROMAX) 250 MG tablet Take 250 mg by mouth daily      Acetylcysteine 600 MG CAPS Take 600 mg by mouth 2 times daily      metoprolol tartrate (LOPRESSOR) 25 MG tablet take 1/2 tablet by mouth twice a day 30 tablet 5    levothyroxine (SYNTHROID) 75 MCG tablet take 1 tablet by mouth once daily 90 tablet 3    simvastatin (ZOCOR) 20 MG tablet TAKE 1 TABLET BY MOUTH  NIGHTLY 90 tablet 3    albuterol (PROVENTIL) (2.5 MG/3ML) 0.083% nebulizer solution Take 3 mLs by nebulization every 6 hours as needed for Wheezing or Shortness of Breath 120 each 3    Lactobacillus (PROBIOTIC ACIDOPHILUS) CAPS Take 1 capsule by mouth daily       Cholecalciferol (VITAMIN D3) 5000 units TABS Take 5,000 Units by mouth every other day       MULTIPLE VITAMIN PO Take 1 tablet by mouth daily       albuterol sulfate HFA (PROAIR HFA) 108 (90 Base) MCG/ACT inhaler Inhale 2 puffs into the lungs every 6 hours as needed for Wheezing or Shortness of Breath 1 Inhaler 11     No current facility-administered medications for this visit.       Allergies   Allergen Reactions    Levaquin [Levofloxacin] Other (See Comments)     Hallucinations    Percocet [Oxycodone-Acetaminophen] Nausea Only and Other (See Comments)     Affects memory    Rifampin Other (See Comments)     Lost half of vision    Sulfa Antibiotics Other (See Comments)     hallucinations    Cefuroxime Diarrhea, Nausea And Vomiting and Other (See Comments)     cramping     Health Maintenance   Topic Date Due    Lipid screen  08/18/2021    Annual Wellness Visit (AWV)  09/09/2021    TSH testing  09/30/2021    Potassium monitoring  09/30/2021    Creatinine monitoring  09/30/2021    Breast cancer screen  10/08/2021    DTaP/Tdap/Td vaccine (2 - Td) 05/17/2023    DEXA (modify frequency per FRAX score)  Completed    Flu vaccine  Completed    Shingles Vaccine  Completed    Pneumococcal 65+ years Vaccine  Completed    COVID-19 Vaccine  Completed    Hepatitis A vaccine  Aged Out    Hepatitis B vaccine  Aged Out    Hib vaccine  Aged Out    Meningococcal (ACWY) vaccine  Aged Out       Subjective:  Review of Systems  General:   No fever, no chills, No fatigue or weight loss  Pulmonary:    some dyspnea, no wheezing  Cardiac:    Denies recent chest pain,   GI:     No nausea or vomiting, no abdominal pain  Neuro:    No dizziness or light headedness,   Musculoskeletal:  No recent active issues  Extremities: No edema, no obvious claudication       Objective:  Physical Exam  /79   Pulse 88   Ht 5' 4\" (1.626 m)   BMI 25.23 kg/m²   General:   Well developed, well nourished  Lungs:   Clear to auscultation  Heart:    Normal S1 S2, Slight murmur. no rubs, no gallops  Abdomen:   Soft, non tender, no organomegalies, positive bowel sounds  Extremities:   No edema, no cyanosis, good peripheral pulses  Neurological:   Awake, alert, oriented. No obvious focal deficits  Musculoskelatal:  No obvious deformities    Assessment:      Diagnosis Orders   1. Essential hypertension     2. Permanent atrial fibrillation ([address]     3. Dizziness     as above  Concerning for the low BP  Likely meds related     Plan:  No follow-ups on file. Change norvasc to 2.5 mg   Monitor BP and symptoms  Decide accordingly   Continue risk factor modification and medical management  Thank you for allowing me to participate in the care of your patient. Please don't hesitate to contact me regarding any further issues related to the patient care    Orders Placed:  No orders of the defined types were placed in this encounter. Medications Prescribed:  No orders of the defined types were placed in this encounter. Discussed use, benefit, and side effects of prescribed medications. All patient questions answered. Pt voicedunderstanding. Instructed to continue current medications, diet and exercise. Continue risk factor modification and medical management. Patient agreed with treatment plan. Follow up as directed.     Electronically signedby Leyla Cuellar MD on 5/5/2021 at 12:51 PM

## 2021-05-06 RX ORDER — AMLODIPINE BESYLATE 2.5 MG/1
2.5 TABLET ORAL DAILY
Qty: 90 TABLET | Refills: 3 | Status: ON HOLD | OUTPATIENT
Start: 2021-05-06 | End: 2022-03-28 | Stop reason: HOSPADM

## 2021-05-06 RX ORDER — AMLODIPINE BESYLATE 2.5 MG/1
2.5 TABLET ORAL DAILY
COMMUNITY
End: 2021-05-06 | Stop reason: SDUPTHER

## 2021-05-11 ENCOUNTER — OFFICE VISIT (OUTPATIENT)
Dept: INTERNAL MEDICINE CLINIC | Age: 86
End: 2021-05-11
Payer: MEDICARE

## 2021-05-11 VITALS
DIASTOLIC BLOOD PRESSURE: 76 MMHG | SYSTOLIC BLOOD PRESSURE: 114 MMHG | HEIGHT: 64 IN | TEMPERATURE: 97.6 F | BODY MASS INDEX: 25.61 KG/M2 | WEIGHT: 150 LBS | HEART RATE: 94 BPM

## 2021-05-11 DIAGNOSIS — N25.81 SECONDARY HYPERPARATHYROIDISM (HCC): ICD-10-CM

## 2021-05-11 DIAGNOSIS — E78.5 DYSLIPIDEMIA: ICD-10-CM

## 2021-05-11 DIAGNOSIS — I10 ESSENTIAL HYPERTENSION: ICD-10-CM

## 2021-05-11 DIAGNOSIS — E03.9 HYPOTHYROIDISM, UNSPECIFIED TYPE: Primary | ICD-10-CM

## 2021-05-11 PROCEDURE — 4040F PNEUMOC VAC/ADMIN/RCVD: CPT | Performed by: INTERNAL MEDICINE

## 2021-05-11 PROCEDURE — 1123F ACP DISCUSS/DSCN MKR DOCD: CPT | Performed by: INTERNAL MEDICINE

## 2021-05-11 PROCEDURE — 1090F PRES/ABSN URINE INCON ASSESS: CPT | Performed by: INTERNAL MEDICINE

## 2021-05-11 PROCEDURE — 99213 OFFICE O/P EST LOW 20 MIN: CPT | Performed by: INTERNAL MEDICINE

## 2021-05-11 PROCEDURE — G8427 DOCREV CUR MEDS BY ELIG CLIN: HCPCS | Performed by: INTERNAL MEDICINE

## 2021-05-11 PROCEDURE — 1036F TOBACCO NON-USER: CPT | Performed by: INTERNAL MEDICINE

## 2021-05-11 PROCEDURE — G8417 CALC BMI ABV UP PARAM F/U: HCPCS | Performed by: INTERNAL MEDICINE

## 2021-05-11 PROCEDURE — G8399 PT W/DXA RESULTS DOCUMENT: HCPCS | Performed by: INTERNAL MEDICINE

## 2021-05-11 SDOH — ECONOMIC STABILITY: FOOD INSECURITY: WITHIN THE PAST 12 MONTHS, YOU WORRIED THAT YOUR FOOD WOULD RUN OUT BEFORE YOU GOT MONEY TO BUY MORE.: NEVER TRUE

## 2021-05-11 SDOH — ECONOMIC STABILITY: TRANSPORTATION INSECURITY
IN THE PAST 12 MONTHS, HAS THE LACK OF TRANSPORTATION KEPT YOU FROM MEDICAL APPOINTMENTS OR FROM GETTING MEDICATIONS?: NO

## 2021-05-11 SDOH — ECONOMIC STABILITY: TRANSPORTATION INSECURITY
IN THE PAST 12 MONTHS, HAS LACK OF TRANSPORTATION KEPT YOU FROM MEETINGS, WORK, OR FROM GETTING THINGS NEEDED FOR DAILY LIVING?: NO

## 2021-05-11 SDOH — ECONOMIC STABILITY: INCOME INSECURITY: HOW HARD IS IT FOR YOU TO PAY FOR THE VERY BASICS LIKE FOOD, HOUSING, MEDICAL CARE, AND HEATING?: NOT HARD AT ALL

## 2021-05-11 ASSESSMENT — PATIENT HEALTH QUESTIONNAIRE - PHQ9: 1. LITTLE INTEREST OR PLEASURE IN DOING THINGS: 0

## 2021-05-11 NOTE — PROGRESS NOTES
[address] [facility] Internal Medicine   [facility] [address]  [facility], [address]  Dept: [phone]  Dept Fax: [phone]    YOB: 1935    Hypertension and hypothyroidism    80 y.o. female   here for follow-up of above issues. She has history of atrial fibrillation for which she is on Eliquis on home oxygen secondary pulmonary fibrosis recently seen Dr. Adriane Castellon, cardiologist here at [address] Past patient has seen Ty Horton, CNP was no longer with the office , I am seeing him for the first time. She is on thyroid supplements claims she takes in the morning 75 mcg of levothyroxine,  Last TSH was 4.170 from 9/30/2020. Due to COVID, she was hesitant to come for office visit, now she is home was in the NH. She lives alone , uses wheel chair and brought in by a friend. She is on 2 LNC.    She got vaccinated for COVID, completed 0n 2/21    Current Outpatient Medications   Medication Sig Dispense Refill    amLODIPine (NORVASC) 2.5 MG tablet Take 1 tablet by mouth daily 90 tablet 3    apixaban (ELIQUIS) 5 MG TABS tablet TAKE 1 TABLET BY MOUTH  TWICE DAILY 180 tablet 3    spironolactone (ALDACTONE) 50 MG tablet Take 1 tablet by mouth daily 90 tablet 1    budesonide (PULMICORT) 0.5 MG/2ML nebulizer suspension Take 2 mLs by nebulization 2 times daily 60 ampule 3    azithromycin (ZITHROMAX) 250 MG tablet Take 250 mg by mouth daily      metoprolol tartrate (LOPRESSOR) 25 MG tablet take 1/2 tablet by mouth twice a day 30 tablet 5    levothyroxine (SYNTHROID) 75 MCG tablet take 1 tablet by mouth once daily 90 tablet 3    simvastatin (ZOCOR) 20 MG tablet TAKE 1 TABLET BY MOUTH  NIGHTLY 90 tablet 3    albuterol sulfate HFA (PROAIR HFA) 108 (90 Base) MCG/ACT inhaler Inhale 2 puffs into the lungs every 6 hours as needed for Wheezing or Shortness of Breath 1 Inhaler 11    Lactobacillus (PROBIOTIC ACIDOPHILUS) CAPS Take 1 capsule by mouth daily       Cholecalciferol (VITAMIN D3) 5000 units TABS Take 5,000 Units by mouth every other day       MULTIPLE VITAMIN PO Take 1 tablet by mouth daily        No current facility-administered medications for this visit. Past Medical History:   Diagnosis Date    Anemia     normal on pre-op 5/2012 and 12/2/13    Backache     h/o    Bronchiectasis ([facility] [facility] 2013    Bursitis     bilateral shoulders    Constipation     Diverticulosis of colon     HTN (hypertension)     Hypercalcemia     slightly elevated at 10.8 pre-op 12/2/13    Hyperlipidemia     Nodular goiter     bx negative    OA (osteoarthritis)     bilateral thumbs - sees Dr. Crowley Labor Osteopenia 11/6/2013    Parathyroid adenoma 4/30/2013    Pneumonia of right upper lobe due to infectious organism     Pneumonitis     Dr. Howie Gage in 7/2010 - bx negative    Pulmonary Mycobacterium avium complex (MAC) infection ([facility]     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 ([facility] [facility]     had one parathyroid removed - now follows with Dr. Tess Bailey    Sinusitis     with seasonal allergies    Vitamin D deficiency 05/2018       Social History     Socioeconomic History    Marital status:      Spouse name: Not on file    Number of children: Not on file    Years of education: Not on file    Highest education level: Not on file   Occupational History    Occupation: retired   Social Needs    Financial resource strain: Not hard at all   [employer] insecurity     Worry: Never true     Inability: Never true   [employer] needs     Medical: No     Non-medical: No   Tobacco Use    Smoking status: Never Smoker    Smokeless tobacco: Never Used   Substance and Sexual Activity    Alcohol use:  Yes     Alcohol/week: 0.0 standard drinks     Comment: socially-1 glassof wine 2-3 times a month    Drug use: No    Sexual activity: Never   Lifestyle    Physical activity     Days per week: Not on file     Minutes per session: Not on file    Stress: Not on file   Relationships    Social connections     Talks on phone: Not on file     Gets together: Not on file     Attends Hindu service: Not on file     Active member of club or organization: Not on file     Attends meetings of clubs or organizations: Not on file     Relationship status: Not on file    Intimate partner violence     Fear of current or ex partner: Not on file     Emotionally abused: Not on file     Physically abused: Not on file     Forced sexual activity: Not on file   Other Topics Concern    Not on file   Social History Narrative    Not on file       Family History   Problem Relation Age of Onset    Diabetes Mother     Thyroid Disease Mother     Arthritis Mother     High Blood Pressure Mother     Arthritis Father     High Blood Pressure Father     Other Father         hay fever    Breast Cancer Neg Hx     Ovarian Cancer Neg Hx        Allergies   Allergen Reactions    Levaquin [Levofloxacin] Other (See Comments)     Hallucinations    Percocet [Oxycodone-Acetaminophen] Nausea Only and Other (See Comments)     Affects memory    Rifampin Other (See Comments)     Lost half of vision    Sulfa Antibiotics Other (See Comments)     hallucinations    Cefuroxime Diarrhea, Nausea And Vomiting and Other (See Comments)     cramping       Review of Systems     /76 (Site: Left Upper Arm)   Pulse 94   Temp 97.6 °F (36.4 °C)   Ht 5' 4\" (1.626 m)   Wt 150 lb (68 kg)   BMI 25.75 kg/m²      Physical Examination: General appearance - patient alert, well appearing, and in no distress  Mental status - alert and oriented    Neck - supple, no significant adenopathy  Chest - few crackles bilaterally pt with pulm fibrosis   Heart - normal rate, irregular rhythm, normal S1, S2, no murmurs, rubs, clicks or gallops  Abdomen - soft, nontender, nondistended, no masses or organomegaly  no abdominal bruits.   Obese Protuberant: No  Neurological - alert, oriented, normal speech, no focal findings or movement disorder noted, motor and sensory grossly normal bilaterally  Musculoskeletal - no joint tenderness, deformity or swelling  Extremities - peripheral pulses normal, no pedal edema, no clubbing or cyanosis, Edson's sign negative bilaterally  Prosthesis: No  Skin - normal coloration and turgor, no rashes, no suspicious skin lesions noted      I have reviewed recent diagnostic testing including labs, EKG. Please see EKG for interpretation. Diagnosis Orders   1. Hypothyroidism, unspecified type  T4, Free    TSH With Reflex Ft4   2. 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 ([address]     3. Essential hypertension     4.  Dyslipidemia         Orders Placed This Encounter   Procedures    T4, Free     Blood test in am     Standing Status:   Future     Standing Expiration Date:   11/11/2021    TSH With Reflex Ft4     Standing Status:   Future     Standing Expiration Date:   11/11/2021       Outpatient Encounter Medications as of 5/11/2021   Medication Sig Dispense Refill    amLODIPine (NORVASC) 2.5 MG tablet Take 1 tablet by mouth daily 90 tablet 3    apixaban (ELIQUIS) 5 MG TABS tablet TAKE 1 TABLET BY MOUTH  TWICE DAILY 180 tablet 3    spironolactone (ALDACTONE) 50 MG tablet Take 1 tablet by mouth daily 90 tablet 1    budesonide (PULMICORT) 0.5 MG/2ML nebulizer suspension Take 2 mLs by nebulization 2 times daily 60 ampule 3    azithromycin (ZITHROMAX) 250 MG tablet Take 250 mg by mouth daily      metoprolol tartrate (LOPRESSOR) 25 MG tablet take 1/2 tablet by mouth twice a day 30 tablet 5    levothyroxine (SYNTHROID) 75 MCG tablet take 1 tablet by mouth once daily 90 tablet 3    simvastatin (ZOCOR) 20 MG tablet TAKE 1 TABLET BY MOUTH  NIGHTLY 90 tablet 3    albuterol sulfate HFA (PROAIR HFA) 108 (90 Base) MCG/ACT inhaler Inhale 2 puffs into the lungs every 6 hours as needed for Wheezing or Shortness of Breath 1 Inhaler 11    Lactobacillus (PROBIOTIC ACIDOPHILUS) CAPS Take 1 capsule by mouth daily       Cholecalciferol (VITAMIN D3) 5000 units TABS Take 5,000 Units by

## 2021-06-24 NOTE — PATIENT INSTRUCTIONS
Recommendations/Plan:  -Continue Flonase 50mcg/INH 2sprays each nostril daily.  -She was advised to keep follow up with Infectious Disease specialist MD Chet for antibiotic management for Mycobacterium avium/intracellulare infection of lungs  - Schedule patient for High resolution CT scan of chest without IV contrast in 12months to follow her interstitial lung disease along with full PFTS. - Schedule patient for follow up with my clinic in 12 months with recommended tests. Patient advised to make early appointment if needed.  -Lebron Ma was advised to make early appointment with my clinic if she develops any constitutional symptoms including loss of weight, poor appetite or hemoptysis. She verbalizes under standing.  - Patient educated about my impression and plan. Patient verbalizes understanding. Avery Vidales(Attending)

## 2021-07-21 DIAGNOSIS — E55.9 VITAMIN D DEFICIENCY: ICD-10-CM

## 2021-07-21 DIAGNOSIS — E78.00 PURE HYPERCHOLESTEROLEMIA: Primary | ICD-10-CM

## 2021-08-06 ENCOUNTER — NURSE ONLY (OUTPATIENT)
Dept: LAB | Age: 86
End: 2021-08-06

## 2021-08-06 DIAGNOSIS — E55.9 VITAMIN D DEFICIENCY: ICD-10-CM

## 2021-08-06 DIAGNOSIS — E03.9 HYPOTHYROIDISM, UNSPECIFIED TYPE: ICD-10-CM

## 2021-08-06 DIAGNOSIS — E78.00 PURE HYPERCHOLESTEROLEMIA: ICD-10-CM

## 2021-08-06 LAB
ALBUMIN SERPL-MCNC: 5 G/DL (ref 3.5–5.1)
ALP BLD-CCNC: 74 U/L (ref 38–126)
ALT SERPL-CCNC: 13 U/L (ref 11–66)
ANION GAP SERPL CALCULATED.3IONS-SCNC: 14 MEQ/L (ref 8–16)
AST SERPL-CCNC: 24 U/L (ref 5–40)
BILIRUB SERPL-MCNC: 0.4 MG/DL (ref 0.3–1.2)
BUN BLDV-MCNC: 35 MG/DL (ref 7–22)
CALCIUM SERPL-MCNC: 12.1 MG/DL (ref 8.5–10.5)
CHLORIDE BLD-SCNC: 101 MEQ/L (ref 98–111)
CHOLESTEROL, TOTAL: 176 MG/DL (ref 100–199)
CO2: 28 MEQ/L (ref 23–33)
CREAT SERPL-MCNC: 1 MG/DL (ref 0.4–1.2)
GFR SERPL CREATININE-BSD FRML MDRD: 53 ML/MIN/1.73M2
GLUCOSE BLD-MCNC: 110 MG/DL (ref 70–108)
HDLC SERPL-MCNC: 85 MG/DL
LDL CHOLESTEROL CALCULATED: 71 MG/DL
LDL CHOLESTEROL DIRECT: 77.36 MG/DL
POTASSIUM SERPL-SCNC: 5.3 MEQ/L (ref 3.5–5.2)
SODIUM BLD-SCNC: 143 MEQ/L (ref 135–145)
T4 FREE: 1.5 NG/DL (ref 0.93–1.76)
TOTAL PROTEIN: 8.2 G/DL (ref 6.1–8)
TRIGL SERPL-MCNC: 98 MG/DL (ref 0–199)
TSH SERPL DL<=0.05 MIU/L-ACNC: 2.93 UIU/ML (ref 0.4–4.2)
VITAMIN D 25-HYDROXY: 103 NG/ML (ref 30–100)

## 2021-08-09 DIAGNOSIS — N25.81 SECONDARY HYPERPARATHYROIDISM (HCC): Primary | ICD-10-CM

## 2021-08-24 ENCOUNTER — OFFICE VISIT (OUTPATIENT)
Dept: FAMILY MEDICINE CLINIC | Age: 86
End: 2021-08-24

## 2021-08-24 ENCOUNTER — TELEPHONE (OUTPATIENT)
Dept: FAMILY MEDICINE CLINIC | Age: 86
End: 2021-08-24

## 2021-08-24 VITALS
HEIGHT: 64 IN | SYSTOLIC BLOOD PRESSURE: 130 MMHG | TEMPERATURE: 95.8 F | WEIGHT: 143.5 LBS | HEART RATE: 64 BPM | DIASTOLIC BLOOD PRESSURE: 60 MMHG | BODY MASS INDEX: 24.5 KG/M2 | RESPIRATION RATE: 22 BRPM

## 2021-08-24 DIAGNOSIS — G89.29 CHRONIC BILATERAL LOW BACK PAIN WITHOUT SCIATICA: Primary | ICD-10-CM

## 2021-08-24 DIAGNOSIS — E78.00 PURE HYPERCHOLESTEROLEMIA: ICD-10-CM

## 2021-08-24 DIAGNOSIS — E44.0 MODERATE MALNUTRITION (HCC): ICD-10-CM

## 2021-08-24 DIAGNOSIS — E03.9 HYPOTHYROIDISM, UNSPECIFIED TYPE: ICD-10-CM

## 2021-08-24 DIAGNOSIS — M54.50 CHRONIC BILATERAL LOW BACK PAIN WITHOUT SCIATICA: Primary | ICD-10-CM

## 2021-08-24 DIAGNOSIS — J47.9 BRONCHIECTASIS WITHOUT COMPLICATION (HCC): ICD-10-CM

## 2021-08-24 DIAGNOSIS — J84.10 PULMONARY FIBROSIS (HCC): ICD-10-CM

## 2021-08-24 PROCEDURE — 1090F PRES/ABSN URINE INCON ASSESS: CPT | Performed by: FAMILY MEDICINE

## 2021-08-24 PROCEDURE — 99214 OFFICE O/P EST MOD 30 MIN: CPT | Performed by: FAMILY MEDICINE

## 2021-08-24 PROCEDURE — 4040F PNEUMOC VAC/ADMIN/RCVD: CPT | Performed by: FAMILY MEDICINE

## 2021-08-24 PROCEDURE — G8420 CALC BMI NORM PARAMETERS: HCPCS | Performed by: FAMILY MEDICINE

## 2021-08-24 PROCEDURE — G8427 DOCREV CUR MEDS BY ELIG CLIN: HCPCS | Performed by: FAMILY MEDICINE

## 2021-08-24 PROCEDURE — 1123F ACP DISCUSS/DSCN MKR DOCD: CPT | Performed by: FAMILY MEDICINE

## 2021-08-24 PROCEDURE — 1036F TOBACCO NON-USER: CPT | Performed by: FAMILY MEDICINE

## 2021-08-24 RX ORDER — LEVOTHYROXINE SODIUM 0.07 MG/1
TABLET ORAL
Qty: 90 TABLET | Refills: 3 | Status: SHIPPED | OUTPATIENT
Start: 2021-08-24 | End: 2022-01-18 | Stop reason: DRUGHIGH

## 2021-08-24 RX ORDER — BUDESONIDE 0.5 MG/2ML
0.5 INHALANT ORAL 2 TIMES DAILY
Qty: 60 AMPULE | Refills: 3 | Status: SHIPPED | OUTPATIENT
Start: 2021-08-24 | End: 2022-04-20 | Stop reason: SDUPTHER

## 2021-08-24 RX ORDER — SIMVASTATIN 20 MG
TABLET ORAL
Qty: 90 TABLET | Refills: 3 | Status: SHIPPED | OUTPATIENT
Start: 2021-08-24 | End: 2022-08-29

## 2021-08-24 RX ORDER — AMOXICILLIN 500 MG/1
CAPSULE ORAL
Qty: 4 CAPSULE | Refills: 3 | Status: SHIPPED | OUTPATIENT
Start: 2021-08-24 | End: 2022-06-23

## 2021-08-24 RX ORDER — AZITHROMYCIN 250 MG/1
250 TABLET, FILM COATED ORAL DAILY
Qty: 90 TABLET | Refills: 3 | Status: SHIPPED | OUTPATIENT
Start: 2021-08-24 | End: 2022-10-18 | Stop reason: SDUPTHER

## 2021-08-24 ASSESSMENT — ENCOUNTER SYMPTOMS
CONSTIPATION: 0
SHORTNESS OF BREATH: 0
SINUS PRESSURE: 0
BACK PAIN: 1

## 2021-08-24 NOTE — TELEPHONE ENCOUNTER
----- Message from Luna Pate MD sent at 8/24/2021  4:50 PM EDT -----  Check with [facility] to see if they could mail us a copy of the MRI or CT or both of her lower back

## 2021-08-24 NOTE — PROGRESS NOTES
Cherie Salinas (:  1935) is a 80 y.o. female,Established patient, here for evaluation of the following chief complaint(s):  Hypertension         ASSESSMENT/PLAN:  1. Bronchiectasis without complication (HCC)  -     budesonide (PULMICORT) 0.5 MG/2ML nebulizer suspension; Take 2 mLs by nebulization 2 times daily, Disp-60 ampule, R-3Normal  2. Pulmonary fibrosis (HCC)  -     budesonide (PULMICORT) 0.5 MG/2ML nebulizer suspension; Take 2 mLs by nebulization 2 times daily, Disp-60 ampule, R-3Normal  3. Hypothyroidism, unspecified type  -     levothyroxine (SYNTHROID) 75 MCG tablet; take 1 tablet by mouth once daily, Disp-90 tablet, R-3Normal  4. Pure hypercholesterolemia  -     simvastatin (ZOCOR) 20 MG tablet; TAKE 1 TABLET BY MOUTH  NIGHTLY, Disp-90 tablet, R-3Normal      No follow-ups on file. Subjective   SUBJECTIVE/OBJECTIVE:  HPI  1. She continues with back pain following PT. She uses tylenol and heat with some help . 2. She had some epidurals without help. 3. The pain is in the lower back and to the buttocks. At night it may spread to the legs  4. She states not having angina recently  Review of Systems   Constitutional: Positive for fatigue. HENT: Negative for sinus pressure. Eyes: Negative for visual disturbance. Respiratory: Negative for shortness of breath. Cardiovascular: Negative for chest pain. Gastrointestinal: Negative for constipation. Genitourinary: Negative. Musculoskeletal: Positive for arthralgias, back pain, gait problem and myalgias. Skin: Negative for rash. Neurological: Positive for weakness. Negative for headaches. The patient's medications, allergies, past medical problems, surgical, social, and family histories were reviewed and updated as needed. Objective   Physical Exam  Constitutional:       General: She is not in acute distress. Appearance: She is well-developed. HENT:      Head: Normocephalic and atraumatic.    Eyes:      General: No scleral icterus. Conjunctiva/sclera: Conjunctivae normal.   Neck:      Trachea: No tracheal deviation. Cardiovascular:      Rate and Rhythm: Normal rate and regular rhythm. Heart sounds: Normal heart sounds. Pulmonary:      Effort: Pulmonary effort is normal.      Breath sounds: Normal breath sounds. Skin:     General: Skin is warm and dry. Neurological:      Mental Status: She is alert and oriented to person, place, and time. Psychiatric:         Behavior: Behavior normal.     Blood pressure 130/60, pulse 64, temperature 95.8 °F (35.4 °C), temperature source Skin, resp. rate 22, height 5' 4\" (1.626 m), weight 143 lb 8 oz (65.1 kg), not currently breastfeeding. An electronic signature was used to authenticate this note.     --Tye Corcoran MD

## 2021-08-31 ENCOUNTER — HOSPITAL ENCOUNTER (OUTPATIENT)
Dept: GENERAL RADIOLOGY | Age: 86
Discharge: HOME | End: 2021-08-31
Payer: MEDICARE

## 2021-08-31 ENCOUNTER — HOSPITAL ENCOUNTER (OUTPATIENT)
Dept: MRI IMAGING | Age: 86
Discharge: HOME | End: 2021-08-31

## 2021-08-31 ENCOUNTER — HOSPITAL ENCOUNTER (OUTPATIENT)
Dept: GENERAL RADIOLOGY | Age: 86
Discharge: HOME | End: 2021-08-31

## 2021-08-31 ENCOUNTER — HOSPITAL ENCOUNTER (OUTPATIENT)
Age: 86
Discharge: HOME | End: 2021-08-31
Payer: MEDICARE

## 2021-08-31 DIAGNOSIS — J18.9 PNEUMONIA OF BOTH LOWER LOBES DUE TO INFECTIOUS ORGANISM: ICD-10-CM

## 2021-08-31 DIAGNOSIS — Z00.6 ENCOUNTER FOR EXAMINATION FOR NORMAL COMPARISON OR CONTROL IN CLINICAL RESEARCH PROGRAM: ICD-10-CM

## 2021-08-31 DIAGNOSIS — Z00.6 ENCOUNTER FOR EXAMINATION FOR NORMAL COMPARISON AND CONTROL IN CLINICAL RESEARCH PROGRAM: ICD-10-CM

## 2021-08-31 DIAGNOSIS — J18.9 COMMUNITY ACQUIRED PNEUMONIA OF RIGHT LUNG, UNSPECIFIED PART OF LUNG: ICD-10-CM

## 2021-08-31 PROCEDURE — 71046 X-RAY EXAM CHEST 2 VIEWS: CPT

## 2021-09-14 ENCOUNTER — OFFICE VISIT (OUTPATIENT)
Dept: INTERNAL MEDICINE CLINIC | Age: 86
End: 2021-09-14
Payer: MEDICARE

## 2021-09-14 VITALS
DIASTOLIC BLOOD PRESSURE: 84 MMHG | TEMPERATURE: 97.5 F | HEART RATE: 76 BPM | WEIGHT: 154.6 LBS | BODY MASS INDEX: 26.54 KG/M2 | SYSTOLIC BLOOD PRESSURE: 138 MMHG

## 2021-09-14 DIAGNOSIS — N25.81 SECONDARY HYPERPARATHYROIDISM (HCC): Primary | ICD-10-CM

## 2021-09-14 PROCEDURE — G8427 DOCREV CUR MEDS BY ELIG CLIN: HCPCS | Performed by: INTERNAL MEDICINE

## 2021-09-14 PROCEDURE — 1090F PRES/ABSN URINE INCON ASSESS: CPT | Performed by: INTERNAL MEDICINE

## 2021-09-14 PROCEDURE — 1123F ACP DISCUSS/DSCN MKR DOCD: CPT | Performed by: INTERNAL MEDICINE

## 2021-09-14 PROCEDURE — 4040F PNEUMOC VAC/ADMIN/RCVD: CPT | Performed by: INTERNAL MEDICINE

## 2021-09-14 PROCEDURE — 1036F TOBACCO NON-USER: CPT | Performed by: INTERNAL MEDICINE

## 2021-09-14 PROCEDURE — 99213 OFFICE O/P EST LOW 20 MIN: CPT | Performed by: INTERNAL MEDICINE

## 2021-09-14 PROCEDURE — G8417 CALC BMI ABV UP PARAM F/U: HCPCS | Performed by: INTERNAL MEDICINE

## 2021-09-14 NOTE — PROGRESS NOTES
MULTIPLE VITAMIN PO Take 1 tablet by mouth daily        No current facility-administered medications for this visit. Past Medical History:   Diagnosis Date    Anemia     normal on pre-op 5/2012 and 12/2/13    Backache     h/o    Bronchiectasis ([facility] 2013    Bursitis     bilateral shoulders    Constipation     Diverticulosis of colon     HTN (hypertension)     Hypercalcemia     slightly elevated at 10.8 pre-op 12/2/13    Hyperlipidemia     Nodular goiter     bx negative    OA (osteoarthritis)     bilateral thumbs - sees Dr. Ziegler Aid Osteopenia 11/6/2013    Parathyroid adenoma 4/30/2013    Pneumonia of right upper lobe due to infectious organism     Pneumonitis     Dr. Venice Ackerman in 7/2010 - bx negative    Pulmonary Mycobacterium avium complex (MAC) infection ([facility]     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 ([facility]     had one parathyroid removed - now follows with Dr. Tania Coronado    Sinusitis     with seasonal allergies    Vitamin D deficiency 05/2018       Social History     Socioeconomic History    Marital status:      Spouse name: Not on file    Number of children: Not on file    Years of education: Not on file    Highest education level: Not on file   Occupational History    Occupation: retired   Tobacco Use    Smoking status: Never Smoker    Smokeless tobacco: Never Used   Vaping Use    Vaping Use: Never used   Substance and Sexual Activity    Alcohol use:  Yes     Alcohol/week: 0.0 standard drinks     Comment: socially-1 glassof wine 2-3 times a month    Drug use: No    Sexual activity: Never   Other Topics Concern    Not on file   Social History Narrative    Not on file     Social Determinants of Health     Financial Resource Strain: Low Risk     Difficulty of Paying Living Expenses: Not hard at all   Food Insecurity: No Food Insecurity    Worried About Running Out of Food in the Last Year: Never true    Vanesa of Food in the Last Year: Never true   Transportation Needs: No Transportation Needs    Lack of Transportation (Medical): No    Lack of Transportation (Non-Medical):  No   Physical Activity:     Days of Exercise per Week:     Minutes of Exercise per Session:    Stress:     Feeling of Stress :    Social Connections:     Frequency of Communication with Friends and Family:     Frequency of Social Gatherings with Friends and Family:     Attends Christian Services:     Active Member of Clubs or Organizations:     Attends Club or Organization Meetings:     Marital Status:    Intimate Partner Violence:     Fear of Current or Ex-Partner:     Emotionally Abused:     Physically Abused:     Sexually Abused:        Family History   Problem Relation Age of Onset    Diabetes Mother     Thyroid Disease Mother     Arthritis Mother     High Blood Pressure Mother     Arthritis Father     High Blood Pressure Father     Other Father         hay fever    Breast Cancer Neg Hx     Ovarian Cancer Neg Hx        Allergies   Allergen Reactions    Levaquin [Levofloxacin] Other (See Comments)     Hallucinations    Percocet [Oxycodone-Acetaminophen] Nausea Only and Other (See Comments)     Affects memory    Rifampin Other (See Comments)     Lost half of vision    Sulfa Antibiotics Other (See Comments)     hallucinations    Cefuroxime Diarrhea, Nausea And Vomiting and Other (See Comments)     cramping       Review of Systems     See HPI    /84 (Site: Right Upper Arm)   Pulse 76   Temp 97.5 °F (36.4 °C)   Wt 154 lb 9.6 oz (70.1 kg)   BMI 26.54 kg/m²      Physical Examination: General appearance - patient alert, well appearing, and in no distress  Mental status - alert and oriented    Neck - supple, no significant adenopathy  Chest - few crackles bilaterally pt with pulm fibrosis   And distant  Heart - normal rate, irregular rhythm, normal S1, S2, no murmurs, rubs, clicks or gallops  Abdomen - soft, nontender, nondistended, no masses or organomegaly  no abdominal bruits. Obese Protuberant: No  Neurological - alert, oriented, normal speech, no focal findings or movement disorder noted, motor and sensory grossly normal bilaterally  Musculoskeletal - no joint tenderness, deformity or swelling  Extremities - peripheral pulses normal, no pedal edema, no clubbing or cyanosis, Edson's sign negative bilaterally  Prosthesis: No  Skin - normal coloration and turgor, no rashes, no suspicious skin lesions noted      I have reviewed recent diagnostic testing including labs,       No orders of the defined types were placed in this encounter. Outpatient Encounter Medications as of 9/14/2021   Medication Sig Dispense Refill    budesonide (PULMICORT) 0.5 MG/2ML nebulizer suspension Take 2 mLs by nebulization 2 times daily 60 ampule 3    metoprolol tartrate (LOPRESSOR) 25 MG tablet take 1/2 tablet by mouth twice a day 90 tablet 5    levothyroxine (SYNTHROID) 75 MCG tablet take 1 tablet by mouth once daily 90 tablet 3    simvastatin (ZOCOR) 20 MG tablet TAKE 1 TABLET BY MOUTH  NIGHTLY 90 tablet 3    azithromycin (ZITHROMAX) 250 MG tablet Take 1 tablet by mouth daily 90 tablet 3    amoxicillin (AMOXIL) 500 MG capsule 4 pills 1 hr before dental procedures 4 capsule 3    amLODIPine (NORVASC) 2.5 MG tablet Take 1 tablet by mouth daily 90 tablet 3    apixaban (ELIQUIS) 5 MG TABS tablet TAKE 1 TABLET BY MOUTH  TWICE DAILY 180 tablet 3    spironolactone (ALDACTONE) 50 MG tablet Take 1 tablet by mouth daily 90 tablet 1    albuterol sulfate HFA (PROAIR HFA) 108 (90 Base) MCG/ACT inhaler Inhale 2 puffs into the lungs every 6 hours as needed for Wheezing or Shortness of Breath 1 Inhaler 11    Lactobacillus (PROBIOTIC ACIDOPHILUS) CAPS Take 1 capsule by mouth daily       MULTIPLE VITAMIN PO Take 1 tablet by mouth daily        No facility-administered encounter medications on file as of 9/14/2021. Controlled Substances Monitoring:       Will schedule follow up appointment in 6 months. Plan:      Hypothyroidism -   Continue with current dose of thyroid supplements, for hypothyroidism and recheck labs pre next office visit, and reminded pt she needs to take levothyroxine in am , atleast 1/2 hr pre breakfast.    HTN is stable on the current regimen. Hyperlipidemia  - On zocor 20 mg daily, check labs , lipids pre next office visit. Vit D levels are mildly elevated, so supplements were stopped, and recheck labs. Due to pulm fibrosis and other lung disorder recommend she gets COVID booster dose, which will be administered at this office. Signed by: Jen Barragan M.D.     [address] [facility]  [address], [address]  [facility], [address]    Tel  [phone]   Fax [phone]

## 2021-10-06 ENCOUNTER — NURSE ONLY (OUTPATIENT)
Dept: LAB | Age: 86
End: 2021-10-06

## 2021-10-06 DIAGNOSIS — N25.81 SECONDARY HYPERPARATHYROIDISM (HCC): ICD-10-CM

## 2021-10-06 LAB — VITAMIN D 25-HYDROXY: 77 NG/ML (ref 30–100)

## 2021-11-30 ENCOUNTER — OFFICE VISIT (OUTPATIENT)
Dept: PULMONOLOGY | Age: 86
End: 2021-11-30
Payer: MEDICARE

## 2021-11-30 VITALS
DIASTOLIC BLOOD PRESSURE: 72 MMHG | SYSTOLIC BLOOD PRESSURE: 132 MMHG | OXYGEN SATURATION: 94 % | HEIGHT: 64 IN | BODY MASS INDEX: 25.61 KG/M2 | TEMPERATURE: 97.8 F | WEIGHT: 150 LBS | HEART RATE: 85 BPM

## 2021-11-30 DIAGNOSIS — J84.10 PULMONARY FIBROSIS (HCC): Primary | ICD-10-CM

## 2021-11-30 DIAGNOSIS — R09.89 CHEST CONGESTION: ICD-10-CM

## 2021-11-30 DIAGNOSIS — J84.9 ILD (INTERSTITIAL LUNG DISEASE) (HCC): ICD-10-CM

## 2021-11-30 PROCEDURE — 4040F PNEUMOC VAC/ADMIN/RCVD: CPT | Performed by: NURSE PRACTITIONER

## 2021-11-30 PROCEDURE — G8484 FLU IMMUNIZE NO ADMIN: HCPCS | Performed by: NURSE PRACTITIONER

## 2021-11-30 PROCEDURE — 1090F PRES/ABSN URINE INCON ASSESS: CPT | Performed by: NURSE PRACTITIONER

## 2021-11-30 PROCEDURE — 99214 OFFICE O/P EST MOD 30 MIN: CPT | Performed by: NURSE PRACTITIONER

## 2021-11-30 PROCEDURE — G8427 DOCREV CUR MEDS BY ELIG CLIN: HCPCS | Performed by: NURSE PRACTITIONER

## 2021-11-30 PROCEDURE — G8417 CALC BMI ABV UP PARAM F/U: HCPCS | Performed by: NURSE PRACTITIONER

## 2021-11-30 PROCEDURE — 1036F TOBACCO NON-USER: CPT | Performed by: NURSE PRACTITIONER

## 2021-11-30 PROCEDURE — 1123F ACP DISCUSS/DSCN MKR DOCD: CPT | Performed by: NURSE PRACTITIONER

## 2021-11-30 ASSESSMENT — ENCOUNTER SYMPTOMS
GASTROINTESTINAL NEGATIVE: 1
COUGH: 1
ALLERGIC/IMMUNOLOGIC NEGATIVE: 1
BACK PAIN: 1
WHEEZING: 0
EYES NEGATIVE: 1
SHORTNESS OF BREATH: 1

## 2021-11-30 NOTE — PROGRESS NOTES
[place] [facility]    : Adeline Velasqeuz, 80 y.o.   : 2021    Pt of Dr. Ted Rogers   Patient presents with    Follow-up     lung disease        [address] is here for follow up for pulmonary fibrosis. 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 Patient did not want any biopsy done. Presents today with some chest congestion and slightly worse SOB- cough worse in the AM  Productive but clear   NO fever or chills   Presents today in [address]- less active due to pinched nerves in her back has had two epidurals with no real success. Patient to go to pain management clinic soon. Home oxygen at 2LPM continuously   Has not used MAISHA in months   CXR done 2021- no acute changes/no infiltratives   Pulmonary rehab done but cannot now due to back pain   Last PFT 2019 restrictive defect decreased DLCO    Progress History:   Since last visit any new medical issues? No  New ER or hospitlal visits? No  Any new or changes in medicines? No  Using inhalers? Yes   Are they helpful?  Yes   PNA vaccination UTD  Flu vaccination UTD  Covid vaccinations and booster- UTD  Past Medical hx   PMH:  Past Medical History:   Diagnosis Date    Anemia     normal on pre-op 2012 and 13    Backache     h/o    Bronchiectasis ([address]     Bursitis     bilateral shoulders    Constipation     Diverticulosis of colon     HTN (hypertension)     Hypercalcemia     slightly elevated at 10.8 pre-op 13    Hyperlipidemia     Nodular goiter     bx negative    OA (osteoarthritis)     bilateral thumbs - sees Dr. Francois Kelly Osteopenia 2013    Parathyroid adenoma 2013    Pneumonia of right upper lobe due to infectious organism     Pneumonitis     Dr. Allyson Zacarias in 2010 - bx negative    Pulmonary Mycobacterium avium complex (MAC) infection ([address]     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 ([address]     had one parathyroid removed - now follows with Dr. Angely Syed    Sinusitis     with seasonal allergies    Vitamin D deficiency 2018     SURGICAL HISTORY:  Past Surgical History:   Procedure Laterality Date    ABDOMINAL EXPLORATION SURGERY  2013    Release of KATHERINE TAMAYO-Dr. Maite Amado    APPENDECTOMY      BRONCHOSCOPY N/A 2020    BRONCHOSCOPY FLUOROSCOPY performed by Bakari Rasmussen MD at [address] Bilateral  ?   SECTION  4718,8391    DILATION AND CURETTAGE OF UTERUS  0682,6842,5070    EXCISION OF PARATHYROID MASS Right 2013    rt parathyroidectomy (excision of rt inferior parathyroid mass) exploration of neck     FOOT SURGERY      left    FOOT SURGERY Left 2017    Dr Fabian Delatorre  6-4-12    left knee    JOINT REPLACEMENT  2014    right knee    MALIGNANT SKIN LESION EXCISION Left 2017    BCC DORSAL FOOT WITH GRAFT & FS    GOSIA AND BSO  1982    TONSILLECTOMY AND ADENOIDECTOMY  194 ?  TOTAL KNEE ARTHROPLASTY Right 2014    [facility]     SOCIAL HISTORY:  Social History     Tobacco Use    Smoking status: Never Smoker    Smokeless tobacco: Never Used   Vaping Use    Vaping Use: Never used   Substance Use Topics    Alcohol use:  Yes     Alcohol/week: 0.0 standard drinks     Comment: socially-1 glassof wine 2-3 times a month    Drug use: No     ALLERGIES:  Allergies   Allergen Reactions    Levaquin [Levofloxacin] Other (See Comments)     Hallucinations    Percocet [Oxycodone-Acetaminophen] Nausea Only and Other (See Comments)     Affects memory    Rifampin Other (See Comments)     Lost half of vision    Sulfa Antibiotics Other (See Comments)     hallucinations    Cefuroxime Diarrhea, Nausea And Vomiting and Other (See Comments)     cramping     FAMILY HISTORY:  Family History   Problem Relation Age of Onset    Diabetes Mother     Thyroid Disease Mother     Arthritis Mother     High Blood Pressure Mother     Arthritis Father     High Blood Pressure Father     Other Father         hay fever    Breast Cancer Neg Hx     Ovarian Cancer Neg Hx      CURRENT MEDICATIONS:  Current Outpatient Medications   Medication Sig Dispense Refill    budesonide (PULMICORT) 0.5 MG/2ML nebulizer suspension Take 2 mLs by nebulization 2 times daily 60 ampule 3    metoprolol tartrate (LOPRESSOR) 25 MG tablet take 1/2 tablet by mouth twice a day 90 tablet 5    levothyroxine (SYNTHROID) 75 MCG tablet take 1 tablet by mouth once daily 90 tablet 3    simvastatin (ZOCOR) 20 MG tablet TAKE 1 TABLET BY MOUTH  NIGHTLY 90 tablet 3    azithromycin (ZITHROMAX) 250 MG tablet Take 1 tablet by mouth daily 90 tablet 3    amoxicillin (AMOXIL) 500 MG capsule 4 pills 1 hr before dental procedures 4 capsule 3    amLODIPine (NORVASC) 2.5 MG tablet Take 1 tablet by mouth daily 90 tablet 3    apixaban (ELIQUIS) 5 MG TABS tablet TAKE 1 TABLET BY MOUTH  TWICE DAILY 180 tablet 3    spironolactone (ALDACTONE) 50 MG tablet Take 1 tablet by mouth daily 90 tablet 1    Lactobacillus (PROBIOTIC ACIDOPHILUS) CAPS Take 1 capsule by mouth daily       MULTIPLE VITAMIN PO Take 1 tablet by mouth daily       albuterol sulfate HFA (PROAIR HFA) 108 (90 Base) MCG/ACT inhaler Inhale 2 puffs into the lungs every 6 hours as needed for Wheezing or Shortness of Breath 1 Inhaler 11     No current facility-administered medications for this visit. Jessenia DAVIES   Review of Systems   Constitutional: Negative. Negative for chills and fever. HENT: Negative. Negative for congestion. Eyes: Negative. Respiratory: Positive for cough (mainly in AM) and shortness of breath (more with exertion ). Negative for wheezing. Cardiovascular: Negative. Negative for chest pain and leg swelling. Gastrointestinal: Negative. Endocrine: Negative. Genitourinary: Negative. Musculoskeletal: Positive for back pain and gait problem.    Allergic/Immunologic: Negative. Hematological: Negative. Psychiatric/Behavioral: Negative. Negative for sleep disturbance. Physical exam   /72 (Site: Left Upper Arm, Position: Sitting, Cuff Size: Small Adult)   Pulse 85   Temp 97.8 °F (36.6 °C)   Ht 5' 4\" (1.626 m)   Wt 150 lb (68 kg)   SpO2 94% Comment: on 2 liter of O2  BMI 25.75 kg/m²    Neck Circumference -     Mallampati -     Physical Exam  Vitals and nursing note reviewed. Constitutional:       Appearance: She is well-developed. HENT:      Head: Normocephalic and atraumatic. Eyes:      Conjunctiva/sclera: Conjunctivae normal.      Pupils: Pupils are equal, round, and reactive to light. Neck:      Vascular: No JVD. Cardiovascular:      Rate and Rhythm: Normal rate and regular rhythm. Heart sounds: Normal heart sounds. No murmur heard. No friction rub. No gallop. Pulmonary:      Effort: Pulmonary effort is normal. No respiratory distress. Breath sounds: No wheezing. Abdominal:      General: Bowel sounds are normal.      Palpations: Abdomen is soft. Musculoskeletal:         General: Normal range of motion. Cervical back: Normal range of motion and neck supple. Lumbar back: Tenderness present. Skin:     General: Skin is warm and dry. Capillary Refill: Capillary refill takes less than 2 seconds. Neurological:      Mental Status: She is alert and oriented to person, place, and time. Psychiatric:         Behavior: Behavior normal.         Thought Content: Thought content normal.         Judgment: Judgment normal.       Test results   Lung Nodule Screening     [] Qualifies    [x]not qualify   [] Declined    [] Completed     8/31/2021   XR CHEST (2 VW)   FINDINGS: POSTSURGICAL CHANGES: None. LINES/TUBES/MECHANICAL DEVICES: None. TRACHEA/HEART/MEDIASTINUM/HILUM: Unremarkable. LUNG GRACIA: Chronic interstitial changes right lung. No acute infiltrate. No effusion. OTHER: Chronic elevated right hemidiaphragm. PNEUMOTHORAX: None. OSSEOUS STRUCTURES: No acute osseous abnormality. 1. Chronic interstitial changes right lung. 2. No acute infiltrate. Assessment      Diagnosis Orders   1. Pulmonary fibrosis (HCC)  CT CHEST HIGH RESOLUTION   2. ILD (interstitial lung disease) ([address]  CT CHEST HIGH RESOLUTION   3. Chest congestion           Plan   -Will start patient on Spiriva Respimat 2 INH once daily in am. Akira Huang educated and demonstrated by me in my office how to use Spiriva Respimat . Patient verbalizes understanding. She was detailed about mechanism of action of drug along with associated side effects. She agreed to take the risk and medication. She verbalizes understanding.  Samples given x 1.   -Patient to call office if she feels a difference with use of Spiriva- No RX given today   -Continue oxygen at 2LPM with exercise and sleep   -CXR reviewed from 8/2021 with patient no acute findings  -Encouraged activity as tolerated; discussed pulmonary rehab but patient is dealing with back issues and is currently scheduled to see pain management  -Acapella/IS encouraged and discussed.   -Continue Pulmicort neb BID as previously prescribed  -MAISHA PRN for SOB/wheezing   -6MWT next appt     Will see Velasquez Bazan in: 6 months HRCT prior     Electronically signed by JASMEET Hawkins - CNP on 11/30/2021 at 12:43 PM

## 2021-12-08 ENCOUNTER — OFFICE VISIT (OUTPATIENT)
Dept: PHYSICAL MEDICINE AND REHAB | Age: 86
End: 2021-12-08
Payer: MEDICARE

## 2021-12-08 VITALS
DIASTOLIC BLOOD PRESSURE: 66 MMHG | SYSTOLIC BLOOD PRESSURE: 98 MMHG | HEIGHT: 64 IN | BODY MASS INDEX: 25.61 KG/M2 | WEIGHT: 150 LBS

## 2021-12-08 DIAGNOSIS — M53.3 CHRONIC SI JOINT PAIN: Primary | ICD-10-CM

## 2021-12-08 DIAGNOSIS — M47.816 FACET ARTHROPATHY, LUMBAR: ICD-10-CM

## 2021-12-08 DIAGNOSIS — G89.29 CHRONIC SI JOINT PAIN: Primary | ICD-10-CM

## 2021-12-08 DIAGNOSIS — G89.4 CHRONIC PAIN SYNDROME: ICD-10-CM

## 2021-12-08 DIAGNOSIS — M79.18 MYOFASCIAL PAIN: ICD-10-CM

## 2021-12-08 PROCEDURE — 1123F ACP DISCUSS/DSCN MKR DOCD: CPT | Performed by: ANESTHESIOLOGY

## 2021-12-08 PROCEDURE — G8484 FLU IMMUNIZE NO ADMIN: HCPCS | Performed by: ANESTHESIOLOGY

## 2021-12-08 PROCEDURE — G8417 CALC BMI ABV UP PARAM F/U: HCPCS | Performed by: ANESTHESIOLOGY

## 2021-12-08 PROCEDURE — 4040F PNEUMOC VAC/ADMIN/RCVD: CPT | Performed by: ANESTHESIOLOGY

## 2021-12-08 PROCEDURE — 1090F PRES/ABSN URINE INCON ASSESS: CPT | Performed by: ANESTHESIOLOGY

## 2021-12-08 PROCEDURE — 99204 OFFICE O/P NEW MOD 45 MIN: CPT | Performed by: ANESTHESIOLOGY

## 2021-12-08 PROCEDURE — 1036F TOBACCO NON-USER: CPT | Performed by: ANESTHESIOLOGY

## 2021-12-08 PROCEDURE — G8427 DOCREV CUR MEDS BY ELIG CLIN: HCPCS | Performed by: ANESTHESIOLOGY

## 2021-12-08 NOTE — PROGRESS NOTES
Medications:   Tramadol - makes her \"loopy\"    Imaging:    Lumbar MRI 2021        Past Medical History:   Diagnosis Date    Anemia     normal on pre-op 2012 and 13    Backache     h/o    Bronchiectasis ([address]     Bursitis     bilateral shoulders    Constipation     Diverticulosis of colon     HTN (hypertension)     Hypercalcemia     slightly elevated at 10.8 pre-op 13    Hyperlipidemia     Nodular goiter     bx negative    OA (osteoarthritis)     bilateral thumbs - sees Dr. Rosenthal Solid Osteopenia 2013    Parathyroid adenoma 2013    Pneumonia of right upper lobe due to infectious organism     Pneumonitis     Dr. Maria Del Rosario Quintana in 2010 - bx negative    Pulmonary Mycobacterium avium complex (MAC) infection ([address]     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 ([address]     had one parathyroid removed - now follows with Dr. Davalos Gallo    Sinusitis     with seasonal allergies    Vitamin D deficiency 2018       Past Surgical History:   Procedure Laterality Date    ABDOMINAL EXPLORATION SURGERY  2013    Release of KATHERINE TAMAYO-Dr. Chase Spears    APPENDECTOMY      BRONCHOSCOPY N/A 2020    BRONCHOSCOPY FLUOROSCOPY performed by Mary Mckeon MD at [address] WITH IMPLANT Bilateral  ?   SECTION  [id number]    DILATION AND CURETTAGE OF UTERUS  [id number]    EXCISION OF PARATHYROID MASS Right 2013    rt parathyroidectomy (excision of rt inferior parathyroid mass) exploration of neck     FOOT SURGERY      left    FOOT SURGERY Left 2017    Dr Moustapha Junior  6-4-12    left knee    JOINT REPLACEMENT  2014    right knee    MALIGNANT SKIN LESION EXCISION Left 2017    BCC DORSAL FOOT WITH GRAFT & FS    GOSIA AND BSO  1982    TONSILLECTOMY AND ADENOIDECTOMY  1940 ?     TOTAL KNEE ARTHROPLASTY Right 2014    [facility]       Family History   Problem Relation Age of negative    Objective:     Vitals:    12/08/21 0956   BP: 98/66       Constitutional: Pleasant, no acute distress   Head: Normocephalic, atraumatic   Eyes: Conjunctivae normal   Neck: Supple, symmetrical   Lungs: Normal respiratory effort, non-labored breathing   Cardiovascular: Limbs warm and well perfused   Abdomen: Non-protruded   Musculoskeletal: Muscle bulk symmetric, no atrophy, no gross deformities   · Lower Extremities: ROM WNL. · Thorax: No paraspinal tenderness bilaterally. No scoliosis or kyphosis. · Lumbar Spine: Decreased ROM. Lumbar paraspinals tender to palpation bilaterally. SLR neg bilaterally. VIKA positive bilaterally. GAENSLEN positive bilaterally. Positive facet loading bilaterally. Bilateral SI joints tender to palpation. Bilateral greater trochanters non-tender to palpation. Neurological: Cranial nerves II-XII grossly intact. · Gait - Antalgic gait. Ambulates without assistive device. · Motor: 5/5 muscle strength in bilateral hip flexion, knee flexion, knee extension, ankle dorsiflexion, and ankle plantar flexion   · Sensory: LT sensation intact in lower limbs   · Reflexes: 2+ symmetrical in bilateral achilles, 2+ bilateral patellar, negative ankle clonus, downgoing babinski   Skin: No rashes or lesions present   Psychological: Cooperative, no exaggerated pain behaviors       Assessment:    Diagnosis Orders   1. Chronic SI joint pain  CHG FLUOR NEEDLE/CATH SPINE/PARASPINAL DX/THER ADDON    IL INJECT SI JOINT ARTHRGRPHY&/ANES/STEROID W/IMAGE   2. Chronic pain syndrome     3. Myofascial pain     4. Facet arthropathy, lumbar           Joellen Barrett is a 80 y. o.female presenting to the pain clinic for evaluation of chronic low back pain. Her clinical history and physical assessment are consistent with significant SI joint dysfunction. We have set the patient up for a bilateral SI joint injection.  We discussed the possibility of starting a medication such as Lyrica only at night but will hold off at this time due to the increased risk of falls. We discussed that she has several pain generators that may have to be addressed in the future. We will follow up 6-8 weeks after the SI injections to evaluate effectiveness. Plan: The following treatment recommendations and plan were discussed in detail with Diogo Paredes. Imaging:   I have reviewed patients imaging of lumbar MRI and results were discussed with patient today. Analgesics:   Patient is taking Acetaminophen. Patient informed that the maximum amount of acetaminophen taken on a regular basis should only be 4000 mg per day. Adjuvants: For continued chronic pain with associated musculoskeletal component, the patient is advised to continue Tylenol arthritis. Interventions: With examination consistent with bilateral sacroiliac dysfunction/pain, we will proceed with a bilateral sacroiliac joint injection. The risks and benefits were discussed in detail with the patient. Patient wants to proceed with the injection. Anticoagulation/NPO Recommendations:   Patient does not need to hold any medications prior to the procedure. Patient does not need to be NPO prior to the procedure. We will perform a LOCAL injection. Multidisciplinary Pain Management:   In the presence of complex, chronic, and multi-factorial pain, the importance of a multidisciplinary approach to pain management in the patients management regimen was emphasized and discussed in great detail. PHYSICAL THERAPY: Patient is advised to see a physical therapist for gentle stretching exercises and conditioning exercises for management of pain.      Referrals: None      Prescriptions Written This Visit: None      Follow-up: for B/L SI injections      Dru Yeboah, DO  Interventional Pain Management/PM&R   [place]

## 2021-12-09 RX ORDER — TIOTROPIUM BROMIDE 18 UG/1
18 CAPSULE ORAL; RESPIRATORY (INHALATION) DAILY
Qty: 90 CAPSULE | Refills: 3 | Status: SHIPPED | OUTPATIENT
Start: 2021-12-09 | End: 2022-01-18 | Stop reason: ALTCHOICE

## 2021-12-20 ENCOUNTER — PATIENT MESSAGE (OUTPATIENT)
Dept: INTERNAL MEDICINE CLINIC | Age: 86
End: 2021-12-20

## 2021-12-21 NOTE — TELEPHONE ENCOUNTER
From: Wilber Campbell  To: Dr. Keyur Garcia: 12/20/2021 12:45 PM EST  Subject: Lab Tests? Am I to have lab work done again prior to my 1/18/2022 appointment? I cannot find mention of it in the 9/14 visit summary nor do I have an order?  Thank Kaelyn Miller

## 2021-12-23 ENCOUNTER — PREP FOR PROCEDURE (OUTPATIENT)
Dept: PHYSICAL MEDICINE AND REHAB | Age: 86
End: 2021-12-23

## 2021-12-26 NOTE — H&P
Today, patient presents for planned bilateral sacroiliac joint injection. This note is reflective of the patient's previous visit for evaluation. We will proceed with today's planned procedure. Since patient's last visit for evaluation, there have been no interval changes in medical history. Patient has no new numbness, weakness, or focal neurological deficit since evaluation. Patient has no contraindications to injection (no anticoagulation or recent antibiotic intake for active infections), and has a  present or is able to drive themselves (as discussed and cleared by physician). Allergies to latex, contrast dye, and steroid medications have been confirmed with the patient prior to the procedure. NPO necessity has been assessed and accepted based on procedure complexity. The risks and benefits of the procedure have been explained including but are not limited to infection, bleeding, paralysis, immediate post procedure weakness, and dizziness; the patient acknowledges understanding and desires to proceed with the procedure. Patient has signed consent for same procedure as discussed in previous clinic encounter. All other questions and concerns were addressed at bedside. See procedure note for full details. Post procedure Instructions: The patient was advised not to drive during the day of the procedure and not to engage in any significant decision making (unless otherwise states by physician). The patient was also advised to be cautious with walking/activity for 24 hours following today's visit and asked not to engage in over-exertion (unless otherwise states by physician). After this time, it is ok to resume pre-procedure level of activity. Patient advised to apply ice to site of injection in situations of pain and discomfort. Patient advised to not submerge site of injection during bath or pool activities for approximately 24 hours post-procedure.  Patient attested to understanding post procedure Current Treatment Medications:   Tylenol arthritis PRN    Historical Treatment Medications:   Tramadol - makes her \"loopy\"    Imaging:    Lumbar MRI 2021        Past Medical History:   Diagnosis Date    Anemia     normal on pre-op 2012 and 13    Backache     h/o    [facility] [address]     Bursitis     bilateral shoulders    Constipation     Diverticulosis of colon     HTN (hypertension)     Hypercalcemia     slightly elevated at 10.8 pre-op 13    Hyperlipidemia     Nodular goiter     bx negative    OA (osteoarthritis)     bilateral thumbs - sees Dr. Loni Cook Osteopenia 2013    Parathyroid adenoma 2013    Pneumonia of right upper lobe due to infectious organism     Pneumonitis     Dr. Tess Gonzalez in 2010 - bx negative    Pulmonary Mycobacterium avium complex (MAC) infection ([facility] [address]     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 ([facility] [address]     had one parathyroid removed - now follows with Dr. Delfina Foreman    Sinusitis     with seasonal allergies    Vitamin D deficiency 2018       Past Surgical History:   Procedure Laterality Date    ABDOMINAL EXPLORATION SURGERY  2013    Release of KATHERINE TAMAYO-Dr. Subhash Batres    APPENDECTOMY      BRONCHOSCOPY N/A 2020    BRONCHOSCOPY FLUOROSCOPY performed by Molly Garcia MD at [address] WITH IMPLANT Bilateral  ?   SECTION  8395,6678    DILATION AND CURETTAGE OF UTERUS  4743,8578,1056    EXCISION OF PARATHYROID MASS Right 2013    rt parathyroidectomy (excision of rt inferior parathyroid mass) exploration of neck     FOOT SURGERY      left    FOOT SURGERY Left 2017    Dr Ashley Ashford  6-4-12    left knee    JOINT REPLACEMENT  2014    right knee    MALIGNANT SKIN LESION EXCISION Left 2017    BCC DORSAL FOOT WITH GRAFT & FS    GOSIA AND BSO  1982    TONSILLECTOMY AND ADENOIDECTOMY  1940 ?     TOTAL KNEE ARTHROPLASTY Right January 6th, 2014    OIO       Family History   Problem Relation Age of Onset    Diabetes Mother     Thyroid Disease Mother     Arthritis Mother     High Blood Pressure Mother     Arthritis Father     High Blood Pressure Father     Other Father         hay fever    Breast Cancer Neg Hx     Ovarian Cancer Neg Hx        Medications & Allergies:   Current Outpatient Medications   Medication Instructions    albuterol sulfate HFA (PROAIR HFA) 108 (90 Base) MCG/ACT inhaler 2 puffs, Inhalation, EVERY 6 HOURS PRN    amLODIPine (NORVASC) 2.5 mg, Oral, DAILY    amoxicillin (AMOXIL) 500 MG capsule 4 pills 1 hr before dental procedures     apixaban (ELIQUIS) 5 MG TABS tablet TAKE 1 TABLET BY MOUTH  TWICE DAILY    azithromycin (ZITHROMAX) 250 mg, Oral, DAILY    budesonide (PULMICORT) 500 mcg, Nebulization, 2 TIMES DAILY    Lactobacillus (PROBIOTIC ACIDOPHILUS) CAPS 1 capsule, Oral, DAILY    levothyroxine (SYNTHROID) 75 MCG tablet take 1 tablet by mouth once daily    metoprolol tartrate (LOPRESSOR) 25 MG tablet take 1/2 tablet by mouth twice a day    MULTIPLE VITAMIN PO 1 tablet, Oral, DAILY    simvastatin (ZOCOR) 20 MG tablet TAKE 1 TABLET BY MOUTH  NIGHTLY    Spiriva HandiHaler 18 mcg, Inhalation, DAILY, 1 capsule via inhalation daily.     spironolactone (ALDACTONE) 50 mg, Oral, DAILY       Allergies   Allergen Reactions    Levaquin [Levofloxacin] Other (See Comments)     Hallucinations    Percocet [Oxycodone-Acetaminophen] Nausea Only and Other (See Comments)     Affects memory    Rifampin Other (See Comments)     Lost half of vision    Sulfa Antibiotics Other (See Comments)     hallucinations    Cefuroxime Diarrhea, Nausea And Vomiting and Other (See Comments)     cramping       Review of Systems:   Constitutional: negative for weight changes or fevers  Genitourinary: negative for bowel/bladder incontinence   Musculoskeletal: positive for low back pain, positive bilateral SI pain  Neurological: negative for any leg weakness or numbness/tingling  Behavioral/Psych: negative for anxiety/depression   All other systems reviewed and are negative    Objective: There were no vitals filed for this visit. Constitutional: Pleasant, no acute distress   Head: Normocephalic, atraumatic   Eyes: Conjunctivae normal   Neck: Supple, symmetrical   Lungs: Normal respiratory effort, non-labored breathing   Cardiovascular: Limbs warm and well perfused   Abdomen: Non-protruded   Musculoskeletal: Muscle bulk symmetric, no atrophy, no gross deformities   · Lower Extremities: ROM WNL. · Thorax: No paraspinal tenderness bilaterally. No scoliosis or kyphosis. · Lumbar Spine: Decreased ROM. Lumbar paraspinals tender to palpation bilaterally. SLR neg bilaterally. VIKA positive bilaterally. GAENSLEN positive bilaterally. Positive facet loading bilaterally. Bilateral SI joints tender to palpation. Bilateral greater trochanters non-tender to palpation. Neurological: Cranial nerves II-XII grossly intact. · Gait - Antalgic gait. Ambulates without assistive device. · Motor: 5/5 muscle strength in bilateral hip flexion, knee flexion, knee extension, ankle dorsiflexion, and ankle plantar flexion   · Sensory: LT sensation intact in lower limbs   · Reflexes: 2+ symmetrical in bilateral achilles, 2+ bilateral patellar, negative ankle clonus, downgoing babinski   Skin: No rashes or lesions present   Psychological: Cooperative, no exaggerated pain behaviors       Assessment:    Diagnosis Orders   1. Chronic SI joint pain  CHG FLUOR NEEDLE/CATH SPINE/PARASPINAL DX/THER ADDON    CT INJECT SI JOINT ARTHRGRPHY&/ANES/STEROID W/IMAGE   2. Chronic pain syndrome     3. Myofascial pain     4. Facet arthropathy, lumbar           Erika Roy is a 80 y. o.female presenting to the pain clinic for evaluation of chronic low back pain.  Her clinical history and physical assessment are consistent with significant SI joint dysfunction. We have set the patient up for a bilateral SI joint injection. We discussed the possibility of starting a medication such as Lyrica only at night but will hold off at this time due to the increased risk of falls. We discussed that she has several pain generators that may have to be addressed in the future. We will follow up 6-8 weeks after the SI injections to evaluate effectiveness. Plan: The following treatment recommendations and plan were discussed in detail with Sophie Salcido. Imaging:   I have reviewed patients imaging of lumbar MRI and results were discussed with patient today. Analgesics:   Patient is taking Acetaminophen. Patient informed that the maximum amount of acetaminophen taken on a regular basis should only be 4000 mg per day. Adjuvants: For continued chronic pain with associated musculoskeletal component, the patient is advised to continue Tylenol arthritis. Interventions: With examination consistent with bilateral sacroiliac dysfunction/pain, we will proceed with a bilateral sacroiliac joint injection. The risks and benefits were discussed in detail with the patient. Patient wants to proceed with the injection. Anticoagulation/NPO Recommendations:   Patient does not need to hold any medications prior to the procedure. Patient does not need to be NPO prior to the procedure. We will perform a LOCAL injection. Multidisciplinary Pain Management:   In the presence of complex, chronic, and multi-factorial pain, the importance of a multidisciplinary approach to pain management in the patients management regimen was emphasized and discussed in great detail. PHYSICAL THERAPY: Patient is advised to see a physical therapist for gentle stretching exercises and conditioning exercises for management of pain.      Referrals: None      Prescriptions Written This Visit: None      Follow-up: for B/L SI injections      Jeramie Colin DO  Interventional Pain Management/PM&R   [place]

## 2021-12-28 ENCOUNTER — APPOINTMENT (OUTPATIENT)
Dept: GENERAL RADIOLOGY | Age: 86
End: 2021-12-28
Attending: ANESTHESIOLOGY
Payer: MEDICARE

## 2021-12-28 ENCOUNTER — HOSPITAL ENCOUNTER (OUTPATIENT)
Age: 86
Setting detail: OUTPATIENT SURGERY
Discharge: HOME | End: 2021-12-28
Attending: ANESTHESIOLOGY | Admitting: ANESTHESIOLOGY
Payer: MEDICARE

## 2021-12-28 VITALS
BODY MASS INDEX: 26.05 KG/M2 | SYSTOLIC BLOOD PRESSURE: 135 MMHG | RESPIRATION RATE: 16 BRPM | HEIGHT: 64 IN | TEMPERATURE: 97 F | OXYGEN SATURATION: 97 % | WEIGHT: 152.6 LBS | HEART RATE: 74 BPM | DIASTOLIC BLOOD PRESSURE: 61 MMHG

## 2021-12-28 PROCEDURE — 6360000002 HC RX W HCPCS: Performed by: ANESTHESIOLOGY

## 2021-12-28 PROCEDURE — 7100000010 HC PHASE II RECOVERY - FIRST 15 MIN: Performed by: ANESTHESIOLOGY

## 2021-12-28 PROCEDURE — 6360000004 HC RX CONTRAST MEDICATION: Performed by: ANESTHESIOLOGY

## 2021-12-28 PROCEDURE — 27096 INJECT SACROILIAC JOINT: CPT | Performed by: ANESTHESIOLOGY

## 2021-12-28 PROCEDURE — 2500000003 HC RX 250 WO HCPCS: Performed by: ANESTHESIOLOGY

## 2021-12-28 PROCEDURE — 2709999900 HC NON-CHARGEABLE SUPPLY: Performed by: ANESTHESIOLOGY

## 2021-12-28 PROCEDURE — 3209999900 FLUORO FOR SURGICAL PROCEDURES

## 2021-12-28 PROCEDURE — 3600000054 HC PAIN LEVEL 3 BASE: Performed by: ANESTHESIOLOGY

## 2021-12-28 RX ORDER — METHYLPREDNISOLONE ACETATE 80 MG/ML
INJECTION, SUSPENSION INTRA-ARTICULAR; INTRALESIONAL; INTRAMUSCULAR; SOFT TISSUE PRN
Status: DISCONTINUED | OUTPATIENT
Start: 2021-12-28 | End: 2021-12-28 | Stop reason: ALTCHOICE

## 2021-12-28 RX ORDER — LIDOCAINE HYDROCHLORIDE 10 MG/ML
INJECTION, SOLUTION EPIDURAL; INFILTRATION; INTRACAUDAL; PERINEURAL PRN
Status: DISCONTINUED | OUTPATIENT
Start: 2021-12-28 | End: 2021-12-28 | Stop reason: ALTCHOICE

## 2021-12-28 RX ORDER — BUPIVACAINE HYDROCHLORIDE 5 MG/ML
INJECTION, SOLUTION PERINEURAL PRN
Status: DISCONTINUED | OUTPATIENT
Start: 2021-12-28 | End: 2021-12-28 | Stop reason: ALTCHOICE

## 2021-12-28 ASSESSMENT — PAIN DESCRIPTION - DESCRIPTORS: DESCRIPTORS: DISCOMFORT;CONSTANT

## 2021-12-28 ASSESSMENT — PAIN SCALES - GENERAL: PAINLEVEL_OUTOF10: 0

## 2021-12-28 ASSESSMENT — PAIN - FUNCTIONAL ASSESSMENT: PAIN_FUNCTIONAL_ASSESSMENT: 0-10

## 2021-12-28 NOTE — PROGRESS NOTES
[facility] USES HOME OXYGEN THERAPY  O2 APPLIED AT 2L/NASAL CANNULA. PULSE OX  97% WITH O2. .  1107 ORANGE JUICE GIVEN AS REQUESTED. 1115 UP AT SIDE OF CART TO GET DRESSED. INJECTION SITES CLEAN AND DRY  1122 DISCHARGED HOME VIA PT.S PORTABLE CHAIR TO PRIVATE CAR WITHOUT COMPLAINT .

## 2021-12-28 NOTE — PROCEDURES
Pre-operative Diagnosis:  SI joint pain     Post-operative Diagnosis:  SI joint pain     Procedure: Bilateral SI joint injection     Procedure Description:  After having signed the informed consent, the patient was placed in the prone position. The patient's back was prepped with chloraprep solution, and draped in a sterile fashion. A total of 2 ml of 1% lidocaine were used to anesthetize the skin and underlying tissues. Under fluoroscopic guidance a single 22-gauge, 3.5 inch spinal needle was advanced to lie within the inferior pole of the RIGHT sacroiliac joint. There were no paresthesias or heme aspiration. Needle placement was confirmed in the AP view. After negative aspiration, 0.5 ml of Omnipaque 300 contrast was injected with appropriate spread observed. A total of 2 ml of 0.5% bupivicaine mixed with 40 mg depo-medrol were injected into the sacroiliac joint. The needle was withdrawn without any complications. This exact procedure was repeated on the contralateral side. The patient tolerated the procedure well, was transported to the recovery room and observed for 15 minutes and discharged in an ambulatory fashion. No immediate reported complications.     Procedural Complications: None  Estimated Blood Loss: 0 mL    Jeramie Colin DO  Interventional Pain Management/PM&R   . [facility] and [facility]

## 2022-01-03 DIAGNOSIS — E03.9 HYPOTHYROIDISM, UNSPECIFIED TYPE: Primary | ICD-10-CM

## 2022-01-03 DIAGNOSIS — E78.5 DYSLIPIDEMIA: ICD-10-CM

## 2022-01-03 NOTE — TELEPHONE ENCOUNTER
Pt was notified orders were placed and to fast 12 hrs night before. She will go to [facility].

## 2022-01-04 ENCOUNTER — NURSE ONLY (OUTPATIENT)
Dept: LAB | Age: 87
End: 2022-01-04

## 2022-01-04 DIAGNOSIS — E78.5 DYSLIPIDEMIA: ICD-10-CM

## 2022-01-04 DIAGNOSIS — E03.9 HYPOTHYROIDISM, UNSPECIFIED TYPE: ICD-10-CM

## 2022-01-04 LAB
ALBUMIN SERPL-MCNC: 4.9 G/DL (ref 3.5–5.1)
ALP BLD-CCNC: 72 U/L (ref 38–126)
ALT SERPL-CCNC: 13 U/L (ref 11–66)
AST SERPL-CCNC: 20 U/L (ref 5–40)
BILIRUB SERPL-MCNC: 0.5 MG/DL (ref 0.3–1.2)
BILIRUBIN DIRECT: < 0.2 MG/DL (ref 0–0.3)
CHOLESTEROL, TOTAL: 186 MG/DL (ref 100–199)
HDLC SERPL-MCNC: 97 MG/DL
LDL CHOLESTEROL CALCULATED: 74 MG/DL
T4 FREE: 1.6 NG/DL (ref 0.93–1.76)
TOTAL PROTEIN: 7.5 G/DL (ref 6.1–8)
TRIGL SERPL-MCNC: 76 MG/DL (ref 0–199)
TSH SERPL DL<=0.05 MIU/L-ACNC: 4.5 UIU/ML (ref 0.4–4.2)

## 2022-01-12 ENCOUNTER — OFFICE VISIT (OUTPATIENT)
Dept: CARDIOLOGY CLINIC | Age: 87
End: 2022-01-12
Payer: MEDICARE

## 2022-01-12 VITALS — SYSTOLIC BLOOD PRESSURE: 152 MMHG | HEART RATE: 71 BPM | DIASTOLIC BLOOD PRESSURE: 76 MMHG

## 2022-01-12 DIAGNOSIS — I10 ESSENTIAL HYPERTENSION: Primary | ICD-10-CM

## 2022-01-12 DIAGNOSIS — R42 DIZZINESS: ICD-10-CM

## 2022-01-12 DIAGNOSIS — I48.21 PERMANENT ATRIAL FIBRILLATION (HCC): ICD-10-CM

## 2022-01-12 PROCEDURE — 1123F ACP DISCUSS/DSCN MKR DOCD: CPT | Performed by: NUCLEAR MEDICINE

## 2022-01-12 PROCEDURE — 1090F PRES/ABSN URINE INCON ASSESS: CPT | Performed by: NUCLEAR MEDICINE

## 2022-01-12 PROCEDURE — G8417 CALC BMI ABV UP PARAM F/U: HCPCS | Performed by: NUCLEAR MEDICINE

## 2022-01-12 PROCEDURE — 1036F TOBACCO NON-USER: CPT | Performed by: NUCLEAR MEDICINE

## 2022-01-12 PROCEDURE — 4040F PNEUMOC VAC/ADMIN/RCVD: CPT | Performed by: NUCLEAR MEDICINE

## 2022-01-12 PROCEDURE — G8427 DOCREV CUR MEDS BY ELIG CLIN: HCPCS | Performed by: NUCLEAR MEDICINE

## 2022-01-12 PROCEDURE — 99213 OFFICE O/P EST LOW 20 MIN: CPT | Performed by: NUCLEAR MEDICINE

## 2022-01-12 PROCEDURE — 93000 ELECTROCARDIOGRAM COMPLETE: CPT | Performed by: NUCLEAR MEDICINE

## 2022-01-12 PROCEDURE — G8484 FLU IMMUNIZE NO ADMIN: HCPCS | Performed by: NUCLEAR MEDICINE

## 2022-01-12 NOTE — PROGRESS NOTES
[id number] [facility] [email or web address] .  [address]  [facility] [address]  Dept: [phone]  Dept Fax: [phone]  Loc: [phone]    Visit Date:     Anton Cornejo is a 80 y.o. female who presents todayfor:  Chief Complaint   Patient presents with    Check-Up    Shortness of Breath    Atrial Fibrillation   know pulmonary fibrosis  On home O2  No chest pain  Known A fib   Seems stable   No dizziness  No syncope        HPI:  HPI  Past Medical History:   Diagnosis Date    Anemia     normal on pre-op 2012 and 13    Backache     h/o    [facility]asis ([facility] [address]     Bursitis     bilateral shoulders    Constipation     Diverticulosis of colon     HTN (hypertension)     Hypercalcemia     slightly elevated at 10.8 pre-op 13    Hyperlipidemia     Nodular goiter     bx negative    OA (osteoarthritis)     bilateral thumbs - sees Dr. Flip Garza Osteopenia 2013    Parathyroid adenoma 2013    Pneumonia of right upper lobe due to infectious organism     Pneumonitis     Dr. Carline Lew in 2010 - bx negative    Pulmonary Mycobacterium avium complex (MAC) infection ([facility] [address]     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 ([facility] [address]     had one parathyroid removed - now follows with Dr. Meagan Ruiz Sinusitis     with seasonal allergies    Vitamin D deficiency 2018      Past Surgical History:   Procedure Laterality Date    ABDOMINAL EXPLORATION SURGERY  2013    Release of KATHERINE TAMAYO-Dr. Lance Galicia    APPENDECTOMY      BACK INJECTION Bilateral 2021    bilateral sacroiliac joint injection performed by Shanti Fernando DO at [address] [facility] N/A 2020    BRONCHOSCOPY FLUOROSCOPY performed by Tonie Domingo MD at [facility]    CATARACT REMOVAL WITH IMPLANT Bilateral  ?      SECTION  3640,1633    DILATION AND CURETTAGE OF UTERUS  6192,5724,1711    EXCISION OF PARATHYROID MASS Right 05/29/2013    rt parathyroidectomy (excision of rt inferior parathyroid mass) exploration of neck     FOOT SURGERY  2002    left    FOOT SURGERY Left 06/12/2017    Dr Ferraor Hazard  6-4-12    left knee    JOINT REPLACEMENT  01/2014    right knee    MALIGNANT SKIN LESION EXCISION Left 06/12/2017    BCC DORSAL FOOT WITH GRAFT & FS    GOSIA AND BSO  1982    TONSILLECTOMY AND ADENOIDECTOMY  1940 ?  TOTAL KNEE ARTHROPLASTY Right January 6th, 2014    OIO     Family History   Problem Relation Age of Onset    Diabetes Mother     Thyroid Disease Mother     Arthritis Mother     High Blood Pressure Mother     Arthritis Father     High Blood Pressure Father     Other Father         hay fever    Breast Cancer Neg Hx     Ovarian Cancer Neg Hx      Social History     Tobacco Use    Smoking status: Never Smoker    Smokeless tobacco: Never Used   Substance Use Topics    Alcohol use:  Yes     Alcohol/week: 0.0 standard drinks     Comment: socially-1 glassof wine 2-3 times a month      Current Outpatient Medications   Medication Sig Dispense Refill    budesonide (PULMICORT) 0.5 MG/2ML nebulizer suspension Take 2 mLs by nebulization 2 times daily 60 ampule 3    metoprolol tartrate (LOPRESSOR) 25 MG tablet take 1/2 tablet by mouth twice a day 90 tablet 5    levothyroxine (SYNTHROID) 75 MCG tablet take 1 tablet by mouth once daily 90 tablet 3    simvastatin (ZOCOR) 20 MG tablet TAKE 1 TABLET BY MOUTH  NIGHTLY 90 tablet 3    azithromycin (ZITHROMAX) 250 MG tablet Take 1 tablet by mouth daily 90 tablet 3    amLODIPine (NORVASC) 2.5 MG tablet Take 1 tablet by mouth daily 90 tablet 3    apixaban (ELIQUIS) 5 MG TABS tablet TAKE 1 TABLET BY MOUTH  TWICE DAILY 180 tablet 3    spironolactone (ALDACTONE) 50 MG tablet Take 1 tablet by mouth daily 90 tablet 1    Lactobacillus (PROBIOTIC ACIDOPHILUS) CAPS Take 1 capsule by mouth daily       MULTIPLE VITAMIN PO Take 1 tablet by mouth daily       tiotropium (SPIRIVA HANDIHALER) 18 MCG inhalation capsule Inhale 1 capsule into the lungs daily 1 capsule via inhalation daily. 90 capsule 3    amoxicillin (AMOXIL) 500 MG capsule 4 pills 1 hr before dental procedures (Patient not taking: Reported on 1/12/2022) 4 capsule 3    albuterol sulfate HFA (PROAIR HFA) 108 (90 Base) MCG/ACT inhaler Inhale 2 puffs into the lungs every 6 hours as needed for Wheezing or Shortness of Breath 1 Inhaler 11     No current facility-administered medications for this visit.      Allergies   Allergen Reactions    Levaquin [Levofloxacin] Other (See Comments)     Hallucinations    Percocet [Oxycodone-Acetaminophen] Nausea Only and Other (See Comments)     Affects memory    Rifampin Other (See Comments)     Lost half of vision    Sulfa Antibiotics Other (See Comments)     hallucinations    Cefuroxime Diarrhea, Nausea And Vomiting and Other (See Comments)     cramping     Health Maintenance   Topic Date Due    COVID-19 Vaccine (1) Never done    Flu vaccine (1) 09/01/2021    Annual Wellness Visit (AWV)  09/09/2021    Depression Screen  05/11/2022    Potassium monitoring  08/06/2022    Creatinine monitoring  08/06/2022    Lipid screen  01/04/2023    TSH testing  01/04/2023    DTaP/Tdap/Td vaccine (2 - Td or Tdap) 05/17/2023    Shingles Vaccine  Completed    Pneumococcal 65+ years Vaccine  Completed    Hepatitis A vaccine  Aged Out    Hepatitis B vaccine  Aged Out    Hib vaccine  Aged Out    Meningococcal (ACWY) vaccine  Aged Out       Subjective:  Review of Systems  General:   No fever, no chills, No fatigue or weight loss  Pulmonary:    baseline dyspnea, no wheezing  Cardiac:    Denies recent chest pain,   GI:     No nausea or vomiting, no abdominal pain  Neuro:    No dizziness or light headedness,   Musculoskeletal:  No recent active issues  Extremities:   No edema, no obvious claudication       Objective:  Physical Exam  BP (!) 152/76   Pulse 71 General:   Well developed, well nourished  Lungs:   Clear to auscultation  Heart:    Normal S1 S2, Slight murmur. no rubs, no gallops  Abdomen:   Soft, non tender, no organomegalies, positive bowel sounds  Extremities:   No edema, no cyanosis, good peripheral pulses  Neurological:   Awake, alert, oriented. No obvious focal deficits  Musculoskelatal:  No obvious deformities    Assessment:      Diagnosis Orders   1. Essential hypertension  EKG 12 lead   2. Dizziness  EKG 12 lead   3. Permanent atrial fibrillation (HCC)  EKG 12 lead   as above  Cardiac fair for now   ECG in office was done today. I reviewed the ECG. No acute findings      Plan:  No follow-ups on file. As above  Continue risk factor modification and medical management'  Thank you for allowing me to participate in the care of your patient. Please don't hesitate to contact me regarding any further issues related to the patient care    Orders Placed:  Orders Placed This Encounter   Procedures    EKG 12 lead     Order Specific Question:   Reason for Exam?     Answer: Other       Medications Prescribed:  No orders of the defined types were placed in this encounter. Discussed use, benefit, and side effects of prescribed medications. All patient questions answered. Pt voicedunderstanding. Instructed to continue current medications, diet and exercise. Continue risk factor modification and medical management. Patient agreed with treatment plan. Follow up as directed.     Electronically signedby Chad Mendoza MD on 1/12/2022 at 12:17 PM

## 2022-01-18 ENCOUNTER — OFFICE VISIT (OUTPATIENT)
Dept: INTERNAL MEDICINE CLINIC | Age: 87
End: 2022-01-18
Payer: MEDICARE

## 2022-01-18 VITALS
SYSTOLIC BLOOD PRESSURE: 120 MMHG | DIASTOLIC BLOOD PRESSURE: 70 MMHG | TEMPERATURE: 97.8 F | HEART RATE: 74 BPM | BODY MASS INDEX: 26.26 KG/M2 | WEIGHT: 153 LBS

## 2022-01-18 DIAGNOSIS — E03.9 HYPOTHYROIDISM, UNSPECIFIED TYPE: Primary | ICD-10-CM

## 2022-01-18 PROCEDURE — 1123F ACP DISCUSS/DSCN MKR DOCD: CPT | Performed by: INTERNAL MEDICINE

## 2022-01-18 PROCEDURE — 1090F PRES/ABSN URINE INCON ASSESS: CPT | Performed by: INTERNAL MEDICINE

## 2022-01-18 PROCEDURE — G8427 DOCREV CUR MEDS BY ELIG CLIN: HCPCS | Performed by: INTERNAL MEDICINE

## 2022-01-18 PROCEDURE — G8484 FLU IMMUNIZE NO ADMIN: HCPCS | Performed by: INTERNAL MEDICINE

## 2022-01-18 PROCEDURE — G8417 CALC BMI ABV UP PARAM F/U: HCPCS | Performed by: INTERNAL MEDICINE

## 2022-01-18 PROCEDURE — 4040F PNEUMOC VAC/ADMIN/RCVD: CPT | Performed by: INTERNAL MEDICINE

## 2022-01-18 PROCEDURE — 99213 OFFICE O/P EST LOW 20 MIN: CPT | Performed by: INTERNAL MEDICINE

## 2022-01-18 PROCEDURE — 1036F TOBACCO NON-USER: CPT | Performed by: INTERNAL MEDICINE

## 2022-01-18 RX ORDER — LEVOTHYROXINE SODIUM 88 UG/1
TABLET ORAL
Qty: 90 TABLET | Refills: 1 | Status: SHIPPED | OUTPATIENT
Start: 2022-01-18 | End: 2022-02-11 | Stop reason: SDUPTHER

## 2022-01-18 NOTE — PROGRESS NOTES
[address] [facility] Internal Medicine   [facility] [address], [address]  Dept: [phone]  Dept Fax: [phone]    YOB: 1935    Hypertension and hypothyroidism    80 y.o. female   here for follow-up of above issues. She has history of atrial fibrillation for which she is on Eliquis on home oxygen secondary pulmonary fibrosis , recently seen Dr. Elham Thomason, cardiologist here at [address]. Due to atrial fib. She is taking thyroid supplements in am without any problems. ,  Last TSH was 4.170 from 9/30/2020. Due to COVID, she was hesitant to come for office visit, now she is home was in the NH. She lives alone , uses wheel chair and brought in by a friend. She is on 2 LNC at home, due to pulm fibrosis and bronchiectasis followed by dr. Rosemarie Ospina. She got vaccinated for COVID, got the booster dose. Has baseline SOB,   No new changes, no falls or any CP. Gets around in a wheel chair,   Able to tolerate PO intake . Due to chronic pain, seeing dr. Jagdeep Sweeney at pain clinic, she feels the last shot did not help. No bleeding issues on eliquis.      Current Outpatient Medications   Medication Sig Dispense Refill    budesonide (PULMICORT) 0.5 MG/2ML nebulizer suspension Take 2 mLs by nebulization 2 times daily 60 ampule 3    metoprolol tartrate (LOPRESSOR) 25 MG tablet take 1/2 tablet by mouth twice a day 90 tablet 5    levothyroxine (SYNTHROID) 75 MCG tablet take 1 tablet by mouth once daily 90 tablet 3    simvastatin (ZOCOR) 20 MG tablet TAKE 1 TABLET BY MOUTH  NIGHTLY 90 tablet 3    azithromycin (ZITHROMAX) 250 MG tablet Take 1 tablet by mouth daily 90 tablet 3    amoxicillin (AMOXIL) 500 MG capsule 4 pills 1 hr before dental procedures 4 capsule 3    amLODIPine (NORVASC) 2.5 MG tablet Take 1 tablet by mouth daily 90 tablet 3    apixaban (ELIQUIS) 5 MG TABS tablet TAKE 1 TABLET BY MOUTH  TWICE DAILY 180 tablet 3    spironolactone (ALDACTONE) 50 MG tablet Take 1 tablet by mouth daily 90 tablet 1    albuterol sulfate HFA (PROAIR HFA) 108 (90 Base) MCG/ACT inhaler Inhale 2 puffs into the lungs every 6 hours as needed for Wheezing or Shortness of Breath 1 Inhaler 11    Lactobacillus (PROBIOTIC ACIDOPHILUS) CAPS Take 1 capsule by mouth daily       MULTIPLE VITAMIN PO Take 1 tablet by mouth daily        No current facility-administered medications for this visit. Past Medical History:   Diagnosis Date    Anemia     normal on pre-op 5/2012 and 12/2/13    Backache     h/o    Bronchiectasis ([facility] [address] 2013    Bursitis     bilateral shoulders    Constipation     Diverticulosis of colon     HTN (hypertension)     Hypercalcemia     slightly elevated at 10.8 pre-op 12/2/13    Hyperlipidemia     Nodular goiter     bx negative    OA (osteoarthritis)     bilateral thumbs - sees Dr. Flip Garza Osteopenia 11/6/2013    Parathyroid adenoma 4/30/2013    Pneumonia of right upper lobe due to infectious organism     Pneumonitis     Dr. Carline Lew in 7/2010 - bx negative    Pulmonary Mycobacterium avium complex (MAC) infection ([facility] [address]     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 ([facility] [address]     had one parathyroid removed - now follows with Dr. Elysia Wild    Sinusitis     with seasonal allergies    Vitamin D deficiency 05/2018       Social History     Socioeconomic History    Marital status:      Spouse name: Not on file    Number of children: Not on file    Years of education: Not on file    Highest education level: Not on file   Occupational History    Occupation: retired   Tobacco Use    Smoking status: Never Smoker    Smokeless tobacco: Never Used   Vaping Use    Vaping Use: Never used   Substance and Sexual Activity    Alcohol use:  Yes     Alcohol/week: 0.0 standard drinks     Comment: socially-1 glassof wine 2-3 times a month    Drug use: No    Sexual activity: Never   Other Topics Concern    Not on file   Social History Narrative    Not on file     Social Determinants of Health Financial Resource Strain: Low Risk     Difficulty of Paying Living Expenses: Not hard at all   Food Insecurity: No Food Insecurity    Worried About Running Out of Food in the Last Year: Never true    Vanesa of Food in the Last Year: Never true   Transportation Needs: No Transportation Needs    Lack of Transportation (Medical): No    Lack of Transportation (Non-Medical):  No   Physical Activity:     Days of Exercise per Week: Not on file    Minutes of Exercise per Session: Not on file   Stress:     Feeling of Stress : Not on file   Social Connections:     Frequency of Communication with Friends and Family: Not on file    Frequency of Social Gatherings with Friends and Family: Not on file    Attends Taoist Services: Not on file    Active Member of Clubs or Organizations: Not on file    Attends Club or Organization Meetings: Not on file    Marital Status: Not on file   Intimate Partner Violence:     Fear of Current or Ex-Partner: Not on file    Emotionally Abused: Not on file    Physically Abused: Not on file    Sexually Abused: Not on file   Housing Stability:     Unable to Pay for Housing in the Last Year: Not on file    Number of Places Lived in the Last Year: Not on file    Unstable Housing in the Last Year: Not on file       Family History   Problem Relation Age of Onset    Diabetes Mother     Thyroid Disease Mother     Arthritis Mother     High Blood Pressure Mother     Arthritis Father     High Blood Pressure Father     Other Father         hay fever    Breast Cancer Neg Hx     Ovarian Cancer Neg Hx        Allergies   Allergen Reactions    Levaquin [Levofloxacin] Other (See Comments)     Hallucinations    Percocet [Oxycodone-Acetaminophen] Nausea Only and Other (See Comments)     Affects memory    Rifampin Other (See Comments)     Lost half of vision    Sulfa Antibiotics Other (See Comments)     hallucinations    Cefuroxime Diarrhea, Nausea And Vomiting and Other (See Comments)     cramping       Review of Systems     See HPI    /70 (Site: Right Upper Arm)   Pulse 74   Temp 97.8 °F (36.6 °C)   Wt 153 lb (69.4 kg)   BMI 26.26 kg/m²      Physical Examination: General appearance - patient alert, well appearing, and in no distress  Mental status - alert and oriented    Neck - supple, no significant adenopathy  Chest - few crackles bilaterally , pt with pulm fibrosis   Heart - normal rate, regular rhythm, normal S1, S2, no murmurs, rubs, clicks or gallops  Abdomen - soft, nontender, nondistended, no masses or organomegaly  no abdominal bruits. Obese Protuberant: No  Neurological - alert, oriented, normal speech, no focal findings or movement disorder noted, motor and sensory grossly normal bilaterally  Musculoskeletal - no joint tenderness, deformity or swelling  Extremities - peripheral pulses normal, no pedal edema, no clubbing or cyanosis, Edson's sign negative bilaterally  Prosthesis: No  Skin - normal coloration and turgor, no rashes, no suspicious skin lesions noted      I have reviewed recent diagnostic testing including labs,       No orders of the defined types were placed in this encounter.       Outpatient Encounter Medications as of 1/18/2022   Medication Sig Dispense Refill    budesonide (PULMICORT) 0.5 MG/2ML nebulizer suspension Take 2 mLs by nebulization 2 times daily 60 ampule 3    metoprolol tartrate (LOPRESSOR) 25 MG tablet take 1/2 tablet by mouth twice a day 90 tablet 5    levothyroxine (SYNTHROID) 75 MCG tablet take 1 tablet by mouth once daily 90 tablet 3    simvastatin (ZOCOR) 20 MG tablet TAKE 1 TABLET BY MOUTH  NIGHTLY 90 tablet 3    azithromycin (ZITHROMAX) 250 MG tablet Take 1 tablet by mouth daily 90 tablet 3    amoxicillin (AMOXIL) 500 MG capsule 4 pills 1 hr before dental procedures 4 capsule 3    amLODIPine (NORVASC) 2.5 MG tablet Take 1 tablet by mouth daily 90 tablet 3    apixaban (ELIQUIS) 5 MG TABS tablet TAKE 1 TABLET BY MOUTH TWICE DAILY 180 tablet 3    spironolactone (ALDACTONE) 50 MG tablet Take 1 tablet by mouth daily 90 tablet 1    albuterol sulfate HFA (PROAIR HFA) 108 (90 Base) MCG/ACT inhaler Inhale 2 puffs into the lungs every 6 hours as needed for Wheezing or Shortness of Breath 1 Inhaler 11    Lactobacillus (PROBIOTIC ACIDOPHILUS) CAPS Take 1 capsule by mouth daily       MULTIPLE VITAMIN PO Take 1 tablet by mouth daily       [DISCONTINUED] tiotropium (SPIRIVA HANDIHALER) 18 MCG inhalation capsule Inhale 1 capsule into the lungs daily 1 capsule via inhalation daily. 90 capsule 3     No facility-administered encounter medications on file as of 1/18/2022. Controlled Substances Monitoring: Will schedule follow up appointment in 6 months. Plan:      Hypothyroidism -  Due to recent TSH from 1/4/22 , increase the  thyroid supplements, for hypothyroidism and recheck labs pre next office visit, and reminded pt she needs to take levothyroxine  88 mcg in am , atleast 1/2 hr pre breakfast.    HTN is stable on the current regimen. Hyperlipidemia  - On zocor 20 mg daily, check labs , lipids pre next office visit. Vit D levels are mildly elevated, better now after change in her dose on the last visit. Due to pulm fibrosis and other lung disorder recommend she gets COVID booster dose. , got it at local drug store. RTO 3-4 months , and thyroid labs pre visit. Signed by: Lenora Sharp M.D.     [address] [facility] Internal Medicine  [address], [place], [address]    Tel  [phone]   Fax [phone]

## 2022-01-22 NOTE — PROGRESS NOTES
Subjective:      Patient ID: Jacinto Muñoz is a 80 y.o. female. HPI  1. We discussed the buspar and it seems to be tolerated ok. She thinks it's helping but now needs a breast biopsy. 2. She is flying soon and wonders about hyperventilating in the airport. We discussed taking an extra half buspar that morning  Review of Systems   Constitutional: Negative for fatigue. HENT: Negative for sinus pressure. Eyes: Negative for visual disturbance. Respiratory: Negative for shortness of breath. Cardiovascular: Negative for chest pain. Gastrointestinal: Negative for constipation. Genitourinary: Negative. Musculoskeletal: Negative for arthralgias. Skin: Negative for rash. Neurological: Negative for headaches. The patient's medications, allergies, past medical problems, surgical, social, and family histories were reviewed and updated as needed. Objective:   Physical Exam   Constitutional: She is oriented to person, place, and time. She appears well-developed and well-nourished. No distress. HENT:   Head: Normocephalic and atraumatic. Right Ear: External ear normal.   Left Ear: External ear normal.   Nose: Nose normal.   Mouth/Throat: Oropharynx is clear and moist. No oropharyngeal exudate. Eyes: Conjunctivae are normal. No scleral icterus. Neck: Neck supple. Carotid bruit is not present. No tracheal deviation present. No thyromegaly present. Cardiovascular: Normal rate, regular rhythm, normal heart sounds and intact distal pulses. Pulmonary/Chest: Effort normal and breath sounds normal.   Abdominal: Soft. Bowel sounds are normal. She exhibits no mass. Musculoskeletal: She exhibits no edema. Lymphadenopathy:     She has no cervical adenopathy. Neurological: She is alert and oriented to person, place, and time. Skin: Skin is warm and dry. Psychiatric: She has a normal mood and affect. Her behavior is normal.   Blood pressure 128/68, pulse 84, resp.  rate 13, height 5' 4\"
no

## 2022-02-07 NOTE — PROGRESS NOTES
Chronic Pain/PM&R Clinic Note     Encounter Date: 2/8/22    Subjective:   Chief Complaint:   Chief Complaint   Patient presents with    Follow Up After Procedure       History of Present Illness:   Mo Beltran is a 80 y.o. female seen in the clinic initially on 02/08/22 upon request from Dr. Rocío Ferrer MD for her history of chronic low back pain. Patient states she sustained a fall in October of 2020 when she tripped over her oxygen tubing but she did not have any pain initially after her fall. On 12/24/2020 patient states she woke up and could not get out of bed. Patient states ever since then her back feels \"brittle. \" Patient states she was seen at [facility] and received two epidural steroid injections which provided no relief. Patient states the past two weeks she has had pain in her left buttock that is stabbing and radiates into her left leg, making her feel like her leg may give out. Patient is concerned about the possibility of further falls due to this. Patient states she lives alone and she does not use any assistive devices. Patient states she has never fallen in the past apart from 10/2022 when she tripped over her oxygen tubing. Patient is on 2L of oxygen for her history of pulmonary fibrosis. Patient also has a history of Afib and is currently on Eliquis. Patient states pain is worse first thing in the morning. She also states she will occasionally wake up in the middle of the night with pain in bilateral legs, pain seems to be in the back of bilateral legs. Patient denies pain, numbness or tingling that radiates into her legs during the day. Patient states she completed home health PT in the spring of 2021 which provided minimal relief only on the day of the therapy. Today, 02/08/2022, patient presents for planned follow-up on chronic low back pain. Patient underwent a bilateral SI joint injection on 12/28/2021. Patient states she received 100% pain relief x2 days.   Patient denies any fall since last visit. Patient denies any new symptoms. Patient denies any new associated leg weakness, saddle anesthesia, bowel or bladder dysfunction. History of Interventions:   Surgery: No previous lumbar surgeries  Injections: Lumbar epidural x 2 at [facility] (2021, 2021) - No relief. B/L SI joint injection (2021) - 100% relief x 2 days    Current Treatment Medications:   Tylenol arthritis PRN    Historical Treatment Medications:   Tramadol - makes her \"loopy\"    Imaging:    Lumbar MRI 2021        Past Medical History:   Diagnosis Date    Anemia     normal on pre-op 2012 and 13    Backache     h/o    Bronchiectasis ([facility] [address] 2013    Bursitis     bilateral shoulders    Constipation     Diverticulosis of colon     HTN (hypertension)     Hypercalcemia     slightly elevated at 10.8 pre-op 13    Hyperlipidemia     Nodular goiter     bx negative    OA (osteoarthritis)     bilateral thumbs - sees Dr. Ashlie Naqvi Osteopenia 2013    Parathyroid adenoma 2013    Pneumonia of right upper lobe due to infectious organism     Pneumonitis     Dr. Teague  in 2010 - bx negative    Pulmonary Mycobacterium avium complex (MAC) infection ([facility] [address]     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 ([facility] [address]     had one parathyroid removed - now follows with Dr. Tomer Rubio    Sinusitis     with seasonal allergies    Vitamin D deficiency 2018       Past Surgical History:   Procedure Laterality Date    ABDOMINAL EXPLORATION SURGERY  2013    Release of KATHERINE TAMAYO-Dr. Dorcus Duverney    APPENDECTOMY      BACK INJECTION Bilateral 2021    bilateral sacroiliac joint injection performed by Marybeth Pineda DO at [facility] N/A 2020    BRONCHOSCOPY FLUOR[facility]    CATARACT REMOVAL WITH IMPLANT Bilateral  ?      SECTION  0446,6750    DILATION AND CURETTAGE OF UTERUS  8381,[id number],6662    EXCISION OF PARATHYROID MASS Right 05/29/2013    rt parathyroidectomy (excision of rt inferior parathyroid mass) exploration of neck     FOOT SURGERY  2002    left    FOOT SURGERY Left 06/12/2017    Dr Maria C Srivastava  6-4-12    left knee    JOINT REPLACEMENT  01/2014    right knee    MALIGNANT SKIN LESION EXCISION Left 06/12/2017    BCC DORSAL FOOT WITH GRAFT & FS    GOSIA AND BSO  1982    TONSILLECTOMY AND ADENOIDECTOMY  1940 ?     TOTAL KNEE ARTHROPLASTY Right January 6th, 2014    [facility]       Family History   Problem Relation Age of Onset    Diabetes Mother     Thyroid Disease Mother     Arthritis Mother     High Blood Pressure Mother     Arthritis Father     High Blood Pressure Father     Other Father         hay fever    Breast Cancer Neg Hx     Ovarian Cancer Neg Hx        Medications & Allergies:   Current Outpatient Medications   Medication Instructions    albuterol sulfate HFA (PROAIR HFA) 108 (90 Base) MCG/ACT inhaler 2 puffs, Inhalation, EVERY 6 HOURS PRN    amLODIPine (NORVASC) 2.5 mg, Oral, DAILY    amoxicillin (AMOXIL) 500 MG capsule 4 pills 1 hr before dental procedures     apixaban (ELIQUIS) 5 MG TABS tablet TAKE 1 TABLET BY MOUTH  TWICE DAILY    azithromycin (ZITHROMAX) 250 mg, Oral, DAILY    budesonide (PULMICORT) 500 mcg, Nebulization, 2 TIMES DAILY    Lactobacillus (PROBIOTIC ACIDOPHILUS) CAPS 1 capsule, Oral, DAILY    levothyroxine (SYNTHROID) 88 MCG tablet take 1 tablet by mouth once daily, new dose    metoprolol tartrate (LOPRESSOR) 25 MG tablet take 1/2 tablet by mouth twice a day    MULTIPLE VITAMIN PO 1 tablet, Oral, DAILY    simvastatin (ZOCOR) 20 MG tablet TAKE 1 TABLET BY MOUTH  NIGHTLY    spironolactone (ALDACTONE) 50 mg, Oral, DAILY       Allergies   Allergen Reactions    Levaquin [Levofloxacin] Other (See Comments)     Hallucinations    Percocet [Oxycodone-Acetaminophen] Nausea Only and Other (See Comments)     Affects memory    Orders   1. Chronic SI joint pain  CHG FLUOR NEEDLE/CATH SPINE/PARASPINAL DX/THER ADDON    MS INJECT SI JOINT ARTHRGRPHY&/ANES/STEROID W/IMAGE   2. Chronic pain syndrome     3. Myofascial pain     4. Facet arthropathy, lumbar     5. Lumbar spondylosis           Saritha Gardner is a 80 y. o.female presenting to the pain clinic for evaluation of chronic low back pain. Her clinical history and physical assessment are consistent with significant SI joint dysfunction. We have set the patient up for a bilateral SI joint injection. We discussed the possibility of starting a medication such as Lyrica only at night but will hold off at this time due to the increased risk of falls. We discussed that she has several pain generators that may have to be addressed in the future. We will follow up 6-8 weeks after the SI injections to evaluate effectiveness. Patient received 100% pain relief after the bilateral SI joint injection, but only for 2 days. I set her up for a bilateral SI joint block with Dr. Mercedes Palacios. We will anticipate proceeding with a bilateral SI RFA for more longstanding relief. Plan: The following treatment recommendations and plan were discussed in detail with Saritha Gardner. Imaging:   I have reviewed patients imaging of lumbar MRI and results were discussed with patient today. Analgesics:   Patient is taking Acetaminophen. Patient informed that the maximum amount of acetaminophen taken on a regular basis should only be 4000 mg per day. Adjuvants: For continued chronic pain with associated musculoskeletal component, the patient is advised to continue Tylenol arthritis. Interventions: With examination consistent with bilateral sacroiliac dysfunction/pain, we will proceed with a bilateral sacroiliac joint block. The risks and benefits were discussed in detail with the patient. Patient wants to proceed with the injection.     Anticoagulation/NPO Recommendations:   Patient does not need to hold any medications prior to the procedure. Patient does not need to be NPO prior to the procedure. We will perform a LOCAL injection. Multidisciplinary Pain Management:   In the presence of complex, chronic, and multi-factorial pain, the importance of a multidisciplinary approach to pain management in the patients management regimen was emphasized and discussed in great detail. PHYSICAL THERAPY: Patient is advised to see a physical therapist for gentle stretching exercises and conditioning exercises for management of pain.      Referrals: None      Prescriptions Written This Visit: None      Follow-up: for B/L SI joint block with Dr. Ricardo Cheung, APRN - CNP'

## 2022-02-08 ENCOUNTER — TELEPHONE (OUTPATIENT)
Dept: PHYSICAL MEDICINE AND REHAB | Age: 87
End: 2022-02-08

## 2022-02-08 ENCOUNTER — OFFICE VISIT (OUTPATIENT)
Dept: PHYSICAL MEDICINE AND REHAB | Age: 87
End: 2022-02-08
Payer: MEDICARE

## 2022-02-08 VITALS
HEIGHT: 64 IN | OXYGEN SATURATION: 99 % | HEART RATE: 60 BPM | BODY MASS INDEX: 26.12 KG/M2 | DIASTOLIC BLOOD PRESSURE: 82 MMHG | WEIGHT: 153 LBS | SYSTOLIC BLOOD PRESSURE: 122 MMHG

## 2022-02-08 DIAGNOSIS — M53.3 CHRONIC SI JOINT PAIN: Primary | ICD-10-CM

## 2022-02-08 DIAGNOSIS — M47.816 FACET ARTHROPATHY, LUMBAR: ICD-10-CM

## 2022-02-08 DIAGNOSIS — M79.18 MYOFASCIAL PAIN: ICD-10-CM

## 2022-02-08 DIAGNOSIS — M47.816 LUMBAR SPONDYLOSIS: ICD-10-CM

## 2022-02-08 DIAGNOSIS — G89.29 CHRONIC SI JOINT PAIN: Primary | ICD-10-CM

## 2022-02-08 DIAGNOSIS — G89.4 CHRONIC PAIN SYNDROME: ICD-10-CM

## 2022-02-08 PROCEDURE — G8427 DOCREV CUR MEDS BY ELIG CLIN: HCPCS | Performed by: NURSE PRACTITIONER

## 2022-02-08 PROCEDURE — 4040F PNEUMOC VAC/ADMIN/RCVD: CPT | Performed by: NURSE PRACTITIONER

## 2022-02-08 PROCEDURE — 1123F ACP DISCUSS/DSCN MKR DOCD: CPT | Performed by: NURSE PRACTITIONER

## 2022-02-08 PROCEDURE — 1090F PRES/ABSN URINE INCON ASSESS: CPT | Performed by: NURSE PRACTITIONER

## 2022-02-08 PROCEDURE — G8484 FLU IMMUNIZE NO ADMIN: HCPCS | Performed by: NURSE PRACTITIONER

## 2022-02-08 PROCEDURE — 1036F TOBACCO NON-USER: CPT | Performed by: NURSE PRACTITIONER

## 2022-02-08 PROCEDURE — 99214 OFFICE O/P EST MOD 30 MIN: CPT | Performed by: NURSE PRACTITIONER

## 2022-02-08 PROCEDURE — G8417 CALC BMI ABV UP PARAM F/U: HCPCS | Performed by: NURSE PRACTITIONER

## 2022-02-09 ENCOUNTER — TELEPHONE (OUTPATIENT)
Dept: PHYSICAL MEDICINE AND REHAB | Age: 87
End: 2022-02-09

## 2022-02-09 NOTE — TELEPHONE ENCOUNTER
Spoke to pt regarding change in her procedure that Anya Sylvester changed and that she can continue taking her Zithromax per Dr Erik Kumari. . pt voice understanding.

## 2022-02-11 DIAGNOSIS — E03.9 HYPOTHYROIDISM, UNSPECIFIED TYPE: ICD-10-CM

## 2022-02-11 RX ORDER — LEVOTHYROXINE SODIUM 88 UG/1
TABLET ORAL
Qty: 90 TABLET | Refills: 0 | Status: SHIPPED | OUTPATIENT
Start: 2022-02-11 | End: 2022-04-14

## 2022-02-11 NOTE — TELEPHONE ENCOUNTER
Date of last visit:  8/24/2021  Date of next visit:  Visit date not found    Requested Prescriptions     Pending Prescriptions Disp Refills    levothyroxine (SYNTHROID) 88 MCG tablet 90 tablet 1     Sig: take 1 tablet by mouth once daily, new dose

## 2022-02-16 ENCOUNTER — PREP FOR PROCEDURE (OUTPATIENT)
Dept: PHYSICAL MEDICINE AND REHAB | Age: 87
End: 2022-02-16

## 2022-02-20 NOTE — H&P
Today, patient presents for planned bilateral sacroiliac joint block. This note is reflective of the patient's previous visit for evaluation. We will proceed with today's planned procedure. Since patient's last visit for evaluation, there have been no interval changes in medical history. Patient has no new numbness, weakness, or focal neurological deficit since evaluation. Patient has no contraindications to injection (no anticoagulation or recent antibiotic intake for active infections), and has a  present or is able to drive themselves (as discussed and cleared by physician). Allergies to latex, contrast dye, and steroid medications have been confirmed with the patient prior to the procedure. NPO necessity has been assessed and accepted based on procedure complexity. The risks and benefits of the procedure have been explained including but are not limited to infection, bleeding, paralysis, immediate post procedure weakness, and dizziness; the patient acknowledges understanding and desires to proceed with the procedure. Patient has signed consent for same procedure as discussed in previous clinic encounter. All other questions and concerns were addressed at bedside. See procedure note for full details. Post procedure Instructions: The patient was advised not to drive during the day of the procedure and not to engage in any significant decision making (unless otherwise states by physician). The patient was also advised to be cautious with walking/activity for 24 hours following today's visit and asked not to engage in over-exertion (unless otherwise states by physician). After this time, it is ok to resume pre-procedure level of activity. Patient advised to apply ice to site of injection in situations of pain and discomfort. Patient advised to not submerge site of injection during bath or pool activities for approximately 24 hours post-procedure.  Patient attested to understanding post procedure directions / restrictions. All other questions and concerns addressed before patient discharge in ambulatory fashion. Chronic Pain/PM&R Clinic Note     Encounter Date: 2/8/22    Subjective:   Chief Complaint:   No chief complaint on file. History of Present Illness:   Kelly Amador is a 80 y.o. female seen in the clinic initially on 02/20/22 upon request from Dr. Gricelda Chacko MD for her history of chronic low back pain. Patient states she sustained a fall in October of 2020 when she tripped over her oxygen tubing but she did not have any pain initially after her fall. On 12/24/2020 patient states she woke up and could not get out of bed. Patient states ever since then her back feels \"brittle. \" Patient states she was seen at [facility] and received two epidural steroid injections which provided no relief. Patient states the past two weeks she has had pain in her left buttock that is stabbing and radiates into her left leg, making her feel like her leg may give out. Patient is concerned about the possibility of further falls due to this. Patient states she lives alone and she does not use any assistive devices. Patient states she has never fallen in the past apart from 10/2022 when she tripped over her oxygen tubing. Patient is on 2L of oxygen for her history of pulmonary fibrosis. Patient also has a history of Afib and is currently on Eliquis. Patient states pain is worse first thing in the morning. She also states she will occasionally wake up in the middle of the night with pain in bilateral legs, pain seems to be in the back of bilateral legs. Patient denies pain, numbness or tingling that radiates into her legs during the day. Patient states she completed home health PT in the spring of 2021 which provided minimal relief only on the day of the therapy. Today, 02/08/2022, patient presents for planned follow-up on chronic low back pain. Patient underwent a bilateral SI joint injection on 12/28/2021. Patient states she received 100% pain relief x2 days. Patient denies any fall since last visit. Patient denies any new symptoms. Patient denies any new associated leg weakness, saddle anesthesia, bowel or bladder dysfunction. History of Interventions:   Surgery: No previous lumbar surgeries  Injections: Lumbar epidural x 2 at [facility] (2021, 2021) - No relief. B/L SI joint injection (2021) - 100% relief x 2 days    Current Treatment Medications:   Tylenol arthritis PRN    Historical Treatment Medications:   Tramadol - makes her \"loopy\"    Imaging:    Lumbar MRI 2021        Past Medical History:   Diagnosis Date    Anemia     normal on pre-op 2012 and 13    Backache     h/o    [facility] [address]     Bursitis     bilateral shoulders    Constipation     Diverticulosis of colon     HTN (hypertension)     Hypercalcemia     slightly elevated at 10.8 pre-op 13    Hyperlipidemia     Nodular goiter     bx negative    OA (osteoarthritis)     bilateral thumbs - sees Dr. Maureen Keenan Osteopenia 2013    Parathyroid adenoma 2013    Pneumonia of right upper lobe due to infectious organism     Pneumonitis     Dr. Luca Davis in 2010 - bx negative    Pulmonary Mycobacterium avium complex (MAC) infection ([facility] [address]     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 ([facility] [address]     had one parathyroid removed - now follows with Dr. Deloris Swanson    Sinusitis     with seasonal allergies    Vitamin D deficiency 2018       Past Surgical History:   Procedure Laterality Date    ABDOMINAL EXPLORATION SURGERY  2013    Release of KATHERINE TAMAYO-Dr. Karlie Echeverria    APPENDECTOMY      BACK INJECTION Bilateral 2021    bilateral sacroiliac joint injection performed by Abran Lizama DO at [address] N/A 2020    BRONCHOSCOPY FLUOROSCOPY performed by Juan Kline MD at [facility]    CATARACT REMOVAL WITH IMPLANT Bilateral  ?      SECTION  8532,0228    DILATION AND CURETTAGE OF UTERUS  2803,2677,7312    EXCISION OF PARATHYROID MASS Right 05/29/2013    rt parathyroidectomy (excision of rt inferior parathyroid mass) exploration of neck     FOOT SURGERY  2002    left    FOOT SURGERY Left 06/12/2017    Dr Neely Life  6-4-12    left knee    JOINT REPLACEMENT  01/2014    right knee    MALIGNANT SKIN LESION EXCISION Left 06/12/2017    BCC DORSAL FOOT WITH GRAFT & FS    GOSIA AND BSO  1982    TONSILLECTOMY AND ADENOIDECTOMY  1940 ?     TOTAL KNEE ARTHROPLASTY Right January 6th, 2014    OIO       Family History   Problem Relation Age of Onset    Diabetes Mother     Thyroid Disease Mother     Arthritis Mother     High Blood Pressure Mother     Arthritis Father     High Blood Pressure Father     Other Father         hay fever    Breast Cancer Neg Hx     Ovarian Cancer Neg Hx        Medications & Allergies:   Current Outpatient Medications   Medication Instructions    albuterol sulfate HFA (PROAIR HFA) 108 (90 Base) MCG/ACT inhaler 2 puffs, Inhalation, EVERY 6 HOURS PRN    amLODIPine (NORVASC) 2.5 mg, Oral, DAILY    amoxicillin (AMOXIL) 500 MG capsule 4 pills 1 hr before dental procedures     apixaban (ELIQUIS) 5 MG TABS tablet TAKE 1 TABLET BY MOUTH  TWICE DAILY    azithromycin (ZITHROMAX) 250 mg, Oral, DAILY    budesonide (PULMICORT) 500 mcg, Nebulization, 2 TIMES DAILY    Lactobacillus (PROBIOTIC ACIDOPHILUS) CAPS 1 capsule, Oral, DAILY    levothyroxine (SYNTHROID) 88 MCG tablet take 1 tablet by mouth once daily, new dose    metoprolol tartrate (LOPRESSOR) 25 MG tablet take 1/2 tablet by mouth twice a day    MULTIPLE VITAMIN PO 1 tablet, Oral, DAILY    simvastatin (ZOCOR) 20 MG tablet TAKE 1 TABLET BY MOUTH  NIGHTLY    spironolactone (ALDACTONE) 50 mg, Oral, DAILY       Allergies   Allergen Reactions    Levaquin [Levofloxacin] Other (See Comments)     Hallucinations    Percocet [Oxycodone-Acetaminophen] Nausea Only and Other (See Comments)     Affects memory    Rifampin Other (See Comments)     Lost half of vision    Sulfa Antibiotics Other (See Comments)     hallucinations    Cefuroxime Diarrhea, Nausea And Vomiting and Other (See Comments)     cramping       Review of Systems:   Constitutional: negative for weight changes or fevers  Genitourinary: negative for bowel/bladder incontinence   Musculoskeletal: positive for low back pain, positive bilateral SI pain  Neurological: negative for any leg weakness or numbness/tingling  Behavioral/Psych: negative for anxiety/depression   All other systems reviewed and are negative    Objective: There were no vitals filed for this visit. Constitutional: Pleasant, no acute distress   Head: Normocephalic, atraumatic   Eyes: Conjunctivae normal   Neck: Supple, symmetrical   Lungs: Normal respiratory effort, non-labored breathing   Cardiovascular: Limbs warm and well perfused   Abdomen: Non-protruded   Musculoskeletal: Muscle bulk symmetric, no atrophy, no gross deformities   · Lower Extremities: ROM WNL. · Thorax: No paraspinal tenderness bilaterally. No scoliosis or kyphosis. · Lumbar Spine: Decreased ROM. Lumbar paraspinals tender to palpation bilaterally. SLR neg bilaterally. VIKA positive bilaterally. GAENSLEN positive bilaterally. Positive facet loading bilaterally. Bilateral SI joints tender to palpation. Bilateral greater trochanters non-tender to palpation. Neurological: Cranial nerves II-XII grossly intact. · Gait - Antalgic gait. Ambulates without assistive device.    · Motor: 5/5 muscle strength in bilateral hip flexion, knee flexion, knee extension, ankle dorsiflexion, and ankle plantar flexion   · Sensory: LT sensation intact in lower limbs   · Reflexes: 2+ symmetrical in bilateral achilles, 2+ bilateral patellar, negative ankle clonus, downgoing babinski   Skin: No rashes or lesions present   Psychological: Cooperative, no exaggerated pain behaviors       Assessment:    Diagnosis Orders   1. Chronic SI joint pain  CHG FLUOR NEEDLE/CATH SPINE/PARASPINAL DX/THER ADDON    MT INJECT SI JOINT ARTHRGRPHY&/ANES/STEROID W/IMAGE   2. Chronic pain syndrome     3. Myofascial pain     4. Facet arthropathy, lumbar     5. Lumbar spondylosis           Denney Heimlich is a 80 y. o.female presenting to the pain clinic for evaluation of chronic low back pain. Her clinical history and physical assessment are consistent with significant SI joint dysfunction. We have set the patient up for a bilateral SI joint injection. We discussed the possibility of starting a medication such as Lyrica only at night but will hold off at this time due to the increased risk of falls. We discussed that she has several pain generators that may have to be addressed in the future. We will follow up 6-8 weeks after the SI injections to evaluate effectiveness. Patient received 100% pain relief after the bilateral SI joint injection, but only for 2 days. I set her up for a bilateral SI joint block with Dr. Angie Khan. We will anticipate proceeding with a bilateral SI RFA for more longstanding relief. Plan: The following treatment recommendations and plan were discussed in detail with Denney Heimlich. Imaging:   I have reviewed patients imaging of lumbar MRI and results were discussed with patient today. Analgesics:   Patient is taking Acetaminophen. Patient informed that the maximum amount of acetaminophen taken on a regular basis should only be 4000 mg per day. Adjuvants: For continued chronic pain with associated musculoskeletal component, the patient is advised to continue Tylenol arthritis. Interventions: With examination consistent with bilateral sacroiliac dysfunction/pain, we will proceed with a bilateral sacroiliac joint block. The risks and benefits were discussed in detail with the patient.   Patient wants to proceed with the injection. Anticoagulation/NPO Recommendations:   Patient does not need to hold any medications prior to the procedure. Patient does not need to be NPO prior to the procedure. We will perform a LOCAL injection. Multidisciplinary Pain Management:   In the presence of complex, chronic, and multi-factorial pain, the importance of a multidisciplinary approach to pain management in the patients management regimen was emphasized and discussed in great detail. PHYSICAL THERAPY: Patient is advised to see a physical therapist for gentle stretching exercises and conditioning exercises for management of pain.      Referrals: None      Prescriptions Written This Visit: None      Follow-up: for B/L SI joint block with CHERYL Nj

## 2022-02-22 ENCOUNTER — HOSPITAL ENCOUNTER (OUTPATIENT)
Age: 87
Setting detail: OUTPATIENT SURGERY
Discharge: HOME | End: 2022-02-22
Attending: ANESTHESIOLOGY | Admitting: ANESTHESIOLOGY
Payer: MEDICARE

## 2022-02-22 ENCOUNTER — APPOINTMENT (OUTPATIENT)
Dept: GENERAL RADIOLOGY | Age: 87
End: 2022-02-22
Attending: ANESTHESIOLOGY
Payer: MEDICARE

## 2022-02-22 VITALS
DIASTOLIC BLOOD PRESSURE: 65 MMHG | TEMPERATURE: 97.4 F | WEIGHT: 151.4 LBS | HEIGHT: 64 IN | HEART RATE: 65 BPM | BODY MASS INDEX: 25.85 KG/M2 | OXYGEN SATURATION: 95 % | RESPIRATION RATE: 16 BRPM | SYSTOLIC BLOOD PRESSURE: 117 MMHG

## 2022-02-22 PROCEDURE — 7100000010 HC PHASE II RECOVERY - FIRST 15 MIN: Performed by: ANESTHESIOLOGY

## 2022-02-22 PROCEDURE — 2500000003 HC RX 250 WO HCPCS: Performed by: ANESTHESIOLOGY

## 2022-02-22 PROCEDURE — 27096 INJECT SACROILIAC JOINT: CPT | Performed by: ANESTHESIOLOGY

## 2022-02-22 PROCEDURE — 3600000054 HC PAIN LEVEL 3 BASE: Performed by: ANESTHESIOLOGY

## 2022-02-22 PROCEDURE — 3600000055 HC PAIN LEVEL 3 ADDL 15 MIN: Performed by: ANESTHESIOLOGY

## 2022-02-22 PROCEDURE — 2709999900 HC NON-CHARGEABLE SUPPLY: Performed by: ANESTHESIOLOGY

## 2022-02-22 PROCEDURE — 7100000011 HC PHASE II RECOVERY - ADDTL 15 MIN: Performed by: ANESTHESIOLOGY

## 2022-02-22 PROCEDURE — 6360000004 HC RX CONTRAST MEDICATION: Performed by: ANESTHESIOLOGY

## 2022-02-22 PROCEDURE — 3209999900 FLUORO FOR SURGICAL PROCEDURES

## 2022-02-22 RX ORDER — BUPIVACAINE HYDROCHLORIDE 5 MG/ML
INJECTION, SOLUTION PERINEURAL PRN
Status: DISCONTINUED | OUTPATIENT
Start: 2022-02-22 | End: 2022-02-22 | Stop reason: ALTCHOICE

## 2022-02-22 RX ORDER — LIDOCAINE HYDROCHLORIDE 10 MG/ML
INJECTION, SOLUTION EPIDURAL; INFILTRATION; INTRACAUDAL; PERINEURAL PRN
Status: DISCONTINUED | OUTPATIENT
Start: 2022-02-22 | End: 2022-02-22 | Stop reason: ALTCHOICE

## 2022-02-22 RX ORDER — METHYLPREDNISOLONE ACETATE 80 MG/ML
INJECTION, SUSPENSION INTRA-ARTICULAR; INTRALESIONAL; INTRAMUSCULAR; SOFT TISSUE PRN
Status: DISCONTINUED | OUTPATIENT
Start: 2022-02-22 | End: 2022-02-22 | Stop reason: ALTCHOICE

## 2022-02-22 ASSESSMENT — PAIN - FUNCTIONAL ASSESSMENT
PAIN_FUNCTIONAL_ASSESSMENT: PREVENTS OR INTERFERES SOME ACTIVE ACTIVITIES AND ADLS
PAIN_FUNCTIONAL_ASSESSMENT: 0-10

## 2022-02-22 ASSESSMENT — PAIN DESCRIPTION - DESCRIPTORS: DESCRIPTORS: ACHING;CONSTANT

## 2022-02-22 ASSESSMENT — PAIN SCALES - GENERAL: PAINLEVEL_OUTOF10: 0

## 2022-02-22 NOTE — PROCEDURES
Pre-operative Diagnosis:  SI joint pain     Post-operative Diagnosis:  SI joint pain     Procedure: Bilateral SI joint injection/block     Procedure Description:  After having signed the informed consent, the patient was placed in the prone position. The patient's back was prepped with chloraprep solution, and draped in a sterile fashion. A total of 2 ml of 1% lidocaine were used to anesthetize the skin and underlying tissues. Under fluoroscopic guidance a single 22-gauge, 3.5 inch spinal needle was advanced to lie within the inferior pole of the RIGHT sacroiliac joint. There were no paresthesias or heme aspiration. Needle placement was confirmed in the AP view. After negative aspiration, 0.5 ml of Omnipaque 300 contrast was injected with appropriate spread observed. A total of 2.5 ml of 0.5% bupivicaine was injected into the sacroiliac joint. The needle was withdrawn without any complications. This exact procedure was repeated on the contralateral side. The patient tolerated the procedure well, was transported to the recovery room and observed for 15 minutes and discharged in an ambulatory fashion. No immediate reported complications.     Procedural Complications: None  Estimated Blood Loss: 0 mL    Jeramie Colin DO  Interventional Pain Management/PM&R   . [facility] and [address] [facility]

## 2022-02-22 NOTE — PROGRESS NOTES
1150 To recovery via cart. Spont resp. VSS. Report received from surgical rn. Pt /O vertigo. HOB elevated. Snack and drink given.  Call bell in reach  1200 Up to dress self without C/O vertigo  1205 Discharge to home per wheelchair in stable condition with friend

## 2022-03-08 ENCOUNTER — HOSPITAL ENCOUNTER (EMERGENCY)
Age: 87
Discharge: NURSING, REHAB OR LONG-TERM CARE | End: 2022-03-08
Attending: PHYSICIAN ASSISTANT
Payer: MEDICARE

## 2022-03-08 ENCOUNTER — APPOINTMENT (OUTPATIENT)
Dept: GENERAL RADIOLOGY | Age: 87
End: 2022-03-08
Payer: MEDICARE

## 2022-03-08 VITALS
SYSTOLIC BLOOD PRESSURE: 120 MMHG | WEIGHT: 150 LBS | RESPIRATION RATE: 21 BRPM | TEMPERATURE: 97.6 F | DIASTOLIC BLOOD PRESSURE: 61 MMHG | HEART RATE: 80 BPM | BODY MASS INDEX: 25.75 KG/M2 | OXYGEN SATURATION: 98 %

## 2022-03-08 DIAGNOSIS — Z78.9 UNABLE TO CARE FOR SELF: ICD-10-CM

## 2022-03-08 DIAGNOSIS — M46.1 OSTEOARTHRITIS OF LEFT SACROILIAC JOINT (HCC): ICD-10-CM

## 2022-03-08 DIAGNOSIS — M46.1 OSTEOARTHRITIS OF RIGHT SACROILIAC JOINT (HCC): ICD-10-CM

## 2022-03-08 DIAGNOSIS — M16.12 OSTEOARTHRITIS OF LEFT HIP, UNSPECIFIED OSTEOARTHRITIS TYPE: Primary | ICD-10-CM

## 2022-03-08 LAB
ALBUMIN SERPL-MCNC: 4.4 G/DL (ref 3.5–5.1)
ALP BLD-CCNC: 73 U/L (ref 38–126)
ALT SERPL-CCNC: 10 U/L (ref 11–66)
ANION GAP SERPL CALCULATED.3IONS-SCNC: 12 MEQ/L (ref 8–16)
AST SERPL-CCNC: 19 U/L (ref 5–40)
BACTERIA: NORMAL
BASOPHILS # BLD: 0.9 %
BASOPHILS ABSOLUTE: 0.1 THOU/MM3 (ref 0–0.1)
BILIRUB SERPL-MCNC: 0.5 MG/DL (ref 0.3–1.2)
BILIRUBIN URINE: NEGATIVE
BLOOD, URINE: NEGATIVE
BUN BLDV-MCNC: 21 MG/DL (ref 7–22)
CALCIUM SERPL-MCNC: 11.1 MG/DL (ref 8.5–10.5)
CASTS: NORMAL /LPF
CASTS: NORMAL /LPF
CHARACTER, URINE: CLEAR
CHLORIDE BLD-SCNC: 99 MEQ/L (ref 98–111)
CO2: 28 MEQ/L (ref 23–33)
COLOR: YELLOW
CREAT SERPL-MCNC: 0.9 MG/DL (ref 0.4–1.2)
CRYSTALS: NORMAL
EOSINOPHIL # BLD: 3 %
EOSINOPHILS ABSOLUTE: 0.3 THOU/MM3 (ref 0–0.4)
EPITHELIAL CELLS, UA: NORMAL /HPF
ERYTHROCYTE [DISTWIDTH] IN BLOOD BY AUTOMATED COUNT: 13.1 % (ref 11.5–14.5)
ERYTHROCYTE [DISTWIDTH] IN BLOOD BY AUTOMATED COUNT: 42.7 FL (ref 35–45)
GFR SERPL CREATININE-BSD FRML MDRD: 59 ML/MIN/1.73M2
GLUCOSE BLD-MCNC: 107 MG/DL (ref 70–108)
GLUCOSE, URINE: NEGATIVE MG/DL
HCT VFR BLD CALC: 43.8 % (ref 37–47)
HEMOGLOBIN: 13.4 GM/DL (ref 12–16)
IMMATURE GRANS (ABS): 0.05 THOU/MM3 (ref 0–0.07)
IMMATURE GRANULOCYTES: 0.6 %
KETONES, URINE: NEGATIVE
LEUKOCYTE ESTERASE, URINE: NEGATIVE
LYMPHOCYTES # BLD: 16.8 %
LYMPHOCYTES ABSOLUTE: 1.5 THOU/MM3 (ref 1–4.8)
MCH RBC QN AUTO: 27.3 PG (ref 26–33)
MCHC RBC AUTO-ENTMCNC: 30.6 GM/DL (ref 32.2–35.5)
MCV RBC AUTO: 89.2 FL (ref 81–99)
MISCELLANEOUS LAB TEST RESULT: NORMAL
MONOCYTES # BLD: 15.8 %
MONOCYTES ABSOLUTE: 1.4 THOU/MM3 (ref 0.4–1.3)
NITRITE, URINE: NEGATIVE
NUCLEATED RED BLOOD CELLS: 0 /100 WBC
OSMOLALITY CALCULATION: 281 MOSMOL/KG (ref 275–300)
PH UA: 7 (ref 5–9)
PLATELET # BLD: 200 THOU/MM3 (ref 130–400)
PMV BLD AUTO: 10.7 FL (ref 9.4–12.4)
POTASSIUM REFLEX MAGNESIUM: 5.2 MEQ/L (ref 3.5–5.2)
PROTEIN UA: NEGATIVE MG/DL
RBC # BLD: 4.91 MILL/MM3 (ref 4.2–5.4)
RBC URINE: NORMAL /HPF
RENAL EPITHELIAL, UA: NORMAL
SARS-COV-2, NAAT: NOT  DETECTED
SEG NEUTROPHILS: 62.9 %
SEGMENTED NEUTROPHILS ABSOLUTE COUNT: 5.6 THOU/MM3 (ref 1.8–7.7)
SODIUM BLD-SCNC: 139 MEQ/L (ref 135–145)
SPECIFIC GRAVITY UA: 1.01 (ref 1–1.03)
TOTAL PROTEIN: 7.7 G/DL (ref 6.1–8)
UROBILINOGEN, URINE: 0.2 EU/DL (ref 0–1)
WBC # BLD: 8.9 THOU/MM3 (ref 4.8–10.8)
WBC UA: NORMAL /HPF
YEAST: NORMAL

## 2022-03-08 PROCEDURE — 73502 X-RAY EXAM HIP UNI 2-3 VIEWS: CPT

## 2022-03-08 PROCEDURE — 80053 COMPREHEN METABOLIC PANEL: CPT

## 2022-03-08 PROCEDURE — 85025 COMPLETE CBC W/AUTO DIFF WBC: CPT

## 2022-03-08 PROCEDURE — 6360000002 HC RX W HCPCS: Performed by: PHYSICIAN ASSISTANT

## 2022-03-08 PROCEDURE — 81001 URINALYSIS AUTO W/SCOPE: CPT

## 2022-03-08 PROCEDURE — 36415 COLL VENOUS BLD VENIPUNCTURE: CPT

## 2022-03-08 PROCEDURE — 87635 SARS-COV-2 COVID-19 AMP PRB: CPT

## 2022-03-08 PROCEDURE — 96372 THER/PROPH/DIAG INJ SC/IM: CPT

## 2022-03-08 PROCEDURE — 99285 EMERGENCY DEPT VISIT HI MDM: CPT

## 2022-03-08 RX ORDER — MORPHINE SULFATE 4 MG/ML
4 INJECTION, SOLUTION INTRAMUSCULAR; INTRAVENOUS ONCE
Status: COMPLETED | OUTPATIENT
Start: 2022-03-08 | End: 2022-03-08

## 2022-03-08 RX ORDER — FENTANYL CITRATE 50 UG/ML
25 INJECTION, SOLUTION INTRAMUSCULAR; INTRAVENOUS ONCE
Status: COMPLETED | OUTPATIENT
Start: 2022-03-08 | End: 2022-03-08

## 2022-03-08 RX ORDER — HYDROCODONE BITARTRATE AND ACETAMINOPHEN 5; 325 MG/1; MG/1
1 TABLET ORAL EVERY 6 HOURS PRN
Qty: 12 TABLET | Refills: 0 | Status: SHIPPED | OUTPATIENT
Start: 2022-03-08 | End: 2022-03-11

## 2022-03-08 RX ADMIN — MORPHINE SULFATE 4 MG: 4 INJECTION, SOLUTION INTRAMUSCULAR; INTRAVENOUS at 09:50

## 2022-03-08 RX ADMIN — FENTANYL CITRATE 25 MCG: 50 INJECTION INTRAMUSCULAR; INTRAVENOUS at 07:49

## 2022-03-08 ASSESSMENT — PAIN DESCRIPTION - PAIN TYPE
TYPE: ACUTE PAIN

## 2022-03-08 ASSESSMENT — PAIN DESCRIPTION - ORIENTATION
ORIENTATION: LEFT

## 2022-03-08 ASSESSMENT — PAIN - FUNCTIONAL ASSESSMENT
PAIN_FUNCTIONAL_ASSESSMENT: 0-10

## 2022-03-08 ASSESSMENT — PAIN SCALES - GENERAL
PAINLEVEL_OUTOF10: 1
PAINLEVEL_OUTOF10: 5
PAINLEVEL_OUTOF10: 6
PAINLEVEL_OUTOF10: 4

## 2022-03-08 ASSESSMENT — PAIN DESCRIPTION - LOCATION
LOCATION: HIP
LOCATION: HIP

## 2022-03-08 ASSESSMENT — PAIN DESCRIPTION - FREQUENCY
FREQUENCY: CONTINUOUS
FREQUENCY: CONTINUOUS

## 2022-03-08 NOTE — CARE COORDINATION
3/8/22, 4:18 PM EST    DISCHARGE PLANNING EVALUATION    Spoke with Viviana at [facility] and they accepted the patient. Patient aware and pleased with plan. She has a friend bring her some items from home. PA provided orders for admission, diet and PT/OT eval and treat and pain medication. AVS and orders faxed to facility. RN has number to call report and is aware that COVID test needs completed. RN will set up transport. No further needs voiced.

## 2022-03-08 NOTE — ED NOTES
Patient crying out in pain when attempting to be placed on bedpan. Patient was able to urinate but with pain while having 2 assist to get on and off. Patient was placed on a purewick suction for further voiding concerns. Will update ED provider.       Elliot Sherman RN  03/08/22 1640

## 2022-03-08 NOTE — CARE COORDINATION
3/8/22, 12:15 PM EST    DISCHARGE PLANNING EVALUATION    Spoke with patient and she prefers a rehab stay at [facility]. She typically lives alone and has a son [place] of state. Referral made to Viviana at [facility] and provider note fax. Will await facility's response.

## 2022-03-08 NOTE — ED NOTES
in to talk with patient regarding advanced life care plans. Patient has [facility] and living will paperwork.       Judi Sherman RN  03/08/22 0004

## 2022-03-08 NOTE — ED PROVIDER NOTES
[address] [facility] [address] [address] EMERGENCY DEPT  EMERGENCY DEPARTMENT ENCOUNTER      Pt Name: Micky Epps  MRN: [id number]  [place] 1935  Date of evaluation: 3/8/2022  Provider: Joselyn Paul PA-C    CHIEF COMPLAINT     Chief Complaint   Patient presents with    Leg Pain     left       HISTORY OF PRESENT ILLNESS    Micky Epps is a 80 y.o. female who presents to the emergency department to the ER with complaints of left SI joint pain as well as left hip pain. Patient states that she has history of SI joint pain. Patient states that she had SI joint nerve block done in the middle of February. Patient states that she does not think it helped. In fact patient states that she is progressively worsened since then. Patient states the pain is mostly in her left buttock as well as her left hip. She states the pain is so bad that she was unable to bear any weight today. She then attempted to put weight on her left leg the pain was too much for her to walk. She denies any recent injury or trauma. Denies any loss of bladder or bowel function. Denies any weakness in her lower extremities. The pain is in the posterior and lateral hip on the left side. She denies any fevers or chills. Again she is had a recent nerve block with that she points to the right and left side but has not had any recent epidural.      Triage notes and Nursing notes were reviewed by myself. Any discrepancies are addressed above.     PAST MEDICAL HISTORY     Past Medical History:   Diagnosis Date    Anemia     normal on pre-op 5/2012 and 12/2/13    Backache     h/o    Bronchiectasis [facility]) 2013    Bursitis     bilateral shoulders    Constipation     Diverticulosis of colon     HTN (hypertension)     Hypercalcemia     slightly elevated at 10.8 pre-op 12/2/13    Hyperlipidemia     Nodular goiter     bx negative    OA (osteoarthritis)     bilateral thumbs - sees Dr. Alcon Lanier Osteopenia 11/6/2013    Parathyroid adenoma 4/30/2013    Pneumonia of right upper lobe due to infectious organism     Pneumonitis     Dr. Keysha Santiago in 2010 - bx negative    Pulmonary Mycobacterium avium complex (MAC) infection [facility]     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 ([facility]     had one parathyroid removed - now follows with Dr. Дмитрий Kendrick    Sinusitis     with seasonal allergies    Vitamin D deficiency 2018       SURGICAL HISTORY       Past Surgical History:   Procedure Laterality Date    ABDOMINAL EXPLORATION SURGERY  2013    Release of SBO, KATHERINE-Dr. Ricarda Dance    APPENDECTOMY      BACK INJECTION Bilateral 2021    bilateral sacroiliac joint injection performed by Jose Mejia DO at [address] N/A 2022    B/L SI joint block performed by Jose Mejia DO at [address] N/A 2020    BRONCHOSCOPY FLUOROSCOPY performed by Haroon Victor MD at [facility]    CATARACT REMOVAL WITH IMPLANT Bilateral  ?   SECTION  8110,5235    DILATION AND CURETTAGE OF UTERUS  3022,5168,2635    EXCISION OF PARATHYROID MASS Right 2013    rt parathyroidectomy (excision of rt inferior parathyroid mass) exploration of neck     FOOT SURGERY  2002    left    FOOT SURGERY Left 2017    Dr Neely Life  6-    left knee    JOINT REPLACEMENT  2014    right knee    MALIGNANT SKIN LESION EXCISION Left 2017    [facility]CC DORSAL FOOT WITH GRAFT & FS    GOSIA AND BSO      TONSILLECTOMY AND ADENOIDECTOMY  194 ?     TOTAL KNEE ARTHROPLASTY Right 2014    OIO       CURRENT MEDICATIONS       Previous Medications    ALBUTEROL SULFATE HFA (PROAIR HFA) 108 (90 BASE) MCG/ACT INHALER    Inhale 2 puffs into the lungs every 6 hours as needed for Wheezing or Shortness of Breath    AMLODIPINE (NORVASC) 2.5 MG TABLET    Take 1 tablet by mouth daily    AMOXICILLIN (AMOXIL) 500 MG CAPSULE    4 pills 1 hr before dental procedures APIXABAN (ELIQUIS) 5 MG TABS TABLET    TAKE 1 TABLET BY MOUTH  TWICE DAILY    AZITHROMYCIN (ZITHROMAX) 250 MG TABLET    Take 1 tablet by mouth daily    BUDESONIDE (PULMICORT) 0.5 MG/2ML NEBULIZER SUSPENSION    Take 2 mLs by nebulization 2 times daily    LACTOBACILLUS (PROBIOTIC ACIDOPHILUS) CAPS    Take 1 capsule by mouth daily     LEVOTHYROXINE (SYNTHROID) 88 MCG TABLET    take 1 tablet by mouth once daily, new dose    METOPROLOL TARTRATE (LOPRESSOR) 25 MG TABLET    take 1/2 tablet by mouth twice a day    MULTIPLE VITAMIN PO    Take 1 tablet by mouth daily     SIMVASTATIN (ZOCOR) 20 MG TABLET    TAKE 1 TABLET BY MOUTH  NIGHTLY    SPIRONOLACTONE (ALDACTONE) 50 MG TABLET    Take 1 tablet by mouth daily       ALLERGIES     Levaquin [levofloxacin], Percocet [oxycodone-acetaminophen], Rifampin, Sulfa antibiotics, and Cefuroxime    FAMILY HISTORY       Family History   Problem Relation Age of Onset    Diabetes Mother     Thyroid Disease Mother     Arthritis Mother     High Blood Pressure Mother     Arthritis Father     High Blood Pressure Father     Other Father         hay fever    Breast Cancer Neg Hx     Ovarian Cancer Neg Hx         SOCIAL HISTORY     Social History     Socioeconomic History    Marital status:      Spouse name: None    Number of children: None    Years of education: None    Highest education level: None   Occupational History    Occupation: retired   Tobacco Use    Smoking status: Never Smoker    Smokeless tobacco: Never Used   Vaping Use    Vaping Use: Never used   Substance and Sexual Activity    Alcohol use:  Yes     Alcohol/week: 0.0 standard drinks     Comment: socially-1 glassof wine 2-3 times a month    Drug use: No    Sexual activity: Never   Other Topics Concern    None   Social History Narrative    None     Social Determinants of Health     Financial Resource Strain: Low Risk     Difficulty of Paying Living Expenses: Not hard at all   Food Insecurity: No Food Insecurity    Worried About Running Out of Food in the Last Year: Never true    Vanesa of Food in the Last Year: Never true   Transportation Needs: No Transportation Needs    Lack of Transportation (Medical): No    Lack of Transportation (Non-Medical): No   Physical Activity:     Days of Exercise per Week: Not on file    Minutes of Exercise per Session: Not on file   Stress:     Feeling of Stress : Not on file   Social Connections:     Frequency of Communication with Friends and Family: Not on file    Frequency of Social Gatherings with Friends and Family: Not on file    Attends Muslim Services: Not on file    Active Member of [address] [place] [address] [employer] or Organizations: Not on file    Attends Club or Organization Meetings: Not on file    Marital Status: Not on file   Intimate Partner Violence:     Fear of Current or Ex-Partner: Not on file    Emotionally Abused: Not on file    Physically Abused: Not on file    Sexually Abused: Not on file   Housing Stability:     Unable to Pay for Housing in the Last Year: Not on file    Number of [place] in the Last Year: Not on file    Unstable Housing in the Last Year: Not on file       REVIEW OF SYSTEMS       A 10 point review of systems discussed the patient and the pertinent positives and names are listed in the HPI    Except as noted above the remainder of the review of systems was reviewed and is negative. PHYSICAL EXAM    (up to 7 for level 4, 8 or more for level 5)     ED Triage Vitals   BP Temp Temp Source Pulse Resp SpO2 Height Weight   03/08/22 0622 03/08/22 0622 03/08/22 0622 03/08/22 0622 03/08/22 0622 03/08/22 0622 -- 03/08/22 0623   (!) 150/91 97.6 °F (36.4 °C) Oral 90 20 95 %  150 lb (68 kg)       General: nontoxic appearing. HEENT: Normocephalic/atraumatic. Extraocular muscles are intact. Neck: Full range of motion. No meningeal signs noted. Lungs: Clear to auscultation in all lung fields. No retractions. No respiratory distress.   Heart: Regular rate and rhythm. Abdomen: Soft, nontender. No guarding or rebound tenderness. Bowel sounds are noted. Extremities: Patient was exquisitely tender in the left posterior lateral hip. Light palpation did appear to cause intense pain. She also complained of pain with range of motion of the left hip. She denies any pain the left knee, left ankle, left foot. Pedal pulses 2 out of 4. Sensation of lower extremities are intact and equal.  Back: Patient was tender in the right and left SI joint but the left side seem to be more severe with palpation. There is no midline tenderness of the T-spine or L-spine. No step-off or deformity the T-spine or L-spine. Neurologic: Alert and oriented. Normal motor and sensory function. Skin: Warm, dry, free of rashes  DIAGNOSTIC RESULTS         RADIOLOGY: (none if blank)   Interpretationper the Radiologist below, if available at the time of this note:    XR HIP 2-3 VW W PELVIS LEFT   Final Result       1. No acute findings. 2. Degenerative changes in each hip. **This report has been created using voice recognition software. It may contain minor errors which are inherent in voice recognition technology. **      Final report electronically signed by Dr. Sara Hernandez on 3/8/2022 7:37 AM          LABS:  Labs Reviewed   CBC WITH AUTO DIFFERENTIAL - Abnormal; Notable for the following components:       Result Value    MCHC 30.6 (*)     Monocytes Absolute 1.4 (*)     All other components within normal limits   COMPREHENSIVE METABOLIC PANEL W/ REFLEX TO MG FOR LOW K - Abnormal; Notable for the following components:    Calcium 11.1 (*)     ALT 10 (*)     All other components within normal limits   GLOMERULAR FILTRATION RATE, ESTIMATED - Abnormal; Notable for the following components:    Est, Glom Filt Rate 59 (*)     All other components within normal limits   COVID-19, RAPID   URINALYSIS WITH MICROSCOPIC   ANION GAP   OSMOLALITY       All other labs were within normal range or not returned as of this dictation. EMERGENCY DEPARTMENT COURSE and Medical Decision Making:     MDM/   Patient was informed of the results of the degenerative changes noted of the right and left SI joint as well as the bone-on-bone degeneration of the left hip. This is a very good explanation for the cause of her pain. Patient was given multiple rounds of pain medication including morphine and fentanyl and she still is unable to ambulate. The nurse try to ambulate her with a walker and the nurse did report that even tried not pivot to get her out of bed because too much pain the patient could not tolerate it. Given the fact that the patient has had several doses of high potent pain medication and still in too much pain to ambulate I do feel she would benefit from PT and OT as well as pain control. Due to the pandemic and the public health crisis given the patient's need for rehabilitation she does need direct transfer from the emergency department to the nursing rehab facility. Patient states she can take Norco although she does have a Percocet allergy. CONSULTS: (None if blank)  IP CONSULT TO SOCIAL WORK    Procedures: (None if blank)       CLINICAL IMPRESSION      1. Osteoarthritis of left hip, unspecified osteoarthritis type    2. Osteoarthritis of left sacroiliac joint ([address]    3. Osteoarthritis of right sacroiliac joint ([address]    4. Unable to care for self          DISPOSITION/PLAN   DISPOSITION Decision To Discharge 03/08/2022 03:17:59 PM      PATIENT REFERRED TO:  Kirit Limon MD  [address]  [phone]    In 1 week        DISCHARGE MEDICATIONS:  New Prescriptions    HYDROCODONE-ACETAMINOPHEN (NORCO) 5-325 MG PER TABLET    Take 1 tablet by mouth every 6 hours as needed for Pain for up to 3 days. Intended supply: 3 days.  Take lowest dose possible to manage pain              (Please note that portions of this note were completed with a voice recognition program.  Efforts weremade to edit the dictations but occasionally words are mis-transcribed.)      Filomena Paul PA-C(electronically signed)              Filomena Paul PA-C  03/08/22 [address] [facility] YANDEL Paul  03/08/22 1527

## 2022-03-08 NOTE — ED NOTES
ED nurse-to-nurse bedside report    Chief Complaint   Patient presents with    Leg Pain     left      LOC: alert and orientated to name, place, date  Vital signs   Vitals:    03/08/22 0622 03/08/22 0623   BP: (!) 150/91    Pulse: 90    Resp: 20    Temp: 97.6 °F (36.4 °C)    TempSrc: Oral    SpO2: 95%    Weight:  150 lb (68 kg)      Pain:    Pain Interventions: Fentanyl  Pain Goal: 3  Oxygen: Yes    Current needs required 2L NC    Telemetry: Yes  LDAs:   Peripheral IV 09/25/20 Left Upper arm (Active)     Continuous Infusions:   Mobility: Requires assistance * 1  Pino Fall Risk Score:    Fall Risk 9/14/2021 9/8/2020 9/6/2020 8/24/2020 8/24/2020 10/9/2019 9/30/2019   2 or more falls in past year? no no no no no no no   Fall with injury in past year? no no no no no no no     Fall Interventions: side rails up bed locked and in lowest position call light in reach   Report given to: Richi Odom RN and BEREKET Velasco  03/08/22 3294

## 2022-03-08 NOTE — ACP (ADVANCE CARE PLANNING)
Advance Care Planning     Advance Care Planning Activator (Inpatient)  Conversation Note      Date of ACP Conversation: 3/8/2022     Conversation Conducted with: Patient with Decision Making Capacity    ACP Activator: [address] Decision Maker:     Current Designated Health Care Decision Maker: Today we documented Decision Maker(s). The patient will provide ACP documents. Care Preferences    Ventilation: \"If you were in your present state of health and suddenly became very ill and were unable to breathe on your own, what would your preference be about the use of a ventilator (breathing machine) if it were available to you? \"      Would the patient desire the use of ventilator (breathing machine)?: yes    \"If your health worsens and it becomes clear that your chance of recovery is unlikely, what would your preference be about the use of a ventilator (breathing machine) if it were available to you? \"     Would the patient desire the use of ventilator (breathing machine)?: No      Resuscitation  \"CPR works best to restart the heart when there is a sudden event, like a heart attack, in someone who is otherwise healthy. Unfortunately, CPR does not typically restart the heart for people who have serious health conditions or who are very sick. \"    \"In the event your heart stopped as a result of an underlying serious health condition, would you want attempts to be made to restart your heart (answer \"yes\" for attempt to resuscitate) or would you prefer a natural death (answer \"no\" for do not attempt to resuscitate)? \" no       [x] Yes   [] No   Educated Patient / Pollo Castaneda regarding differences between Advance Directives and portable DNR orders.     Length of ACP Conversation in minutes:   40    Conversation Outcomes:  [x] ACP discussion completed  [x] Existing advance directive reviewed with patient; no changes to patient's previously recorded wishes  [] New Advance Directive completed  [] Portable Do Not Rescitate prepared for Provider review and signature  [] POLST/POST/MOLST/MOST prepared for Provider review and signature      Follow-up plan:    [] Schedule follow-up conversation to continue planning  [] Referred individual to Provider for additional questions/concerns   [x] Advised patient/agent/surrogate to review completed ACP document and update if needed with changes in condition, patient preferences or care setting    [] This note routed to one or more involved healthcare providers      David lives alone; her sons live in [place] and [place]. She has an exisitng DNR-CCA that still reflects her wishes. She is OK with intubation. LW on file, but not her HCPOA. She will have it brought in. She will be going to [facility] for therapy today.

## 2022-03-08 NOTE — ED NOTES
Pt resting in cot. Denies any needs. Pt states she feels a little \"loopy\" from medication. VSS.  Will continue to monitor       Rolanda Monk RN  03/08/22 3342

## 2022-03-08 NOTE — ED TRIAGE NOTES
Pt comes to ED from home via EMS with c/o left leg pain. Pt states she had a nerve block done on her back about 2 weeks ago to help with chronic low back pain. Pt states over time she has gradually gotten this pain in her left leg. Pt states it is to the point where she cannot bear weight on it. Pt rates her pain a 5 out of 10 resting. Pt denies redness or swelling of her leg. PT states when she coughs it hurts in her left hip.

## 2022-03-08 NOTE — ED NOTES
ED provider informs of Social Work sending patient packet of information to [facility] for evaluation for acceptance. Will wait to hear from social work.       Brissa Sherman RN  03/08/22 1956

## 2022-03-09 NOTE — ED NOTES
Pt assessed at this time, no concerns voiced. Pt resting in bed, call light in reach.      Riaz Miller RN  03/08/22 5579

## 2022-03-09 NOTE — ED NOTES
Pt resting in bed at this time, no concerns voiced. Call light in reach.       Renuka Carrillo RN  03/08/22 8631

## 2022-03-11 ENCOUNTER — OFFICE VISIT (OUTPATIENT)
Dept: PHYSICAL MEDICINE AND REHAB | Age: 87
End: 2022-03-11
Payer: MEDICARE

## 2022-03-11 VITALS
HEIGHT: 64 IN | DIASTOLIC BLOOD PRESSURE: 68 MMHG | SYSTOLIC BLOOD PRESSURE: 124 MMHG | BODY MASS INDEX: 25.61 KG/M2 | WEIGHT: 150 LBS

## 2022-03-11 DIAGNOSIS — G89.29 CHRONIC SI JOINT PAIN: Primary | ICD-10-CM

## 2022-03-11 DIAGNOSIS — G89.4 CHRONIC PAIN SYNDROME: ICD-10-CM

## 2022-03-11 DIAGNOSIS — M47.816 LUMBAR SPONDYLOSIS: ICD-10-CM

## 2022-03-11 DIAGNOSIS — M79.18 MYOFASCIAL PAIN: ICD-10-CM

## 2022-03-11 DIAGNOSIS — M47.816 FACET ARTHROPATHY, LUMBAR: ICD-10-CM

## 2022-03-11 DIAGNOSIS — M53.3 CHRONIC SI JOINT PAIN: Primary | ICD-10-CM

## 2022-03-11 PROCEDURE — 1090F PRES/ABSN URINE INCON ASSESS: CPT | Performed by: NURSE PRACTITIONER

## 2022-03-11 PROCEDURE — G8484 FLU IMMUNIZE NO ADMIN: HCPCS | Performed by: NURSE PRACTITIONER

## 2022-03-11 PROCEDURE — 1036F TOBACCO NON-USER: CPT | Performed by: NURSE PRACTITIONER

## 2022-03-11 PROCEDURE — 4040F PNEUMOC VAC/ADMIN/RCVD: CPT | Performed by: NURSE PRACTITIONER

## 2022-03-11 PROCEDURE — G8417 CALC BMI ABV UP PARAM F/U: HCPCS | Performed by: NURSE PRACTITIONER

## 2022-03-11 PROCEDURE — G8427 DOCREV CUR MEDS BY ELIG CLIN: HCPCS | Performed by: NURSE PRACTITIONER

## 2022-03-11 PROCEDURE — 99214 OFFICE O/P EST MOD 30 MIN: CPT | Performed by: NURSE PRACTITIONER

## 2022-03-11 PROCEDURE — 1123F ACP DISCUSS/DSCN MKR DOCD: CPT | Performed by: NURSE PRACTITIONER

## 2022-03-11 RX ORDER — HYDROCODONE BITARTRATE AND ACETAMINOPHEN 5; 325 MG/1; MG/1
0.5 TABLET ORAL EVERY 8 HOURS PRN
Qty: 45 TABLET | Refills: 0 | Status: ON HOLD | OUTPATIENT
Start: 2022-03-11 | End: 2022-03-28 | Stop reason: HOSPADM

## 2022-03-11 NOTE — PROGRESS NOTES
Chronic Pain/PM&R Clinic Note     Encounter Date: 3/11/22    Subjective:   Chief Complaint:   Chief Complaint   Patient presents with    Follow-up       History of Present Illness:   Feliberto Vargas is a 80 y.o. female seen in the clinic initially on 03/11/22 upon request from Dr. Juan Manuel Velasquez MD for her history of chronic low back pain. Patient states she sustained a fall in October of 2020 when she tripped over her oxygen tubing but she did not have any pain initially after her fall. On 12/24/2020 patient states she woke up and could not get out of bed. Patient states ever since then her back feels \"brittle. \" Patient states she was seen at [facility] and received two epidural steroid injections which provided no relief. Patient states the past two weeks she has had pain in her left buttock that is stabbing and radiates into her left leg, making her feel like her leg may give out. Patient is concerned about the possibility of further falls due to this. Patient states she lives alone and she does not use any assistive devices. Patient states she has never fallen in the past apart from 10/2022 when she tripped over her oxygen tubing. Patient is on 2L of oxygen for her history of pulmonary fibrosis. Patient also has a history of Afib and is currently on Eliquis. Patient states pain is worse first thing in the morning. She also states she will occasionally wake up in the middle of the night with pain in bilateral legs, pain seems to be in the back of bilateral legs. Patient denies pain, numbness or tingling that radiates into her legs during the day. Patient states she completed home health PT in the spring of 2021 which provided minimal relief only on the day of the therapy. Today, 02/08/2022, patient presents for planned follow-up on chronic low back pain. Patient underwent a bilateral SI joint injection on 12/28/2021. Patient states she received 100% pain relief x2 days.   Patient denies any fall since last visit. Patient denies any new symptoms. Patient denies any new associated leg weakness, saddle anesthesia, bowel or bladder dysfunction. Today, 3/11/2022, patient presents for planned follow-up on chronic low back pain. Patient underwent a bilateral SI joint block on 2/22/2022. Patient states she did get 100% pain relief x2 days. Patient states that she had severe left hip pain that started gradually about 3 days after the procedure. She ended up in the ER on 3/8/2022 due to left hip pain and left leg weakness. There she had a left hip x-ray which showed she was bone-on-bone. Patient is questioning what else we can do for this. Patient is currently at [address] for physical therapy. Patient denies any saddle anesthesia or bowel or bladder incontinence. History of Interventions:   Surgery: No previous lumbar surgeries  Injections: Lumbar epidural x 2 at [facility] (02/2021, 03/2021) - No relief.    B/L SI joint injection (12/28/2021) - 100% relief x 2 days  B/L SI joint block (02/22/2022) - 100% relief x 2 days    Current Treatment Medications:   Tylenol arthritis PRN  Tramadol 50mg (ER)- loopy/fatigue    Historical Treatment Medications:   Tramadol - makes her \"loopy\"    Imaging:    Lumbar MRI 01/12/2021        Past Medical History:   Diagnosis Date    Anemia     normal on pre-op 5/2012 and 12/2/13    Backache     h/o    Bronchiectasis ([address] 2013    Bursitis     bilateral shoulders    Constipation     Diverticulosis of colon     HTN (hypertension)     Hypercalcemia     slightly elevated at 10.8 pre-op 12/2/13    Hyperlipidemia     Nodular goiter     bx negative    OA (osteoarthritis)     bilateral thumbs - sees Dr. Liston Gosselin Osteopenia 11/6/2013    Parathyroid adenoma 4/30/2013    Pneumonia of right upper lobe due to infectious organism     Pneumonitis     Dr. Lucy Ryan in 7/2010 - bx negative    Pulmonary Mycobacterium avium complex (MAC) infection ([address]  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 [address]     had one parathyroid removed - now follows with Dr. Edwin Yañez    Sinusitis     with seasonal allergies    Vitamin D deficiency 2018       Past Surgical History:   Procedure Laterality Date    ABDOMINAL EXPLORATION SURGERY  2013    Release of KATHERINE TAMAYO-Dr. Shauna Malcolm    APPENDECTOMY      BACK INJECTION Bilateral 2021    bilateral sacroiliac joint injection performed by Roger Whaley DO at [address] N/A 2022    B/L SI joint block performed by Roger Whaley DO at [address] N/A 2020    BRONCHOSCOPY FLUOROSCOPY performed by Karolina Vasques MD at [facility]    CATARACT REMOVAL WITH IMPLANT Bilateral  ?   SECTION  [id number]    DILATION AND CURETTAGE OF UTERUS  [id number]    EXCISION OF PARATHYROID MASS Right 2013    rt parathyroidectomy (excision of rt inferior parathyroid mass) exploration of neck     FOOT SURGERY      left    FOOT SURGERY Left 2017    Dr [facility]  12    left knee    JOINT REPLACEMENT  2014    right knee    MALIGNANT SKIN LESION EXCISION Left 2017    BCC DORSAL FOOT WITH GRAFT & FS    GOSIA AND BSO  1982    TONSILLECTOMY AND ADENOIDECTOMY  1940 ?     TOTAL KNEE ARTHROPLASTY Right 2014    [facility]       Family History   Problem Relation Age of Onset    Diabetes Mother     Thyroid Disease Mother     Arthritis Mother     High Blood Pressure Mother     Arthritis Father     High Blood Pressure Father     Other Father         hay fever    Breast Cancer Neg Hx     Ovarian Cancer Neg Hx        Medications & Allergies:   Current Outpatient Medications   Medication Instructions    albuterol sulfate HFA (PROAIR HFA) 108 (90 Base) MCG/ACT inhaler 2 puffs, Inhalation, EVERY 6 HOURS PRN    amLODIPine (NORVASC) 2.5 mg, Oral, DAILY    amoxicillin (AMOXIL) 500 MG capsule 4 pills 1 hr before dental procedures     apixaban (ELIQUIS) 5 MG TABS tablet TAKE 1 TABLET BY MOUTH  TWICE DAILY    azithromycin (ZITHROMAX) 250 mg, Oral, DAILY    budesonide (PULMICORT) 500 mcg, Nebulization, 2 TIMES DAILY    HYDROcodone-acetaminophen (NORCO) 5-325 MG per tablet 0.5 tablets, Oral, EVERY 8 HOURS PRN    Lactobacillus (PROBIOTIC ACIDOPHILUS) CAPS 1 capsule, Oral, DAILY    levothyroxine (SYNTHROID) 88 MCG tablet take 1 tablet by mouth once daily, new dose    metoprolol tartrate (LOPRESSOR) 25 MG tablet take 1/2 tablet by mouth twice a day    MULTIPLE VITAMIN PO 1 tablet, Oral, DAILY    simvastatin (ZOCOR) 20 MG tablet TAKE 1 TABLET BY MOUTH  NIGHTLY    spironolactone (ALDACTONE) 50 mg, Oral, DAILY       Allergies   Allergen Reactions    Levaquin [Levofloxacin] Other (See Comments)     Hallucinations    Percocet [Oxycodone-Acetaminophen] Nausea Only and Other (See Comments)     Affects memory    Rifampin Other (See Comments)     Lost half of vision    Sulfa Antibiotics Other (See Comments)     hallucinations    Cefuroxime Diarrhea, Nausea And Vomiting and Other (See Comments)     cramping       Review of Systems:   Constitutional: negative for weight changes or fevers  Genitourinary: negative for bowel/bladder incontinence   Musculoskeletal: positive for low back pain, positive bilateral SI pain, left significantly worse than right  Neurological: negative for any leg weakness or numbness/tingling  Behavioral/Psych: negative for anxiety/depression   All other systems reviewed and are negative    Objective:     Vitals:    03/11/22 1129   BP: 124/68       Constitutional: Pleasant, no acute distress   Head: Normocephalic, atraumatic   Eyes: Conjunctivae normal   Neck: Supple, symmetrical   Lungs: Normal respiratory effort, non-labored breathing   Cardiovascular: Limbs warm and well perfused   Abdomen: Non-protruded   Musculoskeletal: Muscle bulk symmetric, no atrophy, no gross I set her up for a bilateral SI joint block with Dr. Sebastian Gonzalez. We will anticipate proceeding with a bilateral SI RFA for more longstanding relief. Patient received 100% pain relief x2 days after the bilateral SI joint block on 2/22/2022. She continues to have significant left SI joint dysfunction. I have set her up for a bilateral SI RFA starting with the LEFT side first.  We discussed that she may have additional hip pain due to her history of arthritis. We can potentially discuss a hip injections depending on how much pain is left after the SI RFA. We also discussed that it may be easier for her to participate in physical therapy if we can reduce some of her pain. Plan: The following treatment recommendations and plan were discussed in detail with [facility]. Imaging:   I have reviewed patients imaging of lumbar MRI, left hip xray and results were discussed with patient today. Analgesics:   Patient is taking Acetaminophen. Patient informed that the maximum amount of acetaminophen taken on a regular basis should only be 4000 mg per day. With continued significant left SI joint dysfunction and pain I have started her on Norco 5-325 mg 0.5 tab up to 3 times daily as needed for severe pain (7/10). We discussed the risk of tolerance and dependence to this medication. OAS reviewed  Pain agreement signed (12/08/2021)    Adjuvants: For continued chronic pain with associated musculoskeletal component, the patient is advised to continue Tylenol arthritis. Interventions: With examination consistent with bilateral sacroiliac dysfunction/pain, we will proceed with a bilateral sacroiliac joint RFA, LEFT side first.  The risks and benefits were discussed in detail with the patient. Patient wants to proceed with the injection. Anticoagulation/NPO Recommendations:   Patient does not need to hold any medications prior to the procedure.   Patient does need to be NPO x8 prior to the procedure. We will start an IV for the procedure    Multidisciplinary Pain Management:   In the presence of complex, chronic, and multi-factorial pain, the importance of a multidisciplinary approach to pain management in the patients management regimen was emphasized and discussed in great detail. PHYSICAL THERAPY: Patient is advised to see a physical therapist for gentle stretching exercises and conditioning exercises for management of pain.      Referrals: None      Prescriptions Written This Visit:   Nirmal Cinnamon 5-325mg #45 - printed and sent back in nursing home packet      Follow-up: LEFT SI RFA, f/u 4 weeks after bilateral RFA      Verl Sin, APRN - CNP'

## 2022-03-14 ENCOUNTER — TELEPHONE (OUTPATIENT)
Dept: PHYSICAL MEDICINE AND REHAB | Age: 87
End: 2022-03-14

## 2022-03-14 ENCOUNTER — PATIENT MESSAGE (OUTPATIENT)
Dept: PHYSICAL MEDICINE AND REHAB | Age: 87
End: 2022-03-14

## 2022-03-14 NOTE — TELEPHONE ENCOUNTER
From: Nirmal Velez  To: Madison Walker  Sent: 3/14/2022 3:38 PM EDT  Subject: Rachel Arch - New Knee Pain    Harpreet Iverson, my nerve procedures have been scheduled for March 29 and April 5. I'm sharing this with Dr. Justus Perez and you simultaneously. Since seeing you last Friday, I've developed significant left knee pain (8/10). It feels like a vice is gripping the back of my knee and ranges from painful to very painful. It is tender to the touch and hurts to bend it. At times, it feels like a Eliel Horse, but it doesn't spasm and subside like one. They're looking to do an X-Ray, and I'm wondering if you think this might be related to my hip issues. Or, if I need to do anything or see anyone - as this level of pain isn't tolerable in the short term, let alone, for a period of several weeks or more. I'm cc'ing Selena as when I saw her, I didn't have any knee pain, and now I do. If you could let me know your thoughts on this, I'd appreciate it. Nimisha Ron is still here and can talk to anyone he needs to regarding my condition.

## 2022-03-14 NOTE — TELEPHONE ENCOUNTER
Pt. Son Carmita Rea (on [place]) Contacted. Procedure and follow up scheduled. Educated on pre-procedure instructions, also mailed. Verbalized understanding.

## 2022-03-15 ENCOUNTER — APPOINTMENT (OUTPATIENT)
Dept: CT IMAGING | Age: 87
DRG: 554 | End: 2022-03-15
Payer: MEDICARE

## 2022-03-15 ENCOUNTER — APPOINTMENT (OUTPATIENT)
Dept: GENERAL RADIOLOGY | Age: 87
DRG: 554 | End: 2022-03-15
Payer: MEDICARE

## 2022-03-15 ENCOUNTER — HOSPITAL ENCOUNTER (INPATIENT)
Age: 87
LOS: 2 days | Discharge: NURSING, REHAB OR LONG-TERM CARE | DRG: 554 | End: 2022-03-17
Attending: EMERGENCY MEDICINE | Admitting: INTERNAL MEDICINE
Payer: MEDICARE

## 2022-03-15 DIAGNOSIS — M00.9 PYOGENIC ARTHRITIS OF LEFT KNEE JOINT, DUE TO UNSPECIFIED ORGANISM (HCC): Primary | ICD-10-CM

## 2022-03-15 LAB
ALBUMIN SERPL-MCNC: 4.1 G/DL (ref 3.5–5.1)
ALP BLD-CCNC: 89 U/L (ref 38–126)
ALT SERPL-CCNC: 11 U/L (ref 11–66)
ANION GAP SERPL CALCULATED.3IONS-SCNC: 13 MEQ/L (ref 8–16)
APTT: 39.3 SECONDS (ref 22–38)
AST SERPL-CCNC: 18 U/L (ref 5–40)
BILIRUB SERPL-MCNC: 0.5 MG/DL (ref 0.3–1.2)
BUN BLDV-MCNC: 21 MG/DL (ref 7–22)
C-REACTIVE PROTEIN: 29.34 MG/DL (ref 0–1)
CALCIUM SERPL-MCNC: 10.6 MG/DL (ref 8.5–10.5)
CHLORIDE BLD-SCNC: 94 MEQ/L (ref 98–111)
CO2: 26 MEQ/L (ref 23–33)
CREAT SERPL-MCNC: 0.9 MG/DL (ref 0.4–1.2)
GFR SERPL CREATININE-BSD FRML MDRD: 59 ML/MIN/1.73M2
GLUCOSE BLD-MCNC: 115 MG/DL (ref 70–108)
INR BLD: 1.94 (ref 0.85–1.13)
OSMOLALITY CALCULATION: 270.3 MOSMOL/KG (ref 275–300)
POTASSIUM SERPL-SCNC: 5.7 MEQ/L (ref 3.5–5.2)
SCAN OF BLOOD SMEAR: NORMAL
SEDIMENTATION RATE, ERYTHROCYTE: 60 MM/HR (ref 0–20)
SODIUM BLD-SCNC: 133 MEQ/L (ref 135–145)
TOTAL CK: 54 U/L (ref 30–135)
TOTAL PROTEIN: 8.1 G/DL (ref 6.1–8)

## 2022-03-15 PROCEDURE — 89050 BODY FLUID CELL COUNT: CPT

## 2022-03-15 PROCEDURE — 96374 THER/PROPH/DIAG INJ IV PUSH: CPT

## 2022-03-15 PROCEDURE — 76376 3D RENDER W/INTRP POSTPROCES: CPT

## 2022-03-15 PROCEDURE — 85730 THROMBOPLASTIN TIME PARTIAL: CPT

## 2022-03-15 PROCEDURE — 84145 PROCALCITONIN (PCT): CPT

## 2022-03-15 PROCEDURE — 87070 CULTURE OTHR SPECIMN AEROBIC: CPT

## 2022-03-15 PROCEDURE — 82550 ASSAY OF CK (CPK): CPT

## 2022-03-15 PROCEDURE — 73564 X-RAY EXAM KNEE 4 OR MORE: CPT

## 2022-03-15 PROCEDURE — 80053 COMPREHEN METABOLIC PANEL: CPT

## 2022-03-15 PROCEDURE — 99285 EMERGENCY DEPT VISIT HI MDM: CPT

## 2022-03-15 PROCEDURE — 84132 ASSAY OF SERUM POTASSIUM: CPT

## 2022-03-15 PROCEDURE — 36415 COLL VENOUS BLD VENIPUNCTURE: CPT

## 2022-03-15 PROCEDURE — 87040 BLOOD CULTURE FOR BACTERIA: CPT

## 2022-03-15 PROCEDURE — 83690 ASSAY OF LIPASE: CPT

## 2022-03-15 PROCEDURE — 82378 CARCINOEMBRYONIC ANTIGEN: CPT

## 2022-03-15 PROCEDURE — 87075 CULTR BACTERIA EXCEPT BLOOD: CPT

## 2022-03-15 PROCEDURE — 6360000002 HC RX W HCPCS: Performed by: EMERGENCY MEDICINE

## 2022-03-15 PROCEDURE — 85610 PROTHROMBIN TIME: CPT

## 2022-03-15 PROCEDURE — 86301 IMMUNOASSAY TUMOR CA 19-9: CPT

## 2022-03-15 PROCEDURE — 6360000004 HC RX CONTRAST MEDICATION: Performed by: EMERGENCY MEDICINE

## 2022-03-15 PROCEDURE — 74177 CT ABD & PELVIS W/CONTRAST: CPT

## 2022-03-15 PROCEDURE — 85651 RBC SED RATE NONAUTOMATED: CPT

## 2022-03-15 PROCEDURE — 1200000003 HC TELEMETRY R&B

## 2022-03-15 PROCEDURE — 83605 ASSAY OF LACTIC ACID: CPT

## 2022-03-15 PROCEDURE — 89060 EXAM SYNOVIAL FLUID CRYSTALS: CPT

## 2022-03-15 PROCEDURE — 86140 C-REACTIVE PROTEIN: CPT

## 2022-03-15 PROCEDURE — 85025 COMPLETE CBC W/AUTO DIFF WBC: CPT

## 2022-03-15 PROCEDURE — 87205 SMEAR GRAM STAIN: CPT

## 2022-03-15 RX ORDER — SODIUM CHLORIDE 9 MG/ML
INJECTION, SOLUTION INTRAVENOUS CONTINUOUS
Status: DISCONTINUED | OUTPATIENT
Start: 2022-03-16 | End: 2022-03-17 | Stop reason: HOSPADM

## 2022-03-15 RX ORDER — LIDOCAINE HYDROCHLORIDE 10 MG/ML
20 INJECTION, SOLUTION INFILTRATION; PERINEURAL ONCE
Status: DISCONTINUED | OUTPATIENT
Start: 2022-03-15 | End: 2022-03-17 | Stop reason: HOSPADM

## 2022-03-15 RX ORDER — MORPHINE SULFATE 2 MG/ML
2 INJECTION, SOLUTION INTRAMUSCULAR; INTRAVENOUS ONCE
Status: COMPLETED | OUTPATIENT
Start: 2022-03-15 | End: 2022-03-15

## 2022-03-15 RX ADMIN — MORPHINE SULFATE 2 MG: 2 INJECTION, SOLUTION INTRAMUSCULAR; INTRAVENOUS at 23:06

## 2022-03-15 RX ADMIN — IOPAMIDOL 80 ML: 755 INJECTION, SOLUTION INTRAVENOUS at 19:58

## 2022-03-15 ASSESSMENT — PAIN SCALES - GENERAL
PAINLEVEL_OUTOF10: 7

## 2022-03-15 ASSESSMENT — PAIN - FUNCTIONAL ASSESSMENT
PAIN_FUNCTIONAL_ASSESSMENT: 0-10

## 2022-03-15 NOTE — ED PROVIDER NOTES
[address] ENCOUNTER      PATIENT NAME: Oksana Barlow  MRN: [id number]  : 1935  LERMA: 3/15/2022  PROVIDER: Pedro Barber MD      [address] [place] [address]       Chief Complaint   Patient presents with    Knee Pain     lt       Patient is seen and evaluated in a timely fashion. Nurses Notes are reviewed and I agree except as noted in the HPI. HISTORY OF PRESENT ILLNESS    Oksana Barlow is a 80 y.o. female who presents to Emergency Department with Knee Pain (lt)     This patient is a 60-year-old female with past medical history of advanced osteoarthritis, idiopathic pulmonary fibrosis on home oxygen, bronchiectasis, hypertension, hyperlipidemia, and secondary hyperparathyroidism presents to ED for evaluation of left knee pain over last 2 days. Pain currently is at 10/10, patient could not ambulate, patient also states she even could not lift left lower extremity due to severe pain. Patient had left SI joint injection on 2022. She reports no fever or chills. No chest pain. No abdominal pain. No diarrhea. No urinary symptoms. She had prior bilateral total knee replacements. She notices slightly increasing swelling to left knee. No recently recalled left knee trauma or injury. No change of bowl and bladder functions. She lives at an assisted living. At baseline she walks without assistance. This HPI was provided by patient and son.     REVIEW OF SYSTEMS   Ten-point review of systems is negative except those documented in above HPI including constitutional, HEENT, respiratory, cardiovascular, gastrointestinal, genitourinary, musculoskeletal, skin, neurological, hematological and behavioral.      PAST MEDICAL HISTORY     Past Medical History:   Diagnosis Date    Anemia     normal on pre-op 2012 and 13    Backache     h/o    Bronchiectasis ([address] 2013    Bursitis     bilateral shoulders    Constipation     Diverticulosis of colon     HTN (hypertension)     Hypercalcemia     slightly elevated at 10.8 pre-op 13    Hyperlipidemia     Nodular goiter     bx negative    OA (osteoarthritis)     bilateral thumbs - sees Dr. Carline Bertrand Osteopenia 2013    Parathyroid adenoma 2013    Pneumonia of right upper lobe due to infectious organism     Pneumonitis     Dr. Howie Gage in 2010 - bx negative    Pulmonary Mycobacterium avium complex (MAC) infection ([address]     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 ([address]     had one parathyroid removed - now follows with Dr. Jacoby Archer Sinusitis     with seasonal allergies    Vitamin D deficiency 2018       SURGICAL HISTORY       Past Surgical History:   Procedure Laterality Date    ABDOMINAL EXPLORATION SURGERY  2013    Release of SBO, KATHERINE-Dr. Kira Barajas    APPENDECTOMY      BACK INJECTION Bilateral 2021    bilateral sacroiliac joint injection performed by Crystal Wilkins DO at [address] N/A 2022    B/L SI joint block performed by Crystal Wilkins DO at [address] N/A 2020    BRONCHOSCOPY FLUOROSCOPY performed by Camelia Guadarrama MD at [facility]    CATARACT REMOVAL WITH IMPLANT Bilateral  ?   SECTION  7464,2447    DILATION AND CURETTAGE OF UTERUS  5565,0624,6434    EXCISION OF PARATHYROID MASS Right 2013    rt parathyroidectomy (excision of rt inferior parathyroid mass) exploration of neck     FOOT SURGERY      left    FOOT SURGERY Left 2017    Dr Radha Elder  6-4-12    left knee    JOINT REPLACEMENT  2014    right knee    MALIGNANT SKIN LESION EXCISION Left 2017    BCC DORSAL FOOT WITH GRAFT & FS    GOSIA AND BSO  1982    TONSILLECTOMY AND ADENOIDECTOMY  1940 ?     TOTAL KNEE ARTHROPLASTY Right 2014    [facility]       CURRENT MEDICATIONS       Current Discharge Medication List      CONTINUE these medications which have NOT CHANGED    Details   HYDROcodone-acetaminophen (NORCO) 5-325 MG per tablet Take 0.5 tablets by mouth every 8 hours as needed for Pain for up to 30 days. Qty: 45 tablet, Refills: 0    Comments: Reduce doses taken as pain becomes manageable  Associated Diagnoses: Chronic SI joint pain      levothyroxine (SYNTHROID) 88 MCG tablet take 1 tablet by mouth once daily, new dose  Qty: 90 tablet, Refills: 0    Associated Diagnoses: Hypothyroidism, unspecified type      budesonide (PULMICORT) 0.5 MG/2ML nebulizer suspension Take 2 mLs by nebulization 2 times daily  Qty: 60 ampule, Refills: 3    Associated Diagnoses: Bronchiectasis without complication ([address] Pulmonary fibrosis (HCC)      metoprolol tartrate (LOPRESSOR) 25 MG tablet take 1/2 tablet by mouth twice a day  Qty: 90 tablet, Refills: 5      simvastatin (ZOCOR) 20 MG tablet TAKE 1 TABLET BY MOUTH  NIGHTLY  Qty: 90 tablet, Refills: 3    Associated Diagnoses: Pure hypercholesterolemia      azithromycin (ZITHROMAX) 250 MG tablet Take 1 tablet by mouth daily  Qty: 90 tablet, Refills: 3    Comments: Her pulmonologist at the [facility] wants her on this everyday  Associated Diagnoses: Bronchiectasis without complication ([facility])      amoxicillin (AMOXIL) 500 MG capsule 4 pills 1 hr before dental procedures  Qty: 4 capsule, Refills: 3      amLODIPine (NORVASC) 2.5 MG tablet Take 1 tablet by mouth daily  Qty: 90 tablet, Refills: 3      apixaban (ELIQUIS) 5 MG TABS tablet TAKE 1 TABLET BY MOUTH  TWICE DAILY  Qty: 180 tablet, Refills: 3    Comments: Requesting 1 year supply      spironolactone (ALDACTONE) 50 MG tablet Take 1 tablet by mouth daily  Qty: 90 tablet, Refills: 1      albuterol sulfate HFA (PROAIR HFA) 108 (90 Base) MCG/ACT inhaler Inhale 2 puffs into the lungs every 6 hours as needed for Wheezing or Shortness of Breath  Qty: 1 Inhaler, Refills: 11    Associated Diagnoses: Pulmonary fibrosis ([address]  Allergic rhinitis, unspecified seasonality, unspecified trigger; Bronchiectasis without complication ([facility] Mycobacterium avium infection ([facility]      Lactobacillus (PROBIOTIC ACIDOPHILUS) CAPS Take 1 capsule by mouth daily       MULTIPLE VITAMIN PO Take 1 tablet by mouth daily              ALLERGIES     Levaquin [levofloxacin], Percocet [oxycodone-acetaminophen], Rifampin, Sulfa antibiotics, and Cefuroxime    FAMILY HISTORY     She indicated that her mother is . She indicated that her father is . She indicated that the status of her neg hx is unknown.   family history includes Arthritis in her father and mother; Diabetes in her mother; High Blood Pressure in her father and mother; Other in her father; Thyroid Disease in her mother. SOCIAL HISTORY      reports that she has never smoked. She has never used smokeless tobacco. She reports current alcohol use. She reports that she does not use drugs. PHYSICAL EXAM      height is 5' 4\" (1.626 m) and weight is 159 lb 12.8 oz (72.5 kg). Her oral temperature is 98.5 °F (36.9 °C). Her blood pressure is 119/57 (abnormal) and her pulse is 80. Her respiration is 20 and oxygen saturation is 93%. Physical Exam  Vitals and nursing note reviewed. Constitutional:       Appearance: She is well-developed. She is not diaphoretic. HENT:      Head: Normocephalic and atraumatic. Nose: Nose normal.   Eyes:      General: No scleral icterus. Right eye: No discharge. Left eye: No discharge. Conjunctiva/sclera: Conjunctivae normal.      Pupils: Pupils are equal, round, and reactive to light. Neck:      Vascular: No JVD. Trachea: No tracheal deviation. Cardiovascular:      Rate and Rhythm: Normal rate and regular rhythm. Heart sounds: Normal heart sounds. No murmur heard. No friction rub. No gallop. Pulmonary:      Effort: Pulmonary effort is normal. No respiratory distress. Breath sounds: Normal breath sounds. No stridor. No wheezing or rales. Chest:      Chest wall: No tenderness. Abdominal:      General: Bowel sounds are normal. There is no distension. Palpations: Abdomen is soft. There is no mass. Tenderness: There is no abdominal tenderness. There is no guarding or rebound. Hernia: No hernia is present. Musculoskeletal:         General: Tenderness present. No deformity. Cervical back: Normal range of motion and neck supple. Comments: Patient has significant left SI joint tenderness, patient also has significant tenderness to left hip, tenderness to left knee at bilateral suprapatellar area, left knee has mild swelling and slight warmth, no erythema. Left lower extremity has intact neurovascular exam.  Patient is barely able to lift the left lower extremity due to severe pain from left knee and left hip. Lymphadenopathy:      Cervical: No cervical adenopathy. Skin:     General: Skin is warm and dry. Capillary Refill: Capillary refill takes less than 2 seconds. Coloration: Skin is not pale. Findings: No erythema or rash. Neurological:      Mental Status: She is alert and oriented to person, place, and time. Cranial Nerves: No cranial nerve deficit. Sensory: No sensory deficit. Motor: No abnormal muscle tone. Coordination: Coordination normal.      Deep Tendon Reflexes: Reflexes normal.   Psychiatric:         Behavior: Behavior normal.         Thought Content: Thought content normal.         Judgment: Judgment normal.       ANCILLARY TEST RESULTS   EKG:    Interpreted by me  None    LAB RESULTS:  Results for orders placed or performed during the hospital encounter of 03/15/22   CBC with Auto Differential   Result Value Ref Range    WBC 14.2 (H) 4.8 - 10.8 thou/mm3    RBC 4.71 4.20 - 5.40 mill/mm3    Hemoglobin 12.9 12.0 - 16.0 gm/dl    Hematocrit 41.2 37.0 - 47.0 %    MCV 87.5 81.0 - 99.0 fL    MCH 27.4 26.0 - 33.0 pg    MCHC 31.3 (L) 32.2 - 35.5 gm/dl    RDW-CV 12.5 11.5 - 14.5 % RDW-SD 40.2 35.0 - 45.0 fL    Platelets 275 636 - 129 thou/mm3    MPV 11.2 9.4 - 12.4 fL   Comprehensive Metabolic Panel   Result Value Ref Range    Glucose 115 (H) 70 - 108 mg/dL    CREATININE 0.9 0.4 - 1.2 mg/dL    BUN 21 7 - 22 mg/dL    Sodium 133 (L) 135 - 145 meq/L    Potassium 5.7 (H) 3.5 - 5.2 meq/L    Chloride 94 (L) 98 - 111 meq/L    CO2 26 23 - 33 meq/L    Calcium 10.6 (H) 8.5 - 10.5 mg/dL    AST 18 5 - 40 U/L    Alkaline Phosphatase 89 38 - 126 U/L    Total Protein 8.1 (H) 6.1 - 8.0 g/dL    Albumin 4.1 3.5 - 5.1 g/dL    Total Bilirubin 0.5 0.3 - 1.2 mg/dL    ALT 11 11 - 66 U/L   APTT   Result Value Ref Range    aPTT 39.3 (H) 22.0 - 38.0 seconds   Protime-INR   Result Value Ref Range    INR 1.94 (H) 0.85 - 1.13   CK   Result Value Ref Range    Total CK 54 30 - 135 U/L   Anion Gap   Result Value Ref Range    Anion Gap 13.0 8.0 - 16.0 meq/L   Osmolality   Result Value Ref Range    Osmolality Calc 270.3 (L) 275.0 - 300.0 mOsmol/kg   Glomerular Filtration Rate, Estimated   Result Value Ref Range    Est, Glom Filt Rate 59 (A) ml/min/1.73m2   Scan of Blood Smear   Result Value Ref Range    SCAN OF BLOOD SMEAR see below    Sedimentation Rate   Result Value Ref Range    Sed Rate 60 (H) 0 - 20 mm/hr   C-Reactive Protein   Result Value Ref Range    CRP 29.34 (H) 0.00 - 1.00 mg/dl   Potassium   Result Value Ref Range    Potassium 4.6 3.5 - 5.2 meq/L   Lactic Acid   Result Value Ref Range    Lactic Acid 1.5 0.5 - 2.0 mmol/L   Procalcitonin   Result Value Ref Range    Procalcitonin 0.12 (H) 0.01 - 0.09 ng/mL   Synovial fluid, cell count   Result Value Ref Range    Color, Fluid YELLOW     CHARACTER,SYN. FL CLOUDY     Total Volume Received Synovial 18.0 ml    Total Nucleated Cells Synovial 19971 (H) 0 - 150 /cumm   CEA   Result Value Ref Range    CEA 2.6 0.0 - 5.0 ng/ml   Lipase   Result Value Ref Range    Lipase 26.0 5.6 - 51.3 U/L       RADIOLOGY REPORTS  CT ABDOMEN PELVIS W IV CONTRAST Additional Contrast? None Final Result   1. There are new changes of pulmonary fibrosis within the right lung. There are additional early findings of interstitial scarring within the    left lung. 2. 1 cm calcification within the mid pancreatic body associated with    moderate dilatation of more distal aspect of the pancreatic duct. 3. Ventral hernia containing a short segment of the transverse colon    without associated obstruction or edema. Bilateral femoral hernias    containing fat. 4. Colonic diverticulosis without diverticulitis. This document has been electronically signed by: Liliya Vazquez MD on    03/15/2022 08:53 PM      All CTs at this facility use dose modulation techniques and iterative    reconstructions, and/or weight-based dosing   when appropriate to reduce radiation to a low as reasonably achievable. CT LUMBAR RECONSTRUCTION WO POST PROCESS   Final Result   1. No acute abnormality of the lumbar spine. 2. Chronic appearing degenerative changes associated with central canal    and neural foraminal narrowing at multiple levels as described. This document has been electronically signed by: Liliya Vazquez MD on    03/15/2022 08:57 PM      All CTs at this facility use dose modulation techniques and iterative    reconstructions, and/or weight-based dosing   when appropriate to reduce radiation to a low as reasonably achievable. XR KNEE LEFT (MIN 4 VIEWS)   Final Result   No acute bony abnormality. This document has been electronically signed by: Liliya Vazquez MD on    03/15/2022 06:44 PM          ELISE [address]z [id number] ([facility])     Differential Diagnosis: Acute on chronic osteoarthritis, ruling out osteomyelitis, left knee sepsis    Initial Plans: Routine labs, ESR, CRP, CT abdomen pelvis with lumbar spine recon, left knee x-ray, consider left knee arthrocentesis. Summary:    Patient's history, exam, and ED work-ups raise the concern that she may have septic knee.     I failed to obtain synovial fluid when I tried left knee arthrocentesis. I then called ortho who came down obtained cloudy synovial fluid. Total synovial nucleated cells 23,350 suggesting septic knee. Empirically, patient received dose of vancomycin after left knee arthrocentesis    Discussed and admitted to internal medicine service with orthopedic consult per Ortho recommendation. ED Medications  Medications   vancomycin (VANCOCIN) intermittent dosing (placeholder) (has no administration in time range)   iopamidol (ISOVUE-370) 76 % injection 80 mL (80 mLs IntraVENous Given 3/15/22 1958)   morphine (PF) injection 2 mg (2 mg IntraVENous Given 3/15/22 2306)   piperacillin-tazobactam (ZOSYN) 3,375 mg in dextrose 5 % 50 mL IVPB (mini-bag) (0 mg IntraVENous Stopped 3/16/22 0055)     Vital signs in ED  Vitals:    03/15/22 2250 03/16/22 0027 03/16/22 0100 03/16/22 0115   BP: (!) 111/55 133/71 (!) 119/57    Pulse: 67 72 80    Resp: 20 19 20    Temp:   98.5 °F (36.9 °C)    TempSrc:   Oral    SpO2: 98% 98% (!) 83% 93%   Weight:   159 lb 12.8 oz (72.5 kg)    Height:   5' 4\" (1.626 m)        CRITICAL CARE   CRITICAL CARE: There was a high probability of clinically significant/life threatening deterioration in this patient's condition which required my urgent intervention. Total critical care time was 30 minutes. This excludes any time for separately reportable procedures. CONSULTS   Ortho and IM service    PROCEDURES   Failed left knee arthrocentesis    FINAL IMPRESSION AND DISPOSITION      1. Pyogenic arthritis of left knee joint, due to unspecified organism ([facility]        Admit    PATIENT REFERRED TO:  No follow-up provider specified.     DISCHARGE MEDICATIONS:  Current Discharge Medication List          (Please note that portions of this note were completed with a voice recognition program.  Efforts were made to edit the dictations but occasionally words aremis-transcribed.)    MD Julia White MD  03/16/22 1119

## 2022-03-15 NOTE — ED NOTES
Pt repositioned in bed. No concerns expressed at this time.  [facility]  03/15/22 1943

## 2022-03-16 ENCOUNTER — APPOINTMENT (OUTPATIENT)
Dept: MRI IMAGING | Age: 87
DRG: 554 | End: 2022-03-16
Payer: MEDICARE

## 2022-03-16 LAB
ANION GAP SERPL CALCULATED.3IONS-SCNC: 10 MEQ/L (ref 8–16)
BASOPHILS # BLD: 0.2 %
BASOPHILS # BLD: 0.4 %
BASOPHILS ABSOLUTE: 0 THOU/MM3 (ref 0–0.1)
BASOPHILS ABSOLUTE: 0.1 THOU/MM3 (ref 0–0.1)
BUN BLDV-MCNC: 19 MG/DL (ref 7–22)
CA 19-9: 28 U/ML (ref 0–35)
CALCIUM SERPL-MCNC: 9.7 MG/DL (ref 8.5–10.5)
CEA: 2.6 NG/ML (ref 0–5)
CHARACTER,SYN.FL: ABNORMAL
CHLORIDE BLD-SCNC: 96 MEQ/L (ref 98–111)
CO2: 27 MEQ/L (ref 23–33)
COLOR FLUID: YELLOW
CREAT SERPL-MCNC: 0.9 MG/DL (ref 0.4–1.2)
CRYSTALS, FLUID: ABNORMAL
DIFFERENTIAL TYPE: ABNORMAL
EOSINOPHIL # BLD: 1.1 %
EOSINOPHIL # BLD: 1.9 %
EOSINOPHILS ABSOLUTE: 0.2 THOU/MM3 (ref 0–0.4)
EOSINOPHILS ABSOLUTE: 0.2 THOU/MM3 (ref 0–0.4)
ERYTHROCYTE [DISTWIDTH] IN BLOOD BY AUTOMATED COUNT: 12.5 % (ref 11.5–14.5)
ERYTHROCYTE [DISTWIDTH] IN BLOOD BY AUTOMATED COUNT: 12.6 % (ref 11.5–14.5)
ERYTHROCYTE [DISTWIDTH] IN BLOOD BY AUTOMATED COUNT: 40.2 FL (ref 35–45)
ERYTHROCYTE [DISTWIDTH] IN BLOOD BY AUTOMATED COUNT: 40.3 FL (ref 35–45)
GFR SERPL CREATININE-BSD FRML MDRD: 59 ML/MIN/1.73M2
GLUCOSE BLD-MCNC: 103 MG/DL (ref 70–108)
HCT VFR BLD CALC: 36.2 % (ref 37–47)
HCT VFR BLD CALC: 41.2 % (ref 37–47)
HEMOGLOBIN: 11.2 GM/DL (ref 12–16)
HEMOGLOBIN: 12.9 GM/DL (ref 12–16)
IMMATURE GRANS (ABS): 0.06 THOU/MM3 (ref 0–0.07)
IMMATURE GRANS (ABS): 0.11 THOU/MM3 (ref 0–0.07)
IMMATURE GRANULOCYTES: 0.6 %
IMMATURE GRANULOCYTES: 0.8 %
LACTIC ACID: 1.5 MMOL/L (ref 0.5–2)
LIPASE: 26 U/L (ref 5.6–51.3)
LYMPHOCYTES # BLD: 12.5 %
LYMPHOCYTES # BLD: 4.9 %
LYMPHOCYTES ABSOLUTE: 0.7 THOU/MM3 (ref 1–4.8)
LYMPHOCYTES ABSOLUTE: 1.2 THOU/MM3 (ref 1–4.8)
MCH RBC QN AUTO: 27.2 PG (ref 26–33)
MCH RBC QN AUTO: 27.4 PG (ref 26–33)
MCHC RBC AUTO-ENTMCNC: 30.9 GM/DL (ref 32.2–35.5)
MCHC RBC AUTO-ENTMCNC: 31.3 GM/DL (ref 32.2–35.5)
MCV RBC AUTO: 87.5 FL (ref 81–99)
MCV RBC AUTO: 87.9 FL (ref 81–99)
MONOCYTES # BLD: 18.3 %
MONOCYTES # BLD: 26.5 %
MONOCYTES ABSOLUTE: 2.6 THOU/MM3 (ref 0.4–1.3)
MONOCYTES ABSOLUTE: 2.6 THOU/MM3 (ref 0.4–1.3)
MONONUCLEAR CELLS SYNOVIAL FLUID: 9.8 % (ref 0–74)
NUCLEATED RED BLOOD CELLS: 0 /100 WBC
NUCLEATED RED BLOOD CELLS: 0 /100 WBC
OSMOLALITY CALCULATION: 268.9 MOSMOL/KG (ref 275–300)
PATHOLOGIST REVIEW: ABNORMAL
PLATELET # BLD: 166 THOU/MM3 (ref 130–400)
PLATELET # BLD: 216 THOU/MM3 (ref 130–400)
PMV BLD AUTO: 10.5 FL (ref 9.4–12.4)
PMV BLD AUTO: 11.2 FL (ref 9.4–12.4)
POLYMORPHONUCLEAR CELLS SYNOVIAL FLUID: 90.2 % (ref 0–24)
POTASSIUM SERPL-SCNC: 4.5 MEQ/L (ref 3.5–5.2)
POTASSIUM SERPL-SCNC: 4.6 MEQ/L (ref 3.5–5.2)
PROCALCITONIN: 0.12 NG/ML (ref 0.01–0.09)
RBC # BLD: 4.12 MILL/MM3 (ref 4.2–5.4)
RBC # BLD: 4.71 MILL/MM3 (ref 4.2–5.4)
SCAN OF BLOOD SMEAR: NORMAL
SEG NEUTROPHILS: 58.3 %
SEG NEUTROPHILS: 74.5 %
SEGMENTED NEUTROPHILS ABSOLUTE COUNT: 10.6 THOU/MM3 (ref 1.8–7.7)
SEGMENTED NEUTROPHILS ABSOLUTE COUNT: 5.7 THOU/MM3 (ref 1.8–7.7)
SODIUM BLD-SCNC: 133 MEQ/L (ref 135–145)
TOTAL NUCLEATED CELLS SYNOVIAL: ABNORMAL /CUMM (ref 0–150)
TOTAL VOLUME RECEIVED SYNOVIAL: 18 ML
TSH SERPL DL<=0.05 MIU/L-ACNC: 1.92 UIU/ML (ref 0.4–4.2)
WBC # BLD: 14.2 THOU/MM3 (ref 4.8–10.8)
WBC # BLD: 9.7 THOU/MM3 (ref 4.8–10.8)

## 2022-03-16 PROCEDURE — 2580000003 HC RX 258: Performed by: REGISTERED NURSE

## 2022-03-16 PROCEDURE — 0S9D3ZX DRAINAGE OF LEFT KNEE JOINT, PERCUTANEOUS APPROACH, DIAGNOSTIC: ICD-10-PCS | Performed by: INTERNAL MEDICINE

## 2022-03-16 PROCEDURE — 6360000002 HC RX W HCPCS: Performed by: PHYSICIAN ASSISTANT

## 2022-03-16 PROCEDURE — 74183 MRI ABD W/O CNTR FLWD CNTR: CPT

## 2022-03-16 PROCEDURE — 6360000002 HC RX W HCPCS: Performed by: REGISTERED NURSE

## 2022-03-16 PROCEDURE — 97162 PT EVAL MOD COMPLEX 30 MIN: CPT

## 2022-03-16 PROCEDURE — 94640 AIRWAY INHALATION TREATMENT: CPT

## 2022-03-16 PROCEDURE — 6370000000 HC RX 637 (ALT 250 FOR IP): Performed by: REGISTERED NURSE

## 2022-03-16 PROCEDURE — 36415 COLL VENOUS BLD VENIPUNCTURE: CPT

## 2022-03-16 PROCEDURE — 6370000000 HC RX 637 (ALT 250 FOR IP): Performed by: NURSE PRACTITIONER

## 2022-03-16 PROCEDURE — 6360000004 HC RX CONTRAST MEDICATION: Performed by: NURSE PRACTITIONER

## 2022-03-16 PROCEDURE — 97110 THERAPEUTIC EXERCISES: CPT

## 2022-03-16 PROCEDURE — 80048 BASIC METABOLIC PNL TOTAL CA: CPT

## 2022-03-16 PROCEDURE — 85025 COMPLETE CBC W/AUTO DIFF WBC: CPT

## 2022-03-16 PROCEDURE — 1200000003 HC TELEMETRY R&B

## 2022-03-16 PROCEDURE — 2580000003 HC RX 258: Performed by: EMERGENCY MEDICINE

## 2022-03-16 PROCEDURE — A9579 GAD-BASE MR CONTRAST NOS,1ML: HCPCS | Performed by: NURSE PRACTITIONER

## 2022-03-16 PROCEDURE — 6370000000 HC RX 637 (ALT 250 FOR IP): Performed by: PHYSICIAN ASSISTANT

## 2022-03-16 PROCEDURE — 94760 N-INVAS EAR/PLS OXIMETRY 1: CPT

## 2022-03-16 PROCEDURE — 84443 ASSAY THYROID STIM HORMONE: CPT

## 2022-03-16 PROCEDURE — 6360000002 HC RX W HCPCS: Performed by: EMERGENCY MEDICINE

## 2022-03-16 RX ORDER — HYDROCODONE BITARTRATE AND ACETAMINOPHEN 5; 325 MG/1; MG/1
0.5 TABLET ORAL EVERY 8 HOURS PRN
Status: DISCONTINUED | OUTPATIENT
Start: 2022-03-16 | End: 2022-03-17 | Stop reason: HOSPADM

## 2022-03-16 RX ORDER — ACETAMINOPHEN 500 MG
500 TABLET ORAL EVERY 6 HOURS
Status: DISCONTINUED | OUTPATIENT
Start: 2022-03-16 | End: 2022-03-17 | Stop reason: HOSPADM

## 2022-03-16 RX ORDER — POLYETHYLENE GLYCOL 3350 17 G/17G
17 POWDER, FOR SOLUTION ORAL 2 TIMES DAILY
Status: DISCONTINUED | OUTPATIENT
Start: 2022-03-16 | End: 2022-03-17 | Stop reason: HOSPADM

## 2022-03-16 RX ORDER — KETOROLAC TROMETHAMINE 30 MG/ML
15 INJECTION, SOLUTION INTRAMUSCULAR; INTRAVENOUS EVERY 6 HOURS
Status: DISCONTINUED | OUTPATIENT
Start: 2022-03-16 | End: 2022-03-16 | Stop reason: SDUPTHER

## 2022-03-16 RX ORDER — AMLODIPINE BESYLATE 2.5 MG/1
2.5 TABLET ORAL DAILY
Status: DISCONTINUED | OUTPATIENT
Start: 2022-03-16 | End: 2022-03-17 | Stop reason: HOSPADM

## 2022-03-16 RX ORDER — CYCLOBENZAPRINE HCL 10 MG
10 TABLET ORAL 3 TIMES DAILY PRN
Status: DISCONTINUED | OUTPATIENT
Start: 2022-03-16 | End: 2022-03-17 | Stop reason: HOSPADM

## 2022-03-16 RX ORDER — LEVOTHYROXINE SODIUM 88 UG/1
88 TABLET ORAL DAILY
Status: DISCONTINUED | OUTPATIENT
Start: 2022-03-16 | End: 2022-03-17 | Stop reason: HOSPADM

## 2022-03-16 RX ORDER — ATORVASTATIN CALCIUM 10 MG/1
10 TABLET, FILM COATED ORAL NIGHTLY
Status: DISCONTINUED | OUTPATIENT
Start: 2022-03-16 | End: 2022-03-17 | Stop reason: HOSPADM

## 2022-03-16 RX ORDER — SPIRONOLACTONE 25 MG/1
50 TABLET ORAL DAILY
Status: DISCONTINUED | OUTPATIENT
Start: 2022-03-16 | End: 2022-03-17 | Stop reason: HOSPADM

## 2022-03-16 RX ORDER — BUDESONIDE 0.5 MG/2ML
0.5 INHALANT ORAL 2 TIMES DAILY
Status: DISCONTINUED | OUTPATIENT
Start: 2022-03-16 | End: 2022-03-17 | Stop reason: HOSPADM

## 2022-03-16 RX ORDER — DOCUSATE SODIUM 100 MG/1
100 CAPSULE, LIQUID FILLED ORAL 2 TIMES DAILY
Status: DISCONTINUED | OUTPATIENT
Start: 2022-03-16 | End: 2022-03-17 | Stop reason: HOSPADM

## 2022-03-16 RX ORDER — KETOROLAC TROMETHAMINE 30 MG/ML
15 INJECTION, SOLUTION INTRAMUSCULAR; INTRAVENOUS EVERY 6 HOURS
Status: DISCONTINUED | OUTPATIENT
Start: 2022-03-16 | End: 2022-03-17 | Stop reason: HOSPADM

## 2022-03-16 RX ADMIN — VANCOMYCIN HYDROCHLORIDE 1000 MG: 1 INJECTION, POWDER, LYOPHILIZED, FOR SOLUTION INTRAVENOUS at 00:57

## 2022-03-16 RX ADMIN — ATORVASTATIN CALCIUM 10 MG: 10 TABLET, FILM COATED ORAL at 20:22

## 2022-03-16 RX ADMIN — HYDROCODONE BITARTRATE AND ACETAMINOPHEN 0.5 TABLET: 5; 325 TABLET ORAL at 05:54

## 2022-03-16 RX ADMIN — BUDESONIDE 500 MCG: 0.5 INHALANT RESPIRATORY (INHALATION) at 07:54

## 2022-03-16 RX ADMIN — APIXABAN 5 MG: 5 TABLET, FILM COATED ORAL at 20:23

## 2022-03-16 RX ADMIN — PIPERACILLIN AND TAZOBACTAM 3375 MG: 3; .375 INJECTION, POWDER, LYOPHILIZED, FOR SOLUTION INTRAVENOUS at 00:15

## 2022-03-16 RX ADMIN — KETOROLAC TROMETHAMINE 15 MG: 30 INJECTION, SOLUTION INTRAMUSCULAR; INTRAVENOUS at 21:57

## 2022-03-16 RX ADMIN — DOCUSATE SODIUM 100 MG: 100 CAPSULE, LIQUID FILLED ORAL at 20:23

## 2022-03-16 RX ADMIN — KETOROLAC TROMETHAMINE 15 MG: 30 INJECTION, SOLUTION INTRAMUSCULAR; INTRAVENOUS at 15:46

## 2022-03-16 RX ADMIN — SODIUM CHLORIDE: 9 INJECTION, SOLUTION INTRAVENOUS at 00:02

## 2022-03-16 RX ADMIN — ACETAMINOPHEN 500 MG: 500 TABLET ORAL at 12:54

## 2022-03-16 RX ADMIN — GADOTERIDOL 15 ML: 279.3 INJECTION, SOLUTION INTRAVENOUS at 18:22

## 2022-03-16 RX ADMIN — HYDROMORPHONE HYDROCHLORIDE 0.5 MG: 1 INJECTION, SOLUTION INTRAMUSCULAR; INTRAVENOUS; SUBCUTANEOUS at 11:24

## 2022-03-16 RX ADMIN — METOPROLOL TARTRATE 12.5 MG: 25 TABLET, FILM COATED ORAL at 09:42

## 2022-03-16 RX ADMIN — PIPERACILLIN AND TAZOBACTAM 3375 MG: 3; .375 INJECTION, POWDER, LYOPHILIZED, FOR SOLUTION INTRAVENOUS at 06:01

## 2022-03-16 RX ADMIN — HYDROMORPHONE HYDROCHLORIDE 0.5 MG: 1 INJECTION, SOLUTION INTRAMUSCULAR; INTRAVENOUS; SUBCUTANEOUS at 02:56

## 2022-03-16 RX ADMIN — PIPERACILLIN AND TAZOBACTAM 3375 MG: 3; .375 INJECTION, POWDER, LYOPHILIZED, FOR SOLUTION INTRAVENOUS at 21:59

## 2022-03-16 RX ADMIN — POLYETHYLENE GLYCOL 3350 17 G: 17 POWDER, FOR SOLUTION ORAL at 20:23

## 2022-03-16 RX ADMIN — AMLODIPINE BESYLATE 2.5 MG: 2.5 TABLET ORAL at 09:43

## 2022-03-16 RX ADMIN — CYCLOBENZAPRINE 10 MG: 10 TABLET, FILM COATED ORAL at 12:54

## 2022-03-16 RX ADMIN — BUDESONIDE 500 MCG: 0.5 INHALANT RESPIRATORY (INHALATION) at 21:03

## 2022-03-16 RX ADMIN — LEVOTHYROXINE SODIUM 88 MCG: 0.09 TABLET ORAL at 05:54

## 2022-03-16 RX ADMIN — PIPERACILLIN AND TAZOBACTAM 3375 MG: 3; .375 INJECTION, POWDER, LYOPHILIZED, FOR SOLUTION INTRAVENOUS at 15:44

## 2022-03-16 ASSESSMENT — PAIN DESCRIPTION - PROGRESSION
CLINICAL_PROGRESSION: NOT CHANGED

## 2022-03-16 ASSESSMENT — PAIN - FUNCTIONAL ASSESSMENT
PAIN_FUNCTIONAL_ASSESSMENT: PREVENTS OR INTERFERES SOME ACTIVE ACTIVITIES AND ADLS
PAIN_FUNCTIONAL_ASSESSMENT: 0-10
PAIN_FUNCTIONAL_ASSESSMENT: PREVENTS OR INTERFERES SOME ACTIVE ACTIVITIES AND ADLS
PAIN_FUNCTIONAL_ASSESSMENT: PREVENTS OR INTERFERES WITH MANY ACTIVE NOT PASSIVE ACTIVITIES

## 2022-03-16 ASSESSMENT — PAIN DESCRIPTION - LOCATION
LOCATION: KNEE;FOOT
LOCATION: KNEE

## 2022-03-16 ASSESSMENT — PAIN SCALES - GENERAL
PAINLEVEL_OUTOF10: 10
PAINLEVEL_OUTOF10: 7
PAINLEVEL_OUTOF10: 10
PAINLEVEL_OUTOF10: 10
PAINLEVEL_OUTOF10: 8
PAINLEVEL_OUTOF10: 8
PAINLEVEL_OUTOF10: 10
PAINLEVEL_OUTOF10: 6
PAINLEVEL_OUTOF10: 8
PAINLEVEL_OUTOF10: 7
PAINLEVEL_OUTOF10: 10
PAINLEVEL_OUTOF10: 4
PAINLEVEL_OUTOF10: 7

## 2022-03-16 ASSESSMENT — PAIN DESCRIPTION - ONSET
ONSET: ON-GOING

## 2022-03-16 ASSESSMENT — PAIN DESCRIPTION - PAIN TYPE
TYPE: ACUTE PAIN

## 2022-03-16 ASSESSMENT — PAIN DESCRIPTION - DESCRIPTORS
DESCRIPTORS: ACHING;CRAMPING
DESCRIPTORS: ACHING;SPASM
DESCRIPTORS: ACHING

## 2022-03-16 ASSESSMENT — PAIN DESCRIPTION - FREQUENCY
FREQUENCY: CONTINUOUS

## 2022-03-16 ASSESSMENT — PAIN DESCRIPTION - ORIENTATION
ORIENTATION: LEFT

## 2022-03-16 ASSESSMENT — PAIN DESCRIPTION - DIRECTION
RADIATING_TOWARDS: DOWN LEG
RADIATING_TOWARDS: LEG

## 2022-03-16 NOTE — CONSULTS
Consult History & Physical      Patient:  Erika Chaney  YOB: 1935  MRN: [id number]     Acct: [de-identified]    Chief Complaint:  Pancreatic ductal dilatation    Date of Service: Pt seen/examined in consultation on 3/16/2022    History Of Present Illness:      80 y.o. female who we are asked to see/evaluate by Deidre Crouch MD for medical management of pancreatic ductal dilatation. She came to the ED yesterday for left knee pain and inability to life the leg due to the pain. Ortho was consulted and performed an aspiration. CT A/P demonstrated 1 cm calcification within the mid pancreatic body associated with moderate dilatation of more distal aspect of the pancreatic duct, colonic diverticulosis without diverticulitis. LFTs within normal limit.s Denies abdominal pain, nausea, vomiting, diarrhea, melena, and hematochezia. She has a history of chronic constipation for which she takes medication. Her last bowel movement was a few days ago. She denies a family history of gastrointestinal cancers (stomach, esophageal, liver, pancreatic, colon).     Past Medical History:    Past Medical History:   Diagnosis Date    Anemia     normal on pre-op 5/2012 and 12/2/13    Backache     h/o    Bronchiectasis ([address] 2013    Bursitis     bilateral shoulders    Constipation     Diverticulosis of colon     HTN (hypertension)     Hypercalcemia     slightly elevated at 10.8 pre-op 12/2/13    Hyperlipidemia     Nodular goiter     bx negative    OA (osteoarthritis)     bilateral thumbs - sees Dr. Willie Xiao Osteopenia 11/6/2013    Parathyroid adenoma 4/30/2013    Pneumonia of right upper lobe due to infectious organism     Pneumonitis     Dr. Rocío Barrett in 7/2010 - bx negative    Pulmonary Mycobacterium avium complex (MAC) infection ([address]     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 ([address]     had one parathyroid removed - now follows with Dr. Amarilis Jose    Sinusitis     with seasonal allergies    Vitamin D deficiency 05/2018       Home Medications:  Prior to Admission medications    Medication Sig Start Date End Date Taking? Authorizing Provider   HYDROcodone-acetaminophen (NORCO) 5-325 MG per tablet Take 0.5 tablets by mouth every 8 hours as needed for Pain for up to 30 days.  3/11/22 4/10/22  JASMEET Hernandez CNP   levothyroxine (SYNTHROID) 88 MCG tablet take 1 tablet by mouth once daily, new dose 2/11/22   Max Rubin MD   budesonide (PULMICORT) 0.5 MG/2ML nebulizer suspension Take 2 mLs by nebulization 2 times daily 8/24/21   Max Rubin MD   metoprolol tartrate (LOPRESSOR) 25 MG tablet take 1/2 tablet by mouth twice a day 8/24/21   Max Rubin MD   simvastatin (ZOCOR) 20 MG tablet TAKE 1 TABLET BY MOUTH  NIGHTLY 8/24/21   Max Rubin MD   [facility]) 250 MG tablet Take 1 tablet by mouth daily 8/24/21   Max Rubin MD   amoxicillin (AMOXIL) 500 MG capsule 4 pills 1 hr before dental procedures 8/24/21   Max Rubin MD   amLODIPine (NORVASC) 2.5 MG tablet Take 1 tablet by mouth daily 5/6/21   Zoheir A Anshul MD Sincere   apixaban (ELIQUIS) 5 MG TABS tablet TAKE 1 TABLET BY MOUTH  TWICE DAILY 5/5/21   Jono Ball MD   spironolactone (ALDACTONE) 50 MG tablet Take 1 tablet by mouth daily 5/5/21   Jono Ball MD   albuterol sulfate HFA (PROAIR HFA) 108 (90 Base) MCG/ACT inhaler Inhale 2 puffs into the lungs every 6 hours as needed for Wheezing or Shortness of Breath 1/14/20 1/18/22  JASMEET De Los Santos CNP   Lactobacillus (PROBIOTIC ACIDOPHILUS) CAPS Take 1 capsule by mouth daily     Historical Provider, MD   MULTIPLE VITAMIN PO Take 1 tablet by mouth daily     Historical Provider, MD       Surgical History:  Past Surgical History:   Procedure Laterality Date    ABDOMINAL EXPLORATION SURGERY  01/02/2013    Release of KATHERINE TAMAYO-Dr. Rehana Funez    APPENDECTOMY  1961    BACK INJECTION Bilateral 12/28/2021    bilateral sacroiliac joint injection performed by Basia Bush DO at [address] INJECTION N/A 2022    B/L SI joint block performed by Basia Bush DO at [address] N/A 2020    BRONCHOSCOPY FLUOROSCOPY performed by Tai Torres MD at [facility]    CATARACT REMOVAL WITH IMPLANT Bilateral  ?   SECTION  6431,3527    DILATION AND CURETTAGE OF UTERUS  1354,1417,4420    EXCISION OF PARATHYROID MASS Right 2013    rt parathyroidectomy (excision of rt inferior parathyroid mass) exploration of neck     FOOT SURGERY  2002    left    FOOT SURGERY Left 2017    Dr Kwabena Pruett  12    left knee    JOINT REPLACEMENT  2014    right knee    MALIGNANT SKIN LESION EXCISION Left 2017    BCC DORSAL FOOT WITH GRAFT & FS    GOSIA AND BSO  1982    TONSILLECTOMY AND ADENOIDECTOMY   ?  TOTAL KNEE ARTHROPLASTY Right 2014    [facility]       Family History:  Family History   Problem Relation Age of Onset    Diabetes Mother     Thyroid Disease Mother     Arthritis Mother     High Blood Pressure Mother     Arthritis Father     High Blood Pressure Father     Other Father         hay fever    Breast Cancer Neg Hx     Ovarian Cancer Neg Hx        Past GI History:  Chronic constipation, colon polyps, colonoscopy    Allergies:  Levaquin [levofloxacin], Percocet [oxycodone-acetaminophen], Rifampin, Sulfa antibiotics, and Cefuroxime    Social History:   TOBACCO:   reports that she has never smoked. She has never used smokeless tobacco.  ETOH:   reports current alcohol use. Review Of Systems  GENERAL: No fever, chills or weight loss. EYES:  No  blurred vision, double vision   CARDIOVASCULAR: No chest pain or palpitations. RESPIRATORY:  No dyspnea or cough. GI:  See HPI  MUSCULOSKELETAL: +left knee/foot pain  :   No dysuria or hematuria. SKIN:  No rashes or jaundice.     NEUROLOGIC:  No headaches or seizures, numbness or tingling of arms, or legs. PSYCH:  No anxiety or depression. ENDOCRINE:  No polyuria or polydipsia. BLOOD:  No anemia, bleeding disorder, blood or blood product transfusion. PHYSICAL EXAM:  /60   Pulse 78   Temp 98.1 °F (36.7 °C) (Oral)   Resp 20   Ht 5' 4\" (1.626 m)   Wt 159 lb 12.8 oz (72.5 kg)   SpO2 100%   BMI 27.43 kg/m²     General appearance: No apparent distress, appears stated age and cooperative. HEENT: Normal cephalic, atraumatic without obvious deformity. Pupils equal, round, and reactive to light. Neck: Supple, with full range of motion. No jugular venous distention. Trachea midline. Respiratory:  Normal respiratory effort. Clear to auscultation, bilaterally without Rales/Wheezes/Rhonchi. Cardiovascular: Regular rate and rhythm without murmurs, rubs or gallops. Abdomen: Soft, non-tender, non-distended with active bowel sounds. Musculoskeletal: No clubbing, cyanosis or edema bilaterally. Left knee/foot pain with left knee edema. Skin: Pink, warm, dry. No rashes or lesions. Psychiatric: Alert and oriented, thought content appropriate, normal insight    Labs:   Recent Labs     03/16/22  0534   WBC 9.7   HGB 11.2*   HCT 36.2*        Recent Labs     03/16/22  0534   *   K 4.5   CL 96*   CO2 27   BUN 19   CREATININE 0.9   CALCIUM 9.7     Recent Labs     03/15/22  1818   AST 18   ALT 11   BILITOT 0.5   ALKPHOS 89     Recent Labs     03/15/22  1817   INR 1.94*       Radiology:   CT abdomen/pelivs W IV contrast 03/15/22  Impression   1. There are new changes of pulmonary fibrosis within the right lung. There are additional early findings of interstitial scarring within the    left lung. 2. 1 cm calcification within the mid pancreatic body associated with    moderate dilatation of more distal aspect of the pancreatic duct.    3. Ventral hernia containing a short segment of the transverse colon    without associated obstruction or edema. Bilateral femoral hernias    containing fat. 4. Colonic diverticulosis without diverticulitis. Code Status: DNR-CCA    ASSESSMENT:  1. Left knee pain & edema s/p aspiration  2. S/P total left knee arthroplasty 2012  3. Pancreatic ductal dilatation- noted on CT  4. 1 cm calcification in the mid pancreatic body- noted on CT  5. Chronic respiratory failure  6. H/O HTN  7. HLD  8. Diverticulosis  9. Chronic constipation  10. Hyponatremia    PLAN:     ATBs per ortho   Colace BID   Miralax BID   Diet as tolerated   NPO at midnight for MRI   MRCP tomorrow   Pain control per primary   Supportive care per primary team  Will follow       Case reviewed and impression/plan reviewed in collaboration with Dr. Hortensia Mead  Electronically signed by JASMEET Brooks CNP on 3/16/2022 at 2:31 PM    [facility]  Thank you for the consultation.

## 2022-03-16 NOTE — ED NOTES
ED nurse-to-nurse bedside report    Chief Complaint   Patient presents with    Knee Pain     lt      LOC: alert and orientated to name, place, date  Vital signs   Vitals:    03/15/22 1942 03/15/22 2034 03/15/22 2143 03/15/22 2250   BP: 124/60 113/72 124/61 (!) 111/55   Pulse: 74 85 85 67   Resp: 18 18 20 20   Temp:       TempSrc:       SpO2: 100% 93% 93% 98%   Weight:       Height:          Pain:    Pain Interventions: meds  Pain Goal:   Oxygen: Yes    Current needs required 2 L   Telemetry: No  LDAs:   Peripheral IV 09/25/20 Left Upper arm (Active)       Peripheral IV 03/15/22 Right Antecubital (Active)   Site Assessment Clean;Dry; Intact 03/15/22 2251   Line Status Normal saline locked 03/15/22 2251   Dressing Status Clean;Dry; Intact 03/15/22 2251     Continuous Infusions:   Mobility: Requires assistance * 2  [place]ino Fall Risk Score:    Fall Risk 9/14/2021 9/8/2020 9/6/2020 8/24/2020 8/24/2020 10/9/2019 9/30/2019   2 or more falls in past year? no no no no no no no   Fall with injury in past year? no no no no no no no     Fall Interventions: SR up x 2  Report given to: Nolberto Alexismary Elder  03/15/22 5855

## 2022-03-16 NOTE — CARE COORDINATION
3/16/22, 8:44 AM EDT  DISCHARGE PLANNING EVALUATION:    Bairon Riojas       Admitted: 3/15/2022/ [facility] day: 1   Location: Critical access hospital10/010-A Reason for admit: Septic arthritis ([address] [M00.9]  Pyogenic arthritis of left knee joint, due to unspecified organism ([address] [M00.9]   PMH:  has a past medical history of Anemia, Backache, Bronchiectasis ([address] Bursitis, Constipation, Diverticulosis of colon, HTN (hypertension), Hypercalcemia, Hyperlipidemia, Nodular goiter, OA (osteoarthritis), Osteopenia, Parathyroid adenoma, Pneumonia of right upper lobe due to infectious organism, Pneumonitis, Pulmonary Mycobacterium avium complex (MAC) infection ([address] 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 ([address] Sinusitis, and Vitamin D deficiency. Procedure:   3/15 L Knee aspiration in ED  Barriers to Discharge:  Left knee pain and effusion. Pain control. IVF. IV zosyn. IV vanc. Hospitalist and ID consults. Await cultures. PCP: Enid Closs, MD  Readmission Risk Score: 14.4 ( )%    Patient Goals/Plan/Treatment Preferences: Lizz Falcon is from [facility]. SW following. Transportation/Food Security/Housekeeping Addressed:  No issues identified.

## 2022-03-16 NOTE — PROCEDURES
Verbal consent obtained for left knee aspiration. The superolateral pole of the patella was palpated. The skin was marked and then cleansed with betadine followed by alcohol. Using an 18 gauge needle 30 cc of cloudy yellow fluid were aspirated. A 2x2 and band-aid was applied to aspiration site. The patient tolerated the procedure well. Synovial fluid sent to the lab for cell count with differential, gram stain and culture and crystal analysis.

## 2022-03-16 NOTE — H&P
Dr. Jennifer Bloom ( Internal Medicine Specialties )  H&P  3/16/2022  12:34 PM    Patient:  Feliberto Vargas  YOB: 1935    MRN: [id number]   Acct:  [de-identified]   7K-10/010-A  Primary Care Physician: Tai Hernández MD  Over 45 mins spent on this evaluation. Chief Complaint:  Chief Complaint   Patient presents with    Knee Pain     lt       History of Present Illness: The patient is a 80 y.o. female who presents with pain in the left knee. The patient noticed that this pain started about a week and [id number]-izdd-old worsened on Sunday. She denied however any fever or chills with this. She also denies any recent trauma although she has had bilateral knee replacement many years prior. On arrival actomyosin department she was seen by orthopedic surgeon due to the effusion on that knee that was aspirated with specimen sent to the lab for culture and sensitivity. She has since been commenced on empiric antibiotic treatment and pain control. She does feel better although she states that the pain is still rated high despite the medication and she will appreciate it if the medication can be adjusted. .       Past Medical History:        Diagnosis Date    Anemia     normal on pre-op 5/2012 and 12/2/13    Backache     h/o    Bronchiectasis [facility]) 2013    Bursitis     bilateral shoulders    Constipation     Diverticulosis of colon     HTN (hypertension)     Hypercalcemia     slightly elevated at 10.8 pre-op 12/2/13    Hyperlipidemia     Nodular goiter     bx negative    OA (osteoarthritis)     bilateral thumbs - sees Dr. Liston Gosselin Osteopenia 11/6/2013    Parathyroid adenoma 4/30/2013    Pneumonia of right upper lobe due to infectious organism     Pneumonitis     Dr. Lucy Ryan in 7/2010 - bx negative    Pulmonary Myc[facility] avium complex ([facility]) [facility]     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 ([facility]     had one parathyroid removed - now follows with Dr. Chanel Edwards    Sinusitis     with seasonal allergies    Vitamin D deficiency 2018       Past Surgical History:        Procedure Laterality Date    ABDOMINAL EXPLORATION SURGERY  2013    Release of KATHERINE TAMAYO-Dr. Pj Cottrell    APPENDECTOMY  196    BACK INJECTION Bilateral 2021    bilateral sacroiliac joint injection performed by Tristan Villa DO at [address] N/A 2022    B/L SI joint block performed by Tristan Villa DO at [address] N/A 2020    BRONCHOSCOPY FLUOROSCOPY performed by Elvia Perez MD at [facility]    CATARACT REMOVAL WITH IMPLANT Bilateral  ?   SECTION  1196,4682    DILATION AND CURETTAGE OF UTERUS  [id number]    EXCISION OF PARATHYROID MASS Right 2013    rt parathyroidectomy (excision of rt inferior parathyroid mass) exploration of neck     FOOT SURGERY      left    FOOT SURGERY Left 2017    Dr Mik Rebolledo  6-    left knee    JOINT REPLACEMENT  2014    right knee    MALIGNANT SKIN LESION EXCISION Left 2017    BCC DORSAL FOOT WITH GRAFT & FS    GOSIA AND BSO  1982    TONSILLECTOMY AND ADENOIDECTOMY   ?  TOTAL KNEE ARTHROPLASTY Right 2014    OIO       Home Medications: Medications Prior to Admission: HYDROcodone-acetaminophen (NORCO) 5-325 MG per tablet, Take 0.5 tablets by mouth every 8 hours as needed for Pain for up to 30 days.   levothyroxine (SYNTHROID) 88 MCG tablet, take 1 tablet by mouth once daily, new dose  budesonide (PULMICORT) 0.5 MG/2ML nebulizer suspension, Take 2 mLs by nebulization 2 times daily  metoprolol tartrate (LOPRESSOR) 25 MG tablet, take 1/2 tablet by mouth twice a day  simvastatin (ZOCOR) 20 MG tablet, TAKE 1 TABLET BY MOUTH  NIGHTLY  azithromycin (ZITHROMAX) 250 MG tablet, Take 1 tablet by mouth daily  amoxicillin (AMOXIL) 500 MG capsule, 4 pills 1 hr before dental procedures  amLODIPine (NORVASC) 2.5 MG tablet, Take 1 tablet by mouth daily  apixaban (ELIQUIS) 5 MG TABS tablet, TAKE 1 TABLET BY MOUTH  TWICE DAILY  spironolactone (ALDACTONE) 50 MG tablet, Take 1 tablet by mouth daily  albuterol sulfate HFA (PROAIR HFA) 108 (90 Base) MCG/ACT inhaler, Inhale 2 puffs into the lungs every 6 hours as needed for Wheezing or Shortness of Breath  Lactobacillus (PROBIOTIC ACIDOPHILUS) CAPS, Take 1 capsule by mouth daily   MULTIPLE VITAMIN PO, Take 1 tablet by mouth daily       Allergies:  Levaquin [levofloxacin], Percocet [oxycodone-acetaminophen], Rifampin, Sulfa antibiotics, and Cefuroxime    Social History:    reports that she has never smoked. She has never used smokeless tobacco. She reports current alcohol use. She reports that she does not use drugs.     Occupation: Retired    Family History:       Problem Relation Age of Onset    Diabetes Mother     Thyroid Disease Mother     Arthritis Mother     High Blood Pressure Mother     Arthritis Father     High Blood Pressure Father     Other Father         hay fever    Breast Cancer Neg Hx     Ovarian Cancer Neg Hx        Review of Systems:    General ROS: Positive for the pain in the left knee pain and swelling  Psychological ROS: negative  Hematological and Lymphatic ROS: negative  Endocrine ROS: negative  Vision:negative  ENT ROS: Denies  Respiratory ROS: no cough, admits to dyspnea on exertion due to history of pulmonary fibrosis  Cardiovascular ROS: no chest pain or dyspnea on exertion  Gastrointestinal ROS: no abdominal pain, change in bowel habits, or black or bloody stools  Genito-Urinary ROS: no dysuria, trouble voiding, or hematuria  Musculoskeletal ROS: Pain left knee  Neurological ROS: no TIA or stroke symptoms  Dermatological ROS: negative  Weight:no change in weight  Appetite: Feels nauseated sometimes      Physical Exam:    Vitals:  Patient Vitals for the past 24 hrs:   BP Temp Temp src Pulse Resp SpO2 Height Weight   03/16/22 1121 125/60 98.1 °F (36.7 °C) Oral 78 20 -- -- --   03/16/22 0937 (!) 123/55 98 °F (36.7 °C) Oral 91 20 100 % -- --   03/16/22 0245 (!) 124/59 98.1 °F (36.7 °C) Oral 69 20 96 % -- --   03/16/22 0115 -- -- -- -- -- 93 % -- --   03/16/22 0100 (!) 119/57 98.5 °F (36.9 °C) Oral 80 20 (!) 83 % 5' 4\" (1.626 m) 159 lb 12.8 oz (72.5 kg)   03/16/22 0027 133/71 -- -- 72 19 98 % -- --   03/15/22 2250 (!) 111/55 -- -- 67 20 98 % -- --   03/15/22 2143 124/61 -- -- 85 20 93 % -- --   03/15/22 2034 113/72 -- -- 85 18 93 % -- --   03/15/22 1942 124/60 -- -- 74 18 100 % -- --   03/15/22 1844 (!) 114/55 -- -- 75 18 98 % -- --   03/15/22 1640 -- 98.3 °F (36.8 °C) Oral 91 18 95 % 5' 4\" (1.626 m) 145 lb (65.8 kg)     Weight: Weight: 159 lb 12.8 oz (72.5 kg)     24 hour intake/output:    Intake/Output Summary (Last 24 hours) at 3/16/2022 1234  Last data filed at 3/16/2022 0531  Gross per 24 hour   Intake 0 ml   Output --   Net 0 ml       General appearance - alert, well appearing, and in no distress  Eyes - pupils equal and reactive, extraocular eye movements intact  Mouth - mucous membranes moist, pharynx normal without lesions  Neck - supple, no significant adenopathy  Lymphatics - no palpable lymphadenopathy, no hepatosplenomegaly  Chest -bilateral movement without any wheezes but some crepitus due to the pulmonary fibrosis   Heart - normal rate, regular rhythm, normal S1, S2, no murmurs, rubs, clicks or gallops  Abdomen - soft, nontender, nondistended, no masses or organomegaly  Obese: No; Protuberant:   Neurological - alert, oriented, normal speech, no focal findings or movement disorder noted  Musculoskeletal -swelling left knee with tenderness  Extremities -left knee tenderness with swelling otherwise no edema.     Skin - normal coloration and turgor, no rashes, no suspicious skin lesions noted    Review of Labs and Diagnostic Testing:    CBC:   Recent Labs     03/16/22  0534   WBC 9.7   HGB 11.2*   HCT 36.2*   MCV 87.9    BMP:   Recent Labs     03/16/22  0534   *   K 4.5   CL 96*   CO2 27   BUN 19   CREATININE 0.9   CALCIUM 9.7   GLUCOSE 103     PT/INR:   Recent Labs     03/15/22  1817   INR 1.94*     APTT:   Recent Labs     03/15/22  1817   APTT 39.3*      Lipids:   Recent Labs     03/15/22  1818 03/15/22  2347   ALKPHOS 89  --    ALT 11  --    AST 18  --    BILITOT 0.5  --    LABALBU 4.1  --    LIPASE  --  26.0     Troponin: No results for input(s): TROPONINI in the last 72 hours. Imaging:  XR KNEE LEFT (MIN 4 VIEWS)    Result Date: 3/15/2022  4 view left knee Comparison: None Findings: No fractures or dislocations. Prior left knee replacement. Appropriate alignment. No evidence of hardware failure. There is osteopenia. No significant arthritic change or erosions. No joint effusion. No radiopaque foreign body. No acute bony abnormality. This document has been electronically signed by: Heidi Luevano MD on 03/15/2022 06:44 PM    CT ABDOMEN PELVIS W IV CONTRAST Additional Contrast? None    Result Date: 3/15/2022  CT abdomen and pelvis with contrast Comparison: CT - CT ABDOMEN /T/ PELVIS ENTEROGRAPHY - 01/02/2013 12:43 PM EST Findings: Multifocal bronchiectasis and honeycombing within visualized portions of the right middle and lower lobes, new since the prior study. Relatively mild peripheral reticular opacities within the left lung. Mild cardiomegaly 1 cm calcification within the mid pancreatic body, new since the prior study. Moderate dilatation of more distal aspect of the pancreatic duct. Moderate pancreatic volume loss. Mild multifocal scarring within the bilateral kidneys. Unremarkable liver, spleen and adrenal glands. No calcified gallstones or bile duct dilatation. There is colonic diverticulosis. There is no evidence of bowel inflammation or bowel obstruction. There is a ventral hernia containing a small portion of the transverse colon. Status post hysterectomy. Poorly distended urinary bladder.  Bilateral femoral hernias containing fat. Appendix not definitively seen. No secondary findings to suggest appendicitis. No acute fracture. 1. There are new changes of pulmonary fibrosis within the right lung. There are additional early findings of interstitial scarring within the left lung. 2. 1 cm calcification within the mid pancreatic body associated with moderate dilatation of more distal aspect of the pancreatic duct. 3. Ventral hernia containing a short segment of the transverse colon without associated obstruction or edema. Bilateral femoral hernias containing fat. 4. Colonic diverticulosis without diverticulitis. This document has been electronically signed by: Tuan Pelayo MD on 03/15/2022 08:53 PM All CTs at this facility use dose modulation techniques and iterative reconstructions, and/or weight-based dosing when appropriate to reduce radiation to a low as reasonably achievable. FLUORO FOR SURGICAL PROCEDURES    Result Date: 2/22/2022  Radiology exam is complete. No Radiologist dictation. Please follow up with ordering provider. CT LUMBAR RECONSTRUCTION WO POST PROCESS    Result Date: 3/15/2022  CT lumbar spine without contrast Comparison: CT - CT ABDOMEN /T/ PELVIS ENTEROGRAPHY - 01/02/2013 12:43 PM EST Findings: Osteopenia and generalized heterogeneity of bone density. Trace anterolisthesis of L3 on L4. Remaining lumbar alignment appropriate. No acute displaced fracture. T12-L1: Normal L1-L2: Mild ligamentum flavum hypertrophy with mild central canal narrowing. No significant neural foraminal narrowing. L3-L4: Grade 1 anterolisthesis. Mild broad-based disc bulge. Moderate facet osteoarthritis. Mild ligamentum flavum hypertrophy. Moderate central canal stenosis. Mild bilateral neural foraminal narrowing. L4-L5: Moderate bilateral facet osteoarthritis. Minimal broad-based disc bulge. Mild central canal stenosis. Mild narrowing of neural foramina. L5-S1: Mild broad-based disc bulge.  Mild facet osteoarthritis. No significant central canal narrowing. Mild narrowing of the right neural foramen. Findings at the level of the abdomen and pelvis are reported separately. 1. No acute abnormality of the lumbar spine. 2. Chronic appearing degenerative changes associated with central canal and neural foraminal narrowing at multiple levels as described. This document has been electronically signed by: Brian Hernández MD on 03/15/2022 08:57 PM All CTs at this facility use dose modulation techniques and iterative reconstructions, and/or weight-based dosing when appropriate to reduce radiation to a low as reasonably achievable. XR HIP 2-3 VW W PELVIS LEFT    Result Date: 3/8/2022  PROCEDURE: XR HIP 2-3 VW W PELVIS LEFT CLINICAL INFORMATION: hip/SI pain. Left hip pain for 10 days. No known injury. COMPARISON: No prior study. TECHNIQUE: AP view of the pelvis. 2 views of the left hip include an AP view and a frog-leg lateral view. FINDINGS: On the AP view of the pelvis, the pelvic ring is intact. There is no fracture or dislocation of either hip. The superior and inferior pubic rami are intact. There are mild degenerative changes in the sacroiliac joints. The sacral struts are grossly intact. There are moderate degenerative changes in each hip joint. These appear worse on the right than the left. Dedicated views of the left hip demonstrate there is no fracture. There is no dislocation. There is some sclerosis of the superior acetabulum. There is bone-on-bone inferiorly. There is a small osteophyte of the femoral head. The visualized aspects of the superior and inferior pubic rami are normal. No suspicious osseous lesions are present. 1. No acute findings. 2. Degenerative changes in each hip. **This report has been created using voice recognition software. It may contain minor errors which are inherent in voice recognition technology. ** Final report electronically signed by Dr. Sara Hernandez on 3/8/2022 7:37 AM      EKG: Will obtain recent  Assessment:  Patient Active Problem List   Diagnosis    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    Hypercalcemia    Osteoarthritis    Non-toxic nodular goiter    Essential hypertension    Diverticulosis of colon    Hypokalemia    Hypothyroidism    Osteopenia    Bronchiectasis without acute exacerbation (HCC)    Hyponatremia    Pure hypercholesterolemia    Left thyroid nodule    Hyperthyroidism    Lung infiltrate on CT    Dyslipidemia    Elevated serum free T4 level    Elevated TSH    Cough in adult    Nasal polyp    Abnormal CT of the chest    Vitamin D deficiency    Acute hypoxemic respiratory failure (HCC)    Pneumonia    Moderate malnutrition (HCC)    Pulmonary fibrosis (HCC)    SIRS (systemic inflammatory response syndrome) (HCC)    Community acquired pneumonia of right lung    Typical atrial flutter (HCC)    Septic arthritis ([address]         Plan:  1. Continue antibiotics as stated  Adjust Dilaudid for better pain control  Follow-up labs  PT OT  Okay for diet  DVT prophylaxis.     Electronically signed by Lambert Gould MD on 3/16/2022 at 12:34 PM         Copy: Primary Care Physician: Marbella Card MD

## 2022-03-16 NOTE — ED NOTES
Patient resting in bed. Respirations easy and unlabored. No distress noted. Call light within reach. Antibiotic started per [facility].      Chris Singh RN  03/16/22 0028

## 2022-03-16 NOTE — PROGRESS NOTES
Comprehensive Nutrition Assessment    Type and Reason for Visit:  Initial,Positive Nutrition Screen (poor intake)    Nutrition Recommendations/Plan:   Continue current diet   Start ONS (magic cup: chocolate or strawberry)  Monitor appetite and intake  Monitor weights    Nutrition Assessment:      Pt. nutritionally compromised AEB pt report of poor intake and appetite. At risk for further nutrition compromise r/t admission d/t knee pain, septic arthritis, underlying medical condition (hx: anemia, backache, bronchiectasis, brusitis, constipation, diverticulosis of colon, HTN, hypercalcemia, HLD, nodular goiter, OA, osteopenia, parathyroid adenoma, pneumonitis, pulmonary mycobacterium avium complex infection, secondary hyperparathyroidism, sinusitis, and vitamin D deficiency). Malnutrition Assessment:  Malnutrition Status: At risk for malnutrition (Comment)    Context:  Acute Illness     Findings of the 6 clinical characteristics of malnutrition:  Energy Intake:  Mild decrease in energy intake (Comment) (per pt report)  Weight Loss:  No significant weight loss     Body Fat Loss:  1 - Mild body fat loss (hard to determine malnutrition vs advanced age) Orbital   Muscle Mass Loss:  1 - Mild muscle mass loss (hard to determine malnutrition vs advanced age) Temples (temporalis),Clavicles (pectoralis & deltoids),Calf (gastrocnemius)  Fluid Accumulation:  No significant fluid accumulation     Strength:  Not Performed    Estimated Daily Nutrient Needs:  Energy (kcal):  9817-1199 kcal/day (20-25 kcal/kg); Weight Used for Energy Requirements:  Current     Protein (g):  71-87 g/day (1.3-1.6 g/day IBW); Weight Used for Protein Requirements:  Ideal        Fluid (ml/day):  7364-2220 ml/day; Method Used for Fluid Requirements:  1 ml/kcal      Nutrition Related Findings:       Pt. Report/Treatments/Miscellaneous: Pt received O2 through [facility].  Knee was aspirated last evening in ED; 30 cc of yellow cloudy fluid was collected and sent to the lab. Pt reports pain medications are not alleviating the pain. Pt pleasant to talk to. States she had 1/2 chicken salad and some ice cream for lunch. Pt dislikes Ensure supplements. Willing to try magic cup.  GI Status: per Pt last BM was [place] 3/13  Pertinent Labs: Na 133, K 4.5, BUN 19, creatinine 0.9, hgb 11.2  Pertinent Meds: norvasc, pulmicort, synthroid, lopressor     Wounds:  None       Current Nutrition Therapies:    ADULT DIET; Regular  Diet NPO Exceptions are: Sips of Water with Meds  ADULT ORAL NUTRITION SUPPLEMENT; Lunch, Dinner; Frozen Oral Supplement    Anthropometric Measures:  · Height: 5' 4\" (162.6 cm)  · Current Body Weight: 159 lb 12.8 oz (72.5 kg) (3/16: no edema noted)   · Admission Body Weight: 159 lb 12.8 oz (72.5 kg) (only actual weight from 3/16)    · Usual Body Weight:  (per pt 150-153 lbs, per EMR wt hx: 2/22: 151 lbs 6.4 oz, 12/28: 152 lbs 9.6 oz)     · Ideal Body Weight: 120 lbs;   · BMI: 27.4  · Adjusted Body Weight:  ; No Adjustment    · BMI Categories: Overweight (BMI 25.0-29. 9)       Nutrition Diagnosis:   · Inadequate oral intake related to altered taste perception as evidenced by poor intake prior to admission (pt report decreased appetite)      Nutrition Interventions:   Food and/or Nutrient Delivery:  Continue Current Diet,Start Oral Nutrition Supplement  Nutrition Education/Counseling:  Education initiated (encouraged adequate intake to prevent weight loss)   Coordination of Nutrition Care:  Continue to monitor while inpatient    Goals:  Pt will consume 75% or more of recommended energy intake during LOS.        Nutrition Monitoring and Evaluation:   Behavioral-Environmental Outcomes:  None Identified   Food/Nutrient Intake Outcomes:  Diet Advancement/Tolerance,Supplement Intake,Food and Nutrient Intake  Physical Signs/Symptoms Outcomes:  Biochemical Data,GI Status,Nausea or Vomiting,Constipation,Fluid Status or Edema,Skin,Weight,Nutrition Focused Physical Findings     Discharge Planning:     Too soon to determine     Electronically signed by Lupe Jenkins RD on 3/16/22 at 3:38 PM EDT    Contact: [phone]

## 2022-03-16 NOTE — PROGRESS NOTES
[facility]  OCCUPATIONAL THERAPY MISSED TREATMENT NOTE  [facility] ORTHOPEDICS 7K  7K-10/010-A      Date: 3/16/2022  Patient Name: Zen Martinez        CSN: [id number]   : 1935  (80 y.o.)  Gender: female                REASON FOR MISSED TREATMENT: patient is limited by 10/10 pain with activity. OT will hold this date.

## 2022-03-16 NOTE — ACP (ADVANCE CARE PLANNING)
With follow up, noted no code status entered. Per ACP discussion held yesterday and DNR-CCA on file, order for [facility] transcribed.     Curt Phillips, RN, BSN, [address] [place] [address]

## 2022-03-16 NOTE — PLAN OF CARE
Problem: Pain:  Goal: Pain level will decrease  Description: Pain level will decrease  Outcome: Ongoing  Note: Patient taking pain medication on MAR as needed to control pain. Use of non-pharmacologic pain management including ice/repositioning. Patient pain goal is 5/10. Problem: Falls - Risk of:  Goal: Will remain free from falls  Description: Will remain free from falls  Outcome: Met This Shift  Note: Pt using call light appropriately to call for assistance with ambulation to the bathroom and to chair. Pt is also compliant with use of non-skid slippers. Pt reports understanding of fall prevention when discussed. Problem: Skin Integrity:  Goal: Will show no infection signs and symptoms  Description: Will show no infection signs and symptoms  Outcome: Ongoing  Note: Dressing to I&D site on left knee is dry and intact. No other skin impairments noted. Pt understands the importance of frequent repositioning in order to prevent any skin breakdown.

## 2022-03-16 NOTE — CONSULTS
Orthopedic Consult    Requesting Physician: Froilan Muse MD    CHIEF COMPLAINT:  Left knee pain    HISTORY OF PRESENT ILLNESS:      The patient is a 80 y.o. female  who presented to the ED with progressive left knee pain and inability to bear weight. She Has history of low back pain, sees Dr. Citlaly Brown. She has had epidural injections, nerve ablations, and recently had SI injection. She had left hip pain last week that actually brought her to the ED. Pain in the hip is somewhat improved, still has back pain. Left knee pain started about 3 days ago and is progressively worsening. She reports she was unable to walk, she is at [facility] for rehab. She does have bilateral total knee arthroplasties performed by Dr. Brina Pate, left knee in 2012 and right knee in 2014. She denies any fevers, chills, recent illness. No numbness or tingling in the foot. Reports some pain in her back, some pain in the hip but severe pain at the posterior left knee and diffusely about the knee. She reports some swelling about the knee. Denies any falls or other trauma. She has past medical history of significant pulmonary fibrosis on 2 L O2 at baseline, HTN, hyperparathyroidism. She does report a history of gout.      Past Medical History:    Past Medical History:   Diagnosis Date    Anemia     normal on pre-op 5/2012 and 12/2/13    Backache     h/o    Bronchiectasis ([address] 2013    Bursitis     bilateral shoulders    Constipation     Diverticulosis of colon     HTN (hypertension)     Hypercalcemia     slightly elevated at 10.8 pre-op 12/2/13    Hyperlipidemia     Nodular goiter     bx negative    OA (osteoarthritis)     bilateral thumbs - sees Dr. Read Cardinal Osteopenia 11/6/2013    Parathyroid adenoma 4/30/2013    Pneumonia of right upper lobe due to infectious organism     Pneumonitis     Dr. Radha Matthews in 7/2010 - bx negative    Pulmonary Mycobacterium avium complex (MAC) infection ([address]     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 nebulizer suspension Take 2 mLs by nebulization 2 times daily 60 ampule 3    metoprolol tartrate (LOPRESSOR) 25 MG tablet take 1/2 tablet by mouth twice a day 90 tablet 5    simvastatin (ZOCOR) 20 MG tablet TAKE 1 TABLET BY MOUTH  NIGHTLY 90 tablet 3    azithromycin (ZITHROMAX) 250 MG tablet Take 1 tablet by mouth daily 90 tablet 3    amoxicillin (AMOXIL) 500 MG capsule 4 pills 1 hr before dental procedures 4 capsule 3    amLODIPine (NORVASC) 2.5 MG tablet Take 1 tablet by mouth daily 90 tablet 3    apixaban (ELIQUIS) 5 MG TABS tablet TAKE 1 TABLET BY MOUTH  TWICE DAILY 180 tablet 3    spironolactone (ALDACTONE) 50 MG tablet Take 1 tablet by mouth daily 90 tablet 1    albuterol sulfate HFA (PROAIR HFA) 108 (90 Base) MCG/ACT inhaler Inhale 2 puffs into the lungs every 6 hours as needed for Wheezing or Shortness of Breath 1 Inhaler 11    Lactobacillus (PROBIOTIC ACIDOPHILUS) CAPS Take 1 capsule by mouth daily       MULTIPLE VITAMIN PO Take 1 tablet by mouth daily            Allergies:  Levaquin [levofloxacin], Percocet [oxycodone-acetaminophen], Rifampin, Sulfa antibiotics, and Cefuroxime    Social History:   Negative for tobacco use, alcohol abuse and illicit drug abuse    Family History:  Family History   Problem Relation Age of Onset    Diabetes Mother     Thyroid Disease Mother     Arthritis Mother     High Blood Pressure Mother     Arthritis Father     High Blood Pressure Father     Other Father         hay fever    Breast Cancer Neg Hx     Ovarian Cancer Neg Hx          REVIEW OF SYSTEMS:  Gen: Negative for nausea, vomiting, diarrhea, fever, chills, night sweats  HEENT: Negative for double vision, blurred vision, sore throat  Heart: Negative for HTN, palpitations, chest pain  Lungs: Negative for wheezes, asthma or SOB  GI: Negative for nausea, vomiting  : Negative for dysuria, hematuria  Endo: Negative for diabetes, thyroid disorders  Heme: Negative for DVT or bleeding disorders  Psych: Problems:    * No resolved hospital problems. *     80 year old female with pulmonary fibrosis, htn with progressively worsening left knee pain s/p total knee arthroplasty in 2012 with Dr. Hidalgo[address] [place] [address] O, he also replaced the right knee in 2014. She did have an elevated CRP at 29.34 and ESR at 60. There was effusion and 30 cc of cloudy yellow fluid were aspirated and sent to the lab for cell count with differential, gram stain and culture and crystal analysis. Patient to be admitted to hospitalist for medical management. There is concern for possible left septic prosthetic knee. PLAN:  1)  Knee aspirated, will follow for cell count with dif, gram stain and culture and crystal analysis  2)  Admit to hospitalist for medical management  3)  Blood cultures ordered by ED physician.  Antibiotics started  4)  Pain control  5)  NPO   6)  Ice and elevation of the knee        Electronically signed by Violette Layton PA-C on 3/15/2022 at 11:27 PM

## 2022-03-16 NOTE — PROGRESS NOTES
[address] Pharmacokinetic Monitoring Service - Vancomycin     Gene Monzon is a 80 y.o. female starting on vancomycin therapy for bone & joint infection. Pharmacy consulted by Lidia Norton CNP for monitoring and adjustment. Target Concentration: Goal AUC/KSENIA 400-600 mg*hr/L    Additional Antimicrobials: Zosyn    Pertinent Laboratory Values: Wt Readings from Last 1 Encounters:   03/16/22 159 lb 12.8 oz (72.5 kg)     Temp Readings from Last 1 Encounters:   03/16/22 98.1 °F (36.7 °C) (Oral)     Estimated Creatinine Clearance: 44 mL/min (based on SCr of 0.9 mg/dL). Recent Labs     03/15/22  1818 03/16/22  0534   CREATININE 0.9 0.9   WBC 14.2* 9.7     Procalcitonin: 0.12 (03/15/22)    Pertinent Cultures:  Culture Date Source Results   03/15/22 BC x 2 sent   03/15/22 L-knee synovial fluid NGTD   MRSA Nasal Swab: N/A. Non-respiratory infection. Plan:  Dosing recommendations based on Bayesian software  Vancomycin 1000 mg x 1 received in ED; will start maintenance dose of 1000 mg IV q24h  Anticipated AUC of 429 and trough concentration of 13 at steady state  Renal labs as indicated   Vancomycin concentration will be ordered within the next 48 hours.    Pharmacy will continue to monitor patient and adjust therapy as indicated    Thank you for the consult,  Mio Barone [facility]  3/16/2022 8:23 AM

## 2022-03-16 NOTE — ED NOTES
Pt resting on bed, family at bedside. No needs expressed.  [facility]  03/15/22 2037

## 2022-03-16 NOTE — CARE COORDINATION
DISCHARGE/PLANNING EVALUATION  3/16/22, 12:28 PM EDT    Reason for Referral: discharge needs  Mental Status:  Alert, oriented   Decision Making:  Makes own decisions with support from family  Family/Social/Home Environment:  Bethany Mcmahan has been at [facility]. Her usual living situation is home alone. She has left [facility], was discharged because she did not want to stay, and came to [facility] from the [facility]   Current Services including food security, transportation and housekeeping:  No current services, has left [facility], no other services in place prior to ecf   Current Equipment: walker   Payment Source: medicare  Concerns or Barriers to Discharge: does not want to return to previous ec, states she is too overwhelmed to discuss other options at this time  Post acute provider list with quality measures, geographic area and applicable managed care information provided. Questions regarding selection process answered: declined list     Teach Back Method used with patient regarding care plan and discharge plan  Patient  verbalizes understanding of the plan of care and contributes to goal setting. Patient goals, treatment preferences and discharge plan:  Spoke with Bethany Mcmahan. She shares that she does not know where she wants to go at discharge. She has discharged from [facility], but facility has shared they would consider again. Bethany Mcmahan states she is not ready to discuss options for care at discharge yet. Her son has left to return to [facility] today. Will speak with Bethany Mcmahan again at a later time to assist with discharge plan.      Electronically signed by CIARA Robledo on 3/16/2022 at 12:28 PM

## 2022-03-16 NOTE — CONSULTS
[address] [place] [address]                                  CONSULTATION    PATIENT NAME: Amparo Malin                    :        1935  MED REC NO:   [id number]                           ROOM:       0010  ACCOUNT NO:   [de-identified]                           ADMIT DATE: 03/15/2022  PROVIDER:     Tierney Flaherty M.D.    [facility]  [facility]    REASON FOR CONSULTATION:  She is an 80-year-old female patient who  presented with left knee pain. HISTORY OF PRESENT ILLNESS:  She is an 80year-old female patient who  noticed severe pain on her left knee with swelling, for which reason she  came to hospital.  She has known history of degenerative disease  including degenerative back disease. She has been getting injection of  steroid, epidural; however, she has chronic pain and she did notice of  pain and swelling on her left knee. She had effusion on her left knee  and had aspiration. I was asked to see this patient to make sure she  does not have any infection. She had previous history of bilateral knee  replacement, one in  and the other in . Her aspiration shows  WBC in the joint fluid. She has calcium pyrophosphate crystals. White  count was 23,350 with 90% poly. She denies any fever or chills. No  previous history of infection. She has been at [facility]. PAST MEDICAL HISTORY:  Significant for anemia, degenerative back  disease, history of bronchiectasis, diverticulosis, hypertension,  hypercalcemia, hyperlipidemia, nodular goiter, osteoarthritis,  osteopenia, previous history of pneumonia, history of Mycobacterium  avium infection and vitamin D deficiency.     PAST SURGICAL HISTORY:  Includes abdominal exploratory surgery,  appendectomy, back surgery, bronchoscopy, cataract removal with implant,  dilatation and curettage of the uterus, excision of parathyroid mass,  foot surgery, bilateral knee replacement, malignant skin lesion  excision. CURRENT MEDICATIONS:  Include IV Zosyn, levothyroxine, ketorolac,  apixaban, amlodipine, budesonide and vancomycin. ALLERGIES:  SHE HAS ALLERGY TO LEVAQUIN, RIFAMPIN, SULFA, PERCOCET AND  CEFUROXIME. REVIEW OF SYSTEMS:  Noncontributory. PHYSICAL EXAMINATION:  VITAL SIGNS:  Include temperature 98.1, respirations 20, pulse 78, blood  pressure 125/66. HEENT:  Slightly pale conjunctivae. Anicteric sclerae. CHEST:  Bilateral air entry. CARDIOVASCULAR SYSTEM:  Regular. ABDOMEN:  Soft. EXTREMITIES:  Examination of her knee shows she has effusion on her left  knee, slightly swollen and warm. CNS:  She is awake and oriented to person, place and time. DIAGNOSTICS:  Joint fluid as noted above. Sodium 133, potassium 4.5,  chloride 96, bicarb 27, BUN 19, creatinine 0.9. WC 9.7, hemoglobin  11.2, hematocrit 36.2, platelets of 571. IMPRESSION:  An 59-year-old female admitted with left-sided knee pain. She has a previous history of knee replacement. She has pseudogout. We  will follow the Gram stain. Preliminary is negative. We will stop  vancomycin. We will re-evaluate the patient and decide further  treatment.         Laurian Cogan, M.D.    D: 03/16/2022 13:42:12       T: 03/16/2022 16:59:53     [email or web address]  Job#: [id number]     Doc#: [id number]    CC:

## 2022-03-16 NOTE — PROGRESS NOTES
[address] [address]  INPATIENT PHYSICAL THERAPY  EVALUATION  [facility] ORTHOPEDICS 7K - 7K-10/010-A    Time In: 1467  Time Out: 3922  Timed Code Treatment Minutes: 10 Minutes  Minutes: 18          Date: 3/16/2022  Patient Name: Hesham Torres,  Gender:  female        MRN: [id number]  : 1935  (80 y.o.)      Referring Practitioner: Leslie Mckay CNP  Diagnosis: Septic arthritis  Additional Pertinent Hx: The patient is a 80 y.o. female  who presented to the ED with progressive left knee pain and inability to bear weight. She Has history of low back pain, sees Dr. Dc Aguila. She has had epidural injections, nerve ablations, and recently had SI injection. She had left hip pain last week that actually brought her to the ED. Pain in the hip is somewhat improved, still has back pain. Left knee pain started about 3 days ago and is progressively worsening. She reports she was unable to walk, she is at [facility] for rehab. She does have bilateral total knee arthroplasties performed by Dr. Deidre Torres, left knee in  and right knee in . She denies any fevers, chills, recent illness. No numbness or tingling in the foot. Reports some pain in her back, some pain in the hip but severe pain at the posterior left knee and diffusely about the knee. She reports some swelling about the knee. Denies any falls or other trauma. She has past medical history of significant pulmonary fibrosis on 2 L O2 at baseline, HTN, hyperparathyroidism. She does report a history of gout. Pt had knee aspirated, awaiting cultures.      Restrictions/Precautions:  Restrictions/Precautions: Weight Bearing,Fall Risk  Left Lower Extremity Weight Bearing: Weight Bearing As Tolerated    Subjective:  Chart Reviewed: Yes  Patient assessed for rehabilitation services?: Yes  Family / Caregiver Present: No  Subjective: RN approved session, pt is supine in bed, slight encouragement to attempt activity, pt c/o severa pain in L LE, unable to tolerate isometric exercises, tearful due to pain. OOB mobility held. General:  Overall Orientation Status: Within Functional Limits  Follows Commands: Within Functional Limits    Vision: Within Functional Limits    Hearing: Within functional limits       Pain: 10/10: L LE    Vitals: Vitals not assessed per clinical judgement, see nursing flowsheet    Social/Functional History:    Type of Home: Facility ([facility])  Home Layout: One level  Home Access: Level entry  Home Equipment: Rolling walker,Oxygen   Ambulation Assistance: Independent  Transfer Assistance: Independent     Additional Comments: Pt states has been at [facility] for rehab for about one week, has not been able to do much because of pain; prior to that, living at home alone, 2 L O2 at baseline    OBJECTIVE:  Range of Motion:  Bilateral Lower Extremity: Impaired - R LE WFL, DF WFL, did not assess L knee and hip due to significant pain    Strength:  Bilateral Lower Extremity: Impaired - R LE 3+/5, DF 2-/5, able to perform L quad set with pain    **OOB mobility held due to pain    Exercise:  Patient was guided in 1 set(s) 10 reps of exercise to both lower extremities. Ankle pumps and Quad sets, R heel slides and hip abd/add. Exercises were completed for increased independence with functional mobility. Pt tearful with LE exercises. Functional Outcome Measures: Not completed     ASSESSMENT:  Activity Tolerance:  Patient tolerance of  treatment: fair. Treatment Initiated: Treatment and education initiated within context of evaluation. Evaluation time included review of current medical information, gathering information related to past medical, social and functional history, completion of standardized testing, formal and informal observation of tasks, assessment of data and development of plan of care and goals.   Treatment time included skilled education and facilitation of tasks to increase safety and independence with functional mobility for improved independence and quality of life. Assessment: Body structures, Functions, Activity limitations: Decreased functional mobility ,Increased pain,Decreased strength  Assessment: Pt tolerates session fair, limited due to pain, pt tearful with supine exercises, received Norco prior to session and states it is not helping. PT to continue to progress strength and functional mobility. Prognosis: Good    REQUIRES PT FOLLOW UP: Yes    Discharge Recommendations:  Discharge Recommendations: Continue to assess pending progress    Patient Education:  PT Education: PT Dylon Smita of Care    Equipment Recommendations:  Equipment Needed: No    Plan:  Times per week: 5x O  Current Treatment Recommendations: Strengthening    Goals:  Patient goals : to decrease pain  Short term goals  Time Frame for Short term goals: by discharge  Short term goal 1: Pt to participate in supine LE AROM to increase strength for improved mobility. Short term goal 2: PT to assess mobility. Long term goals  Time Frame for Long term goals : NA due to short length of stay. Following session, patient left in safe position with all fall risk precautions in place.

## 2022-03-16 NOTE — ED NOTES
Patient resting in bed. Respirations easy and unlabored. No distress noted. Call light within reach.        Beryle Bunch, RN  03/15/22 8500

## 2022-03-16 NOTE — PROGRESS NOTES
Orthopedic Progress Note    Hesham Torres  [birth year]    Subjective:     Hospital day 1 left knee pain and effusion in the setting of a total knee arthroplasty. Knee was aspirated last evening in the ED 30 cc of yellow cloudy fluid were aspirated and sent to the lab. Patient reports pain in the knee is slightly better since the aspiration. She does continue to have back pain and some pain in the left foot. She denies fever or chill, denies numbness or tingling. Denies chest pain. Has SOB at baseline on 2 L O2 at baseline. No increased shortness of breath. Objective:     BP (!) 124/59   Pulse 69   Temp 98.1 °F (36.7 °C) (Oral)   Resp 20   Ht 5' 4\" (1.626 m)   Wt 159 lb 12.8 oz (72.5 kg)   SpO2 96%   BMI 27.43 kg/m²     Intake/Output Summary (Last 24 hours) at 3/16/2022 0643  Last data filed at 3/16/2022 0531  Gross per 24 hour   Intake 0 ml   Output --   Net 0 ml     DRAIN/TUBE OUTPUT:       General: alert, appears stated age and cooperative   Extremity: Left knee with mild swelling. No significant effusion this morning. Does have some diffuse tenderness to palpation about the knee but tolerates better than last night. Tolerates some gentle motion from 0-35 degrees this morning. Tolerates gentle hip flexion, internal and external rotation. Does have some tenderness to dorsum of the foot. No significant effusion. No erythema. Tolerates some gentle motion of the ankle and foot.     DVT Exam: No evidence of DVT by physical exam     Data Review  CBC:   Lab Results   Component Value Date    WBC 9.7 03/16/2022    RBC 4.12 03/16/2022    RBC 3.45 06/08/2012    HGB 11.2 03/16/2022    HCT 36.2 03/16/2022     03/16/2022     BMP:    Lab Results   Component Value Date     03/16/2022    K 4.5 03/16/2022    K 5.2 03/08/2022    CL 96 03/16/2022    CO2 27 03/16/2022    BUN 19 03/16/2022    CREATININE 0.9 03/16/2022    CALCIUM 9.7 03/16/2022    GLUCOSE 103 03/16/2022    GLUCOSE 93 06/08/2012     PT/INR:    Lab Results   Component Value Date    INR 1.94 03/15/2022       Radiology: See electronic record to view reports. Reports reviewed. Assessment:     Hospital day 1 left knee pain and effusion. Patient s/p left total knee arthroplasty with Dr. Hamilton Herbert in 2012. Had progressively worsening left knee pain for 3 days in the setting of chronic back pain, left hip pain and left leg pain prior to the knee starting. She is not diabetic, does have a history of gout. CRP was 29, ESR 60. 30 cc of cloudy yellow fluid was aspirated. Synovial cell count is 23,000. Crystal analysis is not back yet, awaiting gram stain and culture. Could be gouty flare vs septic left knee prosthesis. Plan:      1: Continue to follow aspirate for crystals, gram stain and culture.  No immediate plan for surgery  2:  Continue pain control  3:  PT/OT as tolerated  4:  WBAT  5:  NPO   6:  Internal medicine for medical management, consult to ID for possible septic knee  7:  Continue empiric antibiotics    Electronically signed by Alba Maradiaga PA-C on 3/16/2022 at 6:43 AM

## 2022-03-16 NOTE — DISCHARGE INSTR - COC
Continuity of Care Form    Patient Name: Tom Barnes   :  1935  MRN:  [id number]    Admit date:  3/15/2022  Discharge date:  ***    Code Status Order: Prior   Advance Directives:      Admitting Physician:  Nancie Jacobs MD  PCP: Kirit Limon MD    Discharging Nurse: [facility] Unit/Room#: 7K-10/010-A  Discharging Unit Phone Number: ***    Emergency Contact:   Extended Emergency Contact Information  Primary Emergency Contact: Jose Benavides   [place] of [address] Phone: [phone]  Relation: Child  Secondary Emergency Contact: [address] of [address] Phone: [phone]  Mobile Phone: [phone]  Relation: Child    Past Surgical History:  Past Surgical History:   Procedure Laterality Date    ABDOMINAL EXPLORATION SURGERY  2013    Release of KATHERINE TAMAYO-Dr. Karlie Echeverria    APPENDECTOMY      BACK INJECTION Bilateral 2021    bilateral sacroiliac joint injection performed by Abran Lizama DO at [place] N/A 2022    B/L SI [facility]    BRONCHOSCOPY FLUOROSCOPY performed by Antwon Tai MD at [address] [place] [address] 153 Bilateral 1990 ?  SECTION  0175,1487    DILATION AND CURETTAGE OF UTERUS  5436,3068,0128    EXCISION OF PARATHYROID MASS Right 2013    rt parathyroidectomy (excision of rt inferior parathyroid mass) exploration of neck     FOOT SURGERY      left    FOOT SURGERY Left 2017    Dr Salcido Police  12    left knee    JOINT REPLACEMENT  2014    right knee    MALIGNANT SKIN LESION EXCISION Left 2017    BCC DORSAL FOOT WITH GRAFT & FS    GOSIA AND BSO  1982    TONSILLECTOMY AND ADENOIDECTOMY  1940 ?     TOTAL KNEE ARTHROPLASTY Right 2014    OIO       Immunization History:   Immunization History Administered Date(s) Administered    KOTAID-Altaf Barrios, Primary or Immunocompromised, PF, 100mcg/0.5mL 01/14/2021, 02/11/2021, 11/01/2021    FLUZONE 3 YEARS AND OVER 09/05/2014    Influenza Vaccine, unspecified formulation 09/07/2016    Influenza Virus Vaccine 10/11/2012, 09/10/2013, 09/18/2017    Influenza Whole 09/08/2015    Influenza, High Dose (Fluzone 65 yrs and older) 09/27/2018    Influenza, MDCK Quadv, with preserv IM (Flucelvax 2 yrs and older) 08/16/2019    Influenza, Triv, inactivated, subunit, adjuvanted, IM (Fluad 65 yrs and older) 09/12/2019, 09/08/2020, 09/08/2020    PPD Test 04/03/2017    Pneumococcal Conjugate 13-valent (Llolgsd26) 11/06/2013    Pneumococcal Polysaccharide (Ktqzwfjdg74) 10/24/2007    Td, unspecified formulation 02/24/2001    Tdap (Boostrix, Adacel) 05/17/2013    Zoster Live (Zostavax) 06/05/2008    Zoster Recombinant (Shingrix) 08/16/2019, 10/03/2019       Active Problems:  Patient Active Problem List   Diagnosis Code    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 ([facility] N25.81    Hypercalcemia E83.52    Osteoarthritis M19.90    Non-toxic nodular goiter E04.9    Essential hypertension I10    Diverticulosis of colon K57.30    Hypokalemia E87.6    Hypothyroidism E03.9    Osteopenia M85.80    Bronchiectasis without acute exacerbation (HCC) J47.9    Hyponatremia E87.1    Pure hypercholesterolemia E78.00    Left thyroid nodule E04.1    Hyperthyroidism E05.90    Lung infiltrate on CT R91.8    Dyslipidemia E78.5    Elevated serum free T4 level R79.89    Elevated TSH R79.89    Cough in adult R05.9    Nasal polyp J33.9    Abnormal CT of the chest R93.89    Vitamin D deficiency E55.9    Acute hypoxemic respiratory failure (HCC) J96.01    Pneumonia J18.9    Moderate malnutrition (HCC) E44.0    Pulmonary fibrosis (HCC) J84.10    SIRS (systemic inflammatory response syndrome) ([facility] R65.10    Community acquired pneumonia of right lung J18.9    Typical atrial flutter ([address] I48.3    Septic arthritis ([address] M00.9 Isolation/Infection:   Isolation            No Isolation          Patient Infection Status       Infection Onset Added Last Indicated Last Indicated By Review Planned Expiration Resolved Resolved By    None active    Resolved    COVID-19 (Rule Out) 09/29/20 09/29/20 09/29/20 COVID-19 (Ordered)   09/29/20 Rule-Out Test Resulted    COVID-19 (Rule Out) 09/25/20 09/25/20 09/25/20 COVID-19 (Ordered)   09/25/20 Rule-Out Test Resulted            Nurse Assessment:  Last Vital Signs: BP (!) 124/59   Pulse 69   Temp 98.1 °F (36.7 °C) (Oral)   Resp 20   Ht 5' 4\" (1.626 m)   Wt 159 lb 12.8 oz (72.5 kg)   SpO2 96%   BMI 27.43 kg/m²     Last documented pain score (0-10 scale): Pain Level: 8  Last Weight:   Wt Readings from Last 1 Encounters:   03/16/22 159 lb 12.8 oz (72.5 kg)     Mental Status:  oriented and alert    IV Access:  {[facility] IV ACCESS:098762612}    Nursing Mobility/ADLs:  Walking   Assisted  Transfer  Assisted  Bathing  Assisted  Dressing  Assisted  Toileting  Assisted  Feeding  Independent  Med Admin  Assisted  Med Delivery   whole    Wound Care Documentation and Therapy:        Elimination:  Continence: Bowel: Yes  Bladder: Yes  Urinary Catheter: None   Colostomy/Ileostomy/Ileal Conduit: No       Date of Last BM: ***  No intake or output data in the 24 hours ending 03/16/22 0336  No intake/output data recorded. Safety Concerns: At Risk for Falls    Impairments/Disabilities:      None    Nutrition Therapy:  Current Nutrition Therapy:       Routes of Feeding: Oral  Liquids: Thin Liquids  Daily Fluid Restriction: no  Last Modified Barium Swallow with Video (Video Swallowing Test): not done    Treatments at the Time of Hospital Discharge:   Respiratory Treatments: ***  Oxygen Therapy:  is on oxygen at 2 L/min per nasal cannula.   Ventilator:    - No ventilator support    Rehab Therapies: {THERAPEUTIC INTERVENTION:3460786947}  Weight Bearing Status/Restrictions: {[facility] Weight Bearin}  Other Medical Equipment (for information only, NOT a DME order):  {EQUIPMENT:492937994}  Other Treatments: ***    Patient's personal belongings (please select all that are sent with patient):  {CHP DME Belongings:316383110}    RN SIGNATURE:  {Esignature:508166609}    CASE MANAGEMENT/SOCIAL WORK SECTION    Inpatient Status Date: ***    Readmission Risk Assessment Score:  Readmission Risk              Risk of Unplanned Readmission:  13           Discharging to Facility/ Agency   Name:   Address:  Phone:  Fax:    Dialysis Facility (if applicable)   Name:  Address:  Dialysis Schedule:  Phone:  Fax:    / signature: {Esignature:009461143}    PHYSICIAN SECTION    Prognosis: {Prognosis:7983846541}    Condition at Discharge: [address] [place] [address] Patient Condition:583044649}    Rehab Potential (if transferring to Rehab): {Prognosis:5891241099}    Recommended Labs or Other Treatments After Discharge: ***    Physician Certification: I certify the above information and transfer of Feliciano Ohara  is necessary for the continuing treatment of the diagnosis listed and that she requires {Admit to Appropriate Level of Care:11920} for {GREATER/LESS:183358040} 30 days.      Update Admission H&P: {CHP DME Changes in AMWNY:373906951}    PHYSICIAN SIGNATURE:  {Esignature:385456256}

## 2022-03-16 NOTE — ED NOTES
Pt provided with boxed lunch. No concerns expressed at this time.  ISAURO [place], [address] [place] [address]  03/15/22 5982

## 2022-03-16 NOTE — PLAN OF CARE
Problem: Nutrition  Goal: Optimal nutrition therapy  Outcome: Ongoing   Nutrition Problem #1: Inadequate oral intake  Intervention: Food and/or Nutrient Delivery: Continue Current Diet,Start Oral Nutrition Supplement  Nutritional Goals: Pt will consume 75% or more of recommended energy intake during LOS.

## 2022-03-16 NOTE — PROGRESS NOTES
Pt admitted to  7K10 via cart/stretcher. Complaints: left knee pain. IV normal saline infusing into the antecubital left, condition patent and no redness at a rate of 75 mls/ hour with about 1,000 mls in the bag still. IV site free of s/s of infection or infiltration. Vital signs obtained. Assessment and data collection initiated. Two nurse skin assessment performed by Bartolome CUBA and Rex HIGGINS. Oriented to room. Explained patients right to have family, representative or physician notified of their admission. Patient has Declined for physician to be notified. Patient has Declined for family/representative to be notified. The patient is interested in [facility]. Martinas meds to beds program?:  No    Policies and procedures for  explained. All questions answered with no further questions at this time. Fall prevention and safety brochure discussed with patient. Bed alarm on. Call light in reach.

## 2022-03-17 ENCOUNTER — HOSPITAL ENCOUNTER (INPATIENT)
Age: 87
LOS: 12 days | Discharge: HOME HEALTH | DRG: 554 | End: 2022-03-29
Attending: PHYSICAL MEDICINE & REHABILITATION | Admitting: PHYSICAL MEDICINE & REHABILITATION
Payer: MEDICARE

## 2022-03-17 VITALS
RESPIRATION RATE: 16 BRPM | TEMPERATURE: 97.7 F | SYSTOLIC BLOOD PRESSURE: 117 MMHG | OXYGEN SATURATION: 100 % | DIASTOLIC BLOOD PRESSURE: 57 MMHG | BODY MASS INDEX: 27.28 KG/M2 | HEIGHT: 64 IN | WEIGHT: 159.8 LBS | HEART RATE: 74 BPM

## 2022-03-17 DIAGNOSIS — M11.262 PSEUDOGOUT OF LEFT KNEE: Primary | ICD-10-CM

## 2022-03-17 PROBLEM — M11.869: Status: ACTIVE | Noted: 2022-03-17

## 2022-03-17 LAB
ANION GAP SERPL CALCULATED.3IONS-SCNC: 10 MEQ/L (ref 8–16)
BASOPHILS # BLD: 0.5 %
BASOPHILS ABSOLUTE: 0 THOU/MM3 (ref 0–0.1)
BUN BLDV-MCNC: 24 MG/DL (ref 7–22)
CALCIUM SERPL-MCNC: 9.2 MG/DL (ref 8.5–10.5)
CHLORIDE BLD-SCNC: 99 MEQ/L (ref 98–111)
CO2: 25 MEQ/L (ref 23–33)
CREAT SERPL-MCNC: 1.2 MG/DL (ref 0.4–1.2)
EOSINOPHIL # BLD: 3.3 %
EOSINOPHILS ABSOLUTE: 0.2 THOU/MM3 (ref 0–0.4)
ERYTHROCYTE [DISTWIDTH] IN BLOOD BY AUTOMATED COUNT: 12.5 % (ref 11.5–14.5)
ERYTHROCYTE [DISTWIDTH] IN BLOOD BY AUTOMATED COUNT: 39.9 FL (ref 35–45)
GFR SERPL CREATININE-BSD FRML MDRD: 43 ML/MIN/1.73M2
GLUCOSE BLD-MCNC: 93 MG/DL (ref 70–108)
HCT VFR BLD CALC: 33.7 % (ref 37–47)
HEMOGLOBIN: 10.6 GM/DL (ref 12–16)
IMMATURE GRANS (ABS): 0.05 THOU/MM3 (ref 0–0.07)
IMMATURE GRANULOCYTES: 0.7 %
LYMPHOCYTES # BLD: 13.9 %
LYMPHOCYTES ABSOLUTE: 1 THOU/MM3 (ref 1–4.8)
MCH RBC QN AUTO: 27.5 PG (ref 26–33)
MCHC RBC AUTO-ENTMCNC: 31.5 GM/DL (ref 32.2–35.5)
MCV RBC AUTO: 87.3 FL (ref 81–99)
MONOCYTES # BLD: 18.8 %
MONOCYTES ABSOLUTE: 1.4 THOU/MM3 (ref 0.4–1.3)
NUCLEATED RED BLOOD CELLS: 0 /100 WBC
PLATELET # BLD: 170 THOU/MM3 (ref 130–400)
PMV BLD AUTO: 10.1 FL (ref 9.4–12.4)
POTASSIUM SERPL-SCNC: 4.9 MEQ/L (ref 3.5–5.2)
RBC # BLD: 3.86 MILL/MM3 (ref 4.2–5.4)
SEG NEUTROPHILS: 62.8 %
SEGMENTED NEUTROPHILS ABSOLUTE COUNT: 4.6 THOU/MM3 (ref 1.8–7.7)
SODIUM BLD-SCNC: 134 MEQ/L (ref 135–145)
WBC # BLD: 7.3 THOU/MM3 (ref 4.8–10.8)

## 2022-03-17 PROCEDURE — 97110 THERAPEUTIC EXERCISES: CPT

## 2022-03-17 PROCEDURE — 6360000002 HC RX W HCPCS: Performed by: REGISTERED NURSE

## 2022-03-17 PROCEDURE — 1180000000 HC REHAB R&B

## 2022-03-17 PROCEDURE — 97530 THERAPEUTIC ACTIVITIES: CPT

## 2022-03-17 PROCEDURE — 85025 COMPLETE CBC W/AUTO DIFF WBC: CPT

## 2022-03-17 PROCEDURE — 80048 BASIC METABOLIC PNL TOTAL CA: CPT

## 2022-03-17 PROCEDURE — 6370000000 HC RX 637 (ALT 250 FOR IP): Performed by: REGISTERED NURSE

## 2022-03-17 PROCEDURE — 6370000000 HC RX 637 (ALT 250 FOR IP): Performed by: PHYSICAL MEDICINE & REHABILITATION

## 2022-03-17 PROCEDURE — 2580000003 HC RX 258: Performed by: INTERNAL MEDICINE

## 2022-03-17 PROCEDURE — 6370000000 HC RX 637 (ALT 250 FOR IP): Performed by: NURSE PRACTITIONER

## 2022-03-17 PROCEDURE — 6360000002 HC RX W HCPCS: Performed by: PHYSICAL MEDICINE & REHABILITATION

## 2022-03-17 PROCEDURE — 97535 SELF CARE MNGMENT TRAINING: CPT

## 2022-03-17 PROCEDURE — 97166 OT EVAL MOD COMPLEX 45 MIN: CPT

## 2022-03-17 PROCEDURE — 99223 1ST HOSP IP/OBS HIGH 75: CPT | Performed by: PHYSICAL MEDICINE & REHABILITATION

## 2022-03-17 PROCEDURE — 2580000003 HC RX 258: Performed by: REGISTERED NURSE

## 2022-03-17 PROCEDURE — 94640 AIRWAY INHALATION TREATMENT: CPT

## 2022-03-17 PROCEDURE — 94760 N-INVAS EAR/PLS OXIMETRY 1: CPT

## 2022-03-17 PROCEDURE — 6370000000 HC RX 637 (ALT 250 FOR IP): Performed by: PHYSICIAN ASSISTANT

## 2022-03-17 PROCEDURE — 36415 COLL VENOUS BLD VENIPUNCTURE: CPT

## 2022-03-17 PROCEDURE — 2700000000 HC OXYGEN THERAPY PER DAY

## 2022-03-17 PROCEDURE — 6360000002 HC RX W HCPCS: Performed by: PHYSICIAN ASSISTANT

## 2022-03-17 RX ORDER — COLCHICINE 0.6 MG/1
0.6 TABLET ORAL 2 TIMES DAILY
Status: DISCONTINUED | OUTPATIENT
Start: 2022-03-17 | End: 2022-03-17 | Stop reason: HOSPADM

## 2022-03-17 RX ORDER — HYDROCODONE BITARTRATE AND ACETAMINOPHEN 5; 325 MG/1; MG/1
0.5 TABLET ORAL EVERY 8 HOURS PRN
Status: CANCELLED | OUTPATIENT
Start: 2022-03-17

## 2022-03-17 RX ORDER — DOCUSATE SODIUM 100 MG/1
100 CAPSULE, LIQUID FILLED ORAL 2 TIMES DAILY
Status: DISCONTINUED | OUTPATIENT
Start: 2022-03-17 | End: 2022-03-21

## 2022-03-17 RX ORDER — LEVOTHYROXINE SODIUM 88 UG/1
88 TABLET ORAL DAILY
Status: DISCONTINUED | OUTPATIENT
Start: 2022-03-18 | End: 2022-03-29 | Stop reason: HOSPADM

## 2022-03-17 RX ORDER — BUDESONIDE 0.5 MG/2ML
0.5 INHALANT ORAL 2 TIMES DAILY
Status: DISCONTINUED | OUTPATIENT
Start: 2022-03-18 | End: 2022-03-29 | Stop reason: HOSPADM

## 2022-03-17 RX ORDER — DOCUSATE SODIUM 100 MG/1
100 CAPSULE, LIQUID FILLED ORAL 2 TIMES DAILY
Status: CANCELLED | OUTPATIENT
Start: 2022-03-17

## 2022-03-17 RX ORDER — HYDROCODONE BITARTRATE AND ACETAMINOPHEN 5; 325 MG/1; MG/1
0.5 TABLET ORAL EVERY 8 HOURS PRN
Status: DISCONTINUED | OUTPATIENT
Start: 2022-03-17 | End: 2022-03-18

## 2022-03-17 RX ORDER — AMLODIPINE BESYLATE 2.5 MG/1
2.5 TABLET ORAL DAILY
Status: DISCONTINUED | OUTPATIENT
Start: 2022-03-18 | End: 2022-03-19

## 2022-03-17 RX ORDER — POLYETHYLENE GLYCOL 3350 17 G/17G
17 POWDER, FOR SOLUTION ORAL 2 TIMES DAILY
Status: CANCELLED | OUTPATIENT
Start: 2022-03-17

## 2022-03-17 RX ORDER — DOCUSATE SODIUM 100 MG/1
100 CAPSULE, LIQUID FILLED ORAL 2 TIMES DAILY
Qty: 60 CAPSULE | Refills: 2 | Status: SHIPPED | OUTPATIENT
Start: 2022-03-17 | End: 2022-04-05

## 2022-03-17 RX ORDER — LEVOTHYROXINE SODIUM 88 UG/1
88 TABLET ORAL DAILY
Status: CANCELLED | OUTPATIENT
Start: 2022-03-17

## 2022-03-17 RX ORDER — CYCLOBENZAPRINE HCL 10 MG
10 TABLET ORAL 3 TIMES DAILY PRN
Status: DISCONTINUED | OUTPATIENT
Start: 2022-03-17 | End: 2022-03-29 | Stop reason: HOSPADM

## 2022-03-17 RX ORDER — COLCHICINE 0.6 MG/1
0.6 TABLET ORAL 2 TIMES DAILY
Status: CANCELLED | OUTPATIENT
Start: 2022-03-17

## 2022-03-17 RX ORDER — ACETAMINOPHEN 500 MG
500 TABLET ORAL EVERY 6 HOURS
Status: CANCELLED | OUTPATIENT
Start: 2022-03-17

## 2022-03-17 RX ORDER — ATORVASTATIN CALCIUM 10 MG/1
10 TABLET, FILM COATED ORAL NIGHTLY
Status: DISCONTINUED | OUTPATIENT
Start: 2022-03-17 | End: 2022-03-29 | Stop reason: HOSPADM

## 2022-03-17 RX ORDER — ACETAMINOPHEN 500 MG
500 TABLET ORAL EVERY 6 HOURS
Status: DISCONTINUED | OUTPATIENT
Start: 2022-03-17 | End: 2022-03-29 | Stop reason: HOSPADM

## 2022-03-17 RX ORDER — POLYETHYLENE GLYCOL 3350 17 G/17G
17 POWDER, FOR SOLUTION ORAL 2 TIMES DAILY
Qty: 527 G | Refills: 2 | Status: SHIPPED | OUTPATIENT
Start: 2022-03-17 | End: 2022-04-05

## 2022-03-17 RX ORDER — CYCLOBENZAPRINE HCL 10 MG
10 TABLET ORAL 3 TIMES DAILY PRN
Status: CANCELLED | OUTPATIENT
Start: 2022-03-17

## 2022-03-17 RX ORDER — KETOROLAC TROMETHAMINE 30 MG/ML
15 INJECTION, SOLUTION INTRAMUSCULAR; INTRAVENOUS EVERY 6 HOURS
Status: CANCELLED | OUTPATIENT
Start: 2022-03-17 | End: 2022-03-21

## 2022-03-17 RX ORDER — COLCHICINE 0.6 MG/1
0.6 TABLET ORAL 2 TIMES DAILY
Status: DISCONTINUED | OUTPATIENT
Start: 2022-03-17 | End: 2022-03-29 | Stop reason: HOSPADM

## 2022-03-17 RX ORDER — BUDESONIDE 0.5 MG/2ML
0.5 INHALANT ORAL 2 TIMES DAILY
Status: CANCELLED | OUTPATIENT
Start: 2022-03-17

## 2022-03-17 RX ORDER — KETOROLAC TROMETHAMINE 30 MG/ML
15 INJECTION, SOLUTION INTRAMUSCULAR; INTRAVENOUS EVERY 6 HOURS
Status: DISCONTINUED | OUTPATIENT
Start: 2022-03-17 | End: 2022-03-18

## 2022-03-17 RX ORDER — 0.9 % SODIUM CHLORIDE 0.9 %
250 INTRAVENOUS SOLUTION INTRAVENOUS ONCE
Status: COMPLETED | OUTPATIENT
Start: 2022-03-17 | End: 2022-03-17

## 2022-03-17 RX ORDER — POLYETHYLENE GLYCOL 3350 17 G/17G
17 POWDER, FOR SOLUTION ORAL 2 TIMES DAILY
Status: DISCONTINUED | OUTPATIENT
Start: 2022-03-17 | End: 2022-03-21

## 2022-03-17 RX ORDER — AMLODIPINE BESYLATE 2.5 MG/1
2.5 TABLET ORAL DAILY
Status: CANCELLED | OUTPATIENT
Start: 2022-03-17

## 2022-03-17 RX ORDER — ATORVASTATIN CALCIUM 10 MG/1
10 TABLET, FILM COATED ORAL NIGHTLY
Status: CANCELLED | OUTPATIENT
Start: 2022-03-17

## 2022-03-17 RX ADMIN — SODIUM CHLORIDE 250 ML: 9 INJECTION, SOLUTION INTRAVENOUS at 00:54

## 2022-03-17 RX ADMIN — CYCLOBENZAPRINE 10 MG: 10 TABLET, FILM COATED ORAL at 09:42

## 2022-03-17 RX ADMIN — COLCHICINE 0.6 MG: 0.6 TABLET, FILM COATED ORAL at 22:03

## 2022-03-17 RX ADMIN — ACETAMINOPHEN 500 MG: 500 TABLET ORAL at 04:24

## 2022-03-17 RX ADMIN — ACETAMINOPHEN 500 MG: 500 TABLET ORAL at 22:01

## 2022-03-17 RX ADMIN — LEVOTHYROXINE SODIUM 88 MCG: 0.09 TABLET ORAL at 06:13

## 2022-03-17 RX ADMIN — APIXABAN 5 MG: 5 TABLET, FILM COATED ORAL at 22:03

## 2022-03-17 RX ADMIN — AMLODIPINE BESYLATE 2.5 MG: 2.5 TABLET ORAL at 09:41

## 2022-03-17 RX ADMIN — BUDESONIDE 500 MCG: 0.5 INHALANT RESPIRATORY (INHALATION) at 09:08

## 2022-03-17 RX ADMIN — METOPROLOL TARTRATE 12.5 MG: 25 TABLET, FILM COATED ORAL at 09:41

## 2022-03-17 RX ADMIN — APIXABAN 5 MG: 5 TABLET, FILM COATED ORAL at 09:41

## 2022-03-17 RX ADMIN — POLYETHYLENE GLYCOL 3350 17 G: 17 POWDER, FOR SOLUTION ORAL at 09:42

## 2022-03-17 RX ADMIN — KETOROLAC TROMETHAMINE 15 MG: 30 INJECTION, SOLUTION INTRAMUSCULAR; INTRAVENOUS at 23:08

## 2022-03-17 RX ADMIN — METOPROLOL TARTRATE 12.5 MG: 25 TABLET, FILM COATED ORAL at 22:03

## 2022-03-17 RX ADMIN — ATORVASTATIN CALCIUM 10 MG: 10 TABLET, FILM COATED ORAL at 22:02

## 2022-03-17 RX ADMIN — SODIUM CHLORIDE: 9 INJECTION, SOLUTION INTRAVENOUS at 06:51

## 2022-03-17 RX ADMIN — KETOROLAC TROMETHAMINE 15 MG: 30 INJECTION, SOLUTION INTRAMUSCULAR; INTRAVENOUS at 16:52

## 2022-03-17 RX ADMIN — DOCUSATE SODIUM 100 MG: 100 CAPSULE, LIQUID FILLED ORAL at 22:02

## 2022-03-17 RX ADMIN — PIPERACILLIN AND TAZOBACTAM 3375 MG: 3; .375 INJECTION, POWDER, LYOPHILIZED, FOR SOLUTION INTRAVENOUS at 06:13

## 2022-03-17 RX ADMIN — DOCUSATE SODIUM 100 MG: 100 CAPSULE, LIQUID FILLED ORAL at 09:41

## 2022-03-17 RX ADMIN — SODIUM CHLORIDE: 9 INJECTION, SOLUTION INTRAVENOUS at 03:14

## 2022-03-17 RX ADMIN — COLCHICINE 0.6 MG: 0.6 TABLET, FILM COATED ORAL at 09:41

## 2022-03-17 RX ADMIN — BUDESONIDE 500 MCG: 0.5 INHALANT RESPIRATORY (INHALATION) at 16:07

## 2022-03-17 RX ADMIN — KETOROLAC TROMETHAMINE 15 MG: 30 INJECTION, SOLUTION INTRAMUSCULAR; INTRAVENOUS at 09:41

## 2022-03-17 ASSESSMENT — PAIN DESCRIPTION - DIRECTION
RADIATING_TOWARDS: LEG
RADIATING_TOWARDS: LEG

## 2022-03-17 ASSESSMENT — PAIN DESCRIPTION - PROGRESSION
CLINICAL_PROGRESSION: NOT CHANGED

## 2022-03-17 ASSESSMENT — PAIN - FUNCTIONAL ASSESSMENT
PAIN_FUNCTIONAL_ASSESSMENT: PREVENTS OR INTERFERES SOME ACTIVE ACTIVITIES AND ADLS

## 2022-03-17 ASSESSMENT — PAIN DESCRIPTION - LOCATION
LOCATION: KNEE

## 2022-03-17 ASSESSMENT — PAIN DESCRIPTION - ORIENTATION
ORIENTATION: LEFT

## 2022-03-17 ASSESSMENT — PAIN SCALES - GENERAL
PAINLEVEL_OUTOF10: 2
PAINLEVEL_OUTOF10: 5
PAINLEVEL_OUTOF10: 6
PAINLEVEL_OUTOF10: 7
PAINLEVEL_OUTOF10: 5
PAINLEVEL_OUTOF10: 1
PAINLEVEL_OUTOF10: 5

## 2022-03-17 ASSESSMENT — PAIN DESCRIPTION - ONSET
ONSET: ON-GOING

## 2022-03-17 ASSESSMENT — PAIN DESCRIPTION - PAIN TYPE
TYPE: ACUTE PAIN

## 2022-03-17 ASSESSMENT — PAIN DESCRIPTION - DESCRIPTORS
DESCRIPTORS: ACHING
DESCRIPTORS: TENDER
DESCRIPTORS: ACHING
DESCRIPTORS: TENDER

## 2022-03-17 ASSESSMENT — PAIN DESCRIPTION - FREQUENCY
FREQUENCY: CONTINUOUS

## 2022-03-17 NOTE — PROGRESS NOTES
Gastroenterology Progress Note:     Patient Name:  Shawn Judd   MRN: [id number]  [id number]  YOB: 1935  Admit Date: 3/15/2022  4:30 PM  Primary Care Physician: Bernie Torres MD   7K-10/010-A     Patient seen and examined. 24 hours events and chart reviewed. Subjective: Patient sitting up in the chair. MRCP results reviewed. Denies abd pain, n/v.     Objective:  BP (!) 122/58   Pulse 72   Temp 97.2 °F (36.2 °C) (Oral)   Resp 16   Ht 5' 4\" (1.626 m)   Wt 159 lb 12.8 oz (72.5 kg)   SpO2 99%   BMI 27.43 kg/m²     Physical Exam:    General:  Nourished in no distress  HEENT: Atraumatic, normocephalic. Moist oral mucous membranes. Neck: Supple without adenopathy, JVD, thyromegaly or masses. Trachea midline. CV: Heart RRR, no murmurs, rubs, gallops. Resp: Even, easy without cough or accessory use. Lungs clear to ascultation bilaterally. Abd: Round, soft, nontender. No hepatosplenomegaly or mass present. Active bowel sounds heard. No distention noted. Ext:  Without cyanosis, clubbing, edema. Left knee/foot pain with left knee edema. Skin: Pink, warm, dry  Neuro:  Alert, oriented x 3 with no obvious deficits.        Rectal: deferred    Labs:   CBC:   Lab Results   Component Value Date    WBC 7.3 03/17/2022    HGB 10.6 03/17/2022    HCT 33.7 03/17/2022    MCV 87.3 03/17/2022     03/17/2022     BMP:   Lab Results   Component Value Date     03/17/2022    K 4.9 03/17/2022    K 5.2 03/08/2022    CL 99 03/17/2022    CO2 25 03/17/2022    PHOS 4.1 06/16/2016    BUN 24 03/17/2022    CREATININE 1.2 03/17/2022    CALCIUM 9.2 03/17/2022     PT/INR:   Lab Results   Component Value Date    INR 1.94 03/15/2022     Lipids:   Lab Results   Component Value Date    ALKPHOS 89 03/15/2022    ALT 11 03/15/2022    AST 18 03/15/2022    BILITOT 0.5 03/15/2022    BILIDIR <0.2 01/04/2022    LABALBU 4.1 03/15/2022    LABALBU 3.5 06/08/2012    LIPASE 26.0 03/15/2022     Significant Diagnostic Studies: likely on the basis of an obstructing calcification, marked associate atrophy of the pancreas compatible with chronic process, no mass. 1. Left knee pain & edema s/p aspiration  2. S/P total left knee arthroplasty 2012  3. Pancreatic ductal dilatation- noted on CT, confirmed on MCRP, no mass  4. 1 cm calcification in the mid pancreatic body- noted on CT, confirmed on MRCP, no mass  5. Chronic respiratory failure  6. H/O HTN  7. HLD  8. Diverticulosis  9. Chronic constipation  10.  Hyponatremia      Plan:    · ATBs per ortho  · Continue Colace BID, script sent  · Continue Miralax BID, script sent  · Diet as tolerated  · Pain control per primary  · Supportive care per primary team  · May need EUS OP for further evaluation   Will need a 6-8 week follow-up with Dr. Beatriz Roque signing off    Case reviewed and impression/plan reviewed in collaboration with Dr. Andrei Turner  Electronically signed by JASMEET Edge CNP on 3/17/2022 at 11:11 AM    [facility]

## 2022-03-17 NOTE — PROGRESS NOTES
[address] [address]  Acute Inpatient Rehab Preadmission Assessment    Patient Name: Neel Nielsen        MRN: [id number]    : 1935  (80 y.o.)  Gender: female     Admitted from:[address]  Initial Assessment    Date of admission to the hospital: 3/15/2022  4:30 PM  Date patient eligible for admission:3/17/2022    Primary Diagnosis: Left knee pseudogout    Did patient have surgery?  no    Physicians: Ava Quiroz MD, Dr. Rufino Walker, Dr. Ricardo Moya, Dr. Wander Hirsch for clinical complications/co-morbidities:   Past Medical History:   Diagnosis Date    Anemia     normal on pre-op 2012 and 13    Backache     h/o    Bronchiectasis ([address]     Bursitis     bilateral shoulders    Constipation     Diverticulosis of colon     HTN (hypertension)     Hypercalcemia     slightly elevated at 10.8 pre-op 13    Hyperlipidemia     Nodular goiter     bx negative    OA (osteoarthritis)     bilateral thumbs - sees Dr. Kesha Salcido    Osteopenia 2013    Parathyroid adenoma 2013    Pneumonia of right upper lobe due to infectious organism     Pneumonitis     Dr. Matt Ruby in 2010 - bx negative    Pulmonary Mycobacterium avium complex (MAC) infection ([address]     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 ([address]     had one parathyroid removed - now follows with Dr. Vicky Perez    Sinusitis     with seasonal allergies    Vitamin D deficiency 2018       Financial Information  Primary insurance: Medicare    Secondary Insurance:   [employer]    Has the patient had two or more falls in the past year or any fall with injury in the past year? no    Did the patient have major surgery during the 100 days prior to admission?   no    Precautions:     Restrictions/Precautions: Weight Bearing,Fall Risk  Left Lower Extremity Weight Bearing: Weight Bearing As Tolerated      Isolation Precautions: None       Physiatrist: Dr. Rufino Walker    Patients Occupation: Retired    Reviewed Lab and Diagnostic reports from Current Admission: Yes    Patients Prior Functional  Level: Prior Function  ADL Assistance: Independent  Homemaking Assistance: Independent (Cleaning lady comes 1x/week.)  Ambulation Assistance: Independent  Transfer Assistance: Independent  Additional Comments: Pt states has been at [facility] for rehab for about one week, has not been able to do much because of pain; prior to that, living at home alone, 2 L O2 at baseline    Current functional status for upper extremity ADLs: Minimal assistance    Current functional status for lower extremity ADLs:  Maximum assistance    Current functional status for bed, chair, wheelchair transfers: Minimal assistance    Current functional status for toilet transfers: Minimal assistance    Current functional status for locomotion: Minimal assistance    Current functional status for bladder management: Minimal contact assistance    Current functional status for bowel management: Moderate assistance    Current functional status for comprehension: Complete independence    Current functional status for expression: Complete independence    Current functional status for social interaction: Complete independence    Current functional status for problem solving: Complete independence    Current functional status for memory: Complete independence    Expected level of Improvement in Self-Care:  Modified independence    Expected level of Improvement in Sphincter Control:  Modified independence    Expected level of Improvement in Transfers: Modified independence    Expected level of Improvement in Locomotion:  Modified independence    Expected level of Improvement in Communication and Social Cognition: Modified independence    Expected length of time to achieve that level of improvement: 2 weeks    Current rehab issues: ADL dysfunction,bladder management, bowel management,carry over of therapy techniques, discharge planning, disease and co-morbidity management, gait/mobility dysfunction, medication management, nutrition and hydration management, Ongoing assessment of safety, Pain management, Patient and family education, Prevention of secondary complications, Skin Integrity, cognitive impairment, communication impairment. Required therapy: Physical Therapy, Occupational Therapy Therapy 3 hours per day, 5-6 days per week. Recreational Therapy 1 hour per week. Expected Discharge Destination: Home    Expected Post Discharge Treatments: Home Care    Other information relevant to the care needs:   Type of Home: Facility ([facility]  Home Layout: One level  Home Access: Level entry  Bathroom Shower/Tub: Walk-in shower  Bathroom Toilet: Handicap height  Bathroom Equipment: Built-in shower seat,Grab bars in shower  Bathroom Accessibility: Accessible  Home Equipment: Rolling walker,Oxygen  ADL Assistance: Independent  Homemaking Assistance: Independent (Cleaning lady comes 1x/week.)  Homemaking Responsibilities: Yes  Ambulation Assistance: Independent  Transfer Assistance: Independent  Active : Yes  Occupation: Retired  Additional Comments: Pt states has been at [facility] for [facility] for about one week, has not been able to do much because of pain; prior to that, living at home alone, 2 L O2 at baseline    Acute Inpatient Rehabilitation Disclosure Statement provided to patient. Patient verbalized understanding. I have reviewed and concur with the findings and results of the pre-admission screening assessment completed by the Inpatient Rehabilitation Admissions Coordinator.     Josselin Branham M.D.

## 2022-03-17 NOTE — PLAN OF CARE
Problem: Impaired respiratory status  Goal: No pulmonary complications  Outcome: Met This Shift  Note: Patient received breathing treatment as ordered, with no distress noted.

## 2022-03-17 NOTE — PLAN OF CARE
Problem: Pain:  Goal: Pain level will decrease  Description: Pain level will decrease  3/17/2022 0120 by De Clark RN  Outcome: Ongoing  Note: Patient's pain level has remained a 7/10 with a pain goal of 3/10. Pt has Tylenol and Toradol rotating q6 hours and Dilaudid and Norco ordered prn. Problem: Falls - Risk of:  Goal: Will remain free from falls  Description: Will remain free from falls  3/17/2022 0120 by De Clark RN  Outcome: Ongoing  Note: Pt remained free of falls during the shift, bed alarm on, bed in lowest position, call light and table in reach. Pt uses call light appropriately. Pt has yet to ambulate. Problem: Skin Integrity:  Goal: Will show no infection signs and symptoms  Description: Will show no infection signs and symptoms  3/17/2022 0120 by De Clark RN  Outcome: Ongoing  Note: Pt admitted with a septic left knee, receiving Zosyn q 8hrs. Pt remains afebrile during shift. Problem: Discharge Planning:  Goal: Discharged to appropriate level of care  Description: Discharged to appropriate level of care  3/17/2022 0120 by De Clark RN  Outcome: Ongoing  Note: Patient's discharge plans are unsure at this time. 3/16/2022 1933 by Astrid Montesinos RN  Outcome: Ongoing  Note: Pt plans to possibly return to ECF at discharge. Care manager and social working helping with discharge needs. Care plan reviewed with patient. Patient verbalizes understanding of the plan of care and contribute to goal setting.

## 2022-03-17 NOTE — PROGRESS NOTES
[address] [address]  INPATIENT PHYSICAL THERAPY  DAILY NOTE  [facility] ORTHOPEDICS 7K - 7K-10/010-A    Time In: 4962  Time Out: 1009  Timed Code Treatment Minutes: 23 Minutes  Minutes: 23          Date: 3/17/2022  Patient Name: Bairon Riojas,  Gender:  female        MRN: [id number]  : 1935  (80 y.o.)     Referring Practitioner: Newton Doyle CNP  Diagnosis: Septic arthritis  Additional Pertinent Hx: The patient is a 80 y.o. female  who presented to the ED with progressive left knee pain and inability to bear weight. She Has history of low back pain, sees Dr. Nicholas Carrillo. She has had epidural injections, nerve ablations, and recently had SI injection. She had left hip pain last week that actually brought her to the ED. Pain in the hip is somewhat improved, still has back pain. Left knee pain started about 3 days ago and is progressively worsening. She reports she was unable to walk, she is at [facility] for rehab. She does have bilateral total knee arthroplasties performed by Dr. Sujatha Mckenzie, left knee in  and right knee in 2014. She denies any fevers, chills, recent illness. No numbness or tingling in the foot. Reports some pain in her back, some pain in the hip but severe pain at the posterior left knee and diffusely about the knee. She reports some swelling about the knee. Denies any falls or other trauma. She has past medical history of significant pulmonary fibrosis on 2 L O2 at baseline, HTN, hyperparathyroidism. She does report a history of gout. Pt had knee aspirated, awaiting cultures.      Prior Level of Function:  Type of Home: Facility ([facility])  Home Layout: One level  Home Access: Level entry  Home Equipment: Rolling walker,Oxygen   [place] Shower/Tub: Walk-in shower  Bathroom Toilet: Handicap height  Bathroom Equipment: Built-in shower seat,Grab bars in shower  Bathroom Accessibility: Accessible    ADL Assistance: Independent  Homemaking Assistance: Independent (Cleaning lady comes 1x/week.)  Homemaking Responsibilities: Yes  Ambulation Assistance: Independent  Transfer Assistance: Independent  Active : Yes  Additional Comments: Pt states has been at [facility] for rehab for about one week, has not been able to do much because of pain; prior to that, living at home alone, 2 L O2 at baseline    Restrictions/Precautions:  Restrictions/Precautions: Weight Bearing,Fall Risk  Left Lower Extremity Weight Bearing: Weight Bearing As Tolerated     SUBJECTIVE: RN approved session. Patient in recliner upon arrival and agreeable to therapy. Patient requested to use restroom during session. PAIN: denies at rest, reports L LE soreness when ambulating    Vitals: Vitals not assessed per clinical judgement, see nursing flowsheet    OBJECTIVE:  Bed Mobility:  Not Tested    Transfers:  Sit to Stand: Minimal Assistance, with increased time for completion, cues for hand placement, with verbal cues  Stand to Sit:Minimal Assistance, with increased time for completion, cues for hand placement, with verbal cues   Gigi Ospina PT present to assess transfers and gait during session. Ambulation:  Minimal Assistance  Distance: ~15ft x2  Surface: Level Tile  Device:Rolling Walker  Gait Deviations: Forward Flexed Posture, Slow Jaimee, Decreased Step Length Bilaterally, Decreased Weight Shift Right, Decreased Gait Speed, Decreased Heel Strike Bilaterally, Mild Path Deviations, Unsteady Gait and left LE pain, impulsive to get to bathroom    Gigi Ospina PT present to assess transfers and gait during session. Balance:  Dynamic Standing Balance: Contact Guard Assistance, during clothing management and sink tasks    Exercise:  Patient was guided in 1 set(s) 10 reps of exercise to both lower extremities. Ankle pumps, Glut sets, Heelslides, Hip abduction/adduction, Straight leg raises, Seated marches and Long arc quads. Exercises were completed for increased independence with functional mobility.     Functional Outcome Measures: Completed  -PAC Inpatient Mobility Raw Score : 16  AM-PAC Inpatient T-Scale Score : 40.78    ASSESSMENT:  Assessment: Patient progressing toward established goals. Activity Tolerance:  Patient tolerance of  treatment: good. Equipment Recommendations:Equipment Needed: No  Discharge Recommendations: Continue to assess pending progress, Inpatient Rehabilitation and Patient would benefit from continued PT at discharge  Plan: Times per week: 5x O  Current Treatment Recommendations: Strengthening    Patient Education  Patient Education: Plan of Care, Precautions/Restrictions, Transfers, Gait, Up in Chair for All Meals, Verbal Exercise Instruction    Goals:  Patient goals : to decrease pain  Short term goals  Time Frame for Short term goals: by discharge  Short term goal 1: Pt to participate in supine LE AROM to increase strength for improved mobility. Short term goal 2: PT to assess mobility. Long term goals  Time Frame for Long term goals : NA due to short length of stay. Following session, patient left in safe position with all fall risk precautions in place.

## 2022-03-17 NOTE — FLOWSHEET NOTE
[address] [facility]  PHYSICAL THERAPY MISSED TREATMENT NOTE  [facility] ORTHOPEDICS 7K    Date: 3/17/2022  Patient Name: Bairon Riojas        MRN: [id number]   : 1935  (80 y.o.)  Gender: female   Referring Practitioner: Newton Doyle CNP  Diagnosis: Septic arthritis         REASON FOR MISSED TREATMENT:  Missed Treat. Patient working with OT upon arrival. Will try back later as time allows.

## 2022-03-17 NOTE — DISCHARGE SUMMARY
Discharge Summary  3/17/2022  4:53 PM    Patient:  Nito Torres  YOB: 1935    MRN: [id number]   Acct: [de-identified]   [id number]   Primary Care Physician: Laura Bello MD    Admit date:  3/15/2022    Discharge date:  3/17/2022    Discharge Diagnoses:    Pseudogout knee left side  Hx BL hip replacements  Pancreatic ductal dilatation   Chronic resp failure  HTN  HLD          Discharge Medications:         Medication List      START taking these medications    docusate sodium 100 MG capsule  Commonly known as: COLACE  Take 1 capsule by mouth 2 times daily     polyethylene glycol 17 g packet  Commonly known as: GLYCOLAX  Take 17 g by mouth 2 times daily        ASK your doctor about these medications    albuterol sulfate  (90 Base) MCG/ACT inhaler  Commonly known as: ProAir HFA  Inhale 2 puffs into the lungs every 6 hours as needed for Wheezing or Shortness of Breath     amLODIPine 2.5 MG tablet  Commonly known as: NORVASC  Take 1 tablet by mouth daily     amoxicillin 500 MG capsule  Commonly known as: AMOXIL  4 pills 1 hr before dental procedures     apixaban 5 MG Tabs tablet  Commonly known as: Eliquis  TAKE 1 TABLET BY MOUTH  TWICE DAILY     azithromycin 250 MG tablet  Commonly known as: ZITHROMAX  Take 1 tablet by mouth daily     budesonide 0.5 MG/2ML nebulizer suspension  Commonly known as: PULMICORT  Take 2 mLs by nebulization 2 times daily     HYDROcodone-acetaminophen 5-325 MG per tablet  Commonly known as: Norco  Take 0.5 tablets by mouth every 8 hours as needed for Pain for up to 30 days.      levothyroxine 88 MCG tablet  Commonly known as: SYNTHROID  take 1 tablet by mouth once daily, new dose     metoprolol tartrate 25 MG tablet  Commonly known as: LOPRESSOR  take 1/2 tablet by mouth twice a day     MULTIPLE VITAMIN PO     Probiotic Acidophilus Caps     simvastatin 20 MG tablet  Commonly known as: ZOCOR  TAKE 1 TABLET BY MOUTH  NIGHTLY     spironolactone 50 MG tablet  Commonly known as: ALDACTONE  Take 1 tablet by mouth daily           Where to Get Your Medications      These medications were sent to [facility] [address], [address]  [address] [facility], [place] [id number]    Phone: [phone]   · docusate sodium 100 MG capsule  · polyethylene glycol 17 g packet       Scheduled Meds: Scheduled Meds:   colchicine  0.6 mg Oral BID    amLODIPine  2.5 mg Oral Daily    apixaban  5 mg Oral BID    budesonide  0.5 mg Nebulization BID    levothyroxine  88 mcg Oral Daily    metoprolol tartrate  12.5 mg Oral BID    atorvastatin  10 mg Oral Nightly    [Held by provider] spironolactone  50 mg Oral Daily    acetaminophen  500 mg Oral Q6H    ketorolac  15 mg IntraVENous Q6H    docusate sodium  100 mg Oral BID    polyethylene glycol  17 g Oral BID    lidocaine  20 mL IntraDERmal Once     Continuous Infusions:   sodium chloride 75 mL/hr at 03/17/22 0651     PRN Meds:. HYDROcodone-acetaminophen, cyclobenzaprine, HYDROmorphone  Continuous Infusions:   sodium chloride 75 mL/hr at 03/17/22 0651       Intake/Output Summary (Last 24 hours) at 3/17/2022 1653  Last data filed at 3/17/2022 1405  Gross per 24 hour   Intake 3086.3 ml   Output 475 ml   Net 2611.3 ml       Diet:  ADULT DIET;  Regular    Activity:  Activity as tolerated (Patient may move about with assist as indicated or with supervision.)    Follow-up:  in the next few weeks with Johnson Michael MD,    Consultants:  ID, ortho    Procedures:  Aspiration of left knee fluid     Diagnostic Test:    Objective:  Lab Results   Component Value Date    WBC 7.3 03/17/2022    RBC 3.86 03/17/2022    RBC 3.45 06/08/2012    HGB 10.6 03/17/2022    HCT 33.7 03/17/2022    MCV 87.3 03/17/2022    MCH 27.5 03/17/2022    MCHC 31.5 03/17/2022    RDW 14.9 12/06/2017     03/17/2022    MPV 10.1 03/17/2022     Lab Results   Component Value Date     03/17/2022    K 4.9 03/17/2022    K 5.2 03/08/2022    CL 99 03/17/2022    CO2 25 03/17/2022    BUN 24 03/17/2022    CREATININE 1.2 03/17/2022    GLUCOSE 93 03/17/2022    GLUCOSE 93 06/08/2012    CALCIUM 9.2 03/17/2022     Lab Results   Component Value Date    CALCIUM 9.2 03/17/2022     No results found for: IONCA  Lab Results   Component Value Date    MG 2.1 09/30/2020     Lab Results   Component Value Date    PHOS 4.1 06/16/2016     No results found for: BNP  Lab Results   Component Value Date    ALKPHOS 89 03/15/2022    ALT 11 03/15/2022    AST 18 03/15/2022    PROT 8.1 03/15/2022    BILITOT 0.5 03/15/2022    BILIDIR <0.2 01/04/2022    LABALBU 4.1 03/15/2022    LABALBU 3.5 06/08/2012     Lab Results   Component Value Date    LACTA 1.5 03/15/2022     No results found for: AMYLASE  Lab Results   Component Value Date    LIPASE 26.0 03/15/2022     Lab Results   Component Value Date    CHOL 186 01/04/2022    TRIG 76 01/04/2022    HDL 97 01/04/2022    LDLCALC 74 01/04/2022     No results for input(s): POCGLU in the last 72 hours. Recent Labs     03/15/22  1818   CKTOTAL 54     Lab Results   Component Value Date    LABA1C 5.2 09/19/2018     Lab Results   Component Value Date    INR 1.94 03/15/2022     No results found for: PHART, PO2ART, ODT8HSA, LWS0LFS, [facility] Course: clinical course has improved    Initial H+P: The patient is a 80 y.o. female who presents with pain in the left knee. The patient noticed that this pain started about a week and [id number] worsened on Sunday. She denied however any fever or chills with this. She also denies any recent trauma although she has had bilateral knee replacement many years prior. On arrival actomyosin department she was seen by orthopedic surgeon due to the effusion on that knee that was aspirated with specimen sent to the lab for culture and sensitivity. She has since been commenced on empiric antibiotic treatment and pain control.   She does feel better although she states that the pain is still rated high despite the medication and she will appreciate it if the medication can be adjusted. .    ID was following; aspirated fluid results more in line with pseudogout so pt placed on Colchicine; abx were stopped. She was seen by GI for pancreatic duct dilatation seen on CT scan; MRCP done which showed chronic process. GI recommends outpt follow up for possible EUS. Pt's pain did improve. She was recommended IPR by PT and she was accepted today. Vitals: BP (!) 117/57   Pulse 74   Temp 97.7 °F (36.5 °C) (Oral)   Resp 16   Ht 5' 4\" (1.626 m)   Wt 159 lb 12.8 oz (72.5 kg)   SpO2 100%   BMI 27.43 kg/m²         Disposition: IPR    Condition: Stable    Over 35 minutes spent on encounter    Assessment and plan of care discussed with supervising physician, Dr Jsoué Hager. JASMEET Hernandez - CNP, CNP     Copy: Primary Care Physician: Kristi Lange MD  Internal Medicine    I have discussed the case, including pertinent history and exam findings with the NP. I have seen and examined the patient and the key elements of the encounter have been performed by me. I agree with the assessment, plan and orders as documented by the NP.     Electronically signed by Reid Skinner MD on 3/17/2022 at 5:51 PM

## 2022-03-17 NOTE — CONSULTS
Physical Medicine & Rehabilitation   Consult Note      Admitting Physician: Ebonie Mariee MD    Primary Care Provider: Marc Guthrie MD     Reason for Consult:  Left knee pain secondary to pseudogout    History of Present Illness:  Gladys Chávez is a 80 y.o. female admitted to [address] [place] [address] on 3/15/2022 with a past medical history of advanced osteoarthritis, idiopathic pulmonary fibrosis on 2L of home oxygen, bronchiectasis, hypertension, hyperlipidemia, and secondary hyperparathyroidism. She  presented to ED for evaluation of left knee pain x previous 2 days.       Pain at that time was at 10/10. She could not ambulate, and was unable to lift left lower extremity due to severe pain.       Patient had left SI joint injection on 2/22/2022. She reported no fever or chills. No chest pain. No abdominal pain. No diarrhea. No urinary symptoms. She had prior bilateral total knee replacements, the left one completed in 2012 and the right in 2014. She noticed slightly increased swelling to left knee. Denied recent left knee trauma or injury.     No change of bowl and bladder functions.      She lives at an assisted living. At baseline she walks without assistance. In the setting of a total knee arthroplasty with knee pain and effusion, her knee was aspirated in the ED for 30 ml of yellow cloudy fluid. Her aspiration showed WBC in the joint fluid. She also had calcium pyrophosphate crystals. White count was 23,350 with 90% polys. She denied any fever or chills and had no  previous history of infection. She was diagnosed with pseudogout, started on Toradol for 2 days then transitioned to Naproxen 250 mg bid. Colchicine added at 0.6 mg bid. Was on Dilaudid 1mg q 4 hours IV prn, and Flexeril 10 mg po tid prn. Also on Norco 5/325 one po q 8 hours prn. She also has a pin point area of skin irritation on her coccyx.       Current Rehabilitation Assessments:  PT:      OBJECTIVE:  Bed Mobility:  Not Tested     Transfers:  Sit to Stand: Minimal Assistance, with increased time for completion, cues for hand placement, with verbal cues  Stand to Sit:Minimal Assistance, with increased time for completion, cues for hand placement, with verbal cues   Erika Gates PT present to assess transfers and gait during session.      Ambulation:  Minimal Assistance  Distance: ~15ft x2  Surface: Level Tile  Device:Rolling Walker  Gait Deviations: Forward Flexed Posture, Slow Jaimee, Decreased Step Length Bilaterally, Decreased Weight Shift Right, Decreased Gait Speed, Decreased Heel Strike Bilaterally, Mild Path Deviations, Unsteady Gait and left LE pain, impulsive to get to bathroom     Erika Gates PT present to assess transfers and gait during session.      Balance:  Dynamic Standing Balance: Contact Guard Assistance, during clothing management and sink tasks     Exercise:  Patient was guided in 1 set(s) 10 reps of exercise to both lower extremities. Ankle pumps, Glut sets, Heelslides, Hip abduction/adduction, Straight leg raises, Seated marches and Long arc quads. Exercises were completed for increased independence with functional mobility.     Functional Outcome Measures: Completed  AM-PAC Inpatient Mobility Raw Score : 16  AM-PAC Inpatient T-Scale Score : 40.78     ASSESSMENT:  Assessment: Patient progressing toward established goals. Activity Tolerance:  Patient tolerance of  treatment: good. Equipment Recommendations:Equipment Needed: No      OT:      COGNITION: Decreased Insight, Impaired Memory, Decreased Problem Solving and Decreased Safety Awareness     RANGE OF MOTION:  Bilateral Upper Extremity:  WFL     STRENGTH:  Bilateral Upper Extremity:  Impaired - deconditioned     SENSATION:   WFL     ADL:   Grooming: Stand By Assistance and with set-up. to wash/dry face while seated. Bathing: Moderate Assistance. Assist to wash/dry B feet, LEs, and tremayne area. Upper Extremity Dressing: Minimal Assistance. to don hospital gown. Lower Extremity Dressing: Dependent. Toileting: Maximum Assistance. Pt external cath leaked prior to arrival. Pt required assistance for tremayne care.      BALANCE:  Sitting Balance:  Stand By Assistance. Standing Balance: Contact Guard Assistance.       BED MOBILITY:  Supine to Sit: Minimal Assistance    Scooting: Contact Guard Assistance       TRANSFERS:  Sit to Stand:  Minimal Assistance, with increased time for completion, cues for hand placement. Stand to Sit: Contact Guard Assistance.       FUNCTIONAL MOBILITY:  Assistive Device: Rolling Walker  Assist Level:  Minimal Assistance, with verbal cues  and with increased time for completion.    Distance: EOB to recliner  Slow pace, No LOB.       Activity Tolerance:  Patient tolerance of  treatment: good.            Past Medical History:        Diagnosis Date    Anemia     normal on pre-op 5/2012 and 12/2/13    Backache     h/o    Bronchiectasis ([facility] 2013    Bursitis     bilateral shoulders    Constipation     Diverticulosis of colon     HTN (hypertension)     Hypercalcemia     slightly elevated at 10.8 pre-op 12/2/13    Hyperlipidemia     Nodular goiter     bx negative    OA (osteoarthritis)     bilateral thumbs - sees Dr. Ashlie Naqvi Osteopenia 11/6/2013    Parathyroid adenoma 4/30/2013    Pneumonia of right upper lobe due to infectious organism     Pneumonitis     Dr. Zahida Baxter in 7/2010 - bx negative    Pulmonary Mycobacterium avium complex (MAC) infection ([address]     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 ([address]     had one parathyroid removed - now follows with Dr. Tomy Dozier Sinusitis     with seasonal allergies    Vitamin D deficiency 05/2018       Past Surgical History:        Procedure Laterality Date    ABDOMINAL EXPLORATION SURGERY  01/02/2013    Release of KATHERINE TAMAYO-Dr. Dorcus Duverney    APPENDECTOMY  1961    BACK INJECTION Bilateral 12/28/2021    bilateral sacroiliac joint injection performed by Harley NAILS DO Cristi at [address] INJECTION N/A 2022    B/L SI joint block performed by Jose Mejia DO at [address] N/A 2020    BRONCHOSCOPY FLUOROSCOPY performed by Haroon Victor MD at [facility]    CATARACT REMOVAL WITH IMPLANT Bilateral  ?   SECTION  4576,1538    DILATION AND CURETTAGE OF UTERUS  0435,1021,2349    EXCISION OF PARATHYROID MASS Right 2013    rt parathyroidectomy (excision of rt inferior parathyroid mass) exploration of neck     FOOT SURGERY  2002    left    FOOT SURGERY Left 2017    Dr Neely Life  12    left knee    JOINT REPLACEMENT  2014    right knee    MALIGNANT SKIN LESION EXCISION Left 2017    BCC DORSAL FOOT WITH GRAFT & FS    GOSIA AND BSO  1982    TONSILLECTOMY AND ADENOIDECTOMY  194 ?  TOTAL KNEE ARTHROPLASTY Right 2014    OIO       Allergies:     Allergies   Allergen Reactions    Levaquin [Levofloxacin] Other (See Comments)     Hallucinations    Percocet [Oxycodone-Acetaminophen] Nausea Only and Other (See Comments)     Affects memory    Rifampin Other (See Comments)     Lost half of vision    Sulfa Antibiotics Other (See Comments)     hallucinations    Cefuroxime Diarrhea, Nausea And Vomiting and Other (See Comments)     cramping        Current Medications:   Current Facility-Administered Medications: colchicine (COLCRYS) tablet 0.6 mg, 0.6 mg, Oral, BID  amLODIPine (NORVASC) tablet 2.5 mg, 2.5 mg, Oral, Daily  apixaban (ELIQUIS) tablet 5 mg, 5 mg, Oral, BID  budesonide (PULMICORT) nebulizer suspension 500 mcg, 0.5 mg, Nebulization, BID  HYDROcodone-acetaminophen (NORCO) 5-325 MG per tablet 0.5 tablet, 0.5 tablet, Oral, Q8H PRN  levothyroxine (SYNTHROID) tablet 88 mcg, 88 mcg, Oral, Daily  metoprolol tartrate (LOPRESSOR) tablet 12.5 mg, 12.5 mg, Oral, BID  atorvastatin (LIPITOR) tablet 10 mg, 10 mg, Oral, Nightly  [Held by provider] spironolactone (ALDACTONE) tablet 50 mg, 50 mg, Oral, Daily  cyclobenzaprine (FLEXERIL) tablet 10 mg, 10 mg, Oral, TID PRN  acetaminophen (TYLENOL) tablet 500 mg, 500 mg, Oral, Q6H  HYDROmorphone (DILAUDID) injection 1 mg, 1 mg, IntraVENous, Q4H PRN  ketorolac (TORADOL) injection 15 mg, 15 mg, IntraVENous, Q6H  docusate sodium (COLACE) capsule 100 mg, 100 mg, Oral, BID  polyethylene glycol (GLYCOLAX) packet 17 g, 17 g, Oral, BID  lidocaine 1 % injection 20 mL, 20 mL, IntraDERmal, Once  0.9 % sodium chloride infusion, , IntraVENous, Continuous    Social History:  Social History     Socioeconomic History    Marital status:      Spouse name: Not on file    Number of children: Not on file    Years of education: Not on file    Highest education level: Not on file   Occupational History    Occupation: retired   Tobacco Use    Smoking status: Never Smoker    Smokeless tobacco: Never Used   Vaping Use    Vaping Use: Never used   Substance and Sexual Activity    Alcohol use: Yes     Alcohol/week: 0.0 standard drinks     Comment: socially-1 glassof wine 2-3 times a month    Drug use: No    Sexual activity: Never   Other Topics Concern    Not on file   Social History Narrative    Not on file     Social Determinants of Health     Financial Resource Strain: Low Risk     Difficulty of Paying Living Expenses: Not hard at all   Food Insecurity: No Food Insecurity    Worried About Running Out of Food in the Last Year: Never true    Vanesa of Food in the Last Year: Never true   Transportation Needs: No Transportation Needs    Lack of Transportation (Medical): No    Lack of Transportation (Non-Medical):  No   Physical Activity:     Days of Exercise per Week: Not on file    Minutes of Exercise per Session: Not on file   Stress:     Feeling of Stress : Not on file   Social Connections:     Frequency of Communication with Friends and Family: Not on file    Frequency of Social Gatherings with Friends and Family: Not on file    Attends Christian Services: Not on file    Active Member of Clubs or Organizations: Not on file    Attends Club or Organization Meetings: Not on file    Marital Status: Not on file   Intimate Partner Violence:     Fear of Current or Ex-Partner: Not on file    Emotionally Abused: Not on file    Physically Abused: Not on file    Sexually Abused: Not on file   Housing Stability:     Unable to Pay for Housing in the Last Year: Not on file    Number of [place] in the Last Year: Not on file    Unstable Housing in the Last Year: Not on file     Type of Home: Facility ([facility]  Home Layout: One level  Home Access: Level entry  Home Equipment: Rolling walker,Oxygen   [employer] Shower/Tub: Walk-in shower  Bathroom Toilet: Handicap height  Bathroom Equipment: Built-in shower seat,Grab bars in shower  Bathroom Accessibility: Accessible  ADL Assistance: [address] [place] [address] Avenue: Independent (Cleaning lady comes 1x/week.)  Homemaking Responsibilities: Yes  Ambulation Assistance: Independent  Transfer Assistance: Independent     Active : Yes  Occupation: Retired  Additional Comments: Pt states has been at [facility] for rehab for about one week, has not been able to do much because of pain; prior to that, living at home alone, 2 L O2 at baseline    Family History:       Problem Relation Age of Onset    Diabetes Mother     Thyroid Disease Mother     Arthritis Mother     High Blood Pressure Mother     Arthritis Father     High Blood Pressure Father     Other Father         hay fever    Breast Cancer Neg Hx     Ovarian Cancer Neg Hx        Review of Systems:  CONSTITUTIONAL:  positive for  fatigue  EYES:  Wears glasses  HEENT:  negative  RESPIRATORY:  On 2L supplemental O2 per NC as at home for pulmonary fibrosis  CARDIOVASCULAR:  negative  GASTROINTESTINAL:  Bowels have not moved since admission  GENITOURINARY: negative  SKIN:  negative  HEMATOLOGIC/LYMPHATIC:  negative  MUSCULOSKELETAL:  negative  NEUROLOGICAL:  negative  BEHAVIOR/PSYCH:  negative  System review otherwise negative    Physical Exam:  BP (!) 122/58   Pulse 72   Temp 97.2 °F (36.2 °C) (Oral)   Resp 16   Ht 5' 4\" (1.626 m)   Wt 159 lb 12.8 oz (72.5 kg)   SpO2 99%   BMI 27.43 kg/m²   awake  Orientation:   person, place, time  Mood: within normal limits  Affect: calm and spontaneous  General appearance: well groomed and in no acute distress    Memory:   normal,   Attention/Concentration: normal  Language:  normal    Cranial Nerves:  cranial nerves II-XII are grossly intact  ROM:  abnormal - decreased at left knee  Motor Exam:  Motor exam is symmetrical 5- out of 5 all extremities bilaterally  Tone:  normal  Muscle bulk: within normal limits  Sensory:  Sensory intact  Coordination:   normal  Deep Tendon Reflexes:  Reflexes are intact and symmetrical bilaterally  Plantar Response:  Right:  downgoing  Left:  downgoing  Gait: not tested    Heart: normal rate, regular rhythm, normal S1, S2, no murmurs, rubs, clicks or gallops  Lungs: clear to auscultation without wheezes or rales  Abdomen: soft, non-tender, non-distended, normal bowel sounds, no masses or organomegaly    Skin: warm and dry, no rash or erythema  Peripheral vascular: Pulses: Normal upper and lower extremity pulses; Edema: no      Diagnostics:  Recent Results (from the past 24 hour(s))   Basic Metabolic Panel    Collection Time: 03/17/22  7:21 AM   Result Value Ref Range    Sodium 134 (L) 135 - 145 meq/L    Potassium 4.9 3.5 - 5.2 meq/L    Chloride 99 98 - 111 meq/L    CO2 25 23 - 33 meq/L    Glucose 93 70 - 108 mg/dL    BUN 24 (H) 7 - 22 mg/dL    CREATININE 1.2 0.4 - 1.2 mg/dL    Calcium 9.2 8.5 - 10.5 mg/dL   CBC with Auto Differential    Collection Time: 03/17/22  7:21 AM   Result Value Ref Range    WBC 7.3 4.8 - 10.8 thou/mm3    RBC 3.86 (L) 4.20 - 5.40 mill/mm3    Hemoglobin 10.6 (L) 12.0 - 16.0 gm/dl    Hematocrit 33.7 (L) 37.0 - 47.0 %    MCV 87.3 81.0 - 99.0 fL    MCH 27.5 26.0 - 33.0 pg    MCHC 31.5 (L) 32.2 - 35.5 gm/dl    RDW-CV 12.5 11.5 - 14.5 %    RDW-SD 39.9 35.0 - 45.0 fL    Platelets 330 185 - 015 thou/mm3    MPV 10.1 9.4 - 12.4 fL    Seg Neutrophils 62.8 %    Lymphocytes 13.9 %    Monocytes 18.8 %    Eosinophils 3.3 %    Basophils 0.5 %    Immature Granulocytes 0.7 %    Segs Absolute 4.6 1.8 - 7.7 thou/mm3    Lymphocytes Absolute 1.0 1.0 - 4.8 thou/mm3    Monocytes Absolute 1.4 (H) 0.4 - 1.3 thou/mm3    Eosinophils Absolute 0.2 0.0 - 0.4 thou/mm3    Basophils Absolute 0.0 0.0 - 0.1 thou/mm3    Immature Grans (Abs) 0.05 0.00 - 0.07 thou/mm3    nRBC 0 /100 wbc   Anion Gap    Collection Time: 03/17/22  7:21 AM   Result Value Ref Range    Anion Gap 10.0 8.0 - 16.0 meq/L   Glomerular Filtration Rate, Estimated    Collection Time: 03/17/22  7:21 AM   Result Value Ref Range    Est, Glom Filt Rate 43 (A) ml/min/1.73m2           Impression:  · Left knee  · S/p total left knee arthroplasty 2012  · Pancreatic ductal dilatation- noted on CT  · 1 cm calcification in the mid pancreatic body- noted on CT  · Osteoarthritis  · Osteopenia  ·  Chronic respiratory failure  ·  H/O HTN  · HLD  · Diverticulosis  · Chronic constipation  · Hyponatremia  · Bursitis bilateral shoulders  · Constipation  · Diverticulitis of colon  · Parathyroid adenoma  · 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  · Vitamin D deficiency      Recommendations:  · Believe patient is an excellent candidate for nd will benefit from admission to the IPR Unit   · Planning admission to Rehab this evening  · Bowel program  · Pain management    It was my pleasure to evaluate Venkata Olmstead today. Please call with questions.     Ju Norman MD

## 2022-03-17 NOTE — PROGRESS NOTES
Veto Manrique 60  INPATIENT OCCUPATIONAL THERAPY  [facility] ORTHOPEDICS 7K  EVALUATION    Time:   Time In: 805  Time Out: 8020  Timed Code Treatment Minutes: 40 Minutes  Minutes: 48          Date: 3/17/2022  Patient Name: Bebeto Simon,   Gender: female      MRN: [id number]  : 1935  (80 y.o.)  Referring Practitioner: Sierra Llanes MD  Diagnosis: Septic Arthrits  Additional Pertinent Hx: Per EMR, \"The patient is a 80 y.o. female who presents with pain in the left knee. The patient noticed that this pain started about a week and 49-3-usvm-old worsened on . She denied however any fever or chills with this. She also denies any recent trauma although she has had bilateral knee replacement many years prior. On arrival actomyosin department she was seen by orthopedic surgeon due to the effusion on that knee that was aspirated with specimen sent to the lab for culture and sensitivity. She has since been commenced on empiric antibiotic treatment and pain control. She does feel better although she states that the pain is still rated high despite the medication and she will appreciate it if the medication can be adjusted. \"    Restrictions/Precautions:  Restrictions/Precautions: Weight Bearing,Fall Risk  Left Lower Extremity Weight Bearing: Weight Bearing As Tolerated    Subjective  Chart Reviewed: Yes,Progress Notes,Orders,History and Physical,Labs,Imaging  Patient assessed for rehabilitation services?: Yes    Subjective: RN okayed OT session. Upon arrival patient was sitting up in bed. Pt was agreeable to OT session.     Pain:  Pain Assessment  Patient Currently in Pain: Yes  Pain Assessment: 0-10  Pain Level: 5  Pain Type: Acute pain  Pain Location: Knee  Pain Orientation: Left    Vitals: Vitals not assessed per clinical judgement, see nursing flowsheet    Social/Functional History:  Type of Home: Facility ([facility])  Home Layout: One level  Home Access: Level entry  Home Equipment: Rolling walker,Oxygen   Bathroom Shower/Tub: Walk-in shower  Bathroom Toilet: Handicap height  Bathroom Equipment: Built-in shower seat,Grab bars in shower  Bathroom Accessibility: Accessible     ADL Assistance: Independent  Homemaking Assistance: Independent (Cleaning lady comes 1x/week.)  Homemaking Responsibilities: Yes  Ambulation Assistance: Independent  Transfer Assistance: Independent    Active : Yes  Occupation: Retired  Additional Comments: Pt states has been at [facility] for rehab for about one week, has not been able to do much because of pain; prior to that, living at home alone, 2 L O2 at baseline    VISION:Corrected     HEARING:  WFL    COGNITION: Decreased Insight, Impaired Memory, Decreased Problem Solving and Decreased Safety Awareness    RANGE OF MOTION:  Bilateral Upper Extremity:  WFL    STRENGTH:  Bilateral Upper Extremity:  Impaired - deconditioned    SENSATION:   WFL    ADL:   Grooming: Stand By Assistance and with set-up. to wash/dry face while seated. Bathing: Moderate Assistance. Assist to wash/dry B feet, LEs, and tremayne area. Upper Extremity Dressing: Minimal Assistance. to don hospital gown. Lower Extremity Dressing: Dependent. Toileting: Maximum Assistance. Pt external cath leaked prior to arrival. Pt required assistance for tremayne care. BALANCE:  Sitting Balance:  Stand By Assistance. Standing Balance: Contact Guard Assistance. BED MOBILITY:  Supine to Sit: Minimal Assistance    Scooting: Contact Guard Assistance      TRANSFERS:  Sit to Stand:  Minimal Assistance, with increased time for completion, cues for hand placement. Stand to Sit: Contact Guard Assistance. FUNCTIONAL MOBILITY:  Assistive Device: Rolling Walker  Assist Level:  Minimal Assistance, with verbal cues  and with increased time for completion. Distance: EOB to recliner  Slow pace, No LOB. Activity Tolerance:  Patient tolerance of  treatment: good.        Assessment:  Assessment: This 80year old female requires skilled OT intervention to increase indep and endurance with all self cares, transfers, mobility, IADLs to return to Indep PLOF. Without skilled OT intervention pt is at increased risk for falls. Performance deficits / Impairments: Decreased functional mobility ,Decreased ADL status,Decreased strength,Decreased endurance,Decreased balance,Decreased safe awareness,Decreased high-level IADLs  Prognosis: Good  REQUIRES OT FOLLOW UP: Yes    Treatment Initiated: Treatment and education initiated within context of evaluation. Evaluation time included review of current medical information, gathering information related to past medical, social and functional history, completion of standardized testing, formal and informal observation of tasks, assessment of data and development of plan of care and goals. Treatment time included skilled education and facilitation of tasks to increase safety and independence with ADL's for improved functional independence and quality of life. Discharge Recommendations:  Continue to assess pending progress,Subacute/Skilled Nursing Facility    Patient Education:  OT Education: OT Role,Plan of Care,Precautions,ADL Adaptive Strategies,Transfer Training    Equipment Recommendations:  Equipment Needed: No    Plan:  Times per week: 5x  Current Treatment Recommendations: Strengthening,Functional Mobility Training,Balance Training,Endurance Training,Patient/Caregiver Education & Training,Equipment Evaluation, Education, & procurement,Safety Education & Training,Self-Care / ADL,Home Management Training. See long-term goal time frame for expected duration of plan of care. If no long-term goals established, a short length of stay is anticipated.     Goals:  Patient goals : Decrease pain  Short term goals  Time Frame for Short term goals: Until discharge  Short term goal 1: Pt will complete BUE strengthening exercises with min vcs for technique to increase indep and endurance with all self cares. Short term goal 2: Pt will complete standing tolerance x 5 minutes with 1-2 UE release and S to increase indep with sinkside grooming. Short term goal 3: Pt will complete functional mobiity to/from BR and HH distances with SBA and 0 vcs for safety to increase indep with toileting. Short term goal 4: Pt will complete LB dressing with LHAE PRN and min A to increase indep and endurance within home environment. Following session, patient left in safe position with all fall risk precautions in place.

## 2022-03-17 NOTE — FLOWSHEET NOTE
03/17/22 1033   Encounter Summary   Services provided to: Patient   Referral/Consult From: Rounding   Continue Visiting Yes  (3/17)   Complexity of Encounter Moderate   Length of Encounter 15 minutes   Routine   Type Initial   Assessment Calm; Approachable   Intervention Nurtured hope   Outcome Comfort   Spiritual/Druze   Type Spiritual support   Assessment: In my encounter with the 80 yr old patient, while rounding  the unit 7K,  I provided spiritual care to patient through conversation, I also came to assess the patients spiritual needs present. The pt was admitted due to septic arthritis. Interventions:  I provided prayer, emotional support and words of comfort.  provided a listening presence and encouraged pt during their hospitalization. Outcomes: The patient was encouraged and didnt share any further spiritual needs at this time. Plan:  Chaplains will follow-up at a later time for assessment of any spiritual care needs present. The pt is supported by [facility].

## 2022-03-17 NOTE — PROGRESS NOTES
GLUCOSE 93 06/08/2012     PT/INR:    Lab Results   Component Value Date    INR 1.94 03/15/2022       Radiology: See electronic record to view reports. Reports reviewed. Assessment:     Hospital day 2 left knee pain and effusion. Patient s/p left total knee arthroplasty with Dr. Mckay Shaikh in 2012. Had progressively worsening left knee pain for 3 days in the setting of chronic back pain, left hip pain and left leg pain prior to the knee starting. She is not diabetic, does have a history of gout. CRP was 29, ESR 60. 30 cc of cloudy yellow fluid was aspirated. Synovial cell count is 23,000. Crystal analysis showed calcium pyrophosphate crystals. No bacteria on gram stain or preliminary culture. No growth preliminary blood cultures. Afebrile    Plan:      1: Conservative treatment for pseudogout. No plans for surgery  2:  Continue pain control. Toradol for 2 days then switch to Naproxen 250 mg BID. Added colchicine 06. Mg BID. Consider adding oral steroid if ok with hopsitalist. DC on Naproxen and colchicine  3:  PT/OT as tolerated  4:  WBAT  5:  Ice to knee as needed  6:  Internal medicine for medical management, consult to ID for possible septic knee  7:  Follow cultures  8:  OK for DC from orthopedic standpoint on Naproxen and colchicine +/- oral steroid.    9:  Follow up with Dr. Mckay Shaikh who did her knee replacements as needed out patient    Electronically signed by Paola Arriola PA-C on 3/17/2022 at 6:07 AM

## 2022-03-17 NOTE — PROGRESS NOTES
[facility] [place] Contin[facility] Nurse  Consult Note       Digna Sotomayor  AGE: 80 y.o. GENDER: female  : 1935 UNIT: 7K-10/010-A  TODAY'S DATE:  3/17/2022    Subjective:     Reason for [address]: pin point area to coccyx      Digna Sotomayor is a 80 y.o. female referred by:   [] Physician/PA/APRN  [x] Nursing  [] Other:     Plan:     Treatment Recommendations:   Wound ostomy consulted for pinpoint area to coccyx. Spoke with primary RN regarding condition of wound. Documentation indicates prior healed pressure injury to coccyx. Primary RN to report if wound is stage 3, 4, DTPI, or unstageable, please reconsult. Otherwise, recommend EPC cream BID and PRN and utilization of moisture wicking underpads. Wound ostomy to sign off at this time. Call as needed.      Specialty Bed Required :   [x] Low Air Loss   [x] Pressure Redistribution  [] Fluid Immersion- Dolphin  [] Bariatric  [] RotoProne   [] Other:

## 2022-03-17 NOTE — CARE COORDINATION
3/17/22, 12:56 PM EDT    DISCHARGE PLANNING EVALUATION      Spoke with Rosalva Benavides, who has decided she would like to go back to [facility]. Referral made to [facility], facility plans to accept at discharge. Consult has been made for inpt rehab. Inpt rehab is considering for admission.

## 2022-03-17 NOTE — PROGRESS NOTES
-left knee tenderness with swelling otherwise no edema. Skin - normal coloration and turgor, no rashes, no suspicious skin lesions noted        Review of Labs and Diagnostic Testing:    CBC:   Recent Labs     03/17/22  0721   WBC 7.3   HGB 10.6*   HCT 33.7*   MCV 87.3        BMP:   Recent Labs     03/17/22  0721   *   K 4.9   CL 99   CO2 25   BUN 24*   CREATININE 1.2   CALCIUM 9.2   GLUCOSE 93     PT/INR:   Recent Labs     03/15/22  1817   INR 1.94*     APTT:   Recent Labs     03/15/22  1817   APTT 39.3*      Lipids:   Recent Labs     03/15/22  1818 03/15/22  2347   ALKPHOS 89  --    ALT 11  --    AST 18  --    BILITOT 0.5  --    LABALBU 4.1  --    LIPASE  --  26.0     Troponin: No results for input(s): TROPONINI in the last 72 hours. Imaging:  XR KNEE LEFT (MIN 4 VIEWS)    Result Date: 3/15/2022  4 view left knee Comparison: None Findings: No fractures or dislocations. Prior left knee replacement. Appropriate alignment. No evidence of hardware failure. There is osteopenia. No significant arthritic change or erosions. No joint effusion. No radiopaque foreign body. No acute bony abnormality. This document has been electronically signed by: Cleo Sevilla MD on 03/15/2022 06:44 PM    CT ABDOMEN PELVIS W IV CONTRAST Additional Contrast? None    Result Date: 3/15/2022  CT abdomen and pelvis with contrast Comparison: CT - CT ABDOMEN /T/ PELVIS ENTEROGRAPHY - 01/02/2013 12:43 PM EST Findings: Multifocal bronchiectasis and honeycombing within visualized portions of the right middle and lower lobes, new since the prior study. Relatively mild peripheral reticular opacities within the left lung. Mild cardiomegaly 1 cm calcification within the mid pancreatic body, new since the prior study. Moderate dilatation of more distal aspect of the pancreatic duct. Moderate pancreatic volume loss. Mild multifocal scarring within the bilateral kidneys. Unremarkable liver, spleen and adrenal glands.  No calcified gallstones or bile duct dilatation. There is colonic diverticulosis. There is no evidence of bowel inflammation or bowel obstruction. There is a ventral hernia containing a small portion of the transverse colon. Status post hysterectomy. Poorly distended urinary bladder. Bilateral femoral hernias containing fat. Appendix not definitively seen. No secondary findings to suggest appendicitis. No acute fracture. 1. There are new changes of pulmonary fibrosis within the right lung. There are additional early findings of interstitial scarring within the left lung. 2. 1 cm calcification within the mid pancreatic body associated with moderate dilatation of more distal aspect of the pancreatic duct. 3. Ventral hernia containing a short segment of the transverse colon without associated obstruction or edema. Bilateral femoral hernias containing fat. 4. Colonic diverticulosis without diverticulitis. This document has been electronically signed by: Yolanda Olivares MD on 03/15/2022 08:53 PM All CTs at this facility use dose modulation techniques and iterative reconstructions, and/or weight-based dosing when appropriate to reduce radiation to a low as reasonably achievable. MRI ABDOMEN W WO CONTRAST MRCP    Result Date: 3/16/2022  MR MRCP without contrast Comparison: 3/15/2022 Findings: Fibrotic changes at the right lung base. No cholelithiasis. Unremarkable gallbladder wall. No pericholecystic fluid. The common bile duct is normal in caliber. There is no common bile duct stone. Marked dilatation of the distal pancreatic duct as documented on recent CT. Pancreatic calcification at the transition point. Postcontrast images limited by motion artifact. No obvious associated mass. Marked atrophy of the distal pancreatic body and pancreatic tail. Unremarkable liver, spleen, adrenal glands and kidneys. No free fluid. Impression: 1.  Obstructive changes of the distal pancreatic duct, likely on the basis of an obstructing calcification. Marked associated atrophy of this portion of the pancreas, compatible with a chronic process. No obvious associated mass. Evaluation limited by motion artifact. This document has been electronically signed by: Laureen Schaeffer MD on 03/16/2022 08:42 PM    CT LUMBAR RECONSTRUCTION WO POST PROCESS    Result Date: 3/15/2022  CT lumbar spine without contrast Comparison: CT - CT ABDOMEN /T/ PELVIS ENTEROGRAPHY - 01/02/2013 12:43 PM EST Findings: Osteopenia and generalized heterogeneity of bone density. Trace anterolisthesis of L3 on L4. Remaining lumbar alignment appropriate. No acute displaced fracture. T12-L1: Normal L1-L2: Mild ligamentum flavum hypertrophy with mild central canal narrowing. No significant neural foraminal narrowing. L3-L4: Grade 1 anterolisthesis. Mild broad-based disc bulge. Moderate facet osteoarthritis. Mild ligamentum flavum hypertrophy. Moderate central canal stenosis. Mild bilateral neural foraminal narrowing. L4-L5: Moderate bilateral facet osteoarthritis. Minimal broad-based disc bulge. Mild central canal stenosis. Mild narrowing of neural foramina. L5-S1: Mild broad-based disc bulge. Mild facet osteoarthritis. No significant central canal narrowing. Mild narrowing of the right neural foramen. Findings at the level of the abdomen and pelvis are reported separately. 1. No acute abnormality of the lumbar spine. 2. Chronic appearing degenerative changes associated with central canal and neural foraminal narrowing at multiple levels as described. This document has been electronically signed by: Laureen Schaeffer MD on 03/15/2022 08:57 PM All CTs at this facility use dose modulation techniques and iterative reconstructions, and/or weight-based dosing when appropriate to reduce radiation to a low as reasonably achievable. EKG:      Diet: ADULT DIET;  Regular        Data:   Scheduled Meds: Scheduled Meds:   colchicine  0.6 mg Oral BID    amLODIPine  2.5 mg Oral Daily    apixaban  5 mg Oral BID    budesonide  0.5 mg Nebulization BID    levothyroxine  88 mcg Oral Daily    metoprolol tartrate  12.5 mg Oral BID    atorvastatin  10 mg Oral Nightly    [Held by provider] spironolactone  50 mg Oral Daily    acetaminophen  500 mg Oral Q6H    ketorolac  15 mg IntraVENous Q6H    docusate sodium  100 mg Oral BID    polyethylene glycol  17 g Oral BID    lidocaine  20 mL IntraDERmal Once     Continuous Infusions:   sodium chloride 75 mL/hr at 03/17/22 0651     PRN Meds:. 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 *        Patient Active Problem List   Diagnosis    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 ([facility]    Hypercalcemia    Osteoarthritis    Non-toxic nodular goiter    Essential hypertension    Diverticulosis of colon    Hypokalemia    Hypothyroidism    Osteopenia    Bronchiectasis without acute exacerbation (HCC)    Hyponatremia    Pure hypercholesterolemia    Left thyroid nodule    Hyperthyroidism    Lung infiltrate on CT    Dyslipidemia    Elevated serum free T4 level    Elevated TSH    Cough in adult    Nasal polyp    Abnormal CT of the chest    Vitamin D deficiency    Acute hypoxemic respiratory failure (HCC)    Pneumonia    Moderate malnutrition (HCC)    Pulmonary fibrosis (HCC)    SIRS (systemic inflammatory response syndrome) (HCC)    Community acquired pneumonia of right lung    Typical atrial flutter (HCC)    Septic arthritis (HCC)    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   Started on colchicine for treatment of the CPPD disease  PT OT  Follow-up labs.    Electronically signed by Lambert Gould MD on 3/17/2022 at 12:08 PM  Over 35 mins spent on this evaluation

## 2022-03-17 NOTE — PROGRESS NOTES
HYDROcodone-acetaminophen, cyclobenzaprine, HYDROmorphone      LABS:     CBC:   Recent Labs     03/15/22  1818 03/16/22  0534 03/17/22  0721   WBC 14.2* 9.7 7.3   HGB 12.9 11.2* 10.6*    166 170     BMP:    Recent Labs     03/15/22  1818 03/15/22  1818 03/15/22  2347 03/16/22  0534 03/17/22  0721   *  --   --  133* 134*   K 5.7*   < > 4.6 4.5 4.9   CL 94*  --   --  96* 99   CO2 26  --   --  27 25   BUN 21  --   --  19 24*   CREATININE 0.9  --   --  0.9 1.2   GLUCOSE 115*  --   --  103 93    < > = values in this interval not displayed. Calcium:  Recent Labs     03/17/22 0721   CALCIUM 9.2     Ionized Calcium:No results for input(s): IONCA in the last 72 hours. Magnesium:No results for input(s): MG in the last 72 hours. Phosphorus:No results for input(s): PHOS in the last 72 hours. BNP:No results for input(s): BNP in the last 72 hours. Glucose:No results for input(s): POCGLU in the last 72 hours. HgbA1C: No results for input(s): LABA1C in the last 72 hours. INR:   Recent Labs     03/15/22  1817   INR 1.94*     Hepatic:   Recent Labs     03/15/22  1818   ALKPHOS 89   ALT 11   AST 18   PROT 8.1*   BILITOT 0.5   LABALBU 4.1     Amylase and Lipase:  Recent Labs     03/15/22  2347   LACTA 1.5     Lactic Acid:   Recent Labs     03/15/22  2347   LACTA 1.5     Troponin:   Recent Labs     03/15/22  1818   CKTOTAL 54     BNP: No results for input(s): BNP in the last 72 hours. CULTURES:   UA: No results for input(s): SPECGRAV, PHUR, COLORU, CLARITYU, MUCUS, PROTEINU, BLOODU, RBCUA, WBCUA, BACTERIA, NITRU, GLUCOSEU, BILIRUBINUR, UROBILINOGEN, KETUA, LABCAST, LABCASTTY, AMORPHOS in the last 72 hours.     Invalid input(s): CRYSTALS  Micro:   Lab Results   Component Value Date    BC No growth-preliminary  03/15/2022          Problem list of patient:     Patient Active Problem List   Diagnosis Code    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 N25.81    Hypercalcemia E83.52    Osteoarthritis M19.90    Non-toxic nodular goiter E04.9    Essential hypertension I10    Diverticulosis of colon K57.30    Hypokalemia E87.6    Hypothyroidism E03.9    Osteopenia M85.80    Bronchiectasis without acute exacerbation (HCC) J47.9    Hyponatremia E87.1    Pure hypercholesterolemia E78.00    Left thyroid nodule E04.1    Hyperthyroidism E05.90    Lung infiltrate on CT R91.8    Dyslipidemia E78.5    Elevated serum free T4 level R79.89    Elevated TSH R79.89    Cough in adult R05.9    Nasal polyp J33.9    Abnormal CT of the chest R93.89    Vitamin D deficiency E55.9    Acute hypoxemic respiratory failure (HCC) J96.01    Pneumonia J18.9    Moderate malnutrition ([facility]) E44.0    Pulmonary fibrosis (HCC) J84.10    SIRS (systemic inflammatory response syndrome) ([facility]) R65.10    Community acquired pneumonia of right lung J18.9    Typical atrial flutter ([facility]) I48.3    Septic arthritis ([facility]) M00.9         ASSESSMENT/PLAN   Left knee pain due to pseudogout  Hx of degenerative back disease  Continue conservative treatment  Will stop iv antibiotic for now      Florentin Alexander MD, MD, FACP 3/17/2022 10:48 AM

## 2022-03-18 LAB
ANION GAP SERPL CALCULATED.3IONS-SCNC: 11 MEQ/L (ref 8–16)
BASOPHILS # BLD: 0.9 %
BASOPHILS ABSOLUTE: 0.1 THOU/MM3 (ref 0–0.1)
BUN BLDV-MCNC: 21 MG/DL (ref 7–22)
CALCIUM SERPL-MCNC: 9.4 MG/DL (ref 8.5–10.5)
CHLORIDE BLD-SCNC: 103 MEQ/L (ref 98–111)
CO2: 23 MEQ/L (ref 23–33)
CREAT SERPL-MCNC: 0.9 MG/DL (ref 0.4–1.2)
EOSINOPHIL # BLD: 5.7 %
EOSINOPHILS ABSOLUTE: 0.4 THOU/MM3 (ref 0–0.4)
ERYTHROCYTE [DISTWIDTH] IN BLOOD BY AUTOMATED COUNT: 12.7 % (ref 11.5–14.5)
ERYTHROCYTE [DISTWIDTH] IN BLOOD BY AUTOMATED COUNT: 40.6 FL (ref 35–45)
GFR SERPL CREATININE-BSD FRML MDRD: 59 ML/MIN/1.73M2
GLUCOSE BLD-MCNC: 86 MG/DL (ref 70–108)
HCT VFR BLD CALC: 33.4 % (ref 37–47)
HEMOGLOBIN: 10.5 GM/DL (ref 12–16)
IMMATURE GRANS (ABS): 0.06 THOU/MM3 (ref 0–0.07)
IMMATURE GRANULOCYTES: 0.9 %
LYMPHOCYTES # BLD: 15.9 %
LYMPHOCYTES ABSOLUTE: 1.1 THOU/MM3 (ref 1–4.8)
MCH RBC QN AUTO: 27.6 PG (ref 26–33)
MCHC RBC AUTO-ENTMCNC: 31.4 GM/DL (ref 32.2–35.5)
MCV RBC AUTO: 87.7 FL (ref 81–99)
MONOCYTES # BLD: 16.5 %
MONOCYTES ABSOLUTE: 1.1 THOU/MM3 (ref 0.4–1.3)
NUCLEATED RED BLOOD CELLS: 0 /100 WBC
PLATELET # BLD: 192 THOU/MM3 (ref 130–400)
PMV BLD AUTO: 10.4 FL (ref 9.4–12.4)
POTASSIUM SERPL-SCNC: 4.9 MEQ/L (ref 3.5–5.2)
PREALBUMIN: 8.4 MG/DL (ref 20–40)
RBC # BLD: 3.81 MILL/MM3 (ref 4.2–5.4)
SEG NEUTROPHILS: 60.1 %
SEGMENTED NEUTROPHILS ABSOLUTE COUNT: 4 THOU/MM3 (ref 1.8–7.7)
SODIUM BLD-SCNC: 137 MEQ/L (ref 135–145)
WBC # BLD: 6.7 THOU/MM3 (ref 4.8–10.8)

## 2022-03-18 PROCEDURE — 97162 PT EVAL MOD COMPLEX 30 MIN: CPT

## 2022-03-18 PROCEDURE — 94640 AIRWAY INHALATION TREATMENT: CPT

## 2022-03-18 PROCEDURE — 84134 ASSAY OF PREALBUMIN: CPT

## 2022-03-18 PROCEDURE — 6360000002 HC RX W HCPCS: Performed by: PHYSICAL MEDICINE & REHABILITATION

## 2022-03-18 PROCEDURE — 97110 THERAPEUTIC EXERCISES: CPT

## 2022-03-18 PROCEDURE — 97166 OT EVAL MOD COMPLEX 45 MIN: CPT

## 2022-03-18 PROCEDURE — 36415 COLL VENOUS BLD VENIPUNCTURE: CPT

## 2022-03-18 PROCEDURE — 85025 COMPLETE CBC W/AUTO DIFF WBC: CPT

## 2022-03-18 PROCEDURE — 94760 N-INVAS EAR/PLS OXIMETRY 1: CPT

## 2022-03-18 PROCEDURE — 97535 SELF CARE MNGMENT TRAINING: CPT

## 2022-03-18 PROCEDURE — 2700000000 HC OXYGEN THERAPY PER DAY

## 2022-03-18 PROCEDURE — 6370000000 HC RX 637 (ALT 250 FOR IP): Performed by: PHYSICAL MEDICINE & REHABILITATION

## 2022-03-18 PROCEDURE — 99231 SBSQ HOSP IP/OBS SF/LOW 25: CPT | Performed by: NURSE PRACTITIONER

## 2022-03-18 PROCEDURE — 97116 GAIT TRAINING THERAPY: CPT

## 2022-03-18 PROCEDURE — 1180000000 HC REHAB R&B

## 2022-03-18 PROCEDURE — 97530 THERAPEUTIC ACTIVITIES: CPT

## 2022-03-18 PROCEDURE — 80048 BASIC METABOLIC PNL TOTAL CA: CPT

## 2022-03-18 RX ORDER — BISACODYL 10 MG
10 SUPPOSITORY, RECTAL RECTAL DAILY PRN
Status: DISCONTINUED | OUTPATIENT
Start: 2022-03-18 | End: 2022-03-23

## 2022-03-18 RX ORDER — HYDROCODONE BITARTRATE AND ACETAMINOPHEN 5; 325 MG/1; MG/1
0.5 TABLET ORAL EVERY 4 HOURS PRN
Status: DISCONTINUED | OUTPATIENT
Start: 2022-03-18 | End: 2022-03-29 | Stop reason: HOSPADM

## 2022-03-18 RX ADMIN — LEVOTHYROXINE SODIUM 88 MCG: 0.09 TABLET ORAL at 05:36

## 2022-03-18 RX ADMIN — HYDROCODONE BITARTRATE AND ACETAMINOPHEN 0.5 TABLET: 5; 325 TABLET ORAL at 08:11

## 2022-03-18 RX ADMIN — ACETAMINOPHEN 500 MG: 500 TABLET ORAL at 20:20

## 2022-03-18 RX ADMIN — COLCHICINE 0.6 MG: 0.6 TABLET, FILM COATED ORAL at 20:20

## 2022-03-18 RX ADMIN — POLYETHYLENE GLYCOL 3350 17 G: 17 POWDER, FOR SOLUTION ORAL at 08:12

## 2022-03-18 RX ADMIN — BUDESONIDE 500 MCG: 0.5 INHALANT RESPIRATORY (INHALATION) at 17:42

## 2022-03-18 RX ADMIN — DOCUSATE SODIUM 100 MG: 100 CAPSULE, LIQUID FILLED ORAL at 20:20

## 2022-03-18 RX ADMIN — COLCHICINE 0.6 MG: 0.6 TABLET, FILM COATED ORAL at 08:12

## 2022-03-18 RX ADMIN — DOCUSATE SODIUM 100 MG: 100 CAPSULE, LIQUID FILLED ORAL at 08:12

## 2022-03-18 RX ADMIN — ATORVASTATIN CALCIUM 10 MG: 10 TABLET, FILM COATED ORAL at 20:20

## 2022-03-18 RX ADMIN — MAGNESIUM HYDROXIDE 30 ML: 400 SUSPENSION ORAL at 08:13

## 2022-03-18 RX ADMIN — APIXABAN 5 MG: 5 TABLET, FILM COATED ORAL at 20:20

## 2022-03-18 RX ADMIN — METOPROLOL TARTRATE 12.5 MG: 25 TABLET, FILM COATED ORAL at 20:20

## 2022-03-18 RX ADMIN — APIXABAN 5 MG: 5 TABLET, FILM COATED ORAL at 08:12

## 2022-03-18 RX ADMIN — KETOROLAC TROMETHAMINE 15 MG: 30 INJECTION, SOLUTION INTRAMUSCULAR; INTRAVENOUS at 05:35

## 2022-03-18 RX ADMIN — POLYETHYLENE GLYCOL 3350 17 G: 17 POWDER, FOR SOLUTION ORAL at 20:20

## 2022-03-18 ASSESSMENT — PAIN SCALES - GENERAL
PAINLEVEL_OUTOF10: 5
PAINLEVEL_OUTOF10: 5
PAINLEVEL_OUTOF10: 4
PAINLEVEL_OUTOF10: 4

## 2022-03-18 ASSESSMENT — PAIN DESCRIPTION - ORIENTATION: ORIENTATION: LEFT

## 2022-03-18 ASSESSMENT — PAIN DESCRIPTION - PAIN TYPE: TYPE: ACUTE PAIN

## 2022-03-18 ASSESSMENT — PAIN DESCRIPTION - LOCATION: LOCATION: KNEE

## 2022-03-18 NOTE — PROGRESS NOTES
[address] [id number]  EVALUATION    Time:    Time In: 1100  Time Out: 1200  Timed Code Treatment Minutes: 40 Minutes  Minutes: 60          Date: 3/18/2022  Patient Name: Gladys Chávez,   Gender: female      MRN: [id number]  : 1935  (80 y.o.)  Referring Practitioner: Dr. Kin Desai  Diagnosis: Pseudogout of left knee  Additional Pertinent Hx: Per H&P Gray Nieves  is a 80 y.o. female admitted to the inpatient rehabilitation unit on 3/17/2022. She was originally admitted to [address] [place] [address] on 3/15/2022 with a past medical history of advanced osteoarthritis, idiopathic pulmonary fibrosis on 2L of home oxygen, bronchiectasis, hypertension, hyperlipidemia, and secondary hyperparathyroidism. She  presented to ED for evaluation of left knee pain x previous 2 days. Pain at that time was at 10/10. She could not ambulate, and was unable to lift left lower extremity due to severe pain. Patient had left SI joint injection on 2022. She reported no fever or chills. No chest pain. No abdominal pain. No diarrhea. No urinary symptoms. She had prior bilateral total knee replacements, the left one completed in  and the right in . She noticed slightly increased swelling to left knee. Denied recent left knee trauma or injury. No change of bowl and bladder functions. She lives at an assisted living. At baseline she walks without assistance. In the setting of a total knee arthroplasty with knee pain and effusion, her knee was aspirated in the ED for 30 ml of yellow cloudy fluid. Her aspiration showed WBC in the joint fluid. She also had calcium pyrophosphate crystals. White count was 23,350 with 90% polys. She denied any fever or chills and had noprevious history of infection. She was diagnosed with pseudogout, started on Toradol for 2 days then transitioned to Naproxen 250 mg bid.   Colchicine added at 0.6 mg in [facility] and one is in [place]    VISION:Corrected    HEARING:  WFL    COGNITION: WFL 15/15 BIMS    RANGE OF MOTION:  Bilateral Upper Extremity:  WNL    STRENGTH:  Bilateral Upper Extremity:  Deconditioning overall     SENSATION:   WFL    ADL:   EATING:Independent. [place] Case CARE Score: 6. ORAL HYGIENE:Supervision or touching assistance. CGA. CARE Score: 4. TOILETING HYGIENE:Supervision or touching assistance. CGA. CARE Score: 4. SHOWERING/BATHING:Partial/moderate assistance. A with feet. CARE Score: 3.     UPPER BODY DRESSING:Partial/moderate assistance. A with nightgown sleeves,. CARE Score: 3. LOWER BODY DRESSING:Partial/moderate assistance. [place] Case CARE Score: 3. FOOTWEAR:Dependent   . CARE Score: 1.     TOILET TRANSFER: Supervision or touching assistance. CGA. CARE Score: 4. BALANCE:  Sitting Balance:  Stand By Assistance. Standing Balance: Contact Guard Assistance. TRANSFERS:  Sit to Stand:  [employer], with increased time for completion. Stand to Sit: Contact Guard Assistance. FUNCTIONAL MOBILITY:  Assistive Device: Rolling Walker  Assist Level:  Contact Guard Assistance. Distance: To and from bathroom     Activity Tolerance:  Patient tolerance of  treatment: good. Limited by decreased endurance and knee pain        Assessment:  Assessment: Pt presents to occupational therapy following pseudogout with patient complaints of knee pain, weakness and decreased endurance impacting patient's ability to complete self care at prior level of functioning. Due to patient's performance deficits, patient would greatly benefit from continued occupational therapy for ADL remediation, endurance building, strengthening and adaptive strategies to return to prior level of functioning and safe return to home environment.    Performance deficits / Impairments: Decreased functional mobility ,Decreased ADL status,Decreased strength,Decreased endurance,Decreased balance,Decreased safe awareness,Decreased high-level IADLs  Prognosis: Good  Decision Making: Medium Complexity  Safety Devices in place: Yes  Type of devices: All fall risk precautions in place,Call light within reach,Gait belt,Left in chair    Treatment Initiated: Treatment and education initiated within context of evaluation. Evaluation time included review of current medical information, gathering information related to past medical, social and functional history, completion of standardized testing, formal and informal observation of tasks, assessment of data and development of plan of care and goals. Treatment time included skilled education and facilitation of tasks to increase safety and independence with ADL's for improved functional independence and quality of life. Discharge Recommendations:  Home with Home health OT    Patient Education:  OT Education: Tim  of [place] PATEL/ Jaam Das [facility] [employer],Transfer Training    Equipment Recommendations: Other: Pt has a shower seat, grab bars, hand held shower, ADA height toilet and half wall nearby. Plan:  Times per week: 5x/wk for 90 min and 1x/wk for 30 min  Current Treatment Recommendations: Strengthening,Functional Mobility Training,Balance Training,Endurance Training,Patient/Caregiver Education & Training,Equipment Evaluation, Education, & procurement,Safety Education & Training,Self-Care / ADL,Home Management Training. See long-term goal time frame for expected duration of plan of care. If no long-term goals established, a short length of stay is anticipated. Goals:  Patient goals : Decrease pain  Short term goals  Time Frame for Short term goals: 1 week  Short term goal 1: Pt will complete BUE strengthening exercises with min vcs for technique to increase indep and endurance with all self cares.   Short term goal 2: Pt will complete standing tolerance x 5 minutes with 1-2 UE release and SBA to increase indep with sinkside grooming. Short term goal 3: Pt will complete functional mobiity to/from BR with SBA and 0 vcs for safety to increase indep with toileting. Short term goal 4: Pt will complete LB dressing with LHAE PRN and min A to increase indep and endurance within home environment. Long term goals  Time Frame for Long term goals : 2 weeks  Long term goal 1: Pt will complete BADL routine with mod I using LHAE prn to return to PLOF. Long term goal 2: Pt will complete light IADL tasks with mod I using ECT prn to improve indpe within her condo. Following session, patient left in safe position with all fall risk precautions in place.

## 2022-03-18 NOTE — PROGRESS NOTES
Admitted to the Inpatient Rehabilitation Unit via wheelchair. Patient was then oriented to room and unit. Education provided on the rehabilitation routine: three hours of therapy five days per week and dining room for lunch. Explained patients right to have family, representative or physician notified of their admission. Patient has Declined for physician to be notified. Patient has Declined for family/representative to be notified. Admitting medication orders compared with acute stay medications; home medication list reviewed with patient/family. Medication issues identified No  Medication issue: none  If yes, physician notified Dr Lyudmila Chiu. Bladder and Bowel Function Assessment:  1. Prior history of bladder problems: no problems with bladder  2. Number of pads used per day: 1  3. Frequency of night time voiding: once  4. Fluid intake volume and pattern: adequate   5. Last BM: 3/13/2022  6. Bowel problems (prior or current) No      Incontinence      Frequent diarrhea      No BM in 3 days this stay or history of constipation      Hemorrhoids      Diverticulitis      Bowel Surgery     Two nurse skin assessment performed by NOBLE Gaffney LPN  and GIFTY Mcbride LPN . Weight: 161.2 lbs      Care plan was created with patient's input and goals were agreed upon. Admission folder provided with education regarding patients diagnoses, fall prevention, skin care, and M in the box. \"Data Collection Information Summary for Patients in Inpatient Rehabilitation Facilities\" and \"Privacy Act Statement - Health Care Records\" provided. Please refer to the admission navigator for further information.

## 2022-03-18 NOTE — PROGRESS NOTES
[facility]  Individualized Disclosure Statement      Patient: Estella Grace      Scope of Service  [facility] provides 24 hour individualized service to patients with functional limitations due to, but not limited to: stroke, brain injury, spinal cord injury, major multiple trauma, fractures, amputation, and neurological disorders. The [address] [place] [address] provides rehabilitative nursing and medical services as well as physical, occupational, speech, and recreation therapies. [address] [facility] is fully accredited by the Commission on Accreditation of Rehabilitation Facilities (CARF) as a comprehensive provider of rehabilitation services. Patients admitted to the [address] [place] [address] receive a minimum of three hours of therapy per day, at least six days per week, with a revised therapy schedule on weekends and holidays. Physical therapy, occupational therapy, and speech therapy are provided seven days per week including holidays. Other therapeutic services are available on weekends and evenings as needed or scheduled. Intensity of Treatment  Your treatment program will consist of Nursing Care and:  1.5 hours of Physical Therapy, per day  1.5 hours of Occupational Therapy, per day   30-60 minutes of Speech Therapy, per day  1 hour of Recreational Therapy, per week    [facility] maintains contracts with most insurance plans. Depending on the type of coverage, the insurance may impose limits on the coverage for rehabilitation care. Coverage is based on the premise that you are able to fully participate in the rehabilitation program and show continued progress. Please verify your own insurance information A copy of this was given to the patient/ family on this date.   Insurance Coverage  Your insurance company has made the following determination relative to the length of your stay:   Your estimated length of stay is 14 days   Your insurance Coverage has been verified as follows:    Primary Insurance: Payor: Maricarmen Cabrera /  /  /    Gabriel Primes:   [id number] Coverage: Active  Secondary Insurance:[employer]  secondary insurance policies often cover co-pay amounts, but to ensure payment please contact your insurance company.     Alternative Resources: Please ask the  for more information [phone]

## 2022-03-18 NOTE — PLAN OF CARE
[facility]  Physical Medicine Case Management Assessment    [x] Inpatient Rehabilitation Unit  [] Transitional Care Unit    Patient Name: Anton Cornejo        MRN: [id number]    : 1935  (80 y.o.)  Gender: female   Date of Admission: 3/17/2022  6:51 PM    Family/Social/Home Environment: Social/Functional History  Prior to admission patient dressed, showered and could make sandwiches for meals. Patient has a cleaning lady that also picks up groceries and takes patient to appointments. Patient has lived alone for around 40 years. 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 For a support system patient has patients sons, friends and neighbors. Patient sees one of the support team members every 3 to 4 days but communicates with someone daily. Dr. Senait Kay is patients family doctor. Patient was on blood thinners prior to admission and Dr. Salma Islas monitors them. Patient was not diabetic prior to admission and prefers [facility] for a pharmacy. Patient did not use any devices prior to admission and biggest concern is that patient won't be totally independent. After discharge Chiqui Farmer (patients cleaning lady) or [employer] will handle transportation needs.   Lives With: Alone  Type of Home:  (Condo)  Home Layout: One level  Home Access: Stairs to enter with rails  Entrance Stairs - Number of Steps: 2  Entrance Stairs - Rails: Right  Bathroom Shower/Tub: Walk-in shower  Bathroom Toilet: Handicap height  Bathroom Equipment: Grab bars in shower,Tub transfer bench  Bathroom Accessibility: Accessible  Home Equipment: [place] Help From: Friend(s)  ADL Assistance: Independent  Homemaking Assistance: Independent  Homemaking Responsibilities: Yes  Ambulation Assistance: Independent  Transfer Assistance: Independent  Active : Yes  Occupation: Retired  Additional Comments: Patient reports she was at [facility] for about a week due to increased difficulty with mobiltiy however prior lived in a condo independently and would like to return to her condo upon discharge. Patient reports she is on 2L O2 at baseline. Patient reports she recently had to utilize a cane for ambulation due to increased complaints of pain in L knee however prior she did not use any AD for mobility. Patient reported she did not have to ambulate far distances. Patient reports she has two sons however one is in [facility] and one is in [place]    Contact/Guardian Information:      [address]: No community resources utilized prior to admission. Sexuality/Intimacy: No issues or concerns identifies at time of SW assessment. Complementary Health Approaches: Patient with no current interest in complementary health approaches at time of SW assessment. Anticipated Needs/Discharge Plans: Anticipate patient would benefit from continued therapy upon discharge.   Services After Discharge  Services At/After Discharge: [facility] ([facility] [phone])       Discharge Planning  Living Arrangements: Other (Comment)  Support Systems: Family Members  Potential Assistance Needed: Skilled Nursing Facility  Potential Assistance Purchasing Medications: No  Type of Home Care Services: [facility]  Patient expects to be discharged to[de-identified] Skilled nursing facility  Expected Discharge Date: 03/24/22      Lawrence Almanzar 3/18/2022 1:34 PM  Electronically signed by CIARA Almanzar on 3/18/2022 at 1:51 PM

## 2022-03-18 NOTE — CARE COORDINATION
3/18/22, 7:29 AM EDT    Patient goals/plan/ treatment preferences discussed by  and . Patient goals/plan/ treatment preferences reviewed with patient/ family. Patient/ family verbalize understanding of discharge plan and are in agreement with goal/plan/treatment preferences. Understanding was demonstrated using the teach back method. AVS provided by RN at time of discharge, which includes all necessary medical information pertaining to the patients current course of illness, treatment, post-discharge goals of care, and treatment preferences. Services After Discharge  Services At/After Discharge: [facility] ([facility] [address])           Pt discharged to [address] [place] [address] last night. SW contacted [employer] with [facility] and updated on this.

## 2022-03-18 NOTE — PLAN OF CARE
Care plan reviewed with patient and verbalize understanding of the plan of care and contribute to goal setting. Problem: Skin Integrity:  Goal: Will show no infection signs and symptoms  Description: Will show no infection signs and symptoms  3/18/2022 0959 by Charle Essex, LPN  Outcome: Ongoing  Note: Shows no s/sx of infection. Afebrile. Will continue to monitor. Problem: Skin Integrity:  Goal: Absence of new skin breakdown  Description: Absence of new skin breakdown  Outcome: Ongoing  Note: No new skin breakdown noted. Will continue to monitor. Problem: Falls - Risk of:  Goal: Will remain free from falls  Description: Will remain free from falls  3/18/2022 0959 by Charle Essex, LPN  Outcome: Ongoing  Note: Patient uses front wheel walker and gait belt to ambulate.  Tolerates well   3/18/2022 0456 by Archana Velez LPN  Outcome: Ongoing

## 2022-03-18 NOTE — CONSULTS
Physical Medicine & Rehabilitation   Consult Note      Admitting Physician: Justine Morrison MD    Primary Care Provider: Smith Watson MD     Reason for Consult:  Left knee pain secondary to pseudogout    History of Present Illness:  Denney Heimlich is a 80 y.o. female admitted to [facility] on 3/17/2022 with a past medical history of advanced osteoarthritis, idiopathic pulmonary fibrosis on 2L of home oxygen, bronchiectasis, hypertension, hyperlipidemia, and secondary hyperparathyroidism. She  presented to ED for evaluation of left knee pain x previous 2 days.       Pain at that time was at 10/10. She could not ambulate, and was unable to lift left lower extremity due to severe pain.       Patient had left SI joint injection on 2/22/2022. She reported no fever or chills. No chest pain. No abdominal pain. No diarrhea. No urinary symptoms. She had prior bilateral total knee replacements, the left one completed in 2012 and the right in 2014. She noticed slightly increased swelling to left knee. Denied recent left knee trauma or injury.     No change of bowl and bladder functions.      She lives at an assisted living. At baseline she walks without assistance. In the setting of a total knee arthroplasty with knee pain and effusion, her knee was aspirated in the ED for 30 ml of yellow cloudy fluid. Her aspiration showed WBC in the joint fluid. She also had calcium pyrophosphate crystals. White count was 23,350 with 90% polys. She denied any fever or chills and had no  previous history of infection. She was diagnosed with pseudogout, started on Toradol for 2 days then transitioned to Naproxen 250 mg bid. Colchicine added at 0.6 mg bid. Was on Dilaudid 1mg q 4 hours IV prn, and Flexeril 10 mg po tid prn. Also on Norco 5/325 one po q 8 hours prn. She also has a pin point area of skin irritation on her coccyx.       Current Rehabilitation Assessments:  PT:      OBJECTIVE:  Bed Mobility:  Not Tested     Transfers:  Sit to Stand: Minimal Assistance, with increased time for completion, cues for hand placement, with verbal cues  Stand to Sit:Minimal Assistance, with increased time for completion, cues for hand placement, with verbal cues   Adeline Carballo PT present to assess transfers and gait during session.      Ambulation:  Minimal Assistance  Distance: ~15ft x2  Surface: Level Tile  Device:Rolling Walker  Gait Deviations: Forward Flexed Posture, Slow Jaimee, Decreased Step Length Bilaterally, Decreased Weight Shift Right, Decreased Gait Speed, Decreased Heel Strike Bilaterally, Mild Path Deviations, Unsteady Gait and left LE pain, impulsive to get to bathroom     Adeline Carballo PT present to assess transfers and gait during session.      Balance:  Dynamic Standing Balance: Contact Guard Assistance, during clothing management and sink tasks     Exercise:  Patient was guided in 1 set(s) 10 reps of exercise to both lower extremities. Ankle pumps, Glut sets, Heelslides, Hip abduction/adduction, Straight leg raises, Seated marches and Long arc quads. Exercises were completed for increased independence with functional mobility.     Functional Outcome Measures: Completed  AM-PAC Inpatient Mobility Raw Score : 16  AM-PAC Inpatient T-Scale Score : 40.78     ASSESSMENT:  Assessment: Patient progressing toward established goals. Activity Tolerance:  Patient tolerance of  treatment: good. Equipment Recommendations:Equipment Needed: No      OT:      COGNITION: Decreased Insight, Impaired Memory, Decreased Problem Solving and Decreased Safety Awareness     RANGE OF MOTION:  Bilateral Upper Extremity:  WFL     STRENGTH:  Bilateral Upper Extremity:  Impaired - deconditioned     SENSATION:   WFL     ADL:   Grooming: Stand By Assistance and with set-up. to wash/dry face while seated. Bathing: Moderate Assistance. Assist to wash/dry B feet, LEs, and tremayne area. Upper Extremity Dressing: Minimal Assistance. to don hospital gown. Lower Extremity Dressing: Dependent. Toileting: Maximum Assistance. Pt external cath leaked prior to arrival. Pt required assistance for tremayne care.      BALANCE:  Sitting Balance:  Stand By Assistance. Standing Balance: Contact Guard Assistance.       BED MOBILITY:  Supine to Sit: Minimal Assistance    Scooting: Contact Guard Assistance       TRANSFERS:  Sit to Stand:  Minimal Assistance, with increased time for completion, cues for hand placement. Stand to Sit: Contact Guard Assistance.       FUNCTIONAL MOBILITY:  Assistive Device: Rolling Walker  Assist Level:  Minimal Assistance, with verbal cues  and with increased time for completion.    Distance: EOB to recliner  Slow pace, No LOB.       Activity Tolerance:  Patient tolerance of  treatment: good.            Past Medical History:        Diagnosis Date    Anemia     normal on pre-op 5/2012 and 12/2/13    Backache     h/o    Bronchiectasis [facility] 2013    Bursitis     bilateral shoulders    Constipation     Diverticulosis of colon     HTN (hypertension)     Hypercalcemia     slightly elevated at 10.8 pre-op 12/2/13    Hyperlipidemia     Nodular goiter     bx negative    OA (osteoarthritis)     bilateral thumbs - sees Dr. Joe Peres Osteopenia 11/6/2013    Parathyroid adenoma 4/30/2013    Pneumonia of right upper lobe due to infectious organism     Pneumonitis     Dr. Miguel Angel Juarez in 7/2010 - bx negative    Pulmonary Mycobacterium avium complex (MAC) infection ([facility]     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 ([facility] [address] [facility]     had one parathyroid removed - now follows with Dr. Halle Sullivan Sinusitis     with seasonal allergies    Vitamin D deficiency 05/2018       Past Surgical History:        Procedure Laterality Date    ABDOMINAL EXPLORATION SURGERY  01/02/2013    Release of KATHERINE TAMAYO-Dr. Ryan Berkowitz    APPENDECTOMY  1961    BACK INJECTION Bilateral 12/28/2021    bilateral sacroiliac joint injection performed by Enrique NAILS DO Cristi at [address] INJECTION N/A 2022    B/L SI joint block performed by Venancio Beth DO at [address] N/A 2020    BRONCHOSCOPY FLUOROSCOPY performed by Leni Cartwright MD [facility]    CATARACT REMOVAL WITH IMPLANT Bilateral  ?   SECTION  8972,1363    DILATION AND CURETTAGE OF UTERUS  2244,3211,7495    EXCISION OF PARATHYROID MASS Right 2013    rt parathyroidectomy (excision of rt inferior parathyroid mass) exploration of neck     FOOT SURGERY  2002    left    FOOT SURGERY Left 2017    Dr Fer Fontanez  6-    left knee    JOINT REPLACEMENT  2014    right knee    MALIGNANT SKIN LESION EXCISION Left 2017    [facility] DORSAL FOOT WITH GRAFT & FS    GOSIA AND BSO  1982    TONSILLECTOMY AND ADENOIDECTOMY  194 ?  TOTAL KNEE ARTHROPLASTY Right 2014    OIO       Allergies:     Allergies   Allergen Reactions    Levaquin [Levofloxacin] Other (See Comments)     Hallucinations    Percocet [Oxycodone-Acetaminophen] Nausea Only and Other (See Comments)     Affects memory    Rifampin Other (See Comments)     Lost half of vision    Sulfa Antibiotics Other (See Comments)     hallucinations    Cefuroxime Diarrhea, Nausea And Vomiting and Other (See Comments)     cramping        Current Medications:   Current Facility-Administered Medications: magnesium hydroxide (MILK OF MAGNESIA) 400 MG/5ML suspension 30 mL, 30 mL, Oral, Daily PRN  acetaminophen (TYLENOL) tablet 500 mg, 500 mg, Oral, Q6H  amLODIPine (NORVASC) tablet 2.5 mg, 2.5 mg, Oral, Daily  apixaban (ELIQUIS) tablet 5 mg, 5 mg, Oral, BID  atorvastatin (LIPITOR) tablet 10 mg, 10 mg, Oral, Nightly  budesonide (PULMICORT) nebulizer suspension 500 mcg, 0.5 mg, Nebulization, BID  colchicine (COLCRYS) tablet 0.6 mg, 0.6 mg, Oral, BID  cyclobenzaprine (FLEXERIL) tablet 10 mg, 10 mg, Oral, TID PRN  docusate sodium (COLACE) capsule 100 mg, 100 mg, Oral, BID  HYDROcodone-acetaminophen (NORCO) 5-325 MG per tablet 0.5 tablet, 0.5 tablet, Oral, Q8H PRN  ketorolac (TORADOL) injection 15 mg, 15 mg, IntraVENous, Q6H  levothyroxine (SYNTHROID) tablet 88 mcg, 88 mcg, Oral, Daily  metoprolol tartrate (LOPRESSOR) tablet 12.5 mg, 12.5 mg, Oral, BID  polyethylene glycol (GLYCOLAX) packet 17 g, 17 g, Oral, BID    Social History:  Social History     Socioeconomic History    Marital status:      Spouse name: Not on file    Number of children: Not on file    Years of education: Not on file    Highest education level: Not on file   Occupational History    Occupation: retired   Tobacco Use    Smoking status: Never Smoker    Smokeless tobacco: Never Used   Vaping Use    Vaping Use: Never used   Substance and Sexual Activity    Alcohol use: Yes     Alcohol/week: 0.0 standard drinks     Comment: socially-1 glassof wine 2-3 times a month    Drug use: No    Sexual activity: Never   Other Topics Concern    Not on file   Social History Narrative    Not on file     Social Determinants of Health     Financial Resource Strain: Low Risk     Difficulty of Paying Living Expenses: Not hard at all   Food Insecurity: No Food Insecurity    Worried About Running Out of Food in the Last Year: Never true    Vanesa of Food in the Last Year: Never true   Transportation Needs: No Transportation Needs    Lack of Transportation (Medical): No    Lack of Transportation (Non-Medical):  No   Physical Activity:     Days of Exercise per Week: Not on file    Minutes of Exercise per Session: Not on file   Stress:     Feeling of Stress : Not on file   Social Connections:     Frequency of Communication with Friends and Family: Not on file    Frequency of Social Gatherings with Friends and Family: Not on file    Attends [employer] Services: Not on file    Active Member of Clubs or Organizations: Not on file    Attends Club or Organization Meetings: Not on file    Marital Status: Not on file   Intimate Partner Violence:     Fear of Current or Ex-Partner: Not on file    Emotionally Abused: Not on file    Physically Abused: Not on file    Sexually Abused: Not on file   Housing Stability:     Unable to Pay for Housing in the Last Year: Not on file    Number of [place] in the Last Year: Not on file    Unstable Housing in the Last Year: Not on file     Type of Home: Facility ([facility])  Home Layout: One level  Home Access: Level entry  Home Equipment: Rolling walker,Oxygen   [address] Shower/Tub: Walk-in shower  Bathroom Toilet: Handicap height  Bathroom Equipment: Built-in shower seat,Grab bars in shower  Bathroom Accessibility: Accessible  ADL Assistance: [address] [place] [address] Avenue: Independent (Cleaning lady comes 1x/week.)  Homemaking Responsibilities: Yes  Ambulation Assistance: Independent  Transfer Assistance: Independent     Active : Yes  Occupation: Retired  Additional Comments: Pt states has been at [facility] for rehab for about one week, has not been able to do much because of pain; prior to that, living at home alone, 2 L O2 at baseline    Family History:       Problem Relation Age of Onset    Diabetes Mother     Thyroid Disease Mother     Arthritis Mother     High Blood Pressure Mother     Arthritis Father     High Blood Pressure Father     Other Father         hay fever    Breast Cancer Neg Hx     Ovarian Cancer Neg Hx        Review of Systems:  CONSTITUTIONAL:  positive for  fatigue  EYES:  Wears glasses  HEENT:  negative  RESPIRATORY:  On 2L supplemental O2 per [place] as at home for pulmonary fibrosis  CARDIOVASCULAR:  negative  GASTROINTESTINAL:  Bowels have not moved since admission  GENITOURINARY:  negative  SKIN:  negative  HEMATOLOGIC/LYMPHATIC:  negative  MUSCULOSKELETAL:  negative  NEUROLOGICAL:  negative  BEHAVIOR/PSYCH:  negative  System review otherwise negative    Physical Exam:  /62   Pulse 63   Temp 98.3 °F (36.8 °C) (Oral)   Resp 16   Ht 5' 4\" (1.626 m)   Wt 161 lb 3.2 oz (73.1 kg)   SpO2 96%   BMI 27.67 kg/m²   awake  Orientation:   person, place, time  Mood: within normal limits  Affect: calm and spontaneous  General appearance: well groomed and in no acute distress    Memory:   normal,   Attention/Concentration: normal  Language:  normal    Cranial Nerves:  cranial nerves II-XII are grossly intact  ROM:  abnormal - decreased at left knee  Motor Exam:  Motor exam is symmetrical 5- out of 5 all extremities bilaterally  Tone:  normal  Muscle bulk: within normal limits  Sensory:  Sensory intact  Coordination:   normal  Deep Tendon Reflexes:  Reflexes are intact and symmetrical bilaterally  Plantar Response:  Right:  downgoing  Left:  downgoing  Gait: not tested    Heart: normal rate, regular rhythm, normal S1, S2, no murmurs, rubs, clicks or gallops  Lungs: clear to auscultation without wheezes or rales  Abdomen: soft, non-tender, non-distended, normal bowel sounds, no masses or organomegaly    Skin: warm and dry, no rash or erythema  Peripheral vascular: Pulses: Normal upper and lower extremity pulses; Edema: no      Diagnostics:  Recent Results (from the past 24 hour(s))   Basic Metabolic Panel    Collection Time: 03/17/22  7:21 AM   Result Value Ref Range    Sodium 134 (L) 135 - 145 meq/L    Potassium 4.9 3.5 - 5.2 meq/L    Chloride 99 98 - 111 meq/L    CO2 25 23 - 33 meq/L    Glucose 93 70 - 108 mg/dL    BUN 24 (H) 7 - 22 mg/dL    CREATININE 1.2 0.4 - 1.2 mg/dL    Calcium 9.2 8.5 - 10.5 mg/dL   CBC with Auto Differential    Collection Time: 03/17/22  7:21 AM   Result Value Ref Range    WBC 7.3 4.8 - 10.8 thou/mm3    RBC 3.86 (L) 4.20 - 5.40 mill/mm3    Hemoglobin 10.6 (L) 12.0 - 16.0 gm/dl    Hematocrit 33.7 (L) 37.0 - 47.0 %    MCV 87.3 81.0 - 99.0 fL    MCH 27.5 26.0 - 33.0 pg    MCHC 31.5 (L) 32.2 - 35.5 gm/dl    RDW-CV 12.5 11.5 - 14.5 % RDW-SD 39.9 35.0 - 45.0 fL    Platelets 666 684 - 815 thou/mm3    MPV 10.1 9.4 - 12.4 fL    Seg Neutrophils 62.8 %    Lymphocytes 13.9 %    Monocytes 18.8 %    Eosinophils 3.3 %    Basophils 0.5 %    Immature Granulocytes 0.7 %    Segs Absolute 4.6 1.8 - 7.7 thou/mm3    Lymphocytes Absolute 1.0 1.0 - 4.8 thou/mm3    Monocytes Absolute 1.4 (H) 0.4 - 1.3 thou/mm3    Eosinophils Absolute 0.2 0.0 - 0.4 thou/mm3    Basophils Absolute 0.0 0.0 - 0.1 thou/mm3    Immature Grans (Abs) 0.05 0.00 - 0.07 thou/mm3    nRBC 0 /100 wbc   Anion Gap    Collection Time: 03/17/22  7:21 AM   Result Value Ref Range    Anion Gap 10.0 8.0 - 16.0 meq/L   Glomerular Filtration Rate, Estimated    Collection Time: 03/17/22  7:21 AM   Result Value Ref Range    Est, Glom Filt Rate 43 (A) ml/min/1.73m2           Impression:  · Left knee pain secondary to pseudogout  · S/p total left knee arthroplasty 2012  · Pancreatic ductal dilatation- noted on CT  · 1 cm calcification in the mid pancreatic body- noted on CT  · Osteoarthritis  · Osteopenia  ·  Chronic respiratory failure  ·  H/O HTN  · HLD  · Diverticulosis  · Chronic constipation  · Hyponatremia  · Bursitis bilateral shoulders  · Constipation  · Diverticulitis of colon  · Parathyroid adenoma  · 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  · Vitamin D deficiency      Recommendations:  · Believe patient is an excellent candidate for nd will benefit from admission to the IPR Unit   · Planning admission to Rehab this evening  · Bowel program  · Pain management    It was my pleasure to evaluate Tom Barnes today. Please call with questions.     Rosie Mendiola MD

## 2022-03-18 NOTE — CARE COORDINATION
Late entry for 3/17/22    3/18/22, 10:57 AM EDT    Patient goals/plan/ treatment preferences discussed by  and . Patient goals/plan/ treatment preferences reviewed with patient/ family. Patient/ family verbalize understanding of discharge plan and are in agreement with goal/plan/treatment preferences. Understanding was demonstrated using the teach back method. AVS provided by RN at time of discharge, which includes all necessary medical information pertaining to the patients current course of illness, treatment, post-discharge goals of care, and treatment preferences. Services After Discharge  Services At/After Discharge: Rivka MORRELL [facility] ([place])   IMM Letter  IMM Letter given to Patient/Family/Significant other/Guardian/POA/by[de-identified] admitting  IMM Letter date given[de-identified] 03/15/22  IMM Letter time given[de-identified] 9845   Discharged to [facility].

## 2022-03-18 NOTE — H&P
Assessments:  PT:      OBJECTIVE:  Bed Mobility:  Not Tested     Transfers:  Sit to Stand: Minimal Assistance, with increased time for completion, cues for hand placement, with verbal cues  Stand to Sit:Minimal Assistance, with increased time for completion, cues for hand placement, with verbal cues   Sol Buerger, PT present to assess transfers and gait during session.      Ambulation:  Minimal Assistance  Distance: ~15ft x2  Surface: Level Tile  Device:Rolling Walker  Gait Deviations: Forward Flexed Posture, Slow Jaimee, Decreased Step Length Bilaterally, Decreased Weight Shift Right, Decreased Gait Speed, Decreased Heel Strike Bilaterally, Mild Path Deviations, Unsteady Gait and left LE pain, impulsive to get to bathroom     Sol Buerger, PT present to assess transfers and gait during session.      Balance:  Dynamic Standing Balance: Contact Guard Assistance, during clothing management and sink tasks     Exercise:  Patient was guided in 1 set(s) 10 reps of exercise to both lower extremities. Ankle pumps, Glut sets, Heelslides, Hip abduction/adduction, Straight leg raises, Seated marches and Long arc quads. Exercises were completed for increased independence with functional mobility.     Functional Outcome Measures: Completed  AM-PAC Inpatient Mobility Raw Score : 16  AM-PAC Inpatient T-Scale Score : 40.78     ASSESSMENT:  Assessment: Patient progressing toward established goals. Activity Tolerance:  Patient tolerance of  treatment: good.       Equipment Recommendations:Equipment Needed: No  Discharge Recommendations: Continue to assess pending progress, Inpatient Rehabilitation and Patient would benefit from continued PT at discharge  Plan: Times per week: 5x O  Current Treatment Recommendations: Strengthening       OT:      COGNITION: Decreased Insight, Impaired Memory, Decreased Problem Solving and Decreased Safety Awareness     RANGE OF MOTION:  Bilateral Upper Extremity:  WFL     STRENGTH:  Bilateral Upper Extremity: Impaired - deconditioned     SENSATION:   WFL     ADL:   Grooming: Stand By Assistance and with set-up. to wash/dry face while seated. Bathing: Moderate Assistance. Assist to wash/dry B feet, LEs, and tremayne area. Upper Extremity Dressing: Minimal Assistance. to don hospital gown. Lower Extremity Dressing: Dependent. Toileting: Maximum Assistance. Pt external cath leaked prior to arrival. Pt required assistance for tremayne care.      BALANCE:  Sitting Balance:  Stand By Assistance. Standing Balance: Contact Guard Assistance.       BED MOBILITY:  Supine to Sit: Minimal Assistance    Scooting: Contact Guard Assistance       TRANSFERS:  Sit to Stand:  Minimal Assistance, with increased time for completion, cues for hand placement. Stand to Sit: Contact Guard Assistance.       FUNCTIONAL MOBILITY:  Assistive Device: Rolling Walker  Assist Level:  Minimal Assistance, with verbal cues  and with increased time for completion. Distance: EOB to recliner  Slow pace, No LOB.       Activity Tolerance:  Patient tolerance of  treatment: good.        Assessment:  Assessment: This 80year old female requires skilled OT intervention to increase indep and endurance with all self cares, transfers, mobility, IADLs to return to Indep PLOF. Without skilled OT intervention pt is at increased risk for falls.   Performance deficits / Impairments: Decreased functional mobility ,Decreased ADL status,Decreased strength,Decreased endurance,Decreased balance,Decreased safe awareness,Decreased high-level IADLs  Prognosis: Good  REQUIRES OT FOLLOW UP: Yes       Past Medical History:      Diagnosis Date    Anemia     normal on pre-op 5/2012 and 12/2/13    Backache     h/o    Bronchiectasis ([facility] [address] 2013    Bursitis     bilateral shoulders    Constipation     Diverticulosis of colon     HTN (hypertension)     Hypercalcemia     slightly elevated at 10.8 pre-op 12/2/13    Hyperlipidemia     Nodular goiter     bx negative    OA (osteoarthritis)     bilateral thumbs - sees Dr. Enoch Modi Osteopenia 2013    Parathyroid adenoma 2013    Pneumonia of right upper lobe due to infectious organism     Pneumonitis     Dr. Georgiana Jeans in 2010 - bx negative    Pulmonary Mycobacterium avium complex (MAC) infection ([facility] [address]     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 ([facility] [address]     had one parathyroid removed - now follows with Dr. Sherice Cornelius    Sinusitis     with seasonal allergies    Vitamin D deficiency 2018       Primary care provider: Zaire Amador MD     Past Surgical History:      Procedure Laterality Date    ABDOMINAL EXPLORATION SURGERY  2013    Release of SBO, KATHERINE-Dr. Franklin Jordan    APPENDECTOMY      BACK INJECTION Bilateral 2021    bilateral sacroiliac joint injection performed by Shemar Parks DO at [address] N/A 2022    B/L SI joint block performed by Shemar Parks DO at [address] 2020    BRONCHOSCOPY FLUOROSCOPY performed by Cindy Hernandez MD at [address] [facility]    CATARACT REMOVAL WITH IMPLANT Bilateral  ?   SECTION  [id number]    DILATION AND CURETTAGE OF UTERUS  [id number]    EXCISION OF PARATHYROID MASS Right 2013    rt parathyroidectomy (excision of rt inferior parathyroid mass) exploration of neck     FOOT SURGERY      left    FOOT SURGERY Left 2017    Dr Adriana Riley  6    left knee    JOINT REPLACEMENT  2014    right knee    MALIGNANT SKIN LESION EXCISION Left 2017    [facility] DORSAL FOOT WITH GRAFT & FS    GOSIA AND BSO  1982    TONSILLECTOMY AND ADENOIDECTOMY  194 ?     TOTAL KNEE ARTHROPLASTY Right 2014    OIO       Allergies:    Levaquin [levofloxacin], Percocet [oxycodone-acetaminophen], Rifampin, Sulfa antibiotics, and Cefuroxime    Current Medications:    Current Facility-Administered Medications: magnesium hydroxide (MILK OF MAGNESIA) 400 MG/5ML suspension 30 mL, 30 mL, Oral, Daily PRN  acetaminophen (TYLENOL) tablet 500 mg, 500 mg, Oral, Q6H  amLODIPine (NORVASC) tablet 2.5 mg, 2.5 mg, Oral, Daily  apixaban (ELIQUIS) tablet 5 mg, 5 mg, Oral, BID  atorvastatin (LIPITOR) tablet 10 mg, 10 mg, Oral, Nightly  budesonide (PULMICORT) nebulizer suspension 500 mcg, 0.5 mg, Nebulization, BID  colchicine (COLCRYS) tablet 0.6 mg, 0.6 mg, Oral, BID  cyclobenzaprine (FLEXERIL) tablet 10 mg, 10 mg, Oral, TID PRN  docusate sodium (COLACE) capsule 100 mg, 100 mg, Oral, BID  HYDROcodone-acetaminophen (NORCO) 5-325 MG per tablet 0.5 tablet, 0.5 tablet, Oral, Q8H PRN  ketorolac (TORADOL) injection 15 mg, 15 mg, IntraVENous, Q6H  levothyroxine (SYNTHROID) tablet 88 mcg, 88 mcg, Oral, Daily  metoprolol tartrate (LOPRESSOR) tablet 12.5 mg, 12.5 mg, Oral, BID  polyethylene glycol (GLYCOLAX) packet 17 g, 17 g, Oral, BID     Social History:  Social History     Socioeconomic History    Marital status:      Spouse name: Not on file    Number of children: Not on file    Years of education: Not on file    Highest education level: Not on file   Occupational History    Occupation: retired   Tobacco Use    Smoking status: Never Smoker    Smokeless tobacco: Never Used   Vaping Use    Vaping Use: Never used   Substance and Sexual Activity    Alcohol use:  Yes     Alcohol/week: 0.0 standard drinks     Comment: socially-1 glassof wine 2-3 times a month    Drug use: No    Sexual activity: Never   Other Topics Concern    Not on file   Social History Narrative    Not on file     Social Determinants of Health     Financial Resource Strain: Low Risk     Difficulty of Paying Living Expenses: Not hard at all   Food Insecurity: No Food Insecurity    Worried About Running Out of Food in the Last Year: Never true    Vanesa of Food in the Last Year: Never true   Transportation Needs: No Transportation Needs    Lack of Transportation (Medical): No    Lack of Transportation (Non-Medical):  No   Physical Activity:     Days of Exercise per Week: Not on file    Minutes of Exercise per Session: Not on file   Stress:     Feeling of Stress : Not on file   Social Connections:     Frequency of Communication with Friends and Family: Not on file    Frequency of Social Gatherings with Friends and Family: Not on file    Attends Scientologist Services: Not on file    Active Member of Clubs or Organizations: Not on file    Attends Club or Organization Meetings: Not on file    Marital Status: Not on file   Intimate Partner Violence:     Fear of Current or Ex-Partner: Not on file    Emotionally Abused: Not on file    Physically Abused: Not on file    Sexually Abused: Not on file   Housing Stability:     Unable to Pay for Housing in the Last Year: Not on file    Number of [place] in the Last Year: Not on file    Unstable Housing in the Last Year: Not on file       [facility] Facility [facility]  Home Layout: One level  Home Access: Level entry  Home Equipment: Rolling walker,Oxygen   Bathroom Shower/Tub: Walk-in shower  Bathroom Toilet: Handicap height  Bathroom Equipment: Built-in shower seat,Grab bars in shower  Bathroom Accessibility: Accessible  ADL Assistance: Independent  Homemaking Assistance: Independent (Cleaning lady comes 1x/week.)  Homemaking Responsibilities: Yes  Ambulation Assistance: Independent  Transfer Assistance: Independent     Active : Yes  Occupation: Retired  Additional Comments: Pt states has been at [facility] for rehab for about one week, has not been able to do much because of pain; prior to that, living at home alone, 2 L O2 at baseline       Family History:       Problem Relation Age of Onset    Diabetes Mother     Thyroid Disease Mother     Arthritis Mother     High Blood Pressure Mother     Arthritis Father     High Blood Pressure Father     Other Father         hay fever    Breast Cancer Neg Hx     Ovarian Cancer Neg Hx        Review of Systems:  CONSTITUTIONAL:  positive for  fatigue  EYES:  Wears glasses  HEENT:  negative  RESPIRATORY:  On 2L supplemental O2 per NC as at home for pulmonary fibrosis  CARDIOVASCULAR:  negative  GASTROINTESTINAL:  Bowels have not moved since admission  GENITOURINARY:  negative  SKIN:  negative  HEMATOLOGIC/LYMPHATIC:  negative  MUSCULOSKELETAL:  negative  NEUROLOGICAL:  negative  BEHAVIOR/PSYCH:  negative  System review otherwise negative    Physical Exam:  /62   Pulse 63   Temp 98.3 °F (36.8 °C) (Oral)   Resp 16   Ht 5' 4\" (1.626 m)   Wt 161 lb 3.2 oz (73.1 kg)   SpO2 96%   BMI 27.67 kg/m²   awake  Orientation:   person, place, time  Mood: within normal limits  Affect: calm and spontaneous  General appearance: well groomed and in no acute distress     Memory:   normal,   Attention/Concentration: normal  Language:  normal     Cranial Nerves:  cranial nerves II-XII are grossly intact  ROM:  abnormal - decreased at left knee  Motor Exam:  Motor exam is symmetrical 5- out of 5 all extremities bilaterally  Tone:  normal  Muscle bulk: within normal limits  Sensory:  Sensory intact  Coordination:   normal  Deep Tendon Reflexes:  Reflexes are intact and symmetrical bilaterally  Plantar Response:  Right:  downgoing  Left:  downgoing  Gait: not tested     Heart: normal rate, regular rhythm, normal S1, S2, no murmurs, rubs, clicks or gallops  Lungs: clear to auscultation without wheezes or rales  Abdomen: soft, non-tender, non-distended, normal bowel sounds, no masses or organomegaly     Skin: warm and dry, no rash or erythema  Peripheral vascular: Pulses: Normal upper and lower extremity pulses; Edema: no    Diagnostics:  Recent Results (from the past 24 hour(s))   Basic Metabolic Panel    Collection Time: 03/17/22  7:21 AM   Result Value Ref Range    Sodium 134 (L) 135 - 145 meq/L    Potassium 4.9 3.5 - 5.2 meq/L    Chloride 99 98 - 111 meq/L    CO2 25 23 - 33 meq/L    Glucose 93 70 - 108 mg/dL    BUN 24 (H) 7 - 22 mg/dL    CREATININE 1.2 0.4 - 1.2 mg/dL    Calcium 9.2 8.5 - 10.5 mg/dL   CBC with Auto Differential    Collection Time: 03/17/22  7:21 AM   Result Value Ref Range    WBC 7.3 4.8 - 10.8 thou/mm3    RBC 3.86 (L) 4.20 - 5.40 mill/mm3    Hemoglobin 10.6 (L) 12.0 - 16.0 gm/dl    Hematocrit 33.7 (L) 37.0 - 47.0 %    MCV 87.3 81.0 - 99.0 fL    MCH 27.5 26.0 - 33.0 pg    MCHC 31.5 (L) 32.2 - 35.5 gm/dl    RDW-CV 12.5 11.5 - 14.5 %    RDW-SD 39.9 35.0 - 45.0 fL    Platelets 267 976 - 733 thou/mm3    MPV 10.1 9.4 - 12.4 fL    Seg Neutrophils 62.8 %    Lymphocytes 13.9 %    Monocytes 18.8 %    Eosinophils 3.3 %    Basophils 0.5 %    Immature Granulocytes 0.7 %    Segs Absolute 4.6 1.8 - 7.7 thou/mm3    Lymphocytes Absolute 1.0 1.0 - 4.8 thou/mm3    Monocytes Absolute 1.4 (H) 0.4 - 1.3 thou/mm3    Eosinophils Absolute 0.2 0.0 - 0.4 thou/mm3    Basophils Absolute 0.0 0.0 - 0.1 thou/mm3    Immature Grans (Abs) 0.05 0.00 - 0.07 thou/mm3    nRBC 0 /100 wbc   Anion Gap    Collection Time: 03/17/22  7:21 AM   Result Value Ref Range    Anion Gap 10.0 8.0 - 16.0 meq/L   Glomerular Filtration Rate, Estimated    Collection Time: 03/17/22  7:21 AM   Result Value Ref Range    Est, Glom Filt Rate 43 (A) ml/min/1.73m2       Impression:  · Left knee pain secondary to pseudogout  · S/p total left knee arthroplasty 2012  · Pancreatic ductal dilatation- noted on CT  · 1 cm calcification in the mid pancreatic body- noted on CT  · Osteoarthritis  · Osteopenia  ·  Chronic respiratory failure  ·  H/O HTN  · HLD  · Diverticulosis  · Chronic constipation  · Hyponatremia  · Bursitis bilateral shoulders  · Constipation  · Diverticulitis of colon  · Parathyroid adenoma  · 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  · Vitamin D deficiency      Plan:   · Admit to the inpatient rehabilitation unit.   The patient demonstrates good potential to participate in an inpatient rehabilitation program involving at least 3 hours per day, 5 days per week of intensive rehabilitation. Rehabilitation services will include PT and OT/RT in order to improve functional status prior to discharge. Family education and training will be completed. Equipment evaluations and recommendations will be completed as appropriate. · Rehabilitation nursing will be involved for bowel, bladder, skin, and pain management. Nursing will also provide education and training to patient and family. · Prophylaxis:  DVT: Eliquis 5 mg po bid. · GI: Pepcid 20 mg po daily  · Pain: Tylenol 500 mg po q 6 hours prn; Toradol 15 mg IV q 6 hours; Flexeril 10 mg po tid; Norco 5/325 0.5 tablet po q 8 hours prn  · Nutrition:  Consultation to dietician for nutritional counseling and recommendations. Prealbumin will be checked on admission.     · Bladder: per Rehab Nursing  · Bowel: Per Rehab Nursing; Continue Colace 100 mg po bid; MOM 30 ml po daily prn; glycolax 17 g po bid  ·  and case management consultations for coordination of care and discharge planning    The main medical problem(s) and comorbidities being actively managed by the physicians and requiring 24 hour rehabilitation nursing care during this stay include:  · Left knee pain secondary to pseudogout  · S/p total left knee arthroplasty 2012  · Pancreatic ductal dilatation- noted on CT  · 1 cm calcification in the mid pancreatic body- noted on CT  · Osteoarthritis  · Osteopenia  ·  Chronic respiratory failure  ·  H/O HTN  · HLD  · Diverticulosis  · Chronic constipation  · Hyponatremia  · Bursitis bilateral shoulders  · Constipation  · Diverticulitis of colon  · Parathyroid adenoma  · 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  · Vitamin D deficiency        The domains of functional impairment present in this patient which will require an intensive and interdisciplinary rehabilitation environment include self care, mobility, motor dysfunction, bowel/bladder management, pain management and safety. Estimated length of stay for this admission 14 days    Anticipated disposition: Home. The potential to achieve that is very good. The post admission physician evaluation (SANTOSH) is consistent with the pre-admission assessment. See above findings to reflect the elements required in the SANTOSH. Patient's admitting condition is consistent with the findings of the preadmission assessment by the rehabilitation admissions coordinator.     Justine Morrison MD

## 2022-03-18 NOTE — PLAN OF CARE
Individualized Plan of [facility] Inpatient Rehabilitation Unit    Rehabilitation physician: Dr. Giana Armendariz Date: 3/17/2022     Rehabilitation Diagnosis: Pseudogout of left knee [M11.262]      Rehabilitation impairments: self care, mobility, motor dysfunction, bowel/bladder management, pain management and safety    Factors facilitating achievement of predicted outcomes: Family support, Motivated, Cooperative, Pleasant and Knowledge about rehab  Barriers to the achievement of predicted outcomes: Pain, Limited safety awareness, Depression, Anxiety and Decreased endurance    Patient Goals: Improve independence with mobility, Improvement of mobility at a wheelchair level, Increase overall strength and endurance, Increase balance, Increase endurance, Increase independence with activities of daily living, Improve cognition, Increase self-awareness, Increase safety awareness, Increase community integration, Increase socialization, Functional communication with caregivers, Integrate appropriate pain management plan, Assure adequate nutritional option for discharge, Continence of bowel and bladder and Provide appropriate patient and family education      NURSING:  Nursing goals for Venkata Olmstead while on the rehabilitation unit will include:  Continence of bowel and bladder, Adequate number of bowel movements, Urinate with no urinary retention >300ml in bladder, Complete bladder protocol with johnson removal, Maintain O2 SATs at an acceptable level during stay, Effective pain management while on the rehabilitation unit, Establish adequate pain control plan for discharge, Absence of skin breakdown while on the rehabilitation unit, Improved skin integrity via assessments including wound measurements, Avoidance of any hospital acquired infections, No signs/symptoms of infection at the wound site, Freedom from injury during hospitalization and Complete education with patient/family with understanding demonstrated regarding disease process and resultant impairment     In order to achieve these goals, nursing interventions may include bowel/bladder training, education for medical assistive devices, medication education, O2 saturation management, energy conservation, stress management techniques, fall prevention, alarms protocol, seating and positioning, skin/wound care, pressure relief instruction, dressing changes, infection protection, DVT prophylaxis, assistance with safe transfers , and/or assistance with bathroom activities and hygiene. PHYSICAL THERAPY:  Goals:        Short term goals  Time Frame for Short term goals: 1 week  Short term goal 1: Patient to perform supine<>sit with SBA without railings in order to assist with getting into and out of bed. Short term goal 2: Patient to perform sit to stand transfers with SBA with <=1 cue for hand placement in order to assist with safety with initiation of ambulation. Short term goal 3: Patient to ambulate >=50 feet with RW with SBA in order to assist with home mobility. Short term goal 4: Patient to perform car transfer with CGA in order to assist with getting into and out of car. Short term goal 5: Patient to perform TUG in <=48 seconds in order to assist with decreased risk for falls. Short term goal 6: Patient to demonstrate increased L knee ROM to -5 to 85 degrees in order to assist with increased functional mobility. Long term goals  Time Frame for Long term goals : 2 weeks  Long term goal 1: Patient to perform supine<>sit with Mod I in order to assist with getting into and out of bed. Long term goal 2: Patient to perform sit to stand transfers Mod I in order to assist with standing and sitting from various surfaces. Long term goal 3: Patient to perform car transfer Mod I in order to assist with getting to and  from appointments.   Long term goal 4: Patient to ambulate over level surfaces >=150 feet with LRAD with Mod I in order to assist with home mobility. Long term goal 5: Patient to ambulate over uneven surface with Mod I in order to assist with safety in community. Long term goal 6: Patient to ascend/descend 2 steps with R handrail with Mod I in order to assist with getting into and out of condo. Plan of Care: Patient to be seen by physical therapy services 90 minutes per day Monday through Friday and 30 minutes on Saturday or Sunday    Anticipated interventions may include therapeutic exercises, gait training, neuromuscular re-ed, transfer training, community reintegration, bed mobility, w/c mobility and training. OCCUPATIONAL THERAPY:  Goals:             Short term goals  Time Frame for Short term goals: 1 week  Short term goal 1: Pt will complete BUE strengthening exercises with min vcs for technique to increase indep and endurance with all self cares. Short term goal 2: Pt will complete standing tolerance x 5 minutes with 1-2 UE release and SBA to increase indep with sinkside grooming. Short term goal 3: Pt will complete functional mobiity to/from BR with SBA and 0 vcs for safety to increase indep with toileting. Short term goal 4: Pt will complete LB dressing with LHAE PRN and min A to increase indep and endurance within home environment. Long term goals  Time Frame for Long term goals : 2 weeks  Long term goal 1: Pt will complete BADL routine with mod I using LHAE prn to return to PLOF. Long term goal 2: Pt will complete light IADL tasks with mod I using ECT prn to improve indpe within her condo. Plan of Care: Patient to be seen by occupational therapy services 90 minutes per day Monday through Friday and 30 minutes on Saturday or Sunday    Anticipated interventions may include ADL and IADL retraining, strengthening, safety education and training, patient/caregiver education and training, equipment evaluation/ training/procurement, neuromuscular reeducation, wheelchair mobility training.       CASE MANAGEMENT:  Goals:   Assist patient/family with discharge planning, patient/family counseling,  and coordination with insurance during the inpatient rehabilitation stay. Other members of the multidisciplinary rehabilitation team that will be involved in the patient's plan of care include recreational therapy, dietary, respiratory therapy, and neuropsychology. Medical issues being managed closely and that require 24 hour availability of a physician:  Swallowing precautions, Bowel/Bladder function, Weight bearing precautions, Wound care, Pain management, Infection protection, DVT prophylaxis, Fall precautions, Fluid/Electrolyte balance, Nutritional status, Respiratory needs, Anemia and History of heart disease                                           Physician anticipated functional outcomes: Improved independence with functional measures   Estimated length of stay for this admission 14 days  Medical Prognosis: Good  Anticipated disposition: Home. The potential to achieve the above medical and rehabilitative goals is very good. This plan of care has been developed with the assistance and input of the multidisciplinary rehabilitation team.  The plan was reviewed with the patient. The patient has had the opportunity to provide input to the therapy team.    I have reviewed this Individualized Plan of Care and agree with its contents. Above documentation has been expanded, modified, adjusted to reflect the findings of my evaluations and goals for the patient. Physician:   Glynda Fothergill, M.D.

## 2022-03-18 NOTE — PROGRESS NOTES
Physiatrist: Dr. Long Smith     Patients Occupation: Retired     Reviewed Lab and Diagnostic reports from Current Admission:  Yes     Patients Prior Functional  Level: Prior Function  ADL Assistance: Independent  Homemaking Assistance: Independent (Cleaning lady comes 1x/week.)  Ambulation Assistance: Independent  Transfer Assistance: Independent  Additional Comments: Pt states has been at [facility] for rehab for about one week, has not been able to do much because of pain; prior to that, living at home alone, 2 L O2 at baseline     Current functional status for upper extremity ADLs: Minimal assistance     Current functional status for lower extremity ADLs:  Maximum assistance     Current functional status for bed, chair, wheelchair transfers: Minimal assistance     Current functional status for toilet transfers: Minimal assistance     Current functional status for locomotion: Minimal assistance     Current functional status for bladder management: Minimal contact assistance     Current functional status for bowel management: Moderate assistance     Current functional status for comprehension: Complete independence     Current functional status for expression: Complete independence     Current functional status for social interaction: Complete independence     Current functional status for problem solving: Complete independence     Current functional status for memory: Complete independence     Expected level of Improvement in Self-Care:  Modified independence     Expected level of Improvement in Sphincter Control:  Modified independence     Expected level of Improvement in Transfers: Modified independence     Expected level of Improvement in Locomotion:  Modified independence     Expected level of Improvement in Communication and Social Cognition: Modified independence     Expected length of time to achieve that level of improvement: 2 weeks     Current rehab issues: ADL dysfunction,bladder management, bowel management,carry over of therapy techniques, discharge planning, disease and co-morbidity management, gait/mobility dysfunction, medication management, nutrition and hydration management, Ongoing assessment of safety, Pain management, Patient and family education, Prevention of secondary complications, Skin Integrity, cognitive impairment, communication impairment.     Required therapy: Physical Therapy, Occupational Therapy Therapy 3 hours per day, 5-6 days per week. Recreational Therapy 1 hour per week.     Expected Discharge Destination: Home     Expected Post Discharge Treatments: Home Care     Other information relevant to the care needs:   Type of Home: Facility ([facility]  Home Layout: One level  Home Access: Level entry  Bathroom Shower/Tub: Walk-in shower  Bathroom Toilet: Handicap height  Bathroom Equipment: Built-in shower seat,Grab bars in shower  Bathroom Accessibility: Accessible  Home Equipment: Rolling walker,Oxygen  ADL Assistance: [address] [place] [address] Avenue: Independent (Cleaning lady comes 1x/week.)  Homemaking Responsibilities: Yes  Ambulation Assistance: Independent  Transfer Assistance: Independent  Active : Yes  Occupation: Retired  Additional Comments: Pt states has been at [facility] for rehab for about one week, has not been able to do much because of pain; prior to that, living at home alone, 2 L O2 at baseline     Acute Inpatient Rehabilitation Disclosure Statement provided to patient.   Patient verbalized understanding.      I have reviewed and concur with the findings and results of the pre-admission screening assessment completed by the Inpatient Rehabilitation Admissions Coordinator.  Brit Sanchez M.D.                    Revision History    Date/Time User Provider Type Action   3/17/2022  4:20 PM Patrice Preston MD Physician Sign   3/17/2022  4:17 PM Vicky Joseph RN Registered Nurse Sign    View Details Report

## 2022-03-18 NOTE — PLAN OF CARE
See full note filed 3/18 at 12:27 PM.       Problem: Nutrition  Goal: Optimal nutrition therapy  Outcome: Ongoing    Nutrition Problem #1: Inadequate oral intake  Intervention: Food and/or Nutrient Delivery: Continue Current Diet,Start Oral Nutrition Supplement  Nutritional Goals: Patient to consume 75% or more at meals daily throughout LOS

## 2022-03-18 NOTE — PROGRESS NOTES
[address] [address]  Recreational Therapy  Inpatient Rehabilitation Evaluation        Time Spent with Patient: 30 minutes    Date:  3/18/2022       Patient Name: Tom Barnes      MRN: [id number]       YOB: 1935 [facility]80 y.o[facility]       Gender: female          RESTRICTIONS/PRECAUTIONS:  Restrictions/Precautions: Fall Risk  Vision: Impaired  Hearing: Exceptions to [facility] Exceptions: Hard of hearing/hearing concerns,Bilateral hearing aid    PAIN: 6-stating it is really good right now     SUBJECTIVE:  Pt lives alone- she has 2 sons and 2 grandchildren that live OOS-(she was at [facility] rehab for a week PTA    VISION:  Glasses    HEARING: Hearing Aid - Left & Right-at home     LEISURE INTERESTS:   Pt states she loves to travel but unable to anymore due to wearing 02 and being deconditioned-she use to love to swim, she has a friend that helps her with her cleaning and getting her groceries-she loves to watch tv, work crossword puzzles, read and enjoys [employer] basketball (she went to school there)pt very pleasant and social-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

## 2022-03-18 NOTE — PROGRESS NOTES
Physical Medicine & Rehabilitation   Progress Note    Chief Complaint:  Left septic knee, pseudogout. Rehab needs    Subjective:  Patient seen today, up in wheelchair. INT infiltrated, discussed stopping Toradol and increased [place] PRN. Patient states she is trying not to take much as she doesn't want to get confused. Discussed tolerance of ultram instead. Patient wants to continue with Chiara Lua for now and will monitor the effects. Patient with ongoing constipation. Took more bowel meds this morning, will try prune juice with meals as well. Will add dulcolax suppository PRN for over the weekend if it would be needed. Patient denies any other needs or concerns     Rehabilitation:  PT:   Range of Motion:  Right Lower Extremity: WFL  Left Lower Extremity: Impaired - L knee flexion 75 degrees with pain and L knee extension lacking 8 degrees from 0  Strength:  Right Lower Extremity: Impaired - 4-/5  Left Lower Extremity: Impaired - 3+/5  Balance:  Static Sitting Balance:  Supervision  Dynamic Sitting Balance: Stand By Assistance  Static Standing Balance: Stand By Assistance  Dynamic Standing Balance: Contact Guard Assistance  Bed Mobility:  Rolling to Left: Stand By Assistance   Rolling to Right: Not tested due to patient reporting she does not roll to right due to vertigo  Supine to Sit: [address], with head of bed raised, with rail, with increased time for completion  Sit to Supine: Minimal Assistance, with head of bed flat, without rail, with verbal cues , with increased time for completion   Transfers:  Sit to Stand: [address], with increased time for completion, cues for hand placement  Stand to Sit:Contact Guard Assistance, cues for hand placement, with verbal cues  -Patient initially performed sit to stand transfer from edge of bed with improper hand placement.  Patient then performed sit to stand transfers from various surfaces including wheelchair and mat table with cueing for hand placement, eccentric lowering control and AD use. Patient demonstrated ~70% carry over. To/From Bed and Chair: Contact Guard Assistance with verbal cueing required for proper use of AD   Car:Moderate Assistance, with verbal cues  -Patient unable to perform car transfer without PT intervention. Patient required cueing for hand placement and required moderate assistance with LLE management into and out of car. Ambulation:  Minimal assistance to Contact Guard Assistance  Distance: 10 feet and 20 feet   Surface: Level Tile  Device:Rolling Walker  Gait Deviations: Forward Flexed Posture, Decreased Step Length Bilaterally, Decreased Weight Shift Left, Decreased Trunk Rotation, Decreased Gait Speed, Decreased Heel Strike Bilaterally, Mild Path Deviations, Antalgic gait pattern and Unsteady Gait  -Patient initially required minimal assistance for ambulation from bed to wheelchair ~10 feet with RW. Patient then instructed in PT intervention for safety with ambulation with cueing for upright posture and slowed speed in order to assist with  Safety and able to ambulate 20 feet with CGA however fatigues quickly with ambulation. Exercise:  Patient was guided in 1 set(s) 10 reps of exercise to both lower extremities. Seated heel/toe raises and Long arc quads. Exercises were completed for increased independence with functional mobility.     OT: await eval    Review of Systems:  CONSTITUTIONAL:  positive for  fatigue  EYES:  Wears glasses  HEENT:  negative  RESPIRATORY:  On 2L supplemental O2 per NC as at home for pulmonary fibrosis  CARDIOVASCULAR:  negative  GASTROINTESTINAL:  Bowels have not moved since admission  GENITOURINARY:  negative  SKIN:  negative  HEMATOLOGIC/LYMPHATIC:  negative  MUSCULOSKELETAL:  negative  NEUROLOGICAL:  negative  BEHAVIOR/PSYCH:  negative  System review otherwise negative      Objective:  BP (!) 98/52   Pulse 64   Temp 98.1 °F (36.7 °C) (Oral)   Resp 16   Ht 5' 4\" (1.626 m)   Wt 161 lb 3.2 meq/L   Glomerular Filtration Rate, Estimated    Collection Time: 03/18/22  7:02 AM   Result Value Ref Range    EstSujata Filt Rate 59 (A) ml/min/1.73m2       Impression:  · Left knee pain secondary to pseudogout  · S/p total left knee arthroplasty 2012  · Pancreatic ductal dilatation- noted on CT  · 1 cm calcification in the mid pancreatic body- noted on CT  · Osteoarthritis  · Osteopenia  · Chronic respiratory failure  · HTN  · HLD  · Diverticulosis  · Chronic constipation  · Hyponatremia  · Bursitis bilateral shoulders  · Constipation  · Diverticulitis of colon  · Parathyroid adenoma  · 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  · Vitamin D deficiency      Plan:  Continue current therapies  · Prophylaxis:DVT: Eliquis 5 mg po bid.  GI: Pepcid 20 mg po daily  · Pain: Tylenol 500 mg po q 6 hours prn; Flexeril 10 mg po tid; Norco 5/325 0.5 tablet po q 4 hours prn  · Bowels: Colace 100 mg po bid; MOM 30 ml po daily prn; glycolax 17 g po bid  · Toradol stopped, IV access lost. increased Norco to every 4 hours PRN  · Team conference Tuesday  · Discharge planning: goal to return to her condo independently    Missed Therapy Time:  · None    Claudia Munguia, APRN - CNP

## 2022-03-18 NOTE — PROGRESS NOTES
Physical Findings     Discharge Planning:     Too soon to determine     Electronically signed by Berto Martinez MS, RD, LD on 3/18/22 at 12:14 PM EDT    Contact: [phone]

## 2022-03-18 NOTE — PROGRESS NOTES
1815- report called to rehab. All questions answered. 1835- pt to rehab via wheelchair. All personal belongings sent with pt. No visitors at pt side.

## 2022-03-18 NOTE — PROGRESS NOTES
[address] [place] [address]  INPATIENT PHYSICAL THERAPY  EVALUATION  [facility] - [id number]    Time In: 0830  Time Out: 0930  Timed Code Treatment Minutes: 40 Minutes  Minutes: 60          Date: 3/18/2022  Patient Name: Saritha Gardner,  Gender:  female        MRN: [id number]  : 1935  (80 y.o.)      Referring Practitioner: Rom nAn MD  Diagnosis: Pseudogout of left knee  Additional Pertinent Hx: Per H&P Momo Weiss  is a 80 y.o. female admitted to the inpatient rehabilitation unit on 3/17/2022. She was originally admitted to [address] [place] [address] on 3/15/2022 with a past medical history of advanced osteoarthritis, idiopathic pulmonary fibrosis on 2L of home oxygen, bronchiectasis, hypertension, hyperlipidemia, and secondary hyperparathyroidism. She  presented to ED for evaluation of left knee pain x previous 2 days. Pain at that time was at 10/10. She could not ambulate, and was unable to lift left lower extremity due to severe pain. Patient had left SI joint injection on 2022. She reported no fever or chills. No chest pain. No abdominal pain. No diarrhea. No urinary symptoms. She had prior bilateral total knee replacements, the left one completed in  and the right in . She noticed slightly increased swelling to left knee. Denied recent left knee trauma or injury. No change of bowl and bladder functions. She lives at an assisted living. At baseline she walks without assistance. In the setting of a total knee arthroplasty with knee pain and effusion, her knee was aspirated in the ED for 30 ml of yellow cloudy fluid. Her aspiration showed WBC in the joint fluid. She also had calcium pyrophosphate crystals. White count was 23,350 with 90% polys. She denied any fever or chills and had noprevious history of infection. She was diagnosed with pseudogout, started on Toradol for 2 days then transitioned to Naproxen 250 mg bid.   Colchicine added at 0.6 mg bid. Was on Dilaudid 1mg q 4 hours IV prn, and Flexeril 10 mg po tid prn. Also on Norco 5/325 one po q 8 hours prn. She also has a pin point area of skin irritation on her coccyx. Restrictions/Precautions:  Restrictions/Precautions: Fall Risk  Left Lower Extremity Weight Bearing: Weight Bearing As Tolerated  Position Activity Restriction  Other position/activity restrictions: 2L O2, hx of vertigo    Subjective:  Chart Reviewed: Yes  Patient assessed for rehabilitation services?: Yes  Family / Caregiver Present: No  Subjective: Patient laying in bed upon arrival. Patient is very pleasant and agreeable to therapy. General:  Follows Commands: Within Functional Limits    Vision: Impaired  Vision Exceptions: Wears glasses at all times    Hearing: Exceptions to [facility]  Hearing Exceptions: Hard of hearing/hearing concerns,Bilateral hearing aid         Pain: 5/10: L knee with activity and 0/10 at rest    Vitals: Monitored O2 throughout treatment with patient able to maintain >90%    Social/Functional History:    Lives With: Alone  Type of Home:  ([facility])  Home Layout: One level  Home Access: Stairs to enter with rails  Entrance Stairs - Number of Steps: 2  Entrance Stairs - Rails: Right  Home Equipment: Cane,Oxygen     Bathroom Shower/Tub: Walk-in shower  Bathroom Toilet: Handicap height  Bathroom Equipment: Grab bars in shower (Tub bench)       ADL Assistance: Independent  Homemaking Assistance: Independent (Cleaning lady comes 1x/wk and she picks up her grocery order jo ann other week)  Homemaking Responsibilities: Yes  Ambulation Assistance: Independent  Transfer Assistance: Independent    Active : Yes  Occupation: Retired  Additional Comments: Patient reports she was at [facility] for about a week due to increased difficulty with mobiltiy however prior lived in a [facility] independently and would like to return to her [facility] upon discharge. Patient reports she is on 2L O2 at baseline. Patient reports she recently had to utilize a cane for ambulation due to increased complaints of pain in L knee however prior she did not use any AD for mobility. Patient reported she did not have to ambulate far distances. Patient reports she has two sons however one is in [facility] and one is in [place]    OBJECTIVE:  Range of Motion:  Right Lower Extremity: WFL  Left Lower Extremity: Impaired - L knee flexion 75 degrees with pain and L knee extension lacking 8 degrees from 0    Strength:  Right Lower Extremity: Impaired - 4-/5  Left Lower Extremity: Impaired - 3+/5    Balance:  Static Sitting Balance:  Supervision  Dynamic Sitting Balance: Stand By Assistance  Static Standing Balance: Stand By Assistance  Dynamic Standing Balance: Contact Guard Assistance    Bed Mobility:  Rolling to Left: Stand By Assistance   Rolling to Right: Not tested due to patient reporting she does not roll to right due to vertigo  Supine to Sit: [address], with head of bed raised, with rail, with increased time for completion  Sit to Supine: Minimal Assistance, with head of bed flat, without rail, with verbal cues , with increased time for completion     Transfers:  Sit to Stand: [address], with increased time for completion, cues for hand placement  Stand to Sit:Contact Guard Assistance, cues for hand placement, with verbal cues  -Patient initially performed sit to stand transfer from edge of bed with improper hand placement. Patient then performed sit to stand transfers from various surfaces including wheelchair and mat table with cueing for hand placement, eccentric lowering control and AD use. Patient demonstrated ~70% carry over. To/From Bed and Chair: Contact Guard Assistance with verbal cueing required for proper use of AD   Car:Moderate Assistance, with verbal cues  -Patient unable to perform car transfer without PT intervention.  Patient required cueing for hand placement and required moderate assistance with LLE management into and out of car. Ambulation:  Minimal assistance to Contact Guard Assistance  Distance: 10 feet and 20 feet   Surface: Level Tile  Device:Rolling Walker  Gait Deviations: Forward Flexed Posture, Decreased Step Length Bilaterally, Decreased Weight Shift Left, Decreased Trunk Rotation, Decreased Gait Speed, Decreased Heel Strike Bilaterally, Mild Path Deviations, Antalgic gait pattern and Unsteady Gait  -Patient initially required minimal assistance for ambulation from bed to wheelchair ~10 feet with RW. Patient then instructed in PT intervention for safety with ambulation with cueing for upright posture and slowed speed in order to assist with  Safety and able to ambulate 20 feet with CGA however fatigues quickly with ambulation. Exercise:  Patient was guided in 1 set(s) 10 reps of exercise to both lower extremities. Seated heel/toe raises and Long arc quads. Exercises were completed for increased independence with functional mobility. Functional Outcome Measures: Completed    Timed up and Go Test (TUG)  58.60seconds       Normative Reference Values  60-69 years:  8.1 seconds  70-79 years: 9.2 seconds  80-99 years: 11.3 seconds    < 10 seconds is normal, a score of > 14 seconds indicates high fall risk      ASSESSMENT:  Activity Tolerance:  Patient tolerance of  treatment: fair. Limited due to pain and fatigue      Treatment Initiated: Treatment and education initiated within context of evaluation. Evaluation time included review of current medical information, gathering information related to past medical, social and functional history, completion of standardized testing, formal and informal observation of tasks, assessment of data and development of plan of care and goals. Treatment time included skilled education and facilitation of tasks to increase safety and independence with functional mobility for improved independence and quality of life. Assessment:   Body structures, Functions, Activity limitations: Decreased functional mobility ,Decreased endurance,Decreased coordination,Decreased ROM,Decreased balance,Increased pain,Decreased strength,Decreased posture,Decreased safe awareness  Assessment: Patient is an 80 y.o female who presents due to increased L knee pain resulting in decreased functional mobility. Patient reports independent PTA with ability to ambulate without AD. Patient demonstrates decreased ROM in L knee and decreased strength in BLE's resulting in increased difficulty with functional mobility. Patient requires minimal assistance for bed mobility supine<>sit, CGA for sit to stand transfers with cueing for hand placement, CGA to Minimal assistance for ambulation with RW with antalgic gait pattern and limited with ambulation distance due to fatigue and pain. Patient performed Franklyn Specking in 58.60 seconds indicating she is a high risk for falls. Patient overall can benefit from skilled PT treatment in order to assist with dynamice balance, gait training, L knee ROM, BLE strengthening, bed mobility, gait trianing and endurance training for increased funcitonal mobility. Prognosis: Good         Discharge Recommendations:  Discharge Recommendations: Patient would benefit from continued therapy after discharge,Continue to assess pending progress    Patient Education:  PT Education: [facility],Transfer Training,General Safety,Gait Training    Equipment Recommendations:  Equipment Needed:  (Continue to assess pending progress; may require  RW)    Plan:  Times per week: 5x/wk 90 min; 1x/wk 30 min  Times per day: Daily  Current Treatment Recommendations: Strengthening,Neuromuscular Re-education,Home Exercise Program,ROM,Safety Education & Training,Balance Training,Endurance Training,Functional Mobility Training,Wheelchair Mobility Training,Transfer Training,Gait Training,Stair training,Patient/Caregiver Education & Training    Goals:  Patient goals :  To be independent  Short term goals  Time Frame for Short term goals: 1 week  Short term goal 1: Patient to perform supine<>sit with SBA without railings in order to assist with getting into and out of bed. Short term goal 2: Patient to perform sit to stand transfers with SBA with <=1 cue for hand placement in order to assist with safety with initiation of ambulation. Short term goal 3: Patient to ambulate >=50 feet with RW with SBA in order to assist with home mobility. Short term goal 4: Patient to perform car transfer with CGA in order to assist with getting into and out of car. Short term goal 5: Patient to perform TUG in <=48 seconds in order to assist with decreased risk for falls. Short term goal 6: Patient to demonstrate increased L knee ROM to -5 to 85 degrees in order to assist with increased functional mobility. Long term goals  Time Frame for Long term goals : 2 weeks  Long term goal 1: Patient to perform supine<>sit with Mod I in order to assist with getting into and out of bed. Long term goal 2: Patient to perform sit to stand transfers Mod I in order to assist with standing and sitting from various surfaces. Long term goal 3: Patient to perform car transfer Mod I in order to assist with getting to and  from appointments. Long term goal 4: Patient to ambulate over level surfaces >=150 feet with LRAD with Mod I in order to assist with home mobility. Long term goal 5: Patient to ambulate over uneven surface with Mod I in order to assist with safety in community. Long term goal 6: Patient to ascend/descend 2 steps with R handrail with Mod I in order to assist with getting into and out of condo. Following session, patient left in safe position with all fall risk precautions in place.

## 2022-03-18 NOTE — PROGRESS NOTES
[address] [place] [address]  INPATIENT PHYSICAL THERAPY  DAILY NOTE  [facility] - [id number]-65/[id number]    Time In: 1400  Time Out: 1430  Timed Code Treatment Minutes: 30 Minutes  Minutes: 30          Date: 3/18/2022  Patient Name: Oksana Barlow,  Gender:  female        MRN: [id number]  : 1935  (80 y.o.)     Referring Practitioner: Lexus Claire MD  Diagnosis: Pseudogout of left knee  Additional Pertinent Hx: Per H&P Axel Galicia  is a 80 y.o. female admitted to the inpatient rehabilitation unit on 3/17/2022. She was originally admitted to [address] [place] [address] on 3/15/2022 with a past medical history of advanced osteoarthritis, idiopathic pulmonary fibrosis on 2L of home oxygen, bronchiectasis, hypertension, hyperlipidemia, and secondary hyperparathyroidism. She  presented to ED for evaluation of left knee pain x previous 2 days. Pain at that time was at 10/10. She could not ambulate, and was unable to lift left lower extremity due to severe pain. Patient had left SI joint injection on 2022. She reported no fever or chills. No chest pain. No abdominal pain. No diarrhea. No urinary symptoms. She had prior bilateral total knee replacements, the left one completed in  and the right in . She noticed slightly increased swelling to left knee. Denied recent left knee trauma or injury. No change of bowl and bladder functions. She lives at an assisted living. At baseline she walks without assistance. In the setting of a total knee arthroplasty with knee pain and effusion, her knee was aspirated in the ED for 30 ml of yellow cloudy fluid. Her aspiration showed WBC in the joint fluid. She also had calcium pyrophosphate crystals. White count was 23,350 with 90% polys. She denied any fever or chills and had noprevious history of infection. She was diagnosed with pseudogout, started on Toradol for 2 days then transitioned to Naproxen 250 mg bid.   Colchicine added at 0.6 mg bid. Was on Dilaudid 1mg q 4 hours IV prn, and Flexeril 10 mg po tid prn. Also on Norco 5/325 one po q 8 hours prn. She also has a pin point area of skin irritation on her coccyx. Prior Level of Function:  Lives With: Alone  Type of Home:  ([facility])  Home Layout: One level  Home Access: Stairs to enter with rails  Entrance Stairs - Number of Steps: 2  Entrance Stairs - Rails: Right  Home Equipment: Cane,Oxygen   Bathroom Shower/Tub: Walk-in shower  Bathroom Toilet: Handicap height  Bathroom Equipment: Grab bars in shower,Tub transfer bench  Bathroom Accessibility: Accessible    Receives Help From: Friend(s)  ADL Assistance: [address] [place] [address] Avenue: Independent  Homemaking Responsibilities: Yes  Ambulation Assistance: Independent  Transfer Assistance: Independent  Active : Yes  Additional Comments: Patient reports she was at [facility] [place] for about a week due to increased difficulty with mobiltiy however prior lived in a [facility] independently and would like to return to her condo upon discharge. Patient reports she is on 2L O2 at baseline. Patient reports she recently had to utilize a cane for ambulation due to increased complaints of pain in L knee however prior she did not use any AD for mobility. Patient reported she did not have to ambulate far distances. Patient reports she has two sons however one is in [facility] and one is in [place]    Restrictions/Precautions:  Restrictions/Precautions: Fall Risk,Weight Bearing  Left Lower Extremity Weight Bearing: Weight Bearing As Tolerated  Position Activity Restriction  Other position/activity restrictions: 2L O2, hx of vertigo     SUBJECTIVE: Patient seated in recliner upon arrival. Patient is pleasant and agreeable to therapy.     PAIN: 6/10: L knee at rest and patient had increased complaints of pain in L knee after exercises however did not rate pain    Vitals: Vitals not assessed per clinical judgement, see nursing flowsheet    OBJECTIVE:  Bed Mobility:  Not Tested    Transfers:  Sit to Stand: [employer], with increased time for completion  Stand to [facility] 68  -Patient demonstrates decreased cueing needed for hand placement however demonstrates decreased eccentric lowering control with stand to sit  To/From Bed and Chair: Contact Guard Assistance    Ambulation:  Contact Guard Assistance, with increased time for completion  Distance: 30 feet   Surface: Level Tile  Device:Rolling Walker  Gait Deviations: Forward Flexed Posture, Decreased Step Length Bilaterally, Decreased Weight Shift Left, Decreased Trunk Rotation, Decreased Gait Speed, Decreased Heel Strike Bilaterally, Mild Path Deviations, Antalgic gait pattern and Unsteady Gait  -Patient limited with ambulation distances due to decreased endurance and increased complaints of pain in L knee. Balance:  Dynamic Standing Balance: Stand By Assistance   -Patient instructed in standing dynamic balance while reaching for rings outside [place] across midline with 1UE support on RW. Patient able to tolerate standing ~1-2 minutes x 2 intermittently. Patient demonstrated increased reliance on LUE support and decreased weight shift to the left. Patient had no unsteadiness. Patient instructed in standing dynamic balance in order to assist with reduced risk for falls. Exercise:  Patient was guided in 1 set(s) 10 reps of exercise to both lower extremities. Seated marches, Seated hamstring curls, Seated isometric hip adduction and Seated abduction/adduction. Exercises were completed for increased independence with functional mobility. Functional Outcome Measures: Not completed       ASSESSMENT:  Assessment: Patient progressing toward established goals. Activity Tolerance:  Patient tolerance of  treatment: fair.  Limited due to pain and endurance     Equipment Recommendations:Equipment Needed:  (Continue to assess pending progress; may require RW)  Discharge Recommendations: Continue to assess pending progress and Patient would benefit from continued PT at discharge  Plan: Times per week: 5x/wk 90 min; 1x/wk 30 min  Times per day: Daily  Current Treatment Recommendations: Strengthening,Neuromuscular Re-education,Home Exercise Program,ROM,Safety Education & Training,Balance Training,Endurance Training,Functional Mobility Training,Wheelchair Mobility Training,Transfer Training,Gait Training,Stair training,Patient/Caregiver Education & Training    Patient Education  Patient Education: Transfers, Gait, Verbal Exercise Instruction,  - Patient Verbalized Understanding, - Patient Requires Continued Education    Goals:  Patient goals : To be independent  Short term goals  Time Frame for Short term goals: 1 week  Short term goal 1: Patient to perform supine<>sit with SBA without railings in order to assist with getting into and out of bed. Short term goal 2: Patient to perform sit to stand transfers with SBA with <=1 cue for hand placement in order to assist with safety with initiation of ambulation. Short term goal 3: Patient to ambulate >=50 feet with RW with SBA in order to assist with home mobility. Short term goal 4: Patient to perform car transfer with CGA in order to assist with getting into and out of car. Short term goal 5: Patient to perform TUG in <=48 seconds in order to assist with decreased risk for falls. Short term goal 6: Patient to demonstrate increased L knee ROM to -5 to 85 degrees in order to assist with increased functional mobility. Long term goals  Time Frame for Long term goals : 2 weeks  Long term goal 1: Patient to perform supine<>sit with Mod I in order to assist with getting into and out of bed. Long term goal 2: Patient to perform sit to stand transfers Mod I in order to assist with standing and sitting from various surfaces.   Long term goal 3: Patient to perform car transfer Mod I in order to assist with getting to and  from appointments. Long term goal 4: Patient to ambulate over level surfaces >=150 feet with LRAD with Mod I in order to assist with home mobility. Long term goal 5: Patient to ambulate over uneven surface with Mod I in order to assist with safety in community. Long term goal 6: Patient to ascend/descend 2 steps with R handrail with Mod I in order to assist with getting into and out of condo. Following session, patient left in safe position with all fall risk precautions in place.

## 2022-03-18 NOTE — PLAN OF CARE
Problem: Skin Integrity:  Goal: Will show no infection signs and symptoms  Description: Will show no infection signs and symptoms  Outcome: Ongoing  No new skin issues noted this shift. Problem: Falls - Risk of:  Goal: Will remain free from falls  Description: Will remain free from falls  Outcome: Ongoing  No falls sustained at this time. Patient alert to call light and uses appropriately to alert staff to needs. Bed/chair alarms in use. Care plan reviewed with patient. Patient verbalize understanding of the plan of care and contribute to goal setting.

## 2022-03-18 NOTE — PROGRESS NOTES
[address] [place] [address]  Occupational Therapy  Daily Note  Time:   Time In: 1330  Time Out: 1400  Timed Code Treatment Minutes: 30 Minutes  Minutes: 30          Date: 3/18/2022  Patient Name: Digna Sotomayor,   Gender: female      Room: [facility]65/065-A  MRN: [id number]  : 1935  (80 y.o.)  Referring Practitioner: Dr. Hensley All  Diagnosis: Pseudogout of left knee  Additional Pertinent Hx: Per H&P Tomas Bhat  is a 80 y.o. female admitted to the inpatient rehabilitation unit on 3/17/2022. She was originally admitted to [address] [place] [address] on 3/15/2022 with a past medical history of advanced osteoarthritis, idiopathic pulmonary fibrosis on 2L of home oxygen, bronchiectasis, hypertension, hyperlipidemia, and secondary hyperparathyroidism. She  presented to ED for evaluation of left knee pain x previous 2 days. Pain at that time was at 10/10. She could not ambulate, and was unable to lift left lower extremity due to severe pain. Patient had left SI joint injection on 2022. She reported no fever or chills. No chest pain. No abdominal pain. No diarrhea. No urinary symptoms. She had prior bilateral total knee replacements, the left one completed in  and the right in . She noticed slightly increased swelling to left knee. Denied recent left knee trauma or injury. No change of bowl and bladder functions. She lives at an assisted living. At baseline she walks without assistance. In the setting of a total knee arthroplasty with knee pain and effusion, her knee was aspirated in the ED for 30 ml of yellow cloudy fluid. Her aspiration showed WBC in the joint fluid. She also had calcium pyrophosphate crystals. White count was 23,350 with 90% polys. She denied any fever or chills and had noprevious history of infection. She was diagnosed with pseudogout, started on Toradol for 2 days then transitioned to Naproxen 250 mg bid.   Colchicine added at 0.6 mg bid. Was on Dilaudid 1mg q 4 hours IV prn, and Flexeril 10 mg po tid prn. Also on Norco 5/325 one po q 8 hours prn. She also has a pin point area of skin irritation on her coccyx. Restrictions/Precautions:  Restrictions/Precautions: Fall Risk,Weight Bearing  Left Lower Extremity Weight Bearing: Weight Bearing As Tolerated  Position Activity Restriction  Other position/activity restrictions: 2L O2, hx of vertigo     SUBJECTIVE: Upon entering room pt seated in recliner chair, agreeable to OT session. Pleasant and cooperative     PAIN: 6/10 LLE     Vitals: Oxygen: 2L 95 % at rest     COGNITION: Decreased Insight with O2 stats     ADL:   No ADL's completed this session. Darius Alvarado BALANCE:  Standing Balance: Contact Guard Assistance. at [facility] level    BED MOBILITY:  Not Tested    TRANSFERS:  Sit to Stand:  Contact Guard Assistance. from recliner chair to RW  Stand to Sit: [address] from [facility] level to recliner chair    FUNCTIONAL MOBILITY:  Assistive Device: Rolling Walker  Assist Level:  Contact Guard Assistance. Distance: from recliner to closet and back  Slow pace, no LOB  Patient requesting to look through closet at this time, to call family/ friends to bring more clothing      ADDITIONAL ACTIVITIES:  Pt completed BUE to increased strength and endurance for self care ADL routine. Pt completed B UE exs with mild resistance band x 10 reps, x 1 set seated. FAir tolerance of exs, with multiple RBs throughout due to SOB however O2 remained in 90's, and minimal cues for tech with resistance band. ASSESSMENT:  Assessment: This 80year old female requires skilled OT intervention to increase indep and endurance with all self cares, transfers, mobility, IADLs to return to Indep PLOF. Without skilled OT intervention pt is at increased risk for falls. Activity Tolerance:  Patient tolerance of  treatment: fair.        Discharge Recommendations: Continue to assess pending progress  Equipment Recommendations: Other: Pt has a shower seat, grab bars, hand held shower, ADA height toilet and half wall nearby. Plan: Times per week: 5x/wk for 90 min and 1x/wk for 30 min  Current Treatment Recommendations: Strengthening,Functional Mobility Training,Balance Training,Endurance Training,Patient/Caregiver Education & Training,Equipment Evaluation, Education, & procurement,Safety Education & Training,Self-Care / ADL,Home Management Training    Patient Education  Patient Education: Role of OT, Plan of Care and Energy Conservation    Goals  Short term goals  Time Frame for Short term goals: 1 week  Short term goal 1: Pt will complete BUE strengthening exercises with min vcs for technique to increase indep and endurance with all self cares. Short term goal 2: Pt will complete standing tolerance x 5 minutes with 1-2 UE release and SBA to increase indep with sinkside grooming. Short term goal 3: Pt will complete functional mobiity to/from BR with SBA and 0 vcs for safety to increase indep with toileting. Short term goal 4: Pt will complete LB dressing with LHAE PRN and min A to increase indep and endurance within home environment. Long term goals  Time Frame for Long term goals : 2 weeks  Long term goal 1: Pt will complete BADL routine with mod I using LHAE prn to return to PLOF. Long term goal 2: Pt will complete light IADL tasks with mod I using ECT prn to improve indpe within her condo. Following session, patient left in safe position with all fall risk precautions in place.

## 2022-03-19 PROCEDURE — 97530 THERAPEUTIC ACTIVITIES: CPT

## 2022-03-19 PROCEDURE — 6370000000 HC RX 637 (ALT 250 FOR IP): Performed by: PHYSICAL MEDICINE & REHABILITATION

## 2022-03-19 PROCEDURE — 97110 THERAPEUTIC EXERCISES: CPT

## 2022-03-19 PROCEDURE — 97535 SELF CARE MNGMENT TRAINING: CPT

## 2022-03-19 PROCEDURE — 2700000000 HC OXYGEN THERAPY PER DAY

## 2022-03-19 PROCEDURE — 6360000002 HC RX W HCPCS: Performed by: PHYSICAL MEDICINE & REHABILITATION

## 2022-03-19 PROCEDURE — 97116 GAIT TRAINING THERAPY: CPT

## 2022-03-19 PROCEDURE — 94640 AIRWAY INHALATION TREATMENT: CPT

## 2022-03-19 PROCEDURE — 94760 N-INVAS EAR/PLS OXIMETRY 1: CPT

## 2022-03-19 PROCEDURE — 1180000000 HC REHAB R&B

## 2022-03-19 RX ADMIN — LEVOTHYROXINE SODIUM 88 MCG: 0.09 TABLET ORAL at 05:52

## 2022-03-19 RX ADMIN — METOPROLOL TARTRATE 12.5 MG: 25 TABLET, FILM COATED ORAL at 10:06

## 2022-03-19 RX ADMIN — ACETAMINOPHEN 500 MG: 500 TABLET ORAL at 20:15

## 2022-03-19 RX ADMIN — APIXABAN 5 MG: 5 TABLET, FILM COATED ORAL at 10:07

## 2022-03-19 RX ADMIN — ACETAMINOPHEN 500 MG: 500 TABLET ORAL at 02:26

## 2022-03-19 RX ADMIN — ACETAMINOPHEN 500 MG: 500 TABLET ORAL at 16:28

## 2022-03-19 RX ADMIN — AMLODIPINE BESYLATE 2.5 MG: 2.5 TABLET ORAL at 10:08

## 2022-03-19 RX ADMIN — APIXABAN 5 MG: 5 TABLET, FILM COATED ORAL at 20:15

## 2022-03-19 RX ADMIN — ATORVASTATIN CALCIUM 10 MG: 10 TABLET, FILM COATED ORAL at 20:15

## 2022-03-19 RX ADMIN — BUDESONIDE 500 MCG: 0.5 INHALANT RESPIRATORY (INHALATION) at 16:08

## 2022-03-19 RX ADMIN — METOPROLOL TARTRATE 12.5 MG: 25 TABLET, FILM COATED ORAL at 20:16

## 2022-03-19 RX ADMIN — COLCHICINE 0.6 MG: 0.6 TABLET, FILM COATED ORAL at 10:07

## 2022-03-19 RX ADMIN — COLCHICINE 0.6 MG: 0.6 TABLET, FILM COATED ORAL at 20:15

## 2022-03-19 RX ADMIN — BUDESONIDE 500 MCG: 0.5 INHALANT RESPIRATORY (INHALATION) at 07:26

## 2022-03-19 RX ADMIN — ACETAMINOPHEN 500 MG: 500 TABLET ORAL at 10:08

## 2022-03-19 ASSESSMENT — PAIN SCALES - GENERAL
PAINLEVEL_OUTOF10: 0
PAINLEVEL_OUTOF10: 5
PAINLEVEL_OUTOF10: 6
PAINLEVEL_OUTOF10: 2
PAINLEVEL_OUTOF10: 6

## 2022-03-19 ASSESSMENT — PAIN DESCRIPTION - FREQUENCY: FREQUENCY: CONTINUOUS

## 2022-03-19 ASSESSMENT — PAIN DESCRIPTION - ONSET: ONSET: ON-GOING

## 2022-03-19 ASSESSMENT — PAIN DESCRIPTION - LOCATION: LOCATION: KNEE

## 2022-03-19 ASSESSMENT — PAIN DESCRIPTION - PAIN TYPE: TYPE: ACUTE PAIN

## 2022-03-19 ASSESSMENT — PAIN DESCRIPTION - DESCRIPTORS: DESCRIPTORS: TENDER

## 2022-03-19 ASSESSMENT — PAIN DESCRIPTION - ORIENTATION: ORIENTATION: LEFT

## 2022-03-19 NOTE — PROGRESS NOTES
[address] [place] [address]  INPATIENT PHYSICAL THERAPY  DAILY NOTE  [facility] - [id number]-[id number]-A    Time In: 1330  Time Out: 1400  Timed Code Treatment Minutes: 30 Minutes  Minutes: 30          Date: 3/19/2022  Patient Name: Shawn Judd,  Gender:  female        MRN: [id number]  : 1935  (80 y.o.)     Referring Practitioner: Sara Snowden MD  Diagnosis: Pseudogout of left knee  Additional Pertinent Hx: Per H&P Donetta Dakin  is a 80 y.o. female admitted to the inpatient rehabilitation unit on 3/17/2022. She was originally admitted to [address] [place] [address] on 3/15/2022 with a past medical history of advanced osteoarthritis, idiopathic pulmonary fibrosis on 2L of home oxygen, bronchiectasis, hypertension, hyperlipidemia, and secondary hyperparathyroidism. She  presented to ED for evaluation of left knee pain x previous 2 days. Pain at that time was at 10/10. She could not ambulate, and was unable to lift left lower extremity due to severe pain. Patient had left SI joint injection on 2022. She reported no fever or chills. No chest pain. No abdominal pain. No diarrhea. No urinary symptoms. She had prior bilateral total knee replacements, the left one completed in  and the right in . She noticed slightly increased swelling to left knee. Denied recent left knee trauma or injury. No change of bowl and bladder functions. She lives at an assisted living. At baseline she walks without assistance. In the setting of a total knee arthroplasty with knee pain and effusion, her knee was aspirated in the ED for 30 ml of yellow cloudy fluid. Her aspiration showed WBC in the joint fluid. She also had calcium pyrophosphate crystals. White count was 23,350 with 90% polys. She denied any fever or chills and had noprevious history of infection. She was diagnosed with pseudogout, started on Toradol for 2 days then transitioned to Naproxen 250 mg bid.   Colchicine added at 0.6 mg bid. Was on Dilaudid 1mg q 4 hours IV prn, and Flexeril 10 mg po tid prn. Also on Norco 5/325 one po q 8 hours prn. She also has a pin point area of skin irritation on her coccyx. Prior Level of Function:  Lives With: Alone  Type of Home:  ([facility])  Home Layout: One level  Home Access: Stairs to enter with rails  Entrance Stairs - Number of Steps: 2  Entrance Stairs - Rails: Right  Home Equipment: Cane,Oxygen   Bathroom Shower/Tub: Walk-in shower  Bathroom Toilet: Handicap height  Bathroom Equipment: Grab bars in shower,Tub transfer bench  Bathroom Accessibility: Accessible    Receives Help From: Friend(s)  ADL Assistance: [address] [place] [address] Avenue: Independent  Homemaking Responsibilities: Yes  Ambulation Assistance: Independent  Transfer Assistance: Independent  Active : Yes  Additional Comments: Patient reports she was at [facility] [place] for about a week due to increased difficulty with mobiltiy however prior lived in a [facility] independently and would like to return to her condo upon discharge. Patient reports she is on 2L O2 at baseline. Patient reports she recently had to utilize a cane for ambulation due to increased complaints of pain in L knee however prior she did not use any AD for mobility. Patient reported she did not have to ambulate far distances. Patient reports she has two sons however one is in [facility] and one is in [place]    Restrictions/Precautions:  Restrictions/Precautions: Fall Risk,Weight Bearing  Left Lower Extremity Weight Bearing: Weight Bearing As Tolerated  Position Activity Restriction  Other position/activity restrictions: 2L O2, hx of vertigo    SUBJECTIVE: Patient in bathroom upon arrival, agreed and cooperative for therapy. Reports feeling slightly dizzy/light-headed during session, but subsided after being up. PAIN: Yes, pain in left knee but not rated.      Vitals: Oxygen: 86-87% following step training while on 2L, did increase to 3L Training,Stair training,Patient/Caregiver Education & Training    Patient Education  Patient Education: Plan of Care, Precautions/Restrictions, Transfers, Reviewed Prior Education, Gait, Stairs, [employer] and Wellness Education, - Patient Requires Continued Education    Goals:  Patient goals : To be independent  Short term goals  Time Frame for Short term goals: 1 week  Short term goal 1: Patient to perform supine<>sit with SBA without railings in order to assist with getting into and out of bed. Short term goal 2: Patient to perform sit to stand transfers with SBA with <=1 cue for hand placement in order to assist with safety with initiation of ambulation. Short term goal 3: Patient to ambulate >=50 feet with RW with SBA in order to assist with home mobility. Short term goal 4: Patient to perform car transfer with CGA in order to assist with getting into and out of car. Short term goal 5: Patient to perform TUG in <=48 seconds in order to assist with decreased risk for falls. Short term goal 6: Patient to demonstrate increased L knee ROM to -5 to 85 degrees in order to assist with increased functional mobility. Long term goals  Time Frame for Long term goals : 2 weeks  Long term goal 1: Patient to perform supine<>sit with Mod I in order to assist with getting into and out of bed. Long term goal 2: Patient to perform sit to stand transfers Mod I in order to assist with standing and sitting from various surfaces. Long term goal 3: Patient to perform car transfer Mod I in order to assist with getting to and  from appointments. Long term goal 4: Patient to ambulate over level surfaces >=150 feet with LRAD with Mod I in order to assist with home mobility. Long term goal 5: Patient to ambulate over uneven surface with Mod I in order to assist with safety in community.   Long term goal 6: Patient to ascend/descend 2 steps with R handrail with Mod I in order to assist with getting into and out of condo.    Following session, patient left in safe position with all fall risk precautions in place.

## 2022-03-19 NOTE — PROGRESS NOTES
[address]  Occupational Therapy  Daily Note  Time:   Time In: 0830  Time Out: 0930  Timed Code Treatment Minutes: 60 Minutes  Minutes: 60          Date: 3/19/2022  Patient Name: Erika Chaney,   Gender: female      Room: [facility]65/065-A  MRN: [id number]  : 1935  (80 y.o.)  Referring Practitioner: Dr. Tres Han  Diagnosis: Pseudogout of left knee  Additional Pertinent Hx: Per H&P Crystal Wallace  is a 80 y.o. female admitted to the inpatient rehabilitation unit on 3/17/2022. She was originally admitted to [facility] on 3/15/2022 with a past medical history of advanced osteoarthritis, idiopathic pulmonary fibrosis on 2L of home oxygen, bronchiectasis, hypertension, hyperlipidemia, and secondary hyperparathyroidism. She  presented to ED for evaluation of left knee pain x previous 2 days. Pain at that time was at 10/10. She could not ambulate, and was unable to lift left lower extremity due to severe pain. Patient had left SI joint injection on 2022. She reported no fever or chills. No chest pain. No abdominal pain. No diarrhea. No urinary symptoms. She had prior bilateral total knee replacements, the left one completed in  and the right in . She noticed slightly increased swelling to left knee. Denied recent left knee trauma or injury. No change of bowl and bladder functions. She lives at an assisted living. At baseline she walks without assistance. In the setting of a total knee arthroplasty with knee pain and effusion, her knee was aspirated in the ED for 30 ml of yellow cloudy fluid. Her aspiration showed WBC in the joint fluid. She also had calcium pyrophosphate crystals. White count was 23,350 with 90% polys. She denied any fever or chills and had noprevious history of infection. She was diagnosed with pseudogout, started on Toradol for 2 days then transitioned to Naproxen 250 mg bid.   Colchicine added at 0.6 mg bid. Was on Dilaudid 1mg q 4 hours IV prn, and Flexeril 10 mg po tid prn. Also on Norco 5/325 one po q 8 hours prn. She also has a pin point area of skin irritation on her coccyx. Restrictions/Precautions:  Restrictions/Precautions: Fall Risk,Weight Bearing  Left Lower Extremity Weight Bearing: Weight Bearing As Tolerated  Position Activity Restriction  Other position/activity restrictions: 2L O2, hx of vertigo     SUBJECTIVE: Patient seated in bedside chair upon arrival. Reports she took laxatives and has been going to the bathroom frequently this AM requesting to complete session in room this AM. Agreeable to OT session    PAIN: Complains of pain in L knee, did not rate     Vitals: Oxygen: Fluctuating between 86-96% on 2L of O2, cueing provided for deep breathing in through nose and out through mouth   *Distributed an incentive spirometer to patient and educated on use, patient verbalized understanding     COGNITION: Decreased Insight     ADL:   Grooming: Contact Guard Assistance. Standing sinkside x1 minute 15 seconds to complete oral care, requiring lengthy seated rest break after task due to increased SOB/fatigue. Checked O2 sats on 2L with reading of 87%. Education provided on deep breathing. Completed hair care seated in chair with setup   Bathing: Minimal Assistance. To wash BLEs from mid shin to feet. CGA to wash tremayne area/bottom, fatigued quickly requiring lengthy seated rest break. SBA to wash remaining areas seated in chair  Upper Extremity Dressing: with set-up. To doff pajama gown and don bra/sweatshirt seated in chair  Lower Extremity Dressing: Maximum Assistance. Threading LEs into pants/underwear. Luiz to don B slippers. CGA to pull up pants over hips   Toileting: [employer]. For clothing management and hygiene x2 trials   Toilet Transfer: Minimal Assistance. To/from STS with cueing provided for technique. BALANCE:  Sitting Balance:  Stand By Assistance.     Standing Balance: Contact Guard Assistance. BED MOBILITY:  Not Tested    TRANSFERS:  Sit to Stand:  Minimal Assistance. From STS; CGA from bedside chair  Stand to Sit: Minimal Assistance. To STS; CGA to bedside chair    FUNCTIONAL MOBILITY:  Assistive Device: Rolling Walker  Assist Level:  Contact Guard Assistance. Distance: To and from bathroom  Slow pace, management required for O2 tubing        ASSESSMENT:     Activity Tolerance:  Patient tolerance of  treatment: fair. Discharge Recommendations: Continue to assess pending progress and Patient would benefit from continued OT at discharge  Equipment Recommendations: Other: Pt has a shower seat, grab bars, hand held shower, ADA height toilet and half wall nearby. Plan: Times per week: 5x/wk for 90 min and 1x/wk for 30 min  Current Treatment Recommendations: Strengthening,Functional Mobility Training,Balance Training,Endurance Training,Patient/Caregiver Education & Training,Equipment Evaluation, Education, & procurement,Safety Education & Training,Self-Care / ADL,Home Management Training    Patient Education  Patient Education: safety with transfers and mobility, ADL strategies, deep breathing, monitoring O2    Goals  Short term goals  Time Frame for Short term goals: 1 week  Short term goal 1: Pt will complete BUE strengthening exercises with min vcs for technique to increase indep and endurance with all self cares. Short term goal 2: Pt will complete standing tolerance x 5 minutes with 1-2 UE release and SBA to increase indep with sinkside grooming. Short term goal 3: Pt will complete functional mobiity to/from BR with SBA and 0 vcs for safety to increase indep with toileting. Short term goal 4: Pt will complete LB dressing with LHAE PRN and min A to increase indep and endurance within home environment.   Long term goals  Time Frame for Long term goals : 2 weeks  Long term goal 1: Pt will complete BADL routine with mod I using LHAE prn to return to PLOF.  Long term goal 2: Pt will complete light IADL tasks with mod I using ECT prn to improve indpe within her condo. Following session, patient left in safe position with all fall risk precautions in place. car

## 2022-03-19 NOTE — PROGRESS NOTES
[facility]  [address] [facility]  Occupational Therapy  Daily Note  Time:   Time In: 1410  Time Out: 1440  Timed Code Treatment Minutes: 30 Minutes  Minutes: 30          Date: 3/19/2022  Patient Name: Feliciano Ohara,   Gender: female      Room: -65/065-A  MRN: [id number]  : 1935  (80 y.o.)  Referring Practitioner: Dr. Ovidio Buitrago  Diagnosis: Pseudogout of left knee  Additional Pertinent Hx: Per H&P Willow Almeida  is a 80 y.o. female admitted to the inpatient rehabilitation unit on 3/17/2022. She was originally admitted to [facility] on 3/15/2022 with a past medical history of advanced osteoarthritis, idiopathic pulmonary fibrosis on 2L of home oxygen, bronchiectasis, hypertension, hyperlipidemia, and secondary hyperparathyroidism. She  presented to ED for evaluation of left knee pain x previous 2 days. Pain at that time was at 10/10. She could not ambulate, and was unable to lift left lower extremity due to severe pain. Patient had left SI joint injection on 2022. She reported no fever or chills. No chest pain. No abdominal pain. No diarrhea. No urinary symptoms. She had prior bilateral total knee replacements, the left one completed in  and the right in . She noticed slightly increased swelling to left knee. Denied recent left knee trauma or injury. No change of bowl and bladder functions. She lives at an assisted living. At baseline she walks without assistance. In the setting of a total knee arthroplasty with knee pain and effusion, her knee was aspirated in the ED for 30 ml of yellow cloudy fluid. Her aspiration showed WBC in the joint fluid. She also had calcium pyrophosphate crystals. White count was 23,350 with 90% polys. She denied any fever or chills and had noprevious history of infection. She was diagnosed with pseudogout, started on Toradol for 2 days then transitioned to Naproxen 250 mg bid.   Colchicine added at 0.6 mg bid. Was on Dilaudid 1mg q 4 hours IV prn, and Flexeril 10 mg po tid prn. Also on Norco 5/325 one po q 8 hours prn. She also has a pin point area of skin irritation on her coccyx. Restrictions/Precautions:  Restrictions/Precautions: Fall Risk,Weight Bearing  Left Lower Extremity Weight Bearing: Weight Bearing As Tolerated  Position Activity Restriction  Other position/activity restrictions: 2L O2, hx of vertigo     SUBJECTIVE: Patient seated in wheelchair upon arrival. Agreeable to OT session     PAIN: Complains of pain in L knee    Vitals: Oxygen: 89-94% on 2L     COGNITION: Decreased Insight    ADL:   Grooming: Contact Guard Assistance. Standing sinkside to wash hands after toileting tasks  Toileting: Contact Guard Assistance. For clothing management, completed hygiene seated on RTS  Toilet Transfer: [address] To/from RTS. BALANCE:  Sitting Balance:  Supervision. Standing Balance: Contact Guard Assistance. BED MOBILITY:  Not Tested    TRANSFERS:  Sit to Stand:  Contact Guard Assistance. From various surfaces  Stand to Sit: Contact Guard Assistance. To various surfaces    FUNCTIONAL MOBILITY:  Assistive Device: Rolling Walker  Assist Level:  Contact Guard Assistance. Distance: To and from bathroom     ADDITIONAL ACTIVITIES:  Patient completed BUE exercises: completed x15 reps x1 set with a 1# dumb bell with distal exercises. Completed BUE table top exercises with shoulder flexion and shoulder horizontal abduction x10 reps x1 set, holding for approx 3 seconds. Completed to improve UE strength, activity tolerance and maintain joint integrity/ROM required for BADL routine and toilet / shower transfers. Patient tolerated fair, requiring rest breaks. Patient also required cues for technique. ASSESSMENT:     Activity Tolerance:  Patient tolerance of  treatment: fair.        Discharge Recommendations: Continue to assess pending progress and Patient would benefit from continued OT at discharge  Equipment Recommendations: Other: Pt has a shower seat, grab bars, hand held shower, ADA height toilet and half wall nearby. Plan: Times per week: 5x/wk for 90 min and 1x/wk for 30 min  Current Treatment Recommendations: Strengthening,Functional Mobility Training,Balance Training,Endurance Training,Patient/Caregiver Education & Training,Equipment Evaluation, Education, & procurement,Safety Education & Training,Self-Care / ADL,Home Management Training    Patient Education  Patient Education: safety with transfers and mobility, ADL strategies, BUE exercises including table top slides    Goals  Short term goals  Time Frame for Short term goals: 1 week  Short term goal 1: Pt will complete BUE strengthening exercises with min vcs for technique to increase indep and endurance with all self cares. Short term goal 2: Pt will complete standing tolerance x 5 minutes with 1-2 UE release and SBA to increase indep with sinkside grooming. Short term goal 3: Pt will complete functional mobiity to/from BR with SBA and 0 vcs for safety to increase indep with toileting. Short term goal 4: Pt will complete LB dressing with LHAE PRN and min A to increase indep and endurance within home environment. Long term goals  Time Frame for Long term goals : 2 weeks  Long term goal 1: Pt will complete BADL routine with mod I using LHAE prn to return to PLOF. Long term goal 2: Pt will complete light IADL tasks with mod I using ECT prn to improve indpe within her condo. Following session, patient left in safe position with all fall risk precautions in place.

## 2022-03-19 NOTE — CONSULTS
Department of Family Practice  Consult Note        Reason for Consult:  Medical management while on the Inpatient Rehab unit. Requesting Physician:  Dr Eckert [place]:   The need to continue the intensive time with therapies following the acute hospital stay. History Obtained From:  patient, EMR    HISTORY OF PRESENT ILLNESS:              The patient is a 80 y.o. female with significant past medical history of       Diagnosis Date    Anemia     normal on pre-op 5/2012 and 12/2/13    Backache     h/o    Bronchiectasis ([address] 2013    Bursitis     bilateral shoulders    Constipation     Diverticulosis of colon     HTN (hypertension)     Hypercalcemia     slightly elevated at 10.8 pre-op 12/2/13    Hyperlipidemia     Nodular goiter     bx negative    OA (osteoarthritis)     bilateral thumbs - sees Dr. Ashlie Naqvi Osteopenia 11/6/2013    Parathyroid adenoma 4/30/2013    Pneumonia of right upper lobe due to infectious organism     Pneumonitis     Dr. Teague  in 7/2010 - bx negative    Pulmonary Mycobacterium avium complex (MAC) infection ([address]     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 ([address]     had one parathyroid removed - now follows with Dr. Tomy Dozier Sinusitis     with seasonal allergies    Vitamin D deficiency 05/2018      who presents with severe pain in the left knee. She was seen by ID and a joint aspiration showed it to be pseudogout. She states it's feeling better with the colchiceine. After becoming more medically stable, she was deconditioned and so has come to the Inpatient Rehab Unit to continue the time with therapies prior to a discharge disposition being made.        Past Medical History:        Diagnosis Date    Anemia     normal on pre-op 5/2012 and 12/2/13    Backache     h/o    Bronchiectasis [facility]) 2013    Bursitis     bilateral shoulders    Constipation     Diverticulosis of colon     HTN (hypertension)     Hypercalcemia     slightly elevated at 10.8 pre-op 13    Hyperlipidemia     Nodular goiter     bx negative    OA (osteoarthritis)     bilateral thumbs - sees Dr. Jeo Peres Osteopenia 2013    Parathyroid adenoma 2013    Pneumonia of right upper lobe due to infectious organism     Pneumonitis     Dr. Miguel Angel Juarez in 2010 - bx negative    Pulmonary Mycobacterium avium complex (MAC) infection ([address]     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 ([address]     had one parathyroid removed - now follows with Dr. Halle Sullivan Sinusitis     with seasonal allergies    Vitamin D deficiency 2018     Past Surgical History:        Procedure Laterality Date    ABDOMINAL EXPLORATION SURGERY  2013    Release of KATHERINE TAMAYO-Dr. Ryan Berkowitz    APPENDECTOMY      BACK INJECTION Bilateral 2021    bilateral sacroiliac joint injection performed by Stanislav Whipple DO at [address] N/A 2022    B/L SI joint block performed by Stanislav Whipple DO at [address] N/A 2020    BRONCHOSCOPY FLUOROSCOPY performed by Harris Haas MD at [facility]    CATARACT REMOVAL WITH IMPLANT Bilateral  ?   SECTION  4323,5910    DILATION AND CURETTAGE OF UTERUS  5006,2246,0023    EXCISION OF PARATHYROID MASS Right 2013    rt parathyroidectomy (excision of rt inferior parathyroid mass) exploration of neck     FOOT SURGERY      left    FOOT SURGERY Left 2017    Dr Georgia Stover  6-    left knee    JOINT REPLACEMENT  2014    right knee    MALIGNANT SKIN LESION EXCISION Left 2017    [facility] DORSAL FOOT WITH GRAFT & FS    GOSIA AND BSO  1982    TONSILLECTOMY AND ADENOIDECTOMY   ?     TOTAL KNEE ARTHROPLASTY Right 2014    [facility]     Current Medications:   Current Facility-Administered Medications: HYDROcodone-acetaminophen (NORCO) 5-325 MG per tablet 0.5 tablet, 0.5 tablet, Oral, Q4H PRN  bisacodyl (DULCOLAX) suppository 10 mg, 10 mg, Rectal, Daily PRN  magnesium hydroxide (MILK OF MAGNESIA) 400 MG/5ML suspension 30 mL, 30 mL, Oral, Daily PRN  acetaminophen (TYLENOL) tablet 500 mg, 500 mg, Oral, Q6H  apixaban (ELIQUIS) tablet 5 mg, 5 mg, Oral, BID  atorvastatin (LIPITOR) tablet 10 mg, 10 mg, Oral, Nightly  budesonide (PULMICORT) nebulizer suspension 500 mcg, 0.5 mg, Nebulization, BID  colchicine (COLCRYS) tablet 0.6 mg, 0.6 mg, Oral, BID  cyclobenzaprine (FLEXERIL) tablet 10 mg, 10 mg, Oral, TID PRN  docusate sodium (COLACE) capsule 100 mg, 100 mg, Oral, BID  levothyroxine (SYNTHROID) tablet 88 mcg, 88 mcg, Oral, Daily  metoprolol tartrate (LOPRESSOR) tablet 12.5 mg, 12.5 mg, Oral, BID  polyethylene glycol (GLYCOLAX) packet 17 g, 17 g, Oral, BID  Allergies:  Levaquin [levofloxacin], Percocet [oxycodone-acetaminophen], Rifampin, Sulfa antibiotics, and Cefuroxime    Social History:   MARITAL STATUS:      Family History:       Problem Relation Age of Onset    Diabetes Mother     Thyroid Disease Mother     Arthritis Mother     High Blood Pressure Mother     Arthritis Father     High Blood Pressure Father     Other Father         hay fever    Breast Cancer Neg Hx     Ovarian Cancer Neg Hx      REVIEW OF SYSTEMS:    CONSTITUTIONAL:  positive for  fatigue  EYES:  positive for  glasses  HEENT:  negative for  nasal congestion  RESPIRATORY:  positive for  cough with sputum and dyspnea  CARDIOVASCULAR:  negative for  chest pain  GASTROINTESTINAL:  negative for abdominal mass  GENITOURINARY:  negative for dysuria  INTEGUMENT/BREAST:  negative for rash  HEMATOLOGIC/LYMPHATIC:  negative for petechiae  ALLERGIC/IMMUNOLOGIC:  negative for anaphylaxis  ENDOCRINE:  negative for diabetic symptoms including polyphagia  MUSCULOSKELETAL:  positive for  myalgias, arthralgias, decreased range of motion, muscle weakness and bone pain  NEUROLOGICAL:  negative for seizures  BEHAVIOR/PSYCH:  negative for increased

## 2022-03-19 NOTE — PROGRESS NOTES
[address] [place] [address]  INPATIENT PHYSICAL THERAPY  DAILY NOTE  [facility] - [id number]-65/065-A    Time In: 1100  Time Out: 1200  Timed Code Treatment Minutes: 60 Minutes  Minutes: 60          Date: 3/19/2022  Patient Name: Rcohelle Calderon,  Gender:  female        MRN: [id number]  : 1935  (80 y.o.)     Referring Practitioner: Joseline Galloway MD  Diagnosis: Pseudogout of left knee  Additional Pertinent Hx: Per H&P Florinda Combs  is a 80 y.o. female admitted to the inpatient rehabilitation unit on 3/17/2022. She was originally admitted to [address] [place] [address] on 3/15/2022 with a past medical history of advanced osteoarthritis, idiopathic pulmonary fibrosis on 2L of home oxygen, bronchiectasis, hypertension, hyperlipidemia, and secondary hyperparathyroidism. She  presented to ED for evaluation of left knee pain x previous 2 days. Pain at that time was at 10/10. She could not ambulate, and was unable to lift left lower extremity due to severe pain. Patient had left SI joint injection on 2022. She reported no fever or chills. No chest pain. No abdominal pain. No diarrhea. No urinary symptoms. She had prior bilateral total knee replacements, the left one completed in  and the right in . She noticed slightly increased swelling to left knee. Denied recent left knee trauma or injury. No change of bowl and bladder functions. She lives at an assisted living. At baseline she walks without assistance. In the setting of a total knee arthroplasty with knee pain and effusion, her knee was aspirated in the ED for 30 ml of yellow cloudy fluid. Her aspiration showed WBC in the joint fluid. She also had calcium pyrophosphate crystals. White count was 23,350 with 90% polys. She denied any fever or chills and had noprevious history of infection. She was diagnosed with pseudogout, started on Toradol for 2 days then transitioned to Naproxen 250 mg bid.   Colchicine added at 0.6 mg bid. Was on Dilaudid 1mg q 4 hours IV prn, and Flexeril 10 mg po tid prn. Also on Norco 5/325 one po q 8 hours prn. She also has a pin point area of skin irritation on her coccyx. Prior Level of Function:  Lives With: Alone  Type of Home:  ([facility])  Home Layout: One level  Home Access: Stairs to enter with rails  Entrance Stairs - Number of Steps: 2  Entrance Stairs - Rails: Right  Home Equipment: Cane,Oxygen   Bathroom Shower/Tub: Walk-in shower  Bathroom Toilet: Handicap height  Bathroom Equipment: Grab bars in shower,Tub transfer bench  Bathroom Accessibility: Accessible    Receives Help From: Friend(s)  ADL Assistance: [facility] [facility] [address]: Independent  Homemaking Responsibilities: Yes  Ambulation Assistance: Independent  Transfer Assistance: Independent  Active : Yes  Additional Comments: Patient reports she was at [facility] [place] for about a week due to increased difficulty with mobiltiy however prior lived in a [facility] independently and would like to return to her condo upon discharge. Patient reports she is on 2L O2 at baseline. Patient reports she recently had to utilize a cane for ambulation due to increased complaints of pain in L knee however prior she did not use any AD for mobility. Patient reported she did not have to ambulate far distances. Patient reports she has two sons however one is in [facility] and one is in [place]    Restrictions/Precautions:  Restrictions/Precautions: Fall Risk,Weight Bearing  Left Lower Extremity Weight Bearing: Weight Bearing As Tolerated  Position Activity Restriction  Other position/activity restrictions: 2L O2, hx of vertigo    SUBJECTIVE: Patient in recliner upon arrival, agreed and cooperative for therapy. Complaints of feeling \"Light-headed\" and \"dizzy\" with positional changes (normally sit-> stand), PTA checking BP and was positive for orthostatic BP, also noting decrease in 02 during therapy sessions, notified RN(Jovana). PAIN: Yes, pain in left knee, 6/10 when asked at start of session. Vitals: Blood Pressure: Sit: 126/63, MAP 79; Stand: 107/60, MAP 75  Oxygen: 91-97% with rest or shortly after activity. Did decrease to 86% after ambulation and to 77% when in BR but unsure of accuracy of reading d/t pt just washing hands right before. Heart Rate: 59-67 bpm    OBJECTIVE:  Bed Mobility:  Not Tested    Transfers:  Sit to Stand: Contact Guard Assistance  Stand to  Corporation, cues for hand placement, with verbal cues  To/From Bed and Chair: Contact Guard Assistance, with verbal cues, using RW    Ambulation:  Contact Guard Assistance, with verbal cues , with increased time for completion  Distance: ~30 ft. X1; 4-5 ft. x4  Surface: Level Tile  Device:Rolling Walker  Gait Deviations: Forward Flexed Posture, Slow Jaimee, Decreased Step Length on Right, Decreased Weight Shift Left, Decreased Gait Speed, Decreased Heel Strike Bilaterally and Decreased Terminal Knee Extension    Balance:  Static Standing Balance: Contact Guard Assistance  Dynamic Standing Balance: Contact Guard Assistance    Exercise:  Patient was guided in 1 set(s) 10 reps of exercise to both lower extremities. Seated marches, Seated heel/toe raises and Long arc quads. Patient also rode Nu-step bike x3:24, L0, using all 4 extremities to work on left knee ROM, tolerated fairly well but needing to stop early d/t needing to use the BR. Exercises were completed for increased independence with functional mobility. Functional Outcome Measures: Not completed       ASSESSMENT:  Assessment: Patient progressing toward established goals. Activity Tolerance:  Patient tolerance of  treatment: good.       Equipment Recommendations:Equipment Needed:  (Continue to assess pending progress; may require  RW)  Discharge Recommendations: Continue to assess pending progress  Plan: Times per week: 5x/wk 90 min; 1x/wk 30 min  Times per day: Daily  Current Treatment Recommendations: Strengthening,Neuromuscular Re-education,Home Exercise Program,ROM,Safety Education & Training,Balance Training,Endurance Training,Functional Mobility Training,Wheelchair Mobility Training,Transfer Training,Gait Training,Stair training,Patient/Caregiver Education & Training    Patient Education  Patient Education: Plan of Care, Precautions/Restrictions, Transfers, Reviewed Prior Education, Gait, Health Promotion and Wellness Education, Home Safety Education, Pursed Lip Breathing,  - Patient Verbalized Understanding    Goals:  Patient goals : To be independent  Short term goals  Time Frame for Short term goals: 1 week  Short term goal 1: Patient to perform supine<>sit with SBA without railings in order to assist with getting into and out of bed. Short term goal 2: Patient to perform sit to stand transfers with SBA with <=1 cue for hand placement in order to assist with safety with initiation of ambulation. Short term goal 3: Patient to ambulate >=50 feet with RW with SBA in order to assist with home mobility. Short term goal 4: Patient to perform car transfer with CGA in order to assist with getting into and out of car. Short term goal 5: Patient to perform TUG in <=48 seconds in order to assist with decreased risk for falls. Short term goal 6: Patient to demonstrate increased L knee ROM to -5 to 85 degrees in order to assist with increased functional mobility. Long term goals  Time Frame for Long term goals : 2 weeks  Long term goal 1: Patient to perform supine<>sit with Mod I in order to assist with getting into and out of bed. Long term goal 2: Patient to perform sit to stand transfers Mod I in order to assist with standing and sitting from various surfaces. Long term goal 3: Patient to perform car transfer Mod I in order to assist with getting to and  from appointments.   Long term goal 4: Patient to ambulate over level surfaces >=150 feet with LRAD with Mod I in order to assist with home mobility. Long term goal 5: Patient to ambulate over uneven surface with Mod I in order to assist with safety in community. Long term goal 6: Patient to ascend/descend 2 steps with R handrail with Mod I in order to assist with getting into and out of condo. Following session, patient left in safe position with all fall risk precautions in place.

## 2022-03-19 NOTE — PLAN OF CARE
Care plan reviewed with patient. Patient verbalizes understanding of the plan of care and contribute to goal setting. Problem: Skin Integrity:  Goal: Will show no infection signs and symptoms  Description: Will show no infection signs and symptoms  Outcome: Ongoing  Note: No s/sx infection noted. Afebrile. Problem: Falls - Risk of:  Goal: Will remain free from falls  Description: Will remain free from falls  Outcome: Ongoing  Note: Up with assist of one with walker and gait belt. Tolerates well. Problem: Pain:  Goal: Pain level will decrease  Description: Pain level will decrease  Outcome: Ongoing  Note: Pain noted in left knee. Scheduled tylenol given, stated will see if that helps before taking anything else at this time.

## 2022-03-20 LAB
ANAEROBIC CULTURE: NORMAL
BODY FLUID CULTURE, STERILE: NORMAL
GRAM STAIN RESULT: NORMAL

## 2022-03-20 PROCEDURE — 1180000000 HC REHAB R&B

## 2022-03-20 PROCEDURE — 94760 N-INVAS EAR/PLS OXIMETRY 1: CPT

## 2022-03-20 PROCEDURE — 2700000000 HC OXYGEN THERAPY PER DAY

## 2022-03-20 PROCEDURE — 6370000000 HC RX 637 (ALT 250 FOR IP): Performed by: NURSE PRACTITIONER

## 2022-03-20 PROCEDURE — 6370000000 HC RX 637 (ALT 250 FOR IP): Performed by: PHYSICAL MEDICINE & REHABILITATION

## 2022-03-20 PROCEDURE — 94640 AIRWAY INHALATION TREATMENT: CPT

## 2022-03-20 PROCEDURE — 6360000002 HC RX W HCPCS: Performed by: PHYSICAL MEDICINE & REHABILITATION

## 2022-03-20 RX ADMIN — ACETAMINOPHEN 500 MG: 500 TABLET ORAL at 02:36

## 2022-03-20 RX ADMIN — ACETAMINOPHEN 500 MG: 500 TABLET ORAL at 22:01

## 2022-03-20 RX ADMIN — HYDROCODONE BITARTRATE AND ACETAMINOPHEN 0.5 TABLET: 5; 325 TABLET ORAL at 12:56

## 2022-03-20 RX ADMIN — APIXABAN 5 MG: 5 TABLET, FILM COATED ORAL at 09:00

## 2022-03-20 RX ADMIN — ACETAMINOPHEN 500 MG: 500 TABLET ORAL at 14:59

## 2022-03-20 RX ADMIN — ACETAMINOPHEN 500 MG: 500 TABLET ORAL at 09:00

## 2022-03-20 RX ADMIN — COLCHICINE 0.6 MG: 0.6 TABLET, FILM COATED ORAL at 22:01

## 2022-03-20 RX ADMIN — ATORVASTATIN CALCIUM 10 MG: 10 TABLET, FILM COATED ORAL at 22:01

## 2022-03-20 RX ADMIN — APIXABAN 5 MG: 5 TABLET, FILM COATED ORAL at 22:02

## 2022-03-20 RX ADMIN — HYDROCODONE BITARTRATE AND ACETAMINOPHEN 0.5 TABLET: 5; 325 TABLET ORAL at 05:55

## 2022-03-20 RX ADMIN — BUDESONIDE 500 MCG: 0.5 INHALANT RESPIRATORY (INHALATION) at 16:24

## 2022-03-20 RX ADMIN — COLCHICINE 0.6 MG: 0.6 TABLET, FILM COATED ORAL at 09:00

## 2022-03-20 RX ADMIN — BUDESONIDE 500 MCG: 0.5 INHALANT RESPIRATORY (INHALATION) at 05:04

## 2022-03-20 RX ADMIN — METOPROLOL TARTRATE 12.5 MG: 25 TABLET, FILM COATED ORAL at 22:01

## 2022-03-20 RX ADMIN — LEVOTHYROXINE SODIUM 88 MCG: 0.09 TABLET ORAL at 05:55

## 2022-03-20 ASSESSMENT — PAIN SCALES - GENERAL
PAINLEVEL_OUTOF10: 6
PAINLEVEL_OUTOF10: 5
PAINLEVEL_OUTOF10: 5
PAINLEVEL_OUTOF10: 7
PAINLEVEL_OUTOF10: 6
PAINLEVEL_OUTOF10: 0
PAINLEVEL_OUTOF10: 2
PAINLEVEL_OUTOF10: 7
PAINLEVEL_OUTOF10: 7

## 2022-03-20 ASSESSMENT — PAIN DESCRIPTION - LOCATION
LOCATION: HIP
LOCATION: HIP

## 2022-03-20 ASSESSMENT — PAIN DESCRIPTION - ORIENTATION
ORIENTATION: LEFT
ORIENTATION: LEFT

## 2022-03-20 ASSESSMENT — PAIN DESCRIPTION - PAIN TYPE
TYPE: ACUTE PAIN
TYPE: ACUTE PAIN

## 2022-03-20 NOTE — PLAN OF CARE
Care plan reviewed with patient. Patient verbalizes understanding of the plan of care and contribute to goal setting. Problem: Skin Integrity:  Goal: Will show no infection signs and symptoms  Description: Will show no infection signs and symptoms  Outcome: Ongoing  Note: No s/sx infection noted. Afebrile. Problem: Falls - Risk of:  Goal: Will remain free from falls  Description: Will remain free from falls  Outcome: Ongoing  Note: Up with min assist of one with walker and gait belt. Tolerates well. Problem: IP MOBILITY  Goal: LTG - patient will demonstrate safe mobility requirements  Outcome: Ongoing     Problem: Pain:  Goal: Pain level will decrease  Description: Pain level will decrease  Outcome: Ongoing  Note: Mod pain noted in left hip at this time.  Denies need for prn medication at this time

## 2022-03-21 ENCOUNTER — APPOINTMENT (OUTPATIENT)
Dept: GENERAL RADIOLOGY | Age: 87
DRG: 554 | End: 2022-03-21
Attending: PHYSICAL MEDICINE & REHABILITATION
Payer: MEDICARE

## 2022-03-21 LAB
BLOOD CULTURE, ROUTINE: NORMAL
BLOOD CULTURE, ROUTINE: NORMAL

## 2022-03-21 PROCEDURE — 1180000000 HC REHAB R&B

## 2022-03-21 PROCEDURE — 94640 AIRWAY INHALATION TREATMENT: CPT

## 2022-03-21 PROCEDURE — 99232 SBSQ HOSP IP/OBS MODERATE 35: CPT | Performed by: NURSE PRACTITIONER

## 2022-03-21 PROCEDURE — 71046 X-RAY EXAM CHEST 2 VIEWS: CPT

## 2022-03-21 PROCEDURE — 97110 THERAPEUTIC EXERCISES: CPT

## 2022-03-21 PROCEDURE — 6360000002 HC RX W HCPCS: Performed by: PHYSICAL MEDICINE & REHABILITATION

## 2022-03-21 PROCEDURE — 97116 GAIT TRAINING THERAPY: CPT

## 2022-03-21 PROCEDURE — 6370000000 HC RX 637 (ALT 250 FOR IP): Performed by: PHYSICAL MEDICINE & REHABILITATION

## 2022-03-21 PROCEDURE — 6370000000 HC RX 637 (ALT 250 FOR IP): Performed by: NURSE PRACTITIONER

## 2022-03-21 PROCEDURE — 97530 THERAPEUTIC ACTIVITIES: CPT

## 2022-03-21 PROCEDURE — 2700000000 HC OXYGEN THERAPY PER DAY

## 2022-03-21 PROCEDURE — 94761 N-INVAS EAR/PLS OXIMETRY MLT: CPT

## 2022-03-21 PROCEDURE — 97535 SELF CARE MNGMENT TRAINING: CPT

## 2022-03-21 RX ORDER — DOCUSATE SODIUM 100 MG/1
100 CAPSULE, LIQUID FILLED ORAL 2 TIMES DAILY PRN
Status: DISCONTINUED | OUTPATIENT
Start: 2022-03-21 | End: 2022-03-23

## 2022-03-21 RX ORDER — POLYETHYLENE GLYCOL 3350 17 G/17G
17 POWDER, FOR SOLUTION ORAL DAILY PRN
Status: DISCONTINUED | OUTPATIENT
Start: 2022-03-21 | End: 2022-03-29 | Stop reason: HOSPADM

## 2022-03-21 RX ADMIN — METOPROLOL TARTRATE 12.5 MG: 25 TABLET, FILM COATED ORAL at 08:13

## 2022-03-21 RX ADMIN — LEVOTHYROXINE SODIUM 88 MCG: 0.09 TABLET ORAL at 05:47

## 2022-03-21 RX ADMIN — APIXABAN 5 MG: 5 TABLET, FILM COATED ORAL at 20:10

## 2022-03-21 RX ADMIN — ATORVASTATIN CALCIUM 10 MG: 10 TABLET, FILM COATED ORAL at 20:10

## 2022-03-21 RX ADMIN — BUDESONIDE 500 MCG: 0.5 INHALANT RESPIRATORY (INHALATION) at 17:54

## 2022-03-21 RX ADMIN — HYDROCODONE BITARTRATE AND ACETAMINOPHEN 0.5 TABLET: 5; 325 TABLET ORAL at 20:10

## 2022-03-21 RX ADMIN — ACETAMINOPHEN 500 MG: 500 TABLET ORAL at 08:13

## 2022-03-21 RX ADMIN — COLCHICINE 0.6 MG: 0.6 TABLET, FILM COATED ORAL at 08:13

## 2022-03-21 RX ADMIN — HYDROCODONE BITARTRATE AND ACETAMINOPHEN 0.5 TABLET: 5; 325 TABLET ORAL at 09:11

## 2022-03-21 RX ADMIN — COLCHICINE 0.6 MG: 0.6 TABLET, FILM COATED ORAL at 20:10

## 2022-03-21 RX ADMIN — ACETAMINOPHEN 500 MG: 500 TABLET ORAL at 16:13

## 2022-03-21 RX ADMIN — HYDROCODONE BITARTRATE AND ACETAMINOPHEN 0.5 TABLET: 5; 325 TABLET ORAL at 13:00

## 2022-03-21 RX ADMIN — APIXABAN 5 MG: 5 TABLET, FILM COATED ORAL at 08:13

## 2022-03-21 RX ADMIN — HYDROCODONE BITARTRATE AND ACETAMINOPHEN 0.5 TABLET: 5; 325 TABLET ORAL at 04:45

## 2022-03-21 RX ADMIN — METOPROLOL TARTRATE 12.5 MG: 25 TABLET, FILM COATED ORAL at 20:10

## 2022-03-21 ASSESSMENT — PAIN DESCRIPTION - ONSET: ONSET: ON-GOING

## 2022-03-21 ASSESSMENT — PAIN DESCRIPTION - PAIN TYPE
TYPE: ACUTE PAIN

## 2022-03-21 ASSESSMENT — PAIN DESCRIPTION - LOCATION
LOCATION: HIP;LEG
LOCATION: KNEE
LOCATION: KNEE

## 2022-03-21 ASSESSMENT — PAIN DESCRIPTION - ORIENTATION
ORIENTATION: LEFT

## 2022-03-21 ASSESSMENT — PAIN DESCRIPTION - PROGRESSION
CLINICAL_PROGRESSION: NOT CHANGED

## 2022-03-21 ASSESSMENT — PAIN DESCRIPTION - FREQUENCY
FREQUENCY: CONTINUOUS

## 2022-03-21 ASSESSMENT — PAIN SCALES - GENERAL
PAINLEVEL_OUTOF10: 5
PAINLEVEL_OUTOF10: 7
PAINLEVEL_OUTOF10: 6
PAINLEVEL_OUTOF10: 5
PAINLEVEL_OUTOF10: 8
PAINLEVEL_OUTOF10: 7
PAINLEVEL_OUTOF10: 7
PAINLEVEL_OUTOF10: 8
PAINLEVEL_OUTOF10: 7
PAINLEVEL_OUTOF10: 5
PAINLEVEL_OUTOF10: 6
PAINLEVEL_OUTOF10: 7

## 2022-03-21 ASSESSMENT — PAIN DESCRIPTION - DESCRIPTORS
DESCRIPTORS: ACHING;TENDER
DESCRIPTORS: ACHING;TENDER
DESCRIPTORS: ACHING;DISCOMFORT;NAGGING;PENETRATING

## 2022-03-21 ASSESSMENT — PAIN - FUNCTIONAL ASSESSMENT: PAIN_FUNCTIONAL_ASSESSMENT: ACTIVITIES ARE NOT PREVENTED

## 2022-03-21 NOTE — PROGRESS NOTES
[facility]  Recreational Therapy  Daily Note  [address]    Time Spent with Patient: 15 minutes    Date:  3/21/2022       Patient Name: Shawn Judd      MRN: [id number]      YOB: 1935 (80 y.o.)       Gender: female  Diagnosis: Pseudogout of left knee  Referring Practitioner: Dr. Romi Kennedy    RESTRICTIONS/PRECAUTIONS:  Restrictions/Precautions: Fall Risk,Weight Bearing  Vision: Impaired  Hearing: Exceptions to [facility]  Hearing Exceptions: Hard of hearing/hearing concerns,Bilateral hearing aid    PAIN: 0-no c/o pain     SUBJECTIVE:  I need to use the bathroom     OBJECTIVE:  Pt does not need any leisure materials for in room use-states she has enough puzzles and has been enjoying the newspaper each morning-requesting to use the bathroom-sit to stand from recliner with SBA-ambulated to the bathroom with RW and SBA-able to doff underwear and pants with SBA-call light within reach         Patient Education  New Education Provided: Importance of LeisureBernadette Safety    Electronically signed by: Gita Telles, CTRS  Date: 3/21/2022

## 2022-03-21 NOTE — PLAN OF CARE
Problem: Skin Integrity:  Goal: Will show no infection signs and symptoms  Description: Will show no infection signs and symptoms  Outcome: Ongoing  No new skin issues noted this shift. Problem: Falls - Risk of:  Goal: Will remain free from falls  Description: Will remain free from falls  Outcome: Ongoing  No falls sustained at this time. Patient alert to call light and uses appropriately to alert staff to needs. Bed/chair alarms in use. Problem: Pain:  Goal: Pain level will decrease  Description: Pain level will decrease  Outcome: Ongoing  C/o intermittent Left hip Pain. PRN Tylenol given. After repositioning and rest not effective for comfort. Care plan reviewed with patient. Patient verbalize understanding of the plan of care and contribute to goal setting.

## 2022-03-21 NOTE — PROGRESS NOTES
[facility]  Inpatient Rehabilitation  Occupational Therapy  Progress Note  Time:  Time In: 1000  Time Out: 1100  Timed Code Treatment Minutes: 60 Minutes  Minutes: 60          Date: 3/21/2022  Patient Name: Gene Monzon,   Gender: female      Room: -65/065-A  MRN: [id number]  : 1935  (80 y.o.)  Referring Practitioner: Dr. Harrison Knight  Diagnosis: Pseudogout of left knee  Additional Pertinent Hx: Per H&P Helen Blake  is a 80 y.o. female admitted to the inpatient rehabilitation unit on 3/17/2022. She was originally admitted to [facility] on 3/15/2022 with a past medical history of advanced osteoarthritis, idiopathic pulmonary fibrosis on 2L of home oxygen, bronchiectasis, hypertension, hyperlipidemia, and secondary hyperparathyroidism. She  presented to ED for evaluation of left knee pain x previous 2 days. Pain at that time was at 10/10. She could not ambulate, and was unable to lift left lower extremity due to severe pain. Patient had left SI joint injection on 2022. She reported no fever or chills. No chest pain. No abdominal pain. No diarrhea. No urinary symptoms. She had prior bilateral total knee replacements, the left one completed in  and the right in . She noticed slightly increased swelling to left knee. Denied recent left knee trauma or injury. No change of bowl and bladder functions. She lives at an assisted living. At baseline she walks without assistance. In the setting of a total knee arthroplasty with knee pain and effusion, her knee was aspirated in the ED for 30 ml of yellow cloudy fluid. Her aspiration showed WBC in the joint fluid. She also had calcium pyrophosphate crystals. White count was 23,350 with 90% polys. She denied any fever or chills and had noprevious history of infection. She was diagnosed with pseudogout, started on Toradol for 2 days then transitioned to Naproxen 250 mg bid. Colchicine added at 0.6 mg bid. Was on Dilaudid 1mg q 4 hours IV prn, and Flexeril 10 mg po tid prn. Also on Norco 5/325 one po q 8 hours prn. She also has a pin point area of skin irritation on her coccyx. Restrictions/Precautions:  Restrictions/Precautions: Fall Risk,Weight Bearing  Left Lower Extremity Weight Bearing: Weight Bearing As Tolerated  Position Activity Restriction  Other position/activity restrictions: 2L O2, hx of vertigo    SUBJECTIVE: Pt pleasant and cooperative. After walking to the bathroom, patient remarked. \"That's the farthest I've walked. \"     PAIN: knee pain,  Did not given number      Vitals: Vitals not assessed per clinical judgement, see nursing flowsheet    COGNITION: WFL    ADL:   Grooming: Contact Guard assistance: standing at sink x3 min to brush teeth      Bathing: Stand By Assistance. Upper Extremity Dressing: Stand By Assistance. Lower Extremity Dressing: Minimal Assistance. Shower Transfer: [employer]. Frequent rest breaks needed during ADL routine due to shortness of breath with exertion. BALANCE:  Sitting Balance:  Supervision. Standing Balance: Stand By Assistance, [employer]. TRANSFERS:  Sit to Stand:  Contact Guard Assistance. Stand to Sit: Stand By Assistance. FUNCTIONAL MOBILITY:  Assistive Device: Rolling Walker  Assist Level:  Contact Guard Assistance. Distance: To and from shower room      ASSESSMENT:  Activity Tolerance:  Patient tolerance of  treatment: good. Assessment:  Conrado Lincoln is progressing towards her goals, meeting 1/4 STGs this week. She has progressed to CGA during standing ADL tasks, min A with LB dressing and SBA during showering and UB dressing tasks. She is limited by decreased endurance and rest breaks due to dyspnea with exertion. She is motivated to return home at [facility]. Pt continues to demonstrate deficits with ADLs, functional mobility, and transfers requiring assist to complete these tasks.  Pt will continue to benefit from OT services to increase independence with these tasks to facilitate return to PLOF    Discharge Recommendations: Home with Home health OT  Equipment Recommendations: Other: Pt has a shower seat, grab bars, hand held shower, ADA height toilet and half wall nearby. Plan: Times per week: 5x/wk for 90 min and 1x/wk for 30 min  Current Treatment Recommendations: Strengthening,Functional Mobility Training,Balance Training,Endurance Training,Patient/Caregiver Education & Training,Equipment Evaluation, Education, & procurement,Safety Education & Training,Self-Care / ADL,Home Management Training    Patient Education  Patient Education: Role of OT, Plan of Care, ADL's, Precautions and Equipment Education    Goals  Short term goals  Time Frame for Short term goals: 1 week  Short term goal 1: Pt will complete BUE strengthening exercises with min vcs for technique to increase indep and endurance with all self cares. MET CONTINUE   Short term goal 2: Pt will complete standing tolerance x 5 minutes with 1-2 UE release and SBA to increase indep with sinkside grooming. NOT MET CONTINUE   Short term goal 3: Pt will complete functional mobiity to/from BR with SBA and 0 vcs for safety to increase indep with toileting. NOT MET CONTINUE   Short term goal 4: Pt will complete LB dressing with LHAE PRN and min A to increase indep and endurance within home environment. MET REVISE   Long term goals  Time Frame for Long term goals : 2 weeks  Long term goal 1: Pt will complete BADL routine with mod I using LHAE prn to return to PLOF. NOT MET CONTINUE   Long term goal 2: Pt will complete light IADL tasks with mod I using ECT prn to improve indpe within her condo. NOT MET CONTINUE     Revised Short-Term Goals  Short term goals  Time Frame for Short term goals: 1 week  Short term goal 1: Pt will complete BUE strengthening exercises with min vcs for technique to increase indep and endurance with all self cares.   Short term goal 2: Pt will complete standing tolerance x 5 minutes with 1-2 UE release and SBA to increase indep with sinkside grooming. Short term goal 3: Pt will complete functional mobiity to/from BR with SBA and 0 vcs for safety to increase indep with toileting. Short term goal 4: Pt will complete LB dressing with LHAE PRN and SBA to increase indep and endurance within home environment. Long term goals  Time Frame for Long term goals : 2 weeks  Long term goal 1: Pt will complete BADL routine with mod I using LHAE prn to return to PLOF. Long term goal 2: Pt will complete light IADL tasks with mod I using ECT prn to improve indpe within her condo. Following session, patient left in safe position with all fall risk precautions in place.

## 2022-03-21 NOTE — PROGRESS NOTES
Physical Medicine & Rehabilitation   Progress Note    Chief Complaint:  Left septic knee, pseudogout. Rehab needs    Subjective:  Patient seen today,sitting up in chair. Patient feels progress is going slowly for what she was expecting. Encouraged patient with progress thus far. When asked, patient feels she is about 65-70% of baseline. Discussed that she won't be 100% when she leaves here, but will continue to see improvement during her stay. Patient states she is hoping she doesn't need a walker, discussed that on discharge she will likely need one, patient understanding. Patient states she was having diarrhea, states it has slowed down now. Adjusted medications today for bowels. Rehabilitation:  PT:   Transfers:  Sit to Stand: Contact Guard Assistance  Stand to [facility], cues for hand placement, with verbal cues  To/From Bed and Chair: Contact Guard Assistance, using RW  Ambulation:  Contact Guard Assistance  Distance: 6 ft x2   Surface: Level Tile  Device:Parallel Bars  Gait Deviations: Forward Flexed Posture, Slow Jaimee, Decreased Step Length Bilaterally, Decreased Weight Shift Left, Decreased Gait Speed and Decreased Heel Strike Bilaterally  Stairs:  Platform:  4\" platform X 1 (x2 trials) using Parallel Bars and [employer], with verbal cues , with increased time for completion, required seated rset break in between trials due to BLE's feeling weak. Balance:  Dynamic Standing Balance: Stand By Assistance, [employer], while completing functional bathroom tasks  Exercise:  Patient was guided in 1 set(s) 10 reps of exercise to both lower extremities. Seated hamstring curls, Seated heel/toe raises on RLE with orange band and seated knee flexion on LLE  Without band. Exercises were completed for increased independence with functional mobility. OT:   ADL:   Grooming: Contact Guard Assistance.   Standing sinkside to wash hands after toileting tasks  [address] For clothing management, completed hygiene seated on RTS  Toilet Transfer: [address] To/from RTS. BALANCE:  Sitting Balance:  Supervision. Standing Balance: Contact Guard Assistance. TRANSFERS:  Sit to Stand:  Contact Guard Assistance. From various surfaces  Stand to Sit: Contact Guard Assistance. To various surfaces  FUNCTIONAL MOBILITY:  Assistive Device: Rolling Walker  Assist Level:  Contact Guard Assistance. Distance: To and from bathroom   ADDITIONAL ACTIVITIES:  Patient completed BUE exercises: completed x15 reps x1 set with a 1# dumb bell with distal exercises. Completed BUE table top exercises with shoulder flexion and shoulder horizontal abduction x10 reps x1 set, holding for approx 3 seconds. Completed to improve UE strength, activity tolerance and maintain joint integrity/ROM required for BADL routine and toilet / shower transfers. Patient tolerated fair, requiring rest breaks. Patient also required cues for technique.      Review of Systems:  CONSTITUTIONAL:  positive for  fatigue  EYES:  Wears glasses  HEENT:  negative  RESPIRATORY:  On 2L supplemental O2 per NC as at home for pulmonary fibrosis  CARDIOVASCULAR:  negative  GASTROINTESTINAL:  Bowels have not moved since admission  GENITOURINARY:  negative  SKIN:  negative  HEMATOLOGIC/LYMPHATIC:  negative  MUSCULOSKELETAL:  negative  NEUROLOGICAL:  negative  BEHAVIOR/PSYCH:  negative  System review otherwise negative      Objective:  BP 95/64 Comment: standing with therapy  Pulse 76   Temp 97.7 °F (36.5 °C) (Oral)   Resp 14   Ht 5' 4.02\" (1.626 m)   Wt 159 lb 11.2 oz (72.4 kg)   SpO2 95%   BMI 27.40 kg/m²   awake  Orientation:   person, place, time  Mood: within normal limits  Affect: calm and spontaneous  General appearance: well groomed and in no acute distress     Memory:   normal,   Attention/Concentration: normal  Language:  normal     Cranial Nerves:  cranial nerves II-XII are grossly intact  ROM:  abnormal - decreased at left knee  Tone:  normal  Muscle bulk: within normal limits  Sensory:  Sensory intact    Skin: warm and dry, no rash or erythema  Peripheral vascular: Pulses: Normal upper and lower extremity pulses; Edema: no    Diagnostics:   No results found for this or any previous visit (from the past 24 hour(s)). 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 GI: Pepcid 20 mg po daily  · Pain: Tylenol 500 mg po q 6 hours prn; Flexeril 10 mg po tid; Norco 5/325 0.5 tablet po q 4 hours prn  · Bowels: Colace 100 mg po bid PRN; MOM 30 ml po daily prn; glycolax 17 g po daily PRN  · Bowel meds changed to PRN today.    · colchicine 0.6mg BID  · Team conference Tuesday  · Discharge planning: goal to return to her condo independently    Missed Therapy Time:  · None    Claudia Munguia, APRN - CNP

## 2022-03-21 NOTE — PROGRESS NOTES
[address] [place] [address]  Occupational Therapy  Daily Note  Time:    Time In: 1200  Time Out: 1230  Timed Code Treatment Minutes: 30 Minutes  Minutes: 30      Date: 3/21/2022  Patient Name: Gladys Chávez,   Gender: female      Room: [facility]/065-A  MRN: [id number]  : 1935  (80 y.o.)  Referring Practitioner: Dr. Kin Desai  Diagnosis: Pseudogout of left knee  Additional Pertinent Hx: Per H&P Gray Nieves  is a 80 y.o. female admitted to the inpatient rehabilitation unit on 3/17/2022. She was originally admitted to [address] [place] [address] on 3/15/2022 with a past medical history of advanced osteoarthritis, idiopathic pulmonary fibrosis on 2L of home oxygen, bronchiectasis, hypertension, hyperlipidemia, and secondary hyperparathyroidism. She  presented to ED for evaluation of left knee pain x previous 2 days. Pain at that time was at 10/10. She could not ambulate, and was unable to lift left lower extremity due to severe pain. Patient had left SI joint injection on 2022. She reported no fever or chills. No chest pain. No abdominal pain. No diarrhea. No urinary symptoms. She had prior bilateral total knee replacements, the left one completed in  and the right in . She noticed slightly increased swelling to left knee. Denied recent left knee trauma or injury. No change of bowl and bladder functions. She lives at an assisted living. At baseline she walks without assistance. In the setting of a total knee arthroplasty with knee pain and effusion, her knee was aspirated in the ED for 30 ml of yellow cloudy fluid. Her aspiration showed WBC in the joint fluid. She also had calcium pyrophosphate crystals. White count was 23,350 with 90% polys. She denied any fever or chills and had noprevious history of infection. She was diagnosed with pseudogout, started on Toradol for 2 days then transitioned to Naproxen 250 mg bid.   Colchicine added at 0.6 mg bid. Was on Dilaudid 1mg q 4 hours IV prn, and Flexeril 10 mg po tid prn. Also on Norco 5/325 one po q 8 hours prn. She also has a pin point area of skin irritation on her coccyx. Restrictions/Precautions:  Restrictions/Precautions: Fall Risk,Weight Bearing  Left Lower Extremity Weight Bearing: Weight Bearing As Tolerated  Position Activity Restriction  Other position/activity restrictions: 2L O2, hx of vertigo      SUBJECTIVE: Pt pleasant and cooperative. PAIN:  Denies     Vitals: Vitals not assessed per clinical judgement, see nursing flowsheet    COGNITION: WFL    EXERCISES:  Patient completed BUE strengthening exercises with skilled education on HEP: completed x10 reps x1 set with a medium resistance band in all joints and all planes in order to improve UE strength and activity tolerance required for BADL routine and toilet / shower transfers. Patient tolerated well, requiring minimal rest breaks. Patient also required instructional cues for technique. ASSESSMENT:  Activity Tolerance:  Patient tolerance of  treatment: good. Discharge Recommendations: Home with assistance of aide  Equipment Recommendations: Other: Pt has a shower seat, grab bars, hand held shower, ADA height toilet and half wall nearby. Plan: Times per week: 5x/wk for 90 min and 1x/wk for 30 min  Current Treatment Recommendations: Strengthening,Functional Mobility Training,Balance Training,Endurance Training,Patient/Caregiver Education & Training,Equipment Evaluation, Education, & procurement,Safety Education & Training,Self-Care / ADL,Home Management Training    Patient Education  Patient Education: Home Exercise Program     Goals  Short term goals  Time Frame for Short term goals: 1 week  Short term goal 1: Pt will complete BUE strengthening exercises with min vcs for technique to increase indep and endurance with all self cares.   Short term goal 2: Pt will complete standing tolerance x 5 minutes with 1-2 UE release and SBA to increase indep with sinkside grooming. Short term goal 3: Pt will complete functional mobiity to/from BR with SBA and 0 vcs for safety to increase indep with toileting. Short term goal 4: Pt will complete LB dressing with LHAE PRN and SBA to increase indep and endurance within home environment. Long term goals  Time Frame for Long term goals : 2 weeks  Long term goal 1: Pt will complete BADL routine with mod I using LHAE prn to return to PLOF. Long term goal 2: Pt will complete light IADL tasks with mod I using ECT prn to improve indpe within her condo. Following session, patient left in safe position with all fall risk precautions in place.

## 2022-03-21 NOTE — PROGRESS NOTES
[facility]  Recreational Therapy  Daily Note  [address]    Time Spent with Patient: 10 minutes    Date:  3/21/2022       Patient Name: Brandon Soto      MRN: [id number]      YOB: 1935 (80 y.o.)       Gender: female  Diagnosis: Pseudogout of left knee  Referring Practitioner: Dr. Grayson Barrera    RESTRICTIONS/PRECAUTIONS:  Restrictions/Precautions: Fall Risk,Weight Bearing  Vision: Impaired  Hearing: Exceptions to [facility]  Hearing Exceptions: Hard of hearing/hearing concerns,Bilateral hearing aid    PAIN: 0-no c/o pain     SUBJECTIVE:  I definitely want it done    OBJECTIVE:  Pt excited to be able to get her hair washed and styled by our beautician-affect bright and social -appreciative          Patient Education  New Education Provided: Importance of Leisure,     Electronically signed by: RONNIE Reyes  Date: 3/21/2022

## 2022-03-21 NOTE — PROGRESS NOTES
Patient: Hesham Torres  Unit/Bed: 7E-65/065-A  YOB: 1935  MRN: [id number] Acct: [de-identified]   Admitting Diagnosis: Pseudogout of left knee [M11.262]  Admit Date:  3/17/2022  Hospital Day: 4    Assessment:     Principal Problem:    Pseudogout of left knee  Active Problems:    Essential hypertension    Osteoarthritis    Hypothyroidism    Bronchiectasis without acute exacerbation (HCC)  Resolved Problems:    * No resolved hospital problems. *      Plan:     Check a CXR as there has been increased oxygen demand during therapies        Subjective:     Patient has no complaint of CP or SOB or Cough out of her normal..   Medication side effects: none    Scheduled Meds:   acetaminophen  500 mg Oral Q6H    apixaban  5 mg Oral BID    atorvastatin  10 mg Oral Nightly    budesonide  0.5 mg Nebulization BID    colchicine  0.6 mg Oral BID    levothyroxine  88 mcg Oral Daily    metoprolol tartrate  12.5 mg Oral BID     Continuous Infusions:  PRN Meds:polyethylene glycol, docusate sodium, HYDROcodone-acetaminophen, bisacodyl, magnesium hydroxide, cyclobenzaprine    Review of Systems  Pertinent items are noted in HPI. Objective:     Patient Vitals for the past 8 hrs:   Resp SpO2   03/21/22 1754 16 96 %     I/O last 3 completed shifts:   In: 36 [P.O.:920]  Out: -   I/O this shift:  In: 300 [P.O.:300]  Out: -     BP 95/64 Comment: standing with therapy  Pulse 76   Temp 97.7 °F (36.5 °C) (Oral)   Resp 16   Ht 5' 4.02\" (1.626 m)   Wt 159 lb 11.2 oz (72.4 kg)   SpO2 96%   BMI 27.40 kg/m²     General appearance: alert, appears stated age and cooperative  Head: Normocephalic, without obvious abnormality, atraumatic  Lungs: clear to auscultation bilaterally  Heart: regular rate and rhythm, S1, S2 normal, no murmur, click, rub or gallop  Abdomen: soft, non-tender; bowel sounds normal; no masses,  no organomegaly  Extremities: extremities normal, atraumatic, no cyanosis or edema  Skin: Skin color, texture, turgor normal. No rashes or lesions  Neurologic: weak    Electronically signed by Mi Encarnacion MD on 3/21/2022 at 6:34 PM

## 2022-03-21 NOTE — PROGRESS NOTES
[facility]  Diagnosis List for Inpatient Rehab facility (IRF) - Patient Assessment Instrument (PHILL)    Patient Name: Gene Monzon        MRN: [id number]    : 1935  (80 y.o.)  Gender: female     Primary impairment requiring rehabilitation: 8.9 Other Orthopedic     Etiologic Diagnosis that led to the condition: Left knee pseudogout    Comorbid conditions affecting rehabilitation:  Left knee pain secondary to pseudogout  S/p total left knee arthroplasty   Pancreatic ductal dilatation- noted on CT  1 cm calcification in the mid pancreatic body- noted on CT  Osteoarthritis  Osteopenia  *Idiopathic pulmonary fibrosis on 2L of home oxygen  *Chronic respiratory failure  HTN  HLD  Diverticulosis  Chronic constipation  Hyponatremia  Bursitis bilateral shoulders  Diverticulitis of colon  Parathyroid adenoma  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  Vitamin D deficiency      Cindy Hernandez M.D.

## 2022-03-21 NOTE — PROGRESS NOTES
[facility]  INPATIENT PHYSICAL THERAPY  PROGRESS NOTE  [address] [facility] - 7E-65/065-A    Time In: 0830  Time Out: 0930  Timed Code Treatment Minutes: 60 Minutes  Minutes: 60          Date: 3/21/2022  Patient Name: Milton Valente,  Gender:  female        MRN: [id number]  : 1935  (80 y.o.)     Referring Practitioner: Antoni Yost MD  Diagnosis: Pseudogout of left knee  Additional Pertinent Hx: Per H&P Ok Schaumann  is a 80 y.o. female admitted to the inpatient rehabilitation unit on 3/17/2022. She was originally admitted to [facility] on 3/15/2022 with a past medical history of advanced osteoarthritis, idiopathic pulmonary fibrosis on 2L of home oxygen, bronchiectasis, hypertension, hyperlipidemia, and secondary hyperparathyroidism. She  presented to ED for evaluation of left knee pain x previous 2 days. Pain at that time was at 10/10. She could not ambulate, and was unable to lift left lower extremity due to severe pain. Patient had left SI joint injection on 2022. She reported no fever or chills. No chest pain. No abdominal pain. No diarrhea. No urinary symptoms. She had prior bilateral total knee replacements, the left one completed in  and the right in . She noticed slightly increased swelling to left knee. Denied recent left knee trauma or injury. No change of bowl and bladder functions. She lives at an assisted living. At baseline she walks without assistance. In the setting of a total knee arthroplasty with knee pain and effusion, her knee was aspirated in the ED for 30 ml of yellow cloudy fluid. Her aspiration showed WBC in the joint fluid. She also had calcium pyrophosphate crystals. White count was 23,350 with 90% polys. She denied any fever or chills and had noprevious history of infection. She was diagnosed with pseudogout, started on Toradol for 2 days then transitioned to Naproxen 250 mg bid.   Colchicine added at 0.6 mg bid. Was on Dilaudid 1mg q 4 hours IV prn, and Flexeril 10 mg po tid prn. Also on Norco 5/325 one po q 8 hours prn. She also has a pin point area of skin irritation on her coccyx. Prior Level of Function:  Lives With: Alone  Type of Home:  ([facility])  Home Layout: One level  Home Access: Stairs to enter with rails  Entrance Stairs - Number of Steps: 2  Entrance Stairs - Rails: Right  Home Equipment: Cane,Oxygen   Bathroom Shower/Tub: Walk-in shower  Bathroom Toilet: Handicap height  Bathroom Equipment: Grab bars in shower,Tub transfer bench  Bathroom Accessibility: Accessible    Receives Help From: Friend(s)  ADL Assistance: [facility]: Independent  Homemaking Responsibilities: Yes  Ambulation Assistance: Independent  Transfer Assistance: Independent  Active : Yes  Additional Comments: Patient reports she was at [facility] for about a week due to increased difficulty with mobiltiy however prior lived in a [facility] independently and would like to return to her condo upon discharge. Patient reports she is on 2L O2 at baseline. Patient reports she recently had to utilize a cane for ambulation due to increased complaints of pain in L knee however prior she did not use any AD for mobility. Patient reported she did not have to ambulate far distances. Patient reports she has two sons however one is in [facility] and one is in [address] [place] [address] [place]    Restrictions/Precautions:  Restrictions/Precautions: Fall Risk,Weight Bearing  Left Lower Extremity Weight Bearing: Weight Bearing As Tolerated  Position Activity Restriction  Other position/activity restrictions: 2L O2, hx of vertigo     SUBJECTIVE: Patient in recliner upon arrival, agreed and cooperative for therapy. Reports more tenderness/pain in left knee and hip today. Also complaints of dizziness when going from sit<-> supine, BP checked and was +orthostatic BP, notified RN (Sonu John).       PAIN: 5-6/10 Left knee and hip    Vitals: Blood Pressure: Supine: 122/60; Sit 1: 139/78; Stand: 95/65; Sit 2: 121/75; Stand 2: 95/64. Notified RN. OBJECTIVE:  Bed Mobility:  Supine to Sit: Stand By Assistance, with head of bed flat, without rail, with verbal cues , with increased time for completion  Sit to Supine: Stand By Assistance, with head of bed flat, with verbal cues , with increased time for completion     Transfers:  Sit to Stand: Contact Guard Assistance  Stand to [place] [address], cues for hand placement, with verbal cues    Ambulation:  Contact Guard Assistance, with verbal cues   Distance: 20 ft. x2 (during both trials of TUG test); 5 ft. x2   Surface: Level Tile  Device:Rolling Walker  Gait Deviations: Forward Flexed Posture, Slow Jaimee, Decreased Step Length on Right, Decreased Weight Shift Left, Decreased Gait Speed, Decreased Heel Strike Bilaterally and Decreased Terminal Knee Extension    Balance:  Static Standing Balance: Stand By Assistance  Dynamic Standing Balance: Contact Guard Assistance    Exercise:  Patient was guided in 1 set(s) 10 reps of exercise to both lower extremities. Ankle pumps, Quad sets and Heelslides. Left knee ROM: -4 to 75 degrees AA, was able to achieve to ~71 AROM. Exercises were completed for increased independence with functional mobility. Functional Outcome Measures: Completed     Timed up and Go Test (TUG)  32.9 seconds  (1st trial)  35.6 seconds (2nd trial)      Normative Reference Values  60-69 years:  8.1 seconds  70-79 years: 9.2 seconds  80-99 years: 11.3 seconds    < 10 seconds is normal, a score of > 14 seconds indicates high fall risk       ASSESSMENT:  Assessment: Patient progressing toward established goals. The patient tolerated session fairly well, showing steady progress towards goals at this time. Still demonstrates limitations due to generalized weakness, decreased endurance and overall deconditioning that affects safe functional mobility.  Would benefit from additional supine<>sit with SBA without railings in order to assist with getting into and out of bed. MET, SEE LTG  Short term goal 2: Patient to perform sit to stand transfers with SBA with <=1 cue for hand placement in order to assist with safety with initiation of ambulation. NOT MET   Short term goal 3: Patient to ambulate >=50 feet with RW with SBA in order to assist with home mobility. MET, SEE LTG   Short term goal 4: Patient to perform car transfer with CGA in order to assist with getting into and out of car. MET, SEE LTG  Short term goal 5: Patient to perform TUG in <=48 seconds in order to assist with decreased risk for falls. MET, SEE LTG  Short term goal 6: Patient to demonstrate increased L knee ROM to -5 to 85 degrees in order to assist with increased functional mobility. NOT MET   Long term goals  Time Frame for Long term goals : 2 weeks  Long term goal 1: Patient to perform supine<>sit with Mod I in order to assist with getting into and out of bed. NOT MET   Long term goal 2: Patient to perform sit to stand transfers Mod I in order to assist with standing and sitting from various surfaces. NOT MET   Long term goal 3: Patient to perform car transfer Mod I in order to assist with getting to and  from appointments. NOT MET   Long term goal 4: Patient to ambulate over level surfaces >=150 feet with LRAD with Mod I in order to assist with home mobility. NOT MET   Long term goal 5: Patient to ambulate over uneven surface with Mod I in order to assist with safety in community. NOT MET   Long term goal 6: Patient to ascend/descend 2 steps with R handrail with Mod I in order to assist with getting into and out of condo. NOT ADDRESSED     Following session, patient left in safe position with all fall risk precautions in place.     Revised Short-Term Goals:    Short term goals  Time Frame for Short term goals: 1 week  Short term goal 1: Patient to perform supine<>sit with Supervision without railings in order to assist with getting into and out of bed. Short term goal 2: Patient to perform sit to stand transfers with SBA with <=1 cue for hand placement in order to assist with safety with initiation of ambulation. Short term goal 3: Patient to ambulate >=75 feet with RW with Supervision in order to assist with home mobility. Short term goal 4: Patient to perform car transfer with Supervision in order to assist with getting into and out of car. Short term goal 5: Patient to perform TUG in <=26 seconds in order to assist with decreased risk for falls. Short term goal 6: Patient to ascend/descend 2 steps with R handrail with SBA in order to assist with getting into and out of condo. Treatment and documentation completed by Inna Tristan PTA.   Goal revision and assessment for progress note completed by Emilia Hair PT.

## 2022-03-21 NOTE — PROGRESS NOTES
[facility]  INPATIENT PHYSICAL THERAPY  DAILY NOTE  [facility] - 7E-65/065-A    Time In: 1430  Time Out: 1500  Timed Code Treatment Minutes: 30 Minutes  Minutes: 30          Date: 3/21/2022  Patient Name: Dayanara Linder,  Gender:  female        MRN: [id number]  : 1935  (80 y.o.)     Referring Practitioner: Amaury Zheng MD  Diagnosis: Pseudogout of left knee  Additional Pertinent Hx: Per H&P Raegan Vargas  is a 80 y.o. female admitted to the inpatient rehabilitation unit on 3/17/2022. She was originally admitted to [facility] on 3/15/2022 with a past medical history of advanced osteoarthritis, idiopathic pulmonary fibrosis on 2L of home oxygen, bronchiectasis, hypertension, hyperlipidemia, and secondary hyperparathyroidism. She  presented to ED for evaluation of left knee pain x previous 2 days. Pain at that time was at 10/10. She could not ambulate, and was unable to lift left lower extremity due to severe pain. Patient had left SI joint injection on 2022. She reported no fever or chills. No chest pain. No abdominal pain. No diarrhea. No urinary symptoms. She had prior bilateral total knee replacements, the left one completed in  and the right in . She noticed slightly increased swelling to left knee. Denied recent left knee trauma or injury. No change of bowl and bladder functions. She lives at an assisted living. At baseline she walks without assistance. In the setting of a total knee arthroplasty with knee pain and effusion, her knee was aspirated in the ED for 30 ml of yellow cloudy fluid. Her aspiration showed WBC in the joint fluid. She also had calcium pyrophosphate crystals. White count was 23,350 with 90% polys. She denied any fever or chills and had noprevious history of infection. She was diagnosed with pseudogout, started on Toradol for 2 days then transitioned to Naproxen 250 mg bid.   Colchicine added at 0.6 mg bid. Was on Dilaudid 1mg q 4 hours IV prn, and Flexeril 10 mg po tid prn. Also on Norco 5/325 one po q 8 hours prn. She also has a pin point area of skin irritation on her coccyx. Prior Level of Function:  Lives With: Alone  Type of Home:  ([facility])  Home Layout: One level  Home Access: Stairs to enter with rails  Entrance Stairs - Number of Steps: 2  Entrance Stairs - Rails: Right  Home Equipment: Cane,Oxygen   Bathroom Shower/Tub: Walk-in shower  Bathroom Toilet: Handicap height  Bathroom Equipment: Grab bars in shower,Tub transfer bench  Bathroom Accessibility: Accessible    Receives Help From: Friend(s)  ADL Assistance: [address]: Independent  Homemaking Responsibilities: Yes  Ambulation Assistance: Independent  Transfer Assistance: Independent  Active : Yes  Additional Comments: Patient reports she was at [facility] for about a week due to increased difficulty with mobiltiy however prior lived in a [facility] independently and would like to return to her condo upon discharge. Patient reports she is on 2L O2 at baseline. Patient reports she recently had to utilize a cane for ambulation due to increased complaints of pain in L knee however prior she did not use any AD for mobility. Patient reported she did not have to ambulate far distances. Patient reports she has two sons however one is in [facility] and one is in [place]    Restrictions/Precautions:  Restrictions/Precautions: Fall Risk,Weight Bearing  Left Lower Extremity Weight Bearing: Weight Bearing As Tolerated  Position Activity Restriction  Other position/activity restrictions: 2L O2, hx of vertigo     SUBJECTIVE: Patient in recliner upon arrival, agreed and cooperative for therapy. Reports pain/soreness in left posterior knee, states it being tender to the touch and but not noted redness and was not warm to the touch.  Also spoke with Laura Guajardo RN about RAMA hose and speaking with pulmonology about oxygen parameters. PAIN: 5/10: Left knee    Vitals: Oxygen: 87-93% throughout session while 2L    OBJECTIVE:  Bed Mobility:  Not Tested    Transfers:  Sit to Stand: Contact Guard Assistance  Stand to  Corporation, with verbal cues, for safely lining up with chair and reaching back before sitting  To/From Bed and Chair: [employer], with verbal cues, using RW  Car:Contact Guard Assistance, cues for hand placement, with verbal cues. Required increased time to recover breathing following getting into car. Ambulation:  Contact Guard Assistance  Distance: 50 ft. X1; 5 ft. x1   Surface: Level Tile  Device:Rolling Walker  Gait Deviations: Forward Flexed Posture, Slow Jaimee, Decreased Step Length on Right, Decreased Weight Shift Left, Decreased Gait Speed, Decreased Heel Strike Bilaterally, Unsteady Gait and Decreased Terminal Knee Extension    Balance:  Static Standing Balance: Stand By Assistance  Dynamic Standing Balance: Contact Guard Assistance     Exercises:   Demonstrated and had patient trial positional HS stretch while having BLE's extended in recliner chair, also showing patient how to stretch HS/heel cord using gait belt to assist.     Functional Outcome Measures: Not completed       ASSESSMENT:  Assessment: Patient progressing toward established goals. Activity Tolerance:  Patient tolerance of  treatment: good.       Equipment Recommendations:Equipment Needed:  (Continue to assess pending progress; may require  RW)  Discharge Recommendations: Continue to assess pending progress  Plan: Times per week: 5x/wk 90 min; 1x/wk 30 min  Times per day: Daily  Current Treatment Recommendations: Strengthening,Neuromuscular Re-education,Home Exercise Program,ROM,Safety Education & Training,Balance Training,Endurance Training,Functional Mobility Training,Wheelchair Mobility Training,Transfer Training,Gait Training,Stair training,Patient/Caregiver Education & Training    Patient Education  Patient Education: Plan of Care, Precautions/Restrictions, Transfers, Reviewed Prior Education, Gait, Car Transfers, Health Promotion and Wellness Education, Home Safety Education, Pursed Lip Breathing, - Patient Requires Continued Education    Goals:  Patient goals : To be independent  Short term goals  Time Frame for Short term goals: 1 week  Short term goal 1: Patient to perform supine<>sit with SBA without railings in order to assist with getting into and out of bed. Short term goal 2: Patient to perform sit to stand transfers with SBA with <=1 cue for hand placement in order to assist with safety with initiation of ambulation. Short term goal 3: Patient to ambulate >=50 feet with RW with SBA in order to assist with home mobility. Short term goal 4: Patient to perform car transfer with CGA in order to assist with getting into and out of car. Short term goal 5: Patient to perform TUG in <=48 seconds in order to assist with decreased risk for falls. Short term goal 6: Patient to demonstrate increased L knee ROM to -5 to 85 degrees in order to assist with increased functional mobility. Long term goals  Time Frame for Long term goals : 2 weeks  Long term goal 1: Patient to perform supine<>sit with Mod I in order to assist with getting into and out of bed. Long term goal 2: Patient to perform sit to stand transfers Mod I in order to assist with standing and sitting from various surfaces. Long term goal 3: Patient to perform car transfer Mod I in order to assist with getting to and  from appointments. Long term goal 4: Patient to ambulate over level surfaces >=150 feet with LRAD with Mod I in order to assist with home mobility. Long term goal 5: Patient to ambulate over uneven surface with Mod I in order to assist with safety in community. Long term goal 6: Patient to ascend/descend 2 steps with R handrail with Mod I in order to assist with getting into and out of condo.     Following session, patient left in safe position with all fall risk precautions in place.

## 2022-03-21 NOTE — PLAN OF CARE
Problem: Impaired respiratory status  Goal: Clear lung sounds  Outcome: Ongoing   Pt gets nebulized treatments twice a day to improve lung sounds.

## 2022-03-21 NOTE — PROGRESS NOTES
[address] NOTE    Conference Date: 3/22/2022  Admit Date:  3/17/2022  6:51 PM  Patient Name: Oksana Barlow    MRN: [id number]    : 1935  (80 y.o.)  Rehabilitation Admitting Diagnosis:  Pseudogout of left knee [M11.262]  Referring Practitioner: Lexus Claire MD      CASE MANAGEMENT  Current issues/needs regarding patient and family discharge status: Prior to admission patient dressed, showered and could make sandwiches for meals. Patient has a cleaning lady that also picks up groceries and takes patient to appointments. Patient has lived alone for around 40 years. 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 For a support system patient has patients sons, friends and neighbors. Patient sees one of the support team members every 3 to 4 days but communicates with someone daily. Dr. Crispin Blackmon is patients family doctor. Patient was on blood thinners prior to admission and Dr. Esther Cordova monitors them. Patient was not diabetic prior to admission and prefers [facility] for a pharmacy. Patient did not use any devices prior to admission and biggest concern is that patient won't be totally independent. After discharge Prerna Rojas (patients cleaning lady) or Tim Cardoso will handle transportation needs. SW assessment noted above completed by CIARA Nieves. SW to follow and maintain involvement in discharge planning. PHYSICAL THERAPY  Patient overall is making progress with skilled PT treatment and has met 4/6 STG's and 0/6 LTG's. Patient requires SBA for supine<>sit, CGA for sit to stand transfers with cues for hand placement, and CGA with use of RW for ambulation ~50 feet limited due to pain and endurance. Patient improved her TUG score to 32.9 seconds from 58.60 on PT evaluation 3/18  indicating she is at high risk for falls.  Patient demonstrates increased L knee ROM -4 to 75 degrees AAROM and 71 degrees L knee flexion AROM limited due to pain in L knee. Patient continues to demonstrate decreased strength in BLE's, increased complaints of pain in L knee and decreased endurance which limits her functional mobility. Patient can continue to benefit from skilled PT treatment in order to assist with bed mobility, transfers, ambulation, BLE strengthening and endurance for increased functional mobility. Equipment Needed:  (Continue to assess pending progress; may require  RW)    [address] is progressing towards her goals, meeting 1/4 STGs this week. She has progressed to CGA during standing ADL tasks, min A with LB dressing and SBA during showering and UB dressing tasks. She is limited by decreased endurance and rest breaks due to dyspnea with exertion. She is motivated to return home at [facility]. Pt continues to demonstrate deficits with ADLs, functional mobility, and transfers requiring assist to complete these tasks. Pt will continue to benefit from OT services to increase independence with these tasks to facilitate return to [facility]  Other: Pt has a shower seat, grab bars, hand held shower, ADA height toilet and half wall nearby. RECREATIONAL THERAPY  Patient has been offered participation in recreational therapy activities and participates as able. 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 -she also worked for the [employer], [place] [id number] and at [employer] in the past -Pt excited to be able to get her hair washed and styled by our beautician-affect bright and social -appreciative - Pt does not need any leisure materials for in room use-states she has enough puzzles and has been enjoying the newspaper each morning-requesting to use the bathroom-sit to stand from recliner with SBA-ambulated to the bathroom with RW and SBA-able to doff underwear and pants with SBA-call light within reach     NUTRITION  Weight: 159 lb 11.2 oz (72.4 kg) / Body mass index is 27.4 kg/m². Current diet: ADULT DIET; Regular  ADULT ORAL NUTRITION SUPPLEMENT; Lunch; Frozen Oral Supplement  Please see nutrition note for details. NURSING  Continent of Bowel: Yes. Frequency: daily. Management: colace, miralax . Continent of Bladder: Yes. Frequency: regular. Management: none. Pain is Managed:  Yes. Management: tylenol. Frequency of Intervention: daily. Adequately Controlled: Yes  Sleep: Adequate and Interventions to Support Sleep: melatonin  Signs and Symptoms of Infection:  No.   Signs and Symptoms of Skin Breakdown:  Yes, coccyx, turning patient   Injury and/or Falls during Inpatient Rehabilitation Admission: Yes  Anticoagulants: eliquis  Diabetic: No  Consultations/Labs/X-rays: no        No results for input(s): POCGLU in the last 72 hours. Lab Results   Component Value Date    LDLCALC 74 01/04/2022    LDLDIRECT 77.36 08/06/2021         Vitals:    03/21/22 1754 03/21/22 2004 03/22/22 0737 03/22/22 0812   BP:  (!) 146/63  116/66   Pulse:  80  82   Resp: 16 18  16   Temp:  97.4 °F (36.3 °C)  97.4 °F (36.3 °C)   TempSrc:  Oral  Oral   SpO2: 96% 95% 91% 95%   Weight:       Height:              Family Education: Family available and participating in education   Fall Risk:  Falling star program initiated  Is the patient appropriate for a stay in the functional apartment? no    Discharge Plan   Estimated Discharge Date: Tuesday, 3/29/22   Destination: discharge home with supervision  Services at Discharge: [address], Occupational Therapy, Nursing and HHA 2x week  Is patient appropriate for an outpatient driving evaluation? no  Equipment at Discharge:  Other: Pt has a

## 2022-03-22 PROCEDURE — 97530 THERAPEUTIC ACTIVITIES: CPT

## 2022-03-22 PROCEDURE — 1180000000 HC REHAB R&B

## 2022-03-22 PROCEDURE — 94760 N-INVAS EAR/PLS OXIMETRY 1: CPT

## 2022-03-22 PROCEDURE — 97110 THERAPEUTIC EXERCISES: CPT

## 2022-03-22 PROCEDURE — 6360000002 HC RX W HCPCS: Performed by: PHYSICAL MEDICINE & REHABILITATION

## 2022-03-22 PROCEDURE — 97535 SELF CARE MNGMENT TRAINING: CPT

## 2022-03-22 PROCEDURE — 97116 GAIT TRAINING THERAPY: CPT

## 2022-03-22 PROCEDURE — 99233 SBSQ HOSP IP/OBS HIGH 50: CPT | Performed by: PHYSICAL MEDICINE & REHABILITATION

## 2022-03-22 PROCEDURE — 6370000000 HC RX 637 (ALT 250 FOR IP): Performed by: PHYSICAL MEDICINE & REHABILITATION

## 2022-03-22 PROCEDURE — 6370000000 HC RX 637 (ALT 250 FOR IP): Performed by: NURSE PRACTITIONER

## 2022-03-22 PROCEDURE — 94640 AIRWAY INHALATION TREATMENT: CPT

## 2022-03-22 PROCEDURE — 2700000000 HC OXYGEN THERAPY PER DAY

## 2022-03-22 RX ORDER — PROMETHAZINE HYDROCHLORIDE 25 MG/1
12.5 TABLET ORAL EVERY 6 HOURS PRN
Status: DISCONTINUED | OUTPATIENT
Start: 2022-03-22 | End: 2022-03-29 | Stop reason: HOSPADM

## 2022-03-22 RX ORDER — LOPERAMIDE HYDROCHLORIDE 2 MG/1
2 CAPSULE ORAL 4 TIMES DAILY PRN
Status: DISCONTINUED | OUTPATIENT
Start: 2022-03-22 | End: 2022-03-29 | Stop reason: HOSPADM

## 2022-03-22 RX ORDER — SPIRONOLACTONE 50 MG/1
50 TABLET, FILM COATED ORAL DAILY
Qty: 90 TABLET | Refills: 3 | Status: SHIPPED | OUTPATIENT
Start: 2022-03-22 | End: 2022-03-28 | Stop reason: HOSPADM

## 2022-03-22 RX ORDER — ONDANSETRON 4 MG/1
4 TABLET, ORALLY DISINTEGRATING ORAL EVERY 8 HOURS PRN
Status: DISCONTINUED | OUTPATIENT
Start: 2022-03-22 | End: 2022-03-29 | Stop reason: HOSPADM

## 2022-03-22 RX ADMIN — ACETAMINOPHEN 500 MG: 500 TABLET ORAL at 21:18

## 2022-03-22 RX ADMIN — ONDANSETRON 4 MG: 4 TABLET, ORALLY DISINTEGRATING ORAL at 14:53

## 2022-03-22 RX ADMIN — BUDESONIDE 500 MCG: 0.5 INHALANT RESPIRATORY (INHALATION) at 07:38

## 2022-03-22 RX ADMIN — ATORVASTATIN CALCIUM 10 MG: 10 TABLET, FILM COATED ORAL at 21:18

## 2022-03-22 RX ADMIN — COLCHICINE 0.6 MG: 0.6 TABLET, FILM COATED ORAL at 08:30

## 2022-03-22 RX ADMIN — COLCHICINE 0.6 MG: 0.6 TABLET, FILM COATED ORAL at 21:18

## 2022-03-22 RX ADMIN — APIXABAN 5 MG: 5 TABLET, FILM COATED ORAL at 08:29

## 2022-03-22 RX ADMIN — ACETAMINOPHEN 500 MG: 500 TABLET ORAL at 02:44

## 2022-03-22 RX ADMIN — APIXABAN 5 MG: 5 TABLET, FILM COATED ORAL at 21:18

## 2022-03-22 RX ADMIN — ACETAMINOPHEN 500 MG: 500 TABLET ORAL at 08:30

## 2022-03-22 RX ADMIN — LEVOTHYROXINE SODIUM 88 MCG: 0.09 TABLET ORAL at 05:43

## 2022-03-22 RX ADMIN — HYDROCODONE BITARTRATE AND ACETAMINOPHEN 0.5 TABLET: 5; 325 TABLET ORAL at 01:24

## 2022-03-22 RX ADMIN — LOPERAMIDE HYDROCHLORIDE 2 MG: 2 CAPSULE ORAL at 14:53

## 2022-03-22 RX ADMIN — METOPROLOL TARTRATE 12.5 MG: 25 TABLET, FILM COATED ORAL at 21:18

## 2022-03-22 RX ADMIN — METOPROLOL TARTRATE 12.5 MG: 25 TABLET, FILM COATED ORAL at 08:30

## 2022-03-22 RX ADMIN — ACETAMINOPHEN 500 MG: 500 TABLET ORAL at 14:53

## 2022-03-22 RX ADMIN — BUDESONIDE 500 MCG: 0.5 INHALANT RESPIRATORY (INHALATION) at 18:05

## 2022-03-22 ASSESSMENT — PAIN SCALES - GENERAL
PAINLEVEL_OUTOF10: 0
PAINLEVEL_OUTOF10: 6
PAINLEVEL_OUTOF10: 0
PAINLEVEL_OUTOF10: 5
PAINLEVEL_OUTOF10: 5
PAINLEVEL_OUTOF10: 0
PAINLEVEL_OUTOF10: 0
PAINLEVEL_OUTOF10: 4

## 2022-03-22 ASSESSMENT — PAIN DESCRIPTION - PAIN TYPE
TYPE: ACUTE PAIN

## 2022-03-22 ASSESSMENT — PAIN DESCRIPTION - ORIENTATION
ORIENTATION: LEFT
ORIENTATION: LEFT

## 2022-03-22 ASSESSMENT — PAIN DESCRIPTION - DESCRIPTORS
DESCRIPTORS: ACHING
DESCRIPTORS: ACHING
DESCRIPTORS: ACHING;TENDER

## 2022-03-22 ASSESSMENT — PAIN DESCRIPTION - FREQUENCY
FREQUENCY: CONTINUOUS
FREQUENCY: CONTINUOUS

## 2022-03-22 ASSESSMENT — PAIN DESCRIPTION - ONSET: ONSET: ON-GOING

## 2022-03-22 ASSESSMENT — PAIN DESCRIPTION - LOCATION
LOCATION: KNEE

## 2022-03-22 NOTE — PROGRESS NOTES
Ifeanyi      Date:  3/22/2022            Patient Name: Saritha Gardner           MRN: [id number]  Acct: [de-identified]          YOB: 1935 (80 y.o.)       Gender: female   Diagnosis: Pseudogout of left knee  Physician: Referring Practitioner: Dr. Sonja Joe TREATMENT:  Pt did not feel well after her O.T. session-requesting to rest at this time but will check back later to see if she is up to getting her hair done by our [address] [place] [address]    3/22/2022

## 2022-03-22 NOTE — PROGRESS NOTES
Patient: Dayanara Linder  Unit/Bed: 7E-65/065-A  YOB: 1935  MRN: [id number] Acct: [de-identified]   Admitting Diagnosis: Pseudogout of left knee [M11.262]  Admit Date:  3/17/2022  Hospital Day: 5    Assessment:     Principal Problem:    Pseudogout of left knee  Active Problems:    Essential hypertension    Osteoarthritis    Hypothyroidism    Bronchiectasis without acute exacerbation ([address]  Resolved Problems:    * No resolved hospital problems. *      Plan:     We discussed the CXR showing no real changes and so will follow the O2 sats        Subjective:     Patient has no complaint of CP or SOB out of her norm. .   Medication side effects: none    Scheduled Meds:   acetaminophen  500 mg Oral Q6H    apixaban  5 mg Oral BID    atorvastatin  10 mg Oral Nightly    budesonide  0.5 mg Nebulization BID    colchicine  0.6 mg Oral BID    levothyroxine  88 mcg Oral Daily    metoprolol tartrate  12.5 mg Oral BID     Continuous Infusions:  PRN Meds:polyethylene glycol, docusate sodium, HYDROcodone-acetaminophen, bisacodyl, magnesium hydroxide, cyclobenzaprine    Review of Systems  Pertinent items are noted in HPI. Objective:     Patient Vitals for the past 8 hrs:   BP Temp Temp src Pulse Resp SpO2   03/22/22 0812 116/66 97.4 °F (36.3 °C) Oral 82 16 95 %   03/22/22 0737 -- -- -- -- -- 91 %     I/O last 3 completed shifts: In: 900 [P.O.:900]  Out: -   No intake/output data recorded.     /66   Pulse 82   Temp 97.4 °F (36.3 °C) (Oral)   Resp 16   Ht 5' 4.02\" (1.626 m)   Wt 159 lb 11.2 oz (72.4 kg)   SpO2 95%   BMI 27.40 kg/m²     General appearance: alert, appears stated age and cooperative  Head: Normocephalic, without obvious abnormality, atraumatic  Lungs: clear to auscultation bilaterally  Heart: regular rate and rhythm, S1, S2 normal, no murmur, click, rub or gallop  Abdomen: soft, non-tender; bowel sounds normal; no masses,  no organomegaly  Extremities: extremities normal, atraumatic, no cyanosis or edema  Skin: Skin color, texture, turgor normal. No rashes or lesions  Neurologic: Grossly normal    Electronically signed by Max Rubin MD on 3/22/2022 at 12:14 PM

## 2022-03-22 NOTE — PLAN OF CARE
Problem: DISCHARGE BARRIERS  Goal: Patient's continuum of care needs are met  Note: Team conference held Tuesday, 03/22/2022. Recommendations of the team were explained to the patient by CIARA Covarrubias, and Dr. Mandi Dean. SW additionally contacted patient's son, Lynne Chavez, to provide team conference update. Team is recommending that patient continue on acute inpatient rehab for one more week, with expected discharge date of Tuesday, 03/29/2022. Prior to discharge, team is recommending family education. Both sons live out of state ([place] and [facility]). Following discharge, team is recommending home health care services. Care plan reviewed with patient and son, Lynne Chavez. Patient and son, Lynne Chavez, verbalized understanding of the plan of care and contributed to goal setting. SW to follow and maintain involvement in discharge planning. In further discussion with son, Lynne Chavez, patient's goal is to be able to return home and he is very supportive of patient doing so. Patient does have emergency alert device ([employer]). However, with both sons living out of state, patient has small support system. Patient's  is only a contracted individual that assists with cleaning and picking up groceries. Patient does drive short distances (hair shop and pharmacy). Patient's neighbor, Elma Vargas, gets patient's mail and checks in on patient daily. SonLynne, plans to further discuss discharge with patient and update SW of plans.

## 2022-03-22 NOTE — PLAN OF CARE
Problem: Skin Integrity:  Goal: Absence of new skin breakdown  Description: Absence of new skin breakdown  Outcome: Ongoing  No new skin issues noted this shift. Problem: Falls - Risk of:  Goal: Will remain free from falls  Description: Will remain free from falls  3/22/2022 0110 by Emmitt Alpers, LPN  Outcome: Ongoing  No falls sustained at this time. Patient alert to call light and uses appropriately to alert staff to needs. Bed/chair alarms in use. Care plan reviewed with patient. Patient verbalize understanding of the plan of care and contribute to goal setting.

## 2022-03-22 NOTE — PROGRESS NOTES
Physical Medicine & Rehabilitation   Progress Note    Chief Complaint:  Left septic knee, pseudogout. Rehab needs    Subjective:  Patient seen on rounds with RINA Hughes LSW, following patient's inpatient Rehab Team Conference. Updated her re her planned date for discharge from the IPR Unit to home on 3/29/22 with Home Health  Physical Therapy, Occupational Therapy, Nursing and HHA 2x week. She reported sleeping well at night, and that her bowels have been moving. No new concerns today. Rehabilitation:  PT: Reviewed during team conference  OT: Reviewed during team conference      Review of Systems:  CONSTITUTIONAL:  positive for  fatigue  EYES:  Wears glasses  HEENT:  negative  RESPIRATORY:  On 2L supplemental O2 per NC as at home for pulmonary fibrosis  CARDIOVASCULAR:  negative  GASTROINTESTINAL:  Bowels have been moving  GENITOURINARY:  negative  SKIN:  negative  HEMATOLOGIC/LYMPHATIC:  negative  MUSCULOSKELETAL:  negative  NEUROLOGICAL:  negative  BEHAVIOR/PSYCH:  negative  System review otherwise negative      Objective:  Vitals:    03/22/22 0812   BP: 116/66   Pulse: 82   Resp: 16   Temp: 97.4 °F (36.3 °C)   SpO2: 95%   awake  Orientation:   person, place, time  Mood: within normal limits  Affect: calm and spontaneous  General appearance: well groomed and in no acute distress     Memory:   normal,   Attention/Concentration: normal  Language:  normal     Cranial Nerves:  cranial nerves II-XII are grossly intact  ROM:  abnormal - decreased at left knee  Tone:  normal  Muscle bulk: within normal limits  Sensory:  Sensory intact    Skin: warm and dry, no rash or erythema  Peripheral vascular: Pulses: Normal upper and lower extremity pulses; Edema: no    Diagnostics:   No results found for this or any previous visit (from the past 24 hour(s)).     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 GI: Pepcid 20 mg po daily  · Pain: Tylenol 500 mg po q 6 hours prn; Flexeril 10 mg po tid; Norco 5/325 0.5 tablet po q 4 hours prn  · Bowels: Colace 100 mg po bid PRN; MOM 30 ml po daily prn; glycolax 17 g po daily PRN  · Bowel meds changed to PRN today.    · colchicine 0.6mg BID  · Team conference Tuesday  · Discharge planning: goal to return to her condo independently  · Planned date for discharge from the IPR Unit to home on 3/29/22 with Home Health  Physical Therapy, Occupational Therapy, Nursing and HHA 2x week    Missed Therapy Time:  · None    Sara Snowden MD

## 2022-03-22 NOTE — PROGRESS NOTES
[address]  Occupational Therapy  Daily Note  Time:   Time In: 1400  Time Out: 1430  Timed Code Treatment Minutes: 30 Minutes  Minutes: 30    Date: 3/22/2022  Patient Name: Theresa Mejía,   Gender: female      Room: [facility]/065-A  MRN: [id number]  : 1935  (80 y.o.)  Referring Practitioner: Dr. Cyndie Dove  Diagnosis: Pseudogout of left knee  Additional Pertinent Hx: Per H&P Darrin Richardson  is a 80 y.o. female admitted to the inpatient rehabilitation unit on 3/17/2022. She was originally admitted to [facility] on 3/15/2022 with a past medical history of advanced osteoarthritis, idiopathic pulmonary fibrosis on 2L of home oxygen, bronchiectasis, hypertension, hyperlipidemia, and secondary hyperparathyroidism. She  presented to ED for evaluation of left knee pain x previous 2 days. Pain at that time was at 10/10. She could not ambulate, and was unable to lift left lower extremity due to severe pain. Patient had left SI joint injection on 2022. She reported no fever or chills. No chest pain. No abdominal pain. No diarrhea. No urinary symptoms. She had prior bilateral total knee replacements, the left one completed in  and the right in . She noticed slightly increased swelling to left knee. Denied recent left knee trauma or injury. No change of bowl and bladder functions. She lives at an assisted living. At baseline she walks without assistance. In the setting of a total knee arthroplasty with knee pain and effusion, her knee was aspirated in the ED for 30 ml of yellow cloudy fluid. Her aspiration showed WBC in the joint fluid. She also had calcium pyrophosphate crystals. White count was 23,350 with 90% polys. She denied any fever or chills and had noprevious history of infection. She was diagnosed with pseudogout, started on Toradol for 2 days then transitioned to Naproxen 250 mg bid.   Colchicine added at 0.6 mg bid.  Was on Dilaudid 1mg q 4 hours IV prn, and Flexeril 10 mg po tid prn. Also on Norco 5/325 one po q 8 hours prn. She also has a pin point area of skin irritation on her coccyx. Restrictions/Precautions:  Restrictions/Precautions: Fall Risk,Weight Bearing  Left Lower Extremity Weight Bearing: Weight Bearing As Tolerated  Position Activity Restriction  Other position/activity restrictions: 2L O2, hx of vertigo     SUBJECTIVE: Patietn pleasant and cooperative. Agreeable to OT. Towards EOS, pt c/o feeling flushed and then began stomach cramps - pt had x2 episodes of loose stools and x1 emesis. Notified AVAN Yareli,     PAIN: Denies pain      Vitals: 85-93 throughout session     COGNITION: WFL    ADL:   Grooming: Stand By Assistance.  hand hygiene x1 min  Toileting: Stand By Assistance. during clothing mgmt and hygiene  Toilet Transfer: Stand By Assistance. .  * 2x events this session     BALANCE:  Sitting Balance:  Modified Independent. Standing Balance: Stand By Assistance. BED MOBILITY:  Not Tested    TRANSFERS:  Sit to Stand:  Stand By Assistance. recliner, ETS, w/c  Stand to Sit: Stand By Assistance. FUNCTIONAL MOBILITY:  Assistive Device: Rolling Walker  Assist Level:  Stand By Assistance. Distance: within room only     ADDITIONAL ACTIVITIES:  - Patient completed IADL dynamic standing task of folding linens, task targeting BUE free from AD to challenge standing endurance + balance. Pt demo endurance x2 min, x3 min and x2 min with seated rest breaks between each event. Skilled education re: energy conservation techniques throughout. ASSESSMENT:     Activity Tolerance:  Patient tolerance of  treatment: good. Discharge Recommendations: Home with Home Health OT  Equipment Recommendations: Other: Pt has a shower seat, grab bars, hand held shower, ADA height toilet and half wall nearby.   Plan: Times per week: 5x/wk for 90 min and 1x/wk for 30 min  Current Treatment Recommendations: Strengthening,Functional Mobility Training,Balance Training,Endurance Training,Patient/Caregiver Education & Training,Equipment Evaluation, Education, & procurement,Safety Education & Training,Self-Care / ADL,Home Management Training    Patient Education  Patient Education: IADLs, ECT     Goals  Short term goals  Time Frame for Short term goals: 1 week  Short term goal 1: Pt will complete BUE strengthening exercises with min vcs for technique to increase indep and endurance with all self cares. Short term goal 2: Pt will complete standing tolerance x 5 minutes with 1-2 UE release and SBA to increase indep with sinkside grooming. Short term goal 3: Pt will complete functional mobiity to/from BR with SBA and 0 vcs for safety to increase indep with toileting. Short term goal 4: Pt will complete LB dressing with LHAE PRN and SBA to increase indep and endurance within home environment. Long term goals  Time Frame for Long term goals : 2 weeks  Long term goal 1: Pt will complete BADL routine with mod I using LHAE prn to return to PLOF. Long term goal 2: Pt will complete light IADL tasks with mod I using ECT prn to improve indpe within her condo. Following session, patient left in safe position with all fall risk precautions in place.

## 2022-03-22 NOTE — PROGRESS NOTES
[address] [place] [address]  INPATIENT PHYSICAL THERAPY  DAILY NOTE  [facility] - 7E-65/065-A    Time In: 0830  Time Out: 0930  Timed Code Treatment Minutes: 60 Minutes  Minutes: 60          Date: 3/22/2022  Patient Name: Shaw Ashley,  Gender:  female        MRN: [id number]  : 1935  (80 y.o.)     Referring Practitioner: Batool Adams MD  Diagnosis: Pseudogout of left knee  Additional Pertinent Hx: Per H&P Shannan Iglesias  is a 80 y.o. female admitted to the inpatient rehabilitation unit on 3/17/2022. She was originally admitted to [address] [place] [address] on 3/15/2022 with a past medical history of advanced osteoarthritis, idiopathic pulmonary fibrosis on 2L of home oxygen, bronchiectasis, hypertension, hyperlipidemia, and secondary hyperparathyroidism. She  presented to ED for evaluation of left knee pain x previous 2 days. Pain at that time was at 10/10. She could not ambulate, and was unable to lift left lower extremity due to severe pain. Patient had left SI joint injection on 2022. She reported no fever or chills. No chest pain. No abdominal pain. No diarrhea. No urinary symptoms. She had prior bilateral total knee replacements, the left one completed in  and the right in . She noticed slightly increased swelling to left knee. Denied recent left knee trauma or injury. No change of bowl and bladder functions. She lives at an assisted living. At baseline she walks without assistance. In the setting of a total knee arthroplasty with knee pain and effusion, her knee was aspirated in the ED for 30 ml of yellow cloudy fluid. Her aspiration showed WBC in the joint fluid. She also had calcium pyrophosphate crystals. White count was 23,350 with 90% polys. She denied any fever or chills and had noprevious history of infection. She was diagnosed with pseudogout, started on Toradol for 2 days then transitioned to Naproxen 250 mg bid.   Colchicine added at 0.6 mg bid. Was on Dilaudid 1mg q 4 hours IV prn, and Flexeril 10 mg po tid prn. Also on Norco 5/325 one po q 8 hours prn. She also has a pin point area of skin irritation on her coccyx. Prior Level of Function:  Lives With: Alone  Type of Home:  ([facility])  Home Layout: One level  Home Access: Stairs to enter with rails  Entrance Stairs - Number of Steps: 2  Entrance Stairs - Rails: Right  Home Equipment: Cane,Oxygen   Bathroom Shower/Tub: Walk-in shower  Bathroom Toilet: Handicap height  Bathroom Equipment: Grab bars in shower,Tub transfer bench  Bathroom Accessibility: Accessible    Receives Help From: Friend(s)  ADL Assistance: [address] [place] [address] Avenue: Independent  Homemaking Responsibilities: Yes  Ambulation Assistance: Independent  Transfer Assistance: Independent  Active : Yes  Additional Comments: Patient reports she was at [facility] for about a week due to increased difficulty with mobiltiy however prior lived in a [facility] independently and would like to return to her condo upon discharge. Patient reports she is on 2L O2 at baseline. Patient reports she recently had to utilize a cane for ambulation due to increased complaints of pain in L knee however prior she did not use any AD for mobility. Patient reported she did not have to ambulate far distances. Patient reports she has two sons however one is in [facility] and one is in [place]    Restrictions/Precautions:  Restrictions/Precautions: Fall Risk,Weight Bearing  Left Lower Extremity Weight Bearing: Weight Bearing As Tolerated  Position Activity Restriction  Other position/activity restrictions: 2L O2, hx of vertigo     SUBJECTIVE: Pt in recliner upon arrival. Pt pleasantly agrees to therapy. Pt denies pain at time but states that she typically has soreness and pain after therapy. Pt placed on 2L O2 during therapy. PAIN: None indicated at time.     Vitals: Blood Pressure: 131/68 mmHg while sitting, 102/61 mmHg while standing  Heart Rate: 67 seated, 95 standing    OBJECTIVE:  Bed Mobility:  Not Tested    Transfers:  Sit to Stand: Contact Guard Assistance  Stand to [address] Assistance  To/From [employer] and Chair: Contact Guard Assistance    Ambulation:  Contact Guard Assistance  Distance: 25'x2, 20'x1  Surface: Level Tile  Device:Rolling Walker  Gait Deviations: Forward Flexed Posture, Slow Jaimee, Decreased Step Length Bilaterally, Decreased Gait Speed and decreased knee flexion  Attempted 50' walk but required rest break at 25'. Pt then performed cone weaving during 20' bout but after 10', pt experienced light headedness. Pt was directed immediately to chair, where blood pressure was taken. Balance:  Dynamic Sitting Balance: Supervision   Pt clipped on and off clothes pins on yard stick for ~3 minutes X 3. This activity required pt to reach outside of BENJAMIN to improve dynamic sitting balance and core strength. Pt also tapped feet on cones while balancing tennis ball on cone for ~3 minutes for 3 bouts. Pt was instructed to sit on edge of chair. This activity was performed to improve dynamic sitting balance, encourage knee flexion, and promote increased step height during gait. Rest periods required throughout secondary to fatigue and SOB. Exercise:  Patient was guided in 1 set(s) 12 reps of exercise to both lower extremities. Glut sets, Seated marches, Seated hamstring curls, Seated heel/toe raises, Long arc quads, Seated isometric hip adduction and Seated abduction/adduction. Exercises were completed for increased independence with functional mobility. Functional Outcome Measures: Not completed       ASSESSMENT:  Assessment: Patient progressing toward established goals. Activity Tolerance:  Patient tolerance of  treatment: good. Pt only able to tolerate ambulating for 25' before requiring rest break due to combination of knee pain and SOB.  Pt had complaints of lightheadedness during gait training. Blood pressure was taken at sitting then at standing. Due to orthostatic hypotension, remainder of treatment was performed in sitting. Pt become SOB frequently. Equipment Recommendations:Equipment Needed:  (Continue to assess pending progress; may require  RW)  Discharge Recommendations: Continue to assess pending progress and Patient would benefit from continued PT at discharge  Plan: Times per week: 5x/wk 90 min; 1x/wk 30 min  Times per day: Daily  Current Treatment Recommendations: Strengthening,Neuromuscular Re-education,Home Exercise Program,ROM,Safety Education & Training,Balance Training,Endurance Training,Functional Mobility Training,Wheelchair Mobility Training,Transfer Training,Gait Training,Stair training,Patient/Caregiver Education & Training    Patient Education  Patient Education: Plan of Care, Gait, Verbal Exercise Instruction    Goals:   Patient goals : To be independent  Short term goals  Time Frame for Short term goals: 1 week  Short term goal 1: Patient to perform supine<>sit with Supervision without railings in order to assist with getting into and out of bed. Short term goal 2: Patient to perform sit to stand transfers with SBA with <=1 cue for hand placement in order to assist with safety with initiation of ambulation. Short term goal 3: Patient to ambulate >=75 feet with RW with Supervision in order to assist with home mobility. Short term goal 4: Patient to perform car transfer with Supervision in order to assist with getting into and out of car. Short term goal 5: Patient to perform TUG in <=26 seconds in order to assist with decreased risk for falls. Short term goal 6: Patient to ascend/descend 2 steps with R handrail with SBA in order to assist with getting into and out of condo.   Long term goals  Time Frame for Long term goals : 2 weeks  Long term goal 1: Patient to perform supine<>sit with Mod I in order to assist with getting into and out of

## 2022-03-22 NOTE — PROGRESS NOTES
[address]  Occupational Therapy  Daily Note  Time:   Time In: 1100  Time Out: 1200  Timed Code Treatment Minutes: 60 Minutes  Minutes: 60    Date: 3/22/2022  Patient Name: Zen Martinez,   Gender: female      Room: 7E-65/065-A  MRN: [id number]  : 1935  (80 y.o.)  Referring Practitioner: Dr. Rj Blackwell  Diagnosis: Pseudogout of left knee  Additional Pertinent Hx: Per H&P Aleksander Noel  is a 80 y.o. female admitted to the inpatient rehabilitation unit on 3/17/2022. She was originally admitted to [facility] on 3/15/2022 with a past medical history of advanced osteoarthritis, idiopathic pulmonary fibrosis on 2L of home oxygen, bronchiectasis, hypertension, hyperlipidemia, and secondary hyperparathyroidism. She  presented to ED for evaluation of left knee pain x previous 2 days. Pain at that time was at 10/10. She could not ambulate, and was unable to lift left lower extremity due to severe pain. Patient had left SI joint injection on 2022. She reported no fever or chills. No chest pain. No abdominal pain. No diarrhea. No urinary symptoms. She had prior bilateral total knee replacements, the left one completed in  and the right in . She noticed slightly increased swelling to left knee. Denied recent left knee trauma or injury. No change of bowl and bladder functions. She lives at an assisted living. At baseline she walks without assistance. In the setting of a total knee arthroplasty with knee pain and effusion, her knee was aspirated in the ED for 30 ml of yellow cloudy fluid. Her aspiration showed WBC in the joint fluid. She also had calcium pyrophosphate crystals. White count was 23,350 with 90% polys. She denied any fever or chills and had noprevious history of infection. She was diagnosed with pseudogout, started on Toradol for 2 days then transitioned to Naproxen 250 mg bid.   Colchicine added at 0.6 mg bid.  Was on Dilaudid 1mg q 4 hours IV prn, and Flexeril 10 mg po tid prn. Also on Norco 5/325 one po q 8 hours prn. She also has a pin point area of skin irritation on her coccyx. Restrictions/Precautions:  Restrictions/Precautions: Fall Risk,Weight Bearing  Left Lower Extremity Weight Bearing: Weight Bearing As Tolerated  Position Activity Restriction  Other position/activity restrictions: 2L O2, hx of vertigo     SUBJECTIVE: Patietn pleasant and cooperative. Agreeable to OT. PAIN: Denies pain      Vitals: 88-98 02 via NC, when dropping to 88 pt quickly recovered to > 95     COGNITION: WFL    ADL:   Grooming: Stand By Assistance.  hand hygiene x1 min  Toileting: Stand By Assistance. during clothing mgmt and hygiene  Toilet Transfer: Stand By Assistance. Karen Collier BALANCE:  Sitting Balance:  Modified Independent. Standing Balance: Stand By Assistance. BED MOBILITY:  Not Tested    TRANSFERS:  Sit to Stand:  Stand By Assistance. recliner, ETS, w/c, standard chair   Stand to Sit: Stand By Assistance. FUNCTIONAL MOBILITY:  Assistive Device: Rolling Walker  Assist Level:  Stand By Assistance. Distance: To and from bathroom and within therpay apartment during IADL. Used w/c to/from apartment for ECT. Skilled education on management of 02 line with RW, good carry over. ADDITIONAL ACTIVITIES:  - Patient completed IADL RW safety task of prepping a bowl of oatmeal (a microwave task she does at home, pt does not cook on stove/oven). Pt was given visual demo of RW safety transportation techniques with skilled education on 02 line mgmt. Pt completed task with SBA with x 1 sitting rest break d/t fatigue and demo good carry over of RW safety, overall endurance 5 min and 4 min. - Patient also placed dishes in  with SBA on top rack as she does at home, demo endurance x 3 min no LOB.      - Patient completed BUE strengthening exercises with skilled education on HEP: completed x15 reps x1 set with a medium resistive band in all joints and all planes in order to improve UE strength and activity tolerance required for BADL routine and toilet / shower transfers. Patient tolerated well, requiring min rest breaks. Patient also required min cues for technique. ASSESSMENT:     Activity Tolerance:  Patient tolerance of  treatment: good. Discharge Recommendations: Home with Home Health OT  Equipment Recommendations: Other: Pt has a shower seat, grab bars, hand held shower, ADA height toilet and half wall nearby. Plan: Times per week: 5x/wk for 90 min and 1x/wk for 30 min  Current Treatment Recommendations: Strengthening,Functional Mobility Training,Balance Training,Endurance Training,Patient/Caregiver Education & Training,Equipment Evaluation, Education, & procurement,Safety Education & Training,Self-Care / ADL,Home Management Training    Patient Education  Patient Education: IADL's, Home Exercise Program, Equipment Education, Home Safety and Assistive Device Safety    Goals  Short term goals  Time Frame for Short term goals: 1 week  Short term goal 1: Pt will complete BUE strengthening exercises with min vcs for technique to increase indep and endurance with all self cares. Short term goal 2: Pt will complete standing tolerance x 5 minutes with 1-2 UE release and SBA to increase indep with sinkside grooming. Short term goal 3: Pt will complete functional mobiity to/from BR with SBA and 0 vcs for safety to increase indep with toileting. Short term goal 4: Pt will complete LB dressing with LHAE PRN and SBA to increase indep and endurance within home environment. Long term goals  Time Frame for Long term goals : 2 weeks  Long term goal 1: Pt will complete BADL routine with mod I using LHAE prn to return to PLOF. Long term goal 2: Pt will complete light IADL tasks with mod I using ECT prn to improve indpe within her condo.     Following session, patient left in safe position with all fall risk precautions in place.

## 2022-03-22 NOTE — PROGRESS NOTES
added at 0.6 mg bid. Was on Dilaudid 1mg q 4 hours IV prn, and Flexeril 10 mg po tid prn. Also on Norco 5/325 one po q 8 hours prn. She also has a pin point area of skin irritation on her coccyx. Prior Level of Function:  Lives With: Alone  Type of Home:  ([facility])  Home Layout: One level  Home Access: Stairs to enter with rails  Entrance Stairs - Number of Steps: 2  Entrance Stairs - Rails: Right  Home Equipment: Cane,Oxygen   Bathroom Shower/Tub: Walk-in shower  Bathroom Toilet: Handicap height  Bathroom Equipment: Grab bars in shower,Tub transfer bench  Bathroom Accessibility: Accessible    Receives Help From: Friend(s)  ADL Assistance: [facility]: Independent  Homemaking Responsibilities: Yes  Ambulation Assistance: Independent  Transfer Assistance: Independent  Active : Yes  Additional Comments: Patient reports she was at [facility] [place] for about a week due to increased difficulty with mobiltiy however prior lived in a [facility] independently and would like to return to her condo upon discharge. Patient reports she is on 2L O2 at baseline. Patient reports she recently had to utilize a cane for ambulation due to increased complaints of pain in L knee however prior she did not use any AD for mobility. Patient reported she did not have to ambulate far distances. Patient reports she has two sons however one is in [facility] and one is in [place]    Restrictions/Precautions:  Restrictions/Precautions: Fall Risk,Weight Bearing  Left Lower Extremity Weight Bearing: Weight Bearing As Tolerated  Position Activity Restriction  Other position/activity restrictions: 2L O2, hx of vertigo     SUBJECTIVE: Pt in recliner upon arrival. Pt pleasantly agrees to therapy. Pt reports knee was not as sore as she had expected it to be. Pt reports light headedness upon standing but states it's normal and goes away. Pt required to use restroom at beginning of therapy session.      PAIN: None indicated. Vitals: Vitals not assessed per clinical judgement, see nursing flowsheet    OBJECTIVE:  Bed Mobility:  Not Tested    Transfers:  Sit to Stand: Contact Guard Assistance  Stand to [address] Assistance  To/From [employer] and Chair: Contact Guard Assistance    Ambulation:  Contact Guard Assistance  Distance: 10'x2  Surface: Level Tile  Device:Rolling Walker  Gait Deviations: Forward Flexed Posture, Slow Jaimee, Decreased Step Length Bilaterally and Decreased Gait Speed  Pt ambulated to restroom then from restroom to wheelchair. Balance:  Dynamic Standing Balance: Stand By Assistance, Contact Guard Assistance   Pt performed ring toss for ~2 minutes for 3 bouts. Pt then tapped feet on pods while balancing a tennis ball on cone for ~2 minutes for 2 bouts. Pt also volleyed balloon for ~3 minutes. Pt required extended rest breaks after each bout due to fatigue and SOB. Pt required contact guard initially due to light headedness but then was stand by assist. Activities were performed to increase pt dynamic standing balance and standing endurance. Functional Outcome Measures: Not completed       ASSESSMENT:  Assessment: Patient progressing toward established goals. Activity Tolerance:  Patient tolerance of  treatment: good. Pt has initial light headedness and reports it is normal and she is accustomed to it. Gait activities were avoided due to initial light headedness and SOB. Pt fatigued easily and required frequent and extended rest breaks.       Equipment Recommendations:Equipment Needed:  (Continue to assess pending progress; may require  RW)  Discharge Recommendations: Continue to assess pending progress  Plan: Times per week: 5x/wk 90 min; 1x/wk 30 min  Times per day: Daily  Current Treatment Recommendations: Strengthening,Neuromuscular Re-education,Home Exercise Program,ROM,Safety Education & [place] Mobility Training,Wheelchair Mobility Training,Transfer Training,Gait Training,Stair training,Patient/Caregiver Education & Training    Patient Education  Patient Education: Plan of Care, Pursed Lip Breathing, Balance    Goals:  Patient goals : To be independent  Short term goals  Time Frame for Short term goals: 1 week  Short term goal 1: Patient to perform supine<>sit with Supervision without railings in order to assist with getting into and out of bed. Short term goal 2: Patient to perform sit to stand transfers with SBA with <=1 cue for hand placement in order to assist with safety with initiation of ambulation. Short term goal 3: Patient to ambulate >=75 feet with RW with Supervision in order to assist with home mobility. Short term goal 4: Patient to perform car transfer with Supervision in order to assist with getting into and out of car. Short term goal 5: Patient to perform TUG in <=26 seconds in order to assist with decreased risk for falls. Short term goal 6: Patient to ascend/descend 2 steps with R handrail with SBA in order to assist with getting into and out of condo. Long term goals  Time Frame for Long term goals : 2 weeks  Long term goal 1: Patient to perform supine<>sit with Mod I in order to assist with getting into and out of bed. Long term goal 2: Patient to perform sit to stand transfers Mod I in order to assist with standing and sitting from various surfaces. Long term goal 3: Patient to perform car transfer Mod I in order to assist with getting to and  from appointments. Long term goal 4: Patient to ambulate over level surfaces >=150 feet with LRAD with Mod I in order to assist with home mobility. Long term goal 5: Patient to ambulate over uneven surface with Mod I in order to assist with safety in community. Long term goal 6: Patient to ascend/descend 2 steps with R handrail with Mod I in order to assist with getting into and out of condo.     Following session, patient left in safe position with all fall risk precautions in place.   Treatment session and note completed by TIM Armenta under supervision of signing therapist.

## 2022-03-22 NOTE — PLAN OF CARE
Care plan reviewed with patient. Patient verbalizes understanding of the plan of care and contribute to goal setting. Problem: Skin Integrity:  Goal: Will show no infection signs and symptoms  Description: Will show no infection signs and symptoms  Outcome: Ongoing  Note: No s/sx infection noted. Afebrile. Problem: Falls - Risk of:  Goal: Will remain free from falls  Description: Will remain free from falls  3/22/2022 1201 by Cherise Bernard LPN  Outcome: Ongoing  Note: Up with assist of one with walker and gait belt. Gait slow and steady. Tolerates well. Uses call light appropriately     Problem: Pain:  Goal: Pain level will decrease  Description: Pain level will decrease  Outcome: Ongoing  Note: Mild, mod pain cont. Patient denies need for prn pain meds at this time.       Problem: IP BOWEL ELIMINATION  Goal: STG - patient will be accident free  Outcome: Ongoing

## 2022-03-22 NOTE — PROGRESS NOTES
[address] [address]  Recreational Therapy  Daily Note  [address]    Time Spent with Patient: 25 minutes    Date:  3/22/2022       Patient Name: Shaw Ashley      MRN: [id number]      YOB: 1935 (80 y.o.)       Gender: female  Diagnosis: Pseudogout of left knee  Referring Practitioner: Dr. Chris Savage    RESTRICTIONS/PRECAUTIONS:  Restrictions/Precautions: Fall Risk,Weight Bearing  Vision: Impaired  Hearing: Exceptions to [facility]  Hearing Exceptions: Hard of hearing/hearing concerns,Bilateral hearing aid    PAIN: 0-no c/o pain     SUBJECTIVE:  I loved this    OBJECTIVE:  Pt enjoyed getting her hair washed and styled by our beautician-affect bright and social-02 at 2 liters-so glad she felt up to getting her hair done-so appreciative-sit to stand from w/c with SBA-ambulated 3 ft to toilet with RW and SBA-able to doff and don underwear and pants with SBA-stood at sink to wash hands with SBA and ambulated to recliner with SBA and RW-comfort measures given         Patient Education  New Education Provided: Importance of Leisure, Walker Safety    Electronically signed by: Daniel Anderson, CTRS  Date: 3/22/2022

## 2022-03-22 NOTE — PLAN OF CARE
Patient continues on breathing treatments and oxygen; tolerating well.   Will continue to monitor patients respiratory status

## 2022-03-23 PROCEDURE — 6370000000 HC RX 637 (ALT 250 FOR IP): Performed by: PHYSICAL MEDICINE & REHABILITATION

## 2022-03-23 PROCEDURE — 97110 THERAPEUTIC EXERCISES: CPT

## 2022-03-23 PROCEDURE — 94640 AIRWAY INHALATION TREATMENT: CPT

## 2022-03-23 PROCEDURE — 97116 GAIT TRAINING THERAPY: CPT

## 2022-03-23 PROCEDURE — 99232 SBSQ HOSP IP/OBS MODERATE 35: CPT | Performed by: PHYSICAL MEDICINE & REHABILITATION

## 2022-03-23 PROCEDURE — 97530 THERAPEUTIC ACTIVITIES: CPT

## 2022-03-23 PROCEDURE — 97535 SELF CARE MNGMENT TRAINING: CPT

## 2022-03-23 PROCEDURE — 6360000002 HC RX W HCPCS: Performed by: PHYSICAL MEDICINE & REHABILITATION

## 2022-03-23 PROCEDURE — 1180000000 HC REHAB R&B

## 2022-03-23 RX ORDER — DOCUSATE SODIUM 100 MG/1
100 CAPSULE, LIQUID FILLED ORAL DAILY PRN
Status: DISCONTINUED | OUTPATIENT
Start: 2022-03-23 | End: 2022-03-29 | Stop reason: HOSPADM

## 2022-03-23 RX ADMIN — ACETAMINOPHEN 500 MG: 500 TABLET ORAL at 20:46

## 2022-03-23 RX ADMIN — BUDESONIDE 500 MCG: 0.5 INHALANT RESPIRATORY (INHALATION) at 16:14

## 2022-03-23 RX ADMIN — LEVOTHYROXINE SODIUM 88 MCG: 0.09 TABLET ORAL at 06:13

## 2022-03-23 RX ADMIN — ACETAMINOPHEN 500 MG: 500 TABLET ORAL at 04:18

## 2022-03-23 RX ADMIN — ACETAMINOPHEN 500 MG: 500 TABLET ORAL at 08:50

## 2022-03-23 RX ADMIN — APIXABAN 5 MG: 5 TABLET, FILM COATED ORAL at 08:50

## 2022-03-23 RX ADMIN — ACETAMINOPHEN 500 MG: 500 TABLET ORAL at 16:15

## 2022-03-23 RX ADMIN — METOPROLOL TARTRATE 12.5 MG: 25 TABLET, FILM COATED ORAL at 08:50

## 2022-03-23 RX ADMIN — COLCHICINE 0.6 MG: 0.6 TABLET, FILM COATED ORAL at 20:47

## 2022-03-23 RX ADMIN — ATORVASTATIN CALCIUM 10 MG: 10 TABLET, FILM COATED ORAL at 20:46

## 2022-03-23 RX ADMIN — METOPROLOL TARTRATE 12.5 MG: 25 TABLET, FILM COATED ORAL at 20:47

## 2022-03-23 RX ADMIN — APIXABAN 5 MG: 5 TABLET, FILM COATED ORAL at 20:47

## 2022-03-23 RX ADMIN — COLCHICINE 0.6 MG: 0.6 TABLET, FILM COATED ORAL at 08:50

## 2022-03-23 ASSESSMENT — PAIN SCALES - GENERAL
PAINLEVEL_OUTOF10: 0
PAINLEVEL_OUTOF10: 5

## 2022-03-23 NOTE — PROGRESS NOTES
[address] [place] [address]  INPATIENT PHYSICAL THERAPY  DAILY NOTE  [facility] - [id number]    Time In: 0900  Time Out: 1000  Timed Code Treatment Minutes: 60 Minutes  Minutes: 60          Date: 3/23/2022  Patient Name: Kelly Amador,  Gender:  female        MRN: [id number]  : 1935  (80 y.o.)     Referring Practitioner: Anne-Marie Mack MD  Diagnosis: Pseudogout of left knee  Additional Pertinent Hx: Per H&P Lois Cuba  is a 80 y.o. female admitted to the inpatient rehabilitation unit on 3/17/2022. She was originally admitted to [address] [place] [address] on 3/15/2022 with a past medical history of advanced osteoarthritis, idiopathic pulmonary fibrosis on 2L of home oxygen, bronchiectasis, hypertension, hyperlipidemia, and secondary hyperparathyroidism. She  presented to ED for evaluation of left knee pain x previous 2 days. Pain at that time was at 10/10. She could not ambulate, and was unable to lift left lower extremity due to severe pain. Patient had left SI joint injection on 2022. She reported no fever or chills. No chest pain. No abdominal pain. No diarrhea. No urinary symptoms. She had prior bilateral total knee replacements, the left one completed in  and the right in . She noticed slightly increased swelling to left knee. Denied recent left knee trauma or injury. No change of bowl and bladder functions. She lives at an assisted living. At baseline she walks without assistance. In the setting of a total knee arthroplasty with knee pain and effusion, her knee was aspirated in the ED for 30 ml of yellow cloudy fluid. Her aspiration showed WBC in the joint fluid. She also had calcium pyrophosphate crystals. White count was 23,350 with 90% polys. She denied any fever or chills and had noprevious history of infection. She was diagnosed with pseudogout, started on Toradol for 2 days then transitioned to Naproxen 250 mg bid.   Colchicine added at 0.6 mg bid. Was on Dilaudid 1mg q 4 hours IV prn, and Flexeril 10 mg po tid prn. Also on Norco 5/325 one po q 8 hours prn. She also has a pin point area of skin irritation on her coccyx. Prior Level of Function:  Lives With: Alone  Type of Home:  ([facility])  Home Layout: One level  Home Access: Stairs to enter with rails  Entrance Stairs - Number of Steps: 2  Entrance Stairs - Rails: Right  Home Equipment: Cane,Oxygen   Bathroom Shower/Tub: Walk-in shower  Bathroom Toilet: Handicap height  Bathroom Equipment: Grab bars in shower,Tub transfer bench  Bathroom Accessibility: Accessible    Receives Help From: Friend(s)  ADL Assistance: [facility] [facility] [address]: Independent  Homemaking Responsibilities: Yes  Ambulation Assistance: Independent  Transfer Assistance: Independent  Active : Yes  Additional Comments: Patient reports she was at [facility] [place] for about a week due to increased difficulty with mobiltiy however prior lived in a [facility] independently and would like to return to her condo upon discharge. Patient reports she is on 2L O2 at baseline. Patient reports she recently had to utilize a cane for ambulation due to increased complaints of pain in L knee however prior she did not use any AD for mobility. Patient reported she did not have to ambulate far distances. Patient reports she has two sons however one is in [facility] and one is in [place]    Restrictions/Precautions:  Restrictions/Precautions: Fall Risk,Weight Bearing  Left Lower Extremity Weight Bearing: Weight Bearing As Tolerated  Position Activity Restriction  Other position/activity restrictions: 2L O2, hx of vertigo     SUBJECTIVE: Patient seated in recliner upon arrival. Patient reporting RN to give new compression stockings due to other ones being too tight and PT assisted with placement of compression stockings prior to start of session. Patient requesting to trial cane this session.  Patient is pleasant and agreeable to therapy. PAIN: 3/10: L knee    Vitals: Monitored O2 throughout session with patient dropping to 87% on 2L O2 during standing/walking tasks however able to return to >90% after several minute rest break    OBJECTIVE:  Bed Mobility:  Not Tested    Transfers:  Sit to Stand: Stand By Assistance  Stand to Sit:Stand By Assistance  To/From Bed and Chair: Stand By Assistance    Ambulation:  Stand By Assistance  Distance: 41 feet  Surface: Level Tile  Device:Rolling Walker  Gait Deviations: Forward Flexed Posture, Decreased Step Length on Right, Decreased Weight Shift Left, Decreased Gait Speed, Decreased Heel Strike Bilaterally, Mild Path Deviations and Decreased Terminal Knee Extension    Contact Guard Assistance  Distance: 10 feet x 2  Surface: Level Tile  Device: Cane  Gait Deviations: Forward Flexed Posture, Decreased step length on right, decreased weight shift to the left, decreased gait speed, decreased heel strike bilaterally and mild path deviations  -Patient  Required cueing for proper sequencing with use of cane and for use of cane in RUE in order to offload LLE with ambulation.   -Patient instructed in lateral side stepping in parallel bars with SBA. Patient  Required cueing for increased step length and step height on LLE in order to assist with floor clearance. Patient instructed in lateral side stepping in order to assist with step height and weight shifting. Stairs:  Stand By Assistance  Number of Steps: 1  Height: 4\" step with Parallel Bars     Contact Guard Assistance  Number of Steps: 1  Height: 6\" step with parallel bars  -Patient required cueing for proper sequencing with ascend/descend step    Balance:  Dynamic Standing Balance: Contact Guard Assistance   -Patient instructed in standing pod tapping with initially BUE support on RW and progressed to 1UE support on RW. Patient required cueing for slowed speed with stepping in order to increase safety.  Patient had increased difficulty with pod tapping on LLE without LUE support. Patient instructed in pod tapping in order to assist with weight shifting and SL balance. Exercise:  Patient was guided in 1 set(s) 20 reps of exercise to both lower extremities. Seated marches, Seated hamstring curls, Seated heel/toe raises, Long arc quads, Seated isometric hip adduction, Seated abduction/adduction and with use of 1lb ankle weights. Exercises were completed for increased independence with functional mobility. Functional Outcome Measures: Not completed       ASSESSMENT:  Assessment: Patient progressing toward established goals. Activity Tolerance:  Patient tolerance of  treatment: fair. Limited due to endurance     Equipment Recommendations:Equipment Needed:  (Continue to assess pending progress; may require  RW)  Discharge Recommendations: Home with Home Health PT  Plan: Times per week: 5x/wk 90 min; 1x/wk 30 min  Times per day: Daily  Current Treatment Recommendations: Strengthening,Neuromuscular Re-education,Home Exercise Program,ROM,Safety Education & Training,Balance Training,Endurance Training,Functional Mobility Training,Wheelchair Mobility Training,Transfer Training,Gait Training,Stair training,Patient/Caregiver Education & Training    Patient Education  Patient Education: Transfers, Gait, Stairs, Verbal Exercise Instruction,  - Patient Verbalized Understanding, - Patient Requires Continued Education    Goals:  Patient goals : To be independent  Short term goals  Time Frame for Short term goals: 1 week  Short term goal 1: Patient to perform supine<>sit with Supervision without railings in order to assist with getting into and out of bed. Short term goal 2: Patient to perform sit to stand transfers with SBA with <=1 cue for hand placement in order to assist with safety with initiation of ambulation. Short term goal 3: Patient to ambulate >=75 feet with RW with Supervision in order to assist with home mobility.   Short term goal 4: Patient to perform car transfer with Supervision in order to assist with getting into and out of car. Short term goal 5: Patient to perform TUG in <=26 seconds in order to assist with decreased risk for falls. Short term goal 6: Patient to ascend/descend 2 steps with R handrail with SBA in order to assist with getting into and out of condo. Long term goals  Time Frame for Long term goals : 2 weeks  Long term goal 1: Patient to perform supine<>sit with Mod I in order to assist with getting into and out of bed. Long term goal 2: Patient to perform sit to stand transfers Mod I in order to assist with standing and sitting from various surfaces. Long term goal 3: Patient to perform car transfer Mod I in order to assist with getting to and  from appointments. Long term goal 4: Patient to ambulate over level surfaces >=150 feet with LRAD with Mod I in order to assist with home mobility. Long term goal 5: Patient to ambulate over uneven surface with Mod I in order to assist with safety in community. Long term goal 6: Patient to ascend/descend 2 steps with R handrail with Mod I in order to assist with getting into and out of condo. Following session, patient left in safe position with all fall risk precautions in place.

## 2022-03-23 NOTE — PROGRESS NOTES
[address] [place] [address]  Occupational Therapy  Daily Note  Time:   Time In: 1130  Time Out: 1230  Timed Code Treatment Minutes: 60 Minutes  Minutes: 60          Date: 3/23/2022  Patient Name: Cris Malone,   Gender: female      Room: [facility] [id number]  MRN: [id number]  : 1935  (80 y.o.)  Referring Practitioner: Dr. Nish Lopez  Diagnosis: Pseudogout of left knee  Additional Pertinent Hx: Per H&P Joanna Bertrand  is a 80 y.o. female admitted to the inpatient rehabilitation unit on 3/17/2022. She was originally admitted to [address] [place] [address] on 3/15/2022 with a past medical history of advanced osteoarthritis, idiopathic pulmonary fibrosis on 2L of home oxygen, bronchiectasis, hypertension, hyperlipidemia, and secondary hyperparathyroidism. She  presented to ED for evaluation of left knee pain x previous 2 days. Pain at that time was at 10/10. She could not ambulate, and was unable to lift left lower extremity due to severe pain. Patient had left SI joint injection on 2022. She reported no fever or chills. No chest pain. No abdominal pain. No diarrhea. No urinary symptoms. She had prior bilateral total knee replacements, the left one completed in  and the right in . She noticed slightly increased swelling to left knee. Denied recent left knee trauma or injury. No change of bowl and bladder functions. She lives at an assisted living. At baseline she walks without assistance. In the setting of a total knee arthroplasty with knee pain and effusion, her knee was aspirated in the ED for 30 ml of yellow cloudy fluid. Her aspiration showed WBC in the joint fluid. She also had calcium pyrophosphate crystals. White count was 23,350 with 90% polys. She denied any fever or chills and had noprevious history of infection. She was diagnosed with pseudogout, started on Toradol for 2 days then transitioned to Naproxen 250 mg bid.   Colchicine added at 0.6 mg bid. Was on Dilaudid 1mg q 4 hours IV prn, and Flexeril 10 mg po tid prn. Also on Norco 5/325 one po q 8 hours prn. She also has a pin point area of skin irritation on her coccyx. Restrictions/Precautions:  Restrictions/Precautions: Fall Risk,Weight Bearing  Left Lower Extremity Weight Bearing: Weight Bearing As Tolerated  Position Activity Restriction  Other position/activity restrictions: 2L O2, hx of vertigo     SUBJECTIVE: Pt seated in bedside chair upon arrival, agreeable to OT session. PAIN: Denies    Vitals: Oxygen: Patient on 2L O2 via Nasal Canula  upon arrival to room. Patient O2 sats at 87% following mobility within hallway- increased to 3L. Upon further activity patient dropping O2 at 88-89%. Pt requiring mod rest break(s) to recover to >90%. Heart Rate: WNL    COGNITION: WFL    ADL:   Grooming: Stand By Assistance. Standing sink side washing hands. Toileting: Stand By Assistance. For hygiene and clothing management during void trial.  Toilet Transfer: Stand By Assistance. RTS. BALANCE:  Sitting Balance:  Supervision. Standing Balance: Stand By Assistance. Longest stand x4 minutes 30 secs within IADL task    BED MOBILITY:  Not Tested    TRANSFERS:  Sit to Stand:  Stand By Assistance. Stand to Sit: Stand By Assistance. FUNCTIONAL MOBILITY:  Assistive Device: Rolling Walker   Assist Level:  Stand By Assistance and [address] Distance:   Completed functional mobility to/from BR, short household distance within unit hallway, and within therapy kitchen at slow pace, no LOB noted. Pt requires education to manage O2 tubing with fair carryover during functional task and occasional cues for walker safety- seated rest break after trial of mobility, mod fatigue noted.      ADDITIONAL ACTIVITIES:  Pt participated in various IADL tasks this session ambulating within therapy kitchen with use of RW to stand folding linens with 2 UE release from RW x1 trial with lengthy seated rest break following. Pt then completed task reach into various planes at high/low levels to retrieve items making coffee. Pt required SBA for balance and lengthy verbal education for O2 line management in conjuntion with RW and transport items safely across countertops and surface to surface- fair follow through. Completed to increase kitchen safety and facilitate functional reaching required for simple meal prep tasks. ASSESSMENT:     Activity Tolerance:  Patient tolerance of  treatment: fair. Pt limited by fatigue and decreased endurance. Discharge Recommendations: Home with Home Health OT  Equipment Recommendations: Other: Pt has a shower seat, grab bars, hand held shower, ADA height toilet and half wall nearby. Plan: Times per week: 5x/wk for 90 min and 1x/wk for 30 min  Current Treatment Recommendations: Strengthening,Functional Mobility Training,Balance Training,Endurance Training,Patient/Caregiver Education & Training,Equipment Evaluation, Education, & procurement,Safety Education & Training,Self-Care / ADL,Home Management Training    Patient Education  Patient Education: ADL's, IADL's, Energy Conservation, Home Safety, Assistive Device Safety and Safety with transfers and mobility. Goals  Short term goals  Time Frame for Short term goals: 1 week  Short term goal 1: Pt will complete BUE strengthening exercises with min vcs for technique to increase indep and endurance with all self cares. Short term goal 2: Pt will complete standing tolerance x 5 minutes with 1-2 UE release and SBA to increase indep with sinkside grooming. Short term goal 3: Pt will complete functional mobiity to/from BR with SBA and 0 vcs for safety to increase indep with toileting. Short term goal 4: Pt will complete LB dressing with LHAE PRN and SBA to increase indep and endurance within home environment.   Long term goals  Time Frame for Long term goals : 2 weeks  Long term goal 1: Pt will complete BADL routine with mod I using LHAE prn to return to PLOF. Long term goal 2: Pt will complete light IADL tasks with mod I using ECT prn to improve indpe within her condo. Following session, patient left in safe position with all fall risk precautions in place.

## 2022-03-23 NOTE — PROGRESS NOTES
Physical Medicine & Rehabilitation   Progress Note    Chief Complaint:  Left septic knee, pseudogout. Rehab needs    Subjective:  Patient seen on rounds in her room on IPR Unit. She stated she was having a good day. She had one episode of emesis yesterday after lunch but feels fine today. Her bowels have been a \"little loose and gassy\"; reduced Colace to daily prn and d/c'd dulcolax suppository. She continues to use 2L supplelmental O2 2/2 pulmonary fibrosis. Her planned date for discharge from the IPR Unit to home on 3/29/22 with Home Health  Physical Therapy, Occupational Therapy, Nursing and HHA 2x week. Rehabilitation:  PT:    OBJECTIVE:  Bed Mobility:  Not Tested     Transfers:  Sit to Stand: Stand By Assistance  Stand to Sit:Stand By Assistance  To/From Bed and Chair: Stand By Assistance     Ambulation:  Stand By Assistance  Distance: 41 feet  Surface: Level Tile  Device:Rolling Walker  Gait Deviations: Forward Flexed Posture, Decreased Step Length on Right, Decreased Weight Shift Left, Decreased Gait Speed, Decreased Heel Strike Bilaterally, Mild Path Deviations and Decreased Terminal Knee Extension     Contact Guard Assistance  Distance: 10 feet x 2  Surface: Level Tile  Device: Cane  Gait Deviations: Forward Flexed Posture, Decreased step length on right, decreased weight shift to the left, decreased gait speed, decreased heel strike bilaterally and mild path deviations  -Patient  Required cueing for proper sequencing with use of cane and for use of cane in RUE in order to offload LLE with ambulation.   -Patient instructed in lateral side stepping in parallel bars with SBA. Patient  Required cueing for increased step length and step height on LLE in order to assist with floor clearance.  Patient instructed in lateral side stepping in order to assist with step height and weight shifting.      Stairs:  Stand By Assistance  Number of Steps: 1  Height: 4\" step with Parallel Bars      Contact Russ 230  Number of Steps: 1  Height: 6\" step with parallel bars  -Patient required cueing for proper sequencing with ascend/descend step     Balance:  Dynamic Standing Balance: Contact Guard Assistance   -Patient instructed in standing pod tapping with initially BUE support on RW and progressed to 1UE support on RW. Patient required cueing for slowed speed with stepping in order to increase safety. Patient had increased difficulty with pod tapping on LLE without LUE support. Patient instructed in pod tapping in order to assist with weight shifting and SL balance.     Exercise:  Patient was guided in 1 set(s) 20 reps of exercise to both lower extremities. Seated marches, Seated hamstring curls, Seated heel/toe raises, Long arc quads, Seated isometric hip adduction, Seated abduction/adduction and with use of 1lb ankle weights. Exercises were completed for increased independence with functional mobility.     Functional Outcome Measures: Not completed     ASSESSMENT:  Assessment: Patient progressing toward established goals. Activity Tolerance:  Patient tolerance of  treatment: fair. Limited due to endurance     Equipment Recommendations:Equipment Needed:  (Continue to assess pending progress; may require  RW)      OT:     COGNITION: WFL     ADL:   Grooming: Stand By Assistance.  hand hygiene x1 min  Toileting: Stand By Assistance. during clothing mgmt and hygiene  Toilet Transfer: Stand By Assistance. .  * 2x events this session      BALANCE:  Sitting Balance:  Modified Independent. Standing Balance: Stand By Assistance.       BED MOBILITY:  Not Tested     TRANSFERS:  Sit to Stand:  Stand By Assistance. recliner, ETS, w/c  Stand to Sit: Stand By Assistance.       FUNCTIONAL MOBILITY:  Assistive Device: Rolling Walker  Assist Level:  Stand By Assistance.    Distance: within room only      ADDITIONAL ACTIVITIES:  - Patient completed IADL dynamic standing task of folding linens, task targeting BUE free from AD to challenge standing endurance + balance. Pt demo endurance x2 min, x3 min and x2 min with seated rest breaks between each event. Skilled education re: energy conservation techniques throughout.           Review of Systems:  CONSTITUTIONAL:  negative  EYES:  Wears glasses  HEENT:  negative  RESPIRATORY:  On 2L supplemental O2 per NC as at home for pulmonary fibrosis  CARDIOVASCULAR:  negative  GASTROINTESTINAL:  Bowels have been moving  GENITOURINARY:  negative  SKIN:  negative  HEMATOLOGIC/LYMPHATIC:  negative  MUSCULOSKELETAL:  negative  NEUROLOGICAL:  negative  BEHAVIOR/PSYCH:  negative  System review otherwise negative      Objective:  Vitals:    03/23/22 0757   BP: (!) 116/57   Pulse: 65   Resp: 18   Temp: 97.6 °F (36.4 °C)   SpO2:    awake  Orientation:   person, place, time  Mood: within normal limits  Affect: calm and spontaneous  General appearance: well groomed and in no acute distress     Memory:   normal,   Attention/Concentration: normal  Language:  normal     Cranial Nerves:  cranial nerves II-XII are grossly intact  ROM:  abnormal - decreased at left knee  Tone:  normal  Muscle bulk: within normal limits  Sensory:  Sensory intact    Skin: warm and dry, no rash or erythema  Peripheral vascular: Pulses: Normal upper and lower extremity pulses; Edema: no    Diagnostics:   No results found for this or any previous visit (from the past 24 hour(s)).     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 GI: Pepcid 20 mg po daily  · Pain: Tylenol 500 mg po q 6 hours prn; Flexeril 10 mg po tid; Norco 5/325 0.5 tablet po q 4 hours prn  · Bowels: Colace 100 mg po qd PRN; MOM 30 ml po daily prn; glycolax 17 g po daily PRN  · Bowel meds PRN.    · colchicine 0.6mg BID  · Team conference Tuesday  · Discharge planning: goal to return to her condo independently  · Planned date for discharge from the IPR Unit to home on 3/29/22 with Home Health  Physical Therapy, Occupational Therapy, Nursing and HHA 2x week    Missed Therapy Time:  · None    Jeanie Jaffe MD

## 2022-03-23 NOTE — PLAN OF CARE
Problem: Skin Integrity:  Goal: Will show no infection signs and symptoms  Description: Will show no infection signs and symptoms  Outcome: Ongoing  Skin assessment every shift. No new skin breakdown. Problem: Falls - Risk of:  Goal: Will remain free from falls  Description: Will remain free from falls  Outcome: Ongoing  Pt will remain free of falls. Pt is 1 assist with walker and gait belt. Problem: Pain:  Goal: Pain level will decrease  Description: Pain level will decrease  Outcome: Ongoing  Pt denies pain at this time.

## 2022-03-23 NOTE — PROGRESS NOTES
[address] [place] [address]  INPATIENT PHYSICAL THERAPY  DAILY NOTE  [facility] - [id number]    Time In: 1030  Time Out: 1100  Timed Code Treatment Minutes: 30 Minutes  Minutes: 30          Date: 3/23/2022  Patient Name: Saritha Gardner,  Gender:  female        MRN: [id number]  : 1935  (80 y.o.)     Referring Practitioner: Rom Ann MD  Diagnosis: Pseudogout of left knee  Additional Pertinent Hx: Per H&P Momo Weiss  is a 80 y.o. female admitted to the inpatient rehabilitation unit on 3/17/2022. She was originally admitted to [address] [place] [address] on 3/15/2022 with a past medical history of advanced osteoarthritis, idiopathic pulmonary fibrosis on 2L of home oxygen, bronchiectasis, hypertension, hyperlipidemia, and secondary hyperparathyroidism. She  presented to ED for evaluation of left knee pain x previous 2 days. Pain at that time was at 10/10. She could not ambulate, and was unable to lift left lower extremity due to severe pain. Patient had left SI joint injection on 2022. She reported no fever or chills. No chest pain. No abdominal pain. No diarrhea. No urinary symptoms. She had prior bilateral total knee replacements, the left one completed in  and the right in . She noticed slightly increased swelling to left knee. Denied recent left knee trauma or injury. No change of bowl and bladder functions. She lives at an assisted living. At baseline she walks without assistance. In the setting of a total knee arthroplasty with knee pain and effusion, her knee was aspirated in the ED for 30 ml of yellow cloudy fluid. Her aspiration showed WBC in the joint fluid. She also had calcium pyrophosphate crystals. White count was 23,350 with 90% polys. She denied any fever or chills and had noprevious history of infection. She was diagnosed with pseudogout, started on Toradol for 2 days then transitioned to Naproxen 250 mg bid.   Colchicine added at 0.6 mg bid. Was on Dilaudid 1mg q 4 hours IV prn, and Flexeril 10 mg po tid prn. Also on Norco 5/325 one po q 8 hours prn. She also has a pin point area of skin irritation on her coccyx. Prior Level of Function:  Lives With: Alone  Type of Home:  ([facility])  Home Layout: One level  Home Access: Stairs to enter with rails  Entrance Stairs - Number of Steps: 2  Entrance Stairs - Rails: Right  Home Equipment: Cane,Oxygen   Bathroom Shower/Tub: Walk-in shower  Bathroom Toilet: Handicap height  Bathroom Equipment: Grab bars in shower,Tub transfer bench  Bathroom Accessibility: Accessible    Receives Help From: Friend(s)  ADL Assistance: [address] [place] [address] Avenue: Independent  Homemaking Responsibilities: Yes  Ambulation Assistance: Independent  Transfer Assistance: Independent  Active : Yes  Additional Comments: Patient reports she was at [facility] for about a week due to increased difficulty with mobiltiy however prior lived in a [facility] independently and would like to return to her condo upon discharge. Patient reports she is on 2L O2 at baseline. Patient reports she recently had to utilize a cane for ambulation due to increased complaints of pain in L knee however prior she did not use any AD for mobility. Patient reported she did not have to ambulate far distances. Patient reports she has two sons however one is in [facility] and one is in [place]    Restrictions/Precautions:  Restrictions/Precautions: Fall Risk,Weight Bearing  Left Lower Extremity Weight Bearing: Weight Bearing As Tolerated  Position Activity Restriction  Other position/activity restrictions: 2L O2, hx of vertigo     SUBJECTIVE: Patient in recliner upon arrival, agreed and cooperative for therapy. PAIN: No pain noted during session. Vitals: Oxygen: decreased to 86% following 40 ft ambulation distance, required increased time and increase to 3L (briefly) until increasing back up into mid-high 90's. Decreased 02 back down to 2L following. 98% following the second 40 ft bout when on 2L 02. OBJECTIVE:    Transfers:  Sit to Stand: Stand By Assistance  Stand to Sit:Stand By Assistance, cues for hand placement, with verbal cues  To/From Bed and Chair: Stand By Assistance, using RW  Car:Stand By Assistance, [employer] Assistance, X 1, cues for hand placement, with verbal cues   **Completed 2 trials of car transfer. 1 trial on passenger side and the other trial to the  side to simulate home environment. Ambulation:  Contact Guard Assistance-Close SBA  Distance: 40 ft. X2; 20 ft. x1   Surface: Level Tile  Device:Cane  Gait Deviations: Forward Flexed Posture, Slow Jaimee, Decreased Step Length Bilaterally, Decreased Weight Shift Left, Decreased Gait Speed, Decreased Heel Strike Bilaterally, Mild Path Deviations and Unsteady Gait with several mild LOB's but no more than CGA required. Functional Outcome Measures: Not completed       ASSESSMENT:  Assessment: Patient progressing toward established goals. Activity Tolerance:  Patient tolerance of  treatment: good. Equipment Recommendations:Equipment Needed:  (Continue to assess pending progress; may require  RW)  Discharge Recommendations: Continue to assess pending progress  Plan: Times per week: 5x/wk 90 min; 1x/wk 30 min  Times per day: Daily  Current Treatment Recommendations: Strengthening,Neuromuscular Re-education,Home Exercise Program,ROM,Safety Education & Training,Balance Training,Endurance Training,Functional Mobility Training,Wheelchair Mobility Training,Transfer Training,Gait Training,Stair training,Patient/Caregiver Education & Training    Patient Education  Patient Education: Plan of Care, Precautions/Restrictions, Transfers, Reviewed Prior Education, Gait, Car Transfers, Health Promotion and Wellness Education, Home Safety Education, - Patient Requires Continued Education    Goals:  Patient goals :  To be independent  Short term goals  Time Frame for Short term goals: 1 week  Short term goal 1: Patient to perform supine<>sit with Supervision without railings in order to assist with getting into and out of bed. Short term goal 2: Patient to perform sit to stand transfers with SBA with <=1 cue for hand placement in order to assist with safety with initiation of ambulation. Short term goal 3: Patient to ambulate >=75 feet with RW with Supervision in order to assist with home mobility. Short term goal 4: Patient to perform car transfer with Supervision in order to assist with getting into and out of car. Short term goal 5: Patient to perform TUG in <=26 seconds in order to assist with decreased risk for falls. Short term goal 6: Patient to ascend/descend 2 steps with R handrail with SBA in order to assist with getting into and out of condo. Long term goals  Time Frame for Long term goals : 2 weeks  Long term goal 1: Patient to perform supine<>sit with Mod I in order to assist with getting into and out of bed. Long term goal 2: Patient to perform sit to stand transfers Mod I in order to assist with standing and sitting from various surfaces. Long term goal 3: Patient to perform car transfer Mod I in order to assist with getting to and  from appointments. Long term goal 4: Patient to ambulate over level surfaces >=150 feet with LRAD with Mod I in order to assist with home mobility. Long term goal 5: Patient to ambulate over uneven surface with Mod I in order to assist with safety in community. Long term goal 6: Patient to ascend/descend 2 steps with R handrail with Mod I in order to assist with getting into and out of condo. Following session, patient left in safe position with all fall risk precautions in place.

## 2022-03-23 NOTE — PROGRESS NOTES
[facility]  [address]  Occupational Therapy  Daily Note  Time:   Time In: 1330  Time Out: 1400  Timed Code Treatment Minutes: 30 Minutes  Minutes: 30          Date: 3/23/2022  Patient Name: Neel Nielsen,   Gender: female      Room: [facility]65/065-A  MRN: [id number]  : 1935  (80 y.o.)  Referring Practitioner: Dr. Brie Murdock  Diagnosis: Pseudogout of left knee  Additional Pertinent Hx: Per H&P Geovanni Lala  is a 80 y.o. female admitted to the inpatient rehabilitation unit on 3/17/2022. She was originally admitted to [facility] on 3/15/2022 with a past medical history of advanced osteoarthritis, idiopathic pulmonary fibrosis on 2L of home oxygen, bronchiectasis, hypertension, hyperlipidemia, and secondary hyperparathyroidism. She  presented to ED for evaluation of left knee pain x previous 2 days. Pain at that time was at 10/10. She could not ambulate, and was unable to lift left lower extremity due to severe pain. Patient had left SI joint injection on 2022. She reported no fever or chills. No chest pain. No abdominal pain. No diarrhea. No urinary symptoms. She had prior bilateral total knee replacements, the left one completed in  and the right in . She noticed slightly increased swelling to left knee. Denied recent left knee trauma or injury. No change of bowl and bladder functions. She lives at an assisted living. At baseline she walks without assistance. In the setting of a total knee arthroplasty with knee pain and effusion, her knee was aspirated in the ED for 30 ml of yellow cloudy fluid. Her aspiration showed WBC in the joint fluid. She also had calcium pyrophosphate crystals. White count was 23,350 with 90% polys. She denied any fever or chills and had noprevious history of infection. She was diagnosed with pseudogout, started on Toradol for 2 days then transitioned to Naproxen 250 mg bid.   Colchicine added at 0.6 mg bid. Was on Dilaudid 1mg q 4 hours IV prn, and Flexeril 10 mg po tid prn. Also on Norco 5/325 one po q 8 hours prn. She also has a pin point area of skin irritation on her coccyx. Restrictions/Precautions:  Restrictions/Precautions: Fall Risk,Weight Bearing  Left Lower Extremity Weight Bearing: Weight Bearing As Tolerated  Position Activity Restriction  Other position/activity restrictions: 2L O2, hx of vertigo     SUBJECTIVE: Pt seated in bedside chair upon arrival, agreeable to OT session. PAIN: No c/o. Vitals: Vitals not assessed per clinical judgement, see nursing flowsheet    COGNITION: WFL    ADL:   No ADL's completed this session. BALANCE:  Sitting Balance:  Supervision. Within w/c  Standing Balance: Stand By Assistance. x1 minute in prep for mobility    BED MOBILITY:  Not Tested    TRANSFERS:  Sit to Stand:  Stand By Assistance. Stand to Sit: Stand By Assistance. FUNCTIONAL MOBILITY:  Assistive Device: Rolling Walker   Assist Level:  Stand By Assistance. Distance:   Completed functional mobility x2 trials for short distance within pt room at slow pace, no LOB noted. Pt requires no cues for walker safety- seated rest break after trial of mobility, min fatigue noted. ADDITIONAL ACTIVITIES:  Patient identified a personal goal to increase UB strength and improve overall endurance so they can complete their toilet & shower transfers; skilled edu on UE strengthening. Completed BUE exercises x10 reps x1 sets using min resistance band in all joints/planes to increase strength and endurance required for ADLs. Pt required min rest break between each exercise and min v/c for proper technique. ASSESSMENT:     Activity Tolerance:  Patient tolerance of  treatment: good. Discharge Recommendations: Home with Home Health OT  Equipment Recommendations: Other: Pt has a shower seat, grab bars, hand held shower, ADA height toilet and half wall nearby.   Plan: Times per week: 5x/wk for 90 min and 1x/wk for 30 min  Current Treatment Recommendations: Strengthening,Functional Mobility Training,Balance Training,Endurance Training,Patient/Caregiver Education & Training,Equipment Evaluation, Education, & procurement,Safety Education & Training,Self-Care / ADL,Home Management Training    Patient Education  Patient Education: Home Exercise Program, Assistive Device Safety and Safety with transfers and mobility. Goals  Short term goals  Time Frame for Short term goals: 1 week  Short term goal 1: Pt will complete BUE strengthening exercises with min vcs for technique to increase indep and endurance with all self cares. Short term goal 2: Pt will complete standing tolerance x 5 minutes with 1-2 UE release and SBA to increase indep with sinkside grooming. Short term goal 3: Pt will complete functional mobiity to/from BR with SBA and 0 vcs for safety to increase indep with toileting. Short term goal 4: Pt will complete LB dressing with LHAE PRN and SBA to increase indep and endurance within home environment. Long term goals  Time Frame for Long term goals : 2 weeks  Long term goal 1: Pt will complete BADL routine with mod I using LHAE prn to return to PLOF. Long term goal 2: Pt will complete light IADL tasks with mod I using ECT prn to improve indpe within her condo. Following session, patient left in safe position with all fall risk precautions in place.

## 2022-03-24 ENCOUNTER — TELEPHONE (OUTPATIENT)
Dept: PHYSICAL MEDICINE AND REHAB | Age: 87
End: 2022-03-24

## 2022-03-24 PROCEDURE — 97110 THERAPEUTIC EXERCISES: CPT

## 2022-03-24 PROCEDURE — 94760 N-INVAS EAR/PLS OXIMETRY 1: CPT

## 2022-03-24 PROCEDURE — 94640 AIRWAY INHALATION TREATMENT: CPT

## 2022-03-24 PROCEDURE — 97530 THERAPEUTIC ACTIVITIES: CPT

## 2022-03-24 PROCEDURE — 2700000000 HC OXYGEN THERAPY PER DAY

## 2022-03-24 PROCEDURE — 1180000000 HC REHAB R&B

## 2022-03-24 PROCEDURE — 6370000000 HC RX 637 (ALT 250 FOR IP): Performed by: PHYSICAL MEDICINE & REHABILITATION

## 2022-03-24 PROCEDURE — 97535 SELF CARE MNGMENT TRAINING: CPT

## 2022-03-24 PROCEDURE — 97116 GAIT TRAINING THERAPY: CPT

## 2022-03-24 PROCEDURE — 6360000002 HC RX W HCPCS: Performed by: PHYSICAL MEDICINE & REHABILITATION

## 2022-03-24 PROCEDURE — 99232 SBSQ HOSP IP/OBS MODERATE 35: CPT | Performed by: NURSE PRACTITIONER

## 2022-03-24 RX ADMIN — BUDESONIDE 500 MCG: 0.5 INHALANT RESPIRATORY (INHALATION) at 07:32

## 2022-03-24 RX ADMIN — LEVOTHYROXINE SODIUM 88 MCG: 0.09 TABLET ORAL at 05:51

## 2022-03-24 RX ADMIN — ATORVASTATIN CALCIUM 10 MG: 10 TABLET, FILM COATED ORAL at 22:53

## 2022-03-24 RX ADMIN — COLCHICINE 0.6 MG: 0.6 TABLET, FILM COATED ORAL at 08:44

## 2022-03-24 RX ADMIN — METOPROLOL TARTRATE 12.5 MG: 25 TABLET, FILM COATED ORAL at 08:44

## 2022-03-24 RX ADMIN — ACETAMINOPHEN 500 MG: 500 TABLET ORAL at 02:56

## 2022-03-24 RX ADMIN — ACETAMINOPHEN 500 MG: 500 TABLET ORAL at 22:52

## 2022-03-24 RX ADMIN — METOPROLOL TARTRATE 12.5 MG: 25 TABLET, FILM COATED ORAL at 22:53

## 2022-03-24 RX ADMIN — APIXABAN 5 MG: 5 TABLET, FILM COATED ORAL at 22:53

## 2022-03-24 RX ADMIN — ACETAMINOPHEN 500 MG: 500 TABLET ORAL at 08:44

## 2022-03-24 RX ADMIN — BUDESONIDE 500 MCG: 0.5 INHALANT RESPIRATORY (INHALATION) at 18:02

## 2022-03-24 RX ADMIN — COLCHICINE 0.6 MG: 0.6 TABLET, FILM COATED ORAL at 22:53

## 2022-03-24 RX ADMIN — APIXABAN 5 MG: 5 TABLET, FILM COATED ORAL at 08:44

## 2022-03-24 ASSESSMENT — PAIN DESCRIPTION - PAIN TYPE: TYPE: ACUTE PAIN

## 2022-03-24 ASSESSMENT — PAIN DESCRIPTION - LOCATION
LOCATION: KNEE
LOCATION: KNEE

## 2022-03-24 ASSESSMENT — PAIN SCALES - GENERAL
PAINLEVEL_OUTOF10: 2
PAINLEVEL_OUTOF10: 0
PAINLEVEL_OUTOF10: 0
PAINLEVEL_OUTOF10: 4
PAINLEVEL_OUTOF10: 2

## 2022-03-24 ASSESSMENT — PAIN DESCRIPTION - DESCRIPTORS: DESCRIPTORS: ACHING

## 2022-03-24 ASSESSMENT — PAIN DESCRIPTION - ORIENTATION: ORIENTATION: LEFT

## 2022-03-24 NOTE — PROGRESS NOTES
[facility]  INPATIENT PHYSICAL THERAPY  DAILY NOTE  [address] [facility] - [id number]    Time In: 0800  Time Out: 0830  Timed Code Treatment Minutes: 30 Minutes  Minutes: 30          Date: 3/24/2022  Patient Name: Micky Epps,  Gender:  female        MRN: [id number]  : 1935  (80 y.o.)     Referring Practitioner: Juana Henry MD  Diagnosis: Pseudogout of left knee  Additional Pertinent Hx: Per H&P Gilles Aparicio  is a 80 y.o. female admitted to the inpatient rehabilitation unit on 3/17/2022. She was originally admitted to [facility] on 3/15/2022 with a past medical history of advanced osteoarthritis, idiopathic pulmonary fibrosis on 2L of home oxygen, bronchiectasis, hypertension, hyperlipidemia, and secondary hyperparathyroidism. She  presented to ED for evaluation of left knee pain x previous 2 days. Pain at that time was at 10/10. She could not ambulate, and was unable to lift left lower extremity due to severe pain. Patient had left SI joint injection on 2022. She reported no fever or chills. No chest pain. No abdominal pain. No diarrhea. No urinary symptoms. She had prior bilateral total knee replacements, the left one completed in  and the right in . She noticed slightly increased swelling to left knee. Denied recent left knee trauma or injury. No change of bowl and bladder functions. She lives at an assisted living. At baseline she walks without assistance. In the setting of a total knee arthroplasty with knee pain and effusion, her knee was aspirated in the ED for 30 ml of yellow cloudy fluid. Her aspiration showed WBC in the joint fluid. She also had calcium pyrophosphate crystals. White count was 23,350 with 90% polys. She denied any fever or chills and had noprevious history of infection. She was diagnosed with pseudogout, started on Toradol for 2 days then transitioned to Naproxen 250 mg bid.   Colchicine assessed per clinical judgement, see nursing flowsheet    OBJECTIVE:  Bed Mobility:  Not Tested    Transfers:  Sit to Stand: Stand By Assistance  Stand to Sit:Stand By Assistance    Ambulation:  Stand By Assistance  Distance: 48 ft. X1; 4 ft. x2   Surface: Level Tile  Device:Rolling Walker  Gait Deviations: Forward Flexed Posture, Slow Jaimee, Decreased Step Length Bilaterally, Decreased Weight Shift Left, Decreased Gait Speed and Decreased Heel Strike Bilaterally    Exercise:  Patient was guided in 1 set(s) 10 reps of exercise to both lower extremities. Seated marches, Seated hamstring curls, Seated heel/toe raises, Long arc quads, Seated isometric hip adduction and Seated abduction/adduction. Exercises were completed for increased independence with functional mobility. Functional Outcome Measures: Not completed       ASSESSMENT:  Assessment: Patient progressing toward established goals. Activity Tolerance:  Patient tolerance of  treatment: good. Equipment Recommendations:Equipment Needed:  (Continue to assess pending progress; may require  RW)  Discharge Recommendations: Home with Home Health PT  Plan: Times per week: 5x/wk 90 min; 1x/wk 30 min  Times per day: Daily  Current Treatment Recommendations: Strengthening,Neuromuscular Re-education,Home Exercise Program,ROM,Safety Education & Training,Balance Training,Endurance Training,Functional Mobility Training,Wheelchair Mobility Training,Transfer Training,Gait Training,Stair training,Patient/Caregiver Education & Training    Patient Education  Patient Education: Plan of Care, Precautions/Restrictions, [employer], Transfers, Reviewed Prior Education, Gait, Health Promotion and Wellness Education, Home Safety Education, Pursed Lip Breathing, - Patient Requires Continued Education    Goals:  Patient goals :  To be independent  Short term goals  Time Frame for Short term goals: 1 week  Short term goal 1: Patient to perform supine<>sit with Supervision without railings in order to assist with getting into and out of bed. Short term goal 2: Patient to perform sit to stand transfers with SBA with <=1 cue for hand placement in order to assist with safety with initiation of ambulation. Short term goal 3: Patient to ambulate >=75 feet with RW with Supervision in order to assist with home mobility. Short term goal 4: Patient to perform car transfer with Supervision in order to assist with getting into and out of car. Short term goal 5: Patient to perform TUG in <=26 seconds in order to assist with decreased risk for falls. Short term goal 6: Patient to ascend/descend 2 steps with R handrail with SBA in order to assist with getting into and out of condo. Long term goals  Time Frame for Long term goals : 2 weeks  Long term goal 1: Patient to perform supine<>sit with Mod I in order to assist with getting into and out of bed. Long term goal 2: Patient to perform sit to stand transfers Mod I in order to assist with standing and sitting from various surfaces. Long term goal 3: Patient to perform car transfer Mod I in order to assist with getting to and  from appointments. Long term goal 4: Patient to ambulate over level surfaces >=150 feet with LRAD with Mod I in order to assist with home mobility. Long term goal 5: Patient to ambulate over uneven surface with Mod I in order to assist with safety in community. Long term goal 6: Patient to ascend/descend 2 steps with R handrail with Mod I in order to assist with getting into and out of condo. Following session, patient left in safe position with all fall risk precautions in place.

## 2022-03-24 NOTE — PLAN OF CARE
Problem: Impaired respiratory status  Goal: Clear lung sounds  Outcome: Ongoing  Note: SPO2>98% on 2 lpm home O2. BBS are clear/diminished. WOB is normal. Pt has a strong productive cough.

## 2022-03-24 NOTE — TELEPHONE ENCOUNTER
Spoke with pt's on-Jose (HIPPA). Pt. Is currently in the hospital on antibiotics. Procedures and follow up cancelled. He will call back when pt. Is released from the hospital to reschedule. Also advised that pt. Will need to be off antibiotics 14 days before having procedure.

## 2022-03-24 NOTE — CARE COORDINATION
Anton Cornejo was evaluated today and a DME order was entered for a wheeled walker because she requires this to successfully complete daily living tasks of personal cares, ambulating, dressing upper body and dressing lower body. A wheeled walker is necessary due to the patient's unsteady gait, upper body weakness, and inability to  an ambulation device; and she can ambulate only by pushing a walker instead of a lesser assistive device such as a cane, crutch, or standard walker. The need for this equipment was discussed with the patient and she understands and is in agreement.     Gerson Eduardo PT, DPT

## 2022-03-24 NOTE — PLAN OF CARE
Problem: Skin Integrity:  Goal: Will show no infection signs and symptoms  Description: Will show no infection signs and symptoms  3/24/2022 0146 by Irwin Clark RN  Outcome: Met This Shift     Problem: Skin Integrity:  Goal: Absence of new skin breakdown  Description: Absence of new skin breakdown  Outcome: Met This Shift     Problem: Falls - Risk of:  Goal: Will remain free from falls  Description: Will remain free from falls  3/24/2022 0146 by Irwin Clark RN  Outcome: Met This Shift     Problem: Falls - Risk of:  Goal: Absence of physical injury  Description: Absence of physical injury  Outcome: Met This Shift     Problem: Pain:  Goal: Pain level will decrease  Description: Pain level will decrease  3/24/2022 0146 by Irwin Clark RN  Outcome: Met This Shift     Problem: Pain:  Goal: Control of acute pain  Description: Control of acute pain  Outcome: Met This Shift     Problem: Pain:  Goal: Control of chronic pain  Description: Control of chronic pain  Outcome: Met This Shift     Problem: Nutrition  Goal: Optimal nutrition therapy  Outcome: Ongoing     Problem: DISCHARGE BARRIERS  Goal: Patient's continuum of care needs are met  Outcome: Ongoing

## 2022-03-24 NOTE — PROGRESS NOTES
[facility]  [address] [facility]  Occupational Therapy  Daily Note  Time:   Time In: 1330  Time Out: 1400  Timed Code Treatment Minutes: 30 Minutes  Minutes: 30          Date: 3/24/2022  Patient Name: Theresa Mejía,   Gender: female      Room: [facility]65/065-A  MRN: [id number]  : 1935  (80 y.o.)  Referring Practitioner: Dr. Cyndie Dove  Diagnosis: Pseudogout of left knee  Additional Pertinent Hx: Per H&P Darrin Richardson  is a 80 y.o. female admitted to the inpatient rehabilitation unit on 3/17/2022. She was originally admitted to [facility] on 3/15/2022 with a past medical history of advanced osteoarthritis, idiopathic pulmonary fibrosis on 2L of home oxygen, bronchiectasis, hypertension, hyperlipidemia, and secondary hyperparathyroidism. She  presented to ED for evaluation of left knee pain x previous 2 days. Pain at that time was at 10/10. She could not ambulate, and was unable to lift left lower extremity due to severe pain. Patient had left SI joint injection on 2022. She reported no fever or chills. No chest pain. No abdominal pain. No diarrhea. No urinary symptoms. She had prior bilateral total knee replacements, the left one completed in  and the right in . She noticed slightly increased swelling to left knee. Denied recent left knee trauma or injury. No change of bowl and bladder functions. She lives at an assisted living. At baseline she walks without assistance. In the setting of a total knee arthroplasty with knee pain and effusion, her knee was aspirated in the ED for 30 ml of yellow cloudy fluid. Her aspiration showed WBC in the joint fluid. She also had calcium pyrophosphate crystals. White count was 23,350 with 90% polys. She denied any fever or chills and had noprevious history of infection. She was diagnosed with pseudogout, started on Toradol for 2 days then transitioned to Naproxen 250 mg bid.   Colchicine added at 0.6 mg bid. Was on Dilaudid 1mg q 4 hours IV prn, and Flexeril 10 mg po tid prn. Also on Norco 5/325 one po q 8 hours prn. She also has a pin point area of skin irritation on her coccyx. Restrictions/Precautions:  Restrictions/Precautions: Fall Risk,Weight Bearing  Left Lower Extremity Weight Bearing: Weight Bearing As Tolerated  Position Activity Restriction  Other position/activity restrictions: 2L O2, hx of vertigo     SUBJECTIVE: Patient seated in bedside chair upon arrival. Agreeable to OT session    PAIN: Denies    Vitals: Vitals not assessed per clinical judgement, see nursing flowsheet    COGNITION: WFL    ADL:   Grooming: Stand By Assistance. Standing sinkside to wash hands  Toileting: Stand By Assistance. For hygiene and clothing management x2 trials  Toilet Transfer: Stand By Assistance. To/from RTS x2 trials. BALANCE:  Sitting Balance:  Modified Independent. Standing Balance: Stand By Assistance. Patient stood at washer to load clothes and set dials appropriately. Patient able to complete task with SBA for balance with no LOB noted, tolerating activity for approx 2 minutes before requiring seated rest break. BED MOBILITY:  Not Tested    TRANSFERS:  Sit to Stand:  Stand By Assistance. From various surfaces  Stand to Sit: Stand By Assistance. To various surfaces    FUNCTIONAL MOBILITY:  Assistive Device: Rolling Walker  Assist Level:  Stand By Assistance. Distance: To and from bathroom      ASSESSMENT:     Activity Tolerance:  Patient tolerance of  treatment: fair. Discharge Recommendations: Home with Home Health OT  Equipment Recommendations: Other: Pt has a shower seat, grab bars, hand held shower, ADA height toilet and half wall nearby.   Plan: Times per week: 5x/wk for 90 min and 1x/wk for 30 min  Current Treatment Recommendations: Strengthening,Functional Mobility Training,Balance Training,Endurance Training,Patient/Caregiver Education & Training,Equipment Evaluation, Education, & procurement,Safety Education & Training,Self-Care / ADL,Home Management Training    Patient Education  Patient Education: safety with transfers and mobility, ADL strategies    Goals  Short term goals  Time Frame for Short term goals: 1 week  Short term goal 1: Pt will complete BUE strengthening exercises with min vcs for technique to increase indep and endurance with all self cares. Short term goal 2: Pt will complete standing tolerance x 5 minutes with 1-2 UE release and SBA to increase indep with sinkside grooming. Short term goal 3: Pt will complete functional mobiity to/from BR with SBA and 0 vcs for safety to increase indep with toileting. Short term goal 4: Pt will complete LB dressing with LHAE PRN and SBA to increase indep and endurance within home environment. Long term goals  Time Frame for Long term goals : 2 weeks  Long term goal 1: Pt will complete BADL routine with mod I using LHAE prn to return to PLOF. Long term goal 2: Pt will complete light IADL tasks with mod I using ECT prn to improve indpe within her condo. Following session, patient left in safe position with all fall risk precautions in place.

## 2022-03-24 NOTE — PROGRESS NOTES
Physical Medicine & Rehabilitation   Progress Note    Chief Complaint:  Left septic knee, pseudogout. Rehab needs    Subjective:  Patient seen on rounds in her room on IPR Unit. Resting with eyes closed in her chair, awakens easily to verbal stimuli. Patient very happy with her progress and discharge plan for next week. Discussed knee fluid culture showed no growth, patient very happy about this. She continues to use 2L supplelmental O2 2/2 pulmonary fibrosis. Her planned date for discharge from the IPR Unit to home on 3/29/22 with Home Health  Physical Therapy, Occupational Therapy, Nursing and HHA 2x week. Rehabilitation:  PT:  Transfers:  Sit to Stand: Stand By Assistance  Stand to Sit:Stand By Assistance, cues for hand placement, with verbal cues  To/From Bed and Chair: Stand By Assistance, using RW  Car:Stand By Assistance, [employer], X 1, cues for hand placement, with verbal cues   **Completed 2 trials of car transfer. 1 trial on passenger side and the other trial to the  side to simulate home environment. Ambulation:  Contact Guard Assistance-Close SBA  Distance: 40 ft. X2; 20 ft. x1   Surface: Level Tile  Device:Cane  Gait Deviations: Forward Flexed Posture, Slow Jaimee, Decreased Step Length Bilaterally, Decreased Weight Shift Left, Decreased Gait Speed, Decreased Heel Strike Bilaterally, Mild Path Deviations and Unsteady Gait with several mild LOB's but no more than CGA required. OT:   BALANCE:  Sitting Balance:  Supervision. Within w/c  Standing Balance: Stand By Assistance. x1 minute in prep for mobility  TRANSFERS:  Sit to Stand:  Stand By Assistance. Stand to Sit: Stand By Assistance. FUNCTIONAL MOBILITY:  Assistive Device: Rolling Walker   Assist Level:  Stand By Assistance. Distance:   Completed functional mobility x2 trials for short distance within pt room at slow pace, no LOB noted.  Pt requires no cues for walker safety- seated rest break after trial of mobility, min fatigue noted. ADDITIONAL ACTIVITIES:  Patient identified a personal goal to increase UB strength and improve overall endurance so they can complete their toilet & shower transfers; skilled edu on UE strengthening. Completed BUE exercises x10 reps x1 sets using min resistance band in all joints/planes to increase strength and endurance required for ADLs. Pt required min rest break between each exercise and min v/c for proper technique. Review of Systems:  CONSTITUTIONAL:  negative  EYES:  Wears glasses  HEENT:  negative  RESPIRATORY:  On 2L supplemental O2 per NC as at home for pulmonary fibrosis  CARDIOVASCULAR:  negative  GASTROINTESTINAL:  Bowels have been moving  GENITOURINARY:  negative  SKIN:  negative  HEMATOLOGIC/LYMPHATIC:  negative  MUSCULOSKELETAL:  negative  NEUROLOGICAL:  negative  BEHAVIOR/PSYCH:  negative  System review otherwise negative      Objective:  Vitals:    03/24/22 0844   BP: (!) 119/57   Pulse: 86   Resp: 16   Temp: 98.1 °F (36.7 °C)   SpO2:    awake  Orientation:   person, place, time  Mood: within normal limits  Affect: calm and spontaneous  General appearance: well groomed and in no acute distress     Memory:   normal,   Attention/Concentration: normal  Language:  normal     Cranial Nerves:  cranial nerves II-XII are grossly intact  ROM:  abnormal - decreased at left knee  Tone:  normal  Muscle bulk: within normal limits  Sensory:  Sensory intact    Skin: warm and dry, no rash or erythema  Peripheral vascular: Pulses: Normal upper and lower extremity pulses; Edema: no    Diagnostics:   No results found for this or any previous visit (from the past 24 hour(s)). Component 3/15/22 2324   Body Fluid Culture, Sterile No growth-preliminary No growth     Anaerobic Culture No growth-preliminary No growth     Gram Stain Result Many segmented neutrophils observed. No bacteria seen. No bacteria seen.      Resulting Agency [facility] Walk-in Appointment Scheduler Lab Narrative  Performed by: Rk Cordero.: synovial fluid       Site: left knee          Current Antibiotics: not stated                 Impression:  · Left knee pain secondary to pseudogout  · S/p total left knee arthroplasty 2012  · Pancreatic ductal dilatation- noted on CT  · 1 cm calcification in the mid pancreatic body- noted on CT  · Osteoarthritis  · Osteopenia  · Chronic respiratory failure  · Idiopathic pulmonary fibrosis on home O2 at 2L  · HTN  · HLD  · Diverticulosis  · Chronic constipation  · Hyponatremia  · Bursitis bilateral shoulders  · Constipation  · Diverticulitis of colon  · Parathyroid adenoma  · 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  · Vitamin D deficiency      Plan:  Continue current therapies  · Prophylaxis:DVT: Eliquis 5 mg po bid. GI: Pepcid 20 mg po daily  · Pain: Tylenol 500 mg po q 6 hours prn; Flexeril 10 mg po tid; Norco 5/325 0.5 tablet po q 4 hours prn  · Bowels: Colace 100 mg po qd PRN; MOM 30 ml po daily prn; glycolax 17 g po daily PRN  · Bowel meds PRN. · colchicine 0.6mg BID  · Team conference Tuesday  · Discharge planning:  to home on 3/29/22 with Home Health  Physical Therapy, Occupational Therapy, Nursing and A 2x week    Missed Therapy Time:  · None    I have evaluated the patient and reviewed the case with the nurse practitioner. I agree with the current plan of care including the workup, evaluation, management, and diagnosis. Care plan has been discussed. I agree with above documentation.         MD Fadia Escobedo, APRN - CNP

## 2022-03-24 NOTE — PROGRESS NOTES
[address] [place] [address]  INPATIENT PHYSICAL THERAPY  DAILY NOTE  [facility] - 7E-65/065-A    Time In: 1030  Time Out: 1130  Timed Code Treatment Minutes: 60 Minutes  Minutes: 60          Date: 3/24/2022  Patient Name: Zen Martinez,  Gender:  female        MRN: [id number]  : 1935  (80 y.o.)     Referring Practitioner: Patrice Preston MD  Diagnosis: Pseudogout of left knee  Additional Pertinent Hx: Per H&P Aleksander Noel  is a 80 y.o. female admitted to the inpatient rehabilitation unit on 3/17/2022. She was originally admitted to [address] [place] [address] on 3/15/2022 with a past medical history of advanced osteoarthritis, idiopathic pulmonary fibrosis on 2L of home oxygen, bronchiectasis, hypertension, hyperlipidemia, and secondary hyperparathyroidism. She  presented to ED for evaluation of left knee pain x previous 2 days. Pain at that time was at 10/10. She could not ambulate, and was unable to lift left lower extremity due to severe pain. Patient had left SI joint injection on 2022. She reported no fever or chills. No chest pain. No abdominal pain. No diarrhea. No urinary symptoms. She had prior bilateral total knee replacements, the left one completed in  and the right in . She noticed slightly increased swelling to left knee. Denied recent left knee trauma or injury. No change of bowl and bladder functions. She lives at an assisted living. At baseline she walks without assistance. In the setting of a total knee arthroplasty with knee pain and effusion, her knee was aspirated in the ED for 30 ml of yellow cloudy fluid. Her aspiration showed WBC in the joint fluid. She also had calcium pyrophosphate crystals. White count was 23,350 with 90% polys. She denied any fever or chills and had noprevious history of infection. She was diagnosed with pseudogout, started on Toradol for 2 days then transitioned to Naproxen 250 mg bid.   Colchicine added at 0.6 mg bid. Was on Dilaudid 1mg q 4 hours IV prn, and Flexeril 10 mg po tid prn. Also on Norco 5/325 one po q 8 hours prn. She also has a pin point area of skin irritation on her coccyx. Prior Level of Function:  Lives With: Alone  Type of Home:  ([facility])  Home Layout: One level  Home Access: Stairs to enter with rails  Entrance Stairs - Number of Steps: 2  Entrance Stairs - Rails: Right  Home Equipment: Cane,Oxygen   Bathroom Shower/Tub: Walk-in shower  Bathroom Toilet: Handicap height  Bathroom Equipment: Grab bars in shower,Tub transfer bench  Bathroom Accessibility: Accessible    Receives Help From: Friend(s)  ADL Assistance: [address] [place] [address] Avenue: Independent  Homemaking Responsibilities: Yes  Ambulation Assistance: Independent  Transfer Assistance: Independent  Active : Yes  Additional Comments: Patient reports she was at [facility] [place] for about a week due to increased difficulty with mobiltiy however prior lived in a [facility] independently and would like to return to her condo upon discharge. Patient reports she is on 2L O2 at baseline. Patient reports she recently had to utilize a cane for ambulation due to increased complaints of pain in L knee however prior she did not use any AD for mobility. Patient reported she did not have to ambulate far distances. Patient reports she has two sons however one is in [facility] and one is in [place]    Restrictions/Precautions:  Restrictions/Precautions: Fall Risk,Weight Bearing  Left Lower Extremity Weight Bearing: Weight Bearing As Tolerated  Position Activity Restriction  Other position/activity restrictions: 2L O2, hx of vertigo     SUBJECTIVE: Patient in recliner upon arrival and agreeable to therapy. Patient requested to use restroom at end of session.      PAIN: 5/10: left knee    Vitals: Vitals not assessed per clinical judgement, see nursing flowsheet    OBJECTIVE:  Bed Mobility:  Not Tested    Transfers:  Sit to Stand: Stand By Assistance  Stand to Sit:Stand By Assistance    Ambulation:  Stand By Assistance  Distance: ~50ft, 40ft, 15ft, 12ft, 12ft  Surface: Level Tile  Device:Rolling Walker  Gait Deviations: Forward Flexed Posture, Slow Jaimee, Decreased Step Length Bilaterally and Decreased Gait Speed  Ambulation 2:  Contact Guard Assistance  Distance: 10ft, 20ft, 20ft  Surface: Level Tile  Device:Cane  Gait Deviations: Forward Flexed Posture, Slow Jaimee, Decreased Step Length Bilaterally, Decreased Weight Shift Left, Decreased Gait Speed, Decreased Heel Strike Bilaterally and Mild Path Deviations    **adjusted cane height after first bout  **patient reported slight dizziness due to vertigo during cone weaving task    Balance:  Dynamic Standing Balance: Stand By Assistance      -standing LE exercises, with 2# ankle weights applied, required seated rest break between each exercise following completion of 10 reps. Exercise:  Patient was guided in 1 set(s) 15 reps of exercise to both lower extremities. Seated marches, Seated hamstring curls, Seated heel/toe raises and Long arc quads 1x15 reps, standing heel/toe raises, marches, hip flex/abd kicks 1x10 reps, all with 2# ankle weights applied. Nustep using BUE/BLE for 5min. Exercises were completed for increased independence with functional mobility. Functional Outcome Measures: Not completed       ASSESSMENT:  Assessment: Patient progressing toward established goals. Activity Tolerance:  Patient tolerance of  treatment: good.       Equipment Recommendations:Equipment Needed:  (Continue to assess pending progress; may require  RW)  Discharge Recommendations: Continue to assess pending progress and Home with Home Health PT  Plan: Times per week: 5x/wk 90 min; 1x/wk 30 min  Times per day: Daily  Current Treatment Recommendations: Strengthening,Neuromuscular Re-education,Home Exercise Program,ROM,Safety Education & Training,Balance Training,Endurance Training,Functional Mobility Training,Wheelchair Mobility Training,Transfer Training,Gait Training,Stair training,Patient/Caregiver Education & Training    Patient Education  Patient Education: Plan of Care, Equipment Education, Transfers, Gait, Up in Chair for Nisha Ortiz, Verbal Exercise Instruction    Goals:  Patient goals : To be independent  Short term goals  Time Frame for Short term goals: 1 week  Short term goal 1: Patient to perform supine<>sit with Supervision without railings in order to assist with getting into and out of bed. Short term goal 2: Patient to perform sit to stand transfers with SBA with <=1 cue for hand placement in order to assist with safety with initiation of ambulation. Short term goal 3: Patient to ambulate >=75 feet with RW with Supervision in order to assist with home mobility. Short term goal 4: Patient to perform car transfer with Supervision in order to assist with getting into and out of car. Short term goal 5: Patient to perform TUG in <=26 seconds in order to assist with decreased risk for falls. Short term goal 6: Patient to ascend/descend 2 steps with R handrail with SBA in order to assist with getting into and out of condo. Long term goals  Time Frame for Long term goals : 2 weeks  Long term goal 1: Patient to perform supine<>sit with Mod I in order to assist with getting into and out of bed. Long term goal 2: Patient to perform sit to stand transfers Mod I in order to assist with standing and sitting from various surfaces. Long term goal 3: Patient to perform car transfer Mod I in order to assist with getting to and  from appointments. Long term goal 4: Patient to ambulate over level surfaces >=150 feet with LRAD with Mod I in order to assist with home mobility. Long term goal 5: Patient to ambulate over uneven surface with Mod I in order to assist with safety in community.   Long term goal 6: Patient to ascend/descend 2 steps with R handrail with Mod I in order to assist with getting into and out of condo. Following session, patient left in safe position with all fall risk precautions in place.

## 2022-03-24 NOTE — PROGRESS NOTES
Patient: Shawn Judd  Unit/Bed: 7E-65/065-A  YOB: 1935  MRN: [id number] Acct: [de-identified]   Admitting Diagnosis: Pseudogout of left knee [M11.262]  Admit Date:  3/17/2022  Hospital Day: 7    Assessment:     Principal Problem:    Pseudogout of left knee  Active Problems:    Essential hypertension    Osteoarthritis    Hypothyroidism    Bronchiectasis without acute exacerbation ([facility] [address]  Resolved Problems:    * No resolved hospital problems. *      Plan:     Continue to follow        Subjective:     Patient has no complaint of CP or SOB over baseline. .   Medication side effects: none    Scheduled Meds:   acetaminophen  500 mg Oral Q6H    apixaban  5 mg Oral BID    atorvastatin  10 mg Oral Nightly    budesonide  0.5 mg Nebulization BID    colchicine  0.6 mg Oral BID    levothyroxine  88 mcg Oral Daily    metoprolol tartrate  12.5 mg Oral BID     Continuous Infusions:  PRN Meds:docusate sodium, ondansetron, promethazine, loperamide, polyethylene glycol, HYDROcodone-acetaminophen, magnesium hydroxide, cyclobenzaprine    Review of Systems  Pertinent items are noted in HPI. Objective:     Patient Vitals for the past 8 hrs:   BP Temp Temp src Pulse Resp SpO2   03/24/22 0844 (!) 119/57 98.1 °F (36.7 °C) Oral 86 16 --   03/24/22 0732 -- -- -- -- 16 98 %     I/O last 3 completed shifts: In: 580 [P.O.:580]  Out: -   I/O this shift:   In: 550 [P.O.:550]  Out: -     BP (!) 119/57   Pulse 86   Temp 98.1 °F (36.7 °C) (Oral)   Resp 16   Ht 5' 4.02\" (1.626 m)   Wt 159 lb 11.2 oz (72.4 kg)   SpO2 98%   BMI 27.40 kg/m²     General appearance: alert, appears stated age and cooperative  Head: Normocephalic, without obvious abnormality, atraumatic  Lungs: clear to auscultation bilaterally  Heart: regular rate and rhythm, S1, S2 normal, no murmur, click, rub or gallop  Abdomen: soft, non-tender; bowel sounds normal; no masses,  no organomegaly  Extremities: extremities normal, atraumatic, no cyanosis or edema  Skin: Skin color, texture, turgor normal. No rashes or lesions  Neurologic: weak    Electronically signed by Liset Bennett MD on 3/24/2022 at 12:06 PM

## 2022-03-24 NOTE — PROGRESS NOTES
[address] [place] [address]  Occupational Therapy  Daily Note  Time:   Time In: 0900  Time Out: 1000  Timed Code Treatment Minutes: 60 Minutes  Minutes: 60          Date: 3/24/2022  Patient Name: Shaw Ashley,   Gender: female      Room: [facility]65/065-A  MRN: [id number]  : 1935  (80 y.o.)  Referring Practitioner: Dr. Chris Savage  Diagnosis: Pseudogout of left knee  Additional Pertinent Hx: Per H&P Shannan Iglesias  is a 80 y.o. female admitted to the inpatient rehabilitation unit on 3/17/2022. She was originally admitted to [address] [place] [address] on 3/15/2022 with a past medical history of advanced osteoarthritis, idiopathic pulmonary fibrosis on 2L of home oxygen, bronchiectasis, hypertension, hyperlipidemia, and secondary hyperparathyroidism. She  presented to ED for evaluation of left knee pain x previous 2 days. Pain at that time was at 10/10. She could not ambulate, and was unable to lift left lower extremity due to severe pain. Patient had left SI joint injection on 2022. She reported no fever or chills. No chest pain. No abdominal pain. No diarrhea. No urinary symptoms. She had prior bilateral total knee replacements, the left one completed in  and the right in . She noticed slightly increased swelling to left knee. Denied recent left knee trauma or injury. No change of bowl and bladder functions. She lives at an assisted living. At baseline she walks without assistance. In the setting of a total knee arthroplasty with knee pain and effusion, her knee was aspirated in the ED for 30 ml of yellow cloudy fluid. Her aspiration showed WBC in the joint fluid. She also had calcium pyrophosphate crystals. White count was 23,350 with 90% polys. She denied any fever or chills and had noprevious history of infection. She was diagnosed with pseudogout, started on Toradol for 2 days then transitioned to Naproxen 250 mg bid.   Colchicine added at 0.6 mg bid. Was on Dilaudid 1mg q 4 hours IV prn, and Flexeril 10 mg po tid prn. Also on Norco 5/325 one po q 8 hours prn. She also has a pin point area of skin irritation on her coccyx. Restrictions/Precautions:  Restrictions/Precautions: Fall Risk,Weight Bearing  Left Lower Extremity Weight Bearing: Weight Bearing As Tolerated  Position Activity Restriction  Other position/activity restrictions: 2L O2, hx of vertigo     SUBJECTIVE: Patient seated in bedside chair upon arrival. Agreeable to OT session    PAIN: Denies    Vitals: Oxygen:      COGNITION: WFL    ADL:   Grooming: Stand By Assistance. Standing sinkside for oral care and hair care  Bathing: Stand By Assistance. Standing in shower to wash bottom/tremayne area. Able to wash remaining areas seated on shower chair with supervision  Upper Extremity Dressing: with set-up. To doff robe and don bra and sweater in seated position  Lower Extremity Dressing: Stand By Assistance. Standing to pull up pants/underwear over hips with 1-2 UE release from walker. Able to thread LEs into pants/underwear with supervision  Toileting: Stand By Assistance. For clothing management and hygiene  Toilet Transfer: Stand By Assistance. To/from RTS  Shower Transfer: Stand By Assistance. To/from shower chair. Patient fatigues quickly and becomes SOB with activity, requiring occasional lengthy rest breaks throughout. Demonstrates good use of deep breathing. Education provided to patient on use of ECT during BADL routine including using shower chair, showering in well ventilated bathroom, using LHAE if needed, etc. Patient verbalized understanding    BALANCE:  Sitting Balance:  Supervision. Standing Balance: Stand By Assistance. BED MOBILITY:  Not Tested    TRANSFERS:  Sit to Stand:  Stand By Assistance. From various surfaces  Stand to Sit: Stand By Assistance.  To various surfaces    FUNCTIONAL MOBILITY:  Assistive Device: Rolling Walker  Assist Level:  Stand By Assistance. Distance: To and from bathroom and To and from shower room   Patient required 2 verbal cues for management of O2 tubing. ADDITIONAL ACTIVITY:   Patient completed BUE strengthening exercises with skilled education on HEP: completed x15 reps x1 set with a minimal resistance band in all joints and all planes in order to improve UE strength and activity tolerance required for BADL routine and toilet / shower transfers. Patient tolerated fair, requiring rest breaks. Patient also required cues for technique. ASSESSMENT:     Activity Tolerance:  Patient tolerance of  treatment: fair. Discharge Recommendations: Home with Home Health OT  Equipment Recommendations: Other: Pt has a shower seat, grab bars, hand held shower, ADA height toilet and half wall nearby. Plan: Times per week: 5x/wk for 90 min and 1x/wk for 30 min  Current Treatment Recommendations: Strengthening,Functional Mobility Training,Balance Training,Endurance Training,Patient/Caregiver Education & Training,Equipment Evaluation, Education, & procurement,Safety Education & Training,Self-Care / ADL,Home Management Training    Patient Education  Patient Education: safety with transfers and mobility, ADL strategies, ECT, HEP     Goals  Short term goals  Time Frame for Short term goals: 1 week  Short term goal 1: Pt will complete BUE strengthening exercises with min vcs for technique to increase indep and endurance with all self cares. Short term goal 2: Pt will complete standing tolerance x 5 minutes with 1-2 UE release and SBA to increase indep with sinkside grooming. Short term goal 3: Pt will complete functional mobiity to/from BR with SBA and 0 vcs for safety to increase indep with toileting. Short term goal 4: Pt will complete LB dressing with LHAE PRN and SBA to increase indep and endurance within home environment.   Long term goals  Time Frame for Long term goals : 2 weeks  Long term goal 1: Pt will complete BADL routine with mod I using LHAE prn to return to PLOF. Long term goal 2: Pt will complete light IADL tasks with mod I using ECT prn to improve indpe within her condo. Following session, patient left in safe position with all fall risk precautions in place.

## 2022-03-25 PROCEDURE — 97530 THERAPEUTIC ACTIVITIES: CPT

## 2022-03-25 PROCEDURE — 94760 N-INVAS EAR/PLS OXIMETRY 1: CPT

## 2022-03-25 PROCEDURE — 6370000000 HC RX 637 (ALT 250 FOR IP): Performed by: PHYSICAL MEDICINE & REHABILITATION

## 2022-03-25 PROCEDURE — 97110 THERAPEUTIC EXERCISES: CPT

## 2022-03-25 PROCEDURE — 97116 GAIT TRAINING THERAPY: CPT

## 2022-03-25 PROCEDURE — 1180000000 HC REHAB R&B

## 2022-03-25 PROCEDURE — 94640 AIRWAY INHALATION TREATMENT: CPT

## 2022-03-25 PROCEDURE — 99231 SBSQ HOSP IP/OBS SF/LOW 25: CPT | Performed by: NURSE PRACTITIONER

## 2022-03-25 PROCEDURE — 6360000002 HC RX W HCPCS: Performed by: PHYSICAL MEDICINE & REHABILITATION

## 2022-03-25 PROCEDURE — 2700000000 HC OXYGEN THERAPY PER DAY

## 2022-03-25 PROCEDURE — 6370000000 HC RX 637 (ALT 250 FOR IP): Performed by: NURSE PRACTITIONER

## 2022-03-25 RX ADMIN — LEVOTHYROXINE SODIUM 88 MCG: 0.09 TABLET ORAL at 05:54

## 2022-03-25 RX ADMIN — METOPROLOL TARTRATE 12.5 MG: 25 TABLET, FILM COATED ORAL at 20:16

## 2022-03-25 RX ADMIN — HYDROCODONE BITARTRATE AND ACETAMINOPHEN 0.5 TABLET: 5; 325 TABLET ORAL at 01:36

## 2022-03-25 RX ADMIN — BUDESONIDE 500 MCG: 0.5 INHALANT RESPIRATORY (INHALATION) at 17:40

## 2022-03-25 RX ADMIN — COLCHICINE 0.6 MG: 0.6 TABLET, FILM COATED ORAL at 08:54

## 2022-03-25 RX ADMIN — APIXABAN 5 MG: 5 TABLET, FILM COATED ORAL at 08:54

## 2022-03-25 RX ADMIN — ACETAMINOPHEN 500 MG: 500 TABLET ORAL at 03:57

## 2022-03-25 RX ADMIN — COLCHICINE 0.6 MG: 0.6 TABLET, FILM COATED ORAL at 20:16

## 2022-03-25 RX ADMIN — ATORVASTATIN CALCIUM 10 MG: 10 TABLET, FILM COATED ORAL at 20:16

## 2022-03-25 RX ADMIN — ACETAMINOPHEN 500 MG: 500 TABLET ORAL at 08:53

## 2022-03-25 RX ADMIN — APIXABAN 5 MG: 5 TABLET, FILM COATED ORAL at 20:16

## 2022-03-25 RX ADMIN — BUDESONIDE 500 MCG: 0.5 INHALANT RESPIRATORY (INHALATION) at 07:17

## 2022-03-25 RX ADMIN — METOPROLOL TARTRATE 12.5 MG: 25 TABLET, FILM COATED ORAL at 08:54

## 2022-03-25 ASSESSMENT — PAIN SCALES - GENERAL
PAINLEVEL_OUTOF10: 0
PAINLEVEL_OUTOF10: 0
PAINLEVEL_OUTOF10: 7
PAINLEVEL_OUTOF10: 5
PAINLEVEL_OUTOF10: 6
PAINLEVEL_OUTOF10: 6
PAINLEVEL_OUTOF10: 1

## 2022-03-25 ASSESSMENT — PAIN DESCRIPTION - ORIENTATION
ORIENTATION: LEFT

## 2022-03-25 ASSESSMENT — PAIN DESCRIPTION - LOCATION
LOCATION: KNEE

## 2022-03-25 ASSESSMENT — PAIN DESCRIPTION - PROGRESSION: CLINICAL_PROGRESSION: NOT CHANGED

## 2022-03-25 ASSESSMENT — PAIN DESCRIPTION - DESCRIPTORS
DESCRIPTORS: ACHING;NAGGING
DESCRIPTORS: OTHER (COMMENT)
DESCRIPTORS: ACHING;DISCOMFORT;NAGGING;PENETRATING

## 2022-03-25 ASSESSMENT — PAIN DESCRIPTION - FREQUENCY
FREQUENCY: CONTINUOUS
FREQUENCY: INTERMITTENT
FREQUENCY: INTERMITTENT

## 2022-03-25 ASSESSMENT — PAIN DESCRIPTION - PAIN TYPE
TYPE: ACUTE PAIN

## 2022-03-25 ASSESSMENT — PAIN DESCRIPTION - ONSET: ONSET: ON-GOING

## 2022-03-25 ASSESSMENT — PAIN - FUNCTIONAL ASSESSMENT: PAIN_FUNCTIONAL_ASSESSMENT: ACTIVITIES ARE NOT PREVENTED

## 2022-03-25 NOTE — PLAN OF CARE
Problem: Skin Integrity:  Goal: Absence of new skin breakdown  Description: Absence of new skin breakdown  Outcome: Ongoing  Note: No new skin issues noted this shift. Problem: Falls - Risk of:  Goal: Will remain free from falls  Description: Will remain free from falls  Outcome: Ongoing  Note: No falls sustained at this time. Patient alert to call light and uses appropriately to alert staff to needs. Bed/chair alarms in use. Problem: Pain:  Goal: Control of acute pain  Description: Control of acute pain  Outcome: Ongoing  Note: C/o intermittent left knee Pain. PRN Norco and routine Tylenol given. After repositioning, elevation and rest not effective for comfort . Care plan reviewed with patient. Patient verbalize understanding of the plan of care and contribute to goal setting.

## 2022-03-25 NOTE — PLAN OF CARE
Problem: Skin Integrity:  Goal: Will show no infection signs and symptoms  Description: Will show no infection signs and symptoms  Outcome: Ongoing  Skin assessment every shift. No new areas of skin breakdown. Problem: Falls - Risk of:  Goal: Will remain free from falls  Description: Will remain free from falls  3/25/2022 1306 by Sinan Reyes LPN  Outcome: Ongoing  Pt will remain free of falls. Pt is 1 assist with walker and gait belt. Problem: Pain:  Goal: Pain level will decrease  Description: Pain level will decrease  Outcome: Ongoing  Reposition frequently to alleviate pain. Pt has routine Tylenol to treat the pain, pt has order for 1/2 of norco, pt does not want to take this medication.

## 2022-03-25 NOTE — PROGRESS NOTES
[facility]  INPATIENT PHYSICAL THERAPY  DAILY NOTE  [facility] - 7E-65/065-A    Time In: 1130  Time Out: 1230  Timed Code Treatment Minutes: 60 Minutes  Minutes: 60          Date: 3/25/2022  Patient Name: Dayanara Linder,  Gender:  female        MRN: [id number]  : 1935  (80 y.o.)     Referring Practitioner: Monica Cramer MD  Diagnosis: Pseudogout of left knee  Additional Pertinent Hx: Per H&P Raegan Vargas  is a 80 y.o. female admitted to the inpatient rehabilitation unit on 3/17/2022. She was originally admitted to [facility] on 3/15/2022 with a past medical history of advanced osteoarthritis, idiopathic pulmonary fibrosis on 2L of home oxygen, bronchiectasis, hypertension, hyperlipidemia, and secondary hyperparathyroidism. She  presented to ED for evaluation of left knee pain x previous 2 days. Pain at that time was at 10/10. She could not ambulate, and was unable to lift left lower extremity due to severe pain. Patient had left SI joint injection on 2022. She reported no fever or chills. No chest pain. No abdominal pain. No diarrhea. No urinary symptoms. She had prior bilateral total knee replacements, the left one completed in  and the right in . She noticed slightly increased swelling to left knee. Denied recent left knee trauma or injury. No change of bowl and bladder functions. She lives at an assisted living. At baseline she walks without assistance. In the setting of a total knee arthroplasty with knee pain and effusion, her knee was aspirated in the ED for 30 ml of yellow cloudy fluid. Her aspiration showed WBC in the joint fluid. She also had calcium pyrophosphate crystals. White count was 23,350 with 90% polys. She denied any fever or chills and had noprevious history of infection. She was diagnosed with pseudogout, started on Toradol for 2 days then transitioned to Naproxen 250 mg bid.   Colchicine added at 0.6 mg bid. Was on Dilaudid 1mg q 4 hours IV prn, and Flexeril 10 mg po tid prn. Also on Norco 5/325 one po q 8 hours prn. She also has a pin point area of skin irritation on her coccyx. Prior Level of Function:  Lives With: Alone  Type of Home:  ([facility])  Home Layout: One level  Home Access: Stairs to enter with rails  Entrance Stairs - Number of Steps: 2  Entrance Stairs - Rails: Right  Home Equipment: Cane,Oxygen   Bathroom Shower/Tub: Walk-in shower  Bathroom Toilet: Handicap height  Bathroom Equipment: Grab bars in shower,Tub transfer bench  Bathroom Accessibility: Accessible    Receives Help From: Friend(s)  ADL Assistance: [facility] [address]: Independent  Homemaking Responsibilities: Yes  Ambulation Assistance: Independent  Transfer Assistance: Independent  Active : Yes  Additional Comments: Patient reports she was at [employer] for about a week due to increased difficulty with mobiltiy however prior lived in a [facility] independently and would like to return to her condo upon discharge. Patient reports she is on 2L O2 at baseline. Patient reports she recently had to utilize a cane for ambulation due to increased complaints of pain in L knee however prior she did not use any AD for mobility. Patient reported she did not have to ambulate far distances. Patient reports she has two sons however one is in [facility] and one is in [place]    Restrictions/Precautions:  Restrictions/Precautions: Fall Risk,Weight Bearing  Left Lower Extremity Weight Bearing: Weight Bearing As Tolerated  Position Activity Restriction  Other position/activity restrictions: 2L O2, hx of vertigo     SUBJECTIVE: Patient in therapy gym upon arrival, just finished up with OT session right before, agreed and cooperative for therapy. PAIN: No pain noted. Vitals: Oxygen: 90-95% while on 2L 02.      OBJECTIVE:  Bed Mobility:  Not Tested    Transfers:  Sit to Stand: Stand By Assistance  Stand to Sit:Stand By Assistance, cues for hand placement   Bed to Chair: Stand By Assistance, no AD, patient managing 02 tubing. Ambulation:  Stand By Assistance, with verbal cues   Distance: 30 ft. X2; 20 ft. x2   Surface: Level Tile  Device:Cane  Gait Deviations: Forward Flexed Posture, Slow Jaimee, Decreased Step Length Bilaterally, Decreased Weight Shift Left, Decreased Gait Speed, Decreased Heel Strike Bilaterally, Mild Path Deviations and Unsteady Gait  **Using Cane, patient weaved around cones and managed 02 tubing to simulate maneuvering around obstacles in home. Tolerated well but did require verbal cues for sequencing of cane occasionally and watching location of 02 tubing, completed 2 trials. Stairs:  Platform:  6\" platform X 1 (x2 trials) using both hands on R acending parallel bar. Had patient hold onto 1# wt in left hand to simulate holding portable 02 when entering/exiting home. and Contact Guard Assistance. Fatigued quickly and required seated rest break following each trial.     Balance:  Dynamic Standing Balance: Stand By Assistance, Contact Guard Assistance    Exercise:  Patient was guided in 1 set(s) 15 reps of exercise to both lower extremities. Seated marches (1# on LLE and 2# on RLE), Seated hamstring curls with orange band, Seated heel/toe raises, Long arc quads (1# on LLE and 2# on RLE), Seated isometric hip adduction and Seated abduction/adduction with orange band. Exercises were completed for increased independence with functional mobility. Functional Outcome Measures: Not completed       ASSESSMENT:  Assessment: Patient progressing toward established goals. Activity Tolerance:  Patient tolerance of  treatment: good.       Equipment Recommendations:Equipment Needed:  (Continue to assess pending progress; may require  RW)  Discharge Recommendations: Home with Home Health PT  Plan: Times per week: 5x/wk 90 min; 1x/wk 30 min  Times per day: Daily  Current Treatment Recommendations: Strengthening,Neuromuscular Re-education,Home Exercise Program,ROM,Safety Education & Training,Balance Training,Endurance Training,Functional Mobility Training,Wheelchair Mobility Training,Transfer Training,Gait Training,Stair training,Patient/Caregiver Education & Training    Patient Education  Patient Education: Plan of Care, Precautions/Restrictions, Transfers, Reviewed Prior Education, Gait, Stairs, [employer] and Wellness Education, Home Safety Education, Energy Conservation, Pursed Lip Breathing, - Patient Requires Continued Education    Goals:  Patient goals : To be independent  Short term goals  Time Frame for Short term goals: 1 week  Short term goal 1: Patient to perform supine<>sit with Supervision without railings in order to assist with getting into and out of bed. Short term goal 2: Patient to perform sit to stand transfers with SBA with <=1 cue for hand placement in order to assist with safety with initiation of ambulation. Short term goal 3: Patient to ambulate >=75 feet with RW with Supervision in order to assist with home mobility. Short term goal 4: Patient to perform car transfer with Supervision in order to assist with getting into and out of car. Short term goal 5: Patient to perform TUG in <=26 seconds in order to assist with decreased risk for falls. Short term goal 6: Patient to ascend/descend 2 steps with R handrail with SBA in order to assist with getting into and out of condo. Long term goals  Time Frame for Long term goals : 2 weeks  Long term goal 1: Patient to perform supine<>sit with Mod I in order to assist with getting into and out of bed. Long term goal 2: Patient to perform sit to stand transfers Mod I in order to assist with standing and sitting from various surfaces. Long term goal 3: Patient to perform car transfer Mod I in order to assist with getting to and  from appointments.   Long term goal 4: Patient to ambulate over level surfaces >=150 feet with LRAD with Mod I in order to assist with home mobility. Long term goal 5: Patient to ambulate over uneven surface with Mod I in order to assist with safety in community. Long term goal 6: Patient to ascend/descend 2 steps with R handrail with Mod I in order to assist with getting into and out of condo. Following session, patient left in safe position with all fall risk precautions in place.

## 2022-03-25 NOTE — PLAN OF CARE
Problem: DISCHARGE BARRIERS  Goal: Patient's continuum of care needs are met  Note: SW contacted patient's son, Hebert Fabry, at [phone], to further discuss discharge planning. No answer, left message requesting returned phone call. SW to follow and maintain involvement in discharge planning.

## 2022-03-25 NOTE — PROGRESS NOTES
[address] [place] [address]  INPATIENT PHYSICAL THERAPY  DAILY NOTE  [facility] - [id number]    Time In: 0800  Time Out: 0830  Timed Code Treatment Minutes: 30 Minutes  Minutes: 30          Date: 3/25/2022  Patient Name: Nirmal Velez,  Gender:  female        MRN: [id number]  : 1935  (80 y.o.)     Referring Practitioner: Johann Galeazzi, MD  Diagnosis: Pseudogout of left knee  Additional Pertinent Hx: Per H&P Manju Montiel  is a 80 y.o. female admitted to the inpatient rehabilitation unit on 3/17/2022. She was originally admitted to [address] [place] [address] on 3/15/2022 with a past medical history of advanced osteoarthritis, idiopathic pulmonary fibrosis on 2L of home oxygen, bronchiectasis, hypertension, hyperlipidemia, and secondary hyperparathyroidism. She  presented to ED for evaluation of left knee pain x previous 2 days. Pain at that time was at 10/10. She could not ambulate, and was unable to lift left lower extremity due to severe pain. Patient had left SI joint injection on 2022. She reported no fever or chills. No chest pain. No abdominal pain. No diarrhea. No urinary symptoms. She had prior bilateral total knee replacements, the left one completed in  and the right in . She noticed slightly increased swelling to left knee. Denied recent left knee trauma or injury. No change of bowl and bladder functions. She lives at an assisted living. At baseline she walks without assistance. In the setting of a total knee arthroplasty with knee pain and effusion, her knee was aspirated in the ED for 30 ml of yellow cloudy fluid. Her aspiration showed WBC in the joint fluid. She also had calcium pyrophosphate crystals. White count was 23,350 with 90% polys. She denied any fever or chills and had noprevious history of infection. She was diagnosed with pseudogout, started on Toradol for 2 days then transitioned to Naproxen 250 mg bid.   Colchicine added at 0.6 mg bid. Was on Dilaudid 1mg q 4 hours IV prn, and Flexeril 10 mg po tid prn. Also on Norco 5/325 one po q 8 hours prn. She also has a pin point area of skin irritation on her coccyx. Prior Level of Function:  Lives With: Alone  Type of Home:  ([facility])  Home Layout: One level  Home Access: Stairs to enter with rails  Entrance Stairs - Number of Steps: 2  Entrance Stairs - Rails: Right  Home Equipment: Cane,Oxygen   Bathroom Shower/Tub: Walk-in shower  Bathroom Toilet: Handicap height  Bathroom Equipment: Grab bars in shower,Tub transfer bench  Bathroom Accessibility: Accessible    Receives Help From: Friend(s)  ADL Assistance: Kerrie: Independent  Homemaking Responsibilities: Yes  Ambulation Assistance: Independent  Transfer Assistance: Independent  Active : Yes  Additional Comments: Patient reports she was at [facility] [place] for about a week due to increased difficulty with mobiltiy however prior lived in a [facility] independently and would like to return to her condo upon discharge. Patient reports she is on 2L O2 at baseline. Patient reports she recently had to utilize a cane for ambulation due to increased complaints of pain in L knee however prior she did not use any AD for mobility. Patient reported she did not have to ambulate far distances. Patient reports she has two sons however one is in [facility] and one is in [place]    Restrictions/Precautions:  Restrictions/Precautions: Fall Risk,Weight Bearing  Left Lower Extremity Weight Bearing: Weight Bearing As Tolerated  Position Activity Restriction  Other position/activity restrictions: 2L O2, hx of vertigo     SUBJECTIVE: Patient seated in recliner upon arrival. Patient is pleasant and agreeable to therapy.     PAIN:Patient initially reported 0/10 pain and at end of session reported 4/10 pain in L knee    Vitals: Monitored O2 throughout session with patient O2 dropping to 87% with activity however able to return to >90% after several minute rest break    OBJECTIVE:  Bed Mobility:  Not Tested    Transfers:  Sit to Stand: Supervision  Stand to Sit:Supervision  To/From Bed and Chair: Stand By Assistance  -Patient performed from recliner to wheelchair without AD without unsteadiness    Ambulation:  Contact Guard Assistance  Distance: 10 feet x 2  Surface: Level Tile  Device:Cane  Gait Deviations: Forward Flexed Posture, Decreased Weight Shift Left, Decreased Gait Speed and Mild Path Deviations  -Patient instructed in weaving in and out of cones with cane with CGA to SBA. Patient required cueing for proper sequencing with cane and for slowed  Gait speed in order to assist with reduced unsteadiness. Patient instructed in weaving in and out of cones in order to assist with safety with obstacle negotiation and home mobility. Balance:  Dynamic Standing Balance: Contact Guard Assistance   -Patient instructed in standing unsupported and reaching outside BENJAMIN without UE support for rings. Patient demonstrated slight unsteadiness however able to self correct. Patient instructed in standing dynamic balance in order to assist with reduced risk for falls. Stairs:  Contact Guard Assistance  Number of Steps: 1 and 2 x 2  Height: 6\" step with One Handrail   -Patient required cueing for proper sequencing with ascending/descending step. Patient required increased time to complete. Patient demonstrated slight unsteadiness with stair negotiation. Functional Outcome Measures: Not completed       ASSESSMENT:  Assessment: Patient progressing toward established goals. Activity Tolerance:  Patient tolerance of  treatment: fair.  Limited due to fatigue     Equipment Recommendations:Equipment Needed:  (Continue to assess pending progress; may require  RW)  Discharge Recommendations: Home with Home Health PT  Plan: Times per week: 5x/wk 90 min; 1x/wk 30 min  Times per day: Daily  Current Treatment Recommendations: Strengthening,Neuromuscular Re-education,Home Exercise Program,ROM,Safety Education & Training,Balance Training,Endurance Training,Functional Mobility Training,Wheelchair Mobility Training,Transfer Training,Gait Training,Stair training,Patient/Caregiver Education & Training    Patient Education  Patient Education: Transfers, Gait, Stairs,  - Patient Verbalized Understanding, - Patient Requires Continued Education    Goals:  Patient goals : To be independent  Short term goals  Time Frame for Short term goals: 1 week  Short term goal 1: Patient to perform supine<>sit with Supervision without railings in order to assist with getting into and out of bed. Short term goal 2: Patient to perform sit to stand transfers with SBA with <=1 cue for hand placement in order to assist with safety with initiation of ambulation. Short term goal 3: Patient to ambulate >=75 feet with RW with Supervision in order to assist with home mobility. Short term goal 4: Patient to perform car transfer with Supervision in order to assist with getting into and out of car. Short term goal 5: Patient to perform TUG in <=26 seconds in order to assist with decreased risk for falls. Short term goal 6: Patient to ascend/descend 2 steps with R handrail with SBA in order to assist with getting into and out of condo. Long term goals  Time Frame for Long term goals : 2 weeks  Long term goal 1: Patient to perform supine<>sit with Mod I in order to assist with getting into and out of bed. Long term goal 2: Patient to perform sit to stand transfers Mod I in order to assist with standing and sitting from various surfaces. Long term goal 3: Patient to perform car transfer Mod I in order to assist with getting to and  from appointments. Long term goal 4: Patient to ambulate over level surfaces >=150 feet with LRAD with Mod I in order to assist with home mobility.   Long term goal 5: Patient to ambulate over uneven surface with Mod I in order to assist with safety in community. Long term goal 6: Patient to ascend/descend 2 steps with R handrail with Mod I in order to assist with getting into and out of condo. Following session, patient left in safe position with all fall risk precautions in place.

## 2022-03-25 NOTE — PROGRESS NOTES
[address]  Occupational Therapy  Daily Note  Time:   Time In: 1100  Time Out: 1130  Timed Code Treatment Minutes: 30 Minutes  Minutes: 30    Date: 3/25/2022  Patient Name: Richmond Mitchell,   Gender: female      Room: [facility]/065-A  MRN: [id number]  : 1935  (80 y.o.)  Referring Practitioner: Dr. Asad Singh  Diagnosis: Pseudogout of left knee  Additional Pertinent Hx: Per H&P Cordelia Gilbert  is a 80 y.o. female admitted to the inpatient rehabilitation unit on 3/17/2022. She was originally admitted to [facility] on 3/15/2022 with a past medical history of advanced osteoarthritis, idiopathic pulmonary fibrosis on 2L of home oxygen, bronchiectasis, hypertension, hyperlipidemia, and secondary hyperparathyroidism. She  presented to ED for evaluation of left knee pain x previous 2 days. Pain at that time was at 10/10. She could not ambulate, and was unable to lift left lower extremity due to severe pain. Patient had left SI joint injection on 2022. She reported no fever or chills. No chest pain. No abdominal pain. No diarrhea. No urinary symptoms. She had prior bilateral total knee replacements, the left one completed in  and the right in . She noticed slightly increased swelling to left knee. Denied recent left knee trauma or injury. No change of bowl and bladder functions. She lives at an assisted living. At baseline she walks without assistance. In the setting of a total knee arthroplasty with knee pain and effusion, her knee was aspirated in the ED for 30 ml of yellow cloudy fluid. Her aspiration showed WBC in the joint fluid. She also had calcium pyrophosphate crystals. White count was 23,350 with 90% polys. She denied any fever or chills and had noprevious history of infection. She was diagnosed with pseudogout, started on Toradol for 2 days then transitioned to Naproxen 250 mg bid.   Colchicine added at 0.6 mg bid.  Was on Dilaudid 1mg q 4 hours IV prn, and Flexeril 10 mg po tid prn. Also on Norco 5/325 one po q 8 hours prn. She also has a pin point area of skin irritation on her coccyx. Restrictions/Precautions:  Restrictions/Precautions: Fall Risk,Weight Bearing  Left Lower Extremity Weight Bearing: Weight Bearing As Tolerated  Position Activity Restriction  Other position/activity restrictions: 2L O2, hx of vertigo     SUBJECTIVE: Patient pleasant and cooperative. Agreeable to OT. PAIN: Denies pain      Vitals: Vitals not assessed per clinical judgement, see nursing flowsheet    COGNITION: WFL    ADL:   Grooming: Supervision. hand hygiene  Toileting: Supervision. Toilet Transfer: Supervision. Uma Bess BALANCE:  Sitting Balance:  Modified Independent. Standing Balance: Supervision. BED MOBILITY:  Not Tested    TRANSFERS:  Sit to Stand:  Supervision. recliner, ETS  Stand to Sit: Supervision. FUNCTIONAL MOBILITY:  Assistive Device: Rolling Walker  Assist Level:  Stand By Assistance. Distance: To and from bathroom     ADDITIONAL ACTIVITIES:  Patient completed community re-entry task of retrieving coffee from [place], discussed energy conservation techniques and adaptive strategies. Pt was able to also voice her own strategies she uses. Deedee good insight. ASSESSMENT:     Activity Tolerance:  Patient tolerance of  treatment: good. Discharge Recommendations: Home with Home Health OT  Equipment Recommendations: Other: Pt has a shower seat, grab bars, hand held shower, ADA height toilet and half wall nearby.   Plan: Times per week: 5x/wk for 90 min and 1x/wk for 30 min  Current Treatment Recommendations: Strengthening,Functional Mobility Training,Balance Training,Endurance Training,Patient/Caregiver Education & Training,Equipment Evaluation, Education, & procurement,Safety Education & Training,Self-Care / ADL,Home Management Training    Patient Education  Patient Education: IADL's    Goals  Short term goals  Time Frame for Short term goals: 1 week  Short term goal 1: Pt will complete BUE strengthening exercises with min vcs for technique to increase indep and endurance with all self cares. Short term goal 2: Pt will complete standing tolerance x 5 minutes with 1-2 UE release and SBA to increase indep with sinkside grooming. Short term goal 3: Pt will complete functional mobiity to/from BR with SBA and 0 vcs for safety to increase indep with toileting. Short term goal 4: Pt will complete LB dressing with LHAE PRN and SBA to increase indep and endurance within home environment. Long term goals  Time Frame for Long term goals : 2 weeks  Long term goal 1: Pt will complete BADL routine with mod I using LHAE prn to return to PLOF. Long term goal 2: Pt will complete light IADL tasks with mod I using ECT prn to improve indpe within her condo. Following session, patient left in safe position with all fall risk precautions in place.

## 2022-03-25 NOTE — PROGRESS NOTES
[address]  Occupational Therapy  Daily Note  Time:   Time In: 1430  Time Out: 1500  Timed Code Treatment Minutes: 30 Minutes  Minutes: 30    Date: 3/25/2022  Patient Name: Hesham Torres,   Gender: female      Room: -65/065-A  MRN: [id number]  : 1935  (80 y.o.)  Referring Practitioner: Dr. Kathryn Manley  Diagnosis: Pseudogout of left knee  Additional Pertinent Hx: Per H&P Shruthi Spears  is a 80 y.o. female admitted to the inpatient rehabilitation unit on 3/17/2022. She was originally admitted to [facility] on 3/15/2022 with a past medical history of advanced osteoarthritis, idiopathic pulmonary fibrosis on 2L of home oxygen, bronchiectasis, hypertension, hyperlipidemia, and secondary hyperparathyroidism. She  presented to ED for evaluation of left knee pain x previous 2 days. Pain at that time was at 10/10. She could not ambulate, and was unable to lift left lower extremity due to severe pain. Patient had left SI joint injection on 2022. She reported no fever or chills. No chest pain. No abdominal pain. No diarrhea. No urinary symptoms. She had prior bilateral total knee replacements, the left one completed in  and the right in . She noticed slightly increased swelling to left knee. Denied recent left knee trauma or injury. No change of bowl and bladder functions. She lives at an assisted living. At baseline she walks without assistance. In the setting of a total knee arthroplasty with knee pain and effusion, her knee was aspirated in the ED for 30 ml of yellow cloudy fluid. Her aspiration showed WBC in the joint fluid. She also had calcium pyrophosphate crystals. White count was 23,350 with 90% polys. She denied any fever or chills and had noprevious history of infection. She was diagnosed with pseudogout, started on Toradol for 2 days then transitioned to Naproxen 250 mg bid.   Colchicine added at 0.6 mg bid.  Was on Dilaudid 1mg q 4 hours IV prn, and Flexeril 10 mg po tid prn. Also on Norco 5/325 one po q 8 hours prn. She also has a pin point area of skin irritation on her coccyx. Restrictions/Precautions:  Restrictions/Precautions: Fall Risk,Weight Bearing  Left Lower Extremity Weight Bearing: Weight Bearing As Tolerated  Position Activity Restriction  Other position/activity restrictions: 2L O2, hx of vertigo     SUBJECTIVE: Patient pleasant and cooperative. Agreeable to OT. PAIN: Denies pain      Vitals: Vitals not assessed per clinical judgement, see nursing flowsheet    COGNITION: WFL    ADL:   Grooming: Supervision. hand hygiene x1 min. Toileting: Supervision. Toilet Transfer: Supervision - ETS     BALANCE:  Sitting Balance:  Modified Independent. Standing Balance: Supervision. BED MOBILITY:  Not Tested    TRANSFERS:  Sit to Stand:  Supervision. recliner, ETS  Stand to Sit: Supervision. FUNCTIONAL MOBILITY:  Assistive Device: Rolling Walker  Assist Level:  Stand By Assistance. Distance: To and from bathroom     ADDITIONAL ACTIVITIES:  Patient completed BUE strengthening exercises with skilled education on HEP: completed x15 reps x1 set with a medium resistive technique in all joints and all planes in order to improve UE strength and activity tolerance required for BADL routine and toilet / shower transfers. Patient tolerated well, requiring min rest breaks. Patient also required min cues for technique. Handout provided. Also provided handout with ECT with skilled education in regards to ADL / IADL. ASSESSMENT:     Activity Tolerance:  Patient tolerance of  treatment: good. Discharge Recommendations: Home with Home Health OT  Equipment Recommendations: Other: Pt has a shower seat, grab bars, hand held shower, ADA height toilet and half wall nearby.   Plan: Times per week: 5x/wk for 90 min and 1x/wk for 30 min  Current Treatment Recommendations: Strengthening,Functional Mobility Training,Balance Training,Endurance Training,Patient/Caregiver Education & Training,Equipment Evaluation, Education, & procurement,Safety Education & Training,Self-Care / ADL,Home Management Training    Patient Education  Patient Education: IADL's    Goals  Short term goals  Time Frame for Short term goals: 1 week  Short term goal 1: Pt will complete BUE strengthening exercises with min vcs for technique to increase indep and endurance with all self cares. Short term goal 2: Pt will complete standing tolerance x 5 minutes with 1-2 UE release and SBA to increase indep with sinkside grooming. Short term goal 3: Pt will complete functional mobiity to/from BR with SBA and 0 vcs for safety to increase indep with toileting. Short term goal 4: Pt will complete LB dressing with LHAE PRN and SBA to increase indep and endurance within home environment. Long term goals  Time Frame for Long term goals : 2 weeks  Long term goal 1: Pt will complete BADL routine with mod I using LHAE prn to return to PLOF. Long term goal 2: Pt will complete light IADL tasks with mod I using ECT prn to improve indpe within her condo. Following session, patient left in safe position with all fall risk precautions in place.

## 2022-03-25 NOTE — PROGRESS NOTES
Patient: Sukhi Ramsey  Unit/Bed: 7E-65/065-A  YOB: 1935  MRN: [id number] Acct: [de-identified]   Admitting Diagnosis: Pseudogout of left knee [M11.262]  Admit Date:  3/17/2022  Hospital Day: 8    Assessment:     Principal Problem:    Pseudogout of left knee  Active Problems:    Essential hypertension    Osteoarthritis    Hypothyroidism    Bronchiectasis without acute exacerbation ([facility] [address]  Resolved Problems:    * No resolved hospital problems. *      Plan:     Continue to follow        Subjective:     Patient has no complaint of CP or SOB out of her normal..   Medication side effects: none    Scheduled Meds:   acetaminophen  500 mg Oral Q6H    apixaban  5 mg Oral BID    atorvastatin  10 mg Oral Nightly    budesonide  0.5 mg Nebulization BID    colchicine  0.6 mg Oral BID    levothyroxine  88 mcg Oral Daily    metoprolol tartrate  12.5 mg Oral BID     Continuous Infusions:  PRN Meds:docusate sodium, ondansetron, promethazine, loperamide, polyethylene glycol, HYDROcodone-acetaminophen, magnesium hydroxide, cyclobenzaprine    Review of Systems  Pertinent items are noted in HPI. Objective:     Patient Vitals for the past 8 hrs:   SpO2   03/25/22 1740 98 %     I/O last 3 completed shifts: In: 1410 [P.O.:1410]  Out: -   No intake/output data recorded.     /61   Pulse 78   Temp 98.2 °F (36.8 °C) (Oral)   Resp 16   Ht 5' 4.02\" (1.626 m)   Wt 159 lb 11.2 oz (72.4 kg)   SpO2 98%   BMI 27.40 kg/m²     General appearance: alert, appears stated age and cooperative  Head: Normocephalic, without obvious abnormality, atraumatic  Lungs: clear to auscultation bilaterally  Heart: regular rate and rhythm, S1, S2 normal, no murmur, click, rub or gallop  Abdomen: soft, non-tender; bowel sounds normal; no masses,  no organomegaly  Extremities: extremities normal, atraumatic, no cyanosis or edema  Skin: Skin color, texture, turgor normal. No rashes or lesions  Neurologic: weak    Electronically signed by Angélica Jamison MD on 3/25/2022 at 7:21 PM

## 2022-03-25 NOTE — PROGRESS NOTES
[address]  Occupational Therapy  Daily Note  Time:   Time In: 0900  Time Out: 0930  Timed Code Treatment Minutes: 30 Minutes  Minutes: 30     Date: 3/25/2022  Patient Name: Hesham Torres,   Gender: female      Room: [facility]/065-A  MRN: [id number]  : 1935  (80 y.o.)  Referring Practitioner: Dr. Kathryn Manley  Diagnosis: Pseudogout of left knee  Additional Pertinent Hx: Per H&P Shrutih Spears  is a 80 y.o. female admitted to the inpatient rehabilitation unit on 3/17/2022. She was originally admitted to [facility] on 3/15/2022 with a past medical history of advanced osteoarthritis, idiopathic pulmonary fibrosis on 2L of home oxygen, bronchiectasis, hypertension, hyperlipidemia, and secondary hyperparathyroidism. She  presented to ED for evaluation of left knee pain x previous 2 days. Pain at that time was at 10/10. She could not ambulate, and was unable to lift left lower extremity due to severe pain. Patient had left SI joint injection on 2022. She reported no fever or chills. No chest pain. No abdominal pain. No diarrhea. No urinary symptoms. She had prior bilateral total knee replacements, the left one completed in  and the right in . She noticed slightly increased swelling to left knee. Denied recent left knee trauma or injury. No change of bowl and bladder functions. She lives at an assisted living. At baseline she walks without assistance. In the setting of a total knee arthroplasty with knee pain and effusion, her knee was aspirated in the ED for 30 ml of yellow cloudy fluid. Her aspiration showed WBC in the joint fluid. She also had calcium pyrophosphate crystals. White count was 23,350 with 90% polys. She denied any fever or chills and had noprevious history of infection. She was diagnosed with pseudogout, started on Toradol for 2 days then transitioned to Naproxen 250 mg bid.   Colchicine added at 0.6 mg Education  Patient Education: ADL's and IADL's, RW safety, standing endurance      Goals  Short term goals  Time Frame for Short term goals: 1 week  Short term goal 1: Pt will complete BUE strengthening exercises with min vcs for technique to increase indep and endurance with all self cares. Short term goal 2: Pt will complete standing tolerance x 5 minutes with 1-2 UE release and SBA to increase indep with sinkside grooming. Short term goal 3: Pt will complete functional mobiity to/from BR with SBA and 0 vcs for safety to increase indep with toileting. Short term goal 4: Pt will complete LB dressing with LHAE PRN and SBA to increase indep and endurance within home environment. Long term goals  Time Frame for Long term goals : 2 weeks  Long term goal 1: Pt will complete BADL routine with mod I using LHAE prn to return to PLOF. Long term goal 2: Pt will complete light IADL tasks with mod I using ECT prn to improve indpe within her condo. Following session, patient left in safe position with all fall risk precautions in place.

## 2022-03-25 NOTE — PROGRESS NOTES
Physical Medicine & Rehabilitation   Progress Note    Chief Complaint:  Left septic knee, pseudogout. Rehab needs    Subjective:  Patient seen today, up in therapy gym. Patient feels things are going well, therapy is going well also. States working on a plan for stairs with her portable O2. She continues to use 2L supplelmental O2 2/2 pulmonary fibrosis. Patient sleeping well, pain is well controlled. Patient denies any needs or concerns at this time. Her planned date for discharge from the IPR Unit to home on 3/29/22 with Home Health  Physical Therapy, Occupational Therapy, Nursing and HHA 2x week. Rehabilitation:  PT:  Transfers:  Sit to Stand: Stand By Assistance  Stand to Sit:Stand By Assistance, cues for hand placement, with verbal cues  To/From Bed and Chair: Stand By Assistance, using RW  Car:Stand By Assistance, [address], X 1, cues for hand placement, with verbal cues   **Completed 2 trials of car transfer. 1 trial on passenger side and the other trial to the  side to simulate home environment. Ambulation:  Contact Guard Assistance-Close SBA  Distance: 40 ft. X2; 20 ft. x1   Surface: Level Tile  Device:Cane  Gait Deviations: Forward Flexed Posture, Slow Jaimee, Decreased Step Length Bilaterally, Decreased Weight Shift Left, Decreased Gait Speed, Decreased Heel Strike Bilaterally, Mild Path Deviations and Unsteady Gait with several mild LOB's but no more than CGA required. OT:   BALANCE:  Sitting Balance:  Supervision. Within w/c  Standing Balance: Stand By Assistance. x1 minute in prep for mobility  TRANSFERS:  Sit to Stand:  Stand By Assistance. Stand to Sit: Stand By Assistance. FUNCTIONAL MOBILITY:  Assistive Device: Rolling Walker   Assist Level:  Stand By Assistance. Distance:   Completed functional mobility x2 trials for short distance within pt room at slow pace, no LOB noted.  Pt requires no cues for walker safety- seated rest break after trial of mobility, min fatigue noted. ADDITIONAL ACTIVITIES:  Patient identified a personal goal to increase UB strength and improve overall endurance so they can complete their toilet & shower transfers; skilled edu on UE strengthening. Completed BUE exercises x10 reps x1 sets using min resistance band in all joints/planes to increase strength and endurance required for ADLs. Pt required min rest break between each exercise and min v/c for proper technique. Review of Systems:  CONSTITUTIONAL:  negative  EYES:  Wears glasses  HEENT:  negative  RESPIRATORY:  On 2L supplemental O2 per NC as at home for pulmonary fibrosis  CARDIOVASCULAR:  negative  GASTROINTESTINAL:  Bowels have been moving  GENITOURINARY:  negative  SKIN:  negative  HEMATOLOGIC/LYMPHATIC:  negative  MUSCULOSKELETAL:  negative  NEUROLOGICAL:  negative  BEHAVIOR/PSYCH:  negative  System review otherwise negative      Objective:  Vitals:    03/25/22 0749   BP: 109/61   Pulse: 78   Resp: 16   Temp: 98.2 °F (36.8 °C)   SpO2: 99%   awake  Orientation:   person, place, time  Mood: within normal limits  Affect: calm and spontaneous  General appearance: well groomed and in no acute distress     Memory:   normal,   Attention/Concentration: normal  Language:  normal     Cranial Nerves:  cranial nerves II-XII are grossly intact  ROM:  abnormal - decreased at left knee  Tone:  normal  Muscle bulk: within normal limits  Sensory:  Sensory intact    Skin: warm and dry, no rash or erythema  Peripheral vascular: Pulses: Normal upper and lower extremity pulses; Edema: no    Diagnostics:   No results found for this or any previous visit (from the past 24 hour(s)).         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 GI: Pepcid 20 mg po daily  · Pain: Tylenol 500 mg po q 6 hours prn; Flexeril 10 mg po tid; Norco 5/325 0.5 tablet po q 4 hours prn  · Bowels: Colace 100 mg po qd PRN; MOM 30 ml po daily prn; glycolax 17 g po daily PRN  · Bowel meds PRN.    · colchicine 0.6mg BID  · Team conference Tuesday  · Discharge planning:  to home on 3/29/22 with Home Health  Physical Therapy, Occupational Therapy, Nursing and HHA 2x week    Missed Therapy Time:  · None      Claudia Munguia, APRN - CNP

## 2022-03-26 LAB
ANION GAP SERPL CALCULATED.3IONS-SCNC: 9 MEQ/L (ref 8–16)
BUN BLDV-MCNC: 22 MG/DL (ref 7–22)
CALCIUM SERPL-MCNC: 9.2 MG/DL (ref 8.5–10.5)
CHLORIDE BLD-SCNC: 101 MEQ/L (ref 98–111)
CO2: 29 MEQ/L (ref 23–33)
CREAT SERPL-MCNC: 0.9 MG/DL (ref 0.4–1.2)
GFR SERPL CREATININE-BSD FRML MDRD: 59 ML/MIN/1.73M2
GLUCOSE BLD-MCNC: 89 MG/DL (ref 70–108)
POTASSIUM SERPL-SCNC: 4.6 MEQ/L (ref 3.5–5.2)
SODIUM BLD-SCNC: 139 MEQ/L (ref 135–145)

## 2022-03-26 PROCEDURE — 6360000002 HC RX W HCPCS: Performed by: PHYSICAL MEDICINE & REHABILITATION

## 2022-03-26 PROCEDURE — 6370000000 HC RX 637 (ALT 250 FOR IP): Performed by: PHYSICAL MEDICINE & REHABILITATION

## 2022-03-26 PROCEDURE — 94640 AIRWAY INHALATION TREATMENT: CPT

## 2022-03-26 PROCEDURE — 36415 COLL VENOUS BLD VENIPUNCTURE: CPT

## 2022-03-26 PROCEDURE — 2700000000 HC OXYGEN THERAPY PER DAY

## 2022-03-26 PROCEDURE — 6370000000 HC RX 637 (ALT 250 FOR IP): Performed by: NURSE PRACTITIONER

## 2022-03-26 PROCEDURE — 94760 N-INVAS EAR/PLS OXIMETRY 1: CPT

## 2022-03-26 PROCEDURE — 80048 BASIC METABOLIC PNL TOTAL CA: CPT

## 2022-03-26 PROCEDURE — 1180000000 HC REHAB R&B

## 2022-03-26 RX ADMIN — BUDESONIDE 500 MCG: 0.5 INHALANT RESPIRATORY (INHALATION) at 08:58

## 2022-03-26 RX ADMIN — APIXABAN 5 MG: 5 TABLET, FILM COATED ORAL at 21:36

## 2022-03-26 RX ADMIN — ACETAMINOPHEN 500 MG: 500 TABLET ORAL at 07:37

## 2022-03-26 RX ADMIN — ATORVASTATIN CALCIUM 10 MG: 10 TABLET, FILM COATED ORAL at 21:36

## 2022-03-26 RX ADMIN — APIXABAN 5 MG: 5 TABLET, FILM COATED ORAL at 07:37

## 2022-03-26 RX ADMIN — METOPROLOL TARTRATE 12.5 MG: 25 TABLET, FILM COATED ORAL at 07:37

## 2022-03-26 RX ADMIN — HYDROCODONE BITARTRATE AND ACETAMINOPHEN 0.5 TABLET: 5; 325 TABLET ORAL at 04:05

## 2022-03-26 RX ADMIN — BUDESONIDE 500 MCG: 0.5 INHALANT RESPIRATORY (INHALATION) at 16:38

## 2022-03-26 RX ADMIN — HYDROCODONE BITARTRATE AND ACETAMINOPHEN 0.5 TABLET: 5; 325 TABLET ORAL at 08:59

## 2022-03-26 RX ADMIN — COLCHICINE 0.6 MG: 0.6 TABLET, FILM COATED ORAL at 07:37

## 2022-03-26 RX ADMIN — LEVOTHYROXINE SODIUM 88 MCG: 0.09 TABLET ORAL at 05:12

## 2022-03-26 RX ADMIN — COLCHICINE 0.6 MG: 0.6 TABLET, FILM COATED ORAL at 21:36

## 2022-03-26 RX ADMIN — ACETAMINOPHEN 500 MG: 500 TABLET ORAL at 21:36

## 2022-03-26 RX ADMIN — METOPROLOL TARTRATE 12.5 MG: 25 TABLET, FILM COATED ORAL at 21:36

## 2022-03-26 ASSESSMENT — PAIN SCALES - GENERAL
PAINLEVEL_OUTOF10: 0
PAINLEVEL_OUTOF10: 7
PAINLEVEL_OUTOF10: 8
PAINLEVEL_OUTOF10: 6
PAINLEVEL_OUTOF10: 0

## 2022-03-26 ASSESSMENT — PAIN DESCRIPTION - ORIENTATION: ORIENTATION: LEFT

## 2022-03-26 ASSESSMENT — PAIN DESCRIPTION - LOCATION: LOCATION: KNEE

## 2022-03-26 ASSESSMENT — PAIN DESCRIPTION - PAIN TYPE: TYPE: ACUTE PAIN

## 2022-03-27 PROCEDURE — 97110 THERAPEUTIC EXERCISES: CPT

## 2022-03-27 PROCEDURE — 6370000000 HC RX 637 (ALT 250 FOR IP): Performed by: NURSE PRACTITIONER

## 2022-03-27 PROCEDURE — 6360000002 HC RX W HCPCS: Performed by: PHYSICAL MEDICINE & REHABILITATION

## 2022-03-27 PROCEDURE — 97530 THERAPEUTIC ACTIVITIES: CPT

## 2022-03-27 PROCEDURE — 97116 GAIT TRAINING THERAPY: CPT

## 2022-03-27 PROCEDURE — 94640 AIRWAY INHALATION TREATMENT: CPT

## 2022-03-27 PROCEDURE — 97535 SELF CARE MNGMENT TRAINING: CPT

## 2022-03-27 PROCEDURE — 2700000000 HC OXYGEN THERAPY PER DAY

## 2022-03-27 PROCEDURE — 1180000000 HC REHAB R&B

## 2022-03-27 PROCEDURE — 94760 N-INVAS EAR/PLS OXIMETRY 1: CPT

## 2022-03-27 PROCEDURE — 6370000000 HC RX 637 (ALT 250 FOR IP): Performed by: PHYSICAL MEDICINE & REHABILITATION

## 2022-03-27 RX ADMIN — METOPROLOL TARTRATE 12.5 MG: 25 TABLET, FILM COATED ORAL at 21:10

## 2022-03-27 RX ADMIN — LOPERAMIDE HYDROCHLORIDE 2 MG: 2 CAPSULE ORAL at 10:57

## 2022-03-27 RX ADMIN — ACETAMINOPHEN 500 MG: 500 TABLET ORAL at 07:34

## 2022-03-27 RX ADMIN — COLCHICINE 0.6 MG: 0.6 TABLET, FILM COATED ORAL at 07:34

## 2022-03-27 RX ADMIN — METOPROLOL TARTRATE 12.5 MG: 25 TABLET, FILM COATED ORAL at 07:34

## 2022-03-27 RX ADMIN — BUDESONIDE 500 MCG: 0.5 INHALANT RESPIRATORY (INHALATION) at 16:16

## 2022-03-27 RX ADMIN — APIXABAN 5 MG: 5 TABLET, FILM COATED ORAL at 21:10

## 2022-03-27 RX ADMIN — BUDESONIDE 500 MCG: 0.5 INHALANT RESPIRATORY (INHALATION) at 07:46

## 2022-03-27 RX ADMIN — ACETAMINOPHEN 500 MG: 500 TABLET ORAL at 21:10

## 2022-03-27 RX ADMIN — COLCHICINE 0.6 MG: 0.6 TABLET, FILM COATED ORAL at 21:10

## 2022-03-27 RX ADMIN — APIXABAN 5 MG: 5 TABLET, FILM COATED ORAL at 07:34

## 2022-03-27 RX ADMIN — ATORVASTATIN CALCIUM 10 MG: 10 TABLET, FILM COATED ORAL at 21:10

## 2022-03-27 RX ADMIN — LEVOTHYROXINE SODIUM 88 MCG: 0.09 TABLET ORAL at 05:32

## 2022-03-27 ASSESSMENT — PAIN SCALES - GENERAL
PAINLEVEL_OUTOF10: 0
PAINLEVEL_OUTOF10: 5
PAINLEVEL_OUTOF10: 0

## 2022-03-27 ASSESSMENT — PAIN DESCRIPTION - PAIN TYPE: TYPE: ACUTE PAIN

## 2022-03-27 NOTE — PROGRESS NOTES
[facility]  INPATIENT PHYSICAL THERAPY  DAILY NOTE  [facility] - 7E-65/065-A    Time In: 0800  Time Out: 0830  Timed Code Treatment Minutes: 30 Minutes  Minutes: 30          Date: 3/27/2022  Patient Name: Anton Cornejo,  Gender:  female        MRN: [id number]  : 1935  (80 y.o.)     Referring Practitioner: Leobardo Morse MD  Diagnosis: Pseudogout of left knee  Additional Pertinent Hx: Per H&P Ju Gonzalez  is a 80 y.o. female admitted to the inpatient rehabilitation unit on 3/17/2022. She was originally admitted to [facility] on 3/15/2022 with a past medical history of advanced osteoarthritis, idiopathic pulmonary fibrosis on 2L of home oxygen, bronchiectasis, hypertension, hyperlipidemia, and secondary hyperparathyroidism. She  presented to ED for evaluation of left knee pain x previous 2 days. Pain at that time was at 10/10. She could not ambulate, and was unable to lift left lower extremity due to severe pain. Patient had left SI joint injection on 2022. She reported no fever or chills. No chest pain. No abdominal pain. No diarrhea. No urinary symptoms. She had prior bilateral total knee replacements, the left one completed in  and the right in . She noticed slightly increased swelling to left knee. Denied recent left knee trauma or injury. No change of bowl and bladder functions. She lives at an assisted living. At baseline she walks without assistance. In the setting of a total knee arthroplasty with knee pain and effusion, her knee was aspirated in the ED for 30 ml of yellow cloudy fluid. Her aspiration showed WBC in the joint fluid. She also had calcium pyrophosphate crystals. White count was 23,350 with 90% polys. She denied any fever or chills and had noprevious history of infection. She was diagnosed with pseudogout, started on Toradol for 2 days then transitioned to Naproxen 250 mg bid.   Colchicine added at 0.6 mg bid. Was on Dilaudid 1mg q 4 hours IV prn, and Flexeril 10 mg po tid prn. Also on Norco 5/325 one po q 8 hours prn. She also has a pin point area of skin irritation on her coccyx. Prior Level of Function:  Lives With: Alone  Type of Home:  ([facility])  Home Layout: One level  Home Access: Stairs to enter with rails  Entrance Stairs - Number of Steps: 2  Entrance Stairs - Rails: Right  Home Equipment: Cane,Oxygen   Bathroom Shower/Tub: Walk-in shower  Bathroom Toilet: Handicap height  Bathroom Equipment: Grab bars in shower,Tub transfer bench  Bathroom Accessibility: Accessible    Receives Help From: Friend(s)  ADL Assistance: [address] [place] [address] Avenue: Independent  Homemaking Responsibilities: Yes  Ambulation Assistance: Independent  Transfer Assistance: Independent  Active : Yes  Additional Comments: Patient reports she was at [facility] for about a week due to increased difficulty with mobiltiy however prior lived in a [facility] independently and would like to return to her condo upon discharge. Patient reports she is on 2L O2 at baseline. Patient reports she recently had to utilize a cane for ambulation due to increased complaints of pain in L knee however prior she did not use any AD for mobility. Patient reported she did not have to ambulate far distances. Patient reports she has two sons however one is in [facility] and one is in [place]    Restrictions/Precautions:  Restrictions/Precautions: Fall Risk,Weight Bearing  Left Lower Extremity Weight Bearing: Weight Bearing As Tolerated  Position Activity Restriction  Other position/activity restrictions: 2L O2, hx of vertigo     SUBJECTIVE: Patient seated in bedside chair upon arrival. Patient pleasant and agreeable to therapy. PAIN: 7/10: L hip    Vitals: Oxygen: Patient SpO2 dropped to 91% with ambulation and seated therex but retrurned to 94% or greater within 30 seconds of taking a rest break.     OBJECTIVE:  Bed Mobility:  Not Tested    Transfers:  Sit to Stand: Stand By Assistance  Stand to Sit:Stand By Assistance, decreased eccentric control on first trial.    Ambulation:  Contact Guard Assistance  Distance: 50'x2  Surface: Level Tile  Device:Rolling Walker  Gait Deviations:  Slow Jaimee, Decreased Step Length Bilaterally, Decreased Gait Speed, Decreased Heel Strike Bilaterally and Patient needed one seated rest break due to SOB. Exercise:  Patient was guided in 1 set(s) 10 reps of exercise to both lower extremities. Seated marches, Seated heel/toe raises, Long arc quads and Seated abduction/adduction. Exercises were completed for increased independence with functional mobility. Functional Outcome Measures: Not completed       ASSESSMENT:  Assessment: Patient progressing toward established goals. Activity Tolerance:  Patient tolerance of  treatment: good. Equipment Recommendations:Equipment Needed:  (Continue to assess pending progress; may require  RW)  Discharge Recommendations: Continue to assess pending progress and Home with Home Health PT  Plan: Times per week: 5x/wk 90 min; 1x/wk 30 min  Times per day: Daily  Current Treatment Recommendations: Strengthening,Neuromuscular Re-education,Home Exercise Program,ROM,Safety Education & Training,Balance Training,Endurance Training,Functional Mobility Training,Wheelchair Mobility Training,Transfer Training,Gait Training,Stair training,Patient/Caregiver Education & Training    Patient Education  Patient Education: Gait, Verbal Exercise Instruction    Goals:  Patient goals : To be independent  Short term goals  Time Frame for Short term goals: 1 week  Short term goal 1: Patient to perform supine<>sit with Supervision without railings in order to assist with getting into and out of bed. Short term goal 2: Patient to perform sit to stand transfers with SBA with <=1 cue for hand placement in order to assist with safety with initiation of ambulation.   Short term goal 3: Patient to ambulate >=75 feet with RW with Supervision in order to assist with home mobility. Short term goal 4: Patient to perform car transfer with Supervision in order to assist with getting into and out of car. Short term goal 5: Patient to perform TUG in <=26 seconds in order to assist with decreased risk for falls. Short term goal 6: Patient to ascend/descend 2 steps with R handrail with SBA in order to assist with getting into and out of condo. Long term goals  Time Frame for Long term goals : 2 weeks  Long term goal 1: Patient to perform supine<>sit with Mod I in order to assist with getting into and out of bed. Long term goal 2: Patient to perform sit to stand transfers Mod I in order to assist with standing and sitting from various surfaces. Long term goal 3: Patient to perform car transfer Mod I in order to assist with getting to and  from appointments. Long term goal 4: Patient to ambulate over level surfaces >=150 feet with LRAD with Mod I in order to assist with home mobility. Long term goal 5: Patient to ambulate over uneven surface with Mod I in order to assist with safety in community. Long term goal 6: Patient to ascend/descend 2 steps with R handrail with Mod I in order to assist with getting into and out of condo. Following session, patient left in safe position with all fall risk precautions in place.

## 2022-03-27 NOTE — PROGRESS NOTES
[address] [place] [address]  Occupational Therapy  Daily Note  Time:   Time In: 1000  Time Out: 1030  Timed Code Treatment Minutes: 30 Minutes  Minutes: 30          Date: 3/27/2022  Patient Name: Wilber Campbell,   Gender: female      Room: [facility]65/065-A  MRN: [id number]  : 1935  (80 y.o.)  Referring Practitioner: Dr. Malik Torres  Diagnosis: Pseudogout of left knee  Additional Pertinent Hx: Per H&P Hodan Villanueva  is a 80 y.o. female admitted to the inpatient rehabilitation unit on 3/17/2022. She was originally admitted to [address] [place] [address] on 3/15/2022 with a past medical history of advanced osteoarthritis, idiopathic pulmonary fibrosis on 2L of home oxygen, bronchiectasis, hypertension, hyperlipidemia, and secondary hyperparathyroidism. She  presented to ED for evaluation of left knee pain x previous 2 days. Pain at that time was at 10/10. She could not ambulate, and was unable to lift left lower extremity due to severe pain. Patient had left SI joint injection on 2022. She reported no fever or chills. No chest pain. No abdominal pain. No diarrhea. No urinary symptoms. She had prior bilateral total knee replacements, the left one completed in  and the right in . She noticed slightly increased swelling to left knee. Denied recent left knee trauma or injury. No change of bowl and bladder functions. She lives at an assisted living. At baseline she walks without assistance. In the setting of a total knee arthroplasty with knee pain and effusion, her knee was aspirated in the ED for 30 ml of yellow cloudy fluid. Her aspiration showed WBC in the joint fluid. She also had calcium pyrophosphate crystals. White count was 23,350 with 90% polys. She denied any fever or chills and had noprevious history of infection. She was diagnosed with pseudogout, started on Toradol for 2 days then transitioned to Naproxen 250 mg bid.   Colchicine added at 0.6 mg bid. Was on Dilaudid 1mg q 4 hours IV prn, and Flexeril 10 mg po tid prn. Also on Norco 5/325 one po q 8 hours prn. She also has a pin point area of skin irritation on her coccyx. Restrictions/Precautions:  Restrictions/Precautions: Fall Risk,Weight Bearing  Left Lower Extremity Weight Bearing: Weight Bearing As Tolerated  Position Activity Restriction  Other position/activity restrictions: 2L O2, hx of vertigo     SUBJECTIVE: RN approved OT session. Upon entering room pt seated in recliner. Agreeable to OT session. Pleasant and cooperative. PAIN: mild L knee pain reported     Vitals: Oxygen: 2L: at rest 95%, with activity 88% patient educated regarding monitoring O2. Patient verbalized understandning    COGNITION: Decreased Insight    ADL:   Grooming: Stand By Assistance. Standing sink side washing hands  Lower Extremity Dressing: Maximum Assistance. to terrell B RAMA hose, patient reports she does not plan to wear upon D/C home as she is unable to reach feet. Patient educaed regarding AE to assist with donning, however patient continues to report she only wears slippers at home  Toileting: Stand By Assistance. for safety  Toilet Transfer: Stand By Assistance. for safety as patient has to emergently use restroom for BM. BALANCE:  Standing Balance: Stand By Assistance. at [facility] level for safety     BED MOBILITY:  Not Tested    TRANSFERS:  Sit to Stand:  Stand By Assistance. standing from recliner chair  Stand to Sit: Stand By Assistance. sitting into recliner chair    FUNCTIONAL MOBILITY:  Assistive Device: Rolling Walker  Assist Level:  Stand By Assistance. Distance: ~15 feet from recliner to wheelchair in white  Quick pace, decreased O2 requiring verbal cues for breathing      ADDITIONAL ACTIVITIES:  Patient completed simulated light house keeping activity, gathering items throughout the apartment kitchen counter top, to increase endurance and safety awareness.  Pt demonstrates ability to gather 6/6 items with Fair endurance and Good overall safety    ASSESSMENT:     Activity Tolerance:  Patient tolerance of  treatment: fair. Discharge Recommendations: Continue to assess pending progress  Equipment Recommendations: Other: Pt has a shower seat, grab bars, hand held shower, ADA height toilet and half wall nearby. Plan: Times per week: 5x/wk for 90 min and 1x/wk for 30 min  Current Treatment Recommendations: Strengthening,Functional Mobility Training,Balance Training,Endurance Training,Patient/Caregiver Education & Training,Equipment Evaluation, Education, & procurement,Safety Education & Training,Self-Care / ADL,Home Management Training    Patient Education  Patient Education: Role of OT, Plan of Care, ADL's and Energy Conservation    Goals  Short term goals  Time Frame for Short term goals: 1 week  Short term goal 1: Pt will complete BUE strengthening exercises with min vcs for technique to increase indep and endurance with all self cares. Short term goal 2: Pt will complete standing tolerance x 5 minutes with 1-2 UE release and SBA to increase indep with sinkside grooming. Short term goal 3: Pt will complete functional mobiity to/from BR with SBA and 0 vcs for safety to increase indep with toileting. Short term goal 4: Pt will complete LB dressing with LHAE PRN and SBA to increase indep and endurance within home environment. Long term goals  Time Frame for Long term goals : 2 weeks  Long term goal 1: Pt will complete BADL routine with mod I using LHAE prn to return to PLOF. Long term goal 2: Pt will complete light IADL tasks with mod I using ECT prn to improve indpe within her condo. Following session, patient left in safe position with all fall risk precautions in place.

## 2022-03-27 NOTE — DISCHARGE INSTR - PHARMACY
Be safe with medicines. If your doctor prescribed medicine, take it exactly as prescribed. Call your doctor if you think you are having a problem with your medicine. You will get more details on the specific medicines your doctor prescribes. Unused medications, especially pain medications, should be disposed to ensure medications do not end up being misused in the future, as well as helping to ensure drugs are not impacting the environment. [facility] and many local police agencies have medication take-back bins to properly destroy these medications. Please see the site https://apps. deadiversion. userADgents.gov/pubdispsearch/spring/main?execution=e2s1 for additional take-back bins located in your area.

## 2022-03-27 NOTE — DISCHARGE INSTR - DIET
Good nutrition is important when healing from an illness, injury, or surgery. Follow any nutrition recommendations given to you during your hospital stay. If you were given an oral nutrition supplement while in the hospital, continue to take this supplement at home. You can take it with meals, in-between meals, and/or before bedtime. These supplements can be purchased at most local grocery stores, pharmacies, and chain [employer]-stores. If you have any questions about your diet or nutrition, call the hospital and ask for the dietitian. Do not skip meals. Eating a balanced diet may increase your energy level.

## 2022-03-28 PROCEDURE — 94640 AIRWAY INHALATION TREATMENT: CPT

## 2022-03-28 PROCEDURE — 94760 N-INVAS EAR/PLS OXIMETRY 1: CPT

## 2022-03-28 PROCEDURE — 6370000000 HC RX 637 (ALT 250 FOR IP): Performed by: PHYSICAL MEDICINE & REHABILITATION

## 2022-03-28 PROCEDURE — 97110 THERAPEUTIC EXERCISES: CPT

## 2022-03-28 PROCEDURE — 97530 THERAPEUTIC ACTIVITIES: CPT

## 2022-03-28 PROCEDURE — 6360000002 HC RX W HCPCS: Performed by: PHYSICAL MEDICINE & REHABILITATION

## 2022-03-28 PROCEDURE — 1180000000 HC REHAB R&B

## 2022-03-28 PROCEDURE — 2700000000 HC OXYGEN THERAPY PER DAY

## 2022-03-28 PROCEDURE — 97116 GAIT TRAINING THERAPY: CPT

## 2022-03-28 PROCEDURE — 99231 SBSQ HOSP IP/OBS SF/LOW 25: CPT | Performed by: NURSE PRACTITIONER

## 2022-03-28 PROCEDURE — 97535 SELF CARE MNGMENT TRAINING: CPT

## 2022-03-28 RX ORDER — COLCHICINE 0.6 MG/1
0.6 TABLET ORAL 2 TIMES DAILY
Qty: 60 TABLET | Refills: 3 | Status: SHIPPED | OUTPATIENT
Start: 2022-03-28 | End: 2022-04-07 | Stop reason: SINTOL

## 2022-03-28 RX ADMIN — ACETAMINOPHEN 500 MG: 500 TABLET ORAL at 08:30

## 2022-03-28 RX ADMIN — APIXABAN 5 MG: 5 TABLET, FILM COATED ORAL at 20:44

## 2022-03-28 RX ADMIN — COLCHICINE 0.6 MG: 0.6 TABLET, FILM COATED ORAL at 08:30

## 2022-03-28 RX ADMIN — LEVOTHYROXINE SODIUM 88 MCG: 0.09 TABLET ORAL at 05:53

## 2022-03-28 RX ADMIN — BUDESONIDE 500 MCG: 0.5 INHALANT RESPIRATORY (INHALATION) at 16:38

## 2022-03-28 RX ADMIN — ACETAMINOPHEN 500 MG: 500 TABLET ORAL at 15:39

## 2022-03-28 RX ADMIN — METOPROLOL TARTRATE 12.5 MG: 25 TABLET, FILM COATED ORAL at 08:30

## 2022-03-28 RX ADMIN — COLCHICINE 0.6 MG: 0.6 TABLET, FILM COATED ORAL at 20:44

## 2022-03-28 RX ADMIN — ACETAMINOPHEN 500 MG: 500 TABLET ORAL at 20:44

## 2022-03-28 RX ADMIN — ACETAMINOPHEN 500 MG: 500 TABLET ORAL at 02:25

## 2022-03-28 RX ADMIN — METOPROLOL TARTRATE 12.5 MG: 25 TABLET, FILM COATED ORAL at 20:44

## 2022-03-28 RX ADMIN — APIXABAN 5 MG: 5 TABLET, FILM COATED ORAL at 08:30

## 2022-03-28 RX ADMIN — ATORVASTATIN CALCIUM 10 MG: 10 TABLET, FILM COATED ORAL at 20:44

## 2022-03-28 ASSESSMENT — PAIN SCALES - GENERAL
PAINLEVEL_OUTOF10: 0
PAINLEVEL_OUTOF10: 6
PAINLEVEL_OUTOF10: 0

## 2022-03-28 NOTE — PLAN OF CARE
Problem: Skin Integrity:  Goal: Will show no infection signs and symptoms  Description: Will show no infection signs and symptoms  3/28/2022 1435 by Rishi Ragland LPN  Outcome: Ongoing  Skin assessment every shift. No new areas of skin breakdown. Problem: Falls - Risk of:  Goal: Will remain free from falls  Description: Will remain free from falls  3/28/2022 1435 by Rishi Ragland LPN  Outcome: Ongoing  No falls this shift, patient is 1 assist with gait belt and samuel stedy.        Problem: Pain:  Goal: Pain level will decrease  Description: Pain level will decrease  3/28/2022 1435 by Rishi Ragland LPN  Outcome: Ongoing  Pain is treated with routine Tylenol, Lyrica and Baclofen, also treated with prn Oxycodone,       Problem: IP BOWEL ELIMINATION  Goal: LTG - patient will utilize adaptive techniques/equipment to complete bowel elimination  Outcome: Ongoing

## 2022-03-28 NOTE — PROGRESS NOTES
[facility]  INPATIENT PHYSICAL THERAPY  DAILY NOTE  [facility] - 7E-65/065-A    Time In: 1200  Time Out: 1230  Timed Code Treatment Minutes: 30 Minutes  Minutes: 30          Date: 3/28/2022  Patient Name: Richmond Mitchell,  Gender:  female        MRN: [id number]  : 1935  (80 y.o.)     Referring Practitioner: Glynda Fothergill, MD  Diagnosis: Pseudogout of left knee  Additional Pertinent Hx: Per H&P Cordelia Gilbert  is a 80 y.o. female admitted to the inpatient rehabilitation unit on 3/17/2022. She was originally admitted to [facility] on 3/15/2022 with a past medical history of advanced osteoarthritis, idiopathic pulmonary fibrosis on 2L of home oxygen, bronchiectasis, hypertension, hyperlipidemia, and secondary hyperparathyroidism. She  presented to ED for evaluation of left knee pain x previous 2 days. Pain at that time was at 10/10. She could not ambulate, and was unable to lift left lower extremity due to severe pain. Patient had left SI joint injection on 2022. She reported no fever or chills. No chest pain. No abdominal pain. No diarrhea. No urinary symptoms. She had prior bilateral total knee replacements, the left one completed in  and the right in . She noticed slightly increased swelling to left knee. Denied recent left knee trauma or injury. No change of bowl and bladder functions. She lives at an assisted living. At baseline she walks without assistance. In the setting of a total knee arthroplasty with knee pain and effusion, her knee was aspirated in the ED for 30 ml of yellow cloudy fluid. Her aspiration showed WBC in the joint fluid. She also had calcium pyrophosphate crystals. White count was 23,350 with 90% polys. She denied any fever or chills and had noprevious history of infection. She was diagnosed with pseudogout, started on Toradol for 2 days then transitioned to Naproxen 250 mg bid.   Colchicine Level Tile  Device:Cane  Gait Deviations: Forward Flexed Posture, Slow Jaimee, Decreased Step Length Bilaterally, Decreased Gait Speed and Mild Path Deviations    Stairs:  Platform:  6\" platform X 2 (x2 trials) using both hands on R ascending railing while holding onto 3# wt to simulate portable 02 tank and Supervision. required rest break following 1st trial of 2 steps due fatigue and shortness of breath. Functional Outcome Measures: Not completed       ASSESSMENT:  Assessment: Patient progressing toward established goals. Activity Tolerance:  Patient tolerance of  treatment: good. Equipment Recommendations:Equipment Needed:  (Continue to assess pending progress; may require  RW)  Discharge Recommendations: Home with Home Health PT  Plan: Times per week: 5x/wk 90 min; 1x/wk 30 min  Times per day: Daily  Current Treatment Recommendations: Strengthening,Neuromuscular Re-education,Home Exercise Program,ROM,Safety Education & Training,Balance Training,Endurance Training,Functional Mobility Training,Wheelchair Mobility Training,Transfer Training,Gait Training,Stair training,Patient/Caregiver Education & Training    Patient Education  Patient Education: Plan of Care, Precautions/Restrictions, Transfers, Reviewed Prior Education, Gait, Stairs, [employer] and Wellness Education, Home Safety Education,  - Patient Verbalized Understanding, - Patient Requires Continued Education    Goals:  Patient goals : To be independent  Short term goals  Time Frame for Short term goals: 1 week  Short term goal 1: Patient to perform supine<>sit with Supervision without railings in order to assist with getting into and out of bed. Short term goal 2: Patient to perform sit to stand transfers with SBA with <=1 cue for hand placement in order to assist with safety with initiation of ambulation. Short term goal 3: Patient to ambulate >=75 feet with RW with Supervision in order to assist with home mobility.   Short term goal 4: Patient to perform car transfer with Supervision in order to assist with getting into and out of car. Short term goal 5: Patient to perform TUG in <=26 seconds in order to assist with decreased risk for falls. Short term goal 6: Patient to ascend/descend 2 steps with R handrail with SBA in order to assist with getting into and out of condo. Long term goals  Time Frame for Long term goals : 2 weeks  Long term goal 1: Patient to perform supine<>sit with Mod I in order to assist with getting into and out of bed. Long term goal 2: Patient to perform sit to stand transfers Mod I in order to assist with standing and sitting from various surfaces. Long term goal 3: Patient to perform car transfer Mod I in order to assist with getting to and  from appointments. Long term goal 4: Patient to ambulate over level surfaces >=150 feet with LRAD with Mod I in order to assist with home mobility. Long term goal 5: Patient to ambulate over uneven surface with Mod I in order to assist with safety in community. Long term goal 6: Patient to ascend/descend 2 steps with R handrail with Mod I in order to assist with getting into and out of condo. Following session, patient left in safe position with all fall risk precautions in place.

## 2022-03-28 NOTE — PROGRESS NOTES
Patient: Shawn Judd  Unit/Bed: 7E-65/065-A  YOB: 1935  MRN: [id number] Acct: [de-identified]   Admitting Diagnosis: Pseudogout of left knee [M11.262]  Admit Date:  3/17/2022  Hospital Day: 11    Assessment:     Principal Problem:    Pseudogout of left knee  Active Problems:    Essential hypertension    Osteoarthritis    Hypothyroidism    Bronchiectasis without acute exacerbation ([facility] [address]  Resolved Problems:    * No resolved hospital problems. *      Plan:     Medically stable for home tomorrow        Subjective:     Patient has no complaint of CP or SOB. .   Medication side effects: none    Scheduled Meds:   acetaminophen  500 mg Oral Q6H    apixaban  5 mg Oral BID    atorvastatin  10 mg Oral Nightly    budesonide  0.5 mg Nebulization BID    colchicine  0.6 mg Oral BID    levothyroxine  88 mcg Oral Daily    metoprolol tartrate  12.5 mg Oral BID     Continuous Infusions:  PRN Meds:docusate sodium, ondansetron, promethazine, loperamide, polyethylene glycol, HYDROcodone-acetaminophen, magnesium hydroxide, cyclobenzaprine    Review of Systems  Pertinent items are noted in HPI. Objective:     Patient Vitals for the past 8 hrs:   Resp SpO2   03/28/22 1639 24 93 %     I/O last 3 completed shifts: In: 8140 [P.O.:1812]  Out: -   I/O this shift:   In: 12 [P.O.:560]  Out: -     /64   Pulse 70   Temp 97.7 °F (36.5 °C) (Oral)   Resp 24   Ht 5' 4.02\" (1.626 m)   Wt 151 lb 14.4 oz (68.9 kg)   SpO2 93%   BMI 26.06 kg/m²     General appearance: alert, appears stated age and cooperative  Head: Normocephalic, without obvious abnormality, atraumatic  Lungs: clear to auscultation bilaterally  Heart: regular rate and rhythm, S1, S2 normal, no murmur, click, rub or gallop  Abdomen: soft, non-tender; bowel sounds normal; no masses,  no organomegaly  Extremities: extremities normal, atraumatic, no cyanosis or edema  Neurologic: Grossly normal    Electronically signed by Chelle Montalvo MD on 3/28/2022 at 5:30 PM

## 2022-03-28 NOTE — PLAN OF CARE
Problem: Skin Integrity:  Goal: Will show no infection signs and symptoms  Description: Will show no infection signs and symptoms  Outcome: Ongoing  Skin assessment every shift. No new areas of skin breakdown, blanchable redness to buttocks. Problem: Falls - Risk of:  Goal: Will remain free from falls  Description: Will remain free from falls  Outcome: Ongoing  Pt will remain free of falls. Pt is free of falls this shift, patient is now Mod I in her room, pt will be discharged on Tuesday at home alone. Problem: Pain:  Goal: Pain level will decrease  Description: Pain level will decrease  Outcome: Ongoing  Pt denies pain this shift, pt takes scheduled Tylenol every 6 hours.

## 2022-03-28 NOTE — PROGRESS NOTES
[address] [place] [address]  Inpatient Rehabilitation  Occupational Therapy  Progress Note  Time:  Time In: 1100  Time Out: 1200  Timed Code Treatment Minutes: 60 Minutes  Minutes: 60    Date: 3/28/2022  Patient Name: Wilber Campbell,   Gender: female      Room: [facility]65/065-A  MRN: [id number]  : 1935  (80 y.o.)  Referring Practitioner: Dr. Malik Torres  Diagnosis: Pseudogout of left knee  Additional Pertinent Hx: Per H&P Hodan Villanueva  is a 80 y.o. female admitted to the inpatient rehabilitation unit on 3/17/2022. She was originally admitted to [address] [place] [address] on 3/15/2022 with a past medical history of advanced osteoarthritis, idiopathic pulmonary fibrosis on 2L of home oxygen, bronchiectasis, hypertension, hyperlipidemia, and secondary hyperparathyroidism. She  presented to ED for evaluation of left knee pain x previous 2 days. Pain at that time was at 10/10. She could not ambulate, and was unable to lift left lower extremity due to severe pain. Patient had left SI joint injection on 2022. She reported no fever or chills. No chest pain. No abdominal pain. No diarrhea. No urinary symptoms. She had prior bilateral total knee replacements, the left one completed in  and the right in . She noticed slightly increased swelling to left knee. Denied recent left knee trauma or injury. No change of bowl and bladder functions. She lives at an assisted living. At baseline she walks without assistance. In the setting of a total knee arthroplasty with knee pain and effusion, her knee was aspirated in the ED for 30 ml of yellow cloudy fluid. Her aspiration showed WBC in the joint fluid. She also had calcium pyrophosphate crystals. White count was 23,350 with 90% polys. She denied any fever or chills and had noprevious history of infection. She was diagnosed with pseudogout, started on Toradol for 2 days then transitioned to Naproxen 250 mg bid. Colchicine added at 0.6 mg bid.   Was on Dilaudid 1mg q 4 hours IV prn, and Flexeril 10 mg po tid prn. Also on Norco 5/325 one po q 8 hours prn. She also has a pin point area of skin irritation on her coccyx. Restrictions/Precautions:  Restrictions/Precautions: Fall Risk,Weight Bearing  Left Lower Extremity Weight Bearing: Weight Bearing As Tolerated  Position Activity Restriction  Other position/activity restrictions: 2L O2, hx of vertigo    SUBJECTIVE: Patient pleasant and cooperative. Agreeable to OT. Reports excitement to discharge tomorrow. PAIN: Denies pain     Vitals: Vitals not assessed per clinical judgement, see nursing flowsheet    COGNITION: WNL    ADL:   EATING:Independent. Sal Hammond CARE Score: 6. ORAL HYGIENE:Independent. mod I at sink. CARE Score: 6. TOILETING HYGIENE:Independent. mod I.  CARE Score: 6. SHOWERING/BATHING:Independent. intermittent use of grab bar, no LOB. CARE Score: 6. UPPER BODY DRESSING:Independent. retrieved with RW, donned without issue. CARE Score: 6. LOWER BODY DRESSING:Independent. mod I with RW. CARE Score: 6. FOOTWEAR:Independent   . CARE Score: 6. TOILET TRANSFER: Independent. grab bar. CARE Score: 6. BALANCE:  Sitting Balance:  Modified Independent. Standing Balance: Modified Independent. BED MOBILITY:  Not Tested    TRANSFERS:  Sit to Stand:  Modified Independent. recliner, STS, sh chair. good hand placement. Stand to Sit: Modified Independent. FUNCTIONAL MOBILITY:  Assistive Device: Rolling Walker  Assist Level:  Modified Independent. Distance: To and from bathroom and to shower room, required w/c for route back. No LOB. Good attention to 02 line mgmt with RW.      ADDITIONAL ACTIVITIES:  Patient completed IADL task of packing up her room with use of RW. Pt did demo endurance x 2 to 3 minutes at a time, x 2 events, but required to sit between each event d/t fatigue.  Pt was able to self initiate sitting rest breaks and demo good self insight on her endurance / energy conservation requiring no external cue'ing. Pt demo task with mod I. Patient completed BUE strengthening exercises with skilled education on HEP: completed x10 reps x1 set with a medium resistive band in all joints and all planes in order to improve UE strength and activity tolerance required for BADL routine and toilet / shower transfers. Patient tolerated well, requiring min rest breaks. Patient also required min cues for technique. ASSESSMENT:  Activity Tolerance:  Patient tolerance of  treatment: good. Assessment: Assessment: Velvet Dumont has made great progress on IP Rehab. Velvet Dumont has improved to a modified independent level with her ADL and IADL routine with use of RW. She continues to be limited by her endurance and SOB, however this is near baseline and she manages her energy conservation techniques well. She has demo improved attention to her 02 line mgmt with her RW and is now mod I with her ADL and IADL based mobility. Velvet Dumont at discharge tomorrow would benefit from [facility] OT services to improve safety at home to decrease fall risk / risk of injury. Discharge Recommendations: Home with [address] [place] [address] with nursing aide  Equipment Recommendations: Other: Pt has a shower seat, grab bars, hand held shower, ADA height toilet and half wall nearby.   Plan: Times per week: 5x/wk for 90 min and 1x/wk for 30 min  Current Treatment Recommendations: Strengthening,Functional Mobility Training,Balance Training,Endurance Training,Patient/Caregiver Education & Training,Equipment Evaluation, Education, & procurement,Safety Education & Training,Self-Care / ADL,Home Management Training    Patient Education  Patient Education: ADL's, Energy Conservation, Equipment Education, Reviewed Prior Education and Assistive Device Safety    Goals  Short term goals  Time Frame for Short term goals: 1 week  Short term goal 1: Pt will complete BUE strengthening exercises with min vcs for technique to increase

## 2022-03-28 NOTE — PLAN OF CARE
Problem: DISCHARGE BARRIERS  Goal: Patient's continuum of care needs are met  Note: SW met with patient and contacted patient's son, Anatoliy Clancy, to further discuss discharge arrangements, as patient is scheduled for discharge home tomorrow, 03/29/2022. Patient's son, Anatoliy Clancy, updated on patient's progress and overall function with ADL's and personal care. Patient is excited to return home tomorrow. Patient reports friend, Savanah Hoover, will be providing transportation and is requesting to be able to discharge at 10:30am; team updated accordingly. Patient's friend, Ender Walter, has brought patient's portable oxygen tank for use at discharge. SW reviewed with patient and son, Anatoliy Clancy, recommendation for home health care services at discharge to include RN/PT/OT/HHA. Patient verbalizes preference for [facility], as she has used their services in the past. Care plan reviewed with patient and son, Anatoliy Clancy. Patient and son, Anatoliy Clancy, verbalized understanding of the plan of care and contributed to goal setting. SW contacted [facility] with referral for RN/PT/OT/HHA. Referral information and discharge date of Tuesday, 03/29/2022, provided to Liliana Patterson. Agency has access to referral information. SW to follow and maintain involvement in discharge planning.

## 2022-03-28 NOTE — DISCHARGE INSTR - COC
Continuity of Care Form    Patient Name: Gladys Chávez   :  1935  MRN:  [id number]    Admit date:  3/17/2022  Discharge date:  22    Code Status Order: DNR-CCA   Advance Directives:      Admitting Physician: Dre Vasquez MD  PCP: Mrac Guthrie MD    Discharging Nurse: NOBLE Washington [facility],[address] Unit/Room#: 7E-65/065-A  Discharging Unit Phone Number: [phone]    Emergency Contact:   Extended Emergency Contact Information  Primary Emergency Contact: Jose Benavides   [facility] [address]  Phone: [phone]  Relation: Child  Secondary Emergency Contact: [address] [facility] Phone: [phone]  Mobile Phone: [phone]  Relation: Child    Past Surgical History:  Past Surgical History:   Procedure Laterality Date    ABDOMINAL EXPLORATION SURGERY  2013    Release of KATHERINE TAMAYO-Dr. Kina Iqbal    APPENDECTOMY      BACK INJECTION Bilateral 2021    bilateral sacroiliac joint injection performed by Paula Perez DO at [address] N/A 2022    B/L SI joint block performed by Paula Perez DO at [address] N/A 2020    BRONCHOSCOPY FLUOROSCOPY performed by Francois Pierce MD at [address] [place] [address] 153 Bilateral 1990 ?  SECTION  0254,0238    DILATION AND CURETTAGE OF UTERUS  [id number],4451    EXCISION OF PARATHYROID MASS Right 2013    rt parathyroidectomy (excision of rt inferior parathyroid mass) exploration of neck     FOOT SURGERY      left    FOOT SURGERY Left 2017    Dr Shanna Frazier  12    left knee    JOINT REPLACEMENT  2014    right knee    MALIGNANT SKIN LESION EXCISION Left 2017    BCC DORSAL FOOT WITH GRAFT & FS    GOSIA AND BSO  1982    TONSILLECTOMY AND ADENOIDECTOMY  1940 ?     TOTAL KNEE ARTHROPLASTY Right 2014    OIO       Immunization History: Immunization History   Administered Date(s) Administered    COVID-19, Tylor Olmstead, Primary or Immunocompromised, PF, 100mcg/0.5mL 01/14/2021, 02/11/2021, 11/01/2021    FLUZONE 3 YEARS AND OVER 09/05/2014    Influenza Vaccine, unspecified formulation 09/07/2016    Influenza Virus Vaccine 10/11/2012, 09/10/2013, 09/18/2017    Influenza Whole 09/08/2015    Influenza, High Dose (Fluzone 65 yrs and older) 09/27/2018    Influenza, MDCK Quadv, with preserv IM (Flucelvax 2 yrs and older) 08/16/2019    Influenza, Triv, inactivated, subunit, adjuvanted, IM (Fluad 65 yrs and older) 09/12/2019, 09/08/2020, 09/08/2020    PPD Test 04/03/2017    Pneumococcal Conjugate 13-valent (Recymsh84) 11/06/2013    Pneumococcal Polysaccharide (Qtmtiglqw19) 10/24/2007    Td, unspecified formulation 02/24/2001    Tdap (Boostrix, Adacel) 05/17/2013    Zoster Live (Zostavax) 06/05/2008    Zoster Recombinant (Shingrix) 08/16/2019, 10/03/2019       Active Problems:  Patient Active Problem List   Diagnosis Code    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 ([facility] N25.81    Hypercalcemia E83.52    Osteoarthritis M19.90    Non-toxic nodular goiter E04.9    Essential hypertension I10    Diverticulosis of colon K57.30    Hypokalemia E87.6    Hypothyroidism E03.9    Osteopenia M85.80    Bronchiectasis without acute exacerbation (HCC) J47.9    Hyponatremia E87.1    Pure hypercholesterolemia E78.00    Left thyroid nodule E04.1    Hyperthyroidism E05.90    Lung infiltrate on CT R91.8    Dyslipidemia E78.5    Elevated serum free T4 level R79.89    Elevated TSH R79.89    Cough in adult R05.9    Nasal polyp J33.9    Abnormal CT of the chest R93.89    Vitamin D deficiency E55.9    Acute hypoxemic respiratory failure (HCC) J96.01    Pneumonia J18.9    Moderate malnutrition (HCC) E44.0    Pulmonary fibrosis (HCC) J84.10    SIRS (systemic inflammatory response syndrome) ([facility] R65.10    Community acquired pneumonia of right lung J18.9    Typical atrial flutter ([address] I48.3 Septic arthritis (HCC) M00.9    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    Pseudogout of left knee M11.262       Isolation/Infection:   Isolation            No Isolation          Patient Infection Status       Infection Onset Added Last Indicated Last Indicated By Review Planned Expiration Resolved Resolved By    None active    Resolved    COVID-19 (Rule Out) 09/29/20 09/29/20 09/29/20 COVID-19 (Ordered)   09/29/20 Rule-Out Test Resulted    COVID-19 (Rule Out) 09/25/20 09/25/20 09/25/20 COVID-19 (Ordered)   09/25/20 Rule-Out Test Resulted            Nurse Assessment:  Last Vital Signs: /64   Pulse 70   Temp 97.7 °F (36.5 °C) (Oral)   Resp 16   Ht 5' 4.02\" (1.626 m)   Wt 151 lb 14.4 oz (68.9 kg)   SpO2 93%   BMI 26.06 kg/m²     Last documented pain score (0-10 scale): Pain Level: 0  Last Weight:   Wt Readings from Last 1 Encounters:   03/28/22 151 lb 14.4 oz (68.9 kg)     Mental Status:  oriented    IV Access:  - None    Nursing Mobility/ADLs:  Walking   Independent  Transfer  Independent  Bathing  Independent  Dressing  Independent  Toileting  Independent  Feeding  Independent  Med Admin  Independent  Med Delivery   whole    Wound Care Documentation and Therapy:        Elimination:  Continence: Bowel: Yes  Bladder: Yes  Urinary Catheter: None   Colostomy/Ileostomy/Ileal Conduit: No       Date of Last BM: 03/27/22    Intake/Output Summary (Last 24 hours) at 3/28/2022 1148  Last data filed at 3/28/2022 0800  Gross per 24 hour   Intake 1112 ml   Output --   Net 1112 ml     I/O last 3 completed shifts: In: 0797 [P.O.:0532]  Out: -     Safety Concerns: At Risk for Falls    Impairments/Disabilities:      None    Nutrition Therapy:  Current Nutrition Therapy:   - Oral Diet:  General    Routes of Feeding: Oral  Liquids:  Thin Liquids  Daily Fluid Restriction: no  Last Modified Barium Swallow with Video (Video Swallowing Test): not done    Treatments at the Time of Hospital Discharge:   Respiratory Treatments: Pulmicort twice daily  Oxygen Therapy:  is on oxygen at 2 L/min per nasal cannula.   Ventilator:    - No ventilator support    Rehab Therapies: Physical Therapy and Occupational Therapy  Weight Bearing Status/Restrictions: No weight bearing restrictions  Other Medical Equipment (for information only, NOT a DME order):  walker  Other Treatments: None    Patient's personal belongings (please select all that are sent with patient):  Glasses, clothing, phone    RN SIGNATURE:  Electronically signed by Peng Carter LPN on 0/24/93 at 5:17 AM EDT

## 2022-03-28 NOTE — PROGRESS NOTES
[facility] NOTE    Conference Date: 3/29/2022  Admit Date:  3/17/2022  6:51 PM  Patient Name: Venktaa Olmstead    MRN: [id number]    : 1935  (80 y.o.)  Rehabilitation Admitting Diagnosis:  Pseudogout of left knee [M11.262]  Referring Practitioner: Ju Norman MD      CASE MANAGEMENT  Current issues/needs regarding patient and family discharge status: Patient has progressed well with PT/OT. Plan for discharge home today, 2022. Home health care services for RN/PT/OT/HHA arranged through [address] [place] [address]. PHYSICAL THERAPY  Patient overall is making progress with skilled PT treatment and has met 5/6 STG's and 3/6 LTG's. Patient is Mod I for bed mobility, sit to stand transfers and ambulation with RW. Patient continues to be limited with ambulation distance (~75 feet) due to decreased endurance levels. Patient is able to ascend/descend 4 steps with bilateral handrails with Mod I with ability to perform with proper sequencing with education provided to perform step two pattern in order to assist with energy conservation. Patient overall can continue to benefit from skilled PT treatment in order to assist with dynamic balance, gait training, BLE strengthening, stair training and endurance training for increased functional mobility. Equipment Needed:  (Continue to assess pending progress; may require  RW)    [address]  has made great progress on IP Rehab. Anne-Marie Balderas has improved to a modified independent level with her ADL and IADL routine with use of RW. She continues to be limited by her endurance and SOB, however this is near baseline and she manages her energy conservation techniques well. She has demo improved attention to her 02 line mgmt with her RW and is now mod I with her ADL and IADL based mobility.  Anne-Marie Balderas at discharge tomorrow would benefit from [facility] OT services to improve safety at home to decrease fall risk / risk of injury. Other: Pt has a shower seat, grab bars, hand held shower, ADA height toilet and half wall nearby. RECREATIONAL THERAPY  Patient has been offered participation in recreational therapy activities and participates as able. NUTRITION  Weight: 151 lb 14.4 oz (68.9 kg) / Body mass index is 26.06 kg/m². Current diet: ADULT DIET; Regular  ADULT ORAL NUTRITION SUPPLEMENT; Lunch; Frozen Oral Supplement  Please see nutrition note for details. NURSING  Continent of Bowel: Yes. Frequency: at least daily. Management: toilet. Continent of Bladder: Yes. Frequency: several times daily. Management: call for assistance. Pain is Managed:  Yes. Management: scheduled Tylenol . Frequency of Intervention: 6 hours. Adequately Controlled: Yes  Sleep: Adequate  Signs and Symptoms of Infection:  No.   Signs and Symptoms of Skin Breakdown:  No.   Injury and/or Falls during Inpatient Rehabilitation Admission: No  Anticoagulants: Eliquis  Diabetic: No  Consultations/Labs/X-rays: none pending  Oxygen while on IP Rehab:  Yes Currently using  2 liters per NC  . Home oxygen: Yes    No results for input(s): POCGLU in the last 72 hours.     Lab Results   Component Value Date    LDLCALC 74 01/04/2022    LDLDIRECT 77.36 08/06/2021         Vitals:    03/28/22 0818 03/28/22 1639 03/28/22 1930 03/29/22 0811   BP: 138/64  130/60 91/63   Pulse: 70  68 87   Resp: 16 24 18    Temp: 97.7 °F (36.5 °C)  97.7 °F (36.5 °C) 97.7 °F (36.5 °C)   TempSrc: Oral  Oral Oral   SpO2: 93% 93% 96% 97%   Weight:       Height:              Family Education: Family available and participating in education   Fall Risk:  Falling star program initiated  Is the patient appropriate for a stay in the functional apartment? no    Discharge Plan   Estimated Discharge Date: Today, 3/29/22   Destination: discharge home with supervision  Services at Discharge: [address] [facility], Occupational Therapy, Nursing and HHA 2x week  Is patient appropriate for an outpatient driving evaluation? no  Equipment at Discharge: Other: Pt has a shower seat, grab bars, hand held shower, ADA height toilet and half wall nearby. Factors facilitating achievement of predicted outcomes: Family support, Friend support, Motivated, Cooperative and Pleasant  Barriers to the achievement of predicted outcomes: Depression, Anxiety and Decreased endurance  Follow up with physiatrist? no     Team Members Present at Conference:  :Marianne Rehman, [address] OTR/JUDY Gimenez [address], [address]  Speech Therapist:N/A  Margarita Klein, RN  Psychologist: Bayron Cm, PhD.    I approve the established interdisciplinary plan of care as documented within the medical record of Oksana Barlow.     Lexus Claire MD

## 2022-03-28 NOTE — PROGRESS NOTES
Physical Medicine & Rehabilitation   Progress Note    Chief Complaint:  Left septic knee, pseudogout. Rehab needs    Subjective:  Patient seen today, up in room in chair. Patient feels things are going really well, and looking forward to going home tomorrow. She continues to use 2L supplelmental O2 2/2 pulmonary fibrosis. Patient sleeping well, pain is well controlled. Patient denies any needs or concerns at this time. Her planned date for discharge from the IPR Unit to home on 3/29/22 with Home Health  Physical Therapy, Occupational Therapy, Nursing and HHA 2x week. Rehabilitation:  PT: reviewed    OT: reviewed    Review of Systems:  CONSTITUTIONAL:  negative  EYES:  Wears glasses  HEENT:  negative  RESPIRATORY:  On 2L supplemental O2 per NC as at home for pulmonary fibrosis  CARDIOVASCULAR:  negative  GASTROINTESTINAL:  Bowels have been moving  GENITOURINARY:  negative  SKIN:  negative  HEMATOLOGIC/LYMPHATIC:  negative  MUSCULOSKELETAL:  negative  NEUROLOGICAL:  negative  BEHAVIOR/PSYCH:  negative  System review otherwise negative      Objective:  Vitals:    03/28/22 0818   BP: 138/64   Pulse: 70   Resp: 16   Temp: 97.7 °F (36.5 °C)   SpO2: 93%   awake  Orientation:   person, place, time  Mood: within normal limits  Affect: calm and spontaneous  General appearance: well groomed and in no acute distress     Memory:   normal,   Attention/Concentration: normal  Language:  normal     Cranial Nerves:  cranial nerves II-XII are grossly intact  ROM:  abnormal - decreased at left knee  Tone:  normal  Muscle bulk: within normal limits  Sensory:  Sensory intact    Skin: warm and dry, no rash or erythema  Peripheral vascular: Pulses: Normal upper and lower extremity pulses; Edema: no    Diagnostics:   No results found for this or any previous visit (from the past 24 hour(s)).         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 GI: Pepcid 20 mg po daily  · Pain: Tylenol 500 mg po q 6 hours prn; Flexeril 10 mg po tid; Norco 5/325 0.5 tablet po q 4 hours prn  · Bowels: Colace 100 mg po qd PRN; MOM 30 ml po daily prn; glycolax 17 g po daily PRN  · Bowel meds PRN.    · colchicine 0.6mg BID  · Team conference Tuesday  · Discharge planning:  to home on 3/29/22 with Home Health  Physical Therapy, Occupational Therapy, Nursing and HHA 2x week    Missed Therapy Time:  · None      JASMEET Silverio - CNP

## 2022-03-28 NOTE — PLAN OF CARE
Problem: Impaired respiratory status  Goal: Clear lung sounds  Outcome: Met This Shift   Pt is on 2lpm nc with sat at 93%. Pt does well with tx and knows to rinse mouth after.

## 2022-03-28 NOTE — PROGRESS NOTES
[address] [place] [address]  INPATIENT PHYSICAL THERAPY  PROGRESS NOTE  [facility] - 7E-65/065-A    Time In: 0830  Time Out: 0930  Timed Code Treatment Minutes: 60 Minutes  Minutes: 60          Date: 3/28/2022  Patient Name: Brandon Soto,  Gender:  female        MRN: [id number]  : 1935  (80 y.o.)     Referring Practitioner: Edi Salinas MD  Diagnosis: Pseudogout of left knee  Additional Pertinent Hx: Per H&P Katherine Brady  is a 80 y.o. female admitted to the inpatient rehabilitation unit on 3/17/2022. She was originally admitted to [address] [place] [address] on 3/15/2022 with a past medical history of advanced osteoarthritis, idiopathic pulmonary fibrosis on 2L of home oxygen, bronchiectasis, hypertension, hyperlipidemia, and secondary hyperparathyroidism. She  presented to ED for evaluation of left knee pain x previous 2 days. Pain at that time was at 10/10. She could not ambulate, and was unable to lift left lower extremity due to severe pain. Patient had left SI joint injection on 2022. She reported no fever or chills. No chest pain. No abdominal pain. No diarrhea. No urinary symptoms. She had prior bilateral total knee replacements, the left one completed in  and the right in . She noticed slightly increased swelling to left knee. Denied recent left knee trauma or injury. No change of bowl and bladder functions. She lives at an assisted living. At baseline she walks without assistance. In the setting of a total knee arthroplasty with knee pain and effusion, her knee was aspirated in the ED for 30 ml of yellow cloudy fluid. Her aspiration showed WBC in the joint fluid. She also had calcium pyrophosphate crystals. White count was 23,350 with 90% polys. She denied any fever or chills and had noprevious history of infection. She was diagnosed with pseudogout, started on Toradol for 2 days then transitioned to Naproxen 250 mg bid.   Colchicine added at 0.6 mg bid. Was on Dilaudid 1mg q 4 hours IV prn, and Flexeril 10 mg po tid prn. Also on Norco 5/325 one po q 8 hours prn. She also has a pin point area of skin irritation on her coccyx. Prior Level of Function:  Lives With: Alone  Type of Home:  ([facility])  Home Layout: One level  Home Access: Stairs to enter with rails  Entrance Stairs - Number of Steps: 2  Entrance Stairs - Rails: Right  Home Equipment: Cane,Oxygen   Bathroom Shower/Tub: Walk-in shower  Bathroom Toilet: Handicap height  Bathroom Equipment: Grab bars in shower,Tub transfer bench  Bathroom Accessibility: Accessible    Receives Help From: Friend(s)  ADL Assistance: [address] [place] [address] Avenue: Independent  Homemaking Responsibilities: Yes  Ambulation Assistance: Independent  Transfer Assistance: Independent  Active : Yes  Additional Comments: Patient reports she was at [employer] for about a week due to increased difficulty with mobiltiy however prior lived in a [facility] independently and would like to return to her condo upon discharge. Patient reports she is on 2L O2 at baseline. Patient reports she recently had to utilize a cane for ambulation due to increased complaints of pain in L knee however prior she did not use any AD for mobility. Patient reported she did not have to ambulate far distances. Patient reports she has two sons however one is in [facility] and one is in [place]    Restrictions/Precautions:  Restrictions/Precautions: Fall Risk,Weight Bearing  Left Lower Extremity Weight Bearing: Weight Bearing As Tolerated  Position Activity Restriction  Other position/activity restrictions: 2L O2, hx of vertigo     SUBJECTIVE: Patient in recliner upon arrival, agreed and cooperative for therapy. PAIN: No pain noted.      Vitals: Vitals not assessed per clinical judgement, see nursing flowsheet    OBJECTIVE:  Bed Mobility:  Rolling to Left: Modified Independent   Supine to Sit: Modified Independent  Sit to Supine: Modified Independent     Transfers:  Sit to Stand: Modified Independent  Stand to Sit:Modified Independent  To/From Bed and Chair: Modified Independent, using RW  Car:Supervision, did require verbal cues for safe/correct hand placements and safely getting lined up with seat before sitting. Ambulation:  Modified Independent   Distance: 10 ft. X1; 75 ft. X1; 20 ft x1 (up/down chapel ramp)  Surface: Level Tile, Carpet and Ramp  Device:Rolling Walker  Gait Deviations: Forward Flexed Posture, Slow Jaimee, Decreased Weight Shift Left and Decreased Gait Speed    Stairs:  Stairs:  6\" steps. X 4 using Bilateral Handrails and Modified Independent. Platform:  6\" platform X 1 using Rolling Walker and Modified Independent. Balance:  Dynamic Standing Balance: Modified Independent, using 1 UE support on AD while picking up object from floor with adaptive reacher    Functional Outcome Measures: Not completed       ASSESSMENT:  Assessment: Patient progressing toward established goals. PT Assessment: Patient overall is making progress with skilled PT treatment and has met 5/6 STG's and 3/6 LTG's. Patient is Mod I for bed mobility, sit to stand transfers and ambulation with RW. Patient continues to be limited with ambulation distance (~75 feet) due to decreased endurance levels. Patient is able to ascend/descend 4 steps with bilateral handrails with Mod I with ability to perform with proper sequencing with education provided to perform step two pattern in order to assist with energy conservation. Patient overall can continue to benefit from skilled PT treatment in order to assist with dynamic balance, gait training, BLE strengthening, stair training and endurance training for increased functional mobility. Activity Tolerance:  Patient tolerance of  treatment: good.       Equipment Recommendations:Equipment Needed:  (Continue to assess pending progress; may require  RW)  Discharge Recommendations: Home with Home Health PT  Plan: Times per week: 5x/wk 90 min; 1x/wk 30 min  Times per day: Daily  Current Treatment Recommendations: Strengthening,Neuromuscular Re-education,Home Exercise Program,ROM,Safety Education & Training,Balance Training,Endurance Training,Functional Mobility Training,Wheelchair Mobility Training,Transfer Training,Gait Training,Stair training,Patient/Caregiver Education & Training    Patient Education  Patient Education: Plan of Care, Precautions/Restrictions, [employer], Transfers, Reviewed Prior Education, Gait, Stairs, Car Transfers, Health Promotion and Wellness Education, Home Safety Education, Activity Pacing, Pursed Lip Breathing,  - Patient Verbalized Understanding    Goals:  Patient goals : To be independent  Short term goals  Time Frame for Short term goals: 1 week  Short term goal 1: Patient to perform supine<>sit with Supervision without railings in order to assist with getting into and out of bed. MET, SEE LTG  Short term goal 2: Patient to perform sit to stand transfers with SBA with <=1 cue for hand placement in order to assist with safety with initiation of ambulation. MET, SEE LTG  Short term goal 3: Patient to ambulate >=75 feet with RW with Supervision in order to assist with home mobility. MET, SEE LTG  Short term goal 4: Patient to perform car transfer with Supervision in order to assist with getting into and out of car. MET, SEE LTG  Short term goal 5: Patient to perform TUG in <=26 seconds in order to assist with decreased risk for falls. NOT ADDRESSED   Short term goal 6: Patient to ascend/descend 2 steps with R handrail with SBA in order to assist with getting into and out of condo. MET, SEE LTG   Long term goals  Time Frame for Long term goals : 2 weeks  Long term goal 1: Patient to perform supine<>sit with Mod I in order to assist with getting into and out of bed.  MET, CONTINUE   Long term goal 2: Patient to perform sit to stand transfers Mod I in order to assist with standing and sitting from various surfaces. MET, CONTINUE   Long term goal 3: Patient to perform car transfer Mod I in order to assist with getting to and  from appointments. NOT MET   Long term goal 4: Patient to ambulate over level surfaces >=150 feet with LRAD with Mod I in order to assist with home mobility. NOT MET   Long term goal 5: Patient to ambulate over uneven surface with Mod I in order to assist with safety in community. MET, CONTINUE   Long term goal 6: Patient to ascend/descend 2 steps with R handrail with Mod I in order to assist with getting into and out of condo. NOT MET     Following session, patient left in safe position with all fall risk precautions in place. Treatment and documentation completed by Anabelle Lee PTA.   Goal revision and assessment for progress note completed by Grayson Cope PT.

## 2022-03-28 NOTE — PROGRESS NOTES
[address] [place] [address]  Occupational Therapy  Daily Note  Time:   Time In: 1000  Time Out: 1030  Timed Code Treatment Minutes: 30 Minutes  Minutes: 30          Date: 3/28/2022  Patient Name: Gene Monzon,   Gender: female      Room: [facility]anner65/065-A  MRN: [id number]  : 1935  (80 y.o.)  Referring Practitioner: Dr. Harrison Knight  Diagnosis: Pseudogout of left knee  Additional Pertinent Hx: Per H&P Helen Blake  is a 80 y.o. female admitted to the inpatient rehabilitation unit on 3/17/2022. She was originally admitted to [address] [place] [address] on 3/15/2022 with a past medical history of advanced osteoarthritis, idiopathic pulmonary fibrosis on 2L of home oxygen, bronchiectasis, hypertension, hyperlipidemia, and secondary hyperparathyroidism. She  presented to ED for evaluation of left knee pain x previous 2 days. Pain at that time was at 10/10. She could not ambulate, and was unable to lift left lower extremity due to severe pain. Patient had left SI joint injection on 2022. She reported no fever or chills. No chest pain. No abdominal pain. No diarrhea. No urinary symptoms. She had prior bilateral total knee replacements, the left one completed in  and the right in . She noticed slightly increased swelling to left knee. Denied recent left knee trauma or injury. No change of bowl and bladder functions. She lives at an assisted living. At baseline she walks without assistance. In the setting of a total knee arthroplasty with knee pain and effusion, her knee was aspirated in the ED for 30 ml of yellow cloudy fluid. Her aspiration showed WBC in the joint fluid. She also had calcium pyrophosphate crystals. White count was 23,350 with 90% polys. She denied any fever or chills and had noprevious history of infection. She was diagnosed with pseudogout, started on Toradol for 2 days then transitioned to Naproxen 250 mg bid.   Colchicine added at 0.6 mg bid. Was on Dilaudid 1mg q 4 hours IV prn, and Flexeril 10 mg po tid prn. Also on Norco 5/325 one po q 8 hours prn. She also has a pin point area of skin irritation on her coccyx. Restrictions/Precautions:  Restrictions/Precautions: Fall Risk,Weight Bearing  Left Lower Extremity Weight Bearing: Weight Bearing As Tolerated  Position Activity Restriction  Other position/activity restrictions: 2L O2, hx of vertigo     SUBJECTIVE: Pt seated in bedside chair upon arrival, agreeable to OT session. PAIN: No c/o    Vitals: Oxygen: Pt on 2L O2 via [place]- WFL throught  Heart Rate: WNL    COGNITION: WFL    ADL:   No ADL's completed this session. Jose Carnes BALANCE:  Sitting Balance:  Modified Independent. Standing Balance: Modified Independent. BED MOBILITY:  Not Tested    TRANSFERS:  Sit to Stand:  Modified Independent. Stand to Sit: Modified Independent. FUNCTIONAL MOBILITY:  Assistive Device: Rolling Walker   Assist Level:  Modified Independent. Distance:   Completed functional mobility pt room at fair pace, no LOB noted. Pt requires no cues for walker safety and demos good carryover for O2 line management at short distance- seated rest break after trial of mobility, min fatigue noted. ADDITIONAL ACTIVITIES:  Pt participated in IADL task to ambulate within pt roomk with use of RW to reach into various planes at high/low levels to retrieve personal belongings and place into suitcase. Pt completed with Mirta implementing EC strategies for seated rest breaks as needed to increase safety. Completed to increase home safety and facilitate functional reaching required for homemaking tasks. Patient identified a personal goal to increase UB strength and improve overall endurance so they can complete their toilet & shower transfers; skilled edu on UE strengthening. Completed BUE exercises x10 reps x1 sets using min resistance band in all joints/planes to increase strength and endurance required for ADLs.  Pt required brief rest break between each exercise and no v/c for proper technique following handout. ASSESSMENT:     Activity Tolerance:  Patient tolerance of  treatment: good. Discharge Recommendations: Home with Home Health OT  Equipment Recommendations: Other: Pt has a shower seat, grab bars, hand held shower, ADA height toilet and half wall nearby. Plan: Times per week: 5x/wk for 90 min and 1x/wk for 30 min  Current Treatment Recommendations: Strengthening,Functional Mobility Training,Balance Training,Endurance Training,Patient/Caregiver Education & Training,Equipment Evaluation, Education, & procurement,Safety Education & Training,Self-Care / ADL,Home Management Training    Patient Education  Patient Education: IADL's, Energy Conservation and Reviewed Prior Education    Goals  Short term goals  Time Frame for Short term goals: 1 week  Short term goal 1: Pt will complete BUE strengthening exercises with min vcs for technique to increase indep and endurance with all self cares. Short term goal 2: Pt will complete standing tolerance x 5 minutes with 1-2 UE release and SBA to increase indep with sinkside grooming. Short term goal 3: Pt will complete functional mobiity to/from BR with SBA and 0 vcs for safety to increase indep with toileting. Short term goal 4: Pt will complete LB dressing with LHAE PRN and SBA to increase indep and endurance within home environment. Long term goals  Time Frame for Long term goals : 2 weeks  Long term goal 1: Pt will complete BADL routine with mod I using LHAE prn to return to PLOF. Long term goal 2: Pt will complete light IADL tasks with mod I using ECT prn to improve indpe within her condo. Following session, patient left in safe position with all fall risk precautions in place.

## 2022-03-29 VITALS
OXYGEN SATURATION: 96 % | HEART RATE: 87 BPM | RESPIRATION RATE: 18 BRPM | TEMPERATURE: 97.7 F | HEIGHT: 64 IN | WEIGHT: 151.9 LBS | SYSTOLIC BLOOD PRESSURE: 91 MMHG | DIASTOLIC BLOOD PRESSURE: 63 MMHG | BODY MASS INDEX: 25.93 KG/M2

## 2022-03-29 PROCEDURE — 6370000000 HC RX 637 (ALT 250 FOR IP): Performed by: PHYSICAL MEDICINE & REHABILITATION

## 2022-03-29 PROCEDURE — 6360000002 HC RX W HCPCS: Performed by: PHYSICAL MEDICINE & REHABILITATION

## 2022-03-29 PROCEDURE — 94640 AIRWAY INHALATION TREATMENT: CPT

## 2022-03-29 PROCEDURE — 2700000000 HC OXYGEN THERAPY PER DAY

## 2022-03-29 PROCEDURE — 97530 THERAPEUTIC ACTIVITIES: CPT

## 2022-03-29 PROCEDURE — 99239 HOSP IP/OBS DSCHRG MGMT >30: CPT | Performed by: NURSE PRACTITIONER

## 2022-03-29 PROCEDURE — 97535 SELF CARE MNGMENT TRAINING: CPT

## 2022-03-29 PROCEDURE — 94760 N-INVAS EAR/PLS OXIMETRY 1: CPT

## 2022-03-29 PROCEDURE — 97110 THERAPEUTIC EXERCISES: CPT

## 2022-03-29 RX ADMIN — APIXABAN 5 MG: 5 TABLET, FILM COATED ORAL at 09:06

## 2022-03-29 RX ADMIN — METOPROLOL TARTRATE 12.5 MG: 25 TABLET, FILM COATED ORAL at 09:06

## 2022-03-29 RX ADMIN — BUDESONIDE 500 MCG: 0.5 INHALANT RESPIRATORY (INHALATION) at 09:41

## 2022-03-29 RX ADMIN — ACETAMINOPHEN 500 MG: 500 TABLET ORAL at 09:06

## 2022-03-29 RX ADMIN — COLCHICINE 0.6 MG: 0.6 TABLET, FILM COATED ORAL at 09:06

## 2022-03-29 RX ADMIN — ACETAMINOPHEN 500 MG: 500 TABLET ORAL at 06:00

## 2022-03-29 RX ADMIN — LEVOTHYROXINE SODIUM 88 MCG: 0.09 TABLET ORAL at 05:55

## 2022-03-29 ASSESSMENT — PAIN SCALES - GENERAL
PAINLEVEL_OUTOF10: 5
PAINLEVEL_OUTOF10: 0
PAINLEVEL_OUTOF10: 0

## 2022-03-29 ASSESSMENT — PAIN DESCRIPTION - LOCATION: LOCATION: LEG

## 2022-03-29 ASSESSMENT — PAIN DESCRIPTION - PAIN TYPE: TYPE: ACUTE PAIN

## 2022-03-29 ASSESSMENT — PAIN - FUNCTIONAL ASSESSMENT: PAIN_FUNCTIONAL_ASSESSMENT: ACTIVITIES ARE NOT PREVENTED

## 2022-03-29 ASSESSMENT — PAIN DESCRIPTION - ORIENTATION: ORIENTATION: LEFT

## 2022-03-29 NOTE — DISCHARGE SUMMARY
Physical Medicine & Rehabilitation   Discharge Summary     Patient Identification:  Micky Epps  : 1935  Admit date: 3/17/2022  Discharge date: 3/29/22   Attending provider: Juana Henry MD        Primary care provider: Dustin Traore MD     Discharge Diagnoses:   Primary impairment requiring rehabilitation: 8.9 Other Orthopedic     Etiologic Diagnosis that led to the condition: Left knee pseudogout     Comorbid conditions affecting rehabilitation:  · Left knee pain secondary to pseudogout  · S/p total left knee arthroplasty   · Pancreatic ductal dilatation- noted on CT  · 1 cm calcification in the mid pancreatic body- noted on CT  · Osteoarthritis  · Osteopenia  · *Idiopathic pulmonary fibrosis on 2L of home oxygen  · *Chronic respiratory failure  · HTN  · HLD  · Diverticulosis  · Chronic constipation  · Hyponatremia  · Bursitis bilateral shoulders  · Diverticulitis of colon  · Parathyroid adenoma  · 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  · Vitamin D deficiency      Discharge Functional Status:    Occupational Therapy:  EATING:Independent. CARE Score: 6  ORAL HYGIENE:Independent. CARE Score: 6  TOILETING HYGIENE:Independent. CARE Score: 6. SHOWERING/BATHING:Independent. UPPER BODY DRESSING:Independent. CARE Score: 6  LOWER BODY DRESSING:Independent. CARE Score: 6  FOOTWEAR:Independent. CARE Score: 6  TOILET TRANSFER: Independent. CARE Score: 6    Physical Therapy:  ROLLING LEFT AND RIGHT: Assistance Needed: Independent. CARE Score: 6. SIT TO SIDELYING:  Assistance Needed: Independent. CARE Score: 6. SIDELYING TO SIT: Assistance Needed: Independent. CARE Score: 6. SIT TO STAND:Assistance Needed: Independent. CARE Score: 6. CHAIR TO BED TRANSFER:Assistance Needed: Independent. CARE Score: 6. CAR TRANSFER:Assistance Needed: Supervision or touching assistance. CARE Score: 4. Not attempted due to medical condition or safety concerns  WALK 10 FEET:Assistance Needed: Independent. CARE Score: 6. WALK 50 FEET WITH 2 TURNS:Assistance Needed: Independent. CARE Score: 6. Not attempted due to medical condition or safety concerns  WALK 150 FEET: .  CARE Score: 88. Not attempted due to medical condition or safety concerns  WALK 10 FEET ON EVEN SURFACES:Assistance Needed: Independent. CARE Score: 6. Not attempted due to medical condition or safety concerns  NAVIGATE 1 STEP:Assistance Needed: Independent. CARE Score: 6. Not attempted due to medical condition or safety concerns  NAVIGATE 4 STEPS:Assistance Needed: Independent. CARE Score: 6. Not attempted due to medical condition or safety concerns  NAVIGATE 12 STEPS: .  CARE Score: 88. Not attempted due to medical condition or safety concerns  PROPEL WHEELCHAIR 50 FEET WITH 2 TURNS: .  CARE Score: 9. Not applicable  PROPEL WHEELCHAIR 150 FEET: . Gweneth Latch Not applicable      Inpatient Acute Hospital Course:   Tom Barnes  is a 80 y.o. female admitted to the inpatient rehabilitation unit on 3/17/2022. She was originally admitted to [facility] on 3/15/2022 with a past medical history of advanced osteoarthritis, idiopathic pulmonary fibrosis on 2L of home oxygen, bronchiectasis, hypertension, hyperlipidemia, and secondary hyperparathyroidism. Joan Rodríguez  presented to ED for evaluation of left knee pain x previous 2 days.    Pain at that time was at 10/10.  She could not ambulate, and was unable to lift left lower extremity due to severe pain.    Patient had left SI joint injection on 2/22/2022.  She reported no fever or chills.  No chest pain.  No abdominal pain.  No diarrhea. No urinary symptoms.  She had prior bilateral total knee replacements, the left one completed in 2012 and the right in 2014.  She noticed slightly increased swelling to left knee.  Denied recent left knee trauma or injury. No change of bowl and bladder functions. At baseline she walks without assistance.   In the setting of a total knee arthroplasty with knee pain and effusion, her knee was aspirated in the ED for 30 ml of yellow cloudy fluid.  Her aspiration showed WBC in the joint fluid.  She also had calcium pyrophosphate crystals.  White count was 23,350 with 90% polys.  She denied any fever or chills and had no  previous history of infection.  She was diagnosed with pseudogout, started on Toradol for 2 days then transitioned to Naproxen 250 mg bid.  Colchicine added at 0.6 mg bid.  Was on Dilaudid 1mg q 4 hours IV prn, and Flexeril 10 mg po tid prn.  Also on Norco 5/325 one po q 8 hours prn. Inpatient Rehabilitation Course:   Terese Fernandes is a 80 y.o. female admitted to inpatient rehabilitation on 3/17/2022 for pseudogout of the left knee. The patient participated in an aggressive multidisciplinary inpatient rehabilitation program involving 3 hours per day, 5 days per week of rehabilitation. Hypertension management was undertaken with medication management on any patient with systolic blood pressure greater than 701 or diastolic blood pressure greater than 90. Hyperlipidemia was considered and the patient was started or continued on a statin medication for any LDL>100. Appropriate stroke and DVT prophylaxis was maintained throughout rehabilitation stay with eliquis. Patient progressed very well with therapy and was able to transition home. Patient had an uneventful course on IPR. Patient comfortable with discharge plan to home with [facility]. Patient on chronic O2 due to pulmonary fibrosis. Patient denied need for Norco at discharge, managing pain with tylenol. Dr Kee Garcia followed during the IPR stay for medical management    Patient was discharged Home with [facility] in Stable condition.     Consults:   Family Medicine    Significant Diagnostics:   CBC:   Lab Results   Component Value Date    WBC 6.7 03/18/2022    RBC 3.81 03/18/2022    RBC 3.45 06/08/2012    HGB 10.5 03/18/2022    HCT 33.4 03/18/2022    MCV 87.7 03/18/2022    MCH 27.6 03/18/2022    MCHC 31.4 03/18/2022    RDW 14.9 12/06/2017     03/18/2022    MPV 10.4 03/18/2022     BMP:    Lab Results   Component Value Date     03/26/2022    K 4.6 03/26/2022    K 5.2 03/08/2022     03/26/2022    CO2 29 03/26/2022    BUN 22 03/26/2022    LABALBU 4.1 03/15/2022    LABALBU 3.5 06/08/2012    CREATININE 0.9 03/26/2022    CALCIUM 9.2 03/26/2022    LABGLOM 59 03/26/2022    GLUCOSE 89 03/26/2022    GLUCOSE 93 06/08/2012     Hepatic Function Panel:    Lab Results   Component Value Date    ALKPHOS 89 03/15/2022    ALT 11 03/15/2022    AST 18 03/15/2022    PROT 8.1 03/15/2022    BILITOT 0.5 03/15/2022    BILIDIR <0.2 01/04/2022    LABALBU 4.1 03/15/2022    LABALBU 3.5 06/08/2012     Albumin:    Lab Results   Component Value Date    LABALBU 4.1 03/15/2022    LABALBU 3.5 06/08/2012     Calcium:    Lab Results   Component Value Date    CALCIUM 9.2 03/26/2022     Magnesium:    Lab Results   Component Value Date    MG 2.1 09/30/2020     Phosphorus:    Lab Results   Component Value Date    PHOS 4.1 06/16/2016     PT/INR:    Lab Results   Component Value Date    INR 1.94 03/15/2022     PTT:    Lab Results   Component Value Date    APTT 39.3 03/15/2022   [APTT}  Troponin:    Lab Results   Component Value Date    TROPONINI 0.008 12/31/2012     Last 3 Troponin:    Lab Results   Component Value Date    TROPONINI 0.008 12/31/2012     U/A:    Lab Results   Component Value Date    COLORU YELLOW 03/08/2022    PROTEINU NEGATIVE 03/08/2022    PHUR 7.0 03/08/2022    LABCAST NONE SEEN 03/08/2022    LABCAST NONE SEEN 03/08/2022    WBCUA 0-2 03/08/2022    RBCUA 0-2 03/08/2022    YEAST NONE SEEN 03/08/2022    BACTERIA NONE SEEN 03/08/2022    SPECGRAV 1.012 03/08/2022    LEUKOCYTESUR NEGATIVE 03/08/2022    UROBILINOGEN 0.2 03/08/2022    BILIRUBINUR NEGATIVE 03/08/2022    BLOODU NEGATIVE 03/08/2022    GLUCOSEU NEGATIVE 01/06/2020     ABG:    Lab Results   Component Value Date    PH 7.41 09/25/2020    PCO2 39 09/25/2020    PO2 61 Details   colchicine (COLCRYS) 0.6 MG tablet Take 1 tablet by mouth 2 times daily  Qty: 60 tablet, Refills: 3              Details   docusate sodium (COLACE) 100 MG capsule Take 1 capsule by mouth 2 times daily  Qty: 60 capsule, Refills: 2      polyethylene glycol (GLYCOLAX) 17 g packet Take 17 g by mouth 2 times daily  Qty: 527 g, Refills: 2      levothyroxine (SYNTHROID) 88 MCG tablet take 1 tablet by mouth once daily, new dose  Qty: 90 tablet, Refills: 0    Associated Diagnoses: Hypothyroidism, unspecified type      budesonide (PULMICORT) 0.5 MG/2ML nebulizer suspension Take 2 mLs by nebulization 2 times daily  Qty: 60 ampule, Refills: 3    Associated Diagnoses: Bronchiectasis without complication ([address] Pulmonary fibrosis (HCC)      metoprolol tartrate (LOPRESSOR) 25 MG tablet take 1/2 tablet by mouth twice a day  Qty: 90 tablet, Refills: 5      simvastatin (ZOCOR) 20 MG tablet TAKE 1 TABLET BY MOUTH  NIGHTLY  Qty: 90 tablet, Refills: 3    Associated Diagnoses: Pure hypercholesterolemia      azithromycin (ZITHROMAX) 250 MG tablet Take 1 tablet by mouth daily  Qty: 90 tablet, Refills: 3    Comments: Her pulmonologist at the [facility] wants her on this everyday  Associated Diagnoses: Bronchiectasis without complication (HCC)      amoxicillin (AMOXIL) 500 MG capsule 4 pills 1 hr before dental procedures  Qty: 4 capsule, Refills: 3      apixaban (ELIQUIS) 5 MG TABS tablet TAKE 1 TABLET BY MOUTH  TWICE DAILY  Qty: 180 tablet, Refills: 3    Comments: Requesting 1 year supply      albuterol sulfate HFA (PROAIR HFA) 108 (90 Base) MCG/ACT inhaler Inhale 2 puffs into the lungs every 6 hours as needed for Wheezing or Shortness of Breath  Qty: 1 Inhaler, Refills: 11    Associated Diagnoses: Pulmonary fibrosis ([address] Allergic rhinitis, unspecified seasonality, unspecified trigger; Bronchiectasis without complication ([address]  Mycobacterium avium infection ([facility] [address] [facility]      Lactobacillus (PROBIOTIC ACIDOPHILUS) CAPS Take 1 capsule by mouth daily       MULTIPLE VITAMIN PO Take 1 tablet by mouth daily               Controlled substances monitoring: not applicable.      35 minutes spent preparing the patient for discharge    JASMEET [place] - CNP

## 2022-03-29 NOTE — PROGRESS NOTES
[address] [place] [address]  INPATIENT PHYSICAL THERAPY  DISCHARGE NOTE  [facility] - 7E-65/065-A    Time In: 0700  Time Out: 0730  Timed Code Treatment Minutes: 30 Minutes  Minutes: 30          Date: 3/29/2022  Patient Name: Richmond Mitchell,  Gender:  female        MRN: [id number]  : 1935  (80 y.o.)     Referring Practitioner: Glynda Fothergill, MD  Diagnosis: Pseudogout of left knee  Additional Pertinent Hx: Per H&P Cordelia Gilbert  is a 80 y.o. female admitted to the inpatient rehabilitation unit on 3/17/2022. She was originally admitted to [address] [place] [address] on 3/15/2022 with a past medical history of advanced osteoarthritis, idiopathic pulmonary fibrosis on 2L of home oxygen, bronchiectasis, hypertension, hyperlipidemia, and secondary hyperparathyroidism. She  presented to ED for evaluation of left knee pain x previous 2 days. Pain at that time was at 10/10. She could not ambulate, and was unable to lift left lower extremity due to severe pain. Patient had left SI joint injection on 2022. She reported no fever or chills. No chest pain. No abdominal pain. No diarrhea. No urinary symptoms. She had prior bilateral total knee replacements, the left one completed in  and the right in . She noticed slightly increased swelling to left knee. Denied recent left knee trauma or injury. No change of bowl and bladder functions. She lives at an assisted living. At baseline she walks without assistance. In the setting of a total knee arthroplasty with knee pain and effusion, her knee was aspirated in the ED for 30 ml of yellow cloudy fluid. Her aspiration showed WBC in the joint fluid. She also had calcium pyrophosphate crystals. White count was 23,350 with 90% polys. She denied any fever or chills and had noprevious history of infection. She was diagnosed with pseudogout, started on Toradol for 2 days then transitioned to Naproxen 250 mg bid.   Colchicine added at 0.6 mg bid. Was on Dilaudid 1mg q 4 hours IV prn, and Flexeril 10 mg po tid prn. Also on Norco 5/325 one po q 8 hours prn. She also has a pin point area of skin irritation on her coccyx. Prior Level of Function:  Lives With: Alone  Type of Home:  ([facility])  Home Layout: One level  Home Access: Stairs to enter with rails  Entrance Stairs - Number of Steps: 2  Entrance Stairs - Rails: Right  Home Equipment: Cane,Oxygen   Bathroom Shower/Tub: Walk-in shower  Bathroom Toilet: Handicap height  Bathroom Equipment: Grab bars in shower,Tub transfer bench  Bathroom Accessibility: Accessible    Receives Help From: Friend(s)  ADL Assistance: [facility] [facility] [address]: Independent  Homemaking Responsibilities: Yes  Ambulation Assistance: Independent  Transfer Assistance: Independent  Active : Yes  Additional Comments: Patient reports she was at [facility] for about a week due to increased difficulty with mobiltiy however prior lived in a [facility] independently and would like to return to her condo upon discharge. Patient reports she is on 2L O2 at baseline. Patient reports she recently had to utilize a cane for ambulation due to increased complaints of pain in L knee however prior she did not use any AD for mobility. Patient reported she did not have to ambulate far distances. Patient reports she has two sons however one is in [place] and one is in [place]    Restrictions/Precautions:  Restrictions/Precautions: Fall Risk,Weight Bearing  Left Lower Extremity Weight Bearing: Weight Bearing As Tolerated  Position Activity Restriction  Other position/activity restrictions: 2L O2, hx of vertigo     SUBJECTIVE: Patient up in chair upon arrival, agreed and cooperative for therapy. Increased coughing noted this am, reports this is normal for her in the morning.      PAIN: Yes, pain in left knee    Vitals: Vitals not assessed per clinical judgement, see nursing flowsheet    OBJECTIVE:  Bed Mobility:  Not Tested    Transfers:  Sit to Stand: Modified Independent  Stand to Sit:Modified Independent   Bed to Chair: Modified Independent using RW    Ambulation:  Modified Independent  Distance: 20 ft. x2   Surface: Level Tile  Device:Rolling Walker  Gait Deviations: Forward Flexed Posture, Decreased Step Length Bilaterally and Decreased Gait Speed    Exercise:  Reviewed Seated and supine HEP with patient for home completion, acknowledged undestanding. Exercises were completed for increased independence with functional mobility. Functional Outcome Measures: Completed     Timed up and Go Test (TUG)  18 seconds-1st trial  17.2 seconds- 2nd trial      Normative Reference Values  60-69 years:  8.1 seconds  70-79 years: 9.2 seconds  80-99 years: 11.3 seconds    < 10 seconds is normal, a score of > 14 seconds indicates high fall risk         ASSESSMENT:  Assessment: Patient progressing toward established goals. The patient tolerated session well, showing steady progress towards goals at this time. Still demonstrates limitations due to generalized weakness and overall deconditioning that affects safe functional mobility. Patient being discharged home alone today with [facility] PT to follow up. Activity Tolerance:  Patient tolerance of  treatment: good. Equipment Recommendations:Equipment Needed:  Rolling Walker delivered from [facility]. Plan: Home alone,  PT to follow up    Patient Education  Patient Education: Plan of Care, Home Exercise Program, Precautions/Restrictions, Transfers, Reviewed Prior Education, Gait, Health Promotion and Wellness Education, Home Safety Education, Activity Pacing, Energy Conservation, Pursed Lip Breathing,  - Patient Verbalized Understanding    Goals:  Patient goals : To be independent  Short term goals  Time Frame for Short term goals: 1 week  Short term goal 1: Patient to perform supine<>sit with Supervision without railings in order to assist with getting into and out of bed. MET, SEE LTG  Short term goal 2: Patient to perform sit to stand transfers with SBA with <=1 cue for hand placement in order to assist with safety with initiation of ambulation. MET, SEE LTG  Short term goal 3: Patient to ambulate >=75 feet with RW with Supervision in order to assist with home mobility. MET, SEE LTG  Short term goal 4: Patient to perform car transfer with Supervision in order to assist with getting into and out of car. MET, SEE LTG  Short term goal 5: Patient to perform TUG in <=26 seconds in order to assist with decreased risk for falls. MET, SEE LTG  Short term goal 6: Patient to ascend/descend 2 steps with R handrail with SBA in order to assist with getting into and out of condo. MET, SEE LTG  Long term goals  Time Frame for Long term goals : 2 weeks  Long term goal 1: Patient to perform supine<>sit with Mod I in order to assist with getting into and out of bed. MET, CONTINUE   Long term goal 2: Patient to perform sit to stand transfers Mod I in order to assist with standing and sitting from various surfaces. MET, CONTINUE   Long term goal 3: Patient to perform car transfer Mod I in order to assist with getting to and  from appointments. NOT MET   Long term goal 4: Patient to ambulate over level surfaces >=150 feet with LRAD with Mod I in order to assist with home mobility. NOT MET   Long term goal 5: Patient to ambulate over uneven surface with Mod I in order to assist with safety in community. MET, CONTINUE   Long term goal 6: Patient to ascend/descend 2 steps with R handrail with Mod I in order to assist with getting into and out of condo. NOT MET     Following session, patient left in safe position with all fall risk precautions in place.

## 2022-03-29 NOTE — PROGRESS NOTES
[facility]  Recreational Therapy  Discharge Note  Inpatient Rehabilitation Unit         Date:  3/29/2022       Patient Name: Darnell Stout      MRN: [id number]       YOB: 1935 (80 y.o.)       Gender: female  Diagnosis: Pseudogout of left knee  Referring Practitioner: Dr. Kelly Bashir    Patient discharged from Recreational Therapy at this time. See recreational therapy notes for details.     Electronically signed by: RONNIE Huber  Date: 3/29/2022

## 2022-03-29 NOTE — PROGRESS NOTES
Plan remains for discharge home today, 03/29/2022. SW contacted [facility] with discharge notification of today, 03/29/2022. Information provided to [facility] (formerly [facility]). Agency has access to discharge instructions and face to face encounter. Team conference today, 03/29/2022. Patient discharged prior to physician/SW rounds.

## 2022-03-29 NOTE — PROGRESS NOTES
Patient: Digna Sotomayor  Unit/Bed: 7E-65/065-A  YOB: 1935  MRN: [id number] Acct: [de-identified]   Admitting Diagnosis: Pseudogout of left knee [M11.262]  Admit Date:  3/17/2022  Hospital Day: 12    Assessment:     Principal Problem:    Pseudogout of left knee  Active Problems:    Essential hypertension    Osteoarthritis    Hypothyroidism    Bronchiectasis without acute exacerbation ([address]  Resolved Problems:    * No resolved hospital problems. *      Plan:     Medically stable for discharge        Subjective:     Patient has no complaint of CP or SOB. .   Medication side effects: none    Scheduled Meds:   acetaminophen  500 mg Oral Q6H    apixaban  5 mg Oral BID    atorvastatin  10 mg Oral Nightly    budesonide  0.5 mg Nebulization BID    colchicine  0.6 mg Oral BID    levothyroxine  88 mcg Oral Daily    metoprolol tartrate  12.5 mg Oral BID     Continuous Infusions:  PRN Meds:docusate sodium, ondansetron, promethazine, loperamide, polyethylene glycol, HYDROcodone-acetaminophen, magnesium hydroxide, cyclobenzaprine    Review of Systems  Pertinent items are noted in HPI. Objective:     Patient Vitals for the past 8 hrs:   BP Temp Temp src Pulse SpO2   03/29/22 0942 -- -- -- -- 96 %   03/29/22 0811 91/63 97.7 °F (36.5 °C) Oral 87 97 %     I/O last 3 completed shifts: In: 1632 [P.O.:1532]  Out: -   I/O this shift:   In: 480 [P.O.:480]  Out: -     BP 91/63   Pulse 87   Temp 97.7 °F (36.5 °C) (Oral)   Resp 18   Ht 5' 4.02\" (1.626 m)   Wt 151 lb 14.4 oz (68.9 kg)   SpO2 96%   BMI 26.06 kg/m²     General appearance: alert, appears stated age and cooperative  Head: Normocephalic, without obvious abnormality, atraumatic  Lungs: clear to auscultation bilaterally  Heart: regular rate and rhythm, S1, S2 normal, no murmur, click, rub or gallop  Abdomen: soft, non-tender; bowel sounds normal; no masses,  no organomegaly  Extremities: extremities normal, atraumatic, no cyanosis or edema  Skin: Skin color, texture, turgor normal. No rashes or lesions  Neurologic: Grossly normal    Electronically signed by Johnson Michael MD on 3/29/2022 at 11:11 AM

## 2022-03-29 NOTE — PLAN OF CARE
Problem: Skin Integrity:  Goal: Will show no infection signs and symptoms  Description: Will show no infection signs and symptoms  3/28/2022 2243 by Cristina Deluna RN  Outcome: Ongoing  Goal: Absence of new skin breakdown  Description: Absence of new skin breakdown  Outcome: Ongoing     Problem: Falls - Risk of:  Goal: Will remain free from falls  Description: Will remain free from falls  3/28/2022 2243 by Cristina Deluna RN  Outcome: Ongoing  Goal: Absence of physical injury  Description: Absence of physical injury  Outcome: Ongoing     Problem: IP MOBILITY  Goal: LTG - patient will demonstrate safe mobility requirements  Outcome: Ongoing     Problem: Pain:  Goal: Pain level will decrease  Description: Pain level will decrease  3/28/2022 2243 by Cristina Deluna RN  Outcome: Ongoing  Goal: Control of acute pain  Description: Control of acute pain  Outcome: Ongoing  Goal: Control of chronic pain  Description: Control of chronic pain  Outcome: Ongoing     Problem: IP DRESSINGS LOWER EXTREMITIES  Goal: LTG - patient will dress lower body with or without assistive device  Outcome: Ongoing     Problem: Nutrition  Goal: Optimal nutrition therapy  Outcome: Ongoing     Problem: DISCHARGE BARRIERS  Goal: Patient's continuum of care needs are met  3/28/2022 2243 by Cristina Deluna RN  Outcome: Ongoing    Problem: IP BOWEL ELIMINATION  Goal: LTG - patient will utilize adaptive techniques/equipment to complete bowel elimination  3/28/2022 2243 by Cristina Deluna RN  Outcome: Ongoing  Goal: LTG - patient will have regular and routine bowel evacuation  Outcome: Ongoing  Goal: STG - patient will be accident free  Outcome: Ongoing  Goal: STG - Patient participates in bowel management program  Outcome: Ongoing  Goal: STG - Patient will verbalize signs and symptoms of constipation and how to prevent/alleviate  Outcome: Ongoing  Goal: STG - patient will be continent of stool  Outcome: Ongoing  Goal: STG - Patient completes digital stimulation technique  Outcome: Ongoing     Problem: Impaired respiratory status  Goal: Clear lung sounds

## 2022-03-29 NOTE — PROGRESS NOTES
[address] [place] [address]  Inpatient Rehabilitation  Occupational Therapy  Discharge Note  Time:  Time In: 0830  Time Out: 0900  Timed Code Treatment Minutes: 30 Minutes  Minutes: 30    Date: 3/29/2022  Patient Name: Hesham Torres,   Gender: female      Room: [facility]/065-A  MRN: [id number]  : 1935  (80 y.o.)  Referring Practitioner: Dr. Kathryn Manley  Diagnosis: Pseudogout of left knee  Additional Pertinent Hx: Per H&P Shruthi Spears  is a 80 y.o. female admitted to the inpatient rehabilitation unit on 3/17/2022. She was originally admitted to [address] [place] [address] on 3/15/2022 with a past medical history of advanced osteoarthritis, idiopathic pulmonary fibrosis on 2L of home oxygen, bronchiectasis, hypertension, hyperlipidemia, and secondary hyperparathyroidism. She  presented to ED for evaluation of left knee pain x previous 2 days. Pain at that time was at 10/10. She could not ambulate, and was unable to lift left lower extremity due to severe pain. Patient had left SI joint injection on 2022. She reported no fever or chills. No chest pain. No abdominal pain. No diarrhea. No urinary symptoms. She had prior bilateral total knee replacements, the left one completed in  and the right in . She noticed slightly increased swelling to left knee. Denied recent left knee trauma or injury. No change of bowl and bladder functions. She lives at an assisted living. At baseline she walks without assistance. In the setting of a total knee arthroplasty with knee pain and effusion, her knee was aspirated in the ED for 30 ml of yellow cloudy fluid. Her aspiration showed WBC in the joint fluid. She also had calcium pyrophosphate crystals. White count was 23,350 with 90% polys. She denied any fever or chills and had noprevious history of infection. She was diagnosed with pseudogout, started on Toradol for 2 days then transitioned to Naproxen 250 mg bid. Colchicine added at 0.6 mg bid.   Was on Dilaudid 1mg q 4 hours IV prn, and Flexeril 10 mg po tid prn. Also on Norco 5/325 one po q 8 hours prn. She also has a pin point area of skin irritation on her coccyx. Restrictions/Precautions:  Restrictions/Precautions: Fall Risk,Weight Bearing  Left Lower Extremity Weight Bearing: Weight Bearing As Tolerated  Position Activity Restriction  Other position/activity restrictions: 2L O2, hx of vertigo    SUBJECTIVE: Patient pleasant and cooperative. Agreeable to OT. PAIN: Denies pain /    Vitals: Vitals not assessed per clinical judgement, see nursing flowsheet    COGNITION: WFL    ADL:   Grooming: Modified Independent.  hand hygiene at RW  Toileting: Modified Independent. Toilet Transfer: Modified Independent. Vin Settler BALANCE:  Sitting Balance:  Independent. Standing Balance: Modified Independent. BED MOBILITY:  Not Tested    TRANSFERS:  Sit to Stand:  Modified Independent. recliner, ETS  Stand to Sit: Modified Independent. FUNCTIONAL MOBILITY:  Assistive Device: Rolling Walker  Assist Level:  Modified Independent. Distance: To and from bathroom  No LOB      ADDITIONAL ACTIVITIES:  Patient completed IADL task of packing up her bathroom, completed with mod I with use of RW, no LOB. Pt required a seated rest break between task and pt demo good self insight of her limitations and ECT, no cues required. Patient completed BUE strengthening exercises with skilled education on HEP: completed x10 reps x1 set with a min resistive band in all joints and all planes in order to improve UE strength and activity tolerance required for BADL routine and toilet / shower transfers. Patient tolerated well, requiring min rest breaks. Patient also required min cues for technique. ASSESSMENT:  Activity Tolerance:  Patient tolerance of  treatment: good. Assessment: Assessment: Tabitha Thomas has made great progress on IP Rehab.  Tabitha Thomas has improved to a modified independent level with her ADL and IADL routine with use of RW. She continues to be limited by her endurance and SOB, however this is near baseline and she manages her energy conservation techniques well. She has demo improved attention to her 02 line mgmt with her RW and is now mod I with her ADL and IADL based mobility. David Murillo would benefit from [facility] OT services to improve safety at home to decrease fall risk / risk of injury at discharge today. Discharge Recommendations: Home with David Henning with nursing aide  Equipment Recommendations: Other: Pt has a shower seat, grab bars, hand held shower, ADA height toilet and half wall nearby. No other DME warranted. Plan: Discharge home with [facility] OT    Patient Education  Patient Education: ADL's, IADL's, Home Exercise Program and Reviewed Prior Education    Goals  Short term goals  Time Frame for Short term goals: 1 day  Short term goal 1: Pt will complete BUE strengthening exercises with min vcs for technique to increase indep and endurance with all self cares. GOAL MET   Short term goal 2: - discontinue  Short term goal 3: Pt will complete functional mobiity to/from BR with mod I and 0 vcs for safety to increase indep with toileting. GOAL MET   Short term goal 4: Pt will complete LB dressing with LHAE PRN and mod I to increase indep and endurance within home environment. GOAL MET   Long term goals  Time Frame for Long term goals : 1 days  Long term goal 1: Pt will complete BADL routine with mod I using LHAE prn to return to PLOF. GOAL MET   Long term goal 2: Pt will complete light IADL tasks with mod I using ECT prn to improve indpe within her condo. GOAL MET     Following session, patient left in safe position with all fall risk precautions in place.

## 2022-03-30 ENCOUNTER — CARE COORDINATION (OUTPATIENT)
Dept: FAMILY MEDICINE CLINIC | Age: 87
End: 2022-03-30

## 2022-03-30 NOTE — CARE COORDINATION
[facility] Transitions Initial Follow Up Call    Outreach made within 2 business days of discharge: Yes    Patient: Rochelle Calderon Patient : 1935   MRN: [id number]  Reason for Admission: knee pain,   Discharge Date: 3/29/22       Spoke with: Sherron Cool    Discharge department/facility: [facility] Interactive Patient Contact:  Was patient able to fill all prescriptions: Yes  Was patient instructed to bring all medications to the follow-up visit: Yes  Is patient taking all medications as directed in the discharge summary? Yes  Does patient understand their discharge instructions: Yes  Does patient have questions or concerns that need addressed prior to 7-14 day follow up office visit: no    Scheduled appointment with PCP within 7-14 days    Follow Up  Future Appointments   Date Time Provider Nicolás Colon   2022  4:10 PM Kory Pakrer MD [facility]   2022 11:00 AM STR CT IMAGING RM[facility]  [facility] OUT EXP [facility]   2022 11:00 AM Darryl Favre, APRN - CNP [address] [place] [address]   2022 11:00 AM Umberto Leslie MD [facility] [place]   2023 12:30 PM Cheli Whitman MD N [facility] - [place] is feeling much better now she is home. She has less pain. She is finding it challenging getting around with the walker and her 02 tubing. She expects to hear from PT/OT and I assured her they will be able to help her with ways to make getting around easier. She said she is just weak since getting out of the hospital. I reassured her that now that she is home she will gradually get her strength back. Her groceries are being delivered today. Her medications were reconciled and she has a follow up appointment scheduled for next week. I will meet her then.     Letty Canavan, RN

## 2022-04-05 ENCOUNTER — OFFICE VISIT (OUTPATIENT)
Dept: FAMILY MEDICINE CLINIC | Age: 87
End: 2022-04-05

## 2022-04-05 ENCOUNTER — CARE COORDINATION (OUTPATIENT)
Dept: FAMILY MEDICINE CLINIC | Age: 87
End: 2022-04-05

## 2022-04-05 VITALS
TEMPERATURE: 96.5 F | BODY MASS INDEX: 26.06 KG/M2 | SYSTOLIC BLOOD PRESSURE: 106 MMHG | HEART RATE: 64 BPM | DIASTOLIC BLOOD PRESSURE: 60 MMHG | RESPIRATION RATE: 20 BRPM | HEIGHT: 64 IN

## 2022-04-05 DIAGNOSIS — J84.10 PULMONARY FIBROSIS (HCC): ICD-10-CM

## 2022-04-05 DIAGNOSIS — K52.9 ENTERITIS: Primary | ICD-10-CM

## 2022-04-05 DIAGNOSIS — I10 ESSENTIAL HYPERTENSION: ICD-10-CM

## 2022-04-05 DIAGNOSIS — E44.0 MODERATE MALNUTRITION (HCC): ICD-10-CM

## 2022-04-05 PROCEDURE — 99496 TRANSJ CARE MGMT HIGH F2F 7D: CPT | Performed by: FAMILY MEDICINE

## 2022-04-05 NOTE — PROGRESS NOTES
No components found for: CHLPL  Lab Results   Component Value Date    TRIG 76 01/04/2022     Lab Results   Component Value Date    HDL 97 01/04/2022     Lab Results   Component Value Date    LDLCALC 74 01/04/2022     No results found for: LABVLDL    Lab Results   Component Value Date    ALT 11 03/15/2022    AST 18 03/15/2022    ALKPHOS 89 03/15/2022    BILITOT 0.5 03/15/2022           Is patient currently taking any cholesterol medications? Yes   If yes, see med list as above    Is the patient reporting any side effects of cholesterol medications? No    Is the patient taking any over the counter medications? No   If yes, see med list as above    Is the patient taking a daily aspirin? No      Patient Self-Management Goal for Chronic Condition  Goal: I will take all medications as prescribed by my doctor, and I will call the office if I am having any medication problems. Barriers to success: none  Plan for overcoming my barriers: N/A     Confidence: 9/10  Date goal set: 4/5/22  Date goal attained:     Have you seen any other physician or provider since your last visit no    Have you had any other diagnostic tests since your last visit? no    Have you changed or stopped any medications since your last visit including any over-the-counter medicines, vitamins, or herbal medicines? no     Are you taking all your prescribed medications?  Yes    If NO, why?

## 2022-04-05 NOTE — PATIENT INSTRUCTIONS
It's ok to use some imodium over the counter as long as there is no fever and you see no blood in the stool. Take just the one dose of colchicine today and call us tomorrow with how you're doing.

## 2022-04-05 NOTE — PROGRESS NOTES
Post-Discharge Transitional Care  Follow Up      Dominik Conn   YOB: 1935    Date of Office Visit:  4/5/2022  Date of Hospital Admission: 3/17/22  Date of Hospital Discharge: 3/29/22  Risk of hospital readmission (high >=14%. Medium >=10%) :Readmission Risk Score: 15 ( )      Care management risk score Rising risk (score 2-5) and Complex Care (Scores >=6): 4     Non face to face  following discharge, date last encounter closed (first attempt may have been earlier): 3/30/2022  1:26 PM    Call initiated 2 business days of discharge: Yes    ASSESSMENT/PLAN:   Enteritis  Moderate malnutrition (HCC)  Pulmonary fibrosis ([address]  Essential hypertension      Medical Decision Making: high complexity  No follow-ups on file. Subjective:   HPI:  Follow up of Hospital problems/diagnosis(es): she went into the hospital due to pseudogout and knee pain. She then went to inpatient rehab    Inpatient course: Discharge summary reviewed- see chart. Interval history/Current status: she has had some diarrhea at home and wonders if it could be from the colchicine. No fever and no blood in the stool.  She had some in the hospital but was treated with imodium    Patient Active Problem List   Diagnosis    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 ([address]    Hypercalcemia    Osteoarthritis    Non-toxic nodular goiter    Essential hypertension    Diverticulosis of colon    Hypokalemia    Hypothyroidism    Osteopenia    Bronchiectasis without acute exacerbation (HCC)    Hyponatremia    Pure hypercholesterolemia    Left thyroid nodule    Hyperthyroidism    Lung infiltrate on CT    Dyslipidemia    Elevated serum free T4 level    Elevated TSH    Cough in adult    Nasal polyp    Abnormal CT of the chest    Vitamin D deficiency    Acute hypoxemic respiratory failure (HCC)    Pneumonia    Moderate malnutrition (HCC)    Pulmonary fibrosis (HCC)    SIRS (systemic inflammatory response syndrome) (HCC)    Community acquired pneumonia of right lung    Typical atrial flutter (HCC)    Septic arthritis (HCC)    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 If you have any questions, ask your nurse or doctor. CONTINUE taking these medications    albuterol sulfate  (90 Base) MCG/ACT inhaler  Commonly known as: ProAir HFA  Inhale 2 puffs into the lungs every 6 hours as needed for Wheezing or Shortness of Breath     amoxicillin 500 MG capsule  Commonly known as: AMOXIL  4 pills 1 hr before dental procedures     apixaban 5 MG Tabs tablet  Commonly known as: Eliquis  TAKE 1 TABLET BY MOUTH  TWICE DAILY     azithromycin 250 MG tablet  Commonly known as: ZITHROMAX  Take 1 tablet by mouth daily     budesonide 0.5 MG/2ML nebulizer suspension  Commonly known as: PULMICORT  Take 2 mLs by nebulization 2 times daily     colchicine 0.6 MG tablet  Commonly known as: COLCRYS  Take 1 tablet by mouth 2 times daily     levothyroxine 88 MCG tablet  Commonly known as: SYNTHROID  take 1 tablet by mouth once daily, new dose     metoprolol tartrate 25 MG tablet  Commonly known as: LOPRESSOR  take 1/2 tablet by mouth twice a day     MULTIPLE VITAMIN PO     Probiotic Acidophilus Caps     simvastatin 20 MG tablet  Commonly known as: ZOCOR  TAKE 1 TABLET BY MOUTH  NIGHTLY              Medications marked \"taking\" at this time  No outpatient medications have been marked as taking for the 4/5/22 encounter (Office Visit) with Valery Blankenship MD.        Medications patient taking as of now reconciled against medications ordered at time of hospital discharge: Yes    A comprehensive review of systems was negative except for: diarrhea, gas and cramping.  The appetite is less    Objective:    /60 (Site: Right Upper Arm, Position: Sitting, Cuff Size: Medium Adult)   Pulse 64   Temp 96.5 °F (35.8 °C) (Skin)   Resp 20   Ht 5' 4.02\" (1.626 m)   BMI 26.06 kg/m²   General Appearance: alert and oriented to person, place and time, well-developed and well-nourished, in no acute distress  Skin: warm and dry, no rash or erythema  Head: normocephalic and atraumatic  Pulmonary/Chest: clear to auscultation bilaterally- no wheezes, rales or rhonchi, normal air movement, no respiratory distress  Cardiovascular: normal rate, regular rhythm, normal S1 and S2, no murmurs and no gallops  Abdomen: soft, non-tender, non-distended, normal bowel sounds, no masses or organomegaly  Extremities: no cyanosis and no clubbing  Musculoskeletal: normal range of motion, no joint swelling, deformity or tenderness  Neurologic: gait and coordination normal and speech normal      An electronic signature was used to authenticate this note.   --Master Corado MD

## 2022-04-06 ENCOUNTER — TELEPHONE (OUTPATIENT)
Dept: FAMILY MEDICINE CLINIC | Age: 87
End: 2022-04-06

## 2022-04-06 NOTE — TELEPHONE ENCOUNTER
Pt called stating that her stomach problems are a little better. She did not take the gout pill last night. She did take the gout pill this morning. She is doing a little better over all so far.      Loni España may be reached at [phone]

## 2022-04-06 NOTE — CARE COORDINATION
I saw Severiano Holguin when she came in for her appointment today. SHe is doing well. She is not quite back to her baseline yet but she is close. She had some pointers from therapy to getting around with the walker and O2 tubing. She waited to take a shower until someone was with her in case she needed help. Her gout medication is causing her some nausea so she is not eating as well as she should. She will address this today with the Dr. I will follow up with her next week.

## 2022-04-07 RX ORDER — ALLOPURINOL 100 MG/1
100 TABLET ORAL DAILY
Qty: 90 TABLET | Refills: 3 | Status: SHIPPED | OUTPATIENT
Start: 2022-04-07 | End: 2022-04-21

## 2022-04-07 NOTE — TELEPHONE ENCOUNTER
Called pt. She stated she didn't take the gout pill last night, she took it this morning and started having the stomach cramps and low stool. She stated her friend had the same reaction but would take allopurinol with no issues. She was wondering if she could try allopurinol instead. Please advise.

## 2022-04-14 ENCOUNTER — CARE COORDINATION (OUTPATIENT)
Dept: FAMILY MEDICINE CLINIC | Age: 87
End: 2022-04-14

## 2022-04-14 DIAGNOSIS — E03.9 HYPOTHYROIDISM, UNSPECIFIED TYPE: ICD-10-CM

## 2022-04-14 RX ORDER — LEVOTHYROXINE SODIUM 88 UG/1
TABLET ORAL
Qty: 90 TABLET | Refills: 0 | Status: SHIPPED | OUTPATIENT
Start: 2022-04-14 | End: 2022-06-06

## 2022-04-14 NOTE — CARE COORDINATION
Rolando Rubio is slowly getting stronger. She said that since she switched to allopurinol her leg pain wakes her up between 3-4am. She will talk to Dr Carla Kay about this next week when he comes back. She could not talk long because she was expecting the visiting nurse to come. PT and OT are also coming today. She said then she will get tomorrow off. She has no additional home needs. I will follow up with her next week. She appreciated me calling to check on her.

## 2022-04-14 NOTE — TELEPHONE ENCOUNTER
Date of last visit:  4/5/2022  Date of next visit:  Visit date not found    Requested Prescriptions     Signed Prescriptions Disp Refills    levothyroxine (SYNTHROID) 88 MCG tablet 90 tablet 0     Sig: TAKE 1 TABLET BY MOUTH ONCE DAILY     Authorizing Provider: Constantino Mcrae     Ordering User: Juan Camacho       Per Verbal order of  Sent prescription to pharmacy for pt.

## 2022-04-20 DIAGNOSIS — J47.9 BRONCHIECTASIS WITHOUT COMPLICATION (HCC): ICD-10-CM

## 2022-04-20 DIAGNOSIS — J84.10 PULMONARY FIBROSIS (HCC): ICD-10-CM

## 2022-04-20 RX ORDER — BUDESONIDE 0.5 MG/2ML
0.5 INHALANT ORAL 2 TIMES DAILY
Qty: 100 EACH | Refills: 5 | Status: ON HOLD | OUTPATIENT
Start: 2022-04-20

## 2022-04-20 NOTE — TELEPHONE ENCOUNTER
Date of last visit:  4/5/2022  Date of next visit:  Visit date not found    Requested Prescriptions     Pending Prescriptions Disp Refills    budesonide (PULMICORT) 0.5 MG/2ML nebulizer suspension       Sig: Take 2 mLs by nebulization 2 times daily

## 2022-04-21 ENCOUNTER — CARE COORDINATION (OUTPATIENT)
Dept: FAMILY MEDICINE CLINIC | Age: 87
End: 2022-04-21

## 2022-04-21 RX ORDER — ALLOPURINOL 100 MG/1
100 TABLET ORAL 2 TIMES DAILY
COMMUNITY
Start: 2022-04-21 | End: 2022-05-18 | Stop reason: SDUPTHER

## 2022-04-22 NOTE — CARE COORDINATION
Mariam Cuevas is doing fine with the therapy and feeling better except she is having leg pain at night that wakes her up 2-3 times a night. Order received to increase her Allopurinol to BID and hopefully this will help. I will follow up with her next week or sooner if needed.

## 2022-04-25 DIAGNOSIS — R09.02 HYPOXIA: Primary | ICD-10-CM

## 2022-04-27 ENCOUNTER — CARE COORDINATION (OUTPATIENT)
Dept: FAMILY MEDICINE CLINIC | Age: 87
End: 2022-04-27

## 2022-04-27 NOTE — CARE COORDINATION
Real Odom was short of breath when she first answered to phone. She said she had to walk from the other room. It got better as we talked. She has not gotten the chest x ray yet but plans to do so when her son comes at the end of the week. She is still saying that she doesn't need one. She said now with exertion her her sa02 only drops into the 80's and recovers quickly back to the 90's. I told her she still needs to get the xray. She agreed that she will. She said she is feeling better all around otherwise.

## 2022-04-28 ENCOUNTER — TELEPHONE (OUTPATIENT)
Dept: FAMILY MEDICINE CLINIC | Age: 87
End: 2022-04-28

## 2022-04-28 NOTE — TELEPHONE ENCOUNTER
Reginald Hutchinson from [facility] home health called to check on message, she said patient was doing well on the 5liters of 02 at this time.

## 2022-04-28 NOTE — TELEPHONE ENCOUNTER
Terence Lock 211 - requesting an approval for oxygen flow increase during activity to 5.0. He has done an assessment on her at different levels. When she walks 20 feet or more her pulse ox dropped to low 80's. He tried it at 3 liters and at 4 liters and progressively got better. 89% was at 4 liters. 5 liters only dropped to 94. He can take a verbal order to either his cell at [phone] or call office at [phone] or can Fax order to [phone].

## 2022-05-02 ENCOUNTER — TELEPHONE (OUTPATIENT)
Dept: PULMONOLOGY | Age: 87
End: 2022-05-02

## 2022-05-02 NOTE — TELEPHONE ENCOUNTER
[address] called asking if they could get an order faxed over for 5lpm. She was discharged from the hospital at 2lpm but when she is doing activity her o2 drops into the 60s the only time they could keep it up during activity was on 5lpm. Please advise, Thanks!

## 2022-05-03 NOTE — TELEPHONE ENCOUNTER
Nain Miranda evaluated her but I'm not covering her [place] orders I did not order the [place] to begin with.  Defer to PCP

## 2022-05-11 ENCOUNTER — HOSPITAL ENCOUNTER (OUTPATIENT)
Age: 87
Discharge: HOME | End: 2022-05-11
Payer: MEDICARE

## 2022-05-11 ENCOUNTER — HOSPITAL ENCOUNTER (OUTPATIENT)
Dept: GENERAL RADIOLOGY | Age: 87
Discharge: HOME | End: 2022-05-11
Payer: MEDICARE

## 2022-05-11 ENCOUNTER — TELEPHONE (OUTPATIENT)
Dept: FAMILY MEDICINE CLINIC | Age: 87
End: 2022-05-11

## 2022-05-11 DIAGNOSIS — R09.02 HYPOXIA: ICD-10-CM

## 2022-05-11 DIAGNOSIS — E03.9 HYPOTHYROIDISM, UNSPECIFIED TYPE: ICD-10-CM

## 2022-05-11 LAB
T4 FREE: 1.26 NG/DL (ref 0.93–1.76)
TSH SERPL DL<=0.05 MIU/L-ACNC: 3.31 UIU/ML (ref 0.4–4.2)

## 2022-05-11 PROCEDURE — 71046 X-RAY EXAM CHEST 2 VIEWS: CPT

## 2022-05-11 PROCEDURE — 84443 ASSAY THYROID STIM HORMONE: CPT

## 2022-05-11 PROCEDURE — 36415 COLL VENOUS BLD VENIPUNCTURE: CPT

## 2022-05-11 PROCEDURE — 84439 ASSAY OF FREE THYROXINE: CPT

## 2022-05-11 NOTE — TELEPHONE ENCOUNTER
Harlan Benitez from [address] [place] [address],[address] home health called and stated they needed an order to say ok for 02 to be up to 5 liters. They called pulmonology and they stated that they didn't order the home health.   So we need to order 02 and fax to [phone]

## 2022-05-12 NOTE — TELEPHONE ENCOUNTER
Sandra informed by Phone  No fever and chills. Sputum is clear. Patient had a little blob of blood. And thinks she has been coughing harder lately not since the o2 was turned up, just as a whole. Had chest xray today. Lena Benitez

## 2022-05-12 NOTE — TELEPHONE ENCOUNTER
I will do the Rx for the 5 liters of flow. Check with Sandra to see if she's having any fever/chills and if she is raising sputum what color is it?

## 2022-05-18 ENCOUNTER — TELEPHONE (OUTPATIENT)
Dept: PHYSICAL MEDICINE AND REHAB | Age: 87
End: 2022-05-18

## 2022-06-05 DIAGNOSIS — E03.9 HYPOTHYROIDISM, UNSPECIFIED TYPE: ICD-10-CM

## 2022-06-06 RX ORDER — LEVOTHYROXINE SODIUM 88 UG/1
TABLET ORAL
Qty: 90 TABLET | Refills: 3 | Status: ON HOLD | OUTPATIENT
Start: 2022-06-06

## 2022-06-06 NOTE — TELEPHONE ENCOUNTER
Date of last visit:  4/5/2022  Date of next visit:  Visit date not found    Requested Prescriptions     Pending Prescriptions Disp Refills    levothyroxine (SYNTHROID) 88 MCG tablet [Pharmacy Med Name: Levothyroxine Sodium 88 MCG Oral Tablet] 90 tablet 3     Sig: TAKE 1 TABLET BY MOUTH ONCE DAILY

## 2022-06-15 ENCOUNTER — TELEPHONE (OUTPATIENT)
Dept: CARDIOLOGY CLINIC | Age: 87
End: 2022-06-15

## 2022-06-15 ENCOUNTER — HOSPITAL ENCOUNTER (OUTPATIENT)
Dept: CT IMAGING | Age: 87
Discharge: HOME | End: 2022-06-15
Payer: MEDICARE

## 2022-06-15 DIAGNOSIS — J84.10 PULMONARY FIBROSIS (HCC): ICD-10-CM

## 2022-06-15 DIAGNOSIS — J84.9 ILD (INTERSTITIAL LUNG DISEASE) (HCC): ICD-10-CM

## 2022-06-15 PROCEDURE — 71250 CT THORAX DX C-: CPT

## 2022-06-15 NOTE — TELEPHONE ENCOUNTER
Pre op Risk Assessment    Procedure EGD  Physician [facility]  Date of surgery/procedure TBD    Last OV 1-12-22  Last Stress 11-  Last Echo 9-  Last Cath None in Epic  Last Stent None in Epic  Is patient on blood thinners Eliquis  Hold Meds/how many days 2 days    FAX: [phone]

## 2022-06-21 ENCOUNTER — OFFICE VISIT (OUTPATIENT)
Dept: PULMONOLOGY | Age: 87
End: 2022-06-21
Payer: MEDICARE

## 2022-06-21 VITALS
HEART RATE: 84 BPM | SYSTOLIC BLOOD PRESSURE: 126 MMHG | DIASTOLIC BLOOD PRESSURE: 80 MMHG | OXYGEN SATURATION: 98 % | BODY MASS INDEX: 26.4 KG/M2 | HEIGHT: 64 IN | TEMPERATURE: 98 F | WEIGHT: 154.6 LBS

## 2022-06-21 DIAGNOSIS — J84.9 ILD (INTERSTITIAL LUNG DISEASE) (HCC): Primary | ICD-10-CM

## 2022-06-21 DIAGNOSIS — J47.9 BRONCHIECTASIS WITHOUT COMPLICATION (HCC): ICD-10-CM

## 2022-06-21 DIAGNOSIS — R91.8 GROUND GLASS OPACITY PRESENT ON IMAGING OF LUNG: ICD-10-CM

## 2022-06-21 DIAGNOSIS — J84.10 PULMONARY FIBROSIS (HCC): ICD-10-CM

## 2022-06-21 PROCEDURE — 1123F ACP DISCUSS/DSCN MKR DOCD: CPT | Performed by: NURSE PRACTITIONER

## 2022-06-21 PROCEDURE — 99214 OFFICE O/P EST MOD 30 MIN: CPT | Performed by: NURSE PRACTITIONER

## 2022-06-21 PROCEDURE — G8417 CALC BMI ABV UP PARAM F/U: HCPCS | Performed by: NURSE PRACTITIONER

## 2022-06-21 PROCEDURE — 1036F TOBACCO NON-USER: CPT | Performed by: NURSE PRACTITIONER

## 2022-06-21 PROCEDURE — 1090F PRES/ABSN URINE INCON ASSESS: CPT | Performed by: NURSE PRACTITIONER

## 2022-06-21 PROCEDURE — G8427 DOCREV CUR MEDS BY ELIG CLIN: HCPCS | Performed by: NURSE PRACTITIONER

## 2022-06-21 RX ORDER — ALBUTEROL SULFATE 90 UG/1
2 AEROSOL, METERED RESPIRATORY (INHALATION) EVERY 4 HOURS PRN
Qty: 1 EACH | Refills: 5 | Status: ON HOLD | OUTPATIENT
Start: 2022-06-21 | End: 2023-06-21

## 2022-06-21 ASSESSMENT — ENCOUNTER SYMPTOMS
WHEEZING: 0
GASTROINTESTINAL NEGATIVE: 1
SHORTNESS OF BREATH: 1
EYES NEGATIVE: 1
ALLERGIC/IMMUNOLOGIC NEGATIVE: 1
COUGH: 1

## 2022-06-21 NOTE — PROGRESS NOTES
[facility]    Patient: Shanel Torres, 80 y.o.   : 1935    Pt of Dr. Ranulfo Jamison   Patient presents with    Follow-up     7 month Pulm follow up, CT         HPI  Loni España is here for follow up for ILD/pulmonary fibrosis . Home oxygen now up to 4LPM continuous sometimes turns it down at night   Finished pulmonary rehab  Oxygen through [address] this was done per [facility] on DC to home   Pulmicort neb BID and Albuterol PRN- has not needed MAISHA  Humidity does make SOB worse   Never a smoker   Previously followed with [facility] for her pulmonary fibrosis basically unknown etiology (still does ZOOM sessions with [place])   Bronchoscopy done - no significant findings then   6MWT next visit   On Zithromax 250 mg PO daily per [place] physician prophylaxis for PNA infection     Progress History:   Since last visit any new medical issues? No  New ER or hospital visits? 3/17/22-3/29/22 left knee pseudogout    Any new or changes in medicines? No  Using inhalers? Yes   Are they helpful?  Yes   Flu vaccine UTD  Pneumonia vaccine UTD  Covid vaccinations done x 2 plus 2 boosters  Past Medical hx   PMH:  Past Medical History:   Diagnosis Date    Anemia     normal on pre-op 2012 and 13    Backache     h/o    [facility]     Bursitis     bilateral shoulders    Constipation     Diverticulosis of colon     HTN (hypertension)     Hypercalcemia     slightly elevated at 10.8 pre-op 13    Hyperlipidemia     Nodular goiter     bx negative    OA (osteoarthritis)     bilateral thumbs - sees Dr. Jeremy Olmos Osteopenia 2013    Parathyroid adenoma 2013    Pneumonia of right upper lobe due to infectious organism     Pneumonitis     Dr. Yola Pena in 2010 - bx negative    Pulmonary Mycobacterium avium complex (MAC) infection ([address]     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 ([facility] [address]     had one parathyroid removed - now follows with Dr. Clemons Comment    Sinusitis     with seasonal allergies    Vitamin D deficiency 2018     SURGICAL HISTORY:  Past Surgical History:   Procedure Laterality Date    ABDOMINAL EXPLORATION SURGERY  2013    Release of KATHERINE TAMAYO-Dr. Thao Burgess    APPENDECTOMY      BACK INJECTION Bilateral 2021    bilateral sacroiliac joint injection performed by Ervin Patino DO at [address] N/A 2022    B/L SI joint block performed by Ervin Patino DO at [address] N/A 2020    BRONCHOSCOPY FLUOROSCOPY performed by Abbie Barrera MD at [facility]    CATARACT REMOVAL WITH IMPLANT Bilateral  ?   SECTION  8059,6260    DILATION AND CURETTAGE OF UTERUS  5926,8101,5853    EXCISION OF PARATHYROID MASS Right 2013    rt parathyroidectomy (excision of rt inferior parathyroid mass) exploration of neck     FOOT SURGERY      left    FOOT SURGERY Left 2017    Dr Guerita Hardin ([facility])      JOINT REPLACEMENT  12    left knee    JOINT REPLACEMENT  2014    right knee    MALIGNANT SKIN LESION EXCISION Left 2017    BCC DORSAL FOOT WITH GRAFT & FS    GOSIA AND BSO (CERVIX REMOVED)  [address] ?  TOTAL KNEE ARTHROPLASTY Right 2014    OIO     SOCIAL HISTORY:  Social History     Tobacco Use    Smoking status: Never Smoker    Smokeless tobacco: Never Used   Vaping Use    Vaping Use: Never used   Substance Use Topics    Alcohol use:  Yes     Alcohol/week: 0.0 standard drinks     Comment: socially-1 glassof wine 2-3 times a month    Drug use: No     ALLERGIES:  Allergies   Allergen Reactions    Colchicine Diarrhea    Levaquin [Levofloxacin] Other (See Comments)     Hallucinations    Percocet [Oxycodone-Acetaminophen] Nausea Only and Other (See Comments)     Affects memory    Rifampin Other (See Comments)     Lost half of vision    Sulfa Antibiotics Other (See Comments)     hallucinations    Cefuroxime Diarrhea, Nausea And Vomiting and Other (See Comments)     cramping     FAMILY HISTORY:  Family History   Problem Relation Age of Onset    Diabetes Mother     Thyroid Disease Mother     Arthritis Mother     High Blood Pressure Mother     Arthritis Father     High Blood Pressure Father     Other Father         hay fever    Breast Cancer Neg Hx     Ovarian Cancer Neg Hx      CURRENT MEDICATIONS:  Current Outpatient Medications   Medication Sig Dispense Refill    albuterol sulfate HFA (PROAIR HFA) 108 (90 Base) MCG/ACT inhaler Inhale 2 puffs into the lungs every 4 hours as needed for Wheezing or Shortness of Breath 1 each 5    levothyroxine (SYNTHROID) 88 MCG tablet TAKE 1 TABLET BY MOUTH ONCE DAILY 90 tablet 3    allopurinol (ZYLOPRIM) 100 MG tablet Take 2 tablets by mouth daily 180 tablet 3    budesonide (PULMICORT) 0.5 MG/2ML nebulizer suspension Take 2 mLs by nebulization 2 times daily 100 each 5    metoprolol tartrate (LOPRESSOR) 25 MG tablet take 1/2 tablet by mouth twice a day 90 tablet 5    simvastatin (ZOCOR) 20 MG tablet TAKE 1 TABLET BY MOUTH  NIGHTLY 90 tablet 3    azithromycin (ZITHROMAX) 250 MG tablet Take 1 tablet by mouth daily 90 tablet 3    amoxicillin (AMOXIL) 500 MG capsule 4 pills 1 hr before dental procedures 4 capsule 3    apixaban (ELIQUIS) 5 MG TABS tablet TAKE 1 TABLET BY MOUTH  TWICE DAILY 180 tablet 3    Lactobacillus (PROBIOTIC ACIDOPHILUS) CAPS Take 1 capsule by mouth daily       MULTIPLE VITAMIN PO Take 1 tablet by mouth daily        No current facility-administered medications for this visit. ROS   Review of Systems   Constitutional: Positive for appetite change (up and down ). Negative for chills and fever. HENT: Negative. Negative for congestion. Eyes: Negative.     Respiratory: Positive for cough (productive at times clear to white ) and shortness of breath (more with exertion ). Negative for wheezing. Cardiovascular: Negative. Negative for chest pain and leg swelling. Gastrointestinal: Negative. Endocrine: Negative. Genitourinary: Negative. Musculoskeletal: Positive for gait problem (presents inWC). Allergic/Immunologic: Negative. Hematological: Negative. Psychiatric/Behavioral: Negative. Negative for sleep disturbance. Physical exam   /80   Pulse 84   Temp 98 °F (36.7 °C)   Ht 5' 4\" (1.626 m)   Wt 154 lb 9.6 oz (70.1 kg)   SpO2 98% Comment: 4 liters pulsating  BMI 26.54 kg/m²    Wt Readings from Last 3 Encounters:   06/21/22 154 lb 9.6 oz (70.1 kg)   03/28/22 151 lb 14.4 oz (68.9 kg)   03/16/22 159 lb 12.8 oz (72.5 kg)       Physical Exam  Vitals and nursing note reviewed. Constitutional:       Appearance: She is well-developed. HENT:      Head: Normocephalic and atraumatic. Eyes:      Conjunctiva/sclera: Conjunctivae normal.      Pupils: Pupils are equal, round, and reactive to light. Neck:      Vascular: No JVD. Cardiovascular:      Rate and Rhythm: Normal rate and regular rhythm. Heart sounds: Normal heart sounds. No murmur heard. No friction rub. No gallop. Pulmonary:      Effort: Pulmonary effort is normal. No respiratory distress. Breath sounds: Rales (bilateral basal rales ) present. No wheezing. Abdominal:      General: Bowel sounds are normal.      Palpations: Abdomen is soft. Musculoskeletal:         General: Normal range of motion. Cervical back: Normal range of motion and neck supple. Skin:     General: Skin is warm and dry. Capillary Refill: Capillary refill takes less than 2 seconds. Neurological:      Mental Status: She is alert and oriented to person, place, and time. Gait: Gait abnormal.   Psychiatric:         Behavior: Behavior normal.         Thought Content:  Thought content normal.         Judgment: Judgment normal.          results Lung Nodule Screening     [] Qualifies    [x] Does not qualify   [] Declined    [] Completed  The USPSTF recommends annual screening for lung cancer with low-dose computed tomography (LDCT) in adults aged 48 to [de-identified] years who have a 30 pack-year smoking history and currently smoke or have quit within the past 20 years. Screening should be discontinued once a person has not smoked for 20 years or develops a health problem that substantially limits life expectancy or the ability or willingness to have curative lung surgery. 6/15/2022   CT CHEST HIGH RESOLUTION   FINDINGS: Heart/mediastinum: Cardiomegaly is stable. Coronary artery calcifications are observed. No pericardial effusion is identified. Slight shift of the mediastinal structures to the right is unchanged. The aorta is not dilated. No mediastinal, hilar, or axillary lymphadenopathy is visualized accounting for lack of IV contrast.   Lungs: Extensive intralobular septal thickening with severe bronchiectasis and honeycombing most pronounced in the right lung, particularly the right upper lobe is unchanged. The patchy upper and lower lobe groundglass subpleural opacities in the left lung have progressed measuring 2.9 x 3.1 cm in the left upper lobe (series 3, image 31). No pleural effusion or pneumothorax is identified. No pulmonary mass or nodule is observed. Upper abdomen: No acute findings are visualized in the limited images through the upper abdomen. Musculoskeletal: Diffuse osteopenia is present. The visualized skeletal structures appear intact. Degenerative disc disease in the thoracic spine is unchanged. 1. Severe interstitial fibrosis, honeycombing, and bronchiectasis most pronounced in the right lung, particularly the right upper lobe, is unchanged. 2. Multifocal nonspecific groundglass opacities in the lung most pronounced left upper lobe are more numerous and more prominent in size and measuring to 2.9 x 3.1 cm (series 3, image 31). 3. No pulmonary mass or nodule is observed. Assessment      Diagnosis Orders   1. ILD (interstitial lung disease) (HCC)     2. Pulmonary fibrosis (HCC)  albuterol sulfate HFA (PROAIR HFA) 108 (90 Base) MCG/ACT inhaler   3. Ground glass opacity present on imaging of lung  CT CHEST WO CONTRAST   4.  Bronchiectasis without complication (HCC)  albuterol sulfate HFA (PROAIR HFA) 108 (90 Base) MCG/ACT inhaler         Plan   -repeat Ct Chest w/o in 3 months for resolution of GGO- incidental findings- patient denies feeling ill will follow   -HRCT in one year  -IS use discussed and encouraged  -Stay on current inhaler/nebulzier regimen  -6MWT next visit in 3 months   -RX refilled for Albuterol HFA inhaler every 4-6 hours PRN for SOB/wheezing   -Advised to maintain pneumonia vaccine with PCP and to take flu vaccine this coming season.  -Advised patient to call office with any changes, questions, or concerns regarding respiratory status    Will see Ean Six back in: 3 months    Rose Montaño, [facility]  6/21/2022

## 2022-06-23 ENCOUNTER — OFFICE VISIT (OUTPATIENT)
Dept: INTERNAL MEDICINE CLINIC | Age: 87
End: 2022-06-23
Payer: MEDICARE

## 2022-06-23 VITALS
HEART RATE: 88 BPM | WEIGHT: 158.2 LBS | DIASTOLIC BLOOD PRESSURE: 78 MMHG | SYSTOLIC BLOOD PRESSURE: 116 MMHG | OXYGEN SATURATION: 94 % | BODY MASS INDEX: 27.15 KG/M2 | TEMPERATURE: 97.9 F

## 2022-06-23 DIAGNOSIS — E03.9 HYPOTHYROIDISM, UNSPECIFIED TYPE: Primary | ICD-10-CM

## 2022-06-23 PROCEDURE — 1036F TOBACCO NON-USER: CPT | Performed by: STUDENT IN AN ORGANIZED HEALTH CARE EDUCATION/TRAINING PROGRAM

## 2022-06-23 PROCEDURE — G8427 DOCREV CUR MEDS BY ELIG CLIN: HCPCS | Performed by: STUDENT IN AN ORGANIZED HEALTH CARE EDUCATION/TRAINING PROGRAM

## 2022-06-23 PROCEDURE — G8417 CALC BMI ABV UP PARAM F/U: HCPCS | Performed by: STUDENT IN AN ORGANIZED HEALTH CARE EDUCATION/TRAINING PROGRAM

## 2022-06-23 PROCEDURE — 1090F PRES/ABSN URINE INCON ASSESS: CPT | Performed by: STUDENT IN AN ORGANIZED HEALTH CARE EDUCATION/TRAINING PROGRAM

## 2022-06-23 PROCEDURE — 99213 OFFICE O/P EST LOW 20 MIN: CPT | Performed by: STUDENT IN AN ORGANIZED HEALTH CARE EDUCATION/TRAINING PROGRAM

## 2022-06-23 PROCEDURE — 1123F ACP DISCUSS/DSCN MKR DOCD: CPT | Performed by: STUDENT IN AN ORGANIZED HEALTH CARE EDUCATION/TRAINING PROGRAM

## 2022-06-23 SDOH — ECONOMIC STABILITY: FOOD INSECURITY: WITHIN THE PAST 12 MONTHS, YOU WORRIED THAT YOUR FOOD WOULD RUN OUT BEFORE YOU GOT MONEY TO BUY MORE.: NEVER TRUE

## 2022-06-23 SDOH — ECONOMIC STABILITY: FOOD INSECURITY: WITHIN THE PAST 12 MONTHS, THE FOOD YOU BOUGHT JUST DIDN'T LAST AND YOU DIDN'T HAVE MONEY TO GET MORE.: NEVER TRUE

## 2022-06-23 ASSESSMENT — ENCOUNTER SYMPTOMS
BLOOD IN STOOL: 0
COLOR CHANGE: 0
ABDOMINAL PAIN: 0
SHORTNESS OF BREATH: 0
PHOTOPHOBIA: 0
TROUBLE SWALLOWING: 0
COUGH: 0
RHINORRHEA: 0

## 2022-06-23 ASSESSMENT — PATIENT HEALTH QUESTIONNAIRE - PHQ9
SUM OF ALL RESPONSES TO PHQ QUESTIONS 1-9: 0
SUM OF ALL RESPONSES TO PHQ QUESTIONS 1-9: 0
1. LITTLE INTEREST OR PLEASURE IN DOING THINGS: 0
SUM OF ALL RESPONSES TO PHQ QUESTIONS 1-9: 0
SUM OF ALL RESPONSES TO PHQ9 QUESTIONS 1 & 2: 0
2. FEELING DOWN, DEPRESSED OR HOPELESS: 0
SUM OF ALL RESPONSES TO PHQ QUESTIONS 1-9: 0

## 2022-06-23 ASSESSMENT — SOCIAL DETERMINANTS OF HEALTH (SDOH): HOW HARD IS IT FOR YOU TO PAY FOR THE VERY BASICS LIKE FOOD, HOUSING, MEDICAL CARE, AND HEATING?: NOT HARD AT ALL

## 2022-06-23 NOTE — PATIENT INSTRUCTIONS
- carnation instant breakfast dietary supplement  - please obtain new labs in 6 months prior to next office visit

## 2022-06-23 NOTE — PROGRESS NOTES
Willie Laboy (:  1935) is a 80 y.o. female,Established patient, here for evaluation of the following chief complaint(s):  Hypertension, Hypothyroidism, and Hyperlipidemia         ASSESSMENT/PLAN:  1. Hypothyroidism, unspecified type  -     TSH With Reflex Ft4; Future  -     T4, Free; Future    Chronic hypothyroidism, controlled. Synthroid increased last visit to 88 MCG daily. Labs reviewed, within normal limits. Denies endocrine symptoms. Continue Synthroid, repeat labs and RTC in 6 months. Return in about 6 months (around 2022). Subjective   SUBJECTIVE/OBJECTIVE:  Other  This is a chronic (hypothyroidism) problem. The current episode started more than 1 year ago. The problem has been gradually improving. Associated symptoms include fatigue. Pertinent negatives include no abdominal pain, arthralgias, chest pain, coughing, fever, headaches or myalgias. Nothing aggravates the symptoms. Treatments tried: synthroid. The treatment provided significant relief. Review of Systems   Constitutional: Positive for fatigue. Negative for fever. HENT: Negative for rhinorrhea and trouble swallowing. Eyes: Negative for photophobia and visual disturbance. Respiratory: Negative for cough and shortness of breath. Cardiovascular: Negative for chest pain and leg swelling. Gastrointestinal: Negative for abdominal pain and blood in stool. Endocrine: Negative for cold intolerance and heat intolerance. Genitourinary: Negative for flank pain and hematuria. Musculoskeletal: Negative for arthralgias and myalgias. Skin: Negative for color change and wound. Allergic/Immunologic: Negative for immunocompromised state. Neurological: Negative for syncope and headaches. Hematological: Negative for adenopathy. Does not bruise/bleed easily. Psychiatric/Behavioral: Negative for agitation and hallucinations. Objective   Physical Exam  Vitals and nursing note reviewed. Constitutional:       Appearance: Normal appearance. HENT:      Head: Normocephalic and atraumatic. Right Ear: External ear normal.      Left Ear: External ear normal.      Nose: Nose normal.      Mouth/Throat:      Pharynx: Oropharynx is clear. Eyes:      Extraocular Movements: Extraocular movements intact. Cardiovascular:      Rate and Rhythm: Normal rate and regular rhythm. Pulses: Normal pulses. Heart sounds: Normal heart sounds. Pulmonary:      Effort: Pulmonary effort is normal.      Breath sounds: Rales present. Abdominal:      General: Abdomen is flat. Palpations: Abdomen is soft. Musculoskeletal:         General: No deformity. Normal range of motion. Cervical back: Normal range of motion and neck supple. Skin:     General: Skin is warm and dry. Capillary Refill: Capillary refill takes less than 2 seconds. Neurological:      General: No focal deficit present. Mental Status: She is alert. Mental status is at baseline. Psychiatric:         Mood and Affect: Mood normal.         Behavior: Behavior normal.            On this date 6/23/2022 I have spent 30 minutes reviewing previous notes, test results and face to face with the patient discussing the diagnosis and importance of compliance with the treatment plan as well as documenting on the day of the visit. An electronic signature was used to authenticate this note.     --Pippa Pickard MD

## 2022-06-27 ENCOUNTER — OFFICE VISIT (OUTPATIENT)
Dept: PHYSICAL MEDICINE AND REHAB | Age: 87
End: 2022-06-27
Payer: MEDICARE

## 2022-06-27 VITALS
BODY MASS INDEX: 26.98 KG/M2 | DIASTOLIC BLOOD PRESSURE: 66 MMHG | HEIGHT: 64 IN | WEIGHT: 158 LBS | SYSTOLIC BLOOD PRESSURE: 98 MMHG

## 2022-06-27 DIAGNOSIS — G89.4 CHRONIC PAIN SYNDROME: ICD-10-CM

## 2022-06-27 DIAGNOSIS — G89.29 CHRONIC SI JOINT PAIN: Primary | ICD-10-CM

## 2022-06-27 DIAGNOSIS — M53.3 CHRONIC SI JOINT PAIN: Primary | ICD-10-CM

## 2022-06-27 DIAGNOSIS — M47.816 LUMBAR SPONDYLOSIS: ICD-10-CM

## 2022-06-27 DIAGNOSIS — M79.18 MYOFASCIAL PAIN: ICD-10-CM

## 2022-06-27 PROCEDURE — 1123F ACP DISCUSS/DSCN MKR DOCD: CPT | Performed by: ANESTHESIOLOGY

## 2022-06-27 PROCEDURE — G8417 CALC BMI ABV UP PARAM F/U: HCPCS | Performed by: ANESTHESIOLOGY

## 2022-06-27 PROCEDURE — 1090F PRES/ABSN URINE INCON ASSESS: CPT | Performed by: ANESTHESIOLOGY

## 2022-06-27 PROCEDURE — 99214 OFFICE O/P EST MOD 30 MIN: CPT | Performed by: ANESTHESIOLOGY

## 2022-06-27 PROCEDURE — G8427 DOCREV CUR MEDS BY ELIG CLIN: HCPCS | Performed by: ANESTHESIOLOGY

## 2022-06-27 PROCEDURE — 1036F TOBACCO NON-USER: CPT | Performed by: ANESTHESIOLOGY

## 2022-06-27 NOTE — PROGRESS NOTES
Chronic Pain/PM&R Clinic Note     Encounter Date: 6/27/22    Subjective:   Chief Complaint:   Chief Complaint   Patient presents with    Follow-up       History of Present Illness:   Gricelda Cuellar is a 80 y.o. female seen in the clinic initially on 12/8/21 upon request from Dr. Елена Gould MD for her history of chronic low back pain. Patient states she sustained a fall in October of 2020 when she tripped over her oxygen tubing but she did not have any pain initially after her fall. On 12/24/2020 patient states she woke up and could not get out of bed. Patient states ever since then her back feels \"brittle. \" Patient states she was seen at [facility] and received two epidural steroid injections which provided no relief. Patient states the past two weeks she had had pain in her left buttock that is stabbing and radiates into her left leg, making her feel like her leg may give out. Patient is concerned about the possibility of further falls due to this. Patient states she lives alone and she does not use any assistive devices. Patient states she has never fallen in the past apart from 10/2022 when she tripped over her oxygen tubing. Patient is on 2L of oxygen for her history of pulmonary fibrosis. Patient also has a history of Afib and is currently on Eliquis. Patient states pain is worse first thing in the morning. She also states she will occasionally wake up in the middle of the night with pain in bilateral legs, pain seems to be in the back of bilateral legs. Patient denies pain, numbness or tingling that radiates into her legs during the day. Patient states she completed home health PT in the spring of 2021 which provided minimal relief only on the day of the therapy. Today, 6/27/2022, patient presents for planned follow-up for chronic low back pain. She unfortunate was unable to have her SI joint radiofrequency ablations completed due to being hospitalized.   She states that she had a horrible injection series done at the orthopedic [place] [address] and has a lot of reservations about this injection therapy. She continues to have low back pain as previously described and would like to try to help out with this pain overall. She notes that she is currently on 4 L of oxygen since being discharged but is no longer on any antibiotic therapy. She denies any symptoms of pain rating down her leg, new focal leg weakness, leg numbness/tingling, saddle anesthesia, bowel/bladder incontinence episodes.       History of Interventions:   Surgery: No previous lumbar surgeries  Injections: Lumbar epidural x 2 at [facility] (02/2021, 03/2021) - No relief    Current Treatment Medications:   Tylenol arthritis PRN    Historical Treatment Medications:   Tramadol - makes her \"loopy\"    Imaging:    Lumbar MRI 01/12/2021        Past Medical History:   Diagnosis Date    Anemia     normal on pre-op 5/2012 and 12/2/13    Backache     h/o    Bronchiectasis ([address] 2013    Bursitis     bilateral shoulders    Constipation     Diverticulosis of colon     HTN (hypertension)     Hypercalcemia     slightly elevated at 10.8 pre-op 12/2/13    Hyperlipidemia     Nodular goiter     bx negative    OA (osteoarthritis)     bilateral thumbs - sees Dr. Bunny Valenzuela Osteopenia 11/6/2013    Parathyroid adenoma 4/30/2013    Pneumonia of right upper lobe due to infectious organism     Pneumonitis     Dr. Leobardo Haney in 7/2010 - bx negative    Pulmonary Mycobacterium avium complex (MAC) infection ([address]     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 ([address]     had one parathyroid removed - now follows with Dr. Massey Salvage    Sinusitis     with seasonal allergies    Vitamin D deficiency 05/2018       Past Surgical History:   Procedure Laterality Date    ABDOMINAL EXPLORATION SURGERY  01/02/2013    Release of KATHERINE TAMAYO-Dr. Rogelio Zaidi    APPENDECTOMY  1961    BACK INJECTION Bilateral 12/28/2021    bilateral sacroiliac joint injection performed by Yenifer Pop DO at [address] INJECTION N/A 2022    B/L SI joint block performed by Kush Salinas DO at [address] N/A 2020    BRONCHOSCOPY FLUOROSCOPY performed by Penny Byrne MD at [facility]    CATARACT REMOVAL WITH IMPLANT Bilateral  ?   SECTION  7061,0688    DILATION AND CURETTAGE OF UTERUS  2467,0501,5426    EXCISION OF PARATHYROID MASS Right 2013    rt parathyroidectomy (excision of rt inferior parathyroid mass) exploration of neck     FOOT SURGERY      left    FOOT SURGERY Left 2017    Dr Frederick Vora ([address] [place] [address])      JOINT REPLACEMENT  12    left knee    JOINT REPLACEMENT  2014    right knee    MALIGNANT SKIN LESION EXCISION Left 2017    BCC DORSAL FOOT WITH GRAFT & FS    GOSIA AND BSO (CERVIX REMOVED)  [facility] ?     TOTAL KNEE ARTHROPLASTY Right 2014    [facility]       Family History   Problem Relation Age of Onset    Diabetes Mother     Thyroid Disease Mother     Arthritis Mother     High Blood Pressure Mother     Arthritis Father     High Blood Pressure Father     Other Father         hay fever    Breast Cancer Neg Hx     Ovarian Cancer Neg Hx        Medications & Allergies:   Current Outpatient Medications   Medication Instructions    albuterol sulfate HFA (PROAIR HFA) 108 (90 Base) MCG/ACT inhaler 2 puffs, Inhalation, EVERY 4 HOURS PRN    allopurinol (ZYLOPRIM) 200 mg, Oral, DAILY    apixaban (ELIQUIS) 5 MG TABS tablet TAKE 1 TABLET BY MOUTH  TWICE DAILY    azithromycin (ZITHROMAX) 250 mg, Oral, DAILY    budesonide (PULMICORT) 500 mcg, Nebulization, 2 TIMES DAILY    Lactobacillus (PROBIOTIC ACIDOPHILUS) CAPS 1 capsule, Oral, DAILY    levothyroxine (SYNTHROID) 88 MCG tablet TAKE 1 TABLET BY MOUTH ONCE DAILY    metoprolol tartrate (LOPRESSOR) 25 MG tablet take 1/2 tablet by mouth twice a day    MULTIPLE VITAMIN PO 1 tablet, Oral, DAILY    simvastatin (ZOCOR) 20 MG tablet TAKE 1 TABLET BY MOUTH  NIGHTLY       Allergies   Allergen Reactions    Colchicine Diarrhea    Levaquin [Levofloxacin] Other (See Comments)     Hallucinations    Oxycodone     Percocet [Oxycodone-Acetaminophen] Nausea Only and Other (See Comments)     Affects memory    Rifampin Other (See Comments)     Lost half of vision    Sulfa Antibiotics Other (See Comments)     hallucinations    Cefuroxime Diarrhea, Nausea And Vomiting and Other (See Comments)     cramping       Review of Systems:   Constitutional: negative for weight changes or fevers  Genitourinary: negative for bowel/bladder incontinence   Musculoskeletal: positive for low back pain, positive bilateral SI pain  Neurological: negative for any leg weakness or numbness/tingling  Behavioral/Psych: negative for anxiety/depression   All other systems reviewed and are negative    Objective:     Vitals:    06/27/22 1043   BP: 98/66       Constitutional: Pleasant, no acute distress   Head: Normocephalic, atraumatic   Eyes: Conjunctivae normal   Neck: Supple, symmetrical   Lungs: Normal respiratory effort, non-labored breathing   Cardiovascular: Limbs warm and well perfused   Abdomen: Non-protruded   Musculoskeletal: Muscle bulk symmetric, no atrophy, no gross deformities   · Lower Extremities: ROM WNL. · Thorax: No paraspinal tenderness bilaterally. No scoliosis or kyphosis. · Lumbar Spine: Decreased ROM. Lumbar paraspinals tender to palpation bilaterally. SLR neg bilaterally. VIKA positive bilaterally. GAENSLEN positive bilaterally. SI joint distraction test positive bilaterally. Positive facet loading bilaterally. Bilateral SI joints tender to palpation. Neurological: Cranial nerves II-XII grossly intact. · Gait - Antalgic gait. Ambulates without assistive device.    · Motor: No focal motor deficits in lower limbs  Skin: No rashes or lesions present   Psychological: Cooperative, no exaggerated pain behaviors       Assessment:    Diagnosis Orders   1. Chronic SI joint pain     2. Chronic pain syndrome     3. Myofascial pain     4. Lumbar spondylosis           Cristiana Wood is a 80 y. o.female presenting to the pain clinic for evaluation of chronic low back pain. Her clinical history and physical assessment are consistent with significant SI joint dysfunction. We have set the patient up for a bilateral SI joint injection. We discussed the possibility of starting a medication such as Lyrica only at night but will hold off at this time due to the increased risk of falls. We discussed that she has several pain generators that may have to be addressed in the future. She responded significantly to 2 previous SI joint injections. We have set her up for bilateral SI joint radiofrequency ablations. We will start with the left side first and proceed with a right-sided later date. Plan: The following treatment recommendations and plan were discussed in detail with Cristiana Wood. Imaging:   I have reviewed patients imaging of lumbar MRI. Analgesics:   Patient is taking Acetaminophen. Patient informed that the maximum amount of acetaminophen taken on a regular basis should only be 4000 mg per day. Adjuvants: For continued chronic pain with associated musculoskeletal component, the patient is advised to continue Tylenol arthritis. Interventions: With examination consistent with bilateral sacroiliac dysfunction/pain, we will proceed with a bilateral sacroiliac joint RFA, LEFT side first.  The risks and benefits were discussed in detail with the patient. Patient wants to proceed with the injection. Anticoagulation/NPO Recommendations:   Patient does not need to hold any medications prior to the procedure. Patient does need to be NPO x8 prior to the procedure.   We will start an IV for the procedure    Multidisciplinary Pain Management:   In the presence of complex, chronic, and multi-factorial pain, the importance of a multidisciplinary approach to pain management in the patients management regimen was emphasized and discussed in great detail.    PHYSICAL THERAPY: Patient is advised to continue hip stretches as instructed in clinic    Referrals:   None    Prescriptions Written This Visit:   None    Follow-up: for LEFT SI RFA      Abby Cornelius, DO  Interventional Pain Management/PM&R   [place]

## 2022-07-01 NOTE — TELEPHONE ENCOUNTER
Joellen Mondragon called requesting a refill on the following medications:  Requested Prescriptions     Pending Prescriptions Disp Refills    apixaban (ELIQUIS) 5 MG TABS tablet 180 tablet 3     Sig: TAKE 1 TABLET BY MOUTH  TWICE DAILY     Pharmacy verified: [facility] in [facility]  . pv      Date of last visit: 1/12/2022  Date of next visit (if applicable): Visit date not found    Pt needs a 2 week supply until she receives mail order

## 2022-07-07 ENCOUNTER — PREP FOR PROCEDURE (OUTPATIENT)
Dept: PHYSICAL MEDICINE AND REHAB | Age: 87
End: 2022-07-07

## 2022-07-11 NOTE — H&P
Today, patient presents for planned left sacroiliac joint thermal radiofrequency ablation. This note is reflective of the patient's previous visit for evaluation. We will proceed with today's planned procedure. Since patient's last visit for evaluation, there have been no interval changes in medical history. Patient has no new numbness, weakness, or focal neurological deficit since evaluation. Patient has no contraindications to injection (no anticoagulation or recent antibiotic intake for active infections), and has a  present or is able to drive themselves (as discussed and cleared by physician). Allergies to latex, contrast dye, and steroid medications have been confirmed with the patient prior to the procedure. NPO necessity has been assessed and accepted based on procedure complexity. The risks and benefits of the procedure have been explained including but are not limited to infection, bleeding, paralysis, immediate post procedure weakness, and dizziness; the patient acknowledges understanding and desires to proceed with the procedure. Patient has signed consent for same procedure as discussed in previous clinic encounter. All other questions and concerns were addressed at bedside. See procedure note for full details. Post procedure Instructions: The patient was advised not to drive during the day of the procedure and not to engage in any significant decision making (unless otherwise states by physician). The patient was also advised to be cautious with walking/activity for 24 hours following today's visit and asked not to engage in over-exertion (unless otherwise states by physician). After this time, it is ok to resume pre-procedure level of activity. Patient advised to apply ice to site of injection in situations of pain and discomfort. Patient advised to not submerge site of injection during bath or pool activities for approximately 24 hours post-procedure.  Patient attested to understanding post procedure directions / restrictions. All other questions and concerns addressed before patient discharge in ambulatory fashion. Chronic Pain/PM&R Clinic Note     Encounter Date: 6/27/22    Subjective:   Chief Complaint:   No chief complaint on file. History of Present Illness:   Gui Tapia is a 80 y.o. female seen in the clinic initially on 12/8/21 upon request from Dr. Tyree Perkins MD for her history of chronic low back pain. Patient states she sustained a fall in October of 2020 when she tripped over her oxygen tubing but she did not have any pain initially after her fall. On 12/24/2020 patient states she woke up and could not get out of bed. Patient states ever since then her back feels \"brittle. \" Patient states she was seen at [facility] and received two epidural steroid injections which provided no relief. Patient states the past two weeks she had had pain in her left buttock that is stabbing and radiates into her left leg, making her feel like her leg may give out. Patient is concerned about the possibility of further falls due to this. Patient states she lives alone and she does not use any assistive devices. Patient states she has never fallen in the past apart from 10/2022 when she tripped over her oxygen tubing. Patient is on 2L of oxygen for her history of pulmonary fibrosis. Patient also has a history of Afib and is currently on Eliquis. Patient states pain is worse first thing in the morning. She also states she will occasionally wake up in the middle of the night with pain in bilateral legs, pain seems to be in the back of bilateral legs. Patient denies pain, numbness or tingling that radiates into her legs during the day. Patient states she completed home health PT in the spring of 2021 which provided minimal relief only on the day of the therapy. Today, 6/27/2022, patient presents for planned follow-up for chronic low back pain.   She unfortunate was unable to have her SI joint radiofrequency ablations completed due to being hospitalized. She states that she had a horrible injection series done at the orthopedic [place] [address] and has a lot of reservations about this injection therapy. She continues to have low back pain as previously described and would like to try to help out with this pain overall. She notes that she is currently on 4 L of oxygen since being discharged but is no longer on any antibiotic therapy. She denies any symptoms of pain rating down her leg, new focal leg weakness, leg numbness/tingling, saddle anesthesia, bowel/bladder incontinence episodes.       History of Interventions:   Surgery: No previous lumbar surgeries  Injections: Lumbar epidural x 2 at [facility] (02/2021, 03/2021) - No relief    Current Treatment Medications:   Tylenol arthritis PRN    Historical Treatment Medications:   Tramadol - makes her \"loopy\"    Imaging:    Lumbar MRI 01/12/2021        Past Medical History:   Diagnosis Date    Anemia     normal on pre-op 5/2012 and 12/2/13    Backache     h/o    Bronchiectasis ([address] 2013    Bursitis     bilateral shoulders    Constipation     Diverticulosis of colon     HTN (hypertension)     Hypercalcemia     slightly elevated at 10.8 pre-op 12/2/13    Hyperlipidemia     Nodular goiter     bx negative    OA (osteoarthritis)     bilateral thumbs - sees Dr. Newton Spearing Osteopenia 11/6/2013    Parathyroid adenoma 4/30/2013    Pneumonia of right upper lobe due to infectious organism     Pneumonitis     Dr. Pollo Talamantes in 7/2010 - bx negative    Pulmonary Mycobacterium avium complex (MAC) infection ([address]     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 ([address]     had one parathyroid removed - now follows with Dr. Dahlia Mckee    Sinusitis     with seasonal allergies    Vitamin D deficiency 05/2018       Past Surgical History:   Procedure Laterality Date    ABDOMINAL EXPLORATION SURGERY  01/02/2013    Release of KATHERINE TAMAYO-Dr. Yaa Wild APPENDECTOMY  1961    BACK INJECTION Bilateral 2021    bilateral sacroiliac joint injection performed by Owens Fleischer, DO at [address] N/A 2022    B/L SI joint block performed by Owens Fleischer, DO at [address] N/A 2020    BRONCHOSCOPY FLUOROSCOPY performed by Soumya Capellan MD at [facility]    CATARACT REMOVAL WITH IMPLANT Bilateral  ?   SECTION  2665,3029    DILATION AND CURETTAGE OF UTERUS  9564,0147,5766    EXCISION OF PARATHYROID MASS Right 2013    rt parathyroidectomy (excision of rt inferior parathyroid mass) exploration of neck     FOOT SURGERY      left    FOOT SURGERY Left 2017    Dr Michel Acuna ([address] [place] [address])      JOINT REPLACEMENT  12    left knee    JOINT REPLACEMENT  2014    right knee    MALIGNANT SKIN LESION EXCISION Left 2017    BCC DORSAL FOOT WITH GRAFT & FS    GOSIA AND BSO (CERVIX REMOVED)  [address] ?     TOTAL KNEE ARTHROPLASTY Right 2014    [facility]       Family History   Problem Relation Age of Onset    Diabetes Mother     Thyroid Disease Mother     Arthritis Mother     High Blood Pressure Mother     Arthritis Father     High Blood Pressure Father     Other Father         hay fever    Breast Cancer Neg Hx     Ovarian Cancer Neg Hx        Medications & Allergies:   Current Outpatient Medications   Medication Instructions    albuterol sulfate HFA (PROAIR HFA) 108 (90 Base) MCG/ACT inhaler 2 puffs, Inhalation, EVERY 4 HOURS PRN    allopurinol (ZYLOPRIM) 200 mg, Oral, DAILY    apixaban (ELIQUIS) 5 MG TABS tablet TAKE 1 TABLET BY MOUTH  TWICE DAILY    azithromycin (ZITHROMAX) 250 mg, Oral, DAILY    budesonide (PULMICORT) 500 mcg, Nebulization, 2 TIMES DAILY    Lactobacillus (PROBIOTIC ACIDOPHILUS) CAPS 1 capsule, Oral, DAILY    levothyroxine (SYNTHROID) 88 MCG tablet TAKE 1 TABLET BY MOUTH ONCE DAILY    metoprolol tartrate (LOPRESSOR) 25 MG tablet take 1/2 tablet by mouth twice a day    MULTIPLE VITAMIN PO 1 tablet, Oral, DAILY    simvastatin (ZOCOR) 20 MG tablet TAKE 1 TABLET BY MOUTH  NIGHTLY       Allergies   Allergen Reactions    Colchicine Diarrhea    Levaquin [Levofloxacin] Other (See Comments)     Hallucinations    Oxycodone     Percocet [Oxycodone-Acetaminophen] Nausea Only and Other (See Comments)     Affects memory    Rifampin Other (See Comments)     Lost half of vision    Sulfa Antibiotics Other (See Comments)     hallucinations    Cefuroxime Diarrhea, Nausea And Vomiting and Other (See Comments)     cramping       Review of Systems:   Constitutional: negative for weight changes or fevers  Genitourinary: negative for bowel/bladder incontinence   Musculoskeletal: positive for low back pain, positive bilateral SI pain  Neurological: negative for any leg weakness or numbness/tingling  Behavioral/Psych: negative for anxiety/depression   All other systems reviewed and are negative    Objective: There were no vitals filed for this visit. Constitutional: Pleasant, no acute distress   Head: Normocephalic, atraumatic   Eyes: Conjunctivae normal   Neck: Supple, symmetrical   Lungs: Normal respiratory effort, non-labored breathing   Cardiovascular: Limbs warm and well perfused   Abdomen: Non-protruded   Musculoskeletal: Muscle bulk symmetric, no atrophy, no gross deformities   · Lower Extremities: ROM WNL. · Thorax: No paraspinal tenderness bilaterally. No scoliosis or kyphosis. · Lumbar Spine: Decreased ROM. Lumbar paraspinals tender to palpation bilaterally. SLR neg bilaterally. VIKA positive bilaterally. GAENSLEN positive bilaterally. SI joint distraction test positive bilaterally. Positive facet loading bilaterally. Bilateral SI joints tender to palpation. Neurological: Cranial nerves II-XII grossly intact. · Gait - Antalgic gait.  Ambulates without assistive device. · Motor: No focal motor deficits in lower limbs  Skin: No rashes or lesions present   Psychological: Cooperative, no exaggerated pain behaviors       Assessment:    Diagnosis Orders   1. Chronic SI joint pain     2. Chronic pain syndrome     3. Myofascial pain     4. Lumbar spondylosis           Gui Tapia is a 80 y. o.female presenting to the pain clinic for evaluation of chronic low back pain. Her clinical history and physical assessment are consistent with significant SI joint dysfunction. We have set the patient up for a bilateral SI joint injection. We discussed the possibility of starting a medication such as Lyrica only at night but will hold off at this time due to the increased risk of falls. We discussed that she has several pain generators that may have to be addressed in the future. She responded significantly to 2 previous SI joint injections. We have set her up for bilateral SI joint radiofrequency ablations. We will start with the left side first and proceed with a right-sided later date. Plan: The following treatment recommendations and plan were discussed in detail with Gui Tapia. Imaging:   I have reviewed patients imaging of lumbar MRI. Analgesics:   Patient is taking Acetaminophen. Patient informed that the maximum amount of acetaminophen taken on a regular basis should only be 4000 mg per day. Adjuvants: For continued chronic pain with associated musculoskeletal component, the patient is advised to continue Tylenol arthritis. Interventions: With examination consistent with bilateral sacroiliac dysfunction/pain, we will proceed with a bilateral sacroiliac joint RFA, LEFT side first.  The risks and benefits were discussed in detail with the patient. Patient wants to proceed with the injection. Anticoagulation/NPO Recommendations:   Patient does not need to hold any medications prior to the procedure.   Patient does need to be NPO x8 prior to the procedure. We will start an IV for the procedure    Multidisciplinary Pain Management:   In the presence of complex, chronic, and multi-factorial pain, the importance of a multidisciplinary approach to pain management in the patients management regimen was emphasized and discussed in great detail.    PHYSICAL THERAPY: Patient is advised to continue hip stretches as instructed in clinic    Referrals:   None    Prescriptions Written This Visit:   None    Follow-up: for LEFT SI RFA      Brittani Backer, DO  Interventional Pain Management/PM&R   [place]

## 2022-07-12 ENCOUNTER — HOSPITAL ENCOUNTER (OUTPATIENT)
Age: 87
Setting detail: OUTPATIENT SURGERY
Discharge: HOME | End: 2022-07-12
Attending: ANESTHESIOLOGY | Admitting: ANESTHESIOLOGY
Payer: MEDICARE

## 2022-07-12 ENCOUNTER — APPOINTMENT (OUTPATIENT)
Dept: GENERAL RADIOLOGY | Age: 87
End: 2022-07-12
Attending: ANESTHESIOLOGY
Payer: MEDICARE

## 2022-07-12 VITALS
OXYGEN SATURATION: 94 % | BODY MASS INDEX: 26.29 KG/M2 | TEMPERATURE: 97.5 F | HEART RATE: 65 BPM | HEIGHT: 64 IN | SYSTOLIC BLOOD PRESSURE: 153 MMHG | WEIGHT: 154 LBS | DIASTOLIC BLOOD PRESSURE: 61 MMHG | RESPIRATION RATE: 16 BRPM

## 2022-07-12 PROCEDURE — 64640 INJECTION TREATMENT OF NERVE: CPT | Performed by: ANESTHESIOLOGY

## 2022-07-12 PROCEDURE — 7100000010 HC PHASE II RECOVERY - FIRST 15 MIN: Performed by: ANESTHESIOLOGY

## 2022-07-12 PROCEDURE — 3600000054 HC PAIN LEVEL 3 BASE: Performed by: ANESTHESIOLOGY

## 2022-07-12 PROCEDURE — 7100000011 HC PHASE II RECOVERY - ADDTL 15 MIN: Performed by: ANESTHESIOLOGY

## 2022-07-12 PROCEDURE — 64625 RF ABLTJ NRV NRVTG SI JT: CPT | Performed by: ANESTHESIOLOGY

## 2022-07-12 PROCEDURE — 3600000055 HC PAIN LEVEL 3 ADDL 15 MIN: Performed by: ANESTHESIOLOGY

## 2022-07-12 PROCEDURE — 2709999900 HC NON-CHARGEABLE SUPPLY: Performed by: ANESTHESIOLOGY

## 2022-07-12 PROCEDURE — 3209999900 FLUORO FOR SURGICAL PROCEDURES

## 2022-07-12 PROCEDURE — 2500000003 HC RX 250 WO HCPCS: Performed by: ANESTHESIOLOGY

## 2022-07-12 RX ORDER — LIDOCAINE HYDROCHLORIDE 10 MG/ML
INJECTION, SOLUTION EPIDURAL; INFILTRATION; INTRACAUDAL; PERINEURAL PRN
Status: DISCONTINUED | OUTPATIENT
Start: 2022-07-12 | End: 2022-07-12 | Stop reason: ALTCHOICE

## 2022-07-12 RX ORDER — LIDOCAINE HYDROCHLORIDE 20 MG/ML
INJECTION, SOLUTION EPIDURAL; INFILTRATION; INTRACAUDAL; PERINEURAL PRN
Status: DISCONTINUED | OUTPATIENT
Start: 2022-07-12 | End: 2022-07-12 | Stop reason: ALTCHOICE

## 2022-07-12 ASSESSMENT — PAIN - FUNCTIONAL ASSESSMENT: PAIN_FUNCTIONAL_ASSESSMENT: 0-10

## 2022-07-12 ASSESSMENT — PAIN SCALES - GENERAL: PAINLEVEL_OUTOF10: 0

## 2022-07-12 NOTE — PROGRESS NOTES
1140 Received in Phase 2 . Drowsy responsive to verbal stimuli. Airway patent. O2 sat  94 on 4l/ nasal cannula as at home  Injection site clean and dry. Denies pain on arrival.  1145 Drink  given.   1155 Assisted to sit at side of cart to get dressed   [phone] Discharged home via private car with son  without complaint

## 2022-07-12 NOTE — PROCEDURES
Pre-operative Diagnosis:  SI joint pain     Post-operative Diagnosis:  SI joint pain     Procedure: LEFT SI joint thermal radiofrequency ablation     Procedure Description:  After having signed the informed consent, the patient was placed in the prone position. The patient's low back and buttocks was prepped with chloraprep solution, and draped in a sterile fashion. A total of 1 ml of 1% lidocaine were used to anesthetize the skin and underlying tissues. Coolief radiofrequency needles were introduced to the anatomic location of the L5 primary dorsal ramus at the junction of the superior articular process and sacral ala utilizing intermittent fluoroscopy, as well as the S1, S2, and S3 lateral branch nerves at the lateral border of the S1, S2, and S3 posterior/dorsal foramen. Motor stimulation at 2 volts was done to confirm no ablation of the ventral ramus at each level. 1 mL of 2% lidocaine was then injected slowly at each level. After waiting 60 seconds, ablation was performed utilizing a thermal radiofrequency generator at 80 degrees C for 1 minute and 30 seconds. The patient tolerated the procedure well, was transported to the recovery room and observed for 15 minutes and discharged in an ambulatory fashion. No immediate reported complications.      Procedural Complications: None  Estimated Blood Loss: 0 mL           Jeramie Colin DO  Interventional Pain Management/PM&R   [facility] and [facility]

## 2022-07-12 NOTE — PROGRESS NOTES
Resting quietly in room with call light in reach. Reviewed discharge instructions with patient who voiced understanding.

## 2022-07-13 ENCOUNTER — PREP FOR PROCEDURE (OUTPATIENT)
Dept: PHYSICAL MEDICINE AND REHAB | Age: 87
End: 2022-07-13

## 2022-07-18 NOTE — H&P
Today, patient presents for planned RIGHT sacroiliac joint radiofrequency ablation. This note is reflective of the patient's previous visit for evaluation. We will proceed with today's planned procedure. Since patient's last visit for evaluation, there have been no interval changes in medical history. Patient has no new numbness, weakness, or focal neurological deficit since evaluation. Patient has no contraindications to injection (no anticoagulation or recent antibiotic intake for active infections), and has a  present or is able to drive themselves (as discussed and cleared by physician). Allergies to latex, contrast dye, and steroid medications have been confirmed with the patient prior to the procedure. NPO necessity has been assessed and accepted based on procedure complexity. The risks and benefits of the procedure have been explained including but are not limited to infection, bleeding, paralysis, immediate post procedure weakness, and dizziness; the patient acknowledges understanding and desires to proceed with the procedure. Patient has signed consent for same procedure as discussed in previous clinic encounter. All other questions and concerns were addressed at bedside. See procedure note for full details. Post procedure Instructions: The patient was advised not to drive during the day of the procedure and not to engage in any significant decision making (unless otherwise states by physician). The patient was also advised to be cautious with walking/activity for 24 hours following today's visit and asked not to engage in over-exertion (unless otherwise states by physician). After this time, it is ok to resume pre-procedure level of activity. Patient advised to apply ice to site of injection in situations of pain and discomfort. Patient advised to not submerge site of injection during bath or pool activities for approximately 24 hours post-procedure.  Patient attested to understanding post procedure directions / restrictions. All other questions and concerns addressed before patient discharge in ambulatory fashion. Chronic Pain/PM&R Clinic Note     Encounter Date: 6/27/22    Subjective:   Chief Complaint:   No chief complaint on file. History of Present Illness:   Blake Jaquez is a 80 y.o. female seen in the clinic initially on 12/8/21 upon request from Dr. Elvin Alvarado MD for her history of chronic low back pain. Patient states she sustained a fall in October of 2020 when she tripped over her oxygen tubing but she did not have any pain initially after her fall. On 12/24/2020 patient states she woke up and could not get out of bed. Patient states ever since then her back feels \"brittle. \" Patient states she was seen at [facility] and received two epidural steroid injections which provided no relief. Patient states the past two weeks she had had pain in her left buttock that is stabbing and radiates into her left leg, making her feel like her leg may give out. Patient is concerned about the possibility of further falls due to this. Patient states she lives alone and she does not use any assistive devices. Patient states she has never fallen in the past apart from 10/2022 when she tripped over her oxygen tubing. Patient is on 2L of oxygen for her history of pulmonary fibrosis. Patient also has a history of Afib and is currently on Eliquis. Patient states pain is worse first thing in the morning. She also states she will occasionally wake up in the middle of the night with pain in bilateral legs, pain seems to be in the back of bilateral legs. Patient denies pain, numbness or tingling that radiates into her legs during the day. Patient states she completed home health PT in the spring of 2021 which provided minimal relief only on the day of the therapy. Today, 6/27/2022, patient presents for planned follow-up for chronic low back pain.   She unfortunate was unable to have her SI joint radiofrequency ablations completed due to being hospitalized. She states that she had a horrible injection series done at the orthopedic [place] [address] and has a lot of reservations about this injection therapy. She continues to have low back pain as previously described and would like to try to help out with this pain overall. She notes that she is currently on 4 L of oxygen since being discharged but is no longer on any antibiotic therapy. She denies any symptoms of pain rating down her leg, new focal leg weakness, leg numbness/tingling, saddle anesthesia, bowel/bladder incontinence episodes.       History of Interventions:   Surgery: No previous lumbar surgeries  Injections: Lumbar epidural x 2 at [facility] (02/2021, 03/2021) - No relief    Current Treatment Medications:   Tylenol arthritis PRN    Historical Treatment Medications:   Tramadol - makes her \"loopy\"    Imaging:    Lumbar MRI 01/12/2021        Past Medical History:   Diagnosis Date    Anemia     normal on pre-op 5/2012 and 12/2/13    Backache     h/o    Bronchiectasis ([address] 2013    Bursitis     bilateral shoulders    Constipation     Diverticulosis of colon     HTN (hypertension)     Hypercalcemia     slightly elevated at 10.8 pre-op 12/2/13    Hyperlipidemia     Nodular goiter     bx negative    OA (osteoarthritis)     bilateral thumbs - sees Dr. Eduard Johnston    Osteopenia 11/6/2013    Parathyroid adenoma 4/30/2013    Pneumonia of right upper lobe due to infectious organism     Pneumonitis     Dr. Kaylyn Menjivar in 7/2010 - bx negative    Pulmonary Mycobacterium avium complex (MAC) infection ([address]     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 ([address]     had one parathyroid removed - now follows with Dr. Doc Moran    Sinusitis     with seasonal allergies    Vitamin D deficiency 05/2018       Past Surgical History:   Procedure Laterality Date    ABDOMINAL EXPLORATION SURGERY  01/02/2013    Release of ASHISHO, KATHERINE-Dr. Lyudmila Mckenna    APPENDECTOMY  1961    BACK INJECTION palpation. Neurological: Cranial nerves II-XII grossly intact. · Gait - Antalgic gait. Ambulates without assistive device. · Motor: No focal motor deficits in lower limbs  Skin: No rashes or lesions present   Psychological: Cooperative, no exaggerated pain behaviors       Assessment:    Diagnosis Orders   1. Chronic SI joint pain     2. Chronic pain syndrome     3. Myofascial pain     4. Lumbar spondylosis           Natalia Martinez is a 80 y. o.female presenting to the pain clinic for evaluation of chronic low back pain. Her clinical history and physical assessment are consistent with significant SI joint dysfunction. We have set the patient up for a bilateral SI joint injection. We discussed the possibility of starting a medication such as Lyrica only at night but will hold off at this time due to the increased risk of falls. We discussed that she has several pain generators that may have to be addressed in the future. She responded significantly to 2 previous SI joint injections. We have set her up for bilateral SI joint radiofrequency ablations. We will start with the left side first and proceed with a right-sided later date. Plan: The following treatment recommendations and plan were discussed in detail with Natalia Martinez. Imaging:   I have reviewed patients imaging of lumbar MRI. Analgesics:   Patient is taking Acetaminophen. Patient informed that the maximum amount of acetaminophen taken on a regular basis should only be 4000 mg per day. Adjuvants: For continued chronic pain with associated musculoskeletal component, the patient is advised to continue Tylenol arthritis. Interventions: With examination consistent with bilateral sacroiliac dysfunction/pain, we will proceed with a bilateral sacroiliac joint RFA, LEFT side first.  The risks and benefits were discussed in detail with the patient. Patient wants to proceed with the injection.     Anticoagulation/NPO Recommendations:   Patient does not need to hold any medications prior to the procedure. Patient does need to be NPO x8 prior to the procedure. We will start an IV for the procedure    Multidisciplinary Pain Management:   In the presence of complex, chronic, and multi-factorial pain, the importance of a multidisciplinary approach to pain management in the patients management regimen was emphasized and discussed in great detail.    PHYSICAL THERAPY: Patient is advised to continue hip stretches as instructed in clinic    Referrals:   None    Prescriptions Written This Visit:   None    Follow-up: for LEFT SI RFA      Nathen Olszewski,   Interventional Pain Management/PM&R   [place]

## 2022-07-18 NOTE — DISCHARGE INSTRUCTIONS

## 2022-07-19 ENCOUNTER — HOSPITAL ENCOUNTER (OUTPATIENT)
Age: 87
Setting detail: OUTPATIENT SURGERY
Discharge: HOME | End: 2022-07-19
Attending: ANESTHESIOLOGY | Admitting: ANESTHESIOLOGY
Payer: MEDICARE

## 2022-07-19 ENCOUNTER — APPOINTMENT (OUTPATIENT)
Dept: GENERAL RADIOLOGY | Age: 87
End: 2022-07-19
Attending: ANESTHESIOLOGY
Payer: MEDICARE

## 2022-07-19 VITALS
OXYGEN SATURATION: 96 % | DIASTOLIC BLOOD PRESSURE: 61 MMHG | RESPIRATION RATE: 16 BRPM | TEMPERATURE: 97.7 F | SYSTOLIC BLOOD PRESSURE: 132 MMHG | BODY MASS INDEX: 26.4 KG/M2 | WEIGHT: 154.6 LBS | HEIGHT: 64 IN | HEART RATE: 64 BPM

## 2022-07-19 PROCEDURE — 7100000011 HC PHASE II RECOVERY - ADDTL 15 MIN: Performed by: ANESTHESIOLOGY

## 2022-07-19 PROCEDURE — 3209999900 FLUORO FOR SURGICAL PROCEDURES

## 2022-07-19 PROCEDURE — 2709999900 HC NON-CHARGEABLE SUPPLY: Performed by: ANESTHESIOLOGY

## 2022-07-19 PROCEDURE — 64625 RF ABLTJ NRV NRVTG SI JT: CPT | Performed by: ANESTHESIOLOGY

## 2022-07-19 PROCEDURE — 7100000010 HC PHASE II RECOVERY - FIRST 15 MIN: Performed by: ANESTHESIOLOGY

## 2022-07-19 PROCEDURE — 3600000054 HC PAIN LEVEL 3 BASE: Performed by: ANESTHESIOLOGY

## 2022-07-19 PROCEDURE — 64640 INJECTION TREATMENT OF NERVE: CPT | Performed by: ANESTHESIOLOGY

## 2022-07-19 PROCEDURE — 2500000003 HC RX 250 WO HCPCS: Performed by: ANESTHESIOLOGY

## 2022-07-19 PROCEDURE — 3600000055 HC PAIN LEVEL 3 ADDL 15 MIN: Performed by: ANESTHESIOLOGY

## 2022-07-19 RX ORDER — LIDOCAINE HYDROCHLORIDE 20 MG/ML
INJECTION, SOLUTION EPIDURAL; INFILTRATION; INTRACAUDAL; PERINEURAL PRN
Status: DISCONTINUED | OUTPATIENT
Start: 2022-07-19 | End: 2022-07-19 | Stop reason: ALTCHOICE

## 2022-07-19 RX ORDER — LIDOCAINE HYDROCHLORIDE 10 MG/ML
INJECTION, SOLUTION EPIDURAL; INFILTRATION; INTRACAUDAL; PERINEURAL PRN
Status: DISCONTINUED | OUTPATIENT
Start: 2022-07-19 | End: 2022-07-19 | Stop reason: ALTCHOICE

## 2022-07-19 ASSESSMENT — PAIN - FUNCTIONAL ASSESSMENT: PAIN_FUNCTIONAL_ASSESSMENT: 0-10

## 2022-07-19 NOTE — PROGRESS NOTES
1130: Patient arrives to recovery room via cart. Spontaneous respirations, vss, report received from surgical RN. Patient denies pain, numbness, tingling , nausea. Injection site clean, dry, intact. HOB elevated. Snack and drink given. Call light within reach. 1145: patient denies needs. Getting dressed. Ride called. 1150: patient wheeled to discharge lobby in stable condition. Patient discharged home with son.

## 2022-07-19 NOTE — PROCEDURES
Pre-operative Diagnosis:  SI joint pain     Post-operative Diagnosis:  SI joint pain     Procedure: RIGHT SI joint thermal radiofrequency ablation     Procedure Description:  After having signed the informed consent, the patient was placed in the prone position. The patient's low back and buttocks was prepped with chloraprep solution, and draped in a sterile fashion. A total of 1 ml of 1% lidocaine were used to anesthetize the skin and underlying tissues. Coolief radiofrequency needles were introduced to the anatomic location of the L5 primary dorsal ramus at the junction of the superior articular process and sacral ala utilizing intermittent fluoroscopy, as well as the S1, S2, and S3 lateral branch nerves at the lateral border of the S1, S2, and S3 posterior/dorsal foramen. Motor stimulation at 2 volts was done to confirm no ablation of the ventral ramus at each level. 1 mL of 2% lidocaine was then injected slowly at each level. After waiting 60 seconds, ablation was performed utilizing a thermal radiofrequency generator at 80 degrees C for 1 minute and 30 seconds. The patient tolerated the procedure well, was transported to the recovery room and observed for 15 minutes and discharged in an ambulatory fashion. No immediate reported complications.      Procedural Complications: None  Estimated Blood Loss: 0 mL           Jeramie Colin DO  Interventional Pain Management/PM&R  [facility] and [facility]

## 2022-07-25 ENCOUNTER — HOSPITAL ENCOUNTER (OUTPATIENT)
Age: 87
Setting detail: OUTPATIENT SURGERY
Discharge: HOME | End: 2022-07-25
Attending: INTERNAL MEDICINE | Admitting: INTERNAL MEDICINE
Payer: MEDICARE

## 2022-07-25 ENCOUNTER — APPOINTMENT (OUTPATIENT)
Dept: GENERAL RADIOLOGY | Age: 87
End: 2022-07-25
Attending: INTERNAL MEDICINE
Payer: MEDICARE

## 2022-07-25 ENCOUNTER — ANESTHESIA (OUTPATIENT)
Dept: ENDOSCOPY | Age: 87
End: 2022-07-25
Payer: MEDICARE

## 2022-07-25 ENCOUNTER — ANESTHESIA EVENT (OUTPATIENT)
Dept: ENDOSCOPY | Age: 87
End: 2022-07-25
Payer: MEDICARE

## 2022-07-25 VITALS
HEIGHT: 64 IN | RESPIRATION RATE: 16 BRPM | DIASTOLIC BLOOD PRESSURE: 60 MMHG | TEMPERATURE: 97.7 F | WEIGHT: 155.6 LBS | BODY MASS INDEX: 26.56 KG/M2 | SYSTOLIC BLOOD PRESSURE: 130 MMHG | HEART RATE: 70 BPM | OXYGEN SATURATION: 93 %

## 2022-07-25 DIAGNOSIS — R10.9 ABDOMINAL PAIN, UNSPECIFIED ABDOMINAL LOCATION: ICD-10-CM

## 2022-07-25 PROCEDURE — 3700000000 HC ANESTHESIA ATTENDED CARE: Performed by: INTERNAL MEDICINE

## 2022-07-25 PROCEDURE — 88305 TISSUE EXAM BY PATHOLOGIST: CPT

## 2022-07-25 PROCEDURE — 2580000003 HC RX 258: Performed by: INTERNAL MEDICINE

## 2022-07-25 PROCEDURE — 7100000011 HC PHASE II RECOVERY - ADDTL 15 MIN: Performed by: INTERNAL MEDICINE

## 2022-07-25 PROCEDURE — 3609017100 HC EGD: Performed by: INTERNAL MEDICINE

## 2022-07-25 PROCEDURE — 74018 RADEX ABDOMEN 1 VIEW: CPT

## 2022-07-25 PROCEDURE — 7100000010 HC PHASE II RECOVERY - FIRST 15 MIN: Performed by: INTERNAL MEDICINE

## 2022-07-25 PROCEDURE — 3700000001 HC ADD 15 MINUTES (ANESTHESIA): Performed by: INTERNAL MEDICINE

## 2022-07-25 PROCEDURE — 6360000002 HC RX W HCPCS: Performed by: NURSE ANESTHETIST, CERTIFIED REGISTERED

## 2022-07-25 PROCEDURE — 2500000003 HC RX 250 WO HCPCS: Performed by: NURSE ANESTHETIST, CERTIFIED REGISTERED

## 2022-07-25 PROCEDURE — 2709999900 HC NON-CHARGEABLE SUPPLY: Performed by: INTERNAL MEDICINE

## 2022-07-25 PROCEDURE — 6360000002 HC RX W HCPCS: Performed by: INTERNAL MEDICINE

## 2022-07-25 RX ORDER — SODIUM CHLORIDE 0.9 % (FLUSH) 0.9 %
5-40 SYRINGE (ML) INJECTION PRN
Status: DISCONTINUED | OUTPATIENT
Start: 2022-07-25 | End: 2022-07-25 | Stop reason: HOSPADM

## 2022-07-25 RX ORDER — GLYCOPYRROLATE 1 MG/5 ML
SYRINGE (ML) INTRAVENOUS PRN
Status: DISCONTINUED | OUTPATIENT
Start: 2022-07-25 | End: 2022-07-25 | Stop reason: SDUPTHER

## 2022-07-25 RX ORDER — PROPOFOL 10 MG/ML
INJECTION, EMULSION INTRAVENOUS PRN
Status: DISCONTINUED | OUTPATIENT
Start: 2022-07-25 | End: 2022-07-25 | Stop reason: SDUPTHER

## 2022-07-25 RX ORDER — LIDOCAINE HYDROCHLORIDE 10 MG/ML
INJECTION, SOLUTION EPIDURAL; INFILTRATION; INTRACAUDAL; PERINEURAL PRN
Status: DISCONTINUED | OUTPATIENT
Start: 2022-07-25 | End: 2022-07-25 | Stop reason: SDUPTHER

## 2022-07-25 RX ORDER — FENTANYL CITRATE 50 UG/ML
12.5 INJECTION, SOLUTION INTRAMUSCULAR; INTRAVENOUS ONCE
Status: COMPLETED | OUTPATIENT
Start: 2022-07-25 | End: 2022-07-25

## 2022-07-25 RX ORDER — SODIUM CHLORIDE 0.9 % (FLUSH) 0.9 %
5-40 SYRINGE (ML) INJECTION EVERY 12 HOURS SCHEDULED
Status: DISCONTINUED | OUTPATIENT
Start: 2022-07-25 | End: 2022-07-25 | Stop reason: HOSPADM

## 2022-07-25 RX ORDER — SODIUM CHLORIDE 9 MG/ML
25 INJECTION, SOLUTION INTRAVENOUS PRN
Status: DISCONTINUED | OUTPATIENT
Start: 2022-07-25 | End: 2022-07-25 | Stop reason: HOSPADM

## 2022-07-25 RX ADMIN — PROPOFOL 50 MG: 10 INJECTION, EMULSION INTRAVENOUS at 14:34

## 2022-07-25 RX ADMIN — SODIUM CHLORIDE, PRESERVATIVE FREE 40 ML: 5 INJECTION INTRAVENOUS at 14:21

## 2022-07-25 RX ADMIN — Medication 0.2 MG: at 14:34

## 2022-07-25 RX ADMIN — PROPOFOL 50 MG: 10 INJECTION, EMULSION INTRAVENOUS at 14:47

## 2022-07-25 RX ADMIN — PROPOFOL 30 MG: 10 INJECTION, EMULSION INTRAVENOUS at 15:00

## 2022-07-25 RX ADMIN — LIDOCAINE HYDROCHLORIDE 50 MG: 10 INJECTION, SOLUTION EPIDURAL; INFILTRATION; INTRACAUDAL; PERINEURAL at 14:34

## 2022-07-25 RX ADMIN — SODIUM CHLORIDE: 9 INJECTION, SOLUTION INTRAVENOUS at 14:29

## 2022-07-25 RX ADMIN — FENTANYL CITRATE 12.5 MCG: 50 INJECTION, SOLUTION INTRAMUSCULAR; INTRAVENOUS at 15:35

## 2022-07-25 ASSESSMENT — PAIN DESCRIPTION - LOCATION
LOCATION: ABDOMEN

## 2022-07-25 ASSESSMENT — PAIN SCALES - GENERAL
PAINLEVEL_OUTOF10: 7
PAINLEVEL_OUTOF10: 0
PAINLEVEL_OUTOF10: 4
PAINLEVEL_OUTOF10: 5
PAINLEVEL_OUTOF10: 2
PAINLEVEL_OUTOF10: 6
PAINLEVEL_OUTOF10: 7
PAINLEVEL_OUTOF10: 4

## 2022-07-25 ASSESSMENT — PAIN - FUNCTIONAL ASSESSMENT
PAIN_FUNCTIONAL_ASSESSMENT: NONE - DENIES PAIN
PAIN_FUNCTIONAL_ASSESSMENT: 0-10

## 2022-07-25 NOTE — OP NOTE
[facility]  EGD Procedure Note    Patient: Mickey Streeter  : 1935      Procedure: Esophagogastroduodenoscopy with biopsy          Date:  2022     Endoscopist:   Cassidy Condon MD    Referring Physician: Cassidy Condon MD  Primary Care Physician: Odilia Willingham MD    Indications: This is a 80y.o. year old female who presents with intermittent abdominal pain with pancreatic ductal dilatation. She has a history of atrial fibrillation, on Eliquis, and pulmonary fibrosis, for which she is on oxygen at 4 L continuous. CT A/P 3/2022 which showed a 1 cm calcification within the mid pancreatic body associated with moderate dilatation of the more distal aspect of the pancreatic duct, diverticulosis without diverticulitis. LFTs appeared normal at that time. MRCP on 3/16/2022 showed obstructive changes of the distal pancreatic duct likely due to an obstructing calcification, marked associated atrophy of the pancreas compatible with chronic process, no mass. Anesthesia: MAC per Anesthesia. Please see anesthesia report. Consent:  The patient or their legal guardian has signed a consent and is aware of the potential risks, benefits, alternatives, and potential complications of this procedure. These include, but are not limited to hemorrhage, bleeding, post procedural pain, perforation, phlebitis, aspiration, hypotension, hypoxia, cardiovascular events such as arrhythmia, and possibly death. Description of Procedure: The patient was then taken to the endoscopy suite and placed in the left lateral decubitus position and the above IV sedation was administered. A forward-viewing Olympus video endoscope was lubricated and inserted through the patient's mouth into the oropharynx. Under direct visualization, the upper esophagus was intubated. The scope was advanced through the esophagus to the extent of the second portion of duodenum.  The scope was then withdrawn with good views of the mucosal surfaces. Both forward and retroflexed views of the stomach were obtained. The stomach was decompressed and the scope was completely withdrawn. The patient tolerated the procedure well and was taken to the recovery area in good condition. There were no immediate complications. Estimated Blood Loss: minimal    Findings:    Esophagus: The GE junction was noted to be at 40 cm. Stomach: There was mild diffuse gastritis. Duodenum: The first and 2nd portions of the duodenum appeared normal with normal villous pattern. The papilla was located and was erythematous. Cold biopsies were taken and placed in Jar 1. Recommendations:   - Await pathology. - Continue current medications. - Follow up with primary care physician as scheduled. - Follow up with Alissa Riley CNP in 4-6 weeks unless previously scheduled. - Would consider EUS if pathology unremarkable given appearance of papilla.      Electronically signed by Elizabeth Rivera MD on 7/25/2022 at 3:06 PM    Elizabeth Rivera MD  [facility]

## 2022-07-25 NOTE — PROGRESS NOTES
1509:  Pt in endo recovery room, her son at bedside. 1515: Notified Anesthesia Dheeraj Bad CRNA that pt blood pressure is low, 80 48  she is treating BP.    1518. .. Bp now is 102/53    1520:  Pt is C/O Abdominal pain. , notifying Dr. Js Fleming. 1523;  Dr. Js Fleming here to speak with son & pt regarding procedure. Dr. Js Fleming ordered KUB plus Fentanyl 12.5 mcg given IV push     1545:  Radiology here to take KUB for pt.       1558: Son back in recovery room at his moms bedside. 1615:  Dr. Js Fleming ordered simethicone drops in water for the pt. To drink to help rid gas in her abdomen. Pt stated pain level was 2 to 3 now. 1635:  Discharge instructions given to pt & son with verbal understanding.

## 2022-07-25 NOTE — ANESTHESIA PRE PROCEDURE
Department of Anesthesiology  Preprocedure Note       Name:  Manas Dueñas   Age:  80 y.o.  :  1935                                          MRN:  [id number]         Date:  2022      Surgeon: Adrianna Kay):  Amna Trinh MD    Procedure: Procedure(s):  EGD    Medications prior to admission:   Prior to Admission medications    Medication Sig Start Date End Date Taking?  Authorizing Provider   apixaban (ELIQUIS) 5 MG TABS tablet TAKE 1 TABLET BY MOUTH  TWICE DAILY 22   Marcellus Rivera MD   albuterol sulfate HFA (PROAIR HFA) 108 (90 Base) MCG/ACT inhaler Inhale 2 puffs into the lungs every 4 hours as needed for Wheezing or Shortness of Breath 22  JASMEET Garcia - CNP   levothyroxine (SYNTHROID) 88 MCG tablet TAKE 1 TABLET BY MOUTH ONCE DAILY 22   Malissa Altamirano MD   allopurinol (ZYLOPRIM) 100 MG tablet Take 2 tablets by mouth daily 22   Malissa Altamirano MD   budesonide (PULMICORT) 0.5 MG/2ML nebulizer suspension Take 2 mLs by nebulization 2 times daily 22   Malissa Altamirano MD   metoprolol tartrate (LOPRESSOR) 25 MG tablet take 1/2 tablet by mouth twice a day 21   Malissa Altamirano MD   simvastatin (ZOCOR) 20 MG tablet TAKE 1 TABLET BY MOUTH  NIGHTLY 21   Malissa Altamirano MD   azithromycin [facility]) 250 MG tablet Take 1 tablet by mouth daily 21   Malissa Altamirano MD   Lactobacillus (PROBIOTIC ACIDOPHILUS) CAPS Take 1 capsule by mouth daily     Historical Provider, MD   MULTIPLE VITAMIN PO Take 1 tablet by mouth daily     Historical Provider, MD       Current medications:    Current Facility-Administered Medications   Medication Dose Route Frequency Provider Last Rate Last Admin    sodium chloride flush 0.9 % injection 5-40 mL  5-40 mL IntraVENous 2 times per day Amna Trinh MD        sodium chloride flush 0.9 % injection 5-40 mL  5-40 mL IntraVENous PRN Amna Trinh MD   40 mL at 22 1421    0.9 % sodium chloride infusion  25 mL Hypercalcemia     slightly elevated at 10.8 pre-op 13    Hyperlipidemia     Nodular goiter     bx negative    OA (osteoarthritis)     bilateral thumbs - sees Dr. Migel Sequeira Osteopenia 2013    Parathyroid adenoma 2013    Pneumonia of right upper lobe due to infectious organism     Pneumonitis     Dr. Chris Jim in 2010 - bx negative    Pulmonary Mycobacterium avium complex (MAC) infection ([address]     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 ([address]     had one parathyroid removed - now follows with Dr. Jessica Bergman Sinusitis     with seasonal allergies    Vitamin D deficiency 2018       Past Surgical History:        Procedure Laterality Date    ABDOMINAL EXPLORATION SURGERY  2013    Release of SBO, KATHERINE-Dr. Linnea Huerta    APPENDECTOMY      BACK INJECTION Bilateral 2021    bilateral sacroiliac joint injection performed by Tapan Berger DO at [address] N/A 2022    B/L SI joint block performed by Tapan Berger DO at [address] N/A 2020    BRONCHOSCOPY FLUOROSCOPY performed by Geo Hyman MD at [facility]    CATARACT REMOVAL WITH IMPLANT Bilateral  ?      SECTION  0405,7469    DILATION AND CURETTAGE OF UTERUS  2205,8359,9485    EXCISION OF PARATHYROID MASS Right 2013    rt parathyroidectomy (excision of rt inferior parathyroid mass) exploration of neck     FOOT SURGERY      left    FOOT SURGERY Left 2017    Dr Liliana Elizondo ([address] [place] [address])      JOINT REPLACEMENT  12    left knee    JOINT REPLACEMENT  2014    right knee    MALIGNANT SKIN LESION EXCISION Left 2017    BCC DORSAL FOOT WITH GRAFT & FS    PAIN MANAGEMENT PROCEDURE Left 2022    sacroiliac joint radiofrequency ablation, LEFT side first performed by Tapan Berger DO at [facility] Right 2022    sacroiliac joint Radiofrequency Ablations, right side performed by Zander Lam DO at [facility] (CERVIX REMOVED)  [address] [place] [address] Pkw[address] ?  TOTAL KNEE ARTHROPLASTY Right January 6th, 2014    OIO       Social History:    Social History     Tobacco Use    Smoking status: Never    Smokeless tobacco: Never   Substance Use Topics    Alcohol use: Yes     Alcohol/week: 0.0 standard drinks     Comment: socially-1 glassof wine 2-3 times a month                                Counseling given: Not Answered      Vital Signs (Current):   Vitals:    07/25/22 1408   BP: 138/63   Pulse: 63   Resp: 18   Temp: 36.5 °C (97.7 °F)   TempSrc: Temporal   SpO2: 99%   Weight: 155 lb 9.6 oz (70.6 kg)   Height: 5' 4\" (1.626 m)                                              BP Readings from Last 3 Encounters:   07/25/22 138/63   07/19/22 132/61   07/12/22 (!) 153/61       NPO Status: Time of last liquid consumption: 0700                        Time of last solid consumption: 1900                        Date of last liquid consumption: 07/24/22                        Date of last solid food consumption: 07/24/22    BMI:   Wt Readings from Last 3 Encounters:   07/25/22 155 lb 9.6 oz (70.6 kg)   07/19/22 154 lb 9.6 oz (70.1 kg)   07/12/22 154 lb (69.9 kg)     Body mass index is 26.71 kg/m².     CBC:   Lab Results   Component Value Date/Time    WBC 6.7 03/18/2022 07:02 AM    RBC 3.81 03/18/2022 07:02 AM    RBC 3.45 06/08/2012 05:30 AM    HGB 10.5 03/18/2022 07:02 AM    HCT 33.4 03/18/2022 07:02 AM    MCV 87.7 03/18/2022 07:02 AM    RDW 14.9 12/06/2017 09:00 AM     03/18/2022 07:02 AM       CMP:   Lab Results   Component Value Date/Time     03/26/2022 07:13 AM    K 4.6 03/26/2022 07:13 AM    K 5.2 03/08/2022 02:23 PM     03/26/2022 07:13 AM    CO2 29 03/26/2022 07:13 AM    BUN 22 03/26/2022 07:13 AM    CREATININE 0.9 03/26/2022 07:13 AM    LABGLOM 59 03/26/2022 07:13 AM    GLUCOSE 89

## 2022-07-25 NOTE — H&P
[facility]   Pre-Operative History and Physical: EGD    Patient: Matias [place]  : 1935     History Obtained From:  patient, electronic medical record    HISTORY OF PRESENT ILLNESS:    The patient is a 80 y.o. female who presents for an EGD for  abdominal pain. Past Medical History:        Diagnosis Date    Anemia     normal on pre-op 2012 and 13    Backache     h/o    Bronchiectasis ([address]     Bursitis     bilateral shoulders    Constipation     Diverticulosis of colon     HTN (hypertension)     Hypercalcemia     slightly elevated at 10.8 pre-op 13    Hyperlipidemia     Nodular goiter     bx negative    OA (osteoarthritis)     bilateral thumbs - sees Dr. Everardo Herrera    Osteopenia 2013    Parathyroid adenoma 2013    Pneumonia of right upper lobe due to infectious organism     Pneumonitis     Dr. Quinten Kingston in 2010 - bx negative    Pulmonary Mycobacterium avium complex (MAC) infection ([facility]     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 ([address]     had one parathyroid removed - now follows with Dr. Jones Males    Sinusitis     with seasonal allergies    Vitamin D deficiency 2018     Past Surgical History:        Procedure Laterality Date    ABDOMINAL EXPLORATION SURGERY  2013    Release of KATHERINE TAMAYO-Dr. Ruben García    APPENDECTOMY      BACK INJECTION Bilateral 2021    bilateral sacroiliac joint injection performed by Sonny Martinez DO at [address] N/A 2022    B/L SI joint block performed by Sonny Martinez DO at [address] N/A 2020    BRONCHOSCOPY FLUOROSCOPY performed by Archana Potts MD at [address] [place] [address] 153 Bilateral 1990 ?      SECTION  2205,2788    DILATION AND CURETTAGE OF UTERUS  2059,0121,5350    EXCISION OF PARATHYROID MASS Right 2013    rt parathyroidectomy (excision of rt inferior parathyroid mass) exploration of neck     FOOT SURGERY   TOBACCO:   reports that she has never smoked. She has never used smokeless tobacco.  ETOH:   reports current alcohol use. DRUGS:   reports no history of drug use. Family History:       Problem Relation Age of Onset    Diabetes Mother     Thyroid Disease Mother     Arthritis Mother     High Blood Pressure Mother     Arthritis Father     High Blood Pressure Father     Other Father         hay fever    Breast Cancer Neg Hx     Ovarian Cancer Neg Hx        PHYSICAL EXAM:    /63   Pulse 63   Temp 97.7 °F (36.5 °C) (Temporal)   Resp 18   Ht 5' 4\" (1.626 m)   Wt 155 lb 9.6 oz (70.6 kg)   SpO2 99%   BMI 26.71 kg/m²       Heart:  Regular rate and rhythm  Lungs:  No increased work of breathing  Abdomen:  BS+, soft, non-distended, non-tender, no masses palpated    ASA Grade:  See Anesthesia documentation    Mallampati Classification:  See Anesthesia documentation    ASSESSMENT AND PLAN:    1. Patient is a 80 y.o. female here for an EGD with MAC.    2.  Procedure options, risks and benefits reviewed with patient. We specifically discussed that risks include but are not limited to infection, bleeding, perforation, death, and missed lesions. Patient expresses understanding.     Electronically signed by Flaquita Jansen MD on 7/25/2022 at 2:25 PM    Flaquita Jansen MD  [facility]

## 2022-07-25 NOTE — ANESTHESIA POSTPROCEDURE EVALUATION
Department of Anesthesiology  Postprocedure Note    Patient: Matias [place]  MRN: [id number]  YOB: 1935  Date of evaluation: 7/25/2022      Procedure Summary     Date: 07/25/22 Room / Location: [address] [place] [address]    Anesthesia Start: 7037 Anesthesia Stop: 2309    Procedure: EGD Diagnosis:       Abdominal pain, unspecified abdominal location      (Abdominal pain, unspecified abdominal location [R10.9])    Surgeons: Cari Salazar MD Responsible Provider: Ely Brice DO    Anesthesia Type: MAC ASA Status: 4          Anesthesia Type: No value filed.     Denton Phase I: Denton Score: 9    Denton Phase II:        Anesthesia Post Evaluation    Patient location during evaluation: bedside  Patient participation: complete - patient participated  Level of consciousness: sleepy but conscious  Pain score: 0  Airway patency: patent  Nausea & Vomiting: no nausea and no vomiting  Complications: no  Cardiovascular status: hemodynamically stable  Respiratory status: acceptable and spontaneous ventilation  Hydration status: stable

## 2022-07-28 ENCOUNTER — OFFICE VISIT (OUTPATIENT)
Dept: PHYSICAL MEDICINE AND REHAB | Age: 87
End: 2022-07-28
Payer: MEDICARE

## 2022-07-28 VITALS
SYSTOLIC BLOOD PRESSURE: 112 MMHG | OXYGEN SATURATION: 100 % | HEIGHT: 64 IN | HEART RATE: 51 BPM | DIASTOLIC BLOOD PRESSURE: 74 MMHG | BODY MASS INDEX: 26.46 KG/M2 | WEIGHT: 155 LBS

## 2022-07-28 DIAGNOSIS — M79.18 MYOFASCIAL PAIN: ICD-10-CM

## 2022-07-28 DIAGNOSIS — G89.29 CHRONIC SI JOINT PAIN: Primary | ICD-10-CM

## 2022-07-28 DIAGNOSIS — G89.4 CHRONIC PAIN SYNDROME: ICD-10-CM

## 2022-07-28 DIAGNOSIS — M53.3 CHRONIC SI JOINT PAIN: Primary | ICD-10-CM

## 2022-07-28 DIAGNOSIS — M47.816 LUMBAR SPONDYLOSIS: ICD-10-CM

## 2022-07-28 PROCEDURE — 1123F ACP DISCUSS/DSCN MKR DOCD: CPT | Performed by: ANESTHESIOLOGY

## 2022-07-28 PROCEDURE — 99024 POSTOP FOLLOW-UP VISIT: CPT | Performed by: ANESTHESIOLOGY

## 2022-07-28 NOTE — PROGRESS NOTES
Chronic Pain/PM&R Clinic Note     Encounter Date: 7/28/22    Subjective:   Chief Complaint:   Chief Complaint   Patient presents with    Follow-up     F/u procedure         History of Present Illness:   Arron Buchanan is a 80 y.o. female seen in the clinic initially on 12/8/21 upon request from Dr. Michele Henley MD for her history of chronic low back pain. Patient states she sustained a fall in October of 2020 when she tripped over her oxygen tubing but she did not have any pain initially after her fall. On 12/24/2020 patient states she woke up and could not get out of bed. Patient states ever since then her back feels \"brittle. \" Patient states she was seen at [facility] and received two epidural steroid injections which provided no relief. Patient states the past two weeks she had had pain in her left buttock that is stabbing and radiates into her left leg, making her feel like her leg may give out. Patient is concerned about the possibility of further falls due to this. Patient states she lives alone and she does not use any assistive devices. Patient states she has never fallen in the past apart from 10/2022 when she tripped over her oxygen tubing. Patient is on 2L of oxygen for her history of pulmonary fibrosis. Patient also has a history of Afib and is currently on Eliquis. Patient states pain is worse first thing in the morning. She also states she will occasionally wake up in the middle of the night with pain in bilateral legs, pain seems to be in the back of bilateral legs. Patient denies pain, numbness or tingling that radiates into her legs during the day. Patient states she completed home health PT in the spring of 2021 which provided minimal relief only on the day of the therapy. Today, 7/28/2022, patient presents for planned follow-up for chronic low back pain. She has completed her SI joint radiofrequency ablations with significant relief in her low back.   He states that she is almost completely pain-free in her low back but did have a fall after her GI procedure where she feels like she tripped over her tubing from excessive sedation. She did bruise up her right knee pretty bad but is recovering well from this. She has noticed some worsening shoulder pain in both shoulders particular when she is raising her hands overhead. She would like to address this and start working on her shoulder pain.       History of Interventions:   Surgery: No previous lumbar surgeries  Injections: Lumbar epidural x 2 at [facility] (02/2021, 03/2021) - No relief  B/L SI RFA - near 100% relief     Current Treatment Medications:   Tylenol arthritis PRN    Historical Treatment Medications:   Tramadol - makes her \"loopy\"    Imaging:    Lumbar MRI 01/12/2021        Past Medical History:   Diagnosis Date    Anemia     normal on pre-op 5/2012 and 12/2/13    Backache     h/o    Bronchiectasis ([facility] 2013    Bursitis     bilateral shoulders    Constipation     Diverticulosis of colon     HTN (hypertension)     Hypercalcemia     slightly elevated at 10.8 pre-op 12/2/13    Hyperlipidemia     Nodular goiter     bx negative    OA (osteoarthritis)     bilateral thumbs - sees Dr. Jeanette Pierre    Osteopenia 11/6/2013    Parathyroid adenoma 4/30/2013    Pneumonia of right upper lobe due to infectious organism     Pneumonitis     Dr. Ayse Mosley in 7/2010 - bx negative    Pulmonary Mycobacterium avium complex (MAC) infection ([facility]     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 ([facility]     had one parathyroid removed - now follows with Dr. Bella Johnson    Sinusitis     with seasonal allergies    Vitamin D deficiency 05/2018       Past Surgical History:   Procedure Laterality Date    ABDOMINAL EXPLORATION SURGERY  01/02/2013    Release of KATHERINE TAMAYO-Dr. Reji Poole    APPENDECTOMY  1961    BACK INJECTION Bilateral 12/28/2021    bilateral sacroiliac joint injection performed by Bobby Grigsby DO at [facility] N/A 2/22/2022    B/L SI joint block performed by Zander Lam DO at [address] [facility] [address] N/A 2020    BRONCHOSCOPY FLUOROSCOPY performed by Bernardino Segovia MD at [address] Bilateral 1990 ?  SECTION  5655,0034    DILATION AND CURETTAGE OF UTERUS  8792,1925,1966    EXCISION OF PARATHYROID MASS Right 2013    rt parathyroidectomy (excision of rt inferior parathyroid mass) exploration of neck     FOOT SURGERY      left    FOOT SURGERY Left 2017    Dr Argentina Lott ([address] [place] [address])      JOINT REPLACEMENT  12    left knee    JOINT REPLACEMENT  2014    right knee    MALIGNANT SKIN LESION EXCISION Left 2017    BCC DORSAL FOOT WITH GRAFT & FS    PAIN MANAGEMENT PROCEDURE Left 2022    sacroiliac joint radiofrequency ablation, LEFT side first performed by Zander Lam DO at [facility] Right 2022    sacroiliac joint Radiofrequency Ablations, right side performed by Zander Lam DO at [address] [place] [address] (CERVIX REMOVED)  [address] ?     TOTAL KNEE ARTHROPLASTY Right 2014    OIO    UPPER GASTROINTESTINAL ENDOSCOPY N/A 2022    EGD performed by Flaquita Jansen MD at [facility]       Family History   Problem Relation Age of Onset    Diabetes Mother     Thyroid Disease Mother     Arthritis Mother     High Blood Pressure Mother     Arthritis Father     High Blood Pressure Father     Other Father         hay fever    Breast Cancer Neg Hx     Ovarian Cancer Neg Hx        Medications & Allergies:   Current Outpatient Medications   Medication Instructions    albuterol sulfate HFA (PROAIR HFA) 108 (90 Base) MCG/ACT inhaler 2 puffs, Inhalation, EVERY 4 HOURS PRN    allopurinol (ZYLOPRIM) 200 mg, Oral, DAILY    apixaban (ELIQUIS) 5 MG TABS tablet TAKE 1 TABLET BY MOUTH  TWICE DAILY    azithromycin (ZITHROMAX) 250 mg, Oral, DAILY    budesonide (PULMICORT) 500 mcg, Nebulization, 2 TIMES DAILY    Lactobacillus (PROBIOTIC ACIDOPHILUS) CAPS 1 capsule, Oral, DAILY    levothyroxine (SYNTHROID) 88 MCG tablet TAKE 1 TABLET BY MOUTH ONCE DAILY    metoprolol tartrate (LOPRESSOR) 25 MG tablet take 1/2 tablet by mouth twice a day    MULTIPLE VITAMIN PO 1 tablet, Oral, DAILY    simvastatin (ZOCOR) 20 MG tablet TAKE 1 TABLET BY MOUTH  NIGHTLY       Allergies   Allergen Reactions    Colchicine Diarrhea    Levaquin [Levofloxacin] Other (See Comments)     Hallucinations    Oxycodone     Percocet [Oxycodone-Acetaminophen] Nausea Only and Other (See Comments)     Affects memory    Rifampin Other (See Comments)     Lost half of vision    Sulfa Antibiotics Other (See Comments)     hallucinations    Cefuroxime Diarrhea, Nausea And Vomiting and Other (See Comments)     cramping       Review of Systems:   Constitutional: negative for weight changes or fevers  Genitourinary: negative for bowel/bladder incontinence   Musculoskeletal: positive for low back pain, positive bilateral SI pain  Neurological: negative for any leg weakness or numbness/tingling  Behavioral/Psych: negative for anxiety/depression   All other systems reviewed and are negative    Objective:     Vitals:    07/28/22 1056   BP: 112/74   Pulse: 51   SpO2: 100%       Constitutional: Pleasant, no acute distress   Head: Normocephalic, atraumatic   Eyes: Conjunctivae normal   Neck: Supple, symmetrical   Lungs: Normal respiratory effort, non-labored breathing   Cardiovascular: Limbs warm and well perfused   Abdomen: Non-protruded   Musculoskeletal: Muscle bulk symmetric, no atrophy, no gross deformities   · Lower Extremities: ROM WNL. · Thorax: No paraspinal tenderness bilaterally. No scoliosis or kyphosis. · Lumbar Spine: Decreased ROM. Lumbar paraspinals tender to palpation bilaterally. SLR neg bilaterally. VIKA positive bilaterally. GAENSLEN positive bilaterally.   SI joint distraction test positive bilaterally. Positive facet loading bilaterally. Bilateral SI joints tender to palpation. Neurological: Cranial nerves II-XII grossly intact. · Gait - Antalgic gait. Ambulates without assistive device. · Motor: No focal motor deficits in lower limbs  Skin: No rashes or lesions present   Psychological: Cooperative, no exaggerated pain behaviors       Assessment:    Diagnosis Orders   1. Chronic SI joint pain        2. Chronic pain syndrome        3. Myofascial pain        4. Lumbar spondylosis                Blake Jaquez is a 80 y. o.female presenting to the pain clinic for evaluation of chronic low back pain. Her clinical history and physical assessment are consistent with significant SI joint dysfunction. We have set the patient up for a bilateral SI joint injection. We discussed the possibility of starting a medication such as Lyrica only at night but will hold off at this time due to the increased risk of falls. We discussed that she has several pain generators that may have to be addressed in the future. She responded significantly to 2 previous SI joint injections and has responded significantly to her SI joint radiofrequency ablations. She appears to have bilateral subacromial impingement/bursitis in her shoulders. We will follow-up in 1 week for bilateral subacromial bursa injections. Plan: The following treatment recommendations and plan were discussed in detail with Blake Jaquez. Imaging:   I have reviewed patients imaging of lumbar MRI. Analgesics:   Patient is taking Acetaminophen. Patient informed that the maximum amount of acetaminophen taken on a regular basis should only be 4000 mg per day. Adjuvants: For continued chronic pain with associated musculoskeletal component, the patient is advised to continue Tylenol arthritis.      Interventions:   None; we will follow-up in 1 week to let patient recover from fall and perform bilateral subacromial bursa injections in office. Anticoagulation/NPO Recommendations:   None    Multidisciplinary Pain Management:   In the presence of complex, chronic, and multi-factorial pain, the importance of a multidisciplinary approach to pain management in the patients management regimen was emphasized and discussed in great detail.    PHYSICAL THERAPY: Patient is advised to continue hip stretches as instructed in clinic    Referrals:   None    Prescriptions Written This Visit:   None    Follow-up: 1 week for B/L subacromial inj      Jeramie Colin DO  Interventional Pain Management/PM&R   [place]

## 2022-08-20 NOTE — ED NOTES
ED nurse-to-nurse bedside report    Chief Complaint   Patient presents with    Chest Pain    Shortness of Breath      LOC: alert and orientated to name, place, date  Vital signs   Vitals:    09/25/20 1644   BP: 125/73   Pulse: 96   Resp: 30   Temp: 98.6 °F (37 °C)   TempSrc: Oral   SpO2: 95%   Weight: 153 lb (69.4 kg)   Height: 5' 4\" (1.626 m)      Pain: 6  Pain Interventions: n/a   Pain Goal: no pain   Oxygen: No    Current needs required none    Telemetry: Yes  LDAs:    Continuous Infusions:   Mobility: Requires assistance * 1  Pino Fall Risk Score: Fall Risk 9/8/2020 9/6/2020 8/24/2020 8/24/2020 10/9/2019 9/30/2019 5/16/2018   2 or more falls in past year? no no no no no no no   Fall with injury in past year? no no no no no no no     Fall Interventions: call light at bedside, fall precautions discussed   Report given to:  Mirta Wilkerson RN  09/25/20 0864 Occupational Therapy:     Orders received and acknowledged, chart reviewed. Discussed pt case with PT. Patient is POD#1 s/p L hemiarthroplasty. Per RN, patient is currently febrile and work up being completed for sepsis. Request to hold eval today and follow up tomorrow.     Thank you,   Hilario Mcgee, OTDANILO, OTR/L

## 2022-08-22 ENCOUNTER — OFFICE VISIT (OUTPATIENT)
Dept: PHYSICAL MEDICINE AND REHAB | Age: 87
End: 2022-08-22
Payer: MEDICARE

## 2022-08-22 VITALS
BODY MASS INDEX: 26.46 KG/M2 | OXYGEN SATURATION: 92 % | HEART RATE: 86 BPM | DIASTOLIC BLOOD PRESSURE: 64 MMHG | WEIGHT: 155 LBS | HEIGHT: 64 IN | SYSTOLIC BLOOD PRESSURE: 108 MMHG

## 2022-08-22 DIAGNOSIS — M79.18 MYOFASCIAL PAIN: ICD-10-CM

## 2022-08-22 DIAGNOSIS — G89.29 CHRONIC SI JOINT PAIN: ICD-10-CM

## 2022-08-22 DIAGNOSIS — G89.4 CHRONIC PAIN SYNDROME: ICD-10-CM

## 2022-08-22 DIAGNOSIS — M75.51 SUBACROMIAL BURSITIS OF BOTH SHOULDERS: Primary | ICD-10-CM

## 2022-08-22 DIAGNOSIS — M75.52 SUBACROMIAL BURSITIS OF BOTH SHOULDERS: Primary | ICD-10-CM

## 2022-08-22 DIAGNOSIS — M53.3 CHRONIC SI JOINT PAIN: ICD-10-CM

## 2022-08-22 PROCEDURE — G8427 DOCREV CUR MEDS BY ELIG CLIN: HCPCS | Performed by: ANESTHESIOLOGY

## 2022-08-22 PROCEDURE — 1090F PRES/ABSN URINE INCON ASSESS: CPT | Performed by: ANESTHESIOLOGY

## 2022-08-22 PROCEDURE — 1036F TOBACCO NON-USER: CPT | Performed by: ANESTHESIOLOGY

## 2022-08-22 PROCEDURE — G8417 CALC BMI ABV UP PARAM F/U: HCPCS | Performed by: ANESTHESIOLOGY

## 2022-08-22 PROCEDURE — 20610 DRAIN/INJ JOINT/BURSA W/O US: CPT | Performed by: ANESTHESIOLOGY

## 2022-08-22 PROCEDURE — 1123F ACP DISCUSS/DSCN MKR DOCD: CPT | Performed by: ANESTHESIOLOGY

## 2022-08-22 PROCEDURE — 99213 OFFICE O/P EST LOW 20 MIN: CPT | Performed by: ANESTHESIOLOGY

## 2022-08-22 NOTE — PROGRESS NOTES
Chronic Pain/PM&R Clinic Note     Encounter Date: 8/22/22    Subjective:   Chief Complaint:   Chief Complaint   Patient presents with    Follow-up     Bilateral shoulder injection       History of Present Illness:   Félix Stewart is a 80 y.o. female seen in the clinic initially on 12/8/21 upon request from Dr. Sujey Bush MD for her history of chronic low back pain. Patient states she sustained a fall in October of 2020 when she tripped over her oxygen tubing but she did not have any pain initially after her fall. On 12/24/2020 patient states she woke up and could not get out of bed. Patient states ever since then her back feels \"brittle. \" Patient states she was seen at [address] [place] [address] and received two epidural steroid injections which provided no relief. Patient states the past two weeks she had had pain in her left buttock that is stabbing and radiates into her left leg, making her feel like her leg may give out. Patient is concerned about the possibility of further falls due to this. Patient states she lives alone and she does not use any assistive devices. Patient states she has never fallen in the past apart from 10/2022 when she tripped over her oxygen tubing. Patient is on 2L of oxygen for her history of pulmonary fibrosis. Patient also has a history of Afib and is currently on Eliquis. Patient states pain is worse first thing in the morning. She also states she will occasionally wake up in the middle of the night with pain in bilateral legs, pain seems to be in the back of bilateral legs. Patient denies pain, numbness or tingling that radiates into her legs during the day. Patient states she completed home health PT in the spring of 2021 which provided minimal relief only on the day of the therapy. Today, 8/22/2022, patient presents for planned follow-up for chronic low back and shoulder pain. She states that she is recovering from her knee injury after her fall.   She continues to have a lot of pain in both of her shoulders particular with overhead activities. She would like to proceed with the steroid injection of the shoulders as previously discussed. She denies any other new symptoms at this point and continues to work on her rehab goals towards her knee.       History of Interventions:   Surgery: No previous lumbar surgeries  Injections: Lumbar epidural x 2 at [facility] (02/2021, 03/2021) - No relief  B/L SI RFA - near 100% relief     Current Treatment Medications:   Tylenol arthritis PRN    Historical Treatment Medications:   Tramadol - makes her \"loopy\"    Imaging:    Lumbar MRI 01/12/2021        Past Medical History:   Diagnosis Date    Anemia     normal on pre-op 5/2012 and 12/2/13    Backache     h/o    Bronchiectasis ([facility] 2013    Bursitis     bilateral shoulders    Constipation     Diverticulosis of colon     HTN (hypertension)     Hypercalcemia     slightly elevated at 10.8 pre-op 12/2/13    Hyperlipidemia     Nodular goiter     bx negative    OA (osteoarthritis)     bilateral thumbs - sees Dr. Bethany Pretty    Osteopenia 11/6/2013    Parathyroid adenoma 4/30/2013    Pneumonia of right upper lobe due to infectious organism     Pneumonitis     Dr. Neisha Cm in 7/2010 - bx negative    Pulmonary Mycobacterium avium complex (MAC) infection ([facility]     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 ([facility]     had one parathyroid removed - now follows with Dr. Felicia Urbano    Sinusitis     with seasonal allergies    Vitamin D deficiency 05/2018       Past Surgical History:   Procedure Laterality Date    ABDOMINAL EXPLORATION SURGERY  01/02/2013    Release of KATHERINE TAMAYO-Dr. Nathan Prabhakar    APPENDECTOMY  1961    BACK INJECTION Bilateral 12/28/2021    bilateral sacroiliac joint injection performed by Chris Mann DO at [place] N/A 2/22/2022    B/L SI joint block performed by Chris Mann DO at [address] 1/8/2020    BRONCHOSCOPY FLUOROSCOPY performed by Saari Plummer MD Riccardo at [address] [place] [address] 153 Bilateral 1990 ?  SECTION  3434,7542    DILATION AND CURETTAGE OF UTERUS  9725,3919,8853    EXCISION OF PARATHYROID MASS Right 2013    rt parathyroidectomy (excision of rt inferior parathyroid mass) exploration of neck     FOOT SURGERY      left    FOOT SURGERY Left 2017    Dr Fab Pinon ([address] [place] [address])      JOINT REPLACEMENT  12    left knee    JOINT REPLACEMENT  2014    right knee    MALIGNANT SKIN LESION EXCISION Left 2017    BCC DORSAL FOOT WITH GRAFT & FS    PAIN MANAGEMENT PROCEDURE Left 2022    sacroiliac joint radiofrequency ablation, LEFT side first performed by Christen Lanes, DO at [facility] Right 2022    sacroiliac joint Radiofrequency Ablations, right side performed by Christen Lanes, DO at [address] [place] [address] (CERVIX REMOVED)  [address] ?     TOTAL KNEE ARTHROPLASTY Right 2014    [facility]    UPPER GASTROINTESTINAL ENDOSCOPY N/A 2022    EGD performed by Leopold Khan, MD at [facility]       Family History   Problem Relation Age of Onset    Diabetes Mother     Thyroid Disease Mother     Arthritis Mother     High Blood Pressure Mother     Arthritis Father     High Blood Pressure Father     Other Father         hay fever    Breast Cancer Neg Hx     Ovarian Cancer Neg Hx        Medications & Allergies:   Current Outpatient Medications   Medication Instructions    albuterol sulfate HFA (PROAIR HFA) 108 (90 Base) MCG/ACT inhaler 2 puffs, Inhalation, EVERY 4 HOURS PRN    allopurinol (ZYLOPRIM) 200 mg, Oral, DAILY    apixaban (ELIQUIS) 5 MG TABS tablet TAKE 1 TABLET BY MOUTH  TWICE DAILY    azithromycin (ZITHROMAX) 250 mg, Oral, DAILY    budesonide (PULMICORT) 500 mcg, Nebulization, 2 TIMES DAILY    Lactobacillus (PROBIOTIC ACIDOPHILUS) CAPS 1 capsule, Oral, DAILY    levothyroxine (SYNTHROID) 88 MCG tablet TAKE 1 TABLET BY MOUTH ONCE DAILY    metoprolol tartrate (LOPRESSOR) 25 MG tablet take 1/2 tablet by mouth twice a day    MULTIPLE VITAMIN PO 1 tablet, Oral, DAILY    simvastatin (ZOCOR) 20 MG tablet TAKE 1 TABLET BY MOUTH  NIGHTLY       Allergies   Allergen Reactions    Colchicine Diarrhea    Levaquin [Levofloxacin] Other (See Comments)     Hallucinations    Oxycodone     Percocet [Oxycodone-Acetaminophen] Nausea Only and Other (See Comments)     Affects memory    Rifampin Other (See Comments)     Lost half of vision    Sulfa Antibiotics Other (See Comments)     hallucinations    Cefuroxime Diarrhea, Nausea And Vomiting and Other (See Comments)     cramping       Review of Systems:   Constitutional: negative for weight changes or fevers  Genitourinary: negative for bowel/bladder incontinence   Musculoskeletal: positive for low back pain, positive bilateral SI pain  Neurological: negative for any leg weakness or numbness/tingling  Behavioral/Psych: negative for anxiety/depression   All other systems reviewed and are negative    Objective:     Vitals:    08/22/22 1520   BP: 108/64   Pulse: 86   SpO2: 92%       Constitutional: Pleasant, no acute distress   Head: Normocephalic, atraumatic   Eyes: Conjunctivae normal   Neck: Supple, symmetrical   Lungs: Normal respiratory effort, non-labored breathing   Cardiovascular: Limbs warm and well perfused   Abdomen: Non-protruded   Musculoskeletal: Muscle bulk symmetric, no atrophy, no gross deformities   · Lower Extremities: ROM WNL. · Thorax: No paraspinal tenderness bilaterally. No scoliosis or kyphosis. · Lumbar Spine: Decreased ROM. Lumbar paraspinals tender to palpation bilaterally. SLR neg bilaterally. VIKA positive bilaterally. GAENSLEN positive bilaterally. SI joint distraction test positive bilaterally. Positive facet loading bilaterally. Bilateral SI joints tender to palpation.   Neurological: Cranial nerves II-XII grossly intact. · Gait - Antalgic gait. Ambulates without assistive device. · Motor: No focal motor deficits in lower limbs  Skin: No rashes or lesions present   Psychological: Cooperative, no exaggerated pain behaviors       Assessment:    Diagnosis Orders   1. Subacromial bursitis of both shoulders        2. Chronic pain syndrome        3. Chronic SI joint pain        4. Myofascial pain                  Natalia Martinez is a 80 y. o.female presenting to the pain clinic for evaluation of chronic low back pain. Her clinical history and physical assessment are consistent with significant SI joint dysfunction. We have set the patient up for a bilateral SI joint injection. We discussed the possibility of starting a medication such as Lyrica only at night but will hold off at this time due to the increased risk of falls. We discussed that she has several pain generators that may have to be addressed in the future. She responded significantly to 2 previous SI joint injections and has responded significantly to her SI joint radiofrequency ablations. She appears to have bilateral subacromial impingement/bursitis in her shoulders. She underwent bilateral subacromial bursa injection in clinic and we will follow-up in 12 weeks for reevaluation. Plan: The following treatment recommendations and plan were discussed in detail with Natalia Martinez. Imaging:   I have reviewed patients imaging of lumbar MRI. Analgesics:   Patient is taking Acetaminophen. Patient informed that the maximum amount of acetaminophen taken on a regular basis should only be 4000 mg per day. Adjuvants: For continued chronic pain with associated musculoskeletal component, the patient is advised to continue Tylenol arthritis. Interventions: For continued bilateral shoulder pain secondary to subacromial bursitis, patient underwent bilateral subacromial bursa injection in clinic today.   Please see attached procedure

## 2022-08-23 RX ORDER — TRIAMCINOLONE ACETONIDE 40 MG/ML
80 INJECTION, SUSPENSION INTRA-ARTICULAR; INTRAMUSCULAR ONCE
Status: SHIPPED | OUTPATIENT
Start: 2022-08-23

## 2022-08-23 NOTE — PROGRESS NOTES
Pre-operative Diagnosis: Bilateral shoulder pain     Post-operative Diagnosis: Bilateral shoulder pain     Procedure: Bilateral subacromial bursa injection     Procedure Description:  Patient was seated upright in a chair. RIGHT shoulder prepped with alcohol wipes. The posterior edge of the acromium was identified and a 25-gauge needle was inserted inferior to the posterior lateral acromium and directed in the subacromial space. 5 cc of a mixture containing 40 mg kenalog with 4 cc of 0.5% bupivacaine were injected after negative aspiration. This exact procedure was repeated on the contralateral side. The needle was withdrawn without any complications.         Procedural Complications: None        Jeramie Garcia DO  Interventional Pain Management/PM&R   [place]

## 2022-08-27 DIAGNOSIS — E78.00 PURE HYPERCHOLESTEROLEMIA: ICD-10-CM

## 2022-08-29 RX ORDER — SIMVASTATIN 20 MG
TABLET ORAL
Qty: 90 TABLET | Refills: 1 | Status: SHIPPED | OUTPATIENT
Start: 2022-08-29

## 2022-08-29 NOTE — TELEPHONE ENCOUNTER
Date of last visit:  4/5/2022  Date of next visit:  Visit date not found    Requested Prescriptions     Pending Prescriptions Disp Refills    simvastatin (ZOCOR) 20 MG tablet [Pharmacy Med Name: Simvastatin 20 MG Oral Tablet] 90 tablet 3     Sig: TAKE 1 TABLET BY MOUTH AT  NIGHT

## 2022-09-21 ENCOUNTER — HOSPITAL ENCOUNTER (OUTPATIENT)
Dept: CT IMAGING | Age: 87
Discharge: HOME | End: 2022-09-21
Payer: MEDICARE

## 2022-09-21 ENCOUNTER — HOSPITAL ENCOUNTER (OUTPATIENT)
Age: 87
Discharge: HOME | End: 2022-09-21
Payer: MEDICARE

## 2022-09-21 DIAGNOSIS — E03.9 HYPOTHYROIDISM, UNSPECIFIED TYPE: ICD-10-CM

## 2022-09-21 DIAGNOSIS — R91.8 GROUND GLASS OPACITY PRESENT ON IMAGING OF LUNG: ICD-10-CM

## 2022-09-21 LAB
T4 FREE: 1.46 NG/DL (ref 0.93–1.76)
TSH SERPL DL<=0.05 MIU/L-ACNC: 3.06 UIU/ML (ref 0.4–4.2)

## 2022-09-21 PROCEDURE — 84439 ASSAY OF FREE THYROXINE: CPT

## 2022-09-21 PROCEDURE — 71250 CT THORAX DX C-: CPT

## 2022-09-21 PROCEDURE — 84443 ASSAY THYROID STIM HORMONE: CPT

## 2022-09-29 ENCOUNTER — OFFICE VISIT (OUTPATIENT)
Dept: PULMONOLOGY | Age: 87
End: 2022-09-29
Payer: MEDICARE

## 2022-09-29 VITALS
BODY MASS INDEX: 26.53 KG/M2 | TEMPERATURE: 97.6 F | HEART RATE: 48 BPM | WEIGHT: 155.4 LBS | HEIGHT: 64 IN | OXYGEN SATURATION: 93 % | SYSTOLIC BLOOD PRESSURE: 128 MMHG | DIASTOLIC BLOOD PRESSURE: 70 MMHG

## 2022-09-29 DIAGNOSIS — J47.9 BRONCHIECTASIS WITHOUT COMPLICATION (HCC): ICD-10-CM

## 2022-09-29 DIAGNOSIS — J98.4 HONEYCOMB LUNG: ICD-10-CM

## 2022-09-29 DIAGNOSIS — J84.10 PULMONARY INTERSTITIAL FIBROSIS (HCC): Primary | ICD-10-CM

## 2022-09-29 PROCEDURE — 99214 OFFICE O/P EST MOD 30 MIN: CPT | Performed by: NURSE PRACTITIONER

## 2022-09-29 PROCEDURE — 1123F ACP DISCUSS/DSCN MKR DOCD: CPT | Performed by: NURSE PRACTITIONER

## 2022-09-29 PROCEDURE — 94618 PULMONARY STRESS TESTING: CPT | Performed by: NURSE PRACTITIONER

## 2022-09-29 PROCEDURE — G8417 CALC BMI ABV UP PARAM F/U: HCPCS | Performed by: NURSE PRACTITIONER

## 2022-09-29 PROCEDURE — 1090F PRES/ABSN URINE INCON ASSESS: CPT | Performed by: NURSE PRACTITIONER

## 2022-09-29 PROCEDURE — G8427 DOCREV CUR MEDS BY ELIG CLIN: HCPCS | Performed by: NURSE PRACTITIONER

## 2022-09-29 PROCEDURE — 1036F TOBACCO NON-USER: CPT | Performed by: NURSE PRACTITIONER

## 2022-09-29 ASSESSMENT — ENCOUNTER SYMPTOMS
ALLERGIC/IMMUNOLOGIC NEGATIVE: 1
EYES NEGATIVE: 1
WHEEZING: 0
COUGH: 1
SHORTNESS OF BREATH: 1
GASTROINTESTINAL NEGATIVE: 1

## 2022-09-29 NOTE — PROGRESS NOTES
Patient is experiencing SOB: Yes always    Patient is experiencing wheezing: No    Patient states they have had a Weak, productive = 2 cough. Phlegm is daily, color:  clear    Patient is coughing up blood: no    Patient has been experiencing chest pains: non-existent    Patient is currently taking the following inhaler(s): Albuterol    Patient is currently on 4lpm O2 with exertion and rest.    Other: Haven't use her albuterol inhaler yet this year.

## 2022-09-29 NOTE — PROGRESS NOTES
[facility]    Patient: Cheryl Pereyra, 80 y.o.   : 1935    Pt of Dr. Deepti Cohen   Patient presents with    Follow-up     ILD 3mo f/u with CT         HPI  Gibran James is here for follow up for ILD/pulmonary fibrosis . HX of MAC infection with RUL scarring/honeycombing   Previously followed with [facility]  On home oxygen continuously 4LPM- 2LPM with sleep - 6MWT  today   Recent CT Chest done with resolution of previously seen GGO  Current use of Budesonide neb BID and Albuterol Prn   Fall within the last couple months doing better now presents in [facility] but does ambulate  SOB mainly with exertion  Intermittent productive cough no wheezing  No real change in her pulmonary symptoms     Progress History:   Since last visit any new medical issues? Yes recent fall   New ER or hospital visits? No  Any new or changes in medicines? No  Using inhalers? Yes   Are they helpful?  Yes   Flu vaccine completed 22  Pneumonia vaccine completed  Covid vaccine done x 2 with 3 boosters  Past Medical hx   PMH:  Past Medical History:   Diagnosis Date    Anemia     normal on pre-op 2012 and 13    Backache     h/o    Bronchiectasis ([address]     Bursitis     bilateral shoulders    Constipation     Diverticulosis of colon     HTN (hypertension)     Hypercalcemia     slightly elevated at 10.8 pre-op 13    Hyperlipidemia     Nodular goiter     bx negative    OA (osteoarthritis)     bilateral thumbs - sees Dr. Antony Greco    Osteopenia 2013    Parathyroid adenoma 2013    Pneumonia of right upper lobe due to infectious organism     Pneumonitis     Dr. Tauna Severance in 2010 - bx negative    Pulmonary Mycobacterium avium complex (MAC) infection ([address]     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 ([address]     had one parathyroid removed - now follows with Dr. Mushtaq Paul    Sinusitis     with seasonal allergies    Vitamin D deficiency 2018 SURGICAL HISTORY:  Past Surgical History:   Procedure Laterality Date    ABDOMINAL EXPLORATION SURGERY  2013    Release of KATHERINE TAMAYO-Dr. Melany Ramirez    APPENDECTOMY  1961    BACK INJECTION Bilateral 2021    bilateral sacroiliac joint injection performed by Owens Fleischer, DO at [facility] N/A 2022    B/L SI joint block performed by Owens Fleischer, DO at [address] N/A 2020    BRONCHOSCOPY FLUOROSCOPY performed by Abhay Thorpe MD at [address] [place] [address] 153 Bilateral 1990 ?  SECTION  1905,[id number]    DILATION AND CURETTAGE OF UTERUS  4531,3077,4527    EXCISION OF PARATHYROID MASS Right 2013    rt parathyroidectomy (excision of rt inferior parathyroid mass) exploration of neck     FOOT SURGERY      left    FOOT SURGERY Left 2017    Dr Elyssa Winchester ([address] [place] [address])      JOINT REPLACEMENT  12    left knee    JOINT REPLACEMENT  2014    right knee    MALIGNANT SKIN LESION EXCISION Left 2017    BCC DORSAL FOOT WITH GRAFT & FS    PAIN MANAGEMENT PROCEDURE Left 2022    sacroiliac joint radiofrequency ablation, LEFT side first performed by Owens Fleischer, DO at [facility] Right 2022    sacroiliac joint Radiofrequency Ablations, right side performed by Owens Fleischer, DO at [address] [place] [address] (CERVIX REMOVED)  [address] ? TOTAL KNEE ARTHROPLASTY Right 2014    [facility]    UPPER GASTROINTESTINAL ENDOSCOPY N/A 2022    EGD performed by Kathy Patel MD at [facility]     SOCIAL HISTORY:  Social History     Tobacco Use    Smoking status: Never    Smokeless tobacco: Never   Vaping Use    Vaping Use: Never used   Substance Use Topics    Alcohol use:  Yes     Alcohol/week: 0.0 standard drinks     Comment: socially-1 glassof wine 2-3 times a month    Drug use: No     ALLERGIES:  Allergies   Allergen Reactions    Colchicine Diarrhea    Levaquin [Levofloxacin] Other (See Comments)     Hallucinations    Oxycodone     Percocet [Oxycodone-Acetaminophen] Nausea Only and Other (See Comments)     Affects memory    Rifampin Other (See Comments)     Lost half of vision    Sulfa Antibiotics Other (See Comments)     hallucinations    Cefuroxime Diarrhea, Nausea And Vomiting and Other (See Comments)     cramping     FAMILY HISTORY:  Family History   Problem Relation Age of Onset    Diabetes Mother     Thyroid Disease Mother     Arthritis Mother     High Blood Pressure Mother     Arthritis Father     High Blood Pressure Father     Other Father         hay fever    Breast Cancer Neg Hx     Ovarian Cancer Neg Hx      CURRENT MEDICATIONS:  Current Outpatient Medications   Medication Sig Dispense Refill    simvastatin (ZOCOR) 20 MG tablet TAKE 1 TABLET BY MOUTH AT  NIGHT 90 tablet 1    apixaban (ELIQUIS) 5 MG TABS tablet TAKE 1 TABLET BY MOUTH  TWICE DAILY 180 tablet 1    albuterol sulfate HFA (PROAIR HFA) 108 (90 Base) MCG/ACT inhaler Inhale 2 puffs into the lungs every 4 hours as needed for Wheezing or Shortness of Breath 1 each 5    levothyroxine (SYNTHROID) 88 MCG tablet TAKE 1 TABLET BY MOUTH ONCE DAILY 90 tablet 3    allopurinol (ZYLOPRIM) 100 MG tablet Take 2 tablets by mouth daily 180 tablet 3    budesonide (PULMICORT) 0.5 MG/2ML nebulizer suspension Take 2 mLs by nebulization 2 times daily 100 each 5    metoprolol tartrate (LOPRESSOR) 25 MG tablet take 1/2 tablet by mouth twice a day 90 tablet 5    azithromycin (ZITHROMAX) 250 MG tablet Take 1 tablet by mouth daily 90 tablet 3    Lactobacillus (PROBIOTIC ACIDOPHILUS) CAPS Take 1 capsule by mouth daily       MULTIPLE VITAMIN PO Take 1 tablet by mouth daily        Current Facility-Administered Medications   Medication Dose Route Frequency Provider Last Rate Last Admin    triamcinolone acetonide seconds. Neurological:      Mental Status: She is alert and oriented to person, place, and time. Psychiatric:         Behavior: Behavior normal.         Thought Content: Thought content normal.         Judgment: Judgment normal.        results   Lung Nodule Screening     [] Qualifies    [x] Does not qualify   [] Declined    [] Completed    The USPSTF recommends annual screening for lung cancer with low-dose computed tomography (LDCT) in adults aged 48 to [de-identified] years who have a 30 pack-year smoking history and currently smoke or have quit within the past 20 years. Screening should be discontinued once a person has not smoked for 20 years or develops a health problem that substantially limits life expectancy or the ability or willingness to have curative lung surgery. Six Minute Walk Test  Natalia Martinez [birth year]    Six minute walk test done in my office today by my medical assistant. Sandra's oxygen saturation at rest on room air was 86%. Her oxygen saturation dropped to 85% on room air with exertion after walking 108 feet and within 3 minutes 30 seconds. The six minute walk test was repeated with oxygen supplementation. Oxygen supplementation was started with 1 LPM via nasal cannula and titrated to 5 LPM via nasal cannula. At the end of the test [facility] Makayla's oxygen saturation remained at 90% on 5 LPM with exertion. She is mobile in the home and requires oxygen as outlined above. Patient ambulated a total of 648 feet with oxygen. Resting Dyspnea/Guadalupe score was 0 / 3  and 0 / 3  upon completion of the walk. Resting heart rate was  55 bpm and 67 bpm upon completing the walk. Nasal Oxygen order:  1-5 lpm to be used with:  Rest: Yes. Walking: Yes. Sleep: Yes. POC flow: Yes. Continuous flow: Yes. DME Medical Necessity Documentation    Gianfranco Ruvalcaba was seen in the office on 9/29/2022 for the diagnosis Pulmonary fibrosis.  I am prescribing oxygen because the diagnosis and testing requires the patient to have oxygen in the home. her condition will improve or be benefited by oxygen use. The patient is able to perform good mobility in a home setting and therefore does require the use of a portable oxygen system. 9/21/2022   CT CHEST WO CONTRAST     FINDINGS: Heart/mediastinum: Cardiomegaly is stable. Coronary artery calcifications are observed. The aorta is not dilated. No is visualized accounting for lack contrast. Shift of the mediastinal structures to the right is unchanged. Lungs: The large groundglass opacity in the left midlung has resolved. The severe intralobular septal thickening and bronchiectasis with honeycombing most pronounced in the right upper lobe is unchanged. Scattered subpleural scarring and groundglass opacities in the left lung are mild similar to the 2020 comparison. No pleural effusion or pneumothorax is observed. Upper abdomen: No acute findings are noted in the limited images through the upper abdomen. Musculoskeletal: Multilevel degenerative disc disease in the thoracic spine is unchanged. Diffuse osteopenia is visualized. The visualized skeletal structures appear intact. The previously seen more numerous and more prominent multifocal groundglass opacities in the left lung have improved. The severe interstitial fibrosis, honeycombing, bronchiectasis most pronounced in the right upper lobe is unchanged and overall the appearance of the chest is similar to the 2020 comparison. No pulmonary mass or nodule is observed. Connective tissue work up results:  Date: 4/03/2017  SABAS( Antinuclear antibody):  Negative-none detected. Fungal serologies- Negative                   RA ( Rheumatoid factor):  Negative. <10                                                          ACE(  level):    Negative-22                               ESR ( Sed rate): 9               Connective Tissue Labs  Date: 1/11/19     SABAS with reflex-None detected.   Anti RNP-1- Normal.  Anti Lexie-0- normal  Anti-SSB-0- normal  AntiSSA-1- normal  AntiCentromere- 4- Normal  Scleroderma AB-24-normal  ACE-10- normal  ESR-101 elevated  Rheumatoid Factor-13 WNL                  Assessment      Diagnosis Orders   1. Pulmonary interstitial fibrosis ([address]  6 Minute Walk Test    DME Order for Home Oxygen as OP    2/2 hx of MAC infection       2. Bronchiectasis without complication (HCC)  6 Minute Walk Test      3.  Honeycomb lung      RUL            Plan   -6MWT today 1LPM with rest and sleep 5LPM with activity   -New oxygen order placed and faxed to [address]  -Debating on how long POC will be sufficient for patient with activity   -Continue Pulmicort neb BID  -Continue Albuterol PRN for SOB/wheezing  -HRCT possible in one year   -CT Chest reviewed with patient resolved GGO with other stable findings in RUL  -Advised to maintain pneumonia vaccine with PCP and to take flu vaccine this coming season.  -Advised patient to call office with any changes, questions, or concerns regarding respiratory status    Will see Matias Hanson back in: 6 months no testing     Willye Smoker, CNP  9/29/2022

## 2022-10-11 DIAGNOSIS — J47.9 BRONCHIECTASIS WITHOUT COMPLICATION (HCC): ICD-10-CM

## 2022-10-11 RX ORDER — AZITHROMYCIN 250 MG/1
TABLET, FILM COATED ORAL
Qty: 90 TABLET | Refills: 3 | OUTPATIENT
Start: 2022-10-11

## 2022-10-17 ENCOUNTER — OFFICE VISIT (OUTPATIENT)
Dept: PHYSICAL MEDICINE AND REHAB | Age: 87
End: 2022-10-17
Payer: MEDICARE

## 2022-10-17 ENCOUNTER — TELEPHONE (OUTPATIENT)
Dept: PHYSICAL MEDICINE AND REHAB | Age: 87
End: 2022-10-17

## 2022-10-17 VITALS
WEIGHT: 155 LBS | HEIGHT: 64 IN | SYSTOLIC BLOOD PRESSURE: 128 MMHG | DIASTOLIC BLOOD PRESSURE: 78 MMHG | BODY MASS INDEX: 26.46 KG/M2

## 2022-10-17 DIAGNOSIS — M51.36 DEGENERATIVE DISC DISEASE, LUMBAR: ICD-10-CM

## 2022-10-17 DIAGNOSIS — Z79.01 ANTICOAGULATED: ICD-10-CM

## 2022-10-17 DIAGNOSIS — M79.18 MYOFASCIAL PAIN: ICD-10-CM

## 2022-10-17 DIAGNOSIS — M47.816 LUMBAR SPONDYLOSIS: Primary | ICD-10-CM

## 2022-10-17 PROCEDURE — G8484 FLU IMMUNIZE NO ADMIN: HCPCS | Performed by: ANESTHESIOLOGY

## 2022-10-17 PROCEDURE — 99214 OFFICE O/P EST MOD 30 MIN: CPT | Performed by: ANESTHESIOLOGY

## 2022-10-17 PROCEDURE — 1036F TOBACCO NON-USER: CPT | Performed by: ANESTHESIOLOGY

## 2022-10-17 PROCEDURE — 1123F ACP DISCUSS/DSCN MKR DOCD: CPT | Performed by: ANESTHESIOLOGY

## 2022-10-17 PROCEDURE — G8417 CALC BMI ABV UP PARAM F/U: HCPCS | Performed by: ANESTHESIOLOGY

## 2022-10-17 PROCEDURE — G8427 DOCREV CUR MEDS BY ELIG CLIN: HCPCS | Performed by: ANESTHESIOLOGY

## 2022-10-17 PROCEDURE — 1090F PRES/ABSN URINE INCON ASSESS: CPT | Performed by: ANESTHESIOLOGY

## 2022-10-17 NOTE — PROGRESS NOTES
Chronic Pain/PM&R Clinic Note     Encounter Date:10/17/22    Subjective:   Chief Complaint:   Chief Complaint   Patient presents with    Follow-up     Having discomfort in lower back, and would like to discuss pain treatments        History of Present Illness:   Daniel Agrawal is a 80 y.o. female seen in the clinic initially on 12/8/21 upon request from Dr. Tommy Stubbs MD for her history of chronic low back pain. Patient states she sustained a fall in October of 2020 when she tripped over her oxygen tubing but she did not have any pain initially after her fall. She has medical history of A. fib (on Eliquis and pulmonary fibrosis (on 4 L continuous O2). On 12/24/2020 patient states she woke up and could not get out of bed. Patient states ever since then her back feels \"brittle. \" Patient states she was seen at [facility] and received two epidural steroid injections which provided no relief. Patient states the past two weeks she had had pain in her left buttock that is stabbing and radiates into her left leg, making her feel like her leg may give out. Patient is concerned about the possibility of further falls due to this. Patient states she lives alone and she does not use any assistive devices. Patient states she has never fallen in the past apart from 10/2022 when she tripped over her oxygen tubing. Patient is on 2L of oxygen for her history of pulmonary fibrosis. Patient also has a history of Afib and is currently on Eliquis. Patient states pain is worse first thing in the morning. She also states she will occasionally wake up in the middle of the night with pain in bilateral legs, pain seems to be in the back of bilateral legs. Patient denies pain, numbness or tingling that radiates into her legs during the day. Patient states she completed home health PT in the spring of 2021 which provided minimal relief only on the day of the therapy.      Today, 10/17/2022, patient presents for planned follow-up for chronic low back and shoulder pain. She states that her shoulder injections have been extremely beneficial for her shoulder pain. She is very happy overall with the relief she has obtained from these. She feels like she can use her shoulders now without any pain. She continues to endorse some low back pain but states that this is above her waistline a few inches and thinks it is a little higher in her lumbar spine. She states that this back pain is challenging for her to stand and do anything in one position for too long. She denies any pain rating further leg, focal leg weakness, leg numbness/tingling, or bowel/bladder incontinence episodes. She is wonder what she can do to help out with this pain at this point.       History of Interventions:   Surgery: No previous lumbar surgeries  Injections: Lumbar epidural x 2 at [facility] (02/2021, 03/2021) - No relief  B/L SI RFA - near 100% relief     Current Treatment Medications:   Tylenol arthritis PRN    Historical Treatment Medications:   Tramadol - makes her \"loopy\"    Imaging:    Lumbar MRI 01/12/2021        Past Medical History:   Diagnosis Date    Anemia     normal on pre-op 5/2012 and 12/2/13    Backache     h/o    Bronchiectasis ([address] 2013    Bursitis     bilateral shoulders    Constipation     Diverticulosis of colon     HTN (hypertension)     Hypercalcemia     slightly elevated at 10.8 pre-op 12/2/13    Hyperlipidemia     Nodular goiter     bx negative    OA (osteoarthritis)     bilateral thumbs - sees Dr. Antelmo Levy    Osteopenia 11/6/2013    Parathyroid adenoma 4/30/2013    Pneumonia of right upper lobe due to infectious organism     Pneumonitis     Dr. Cady Ireland in 7/2010 - bx negative    Pulmonary Mycobacterium avium complex (MAC) infection ([address]     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 ([address]     had one parathyroid removed - now follows with Dr. Eber Walker    Sinusitis     with seasonal allergies    Vitamin D deficiency 05/2018       Past Surgical History:   Procedure Laterality Date    ABDOMINAL EXPLORATION SURGERY  2013    Release of [place]  [address]    BACK INJECTION Bilateral 2021    bilateral sacroiliac joint injection performed by Ashwin Poole DO at [place] N/A 2022    B/L SI joint block performed by Ashwin Poole DO at [address] N/A 2020    BRONCHOSCOPY FLUOROSCOPY performed by Rebecca Langford MD at [address] Bilateral 1990 ?  SECTION  3398,4893    DILATION AND CURETTAGE OF UTERUS  [id number]    EXCISION OF PARATHYROID MASS Right 2013    rt parathyroidectomy (excision of rt inferior parathyroid mass) exploration of neck     FOOT SURGERY      left    FOOT SURGERY Left 2017    Dr Zamora Camera ([facility])      JOINT REPLACEMENT  12    left knee    JOINT REPLACEMENT  2014    right knee    MALIGNANT SKIN LESION EXCISION Left 2017    BCC DORSAL FOOT WITH GRAFT & FS    PAIN MANAGEMENT PROCEDURE Left 2022    sacroiliac joint radiofrequency ablation, LEFT side first performed by Ashwin Poole DO at [facility] Right 2022    sacroiliac joint Radiofrequency Ablations, right side performed by Ashwin Poole DO at [address] [place] [address] (CERVIX REMOVED)  [address] ?     TOTAL KNEE ARTHROPLASTY Right 2014    [facility]    UPPER GASTROINTESTINAL ENDOSCOPY N/A 2022    EGD performed by Pedro Pablo Hackett MD at [facility]       Family History   Problem Relation Age of Onset    Diabetes Mother     Thyroid Disease Mother     Arthritis Mother     High Blood Pressure Mother     Arthritis Father     High Blood Pressure Father     Other Father         hay fever    Breast Cancer Neg Hx     Ovarian Cancer Neg Hx        Medications & Allergies: Current Outpatient Medications   Medication Instructions    albuterol sulfate HFA (PROAIR HFA) 108 (90 Base) MCG/ACT inhaler 2 puffs, Inhalation, EVERY 4 HOURS PRN    allopurinol (ZYLOPRIM) 200 mg, Oral, DAILY    apixaban (ELIQUIS) 5 MG TABS tablet TAKE 1 TABLET BY MOUTH  TWICE DAILY    azithromycin (ZITHROMAX) 250 mg, Oral, DAILY    budesonide (PULMICORT) 500 mcg, Nebulization, 2 TIMES DAILY    Lactobacillus (PROBIOTIC ACIDOPHILUS) CAPS 1 capsule, Oral, DAILY    levothyroxine (SYNTHROID) 88 MCG tablet TAKE 1 TABLET BY MOUTH ONCE DAILY    metoprolol tartrate (LOPRESSOR) 25 MG tablet take 1/2 tablet by mouth twice a day    MULTIPLE VITAMIN PO 1 tablet, Oral, DAILY    simvastatin (ZOCOR) 20 MG tablet TAKE 1 TABLET BY MOUTH AT  NIGHT       Allergies   Allergen Reactions    Colchicine Diarrhea    Levaquin [Levofloxacin] Other (See Comments)     Hallucinations    Oxycodone     Percocet [Oxycodone-Acetaminophen] Nausea Only and Other (See Comments)     Affects memory    Rifampin Other (See Comments)     Lost half of vision    Sulfa Antibiotics Other (See Comments)     hallucinations    Cefuroxime Diarrhea, Nausea And Vomiting and Other (See Comments)     cramping       Review of Systems:   Constitutional: negative for weight changes or fevers  Genitourinary: negative for bowel/bladder incontinence   Musculoskeletal: positive for low back pain  Neurological: negative for any leg weakness or numbness/tingling  Behavioral/Psych: negative for anxiety/depression   All other systems reviewed and are negative    Objective:     Vitals:    10/17/22 1336   BP: 128/78       Constitutional: Pleasant, no acute distress   Head: Normocephalic, atraumatic   Eyes: Conjunctivae normal   Neck: Supple, symmetrical   Lungs: Normal respiratory effort, non-labored breathing   Cardiovascular: Limbs warm and well perfused   Abdomen: Non-protruded   Musculoskeletal: Muscle bulk symmetric, no atrophy, no gross deformities   · Lower Extremities: ROM WNL. · Thorax: No paraspinal tenderness bilaterally. No scoliosis or kyphosis. · Lumbar Spine: Decreased ROM. Lumbar paraspinals tender to palpation bilaterally. SLR neg bilaterally. VIKA positive bilaterally. GAENSLEN positive bilaterally. SI joint distraction test positive bilaterally. Positive facet loading bilaterally. Neurological: Cranial nerves II-XII grossly intact. · Gait - Antalgic gait. Ambulates without assistive device. · Motor: No focal motor deficits in lower limbs  Skin: No rashes or lesions present   Psychological: Cooperative, no exaggerated pain behaviors       Assessment:    Diagnosis Orders   1. Lumbar spondylosis  CHG FLUOR NEEDLE/CATH SPINE/PARASPINAL DX/THER ADDON    AK INJ DX/THER AGNT PARAVERT FACET JOINT, LUMBAR/SAC, 1ST LEVEL    AK INJ DX/THER AGNT PARAVERT FACET JOINT, LUMBAR/SAC, 2ND LEVEL      2. Anticoagulated        3. Myofascial pain        4. Degenerative disc disease, lumbar                    Martínez Villa is a 80 y. o.female presenting to the pain clinic for evaluation of chronic low back pain. Her clinical history and physical assessment are consistent with significant SI joint dysfunction. We have set the patient up for a bilateral SI joint injection. We discussed the possibility of starting a medication such as Lyrica only at night but will hold off at this time due to the increased risk of falls. We discussed that she has several pain generators that may have to be addressed in the future. She responded significantly to 2 previous SI joint injections and has responded significantly to her SI joint radiofrequency ablations and bilateral subacromial injections for shoulder pain. She has ongoing lumbar facet mediated pain particular in the L3/L4 and L4/L5 distribution have set her up for facet injections at these levels. We will have to get permission to hold her Eliquis for this procedure. Plan:    The following treatment recommendations and plan were discussed in detail with Tonya Ray. Imaging:   I have reviewed patients imaging of lumbar MRI. Analgesics:   Patient is taking Acetaminophen. Patient informed that the maximum amount of acetaminophen taken on a regular basis should only be 4000 mg per day. Adjuvants: For continued chronic pain with associated musculoskeletal component, the patient is advised to continue Tylenol arthritis. Interventions: In presence of lumbar axial back pain and with physical exam consistent for facetal pain, the option lumbar facet steroid injections at bilateral L3/L4 and L4/L5 was chosen. The risks and benefits were discussed in detail with the patient. Patient wants to proceed with the injection. Anticoagulation/NPO Recommendations:   Patient will need to hold Eliquis x 3 days prior to the procedure. Patient does not have to be NPO prior to the procedure. We will perform a LOCAL injection. Multidisciplinary Pain Management:   In the presence of complex, chronic, and multi-factorial pain, the importance of a multidisciplinary approach to pain management in the patients management regimen was emphasized and discussed in great detail.    PHYSICAL THERAPY: Patient is advised to continue hip stretches as instructed in clinic    Referrals:   None    Prescriptions Written This Visit:   None    Follow-up: For lumbar facet inj      Jeramie Chahal, DO  Interventional Pain Management/PM&R   [place]

## 2022-10-18 ENCOUNTER — TELEPHONE (OUTPATIENT)
Dept: CARDIOLOGY CLINIC | Age: 87
End: 2022-10-18

## 2022-10-18 DIAGNOSIS — J47.9 BRONCHIECTASIS WITHOUT COMPLICATION (HCC): ICD-10-CM

## 2022-10-18 RX ORDER — AZITHROMYCIN 250 MG/1
250 TABLET, FILM COATED ORAL DAILY
Qty: 90 TABLET | Refills: 3 | Status: ON HOLD | OUTPATIENT
Start: 2022-10-18

## 2022-10-18 RX ORDER — AZITHROMYCIN 250 MG/1
TABLET, FILM COATED ORAL
Qty: 90 TABLET | Refills: 3 | OUTPATIENT
Start: 2022-10-18

## 2022-10-18 NOTE — TELEPHONE ENCOUNTER
Date of last visit:  4/5/2022  Date of next visit:  Visit date not found    Requested Prescriptions     Pending Prescriptions Disp Refills    azithromycin (ZITHROMAX) 250 MG tablet [Pharmacy Med Name: AZITHROMYCIN 250 MG TABLET] 90 tablet 3     Sig: take 1 tablet by mouth once daily

## 2022-10-18 NOTE — TELEPHONE ENCOUNTER
Date of last visit:  4/5/2022  Date of next visit:  Visit date not found    Requested Prescriptions     Pending Prescriptions Disp Refills    azithromycin (ZITHROMAX) 250 MG tablet 90 tablet 3     Sig: Take 1 tablet by mouth daily

## 2022-10-18 NOTE — TELEPHONE ENCOUNTER
Patient called earlier today and talked to [facility], pulmonology wanted her to take this.

## 2022-10-18 NOTE — TELEPHONE ENCOUNTER
Pre op Risk Assessment    Procedure Lumbar facet steroid injection  Physician [facility]  Date of surgery/procedure TBD    Last OV 1-12-22  Last Stress 11-9-16  Last Echo 9-30-20  Last Cath None in Epic  Last Stent None in Epic  Is patient on blood thinners Eliquis  Hold Meds/how many days 3 days      Fax: [phone]

## 2022-11-04 ENCOUNTER — HOSPITAL ENCOUNTER (INPATIENT)
Age: 87
LOS: 5 days | Discharge: NURSING, REHAB OR LONG-TERM CARE | DRG: 196 | End: 2022-11-09
Attending: INTERNAL MEDICINE | Admitting: INTERNAL MEDICINE
Payer: MEDICARE

## 2022-11-04 ENCOUNTER — APPOINTMENT (OUTPATIENT)
Dept: GENERAL RADIOLOGY | Age: 87
DRG: 196 | End: 2022-11-04
Payer: MEDICARE

## 2022-11-04 DIAGNOSIS — J18.9 PNEUMONIA DUE TO INFECTIOUS ORGANISM, UNSPECIFIED LATERALITY, UNSPECIFIED PART OF LUNG: ICD-10-CM

## 2022-11-04 DIAGNOSIS — J84.10 PULMONARY FIBROSIS (HCC): ICD-10-CM

## 2022-11-04 DIAGNOSIS — R06.02 SHORTNESS OF BREATH: Primary | ICD-10-CM

## 2022-11-04 LAB
ALLEN TEST: POSITIVE
ANION GAP SERPL CALCULATED.3IONS-SCNC: 13 MEQ/L (ref 8–16)
BACTERIA: ABNORMAL
BASE EXCESS (CALCULATED): 5.6 MMOL/L (ref -2.5–2.5)
BASOPHILS # BLD: 0.5 %
BASOPHILS ABSOLUTE: 0 THOU/MM3 (ref 0–0.1)
BILIRUBIN URINE: NEGATIVE
BLOOD, URINE: ABNORMAL
BUN BLDV-MCNC: 16 MG/DL (ref 7–22)
CALCIUM SERPL-MCNC: 9.9 MG/DL (ref 8.5–10.5)
CASTS: ABNORMAL /LPF
CASTS: ABNORMAL /LPF
CHARACTER, URINE: CLEAR
CHLORIDE BLD-SCNC: 95 MEQ/L (ref 98–111)
CO2: 31 MEQ/L (ref 23–33)
COLLECTED BY:: ABNORMAL
COLOR: YELLOW
CREAT SERPL-MCNC: 0.7 MG/DL (ref 0.4–1.2)
CRYSTALS: ABNORMAL
DEVICE: ABNORMAL
EKG ATRIAL RATE: 87 BPM
EKG ATRIAL RATE: 94 BPM
EKG P AXIS: 11 DEGREES
EKG P AXIS: 37 DEGREES
EKG P-R INTERVAL: 160 MS
EKG P-R INTERVAL: 172 MS
EKG Q-T INTERVAL: 346 MS
EKG Q-T INTERVAL: 398 MS
EKG QRS DURATION: 82 MS
EKG QRS DURATION: 84 MS
EKG QTC CALCULATION (BAZETT): 432 MS
EKG QTC CALCULATION (BAZETT): 478 MS
EKG R AXIS: -50 DEGREES
EKG R AXIS: 99 DEGREES
EKG T AXIS: 15 DEGREES
EKG T AXIS: 27 DEGREES
EKG VENTRICULAR RATE: 87 BPM
EKG VENTRICULAR RATE: 94 BPM
EOSINOPHIL # BLD: 2.2 %
EOSINOPHILS ABSOLUTE: 0.2 THOU/MM3 (ref 0–0.4)
EPITHELIAL CELLS, UA: ABNORMAL /HPF
ERYTHROCYTE [DISTWIDTH] IN BLOOD BY AUTOMATED COUNT: 13.6 % (ref 11.5–14.5)
ERYTHROCYTE [DISTWIDTH] IN BLOOD BY AUTOMATED COUNT: 40.1 FL (ref 35–45)
GFR SERPL CREATININE-BSD FRML MDRD: > 60 ML/MIN/1.73M2
GLUCOSE BLD-MCNC: 99 MG/DL (ref 70–108)
GLUCOSE, URINE: NEGATIVE MG/DL
HCO3: 30 MMOL/L (ref 23–28)
HCT VFR BLD CALC: 38.2 % (ref 37–47)
HEMOGLOBIN: 12.2 GM/DL (ref 12–16)
IFIO2: 4
IMMATURE GRANS (ABS): 0.11 THOU/MM3 (ref 0–0.07)
IMMATURE GRANULOCYTES: 1.2 %
INFLUENZA A: NOT DETECTED
INFLUENZA B: NOT DETECTED
KETONES, URINE: NEGATIVE
LEUKOCYTE ESTERASE, URINE: NEGATIVE
LYMPHOCYTES # BLD: 7.7 %
LYMPHOCYTES ABSOLUTE: 0.7 THOU/MM3 (ref 1–4.8)
MCH RBC QN AUTO: 26.1 PG (ref 26–33)
MCHC RBC AUTO-ENTMCNC: 31.9 GM/DL (ref 32.2–35.5)
MCV RBC AUTO: 81.6 FL (ref 81–99)
MISCELLANEOUS LAB TEST RESULT: ABNORMAL
MONOCYTES # BLD: 15 %
MONOCYTES ABSOLUTE: 1.4 THOU/MM3 (ref 0.4–1.3)
NITRITE, URINE: NEGATIVE
NUCLEATED RED BLOOD CELLS: 0 /100 WBC
O2 SATURATION: 95 %
OSMOLALITY CALCULATION: 278.8 MOSMOL/KG (ref 275–300)
PCO2: 42 MMHG (ref 35–45)
PH BLOOD GAS: 7.46 (ref 7.35–7.45)
PH UA: 7 (ref 5–9)
PLATELET # BLD: 276 THOU/MM3 (ref 130–400)
PMV BLD AUTO: 10.9 FL (ref 9.4–12.4)
PO2: 70 MMHG (ref 71–104)
POTASSIUM SERPL-SCNC: 4.5 MEQ/L (ref 3.5–5.2)
PRO-BNP: 719.8 PG/ML (ref 0–1800)
PROTEIN UA: ABNORMAL MG/DL
RBC # BLD: 4.68 MILL/MM3 (ref 4.2–5.4)
RBC URINE: ABNORMAL /HPF
RENAL EPITHELIAL, UA: ABNORMAL
SARS-COV-2 RNA, RT PCR: NOT DETECTED
SEG NEUTROPHILS: 73.4 %
SEGMENTED NEUTROPHILS ABSOLUTE COUNT: 6.9 THOU/MM3 (ref 1.8–7.7)
SODIUM BLD-SCNC: 139 MEQ/L (ref 135–145)
SOURCE, BLOOD GAS: ABNORMAL
SPECIFIC GRAVITY UA: 1.01 (ref 1–1.03)
TROPONIN T: < 0.01 NG/ML
UROBILINOGEN, URINE: 0.2 EU/DL (ref 0–1)
WBC # BLD: 9.4 THOU/MM3 (ref 4.8–10.8)
WBC UA: ABNORMAL /HPF
YEAST: ABNORMAL

## 2022-11-04 PROCEDURE — 99223 1ST HOSP IP/OBS HIGH 75: CPT | Performed by: INTERNAL MEDICINE

## 2022-11-04 PROCEDURE — 1200000003 HC TELEMETRY R&B

## 2022-11-04 PROCEDURE — 93010 ELECTROCARDIOGRAM REPORT: CPT | Performed by: INTERNAL MEDICINE

## 2022-11-04 PROCEDURE — 71046 X-RAY EXAM CHEST 2 VIEWS: CPT

## 2022-11-04 PROCEDURE — 36415 COLL VENOUS BLD VENIPUNCTURE: CPT

## 2022-11-04 PROCEDURE — 85025 COMPLETE CBC W/AUTO DIFF WBC: CPT

## 2022-11-04 PROCEDURE — 36600 WITHDRAWAL OF ARTERIAL BLOOD: CPT

## 2022-11-04 PROCEDURE — 6370000000 HC RX 637 (ALT 250 FOR IP): Performed by: STUDENT IN AN ORGANIZED HEALTH CARE EDUCATION/TRAINING PROGRAM

## 2022-11-04 PROCEDURE — 99285 EMERGENCY DEPT VISIT HI MDM: CPT

## 2022-11-04 PROCEDURE — 83880 ASSAY OF NATRIURETIC PEPTIDE: CPT

## 2022-11-04 PROCEDURE — 6370000000 HC RX 637 (ALT 250 FOR IP)

## 2022-11-04 PROCEDURE — 2700000000 HC OXYGEN THERAPY PER DAY

## 2022-11-04 PROCEDURE — 2580000003 HC RX 258: Performed by: NURSE PRACTITIONER

## 2022-11-04 PROCEDURE — 94760 N-INVAS EAR/PLS OXIMETRY 1: CPT

## 2022-11-04 PROCEDURE — 80048 BASIC METABOLIC PNL TOTAL CA: CPT

## 2022-11-04 PROCEDURE — 6360000002 HC RX W HCPCS: Performed by: STUDENT IN AN ORGANIZED HEALTH CARE EDUCATION/TRAINING PROGRAM

## 2022-11-04 PROCEDURE — 82803 BLOOD GASES ANY COMBINATION: CPT

## 2022-11-04 PROCEDURE — 93005 ELECTROCARDIOGRAM TRACING: CPT | Performed by: INTERNAL MEDICINE

## 2022-11-04 PROCEDURE — 87636 SARSCOV2 & INF A&B AMP PRB: CPT

## 2022-11-04 PROCEDURE — 2580000003 HC RX 258

## 2022-11-04 PROCEDURE — 94640 AIRWAY INHALATION TREATMENT: CPT

## 2022-11-04 PROCEDURE — 84484 ASSAY OF TROPONIN QUANT: CPT

## 2022-11-04 PROCEDURE — 81001 URINALYSIS AUTO W/SCOPE: CPT

## 2022-11-04 PROCEDURE — 93005 ELECTROCARDIOGRAM TRACING: CPT | Performed by: NURSE PRACTITIONER

## 2022-11-04 PROCEDURE — 6360000002 HC RX W HCPCS

## 2022-11-04 RX ORDER — METHYLPREDNISOLONE SODIUM SUCCINATE 40 MG/ML
40 INJECTION, POWDER, LYOPHILIZED, FOR SOLUTION INTRAMUSCULAR; INTRAVENOUS EVERY 12 HOURS
Status: DISCONTINUED | OUTPATIENT
Start: 2022-11-04 | End: 2022-11-06

## 2022-11-04 RX ORDER — PREDNISONE 20 MG/1
40 TABLET ORAL DAILY
Status: DISCONTINUED | OUTPATIENT
Start: 2022-11-07 | End: 2022-11-04

## 2022-11-04 RX ORDER — LEVOTHYROXINE SODIUM 88 UG/1
88 TABLET ORAL DAILY
Status: DISCONTINUED | OUTPATIENT
Start: 2022-11-04 | End: 2022-11-09 | Stop reason: HOSPADM

## 2022-11-04 RX ORDER — BUDESONIDE 0.5 MG/2ML
0.5 INHALANT ORAL 2 TIMES DAILY
Status: DISCONTINUED | OUTPATIENT
Start: 2022-11-04 | End: 2022-11-07

## 2022-11-04 RX ORDER — SODIUM CHLORIDE 0.9 % (FLUSH) 0.9 %
5-40 SYRINGE (ML) INJECTION EVERY 12 HOURS SCHEDULED
Status: DISCONTINUED | OUTPATIENT
Start: 2022-11-04 | End: 2022-11-09 | Stop reason: HOSPADM

## 2022-11-04 RX ORDER — 0.9 % SODIUM CHLORIDE 0.9 %
500 INTRAVENOUS SOLUTION INTRAVENOUS ONCE
Status: COMPLETED | OUTPATIENT
Start: 2022-11-04 | End: 2022-11-04

## 2022-11-04 RX ORDER — ALBUTEROL SULFATE 90 UG/1
2 AEROSOL, METERED RESPIRATORY (INHALATION) EVERY 4 HOURS PRN
Status: DISCONTINUED | OUTPATIENT
Start: 2022-11-04 | End: 2022-11-09 | Stop reason: HOSPADM

## 2022-11-04 RX ORDER — ACETAMINOPHEN 325 MG/1
650 TABLET ORAL EVERY 6 HOURS PRN
Status: DISCONTINUED | OUTPATIENT
Start: 2022-11-04 | End: 2022-11-09 | Stop reason: HOSPADM

## 2022-11-04 RX ORDER — ALBUTEROL SULFATE 90 UG/1
2 AEROSOL, METERED RESPIRATORY (INHALATION) 2 TIMES DAILY
Status: DISCONTINUED | OUTPATIENT
Start: 2022-11-04 | End: 2022-11-04

## 2022-11-04 RX ORDER — MULTIVITAMIN WITH IRON
1 TABLET ORAL DAILY
Status: DISCONTINUED | OUTPATIENT
Start: 2022-11-04 | End: 2022-11-09 | Stop reason: HOSPADM

## 2022-11-04 RX ORDER — ACETAMINOPHEN 650 MG/1
650 SUPPOSITORY RECTAL EVERY 6 HOURS PRN
Status: DISCONTINUED | OUTPATIENT
Start: 2022-11-04 | End: 2022-11-09 | Stop reason: HOSPADM

## 2022-11-04 RX ORDER — ATORVASTATIN CALCIUM 10 MG/1
10 TABLET, FILM COATED ORAL NIGHTLY
Status: DISCONTINUED | OUTPATIENT
Start: 2022-11-04 | End: 2022-11-09 | Stop reason: HOSPADM

## 2022-11-04 RX ORDER — ONDANSETRON 4 MG/1
4 TABLET, ORALLY DISINTEGRATING ORAL EVERY 8 HOURS PRN
Status: DISCONTINUED | OUTPATIENT
Start: 2022-11-04 | End: 2022-11-09 | Stop reason: HOSPADM

## 2022-11-04 RX ORDER — SODIUM CHLORIDE 9 MG/ML
INJECTION, SOLUTION INTRAVENOUS PRN
Status: DISCONTINUED | OUTPATIENT
Start: 2022-11-04 | End: 2022-11-09 | Stop reason: HOSPADM

## 2022-11-04 RX ORDER — METHYLPREDNISOLONE SODIUM SUCCINATE 40 MG/ML
40 INJECTION, POWDER, LYOPHILIZED, FOR SOLUTION INTRAMUSCULAR; INTRAVENOUS EVERY 6 HOURS
Status: DISCONTINUED | OUTPATIENT
Start: 2022-11-04 | End: 2022-11-04

## 2022-11-04 RX ORDER — ONDANSETRON 2 MG/ML
4 INJECTION INTRAMUSCULAR; INTRAVENOUS EVERY 6 HOURS PRN
Status: DISCONTINUED | OUTPATIENT
Start: 2022-11-04 | End: 2022-11-09 | Stop reason: HOSPADM

## 2022-11-04 RX ORDER — AZITHROMYCIN 250 MG/1
250 TABLET, FILM COATED ORAL DAILY
Status: DISCONTINUED | OUTPATIENT
Start: 2022-11-04 | End: 2022-11-09 | Stop reason: HOSPADM

## 2022-11-04 RX ORDER — POLYETHYLENE GLYCOL 3350 17 G/17G
17 POWDER, FOR SOLUTION ORAL DAILY PRN
Status: DISCONTINUED | OUTPATIENT
Start: 2022-11-04 | End: 2022-11-09 | Stop reason: HOSPADM

## 2022-11-04 RX ORDER — ALLOPURINOL 100 MG/1
200 TABLET ORAL DAILY
Status: DISCONTINUED | OUTPATIENT
Start: 2022-11-04 | End: 2022-11-09 | Stop reason: HOSPADM

## 2022-11-04 RX ORDER — LACTOBACILLUS RHAMNOSUS GG 10B CELL
1 CAPSULE ORAL DAILY
Status: DISCONTINUED | OUTPATIENT
Start: 2022-11-04 | End: 2022-11-09 | Stop reason: HOSPADM

## 2022-11-04 RX ORDER — IPRATROPIUM BROMIDE AND ALBUTEROL SULFATE 2.5; .5 MG/3ML; MG/3ML
1 SOLUTION RESPIRATORY (INHALATION) EVERY 4 HOURS
Status: DISCONTINUED | OUTPATIENT
Start: 2022-11-04 | End: 2022-11-04

## 2022-11-04 RX ORDER — SODIUM CHLORIDE 0.9 % (FLUSH) 0.9 %
5-40 SYRINGE (ML) INJECTION PRN
Status: DISCONTINUED | OUTPATIENT
Start: 2022-11-04 | End: 2022-11-09 | Stop reason: HOSPADM

## 2022-11-04 RX ORDER — ALBUTEROL SULFATE 2.5 MG/3ML
2.5 SOLUTION RESPIRATORY (INHALATION) 2 TIMES DAILY
Status: DISCONTINUED | OUTPATIENT
Start: 2022-11-04 | End: 2022-11-06

## 2022-11-04 RX ADMIN — METOPROLOL TARTRATE 12.5 MG: 25 TABLET, FILM COATED ORAL at 21:43

## 2022-11-04 RX ADMIN — AZITHROMYCIN MONOHYDRATE 250 MG: 250 TABLET ORAL at 21:43

## 2022-11-04 RX ADMIN — ATORVASTATIN CALCIUM 10 MG: 10 TABLET, FILM COATED ORAL at 21:44

## 2022-11-04 RX ADMIN — IPRATROPIUM BROMIDE AND ALBUTEROL SULFATE 1 AMPULE: .5; 3 SOLUTION RESPIRATORY (INHALATION) at 16:56

## 2022-11-04 RX ADMIN — BUDESONIDE 500 MCG: 0.5 INHALANT RESPIRATORY (INHALATION) at 15:09

## 2022-11-04 RX ADMIN — ALBUTEROL SULFATE 2 PUFF: 90 AEROSOL, METERED RESPIRATORY (INHALATION) at 15:09

## 2022-11-04 RX ADMIN — SODIUM CHLORIDE, PRESERVATIVE FREE 10 ML: 5 INJECTION INTRAVENOUS at 21:44

## 2022-11-04 RX ADMIN — Medication 1 CAPSULE: at 21:42

## 2022-11-04 RX ADMIN — SODIUM CHLORIDE 500 ML: 9 INJECTION, SOLUTION INTRAVENOUS at 08:21

## 2022-11-04 RX ADMIN — APIXABAN 5 MG: 5 TABLET, FILM COATED ORAL at 21:44

## 2022-11-04 RX ADMIN — METHYLPREDNISOLONE SODIUM SUCCINATE 40 MG: 40 INJECTION, POWDER, FOR SOLUTION INTRAMUSCULAR; INTRAVENOUS at 21:43

## 2022-11-04 ASSESSMENT — ENCOUNTER SYMPTOMS
NAUSEA: 1
NAUSEA: 0
DIARRHEA: 0
COUGH: 1
ABDOMINAL DISTENTION: 0
WHEEZING: 0
RHINORRHEA: 0
COLOR CHANGE: 0
SHORTNESS OF BREATH: 1
VOMITING: 0
SORE THROAT: 0
CHEST TIGHTNESS: 0
ALLERGIC/IMMUNOLOGIC NEGATIVE: 1
ABDOMINAL PAIN: 0
EYES NEGATIVE: 1

## 2022-11-04 NOTE — CONSULTS
[place] [facility]      Patient - Petra Siu   MRN -  [id number]   [facility]   - 1935      Date of Admission -  2022  6:20 AM  Date of evaluation -  2022  Room - --A   Hospital Day - 0  Darci Singh MD Primary Care Physician - Neo Dang MD     Problem List      Active Hospital Problems    Diagnosis Date Noted    Shortness of breath [R06.02] 2022     Priority: Medium     Reason for Consult    Worsening shortness of breath, hx pulmonary fibrosis  HPI   History Obtained From: Patient and electronic medical record. Petra Siu is a 80 y.o. female lifetime nonsmoker with history of ILD/pulmonary fibrosis and PAF, presented to [facility] ED with complaints or worsening dyspnea and increased work of breathing. On arrival to ED saturating >94% on 4L NC, her baseline. Work up generally negative for acute findings. Admitted for further work up. On evaluation, patient states she started noticing worsening shortness of breath with increased work of breathing with minimal activity in the last week that has gotten worse. Endorses associated fatigue with lightheadedness and reduced PO intake related to her inability to ambulate and cook for herself. Reports her pulse ox showing oxygen at 60% on 4L NC when walking at home. Lives alone. Usually able to care for herself. Has been on oxygen due to ILD, 4L on ambulation and usually titrate down during rest and at night. Has been at baseline otherwise, no chest pain, fever, chills, nausea, vomiting, diarrhea. No sick contact. Vaccinated for [employer]. CT chest 2022: The severe intralobular septal thickening and bronchiectasis with honeycombing most pronounced in the right upper lobe is unchanged. Scattered subpleural scarring and groundglass opacities in the left lung are mild similar to the 2020 comparison.      Per chart review:  Connective tissue work up results:  Date: 4/03/2017  SABAS( Antinuclear antibody):  Negative-none detected. Fungal serologies- Negative                   RA ( Rheumatoid factor):  Negative. <10                                                          ACE(  level):    Negative-22                               ESR ( Sed rate): 9               Connective Tissue Labs  Date: 1/11/19     SABAS with reflex-None detected.   Anti RNP-1- Normal.  Anti Lexie-0- normal  Anti-SSB-0- normal  AntiSSA-1- normal  AntiCentromere- 4- Normal  Scleroderma AB-24-normal  ACE-10- normal  ESR-101 elevated  Rheumatoid Factor-13 WNL                 PMHx   Past Medical History      Diagnosis Date    Anemia     normal on pre-op 5/2012 and 12/2/13    Backache     h/o    Bronchiectasis ([address] 2013    Bursitis     bilateral shoulders    Constipation     Diverticulosis of colon     HTN (hypertension)     Hypercalcemia     slightly elevated at 10.8 pre-op 12/2/13    Hyperlipidemia     Nodular goiter     bx negative    OA (osteoarthritis)     bilateral thumbs - sees Dr. Gianni Mcintosh    Osteopenia 11/6/2013    Parathyroid adenoma 4/30/2013    Pneumonia of right upper lobe due to infectious organism     Pneumonitis     Dr. Elijah Rose in 7/2010 - bx negative    Pulmonary Mycobacterium avium complex (MAC) infection ([address]     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 ([address]     had one parathyroid removed - now follows with Dr. Rylee Daily    Sinusitis     with seasonal allergies    Vitamin D deficiency 05/2018      Past Surgical History        Procedure Laterality Date    ABDOMINAL EXPLORATION SURGERY  01/02/2013    Release of SBO, KATHERINE-Dr. Mo Hager    APPENDECTOMY  1961    BACK INJECTION Bilateral 12/28/2021    bilateral sacroiliac joint injection performed by Lindsey Roque DO at [place] N/A 2/22/2022    B/L SI joint block performed by Lindsey Roque DO at [address] 1/8/2020    BRONCHOSCOPY FLUOROSCOPY performed by Evan Taveras MD Riccardo at [address] [place] [address] 153 Bilateral 1990 ?  SECTION  1739,3724    DILATION AND CURETTAGE OF UTERUS  [id number],1924    EXCISION OF PARATHYROID MASS Right 2013    rt parathyroidectomy (excision of rt inferior parathyroid mass) exploration of neck     FOOT SURGERY      left    FOOT SURGERY Left 2017    Dr Gigi Benitez ([address] [place] [address])      JOINT REPLACEMENT  12    left knee    JOINT REPLACEMENT  2014    right knee    MALIGNANT SKIN LESION EXCISION Left 2017    BCC DORSAL FOOT WITH GRAFT & FS    PAIN MANAGEMENT PROCEDURE Left 2022    sacroiliac joint radiofrequency ablation, LEFT side first performed by Hershal Lesch, DO at [facility] Right 2022    sacroiliac joint Radiofrequency Ablations, right side performed by Hershal Lesch, DO at [address] [place] [address] (CERVIX REMOVED)  [address] ?     TOTAL KNEE ARTHROPLASTY Right 2014    OIO    UPPER GASTROINTESTINAL ENDOSCOPY N/A 2022    EGD performed by Maureen Orozco MD at [facility]     Meds    Current Medications    sodium chloride flush  5-40 mL IntraVENous 2 times per day    allopurinol  200 mg Oral Daily    apixaban  5 mg Oral BID    azithromycin  250 mg Oral Daily    budesonide  0.5 mg Nebulization BID    lactobacillus  1 capsule Oral Daily    levothyroxine  88 mcg Oral Daily    metoprolol tartrate  12.5 mg Oral BID    multivitamin  1 tablet Oral Daily    atorvastatin  10 mg Oral Nightly    methylPREDNISolone  40 mg IntraVENous Q12H    ipratropium-albuterol  1 ampule Inhalation Q4H     sodium chloride flush, sodium chloride, ondansetron **OR** ondansetron, polyethylene glycol, acetaminophen **OR** acetaminophen, albuterol sulfate HFA  IV Drips/Infusions   sodium chloride       Home Medications  Medications Prior to Admission: azithromycin (ZITHROMAX) 250 MG tablet, Take 1 tablet by mouth daily  simvastatin (ZOCOR) 20 MG tablet, TAKE 1 TABLET BY MOUTH AT  NIGHT  apixaban (ELIQUIS) 5 MG TABS tablet, TAKE 1 TABLET BY MOUTH  TWICE DAILY  albuterol sulfate HFA (PROAIR HFA) 108 (90 Base) MCG/ACT inhaler, Inhale 2 puffs into the lungs every 4 hours as needed for Wheezing or Shortness of Breath  levothyroxine (SYNTHROID) 88 MCG tablet, TAKE 1 TABLET BY MOUTH ONCE DAILY  allopurinol (ZYLOPRIM) 100 MG tablet, Take 2 tablets by mouth daily  budesonide (PULMICORT) 0.5 MG/2ML nebulizer suspension, Take 2 mLs by nebulization 2 times daily  metoprolol tartrate (LOPRESSOR) 25 MG tablet, take 1/2 tablet by mouth twice a day  Lactobacillus (PROBIOTIC ACIDOPHILUS) CAPS, Take 1 capsule by mouth daily   MULTIPLE VITAMIN PO, Take 1 tablet by mouth daily   Diet    ADULT DIET; Regular  Allergies    Colchicine, Levaquin [levofloxacin], Oxycodone, Percocet [oxycodone-acetaminophen], Rifampin, Sulfa antibiotics, and Cefuroxime  Social History     Social History     Socioeconomic History    Marital status:      Spouse name: Not on file    Number of children: Not on file    Years of education: Not on file    Highest education level: Not on file   Occupational History    Occupation: retired   Tobacco Use    Smoking status: Never    Smokeless tobacco: Never   Vaping Use    Vaping Use: Never used   Substance and Sexual Activity    Alcohol use:  Yes     Alcohol/week: 0.0 standard drinks     Comment: socially-1 glassof wine 2-3 times a month    Drug use: No    Sexual activity: Never   Other Topics Concern    Not on file   Social History Narrative    Not on file     Social Determinants of Health     Financial Resource Strain: Low Risk     Difficulty of Paying Living Expenses: Not hard at all   Food Insecurity: No Food Insecurity    Worried About Running Out of Food in the Last Year: Never true    Ran Out of Food in the Last Year: Never true   Transportation Needs: Not on file Physical Activity: Not on file   Stress: Not on file   Social Connections: Not on file   Intimate Partner Violence: Not on file   Housing Stability: Not on file     Family History          Problem Relation Age of Onset    Diabetes Mother     Thyroid Disease Mother     Arthritis Mother     High Blood Pressure Mother     Arthritis Father     High Blood Pressure Father     Other Father         hay fever    Breast Cancer Neg Hx     Ovarian Cancer Neg Hx      Sleep History    Never diagnosed with sleep apnea in the past.  Occupational history   Occupation:  She is current working: No  Type of profession: retired. History of tobacco smoking:No  .  History of recreational or IV drug use in the past:NO     History of exposure to coal mines/coal dust: NO  History of exposure to foundry dust/welding: NO  History of exposure to quarry/silica/sandblasting: NO  History of exposure to asbestos/working with breaks/ships: NO  History of exposure to farm dust: NO  History of recent travel to long distances: NO  History of exposure to birds, pigeons, or chickens in the past:NO  Pet animals at home:No      History of pulmonary embolism in the past: No            History of DVT in the past:No        [] Right lower extremity   [] Left lower extremity.            Riview of systems   General: negative for - fever, or chills  Respiratory: negative for shortness of breath, or wheezing  Cardiovascular: negative for chest pain, palpitations  Gastrointestinal: no abdominal pain, change in bowel habits, or black or bloody stools  Genito-Urinary: no dysuria, trouble voiding, or hematuria  Musculoskeletal: negative for - muscular weakness, joint pain in hands/knees/ankles   Neurological ROS: negative for -  numbness/tingling, seizures or weakness  Psychological: negative for - anxiety and depression   Hematological and Lymphatic: negative for history of blood clots or bleeding disorder  Dermatological ROS: negative for - skin lesion changes    Vitals     height is 5' 4\" (1.626 m) and weight is 155 lb 3.3 oz (70.4 kg). Her oral temperature is 97.4 °F (36.3 °C). Her blood pressure is 149/79 (abnormal) and her pulse is 73. Her respiration is 22 and oxygen saturation is 93%. Body mass index is 26.64 kg/m². SUPPLEMENTAL O2: O2 Flow Rate (L/min): 4 L/min     I/O      Intake/Output Summary (Last 24 hours) at 11/4/2022 1819  Last data filed at 11/4/2022 1657  Gross per 24 hour   Intake 739.93 ml   Output --   Net 739.93 ml     No intake/output data recorded. Patient Vitals for the past 96 hrs (Last 3 readings):   Weight   11/04/22 1500 155 lb 3.3 oz (70.4 kg)       Exam   Nursing note and vitals reviewed. Constitutional: in mild respiratory distress, appears stated age, cooperative  HENT: Normal-appearing ears and nose. Normal voice, not muffled. Head: as above  Right Ear: as above  Left Ear: as above  Mouth/Throat: as above  Eyes: Normal in appearance. EOM intact. Neck: Supple  Cardiovascular: Normal S1 and S1, regular rate rhythm, tachycardic, no murmurs  Pulmonary/Chest: Increased work of breathing, tachyneic. Diminished lung sounds, dry rales RUL predominantly, some expiratory wheezing in lower lobes bilaterally, No rhonchi  Abdominal: Nontender to palpation. Normal bowl sounds. Musculoskeletal: No motor or sensory deficit. Extremities: No peripheral edema. Lymphadenopathy:  None appreciated. Neurological: CN II-XII intact. Neurovascularly intact. Skin: No rashes or lacerations. Psychiatric: AAO x3. Normal mood and affect.       Labs  - Old records and notes have been reviewed in [facility]   ABG  Lab Results   Component Value Date/Time    PH 7.46 11/04/2022 08:39 AM    PO2 70 11/04/2022 08:39 AM    PCO2 42 11/04/2022 08:39 AM    HCO3 30 11/04/2022 08:39 AM    O2SAT 95 11/04/2022 08:39 AM     Lab Results   Component Value Date/Time    IFIO2 4 11/04/2022 08:39 AM     CBC  Recent Labs     11/04/22  0805   WBC 9.4   RBC 4.68 HGB 12.2   HCT 38.2   MCV 81.6   MCH 26.1   MCHC 31.9*      MPV 10.9      BMP  Recent Labs     11/04/22  0805      K 4.5   CL 95*   CO2 31   BUN 16   CREATININE 0.7   GLUCOSE 99   CALCIUM 9.9     LFT  No results for input(s): AST, ALT, ALB, BILITOT, ALKPHOS, LIPASE in the last 72 hours. Invalid input(s): AMYLASE  TROP  Lab Results   Component Value Date/Time    TROPONINT < 0.010 11/04/2022 08:05 AM    TROPONINT < 0.010 09/30/2020 09:00 AM    TROPONINT < 0.010 09/25/2020 05:16 PM     BNP  No results for input(s): BNP in the last 72 hours. Lactic Acid  No results for input(s): LACTA in the last 72 hours. INR  No results for input(s): INR, PROTIME in the last 72 hours. PTT  No results for input(s): APTT in the last 72 hours. Glucose  No results for input(s): POCGLU in the last 72 hours. UA   Recent Labs     11/04/22  1055   SPECGRAV 1.012   PHUR 7.0   COLORU YELLOW   PROTEINU TRACE*   BLOODU TRACE*   RBCUA 10-15   WBCUA 2-4   BACTERIA NONE SEEN   NITRU NEGATIVE   BILIRUBINUR NEGATIVE   UROBILINOGEN 0.2   KETUA NEGATIVE   LABCAST NONE SEEN  NONE SEEN   . PFTs           Sleep studies   none    Cultures    Flu and COVID negative    EKG   Sinus rhythm with Premature atrial complexes  Left anterior fascicular block  Cannot rule out Anterior infarct , age undetermined  Abnormal ECG  When compared with ECG of 04-NOV-2022 06:30,  Premature atrial complexes are now Present  Left anterior fascicular block is now Present    Echocardiogram    Summary   Ejection fraction is visually estimated at 55%. Overall left ventricular function is normal.   Aortic valve appears tricuspid. Aortic valve leaflets are somewhat thickened. Aortic valve leaflets are Mildly calcified. There is a small localized posterior pericardial effusion noted.       Signature      ----------------------------------------------------------------   Electronically signed by Jamee Jimenez MD (Interpreting   physician) on 09/30/2020 at 03:42 PM   ----------------------------------------------------------------  Radiology    CXR 11/4/2022      Moderately severe fibrotic changes in the right lung with milder fibrotic changes in the left lung. No definite new abnormalities. CT Scans  (See actual reports for details)  CT chest wo contrast 09/21/2022  The previously seen more numerous and more prominent multifocal groundglass opacities in the left lung have improved. The severe interstitial fibrosis, honeycombing, bronchiectasis most pronounced in the right upper lobe is unchanged and overall the    appearance of the chest is similar to the 2020 comparison. No pulmonary mass or nodule is observed. Assessment   Acute hypoxic respiratory failure: unclear etiology. Reported hypoxia on ambulation to SpO2 60% with activity at home, reported per patient. Currently on 4L NC, at baseline. Low suspicion for infectious etiology with lack of fever, leukocytosis, productive cough. No sick contacts. COVID and Flu neg. CXR with no new obvious consolidation or infiltrates. CXR appears unchanged compared to previous CXR in 05/2022. No signs of volume overload present. Suspect progression of underlying ILD. ILD/pulmonary fibrosis of uncertain etiology: Restrictive lung defect on PFT. Follow with Jorge Alberto Mathews NP at pulmonology office. On home oxygen continuously 4LPM- 2LPM with sleep. Recent CT Chest done with resolution of previously seen GGO and stable findings in RUL.  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 Plan discussed with Dr. Josue Pappas. Electronically signed by   Nancy Belle MD on 11/4/2022 at 6:19 PM     Addendum by Dr. Josue Pappas MD:  Patient seen by me independently including key components of medical care. Face to face evaluation and examination was performed. Case discussed with Lelia Hernandez MD-resident physician. Italicized font, if present,  represents changes to the note made by me. More than 50% of the encounter time involved with patient care by the Pulmonary & Critical care service team spent by me. Please see my modifications mentioned below:      Assessment:  -Interstitial Interstitial lung disease due to uncertain etiology. Her work up for connective tissue diseases are negative- ? UIP Vs Post inflammatory scarring from her previous hx of Mycobacterium avium/intracellulare infection of lungs- She refused to go for open lung biopsy with VATS. She is following with [facility] pulmonary service. She was evaluated at [facility] pulmonary service via virtual visit on 7 December 2021 for the last time by Dr. Deepti Odom MD  -She was treated by Dr. Rell Blankenship for Mycobacterium AVM intracellular infection for 13months. Her antibiotics were discontinued due to development of optic neutitis. -Bronchiectasis noted on HRCT. She is using Acapella. 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  -Chronic cough due to ILD Vs Bronchiectasis- Improved  -Allergic rhinitis under control  -HTN (hypertension)-under control.  -Nodular goiter.  -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 ([address] -Hx of sinusitis    Plan: Will treat patient for interstitial lung disease exacerbation with Solu-Medrol 40 mg IV twice daily. Continue DuoNebs 3 Clarisa nebulization every 4 hourly. Hold the Pulmicort nebulization with 500 mcg twice daily.   Patient currently receiving systemic steroids  Will do CT scan of chest without IV contrast with high-resolution protocol to check the current status of interstitial lung disease and bronchiectasis. Navya Mcmahon is currently on Eliquis 5 mg p.o. twice daily  Patient educated about my impression plan.     Electronically signed by   Nicky Page MD on 11/4/2022 at 6:19 PM

## 2022-11-04 NOTE — PROGRESS NOTES
Initial Evaluation          Patient:   Eulalia Billingsley  YOB: 1935  Age:  80 y.o. Room:  [address] [place] [address]  MRN:  [id number]   Acct: [de-identified]    Date of Admission:  11/4/2022  6:20 AM  Date of Service:  11/4/2022  Completed By:  Gris Bynum RN                 Reason for Palliative Care Evaluation:-             [] Code Status Discussion              [x] Goals of Care              [] Pain/Symptom Management               [] Emotional Support              [] Other:                   Current Issues:-  []  Pain  []  Fatigue  []  Nausea  []  Anxiety  []  Depression  [x]  Shortness of Breath  []  Constipation  []  Appetite  []  Other:             Advance Directives:-  [] [place] DNR Form  [x] Living Will  [] Medical POA             Current Code Status:-  [] Full Resuscitation  [] DNR-Comfort Care-Arrest  [] DNR-Comfort Care       [x] Limited Resuscitation             [x] No CPR            [x] No shock            [x] No ET intubation/reintubation            [x] No resuscitative medications            [] Other limitation:              Palliative Performance Status:          [] 60%  Ambulation reduced; Significant disease;Can't do hobbies/housework; intake normal or reduced; occasional assist; LOC full/confusion        [x] 50%  Mainly sit/lie; Extensive disease; Can't do any work; Considerable assist; intake normal or reduced; LOC full/confusion        [] 40%  Mainly in bed; Extensive disease; Mainly assist; intake normal or reduced; LOC full/confusion         [] 30%  Bed Bound; Extensive disease; Total care; intake reduced; LOC full/confusion        [] 20%  Bed Bound; Extensive disease; Total care; intake minimal; Drowsy/coma        [] 10%  Bed Bound; Extensive disease;  Total care; Mouth care only; Drowsy/coma        [] 0  Death        Goals of care evaluation:-        The patient goals of care are to provide comfort care/supportive services/palliation & relieve suffering:  Goals of care discussed with:  [x] Patient independently  [] Patient and Family  [] Family or Healthcare DPOA independently  [] Unable to discuss with patient, family/DPOA not present         Family/Patient Discussion:  Patient is pleasant, alert & oriented x 4. Patient is well aware of her pulmonary fibrosis diagnosis and she believes is the cause of her shortness of breath that started a little over a week ago. Her shortness of breath continuously worsened and greatly started to affect her activity tolerance and caused increased fatigue. She is compliant with her treatment at home and has home O2 usually at 4L/NC. [facility] with reliable neighbors who frequently check in to visit with her. Patient has two sons [facility] [employer] and she states they are her health care decision makers. Living will is complete and currently scanned into the EMR. Patient is a Limited Code No to Intubation, No chest compressions, No to rescue medications, and No to cardioversion/defibrilation. Patient verbalized understanding about this code status and no further questions voiced at this time. Patient has been seen at [facility] for her chronic back pain and had received steroid injections with minimal help. She is now seeing someone within the pain management group and she states that did an ablation which has helped improve her back pain greatly, which has ultimately improved her respiratory function. Patient voiced concerns about being discharged home to early and is worried that her symptoms will return. Patient does plan on returning home once shortness of breath has improved. We discussed her thoughts about a possible need for further supportive services or a facility for more around the clock needs which she is not objection to when the time is needed however, at this time she hopes to return home as she was prior to this admission.  Patient is aware of her chronic condition and the risks of her returning to the hospital is high. She is not one to immediately seek the hospital services for symptom management however does know when to seek the help she needs. No further questions voiced. Much emotional support provided. Plan/Follow-Up:  Palliative Care will remain available if needed. Please contact [phone] if symptoms worsen or change in condition.        Electronically signed by Cassidy Mari RN on 11/4/2022 at 3:51 PM           Palliative Care Office: [phone]

## 2022-11-04 NOTE — ED NOTES
ED to inpatient nurses report    Chief Complaint   Patient presents with    Shortness of Breath      Present to ED from home  LOC: alert and orientated to name, place, date  Vital signs   Vitals:    11/04/22 0820 11/04/22 0951 11/04/22 1109 11/04/22 1248   BP: (!) 145/70 (!) 111/54 118/62 110/64   Pulse: 93 83 84 (!) 101   Resp: 28 29 30 26   Temp:       TempSrc:       SpO2: 93% 96% 94% 95%      Oxygen Baseline 97% 4L    Current needs required 4L Bipap/Cpap No  LDAs:   Peripheral IV 11/04/22 Left;Proximal;Anterior Forearm (Active)   Site Assessment Clean, dry & intact 11/04/22 0637   Line Status Blood return noted 11/04/22 0637   Phlebitis Assessment No symptoms 11/04/22 0637   Infiltration Assessment 0 11/04/22 0637   Alcohol Cap Used No 11/04/22 0637   Dressing Status New dressing applied 11/04/22 0637   Dressing Intervention New 11/04/22 0637     Mobility: Requires assistance * 2  Pending ED orders: Ambulatory pulse ox- patient refused  Present condition: Stable    C-SSRS Risk of Suicide: No Risk  Swallow Screening    Preferred Language: [place]     Electronically signed by Ezequiel Ivan RN on 11/4/2022 at [address] [place] [address]  11/04/22 7213

## 2022-11-04 NOTE — ED NOTES
In to assist patient on the bedpan, patient with much difficulty moving in bed, notable increase of shortness of breath with a respiratory rate in the 40s.   Patient repositioned and observed to have a decrease in respiratory effort before leaving room/     Skye Ascencio RN  11/04/22 1102

## 2022-11-04 NOTE — ED NOTES
Report received from BEREKET HAYES and care assumed at this time.      Yessi Fierro RN  11/04/22 0498

## 2022-11-04 NOTE — ED PROVIDER NOTES
[facility] Emergency Department    CHIEF COMPLAINT       Chief Complaint   Patient presents with    Shortness of Breath       Nurses Notes reviewed and I agree except as noted in the HPI. HISTORY OF PRESENT ILLNESS    Mahamed Byrne is a 80 y.o. female who presents to the ED for evaluation of shortness of breath. Patient notes ongoing shortness of breath this week. Notes that she has had decreased appetite, fatigue, chills. She notes cough and shortness of breath, denies any production to her cough. She denies any chest pain. She notes a history of pulmonary fibrosis, A. fib, on Eliquis. She denies any new medications recently. She denies nausea vomiting or diarrhea. She reports some dizziness and lightheadedness, denies a headache. Denies any falls recently. She also notes past medical history of anemia in the past.  She denies any current melena. HPI was provided by the patient. REVIEW OF SYSTEMS     Review of Systems   Constitutional:  Positive for activity change, chills and fatigue. Negative for fever. HENT:  Negative for congestion, rhinorrhea and sore throat. Respiratory:  Positive for cough and shortness of breath. Negative for chest tightness and wheezing. Cardiovascular:  Negative for chest pain and leg swelling. Gastrointestinal:  Negative for abdominal distention, abdominal pain, diarrhea, nausea and vomiting. Genitourinary:  Negative for decreased urine volume, difficulty urinating, dysuria, flank pain and frequency. Musculoskeletal:  Negative for arthralgias and myalgias. Skin:  Negative for color change and rash. Allergic/Immunologic: Negative for immunocompromised state. Neurological:  Positive for dizziness and light-headedness. Negative for weakness and numbness. Hematological:  Does not bruise/bleed easily. Psychiatric/Behavioral:  Negative for agitation, behavioral problems and confusion.        PAST MEDICAL HISTORY     Past Medical History: Diagnosis Date    Anemia     normal on pre-op 2012 and 13    Backache     h/o    Bronchiectasis ([facility] [address]     Bursitis     bilateral shoulders    Constipation     Diverticulosis of colon     HTN (hypertension)     Hypercalcemia     slightly elevated at 10.8 pre-op 13    Hyperlipidemia     Nodular goiter     bx negative    OA (osteoarthritis)     bilateral thumbs - sees Dr. Tari Galvan    Osteopenia 2013    Parathyroid adenoma 2013    Pneumonia of right upper lobe due to infectious organism     Pneumonitis     Dr. Carlton Mcmullen in 2010 - bx negative    Pulmonary Mycobacterium avium complex (MAC) infection ([facility] [address]     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 ([facility] [address]     had one parathyroid removed - now follows with Dr. Marcum Sox    Sinusitis     with seasonal allergies    Vitamin D deficiency 2018       SURGICALHISTORY      has a past surgical history that includes Appendectomy (); Cataract removal with implant (Bilateral,  ?);  section (4108,5406); Dilation and curettage of uterus (0314,5755,0551); Foot surgery (); Abdominal exploration surgery (2013); Total abdominal hysterectomy w/ bilateral salpingoophorectomy (); Excision of Parathyroid Mass (Right, 2013); Total knee arthroplasty (Right, 2014); Tonsillectomy and adenoidectomy ( ?); joint replacement (12); joint replacement (2014); Hysterectomy; malignant skin lesion excision (Left, 2017); Foot surgery (Left, 2017); bronchoscopy (N/A, 2020); Back Injection (Bilateral, 2021); Back Injection (N/A, 2022); Pain management procedure (Left, 2022); Pain management procedure (Right, 2022); and Upper gastrointestinal endoscopy (N/A, 2022).     CURRENT MEDICATIONS       Previous Medications    ALBUTEROL SULFATE HFA (PROAIR HFA) 108 (90 BASE) MCG/ACT INHALER    Inhale 2 puffs into the lungs every 4 hours as needed for Wheezing or Shortness of Breath    ALLOPURINOL (ZYLOPRIM) 100 MG TABLET    Take 2 tablets by mouth daily    APIXABAN (ELIQUIS) 5 MG TABS TABLET    TAKE 1 TABLET BY MOUTH  TWICE DAILY    AZITHROMYCIN (ZITHROMAX) 250 MG TABLET    Take 1 tablet by mouth daily    BUDESONIDE (PULMICORT) 0.5 MG/2ML NEBULIZER SUSPENSION    Take 2 mLs by nebulization 2 times daily    LACTOBACILLUS (PROBIOTIC ACIDOPHILUS) CAPS    Take 1 capsule by mouth daily     LEVOTHYROXINE (SYNTHROID) 88 MCG TABLET    TAKE 1 TABLET BY MOUTH ONCE DAILY    METOPROLOL TARTRATE (LOPRESSOR) 25 MG TABLET    take 1/2 tablet by mouth twice a day    MULTIPLE VITAMIN PO    Take 1 tablet by mouth daily     SIMVASTATIN (ZOCOR) 20 MG TABLET    TAKE 1 TABLET BY MOUTH AT  NIGHT       ALLERGIES     is allergic to colchicine, levaquin [levofloxacin], oxycodone, percocet [oxycodone-acetaminophen], rifampin, sulfa antibiotics, and cefuroxime. FAMILY HISTORY     She indicated that her mother is . She indicated that her father is . She indicated that the status of her neg hx is unknown.   family history includes Arthritis in her father and mother; Diabetes in her mother; High Blood Pressure in her father and mother; Other in her father; Thyroid Disease in her mother. SOCIAL HISTORY       Social History     Socioeconomic History    Marital status:      Spouse name: Not on file    Number of children: Not on file    Years of education: Not on file    Highest education level: Not on file   Occupational History    Occupation: retired   Tobacco Use    Smoking status: Never    Smokeless tobacco: Never   Vaping Use    Vaping Use: Never used   Substance and Sexual Activity    Alcohol use:  Yes     Alcohol/week: 0.0 standard drinks     Comment: socially-1 glassof wine 2-3 times a month    Drug use: No    Sexual activity: Never   Other Topics Concern    Not on file   Social History Narrative    Not on file     Social Determinants of Health     Financial Resource Strain: Low Risk     Difficulty of Paying Living Expenses: Not hard at all   Food Insecurity: No Food Insecurity    Worried About Running Out of Food in the Last Year: Never true    Ran Out of Food in the Last Year: Never true   Transportation Needs: Not on file   Physical Activity: Not on file   Stress: Not on file   Social Connections: Not on file   Intimate Partner Violence: Not on file   Housing Stability: Not on file       PHYSICAL EXAM     INITIAL VITALS:  oral temperature is 98 °F (36.7 °C). Her blood pressure is 110/64 and her pulse is 101 (abnormal). Her respiration is 26 and oxygen saturation is 95%. Physical Exam  Vitals and nursing note reviewed. Constitutional:       Appearance: Normal appearance. She is well-developed. HENT:      Head: Normocephalic. Mouth/Throat:      Pharynx: Uvula midline. Eyes:      Conjunctiva/sclera: Conjunctivae normal.   Cardiovascular:      Rate and Rhythm: Normal rate and regular rhythm. Heart sounds: Normal heart sounds, S1 normal and S2 normal. No murmur heard. Pulmonary:      Effort: Pulmonary effort is normal. Tachypnea present. No respiratory distress. Breath sounds: Normal breath sounds. No decreased breath sounds, wheezing, rhonchi or rales. Chest:      Chest wall: No tenderness. Abdominal:      General: Bowel sounds are normal. There is no distension. Palpations: Abdomen is soft. Tenderness: There is no abdominal tenderness. Musculoskeletal:         General: Normal range of motion. Cervical back: Normal range of motion and neck supple. Right lower leg: No edema. Left lower leg: No edema. Lymphadenopathy:      Cervical: No cervical adenopathy. Skin:     General: Skin is warm and dry. Capillary Refill: Capillary refill takes less than 2 seconds. Neurological:      General: No focal deficit present. Mental Status: She is alert and oriented to person, place, and time.    Psychiatric:         Speech: Speech normal.         Behavior: Behavior normal.         Thought Content: Thought content normal.       DIFFERENTIAL DIAGNOSIS:   Pneumonia, pulmonary acidosis, influenza, COVID-19, anemia    DIAGNOSTIC RESULTS     EKG: All EKG's are interpreted by the Emergency Department Physician who eithersigns or Co-signs this chart in the absence of a cardiologist.    EKG read and interpreted by myself with comparison to September 30, 2020 gives impression of normal sinus rhythm with heart rate of 94; interval 172; QRS 84;QTc 432; axis P-11, R-99, T-15. RADIOLOGY: non-plainfilm images(s) such as CT, Ultrasound and MRI are read by the radiologist.  Plain radiographic images are visualized and preliminarily interpreted by the emergency physician unless otherwise stated below. XR CHEST (2 VW)   Final Result   Moderately severe fibrotic changes in the right lung with milder fibrotic changes in the left lung. No definite new abnormalities. **This report has been created using voice recognition software. It may contain minor errors which are inherent in voice recognition technology. **      Final report electronically signed by Dr. Mike Rivera on 11/4/2022 7:43 AM            LABS:   Labs Reviewed   CBC WITH AUTO DIFFERENTIAL - Abnormal; Notable for the following components:       Result Value    MCHC 31.9 (*)     Lymphocytes Absolute 0.7 (*)     Monocytes Absolute 1.4 (*)     Immature Grans (Abs) 0.11 (*)     All other components within normal limits   BASIC METABOLIC PANEL - Abnormal; Notable for the following components:    Chloride 95 (*)     All other components within normal limits   URINALYSIS WITH MICROSCOPIC - Abnormal; Notable for the following components:    Blood, Urine TRACE (*)     Protein, UA TRACE (*)     All other components within normal limits   BLOOD GAS, ARTERIAL - Abnormal; Notable for the following components:    pH, Blood Gas 7.46 (*)     PO2 70 (*)     HCO3 30 (*)     Base Excess (Calculated) 5.6 (*)     All other components within normal limits   COVID-19 & INFLUENZA COMBO   BRAIN NATRIURETIC PEPTIDE   TROPONIN   GLOMERULAR FILTRATION RATE, ESTIMATED   ANION GAP   OSMOLALITY       EMERGENCY DEPARTMENT COURSE:   Vitals:    Vitals:    11/04/22 0820 11/04/22 0951 11/04/22 1109 11/04/22 1248   BP: (!) 145/70 (!) 111/54 118/62 110/64   Pulse: 93 83 84 (!) 101   Resp: 28 29 30 26   Temp:       TempSrc:       SpO2: 93% 96% 94% 95%         MDM    Patient was seen and evaluated in the emergency department, patient appears to be in no acute distress, vital signs reviewed, tachypnea noted. Physical exam completed lungs were clear, no significant edema in any extremities, heart rate was regular during my evaluation. No murmurs noted. Labs and imaging were ordered. Lab work reveals no significant normality, ABGs negative for any hypercapnia or respiratory acidosis. X-ray of the chest shows mild to severe fibrosis of the lung. Nursing staff attempted to get the patient up but she had significant shortness of breath with any activity. I feel the patient requires admission for further evaluation due to shortness of breath. Discussed this with the internal medicine group who accepts the patient for admission. Discussed plan of care with the patient she is agreeable with admission. Medications   0.9 % sodium chloride bolus (0 mLs IntraVENous Stopped 11/4/22 1022)       Patient was seenindependently by myself. The patient's final impression and disposition and plan was determined by myself. CRITICAL CARE:   None    CONSULTS:  Internal medicine    PROCEDURES:  None    FINAL IMPRESSION     1. Shortness of breath    2. Pulmonary fibrosis ([facility] [address]          DISPOSITION/PLAN   Patient admitted    PATIENT REFERREDTO:  No follow-up provider specified.     DISCHARGE MEDICATIONS:  New Prescriptions    No medications on file       (Please note that portions of this note were completed with a voice recognition program.  Efforts were made to edit the

## 2022-11-04 NOTE — PLAN OF CARE
Problem: Discharge Planning  Goal: Discharge to home or other facility with appropriate resources  Outcome: Progressing     Problem: Discharge Planning  Goal: Discharge to home or other facility with appropriate resources  Outcome: Progressing     Problem: Skin/Tissue Integrity  Goal: Absence of new skin breakdown  Description: 1. Monitor for areas of redness and/or skin breakdown  2. Assess vascular access sites hourly  3. Every 4-6 hours minimum:  Change oxygen saturation probe site  4. Every 4-6 hours:  If on nasal continuous positive airway pressure, respiratory therapy assess nares and determine need for appliance change or resting period. Outcome: Progressing     Problem: Skin/Tissue Integrity  Goal: Absence of new skin breakdown  Description: 1. Monitor for areas of redness and/or skin breakdown  2. Assess vascular access sites hourly  3. Every 4-6 hours minimum:  Change oxygen saturation probe site  4. Every 4-6 hours:  If on nasal continuous positive airway pressure, respiratory therapy assess nares and determine need for appliance change or resting period.   Outcome: Progressing     Problem: Safety - Adult  Goal: Free from fall injury  Outcome: Progressing     Problem: Safety - Adult  Goal: Free from fall injury  Outcome: Progressing

## 2022-11-04 NOTE — H&P
HISTORY AND PHYSICAL             Date: 11/4/2022        Patient Name: Natalie Cody     YOB: 1935      Age:  80 y.o. Chief Complaint     Chief Complaint   Patient presents with    Shortness of Breath          History Obtained From   patient    History of Present Illness   19-year-old female primary history of pulmonary fibrosis, hypertension, hyperlipidemia, hypothyroid, typical a fib who presented to the emergency department via EMS for shortness of breath. Patient states since last weekend she has had increased shortness of breath wears 4 L O2 at home at times pulse ox got down to 60s. She said increased weakness decreased appetite, patient's been nauseated, positive for chills positive for headache due to dehydration per patient. Patient states starting last night having shortness of breath while lying down. Patient denies fever vomiting or diarrhea denies any heart palpitations.   Patient admitted for shortness of breath  Past Medical History     Past Medical History:   Diagnosis Date    Anemia     normal on pre-op 5/2012 and 12/2/13    Backache     h/o    Bronchiectasis ([address] 2013    Bursitis     bilateral shoulders    Constipation     Diverticulosis of colon     HTN (hypertension)     Hypercalcemia     slightly elevated at 10.8 pre-op 12/2/13    Hyperlipidemia     Nodular goiter     bx negative    OA (osteoarthritis)     bilateral thumbs - sees Dr. Carl Reina    Osteopenia 11/6/2013    Parathyroid adenoma 4/30/2013    Pneumonia of right upper lobe due to infectious organism     Pneumonitis     Dr. Nano Boyce in 7/2010 - bx negative    Pulmonary Mycobacterium avium complex (MAC) infection ([address]     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 ([address]     had one parathyroid removed - now follows with Dr. Cabrera Fernandez    Sinusitis     with seasonal allergies    Vitamin D deficiency 05/2018        Past Surgical History     Past Surgical History:   Procedure Laterality Date    ABDOMINAL EXPLORATION SURGERY 2013    Release of [place] TAMAYO [address]    BACK INJECTION Bilateral 2021    bilateral sacroiliac joint injection performed by Margo Sahu DO at [facility] N/A 2022    B/L SI joint block performed by Margo Sahu DO at [address] N/A 2020    BRONCHOSCOPY FLUOROSCOPY performed by Lola Skinner MD at [address] Bilateral 1990 ?  SECTION  0475,0605    DILATION AND CURETTAGE OF UTERUS  9007,0913,6535    EXCISION OF PARATHYROID MASS Right 2013    rt parathyroidectomy (excision of rt inferior parathyroid mass) exploration of neck     FOOT SURGERY      left    FOOT SURGERY Left 2017    Dr Juan Her ([address] [place] [address])      JOINT REPLACEMENT  12    left knee    JOINT REPLACEMENT  2014    right knee    MALIGNANT SKIN LESION EXCISION Left 2017    BCC DORSAL FOOT WITH GRAFT & FS    PAIN MANAGEMENT PROCEDURE Left 2022    sacroiliac joint radiofrequency ablation, LEFT side first performed by Margo Sahu DO at [facility] Right 2022    sacroiliac joint Radiofrequency Ablations, right side performed by Margo Sahu DO at [facility] [place] (CERVIX REMOVED)  [address] ? TOTAL KNEE ARTHROPLASTY Right 2014    [facility]    UPPER GASTROINTESTINAL ENDOSCOPY N/A 2022    EGD performed by Carla Greer MD at [facility]        Medications Prior to Admission     Prior to Admission medications    Medication Sig Start Date End Date Taking?  Authorizing Provider   azithromycin (ZITHROMAX) 250 MG tablet Take 1 tablet by mouth daily 10/18/22   Elyssa Leblanc MD   simvastatin (ZOCOR) 20 MG tablet TAKE 1 TABLET BY MOUTH AT  NIGHT 22   Elyssa Leblanc MD   apixaban (ELIQUIS) 5 MG TABS tablet TAKE 1 TABLET BY MOUTH  TWICE DAILY 7/1/22   Marcellus Barba MD   albuterol sulfate HFA (PROAIR HFA) 108 (90 Base) MCG/ACT inhaler Inhale 2 puffs into the lungs every 4 hours as needed for Wheezing or Shortness of Breath 6/21/22 6/21/23  Da Maurice, APRN - CNP   levothyroxine (SYNTHROID) 88 MCG tablet TAKE 1 TABLET BY MOUTH ONCE DAILY 6/6/22   Catia Garland MD   allopurinol (ZYLOPRIM) 100 MG tablet Take 2 tablets by mouth daily 5/18/22   Catia Garland MD   budesonide (PULMICORT) 0.5 MG/2ML nebulizer suspension Take 2 mLs by nebulization 2 times daily 4/20/22   Catia Garland MD   metoprolol tartrate (LOPRESSOR) 25 MG tablet take 1/2 tablet by mouth twice a day 8/24/21   Catia Garland MD   Lactobacillus (PROBIOTIC ACIDOPHILUS) CAPS Take 1 capsule by mouth daily     Historical Provider, MD   MULTIPLE VITAMIN PO Take 1 tablet by mouth daily     Historical Provider, MD        Allergies   Colchicine, Levaquin [levofloxacin], Oxycodone, Percocet [oxycodone-acetaminophen], Rifampin, Sulfa antibiotics, and Cefuroxime    Social History     Social History       Tobacco History       Smoking Status  Never      Smokeless Tobacco Use  Never              Alcohol History       Alcohol Use Status  Yes Drinks/Week  0 Standard drinks or equivalent per week Amount  0.0 standard drinks of alcohol/wk Comment  socially-1 glassof wine 2-3 times a month              Drug Use       Drug Use Status  No              Sexual Activity       Sexually Active  Never                    Family History     Family History   Problem Relation Age of Onset    Diabetes Mother     Thyroid Disease Mother     Arthritis Mother     High Blood Pressure Mother     Arthritis Father     High Blood Pressure Father     Other Father         hay fever    Breast Cancer Neg Hx     Ovarian Cancer Neg Hx        Review of Systems   Review of Systems   Constitutional:  Positive for activity change, appetite change, chills and fatigue. Eyes: Negative. Respiratory:  Positive for cough and shortness of breath. Cardiovascular: Negative. Gastrointestinal:  Positive for nausea. Endocrine: Negative. Genitourinary: Negative. Musculoskeletal: Negative. Skin: Negative. Allergic/Immunologic: Negative. Neurological:  Positive for dizziness, light-headedness and headaches. Hematological: Negative. Psychiatric/Behavioral: Negative. Physical Exam   /64   Pulse (!) 101   Temp 98 °F (36.7 °C) (Oral)   Resp 26   SpO2 95%     Physical Exam  Constitutional:       Appearance: Normal appearance. HENT:      Head: Normocephalic. Right Ear: External ear normal.      Left Ear: External ear normal.      Nose: Nose normal.   Eyes:      Extraocular Movements: Extraocular movements intact. Pupils: Pupils are equal, round, and reactive to light. Cardiovascular:      Rate and Rhythm: Regular rhythm. Tachycardia present. Pulses: Normal pulses. Pulmonary:      Effort: Tachypnea present. Breath sounds: Normal air entry. Decreased breath sounds present. Comments: 4L NC  Abdominal:      General: Abdomen is flat. Bowel sounds are normal.      Palpations: Abdomen is soft. Musculoskeletal:         General: Normal range of motion. Cervical back: Normal range of motion and neck supple. Skin:     General: Skin is warm and dry. Neurological:      General: No focal deficit present. Mental Status: She is alert and oriented to person, place, and time.    Psychiatric:         Mood and Affect: Mood normal.       Labs      Recent Results (from the past 24 hour(s))   EKG SOB    Collection Time: 11/04/22  6:30 AM   Result Value Ref Range    Ventricular Rate 94 BPM    Atrial Rate 94 BPM    P-R Interval 172 ms    QRS Duration 84 ms    Q-T Interval 346 ms    QTc Calculation (Bazett) 432 ms    P Axis 11 degrees    R Axis 99 degrees    T Axis 15 degrees   Glomerular Filtration Rate, Estimated    Collection Time: 11/04/22  7:00 AM   Result Value Ref Range    Est, Glom Filt Rate >60 >60 ml/min/1.73m2   CBC with Auto Differential    Collection Time: 11/04/22  8:05 AM   Result Value Ref Range    WBC 9.4 4.8 - 10.8 thou/mm3    RBC 4.68 4.20 - 5.40 mill/mm3    Hemoglobin 12.2 12.0 - 16.0 gm/dl    Hematocrit 38.2 37.0 - 47.0 %    MCV 81.6 81.0 - 99.0 fL    MCH 26.1 26.0 - 33.0 pg    MCHC 31.9 (L) 32.2 - 35.5 gm/dl    RDW-CV 13.6 11.5 - 14.5 %    RDW-SD 40.1 35.0 - 45.0 fL    Platelets 276 916 - 834 thou/mm3    MPV 10.9 9.4 - 12.4 fL    Seg Neutrophils 73.4 %    Lymphocytes 7.7 %    Monocytes 15.0 %    Eosinophils 2.2 %    Basophils 0.5 %    Immature Granulocytes 1.2 %    Segs Absolute 6.9 1.8 - 7.7 thou/mm3    Lymphocytes Absolute 0.7 (L) 1.0 - 4.8 thou/mm3    Monocytes Absolute 1.4 (H) 0.4 - 1.3 thou/mm3    Eosinophils Absolute 0.2 0.0 - 0.4 thou/mm3    Basophils Absolute 0.0 0.0 - 0.1 thou/mm3    Immature Grans (Abs) 0.11 (H) 0.00 - 0.07 thou/mm3    nRBC 0 /100 wbc   Brain Natriuretic Peptide    Collection Time: 11/04/22  8:05 AM   Result Value Ref Range    Pro-.8 0.0 - 1800.0 pg/mL   Basic Metabolic Panel    Collection Time: 11/04/22  8:05 AM   Result Value Ref Range    Sodium 139 135 - 145 meq/L    Potassium 4.5 3.5 - 5.2 meq/L    Chloride 95 (L) 98 - 111 meq/L    CO2 31 23 - 33 meq/L    Glucose 99 70 - 108 mg/dL    BUN 16 7 - 22 mg/dL    Creatinine 0.7 0.4 - 1.2 mg/dL    Calcium 9.9 8.5 - 10.5 mg/dL   Troponin    Collection Time: 11/04/22  8:05 AM   Result Value Ref Range    Troponin T < 0.010 ng/ml   Anion Gap    Collection Time: 11/04/22  8:05 AM   Result Value Ref Range    Anion Gap 13.0 8.0 - 16.0 meq/L   Osmolality    Collection Time: 11/04/22  8:05 AM   Result Value Ref Range    Osmolality Calc 278.8 275.0 - 300.0 mOsmol/kg   EKG 12 Lead    Collection Time: 11/04/22  8:09 AM   Result Value Ref Range    Ventricular Rate 87 BPM    Atrial Rate 87 BPM    P-R Interval 160 ms    QRS Duration 82 ms    Q-T Interval 398 ms    QTc Calculation (Bazett) 478 ms    P Axis 37 degrees    R Axis -50 degrees    T Axis 27 degrees   COVID-19 & Influenza Combo    Collection Time: 11/04/22  8:25 AM    Specimen: Nasopharyngeal Swab   Result Value Ref Range    SARS-CoV-2 RNA, RT PCR NOT DETECTED NOT DETECTED    INFLUENZA A NOT DETECTED NOT DETECTED    INFLUENZA B NOT DETECTED NOT DETECTED   Blood gas, arterial    Collection Time: 11/04/22  8:39 AM   Result Value Ref Range    pH, Blood Gas 7.46 (H) 7.35 - 7.45    PCO2 42 35 - 45 mmhg    PO2 70 (L) 71 - 104 mmhg    HCO3 30 (H) 23 - 28 mmol/l    Base Excess (Calculated) 5.6 (H) -2.5 - 2.5 mmol/l    O2 Sat 95 %    IFIO2 4     DEVICE Cannula     Dustin Test Positive     Source: R Radial     COLLECTED BY: [id number]    Urinalysis with Microscopic    Collection Time: 11/04/22 10:55 AM   Result Value Ref Range    Glucose, Urine NEGATIVE NEGATIVE mg/dl    Bilirubin Urine NEGATIVE NEGATIVE    Ketones, Urine NEGATIVE NEGATIVE    Specific Gravity, UA 1.012 1.002 - 1.030    Blood, Urine TRACE (A) NEGATIVE    pH, UA 7.0 5.0 - 9.0    Protein, UA TRACE (A) NEGATIVE mg/dl    Urobilinogen, Urine 0.2 0.0 - 1.0 eu/dl    Nitrite, Urine NEGATIVE NEGATIVE    Leukocyte Esterase, Urine NEGATIVE NEGATIVE    Color, UA YELLOW YELLOW-STRAW    Character, Urine CLEAR [id number]    RBC, UA 10-15 0-2/hpf /hpf    WBC, UA 2-4 0-4/hpf /hpf    Epithelial Cells, UA 0-2 3-5/hpf /hpf    Bacteria, UA NONE SEEN FEW/NONE SEEN    Casts NONE SEEN NONE SEEN /lpf    Crystals NONE SEEN NONE SEEN    Renal Epithelial, UA NONE SEEN NONE SEEN    Yeast, UA NONE SEEN NONE SEEN    Casts NONE SEEN /lpf    Miscellaneous Lab Test Result NONE SEEN         Imaging/Diagnostics Last 24 Hours   XR CHEST (2 VW)    Result Date: 11/4/2022  PROCEDURE: XR CHEST (2 VW) CLINICAL INFORMATION: Shortness of Breath. Shortness of breath, weakness, lightheadedness. Cough. COMPARISON: Chest x-ray 5/11/2022.  TECHNIQUE: AP upright view of the chest and a lateral view were obtained. FINDINGS: The heart size is normal.  The mediastinum is not widened. There is stable severe fibrotic changes in the right lung with right lung volume loss. There are milder fibrotic changes in the left lung. There are no pleural effusions. There are no definite new pulmonary abnormalities. The pulmonary vessels cannot be seen. No suspicious osseous lesions are present. Moderately severe fibrotic changes in the right lung with milder fibrotic changes in the left lung. No definite new abnormalities. **This report has been created using voice recognition software. It may contain minor errors which are inherent in voice recognition technology. ** Final report electronically signed by Dr. Lauren Iglesias on 11/4/2022 7:43 AM      Assessment      Hospital Problems             Last Modified POA    * (Principal) Shortness of breath 11/4/2022 Yes     Pulmonary fibrosis  Hypertension  Hypothyroid  Osteopenia  A. fib      Plan   Pulmonary consult  Echo  Patient on Eliquis  Solu-Medrol as ordered  Bronchodilators  CBC in a.m.   Palliative care  PT OT  Home medications reviewed        Consultations Ordered:  IP CONSULT TO CASE MANAGEMENT  PALLIATIVE CARE EVAL  IP CONSULT TO PULMONOLOGY    Electronically signed by JASMEET Polanco CNP on 11/4/22 at 2:35 PM EDT    Assessment and plan of care discussed with supervising physician, Dr Laureen Wallace.      Pt seen and examined by me  D/w Cain Hull  Pt with pulmonary fibrosis on home O2 at 4L  Did notice increased SOB for a week  Could not like in bed flat  Also noticed  drop in pulse ox in 60's with activity  Will consult pulm and start steroids  Pt is limited code    Electronically signed by Vivi Reagan MD on 11/4/2022 at 5:49 PM

## 2022-11-05 ENCOUNTER — APPOINTMENT (OUTPATIENT)
Dept: CT IMAGING | Age: 87
DRG: 196 | End: 2022-11-05
Payer: MEDICARE

## 2022-11-05 LAB
BASOPHILS # BLD: 0.3 %
BASOPHILS ABSOLUTE: 0 THOU/MM3 (ref 0–0.1)
EOSINOPHIL # BLD: 0 %
EOSINOPHILS ABSOLUTE: 0 THOU/MM3 (ref 0–0.4)
ERYTHROCYTE [DISTWIDTH] IN BLOOD BY AUTOMATED COUNT: 13.4 % (ref 11.5–14.5)
ERYTHROCYTE [DISTWIDTH] IN BLOOD BY AUTOMATED COUNT: 40.1 FL (ref 35–45)
HCT VFR BLD CALC: 35.7 % (ref 37–47)
HEMOGLOBIN: 11.3 GM/DL (ref 12–16)
IMMATURE GRANS (ABS): 0.08 THOU/MM3 (ref 0–0.07)
IMMATURE GRANULOCYTES: 1.2 %
LV EF: 58 %
LVEF MODALITY: NORMAL
LYMPHOCYTES # BLD: 5.5 %
LYMPHOCYTES ABSOLUTE: 0.4 THOU/MM3 (ref 1–4.8)
MCH RBC QN AUTO: 26.4 PG (ref 26–33)
MCHC RBC AUTO-ENTMCNC: 31.7 GM/DL (ref 32.2–35.5)
MCV RBC AUTO: 83.4 FL (ref 81–99)
MONOCYTES # BLD: 1.2 %
MONOCYTES ABSOLUTE: 0.1 THOU/MM3 (ref 0.4–1.3)
NUCLEATED RED BLOOD CELLS: 0 /100 WBC
PLATELET # BLD: 255 THOU/MM3 (ref 130–400)
PMV BLD AUTO: 10.6 FL (ref 9.4–12.4)
RBC # BLD: 4.28 MILL/MM3 (ref 4.2–5.4)
SEG NEUTROPHILS: 91.8 %
SEGMENTED NEUTROPHILS ABSOLUTE COUNT: 6.3 THOU/MM3 (ref 1.8–7.7)
WBC # BLD: 6.9 THOU/MM3 (ref 4.8–10.8)

## 2022-11-05 PROCEDURE — 6360000002 HC RX W HCPCS

## 2022-11-05 PROCEDURE — 2580000003 HC RX 258

## 2022-11-05 PROCEDURE — 1200000003 HC TELEMETRY R&B

## 2022-11-05 PROCEDURE — 85025 COMPLETE CBC W/AUTO DIFF WBC: CPT

## 2022-11-05 PROCEDURE — 6370000000 HC RX 637 (ALT 250 FOR IP)

## 2022-11-05 PROCEDURE — 71250 CT THORAX DX C-: CPT

## 2022-11-05 PROCEDURE — 36415 COLL VENOUS BLD VENIPUNCTURE: CPT

## 2022-11-05 PROCEDURE — 94760 N-INVAS EAR/PLS OXIMETRY 1: CPT

## 2022-11-05 PROCEDURE — 2580000003 HC RX 258: Performed by: INTERNAL MEDICINE

## 2022-11-05 PROCEDURE — 2700000000 HC OXYGEN THERAPY PER DAY

## 2022-11-05 PROCEDURE — 99233 SBSQ HOSP IP/OBS HIGH 50: CPT | Performed by: INTERNAL MEDICINE

## 2022-11-05 PROCEDURE — 94640 AIRWAY INHALATION TREATMENT: CPT

## 2022-11-05 PROCEDURE — 6360000002 HC RX W HCPCS: Performed by: INTERNAL MEDICINE

## 2022-11-05 PROCEDURE — 93306 TTE W/DOPPLER COMPLETE: CPT

## 2022-11-05 PROCEDURE — 6360000002 HC RX W HCPCS: Performed by: STUDENT IN AN ORGANIZED HEALTH CARE EDUCATION/TRAINING PROGRAM

## 2022-11-05 RX ADMIN — LEVOTHYROXINE SODIUM 88 MCG: 0.09 TABLET ORAL at 06:10

## 2022-11-05 RX ADMIN — METOPROLOL TARTRATE 12.5 MG: 25 TABLET, FILM COATED ORAL at 08:59

## 2022-11-05 RX ADMIN — APIXABAN 5 MG: 5 TABLET, FILM COATED ORAL at 08:59

## 2022-11-05 RX ADMIN — METOPROLOL TARTRATE 12.5 MG: 25 TABLET, FILM COATED ORAL at 20:56

## 2022-11-05 RX ADMIN — ALLOPURINOL 200 MG: 100 TABLET ORAL at 08:56

## 2022-11-05 RX ADMIN — APIXABAN 5 MG: 5 TABLET, FILM COATED ORAL at 20:56

## 2022-11-05 RX ADMIN — SODIUM CHLORIDE, PRESERVATIVE FREE 10 ML: 5 INJECTION INTRAVENOUS at 20:56

## 2022-11-05 RX ADMIN — ATORVASTATIN CALCIUM 10 MG: 10 TABLET, FILM COATED ORAL at 20:56

## 2022-11-05 RX ADMIN — METHYLPREDNISOLONE SODIUM SUCCINATE 40 MG: 40 INJECTION, POWDER, FOR SOLUTION INTRAMUSCULAR; INTRAVENOUS at 17:27

## 2022-11-05 RX ADMIN — Medication 1 TABLET: at 08:59

## 2022-11-05 RX ADMIN — CEFTRIAXONE SODIUM 1000 MG: 1 INJECTION, POWDER, FOR SOLUTION INTRAMUSCULAR; INTRAVENOUS at 13:43

## 2022-11-05 RX ADMIN — SODIUM CHLORIDE, PRESERVATIVE FREE 10 ML: 5 INJECTION INTRAVENOUS at 09:28

## 2022-11-05 RX ADMIN — ALBUTEROL SULFATE 2.5 MG: 2.5 SOLUTION RESPIRATORY (INHALATION) at 18:02

## 2022-11-05 RX ADMIN — Medication 1 CAPSULE: at 08:59

## 2022-11-05 RX ADMIN — AZITHROMYCIN MONOHYDRATE 250 MG: 250 TABLET ORAL at 08:59

## 2022-11-05 RX ADMIN — METHYLPREDNISOLONE SODIUM SUCCINATE 40 MG: 40 INJECTION, POWDER, FOR SOLUTION INTRAMUSCULAR; INTRAVENOUS at 03:45

## 2022-11-05 NOTE — PLAN OF CARE
Problem: Discharge Planning  Goal: Discharge to home or other facility with appropriate resources  11/5/2022 0927 by Ayo Bradley RN  Outcome: Progressing  11/5/2022 0315 by Sky Jaquez RN  Outcome: Progressing  Flowsheets (Taken 11/4/2022 1940)  Discharge to home or other facility with appropriate resources: Identify barriers to discharge with patient and caregiver     Problem: Discharge Planning  Goal: Discharge to home or other facility with appropriate resources  11/5/2022 0927 by Ayo Bradley RN  Outcome: Progressing  11/5/2022 0315 by Sky Jaquez RN  Outcome: Progressing  Flowsheets (Taken 11/4/2022 1940)  Discharge to home or other facility with appropriate resources: Identify barriers to discharge with patient and caregiver     Problem: Skin/Tissue Integrity  Goal: Absence of new skin breakdown  Description: 1. Monitor for areas of redness and/or skin breakdown  2. Assess vascular access sites hourly  3. Every 4-6 hours minimum:  Change oxygen saturation probe site  4. Every 4-6 hours:  If on nasal continuous positive airway pressure, respiratory therapy assess nares and determine need for appliance change or resting period. 11/5/2022 0927 by Ayo Bradley RN  Outcome: Progressing  11/5/2022 0315 by Sky Jaquez RN  Outcome: Progressing     Problem: Skin/Tissue Integrity  Goal: Absence of new skin breakdown  Description: 1. Monitor for areas of redness and/or skin breakdown  2. Assess vascular access sites hourly  3. Every 4-6 hours minimum:  Change oxygen saturation probe site  4. Every 4-6 hours:  If on nasal continuous positive airway pressure, respiratory therapy assess nares and determine need for appliance change or resting period.   11/5/2022 0927 by Ayo Bradley RN  Outcome: Progressing  11/5/2022 0315 by Sky Jaquez RN  Outcome: Progressing

## 2022-11-05 NOTE — PROGRESS NOTES
[place] [facility]      Patient - Thelma Trevino   MRN -  [id number]   [facility] # - [de-identified]   - 1935      Date of Admission -  2022  6:20 AM  Date of evaluation -  2022  Room - --A   Hospital Day - 1  Consulting - Haleigh Infante MD Primary Care Physician - Torres Holland MD     Problem List      Active Hospital Problems    Diagnosis Date Noted    Shortness of breath [R06.02] 2022     Priority: Medium     Reason for Consult    Worsening shortness of breath, hx pulmonary fibrosis  HPI   History Obtained From: Patient and electronic medical record. Thelma Trevino is a 80 y.o. female lifetime nonsmoker with history of ILD/pulmonary fibrosis and PAF, presented to [facility] ED with complaints or worsening dyspnea and increased work of breathing. On arrival to ED saturating >94% on 4L NC, her baseline. Work up generally negative for acute findings. Admitted for further work up. On evaluation, patient states she started noticing worsening shortness of breath with increased work of breathing with minimal activity in the last week that has gotten worse. Endorses associated fatigue with lightheadedness and reduced PO intake related to her inability to ambulate and cook for herself. Reports her pulse ox showing oxygen at 60% on 4L NC when walking at home. Lives alone. Usually able to care for herself. Has been on oxygen due to ILD, 4L on ambulation and usually titrate down during rest and at night. Has been at baseline otherwise, no chest pain, fever, chills, nausea, vomiting, diarrhea. No sick contact. Vaccinated for [employer]. CT chest 2022: The severe intralobular septal thickening and bronchiectasis with honeycombing most pronounced in the right upper lobe is unchanged. Scattered subpleural scarring and groundglass opacities in the left lung are mild similar to the 2020 comparison.      Per chart review:  Connective tissue work up results:  Date: 4/03/2017  SABAS( Antinuclear antibody):  Negative-none detected. Fungal serologies- Negative                   RA ( Rheumatoid factor):  Negative. <10                                                          ACE(  level):    Negative-22                               ESR ( Sed rate): 9               Connective Tissue Labs  Date: 1/11/19     SABAS with reflex-None detected. Anti RNP-1- Normal.  Anti Lexie-0- normal  Anti-SSB-0- normal  AntiSSA-1- normal  AntiCentromere- 4- Normal  Scleroderma AB-24-normal  ACE-10- normal  ESR-101 elevated  Rheumatoid Factor-13 WNL                   Past 24 hours   No acute events overnight. CT chest done showed severe interstitial fibrotic changes seen throughout right hemithorax with extensive honeycombing, RUL> and diffuse groundglass and nodular infiltrates throughout left hemithorax. Reports having less work of breathing, improvement in dyspnea noted but continues to have it. No new complaints.     PMHx   Past Medical History      Diagnosis Date    Anemia     normal on pre-op 5/2012 and 12/2/13    Backache     h/o    Bronchiectasis ([address] 2013    Bursitis     bilateral shoulders    Constipation     Diverticulosis of colon     HTN (hypertension)     Hypercalcemia     slightly elevated at 10.8 pre-op 12/2/13    Hyperlipidemia     Nodular goiter     bx negative    OA (osteoarthritis)     bilateral thumbs - sees Dr. Oma Pierce    Osteopenia 11/6/2013    Parathyroid adenoma 4/30/2013    Pneumonia of right upper lobe due to infectious organism     Pneumonitis     Dr. Linda Subramanian in 7/2010 - bx negative    Pulmonary Mycobacterium avium complex (MAC) infection ([address]     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 ([address]     had one parathyroid removed - now follows with Dr. Page Major    Sinusitis     with seasonal allergies    Vitamin D deficiency 05/2018      Past Surgical History        Procedure Laterality Date    ABDOMINAL EXPLORATION SURGERY  01/02/2013    Release of KATHERINE TAMAYO- [address]    BACK INJECTION Bilateral 2021    bilateral sacroiliac joint injection performed by Reddy Osei DO at [facility] N/A 2022    B/L SI joint block performed by Reddy Osei DO at [address] N/A 2020    BRONCHOSCOPY FLUOROSCOPY performed by Luz Maria Varma MD at [address] Bilateral 1990 ?  SECTION  5150,1276    DILATION AND CURETTAGE OF UTERUS  8109,4340,1252    EXCISION OF PARATHYROID MASS Right 2013    rt parathyroidectomy (excision of rt inferior parathyroid mass) exploration of neck     FOOT SURGERY      left    FOOT SURGERY Left 2017    Dr Brian Pean ([address] [place] [address])      JOINT REPLACEMENT  12    left knee    JOINT REPLACEMENT  2014    right knee    MALIGNANT SKIN LESION EXCISION Left 2017    BCC DORSAL FOOT WITH GRAFT & FS    PAIN MANAGEMENT PROCEDURE Left 2022    sacroiliac joint radiofrequency ablation, LEFT side first performed by Reddy Osei DO at [facility] Right 2022    sacroiliac joint Radiofrequency Ablations, right side performed by Reddy Osei DO at [facility] [place] (CERVIX REMOVED)  [address] ?     TOTAL KNEE ARTHROPLASTY Right 2014    OIO    UPPER GASTROINTESTINAL ENDOSCOPY N/A 2022    EGD performed by Shaheen Dubon MD at [address] [facility]     Meds    Current Medications    cefTRIAXone (ROCEPHIN) IV  1,000 mg IntraVENous Q24H    sodium chloride flush  5-40 mL IntraVENous 2 times per day    allopurinol  200 mg Oral Daily    apixaban  5 mg Oral BID    azithromycin  250 mg Oral Daily    [Held by provider] budesonide  0.5 mg Nebulization BID    lactobacillus  1 capsule Oral Daily    levothyroxine  88 mcg Oral Daily    metoprolol tartrate  12.5 mg Oral BID    multivitamin  1 tablet Oral Daily    atorvastatin  10 mg Oral Nightly    methylPREDNISolone  40 mg IntraVENous Q12H    albuterol  2.5 mg Nebulization BID     sodium chloride flush, sodium chloride, ondansetron **OR** ondansetron, polyethylene glycol, acetaminophen **OR** acetaminophen, albuterol sulfate HFA  IV Drips/Infusions   sodium chloride       Home Medications  Medications Prior to Admission: azithromycin (ZITHROMAX) 250 MG tablet, Take 1 tablet by mouth daily  simvastatin (ZOCOR) 20 MG tablet, TAKE 1 TABLET BY MOUTH AT  NIGHT  apixaban (ELIQUIS) 5 MG TABS tablet, TAKE 1 TABLET BY MOUTH  TWICE DAILY  albuterol sulfate HFA (PROAIR HFA) 108 (90 Base) MCG/ACT inhaler, Inhale 2 puffs into the lungs every 4 hours as needed for Wheezing or Shortness of Breath  levothyroxine (SYNTHROID) 88 MCG tablet, TAKE 1 TABLET BY MOUTH ONCE DAILY  allopurinol (ZYLOPRIM) 100 MG tablet, Take 2 tablets by mouth daily  budesonide (PULMICORT) 0.5 MG/2ML nebulizer suspension, Take 2 mLs by nebulization 2 times daily  metoprolol tartrate (LOPRESSOR) 25 MG tablet, take 1/2 tablet by mouth twice a day  Lactobacillus (PROBIOTIC ACIDOPHILUS) CAPS, Take 1 capsule by mouth daily   MULTIPLE VITAMIN PO, Take 1 tablet by mouth daily   Diet    ADULT DIET; Regular  Allergies    Colchicine, Levaquin [levofloxacin], Oxycodone, Percocet [oxycodone-acetaminophen], Rifampin, Sulfa antibiotics, and Cefuroxime  Social History     Social History     Socioeconomic History    Marital status:      Spouse name: Not on file    Number of children: Not on file    Years of education: Not on file    Highest education level: Not on file   Occupational History    Occupation: retired   Tobacco Use    Smoking status: Never    Smokeless tobacco: Never   Vaping Use    Vaping Use: Never used   Substance and Sexual Activity    Alcohol use: Yes     Alcohol/week: 0.0 standard drinks     Comment: socially-1 glassof wine 2-3 times a month    Drug use:  No Sexual activity: Never   Other Topics Concern    Not on file   Social History Narrative    Not on file     Social Determinants of Health     Financial Resource Strain: Low Risk     Difficulty of Paying Living Expenses: Not hard at all   Food Insecurity: No Food Insecurity    Worried About Running Out of Food in the Last Year: Never true    Ran Out of Food in the Last Year: Never true   Transportation Needs: Not on file   Physical Activity: Not on file   Stress: Not on file   Social Connections: Not on file   Intimate Partner Violence: Not on file   Housing Stability: Not on file     Family History          Problem Relation Age of Onset    Diabetes Mother     Thyroid Disease Mother     Arthritis Mother     High Blood Pressure Mother     Arthritis Father     High Blood Pressure Father     Other Father         hay fever    Breast Cancer Neg Hx     Ovarian Cancer Neg Hx      Sleep History    Never diagnosed with sleep apnea in the past.  Occupational history   Occupation:  She is current working: No  Type of profession: retired. History of tobacco smoking:No  .  History of recreational or IV drug use in the past:NO     History of exposure to coal mines/coal dust: NO  History of exposure to foundry dust/welding: NO  History of exposure to quarry/silica/sandblasting: NO  History of exposure to asbestos/working with breaks/ships: NO  History of exposure to farm dust: NO  History of recent travel to long distances: NO  History of exposure to birds, pigeons, or chickens in the past:NO  Pet animals at home:No      History of pulmonary embolism in the past: No            History of DVT in the past:No        [] Right lower extremity   [] Left lower extremity.            [place] of systems   General: negative for - fever, or chills  Respiratory: negative for shortness of breath, or wheezing  Cardiovascular: negative for chest pain, palpitations  Gastrointestinal: no abdominal pain, change in bowel habits, or black or bloody stools  Genito-Urinary: no dysuria, trouble voiding, or hematuria  Musculoskeletal: negative for - muscular weakness, joint pain in hands/knees/ankles   Neurological ROS: negative for -  numbness/tingling, seizures or weakness  Psychological: negative for - anxiety and depression   Hematological and Lymphatic: negative for history of blood clots or bleeding disorder  Dermatological ROS: negative for - skin lesion changes    Vitals     height is 5' 4\" (1.626 m) and weight is 155 lb 10.3 oz (70.6 kg). Her oral temperature is 98.5 °F (36.9 °C). Her blood pressure is 115/57 (abnormal) and her pulse is 76. Her respiration is 18 and oxygen saturation is 97%. Body mass index is 26.72 kg/m². SUPPLEMENTAL O2: O2 Flow Rate (L/min): 4 L/min     I/O      Intake/Output Summary (Last 24 hours) at 11/5/2022 1030  Last data filed at 11/5/2022 0925  Gross per 24 hour   Intake 420 ml   Output 250 ml   Net 170 ml       I/O last 3 completed shifts: In: 799.9 [P.O.:300; IV Piggyback:499.9]  Out: 250 [Urine:250]   Patient Vitals for the past 96 hrs (Last 3 readings):   Weight   11/05/22 0346 155 lb 10.3 oz (70.6 kg)   11/04/22 1500 155 lb 3.3 oz (70.4 kg)         Exam   Nursing note and vitals reviewed. Constitutional: in mild respiratory distress, appears stated age, cooperative  HENT: Normal-appearing ears and nose. Normal voice, not muffled. Head: as above  Right Ear: as above  Left Ear: as above  Mouth/Throat: as above  Eyes: Normal in appearance. EOM intact. Neck: Supple  Cardiovascular: Normal S1 and S1, regular rate rhythm, tachycardic, no murmurs  Pulmonary/Chest: Increased work of breathing, tachyneic. Diminished lung sounds, dry rales RUL predominantly, some expiratory wheezing in lower lobes bilaterally- improved, No rhonchi  Abdominal: Nontender to palpation. Normal bowl sounds. Musculoskeletal: No motor or sensory deficit. Extremities: No peripheral edema.   Lymphadenopathy:  None appreciated. Neurological: CN II-XII intact. Neurovascularly intact. Skin: No rashes or lacerations. Psychiatric: AAO x3. Normal mood and affect. Labs  - Old records and notes have been reviewed in [facility]  Lab Results   Component Value Date/Time    PH 7.46 11/04/2022 08:39 AM    PO2 70 11/04/2022 08:39 AM    PCO2 42 11/04/2022 08:39 AM    HCO3 30 11/04/2022 08:39 AM    O2SAT 95 11/04/2022 08:39 AM     Lab Results   Component Value Date/Time    IFIO2 4 11/04/2022 08:39 AM     CBC  Recent Labs     11/04/22  0805 11/05/22  0641   WBC 9.4 6.9   RBC 4.68 4.28   HGB 12.2 11.3*   HCT 38.2 35.7*   MCV 81.6 83.4   MCH 26.1 26.4   MCHC 31.9* 31.7*    255   MPV 10.9 10.6        BMP  Recent Labs     11/04/22  0805      K 4.5   CL 95*   CO2 31   BUN 16   CREATININE 0.7   GLUCOSE 99   CALCIUM 9.9       LFT  No results for input(s): AST, ALT, ALB, BILITOT, ALKPHOS, LIPASE in the last 72 hours. Invalid input(s): AMYLASE  TROP  Lab Results   Component Value Date/Time    TROPONINT < 0.010 11/04/2022 08:05 AM    TROPONINT < 0.010 09/30/2020 09:00 AM    TROPONINT < 0.010 09/25/2020 05:16 PM     BNP  No results for input(s): BNP in the last 72 hours. Lactic Acid  No results for input(s): LACTA in the last 72 hours. INR  No results for input(s): INR, PROTIME in the last 72 hours. PTT  No results for input(s): APTT in the last 72 hours. Glucose  No results for input(s): POCGLU in the last 72 hours. UA   Recent Labs     11/04/22  1055   SPECGRAV 1.012   PHUR 7.0   COLORU YELLOW   PROTEINU TRACE*   BLOODU TRACE*   RBCUA 10-15   WBCUA 2-4   BACTERIA NONE SEEN   NITRU NEGATIVE   BILIRUBINUR NEGATIVE   UROBILINOGEN 0.2   KETUA NEGATIVE   LABCAST NONE SEEN  NONE SEEN     .     PFTs           Sleep studies   none    Cultures    Flu and COVID negative    EKG   Sinus rhythm with Premature atrial complexes  Left anterior fascicular block  Cannot rule out Anterior infarct , age undetermined  Abnormal ECG  When compared with ECG of 04-NOV-2022 06:30,  Premature atrial complexes are now Present  Left anterior fascicular block is now Present    Echocardiogram    Summary   Ejection fraction is visually estimated at 55%. Overall left ventricular function is normal.   Aortic valve appears tricuspid. Aortic valve leaflets are somewhat thickened. Aortic valve leaflets are Mildly calcified. There is a small localized posterior pericardial effusion noted. Signature      ----------------------------------------------------------------   Electronically signed by Everett Schwarz MD (Interpreting   physician) on 09/30/2020 at 03:42 PM   ----------------------------------------------------------------  Radiology    CXR 11/4/2022      Moderately severe fibrotic changes in the right lung with milder fibrotic changes in the left lung. No definite new abnormalities. CT Scans  (See actual reports for details)  CT chest wo contrast 09/21/2022  The previously seen more numerous and more prominent multifocal groundglass opacities in the left lung have improved. The severe interstitial fibrosis, honeycombing, bronchiectasis most pronounced in the right upper lobe is unchanged and overall the    appearance of the chest is similar to the 2020 comparison. No pulmonary mass or nodule is observed. CT chest wo contrast 11/05/2022  1. Severe interstitial fibrotic changes are seen throughout the right hemithorax with extensive honeycombing which is most severe in the right upper lobe. Bronchiectasis is seen bilaterally although significantly more severe on the right side. 2. There are diffuse groundglass and nodular infiltrates throughout the left hemithorax. Assessment   Acute hypoxic respiratory failure: unclear etiology. Reported hypoxia on ambulation to SpO2 60% with activity at home, reported per patient. Currently on 4L NC, at baseline.  Low suspicion for infectious etiology with lack of fever, leukocytosis, productive cough. No sick contacts. COVID and Flu neg. CXR with no new obvious consolidation or infiltrates. CXR appears unchanged compared to previous CXR in 05/2022. No signs of volume overload present. Suspect progression of underlying ILD. ILD/pulmonary fibrosis of uncertain etiology: Restrictive lung defect on PFT. Follow with Elias Cordova NP at pulmonology office. On home oxygen continuously 4LPM- 2LPM with sleep. Recent CT Chest done with resolution of previously seen GGO and stable findings in RUL. Currently using Budesonide neb BID and Albuterol Prn. Her work up for connective tissue diseases are negative- ? UIP Vs Post inflammatory scarring from her previous hx of Mycobacterium avium/intracellulare infection of lungs- She refused to go for open lung biopsy with VATS. She is following with [facility] pulmonary service. She was evaluated at [address] [place] [address] Dr pulmonary service via virtual visit on 7 December 2021 for the last time by Dr. 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 Her antibiotics were discontinued due to development of optic neutitis. Hx MAC 2017 with RUL scarring/honecombing  Allergic Rhinitis  PAF on Eliquis and Lopressor  Hypothyroidism on Synthroid  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    Plan   -Discussed possible evaluation with bronchoscopy to determine if patient continues to have MAC. Patient unable to make decision, would like to discuss with son. Will contact her son for further discussion and planning.  -Duonebs q4h  -SoluMedrol 40 mg IV BID for now  -Start on Rocephin for possible ELIGIO pneumonia  -Continue home Zithromax as prescribed per pulmonologist at [address] pulse ox while ambulating  -May consider to repeat CT chest if continues to be hypoxic on ambulation   -IS, PTOT    Questions and concerns addressed. Plan discussed with Dr. Michael Muñoz.     Electronically signed by   Savanna Cochran MD on 11/5/2022 at 10:30 AM     Addendum by Dr. Carmen Santiago MD:  Patient seen by me independently including key components of medical care. Face to face evaluation and examination was performed. Case discussed with Tyrese Camarena MD-resident physician. Italicized font, if present,  represents changes to the note made by me. More than 50% of the encounter time involved with patient care by the Pulmonary & Critical care service team spent by me. Please see my modifications mentioned below:  Continue antibiotics  I spoke with patient regarding worsening of CT scan chest findings and need of further diagnostic work-up including bronchoscopy. She told me that she is going to discuss with her son and she is going to inform me.       Electronically signed by   Ana Ahmadi MD on 11/5/2022 at 4:54 PM

## 2022-11-05 NOTE — PROGRESS NOTES
IM Progress Note  Dr. Brooke Uribe  11/5/2022 8:26 AM      Patient name Krystina Huang  [id number]  PCP: Linda Carranza MD  Admit Date: 11/4/2022  Acct No. [de-identified]    Subjective: Interval History:   Pt up in bed  Breathing not worse but sob with mild exertion      Diet: ADULT DIET; Regular    I/O last 3 completed shifts: In: 799.9 [P.O.:300; IV Piggyback:499.9]  Out: 250 [Urine:250]  No intake/output data recorded. Admission weight: 155 lb 3.3 oz (70.4 kg) as of 11/4/2022  6:20 AM  Wt Readings from Last 3 Encounters:   11/05/22 155 lb 10.3 oz (70.6 kg)   10/17/22 155 lb (70.3 kg)   09/29/22 155 lb 6.4 oz (70.5 kg)     Body mass index is 26.72 kg/m². ROS   CVS;  no cp or palpitation  Resp: +SOB occasional cough  Neuro:  No numbness or weakness or dizziness  Abd: no nausea or vomiting or abd pain      Medications:   Scheduled Meds:   sodium chloride flush  5-40 mL IntraVENous 2 times per day    allopurinol  200 mg Oral Daily    apixaban  5 mg Oral BID    azithromycin  250 mg Oral Daily    [Held by provider] budesonide  0.5 mg Nebulization BID    lactobacillus  1 capsule Oral Daily    levothyroxine  88 mcg Oral Daily    metoprolol tartrate  12.5 mg Oral BID    multivitamin  1 tablet Oral Daily    atorvastatin  10 mg Oral Nightly    methylPREDNISolone  40 mg IntraVENous Q12H    albuterol  2.5 mg Nebulization BID     Continuous Infusions:   sodium chloride         Labs :     CBC:   Recent Labs     11/04/22  0805 11/05/22  0641   WBC 9.4 6.9   HGB 12.2 11.3*    255     BMP:    Recent Labs     11/04/22  0805      K 4.5   CL 95*   CO2 31   BUN 16   CREATININE 0.7   GLUCOSE 99     Hepatic: No results for input(s): AST, ALT, ALB, BILITOT, ALKPHOS in the last 72 hours. Troponin: No results for input(s): TROPONINI in the last 72 hours. BNP: No results for input(s): BNP in the last 72 hours. Lipids: No results for input(s): CHOL, HDL in the last 72 hours.     Invalid input(s): LDLCALCU  INR: No results for input(s): INR in the last 72 hours.     Radiology    Objective:   Vitals: BP (!) 125/57   Pulse 77   Temp 99 °F (37.2 °C) (Oral)   Resp 16   Ht 5' 4\" (1.626 m)   Wt 155 lb 10.3 oz (70.6 kg)   SpO2 95%   BMI 26.72 kg/m²   HEENT: Head:pupils react  Neck: supple  Lungs: diminished air entry bilat  Heart: regular rate and rhythm   Abdomen: soft BS heard   Extremities: warm  edema  Neurologic:  Alert, oriented X3    Impression:   :   Acute on chronic resp failure with pulm fibrosis  HTN   Hypothyroidism      Plan:    Cont steroids  Cont bronchodilators  PT OT  Await CT   Limited code      Miroslava Falk MD, MD

## 2022-11-05 NOTE — RT PROTOCOL NOTE
RT Inhaler-Nebulizer Bronchodilator Protocol Note    There is a bronchodilator order in the chart from a provider indicating to follow the RT Bronchodilator Protocol and there is an Initiate RT Inhaler-Nebulizer Bronchodilator Protocol order as well (see protocol at bottom of note). CXR Findings:  No results found. The findings from the last RT Protocol Assessment were as follows:   History Pulmonary Disease: Chronic pulmonary disease (pulmonary fibrosis)  Respiratory Pattern: Dyspnea on exertion or RR 21-25 bpm  Breath Sounds: Slightly diminished and/or crackles  Cough: Strong, spontaneous, non-productive  Indication for Bronchodilator Therapy: On home bronchodilators, Decreased or absent breath sounds  Bronchodilator Assessment Score: 6    Aerosolized bronchodilator medication orders have been revised according to the RT Inhaler-Nebulizer Bronchodilator Protocol below. Respiratory Therapist to perform RT Therapy Protocol Assessment initially then follow the protocol. Repeat RT Therapy Protocol Assessment PRN for score 0-3 or on second treatment, BID, and PRN for scores above 3. No Indications - adjust the frequency to every 6 hours PRN wheezing or bronchospasm, if no treatments needed after 48 hours then discontinue using Per Protocol order mode. If indication present, adjust the RT bronchodilator orders based on the Bronchodilator Assessment Score as indicated below. Use Inhaler orders unless patient has one or more of the following: on home nebulizer, not able to hold breath for 10 seconds, is not alert and oriented, cannot activate and use MDI correctly, or respiratory rate 25 breaths per minute or more, then use the equivalent nebulizer order(s) with same Frequency and PRN reasons based on the score. If a patient is on this medication at home then do not decrease Frequency below that used at home.     0-3 - enter or revise RT bronchodilator order(s) to equivalent RT Bronchodilator order with Frequency of every 4 hours PRN for wheezing or increased work of breathing using Per Protocol order mode. 4-6 - enter or revise RT Bronchodilator order(s) to two equivalent RT bronchodilator orders with one order with BID Frequency and one order with Frequency of every 4 hours PRN wheezing or increased work of breathing using Per Protocol order mode. 7-10 - enter or revise RT Bronchodilator order(s) to two equivalent RT bronchodilator orders with one order with TID Frequency and one order with Frequency of every 4 hours PRN wheezing or increased work of breathing using Per Protocol order mode. 11-13 - enter or revise RT Bronchodilator order(s) to one equivalent RT bronchodilator order with QID Frequency and an Albuterol order with Frequency of every 4 hours PRN wheezing or increased work of breathing using Per Protocol order mode. Greater than 13 - enter or revise RT Bronchodilator order(s) to one equivalent RT bronchodilator order with every 4 hours Frequency and an Albuterol order with Frequency of every 2 hours PRN wheezing or increased work of breathing using Per Protocol order mode. RT to enter RT Home Evaluation for COPD & MDI Assessment order using Per Protocol order mode.     Electronically signed by Ginger Mcarthur RCP on 11/5/2022 at 6:06 PM

## 2022-11-05 NOTE — FLOWSHEET NOTE
11/05/22 1036   Safe Environment   Safety Measures Other (comment)  (vn safety round complete pt setting up in the chair , nurse in room , permitted video no voiced complaints or concerns , call light within reach)

## 2022-11-06 ENCOUNTER — APPOINTMENT (OUTPATIENT)
Dept: GENERAL RADIOLOGY | Age: 87
DRG: 196 | End: 2022-11-06
Payer: MEDICARE

## 2022-11-06 LAB
ALLEN TEST: POSITIVE
BASE EXCESS (CALCULATED): 3.4 MMOL/L (ref -2.5–2.5)
COLLECTED BY:: ABNORMAL
DEVICE: ABNORMAL
HCO3: 29 MMOL/L (ref 23–28)
IFIO2: 6
O2 SATURATION: 93 %
PCO2: 48 MMHG (ref 35–45)
PH BLOOD GAS: 7.39 (ref 7.35–7.45)
PO2: 67 MMHG (ref 71–104)
SOURCE, BLOOD GAS: ABNORMAL

## 2022-11-06 PROCEDURE — 6370000000 HC RX 637 (ALT 250 FOR IP)

## 2022-11-06 PROCEDURE — 2580000003 HC RX 258

## 2022-11-06 PROCEDURE — 6360000002 HC RX W HCPCS

## 2022-11-06 PROCEDURE — 87116 MYCOBACTERIA CULTURE: CPT

## 2022-11-06 PROCEDURE — 1200000003 HC TELEMETRY R&B

## 2022-11-06 PROCEDURE — 6370000000 HC RX 637 (ALT 250 FOR IP): Performed by: STUDENT IN AN ORGANIZED HEALTH CARE EDUCATION/TRAINING PROGRAM

## 2022-11-06 PROCEDURE — 2580000003 HC RX 258: Performed by: INTERNAL MEDICINE

## 2022-11-06 PROCEDURE — 82803 BLOOD GASES ANY COMBINATION: CPT

## 2022-11-06 PROCEDURE — 36600 WITHDRAWAL OF ARTERIAL BLOOD: CPT

## 2022-11-06 PROCEDURE — 6360000002 HC RX W HCPCS: Performed by: STUDENT IN AN ORGANIZED HEALTH CARE EDUCATION/TRAINING PROGRAM

## 2022-11-06 PROCEDURE — 6370000000 HC RX 637 (ALT 250 FOR IP): Performed by: INTERNAL MEDICINE

## 2022-11-06 PROCEDURE — 99232 SBSQ HOSP IP/OBS MODERATE 35: CPT | Performed by: INTERNAL MEDICINE

## 2022-11-06 PROCEDURE — 94640 AIRWAY INHALATION TREATMENT: CPT

## 2022-11-06 PROCEDURE — 94761 N-INVAS EAR/PLS OXIMETRY MLT: CPT

## 2022-11-06 PROCEDURE — 2700000000 HC OXYGEN THERAPY PER DAY

## 2022-11-06 PROCEDURE — 71045 X-RAY EXAM CHEST 1 VIEW: CPT

## 2022-11-06 PROCEDURE — 6360000002 HC RX W HCPCS: Performed by: INTERNAL MEDICINE

## 2022-11-06 RX ORDER — GUAIFENESIN 600 MG/1
600 TABLET, EXTENDED RELEASE ORAL 2 TIMES DAILY
Status: DISCONTINUED | OUTPATIENT
Start: 2022-11-06 | End: 2022-11-09 | Stop reason: HOSPADM

## 2022-11-06 RX ORDER — ALBUTEROL SULFATE 2.5 MG/3ML
2.5 SOLUTION RESPIRATORY (INHALATION) 3 TIMES DAILY
Status: DISCONTINUED | OUTPATIENT
Start: 2022-11-06 | End: 2022-11-08

## 2022-11-06 RX ORDER — HYDROXYZINE HYDROCHLORIDE 10 MG/1
10 TABLET, FILM COATED ORAL ONCE
Status: COMPLETED | OUTPATIENT
Start: 2022-11-06 | End: 2022-11-06

## 2022-11-06 RX ORDER — PREDNISONE 20 MG/1
40 TABLET ORAL DAILY
Status: COMPLETED | OUTPATIENT
Start: 2022-11-06 | End: 2022-11-08

## 2022-11-06 RX ADMIN — APIXABAN 5 MG: 5 TABLET, FILM COATED ORAL at 09:00

## 2022-11-06 RX ADMIN — Medication 1 TABLET: at 08:59

## 2022-11-06 RX ADMIN — Medication 1 CAPSULE: at 08:59

## 2022-11-06 RX ADMIN — SODIUM CHLORIDE, PRESERVATIVE FREE 10 ML: 5 INJECTION INTRAVENOUS at 09:02

## 2022-11-06 RX ADMIN — ALBUTEROL SULFATE 2.5 MG: 2.5 SOLUTION RESPIRATORY (INHALATION) at 12:52

## 2022-11-06 RX ADMIN — METHYLPREDNISOLONE SODIUM SUCCINATE 40 MG: 40 INJECTION, POWDER, FOR SOLUTION INTRAMUSCULAR; INTRAVENOUS at 04:28

## 2022-11-06 RX ADMIN — ALLOPURINOL 200 MG: 100 TABLET ORAL at 21:58

## 2022-11-06 RX ADMIN — ALBUTEROL SULFATE 2.5 MG: 2.5 SOLUTION RESPIRATORY (INHALATION) at 07:38

## 2022-11-06 RX ADMIN — GUAIFENESIN 600 MG: 600 TABLET, EXTENDED RELEASE ORAL at 21:58

## 2022-11-06 RX ADMIN — CEFTRIAXONE SODIUM 1000 MG: 1 INJECTION, POWDER, FOR SOLUTION INTRAMUSCULAR; INTRAVENOUS at 13:58

## 2022-11-06 RX ADMIN — METOPROLOL TARTRATE 12.5 MG: 25 TABLET, FILM COATED ORAL at 09:00

## 2022-11-06 RX ADMIN — PREDNISONE 40 MG: 20 TABLET ORAL at 13:55

## 2022-11-06 RX ADMIN — SODIUM CHLORIDE, PRESERVATIVE FREE 10 ML: 5 INJECTION INTRAVENOUS at 21:59

## 2022-11-06 RX ADMIN — HYDROXYZINE HYDROCHLORIDE 10 MG: 10 TABLET ORAL at 11:54

## 2022-11-06 RX ADMIN — ALBUTEROL SULFATE 2.5 MG: 2.5 SOLUTION RESPIRATORY (INHALATION) at 20:45

## 2022-11-06 RX ADMIN — ATORVASTATIN CALCIUM 10 MG: 10 TABLET, FILM COATED ORAL at 21:58

## 2022-11-06 RX ADMIN — AZITHROMYCIN MONOHYDRATE 250 MG: 250 TABLET ORAL at 09:00

## 2022-11-06 RX ADMIN — GUAIFENESIN 600 MG: 600 TABLET, EXTENDED RELEASE ORAL at 13:55

## 2022-11-06 RX ADMIN — APIXABAN 5 MG: 5 TABLET, FILM COATED ORAL at 21:58

## 2022-11-06 RX ADMIN — LEVOTHYROXINE SODIUM 88 MCG: 0.09 TABLET ORAL at 06:24

## 2022-11-06 RX ADMIN — METOPROLOL TARTRATE 12.5 MG: 25 TABLET, FILM COATED ORAL at 21:58

## 2022-11-06 NOTE — PLAN OF CARE
Problem: Respiratory - Adult  Goal: Clear lung sounds  11/6/2022 0752 by Gris Bradshaw RCP  Outcome: Progressing   Pt on aerosol txs to improve breath sounds/aeration. Pt mutually agrees with care plan.

## 2022-11-06 NOTE — PROGRESS NOTES
[place] [facility]      Patient - Tonya Ray   MRN -  [id number]   [facility]   - 1935      Date of Admission -  2022  6:20 AM  Date of evaluation -  2022  Room - --A   Hospital Day - 2  Coby Lambert MD Primary Care Physician - Jamshid Peterson MD     Problem List      Active Hospital Problems    Diagnosis Date Noted    Shortness of breath [R06.02] 2022     Priority: Medium     Reason for Consult    Hx pulmonary fibrosis, worsening shortness of breath  HPI   History Obtained From: Patient and electronic medical record. Tonya Ray is a 80 y.o. female lifetime nonsmoker with history of ILD/pulmonary fibrosis and PAF, presented to [facility] ED with complaints or worsening dyspnea and increased work of breathing. On arrival to ED saturating >94% on 4L NC, her baseline. Work up generally negative for acute findings. Admitted for further work up. On evaluation, patient states she started noticing worsening shortness of breath with increased work of breathing with minimal activity in the last week that has gotten worse. Endorses associated fatigue with lightheadedness and reduced PO intake related to her inability to ambulate and cook for herself. Reports her pulse ox showing oxygen at 60% on 4L NC when walking at home. Lives alone. Usually able to care for herself. Has been on oxygen due to ILD, 4L on ambulation and usually titrate down during rest and at night. Has been at baseline otherwise, no chest pain, fever, chills, nausea, vomiting, diarrhea. No sick contact. Vaccinated for [employer]. CT chest 2022: The severe intralobular septal thickening and bronchiectasis with honeycombing most pronounced in the right upper lobe is unchanged. Scattered subpleural scarring and groundglass opacities in the left lung are mild similar to the 2020 comparison.      Per chart review:  Connective tissue work up results:  Date: 4/03/2017  SABAS( Antinuclear antibody):  Negative-none detected. Fungal serologies- Negative                   RA ( Rheumatoid factor):  Negative. <10                                                          ACE(  level):    Negative-22                               ESR ( Sed rate): 9               Connective Tissue Labs  Date: 1/11/19     SABAS with reflex-None detected. Anti RNP-1- Normal.  Anti Lexie-0- normal  Anti-SSB-0- normal  AntiSSA-1- normal  AntiCentromere- 4- Normal  Scleroderma AB-24-normal  ACE-10- normal  ESR-101 elevated  Rheumatoid Factor-13 WNL                   Past 24 hours   Discussed options for management at this time, including bronchoscopy for biopsy with patient's son, Vish Brown. Patient thinking to go to [facility] for further evaluation and management prior to bronchoscopy due to her high risk of needing intubation and anticipated difficult wean off ventilator due to her underlying lung disease. No acute events overnight otherwise. Reports feeling better overall, still having some dyspnea but improved from prior. Attributing some of it to feeling anxious. No new complaints.     PMHx   Past Medical History      Diagnosis Date    Anemia     normal on pre-op 5/2012 and 12/2/13    Backache     h/o    Bronchiectasis ([address] 2013    Bursitis     bilateral shoulders    Constipation     Diverticulosis of colon     HTN (hypertension)     Hypercalcemia     slightly elevated at 10.8 pre-op 12/2/13    Hyperlipidemia     Nodular goiter     bx negative    OA (osteoarthritis)     bilateral thumbs - sees Dr. Todd Mckenzie    Osteopenia 11/6/2013    Parathyroid adenoma 4/30/2013    Pneumonia of right upper lobe due to infectious organism     Pneumonitis     Dr. Yogesh Joseph in 7/2010 - bx negative    Pulmonary Mycobacterium avium complex (MAC) infection ([address]     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 ([address]     had one parathyroid removed - now follows with Dr. Michael Green     with seasonal allergies    Vitamin D deficiency 2018      Past Surgical History        Procedure Laterality Date    ABDOMINAL EXPLORATION SURGERY  2013    Release of ASHISHO, KATHERINE-Dr. Obed Russell    APPENDECTOMY  196    BACK INJECTION Bilateral 2021    bilateral sacroiliac joint injection performed by Neo Dominguez DO at [facility] N/A 2022    B/L SI joint block performed by Neo Dominguez DO at [address] N/A 2020    BRONCHOSCOPY FLUOROSCOPY performed by George Bertrand MD at [address] [place] [address] 153 Bilateral 1990 ?  SECTION  9324,8811    DILATION AND CURETTAGE OF UTERUS  [id number]    EXCISION OF PARATHYROID MASS Right 2013    rt parathyroidectomy (excision of rt inferior parathyroid mass) exploration of neck     FOOT SURGERY      left    FOOT SURGERY Left 2017    Dr Chema Dale ([address] [place] [address])      JOINT REPLACEMENT  12    left knee    JOINT REPLACEMENT  2014    right knee    MALIGNANT SKIN LESION EXCISION Left 2017    BCC DORSAL FOOT WITH GRAFT & FS    PAIN MANAGEMENT PROCEDURE Left 2022    sacroiliac joint radiofrequency ablation, LEFT side first performed by Neo Dominguez DO at [facility] Right 2022    sacroiliac joint Radiofrequency Ablations, right side performed by Neo Dominguez DO at [address] [place] [address] (CERVIX REMOVED)  [address] [place] ?     TOTAL KNEE ARTHROPLASTY Right 2014    OIO    UPPER GASTROINTESTINAL ENDOSCOPY N/A 2022    EGD performed by Jerson Cunningham MD at [facility]     Meds    Current Medications    albuterol  2.5 mg Nebulization TID    hydrOXYzine HCl  10 mg Oral Once    cefTRIAXone (ROCEPHIN) IV  1,000 mg IntraVENous Q24H    sodium chloride flush  5-40 mL IntraVENous 2 times per day allopurinol  200 mg Oral Daily    apixaban  5 mg Oral BID    azithromycin  250 mg Oral Daily    [Held by provider] budesonide  0.5 mg Nebulization BID    lactobacillus  1 capsule Oral Daily    levothyroxine  88 mcg Oral Daily    metoprolol tartrate  12.5 mg Oral BID    multivitamin  1 tablet Oral Daily    atorvastatin  10 mg Oral Nightly    methylPREDNISolone  40 mg IntraVENous Q12H     sodium chloride flush, sodium chloride, ondansetron **OR** ondansetron, polyethylene glycol, acetaminophen **OR** acetaminophen, albuterol sulfate HFA  IV Drips/Infusions   sodium chloride       Home Medications  Medications Prior to Admission: azithromycin (ZITHROMAX) 250 MG tablet, Take 1 tablet by mouth daily  simvastatin (ZOCOR) 20 MG tablet, TAKE 1 TABLET BY MOUTH AT  NIGHT  apixaban (ELIQUIS) 5 MG TABS tablet, TAKE 1 TABLET BY MOUTH  TWICE DAILY  albuterol sulfate HFA (PROAIR HFA) 108 (90 Base) MCG/ACT inhaler, Inhale 2 puffs into the lungs every 4 hours as needed for Wheezing or Shortness of Breath  levothyroxine (SYNTHROID) 88 MCG tablet, TAKE 1 TABLET BY MOUTH ONCE DAILY  allopurinol (ZYLOPRIM) 100 MG tablet, Take 2 tablets by mouth daily  budesonide (PULMICORT) 0.5 MG/2ML nebulizer suspension, Take 2 mLs by nebulization 2 times daily  metoprolol tartrate (LOPRESSOR) 25 MG tablet, take 1/2 tablet by mouth twice a day  Lactobacillus (PROBIOTIC ACIDOPHILUS) CAPS, Take 1 capsule by mouth daily   MULTIPLE VITAMIN PO, Take 1 tablet by mouth daily   Diet    ADULT DIET;  Regular  Allergies    Colchicine, Levaquin [levofloxacin], Oxycodone, Percocet [oxycodone-acetaminophen], Rifampin, Sulfa antibiotics, and Cefuroxime  Social History     Social History     Socioeconomic History    Marital status:      Spouse name: Not on file    Number of children: Not on file    Years of education: Not on file    Highest education level: Not on file   Occupational History    Occupation: retired   Tobacco Use    Smoking status: Never Smokeless tobacco: Never   Vaping Use    Vaping Use: Never used   Substance and Sexual Activity    Alcohol use: Yes     Alcohol/week: 0.0 standard drinks     Comment: socially-1 glassof wine 2-3 times a month    Drug use: No    Sexual activity: Never   Other Topics Concern    Not on file   Social History Narrative    Not on file     Social Determinants of Health     Financial Resource Strain: Low Risk     Difficulty of Paying Living Expenses: Not hard at all   Food Insecurity: No Food Insecurity    Worried About Running Out of Food in the Last Year: Never true    Ran Out of Food in the Last Year: Never true   Transportation Needs: Not on file   Physical Activity: Not on file   Stress: Not on file   Social Connections: Not on file   Intimate Partner Violence: Not on file   Housing Stability: Not on file     Family History          Problem Relation Age of Onset    Diabetes Mother     Thyroid Disease Mother     Arthritis Mother     High Blood Pressure Mother     Arthritis Father     High Blood Pressure Father     Other Father         hay fever    Breast Cancer Neg Hx     Ovarian Cancer Neg Hx      Sleep History    Never diagnosed with sleep apnea in the past.  Occupational history   Occupation:  She is current working: No  Type of profession: retired. History of tobacco smoking:No  .  History of recreational or IV drug use in the past:NO     History of exposure to coal mines/coal dust: NO  History of exposure to foundry dust/welding: NO  History of exposure to quarry/silica/sandblasting: NO  History of exposure to asbestos/working with breaks/ships: NO  History of exposure to farm dust: NO  History of recent travel to long distances: NO  History of exposure to birds, pigeons, or chickens in the past:NO  Pet animals at home:No      History of pulmonary embolism in the past: No            History of DVT in the past:No        [] Right lower extremity   [] Left lower extremity.            [employer]   General: negative for - fever, or chills  Respiratory: negative for shortness of breath, or wheezing  Cardiovascular: negative for chest pain, palpitations  Gastrointestinal: no abdominal pain, change in bowel habits, or black or bloody stools  Genito-Urinary: no dysuria, trouble voiding, or hematuria  Musculoskeletal: negative for - muscular weakness, joint pain in hands/knees/ankles   Neurological ROS: negative for -  numbness/tingling, seizures or weakness  Psychological: negative for - anxiety and depression   Hematological and Lymphatic: negative for history of blood clots or bleeding disorder  Dermatological ROS: negative for - skin lesion changes    Vitals     height is 5' 4\" (1.626 m) and weight is 155 lb 10.3 oz (70.6 kg). Her oral temperature is 97.4 °F (36.3 °C). Her blood pressure is 122/71 and her pulse is 66. Her respiration is 18 and oxygen saturation is 92%. Body mass index is 26.72 kg/m². SUPPLEMENTAL O2: O2 Flow Rate (L/min): 6 L/min     I/O      Intake/Output Summary (Last 24 hours) at 11/6/2022 0922  Last data filed at 11/6/2022 1814  Gross per 24 hour   Intake 360 ml   Output --   Net 360 ml       I/O last 3 completed shifts: In: 5 [P.O.:420]  Out: 250 [Urine:250]   Patient Vitals for the past 96 hrs (Last 3 readings):   Weight   11/05/22 0346 155 lb 10.3 oz (70.6 kg)   11/04/22 1500 155 lb 3.3 oz (70.4 kg)         Exam   Nursing note and vitals reviewed. Constitutional: in mild respiratory distress, appears stated age, cooperative  HENT: Normal-appearing ears and nose. Normal voice, not muffled. Head: as above  Right Ear: as above  Left Ear: as above  Mouth/Throat: as above  Eyes: Normal in appearance. EOM intact. Neck: Supple  Cardiovascular: Normal S1 and S1, regular rate rhythm, tachycardic, no murmurs  Pulmonary/Chest: Non-labored breathing. Tachypneic.  Diminished lung sounds, dry rales RUL predominantly, expiratory wheezing in lower lobes bilaterally- resolving, No rhonchi  Abdominal: Nontender to palpation. Normal bowl sounds. Musculoskeletal: No motor or sensory deficit. Extremities: No peripheral edema. Lymphadenopathy:  None appreciated. Neurological: CN II-XII intact. Neurovascularly intact. Skin: No rashes or lacerations. Psychiatric: AAO x3. Normal mood and affect. Labs  - [facility]  Lab Results   Component Value Date/Time    PH 7.46 11/04/2022 08:39 AM    PO2 70 11/04/2022 08:39 AM    PCO2 42 11/04/2022 08:39 AM    HCO3 30 11/04/2022 08:39 AM    O2SAT 95 11/04/2022 08:39 AM     Lab Results   Component Value Date/Time    IFIO2 4 11/04/2022 08:39 AM     CBC  Recent Labs     11/04/22  0805 11/05/22  0641   WBC 9.4 6.9   RBC 4.68 4.28   HGB 12.2 11.3*   HCT 38.2 35.7*   MCV 81.6 83.4   MCH 26.1 26.4   MCHC 31.9* 31.7*    255   MPV 10.9 10.6        BMP  Recent Labs     11/04/22  0805      K 4.5   CL 95*   CO2 31   BUN 16   CREATININE 0.7   GLUCOSE 99   CALCIUM 9.9       LFT  No results for input(s): AST, ALT, ALB, BILITOT, ALKPHOS, LIPASE in the last 72 hours. Invalid input(s): AMYLASE  TROP  Lab Results   Component Value Date/Time    TROPONINT < 0.010 11/04/2022 08:05 AM    TROPONINT < 0.010 09/30/2020 09:00 AM    TROPONINT < 0.010 09/25/2020 05:16 PM     BNP  No results for input(s): BNP in the last 72 hours. Lactic Acid  No results for input(s): LACTA in the last 72 hours. INR  No results for input(s): INR, PROTIME in the last 72 hours. PTT  No results for input(s): APTT in the last 72 hours. Glucose  No results for input(s): POCGLU in the last 72 hours. UA   Recent Labs     11/04/22  1055   SPECGRAV 1.012   PHUR 7.0   COLORU YELLOW   PROTEINU TRACE*   BLOODU TRACE*   RBCUA 10-15   WBCUA 2-4   BACTERIA NONE SEEN   NITRU NEGATIVE   BILIRUBINUR NEGATIVE   UROBILINOGEN 0.2   KETUA NEGATIVE   LABCAST NONE SEEN  NONE SEEN     .     PFTs           Sleep studies   none    Cultures    Flu and COVID negative    EKG   Sinus rhythm with Premature atrial complexes  Left anterior fascicular block  Cannot rule out Anterior infarct , age undetermined  Abnormal ECG  When compared with ECG of 04-NOV-2022 06:30,  Premature atrial complexes are now Present  Left anterior fascicular block is now Present    Echocardiogram    Summary   Ejection fraction is visually estimated at 55%. Overall left ventricular function is normal.   Aortic valve appears tricuspid. Aortic valve leaflets are somewhat thickened. Aortic valve leaflets are Mildly calcified. There is a small localized posterior pericardial effusion noted. Signature      ----------------------------------------------------------------   Electronically signed by Leyda Cornejo MD (Interpreting   physician) on 09/30/2020 at 03:42 PM   ----------------------------------------------------------------  Radiology    CXR 11/4/2022      Moderately severe fibrotic changes in the right lung with milder fibrotic changes in the left lung. No definite new abnormalities. CT Scans  (See actual reports for details)  CT chest wo contrast 09/21/2022  The previously seen more numerous and more prominent multifocal groundglass opacities in the left lung have improved. The severe interstitial fibrosis, honeycombing, bronchiectasis most pronounced in the right upper lobe is unchanged and overall the    appearance of the chest is similar to the 2020 comparison. No pulmonary mass or nodule is observed. CT chest wo contrast 11/05/2022  1. Severe interstitial fibrotic changes are seen throughout the right hemithorax with extensive honeycombing which is most severe in the right upper lobe. Bronchiectasis is seen bilaterally although significantly more severe on the right side. 2. There are diffuse groundglass and nodular infiltrates throughout the left hemithorax. Assessment   Acute hypoxic respiratory failure: unclear etiology.  Reported hypoxia on ambulation to SpO2 60% with activity at home, reported per patient. Currently on 4L NC, at baseline. Low suspicion for infectious etiology with lack of fever, leukocytosis, productive cough. No sick contacts. COVID and Flu neg. CXR with no new obvious consolidation or infiltrates. CXR appears unchanged compared to previous CXR in 05/2022. No signs of volume overload present. Suspect progression of underlying ILD. ILD/pulmonary fibrosis of uncertain etiology: Restrictive lung defect on PFT. Follow with Edwardo Cortes NP at pulmonology office. On home oxygen continuously 4LPM- 2LPM with sleep. Recent CT Chest done with resolution of previously seen GGO and stable findings in RUL. Currently using Budesonide neb BID and Albuterol Prn. Her work up for connective tissue diseases are negative- ? UIP Vs Post inflammatory scarring from her previous hx of Mycobacterium avium/intracellulare infection of lungs- She refused to go for open lung biopsy with VATS. She is following with [facility] pulmonary service. She was evaluated at [address] [place] [address] pulmonary service via virtual visit on 7 December 2021 for the last time by Dr. 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 Her antibiotics (Rifampin per chart) were discontinued due to development of optic neutitis and transient vision loss.    Hx MAC 2017 with RUL scarring/honecombing  Allergic Rhinitis  PAF on Eliquis and Lopressor  Hypothyroidism on Synthroid  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    Plan   -Duonebs q4h  -Transition from SoluMedrol 40 mg IV BID to PO prednisone 40 mg daily for total 5 days   -Continue Rocephin for possible ELIGIO pneumonia  -Continue home Zithromax as prescribed per pulmonologist at [address] pulse ox while ambulating/will need home oxygen evaluation prior to discharge\  -IS, PTOT  -Consider transfer to [facility] clinic for further evaluation    Questions and concerns addressed. Plan discussed with Dr. Heriberto Valdez. Electronically signed by   Keiko Almaraz MD on 11/6/2022 at 9:22 AM     Addendum by Dr. Heriberto Valdez MD:  Patient seen by me independently including key components of medical care. Face to face evaluation and examination was performed. Case discussed with Gretel Benavides MD-resident physician. Italicized font, if present,  represents changes to the note made by me. More than 50% of the encounter time involved with patient care by the Pulmonary & Critical care service team spent by me. Please see my modifications mentioned below:  She is on 4 L of oxygen on nasal cannula  She usually uses 4 L of oxygen and nasal cannula at home  She not able to produce a good sputum sample to send to the cultures  She told me that she spoke with her son last night. Gretel Benavides MD from internal medicine service also spoke with the patient's son over phone. Patient and patient's son decided to hold off on diagnostic bronchoscopy by me. Patient want to make a follow-up appointment with the pulmonologist at [facility] for further evaluation and management of her worsening of recent CT scan of chest findings and to consider for diagnostic bronchoscopy to check the current status of ? Mycobacterium avium intracellular infection. Patient verbalized understanding the consequence of her decision. We will send a sputum for acid-fast bacilli smear and cultures to check the current status of Mycobacterium avium intracellular infection. Start patient on Mucinex 600 mg p.o. twice daily. Patient repeatedly told me that she not able to produce sputum to send it to regular cultures. Eliquis.     Electronically signed by   Lex Person MD on 11/6/2022 at 2:26 PM

## 2022-11-06 NOTE — PLAN OF CARE
Problem: Respiratory - Adult  Goal: Clear lung sounds  11/6/2022 0605 by Tyler Nayak RCP  Outcome: Progressing     Patient mutually agreed on goals.

## 2022-11-06 NOTE — PROGRESS NOTES
IM Progress Note  Dr. Sim Talamantes  11/6/2022 8:32 AM      Patient name Ritika Tao  [id number]  PCP: Leta Samayoa MD  Admit Date: 11/4/2022  Acct No. [de-identified]    Subjective: Interval History:   Pt up in bed  Breathing not worse but sob with mild exertion      Diet: ADULT DIET; Regular    I/O last 3 completed shifts: In: 5 [P.O.:420]  Out: 250 [Urine:250]  No intake/output data recorded. Admission weight: 155 lb 3.3 oz (70.4 kg) as of 11/4/2022  6:20 AM  Wt Readings from Last 3 Encounters:   11/05/22 155 lb 10.3 oz (70.6 kg)   10/17/22 155 lb (70.3 kg)   09/29/22 155 lb 6.4 oz (70.5 kg)     Body mass index is 26.72 kg/m². ROS   CVS;  no cp or palpitation  Resp: +SOB occasional cough  Neuro:  No numbness or weakness or dizziness  Abd: no nausea or vomiting or abd pain      Medications:   Scheduled Meds:   albuterol  2.5 mg Nebulization TID    hydrOXYzine HCl  10 mg Oral Once    cefTRIAXone (ROCEPHIN) IV  1,000 mg IntraVENous Q24H    sodium chloride flush  5-40 mL IntraVENous 2 times per day    allopurinol  200 mg Oral Daily    apixaban  5 mg Oral BID    azithromycin  250 mg Oral Daily    [Held by provider] budesonide  0.5 mg Nebulization BID    lactobacillus  1 capsule Oral Daily    levothyroxine  88 mcg Oral Daily    metoprolol tartrate  12.5 mg Oral BID    multivitamin  1 tablet Oral Daily    atorvastatin  10 mg Oral Nightly    methylPREDNISolone  40 mg IntraVENous Q12H     Continuous Infusions:   sodium chloride         Labs :     CBC:   Recent Labs     11/04/22  0805 11/05/22  0641   WBC 9.4 6.9   HGB 12.2 11.3*    255       BMP:    Recent Labs     11/04/22  0805      K 4.5   CL 95*   CO2 31   BUN 16   CREATININE 0.7   GLUCOSE 99       Hepatic: No results for input(s): AST, ALT, ALB, BILITOT, ALKPHOS in the last 72 hours. Troponin: No results for input(s): TROPONINI in the last 72 hours. BNP: No results for input(s): BNP in the last 72 hours.   Lipids: No results for input(s): CHOL, HDL in the last 72 hours. Invalid input(s): LDLCALCU  INR: No results for input(s): INR in the last 72 hours.     Radiology    Objective:   Vitals: /71   Pulse 66   Temp 97.4 °F (36.3 °C) (Oral)   Resp 18   Ht 5' 4\" (1.626 m)   Wt 155 lb 10.3 oz (70.6 kg)   SpO2 92% Comment: 91-93%  BMI 26.72 kg/m²   HEENT: Head:pupils react  Neck: supple  Lungs: diminished air entry bilat  Heart: regular rate and rhythm   Abdomen: soft BS heard   Extremities: warm  edema  Neurologic:  Alert, oriented X3    Impression:   :   Acute on chronic resp failure with pulm fibrosis  HTN   Hypothyroidism  A Fib on anticoag    Plan:    Pt appears quite anxious and requesting somethng mild for anxiety  Pt not sure if she wants to take the risk of bronchoscopy  Now on 6L nasal O2  Denies any pain  Cont bronchodilators  Cont anticoag        Fatuma Loving MD, MD

## 2022-11-06 NOTE — RT PROTOCOL NOTE
RT Inhaler-Nebulizer Bronchodilator Protocol Note    There is a bronchodilator order in the chart from a provider indicating to follow the RT Bronchodilator Protocol and there is an Initiate RT Inhaler-Nebulizer Bronchodilator Protocol order as well (see protocol at bottom of note). CXR Findings:  No results found. The findings from the last RT Protocol Assessment were as follows:   History Pulmonary Disease: Chronic pulmonary disease  Respiratory Pattern: Mild dyspnea at rest, irregular pattern, or RR 21-25 bpm  Breath Sounds: Slightly diminished and/or crackles  Cough: Strong, spontaneous, non-productive  Indication for Bronchodilator Therapy: Decreased or absent breath sounds, On home bronchodilators  Bronchodilator Assessment Score: 8    Aerosolized bronchodilator medication orders have been revised according to the RT Inhaler-Nebulizer Bronchodilator Protocol below. Respiratory Therapist to perform RT Therapy Protocol Assessment initially then follow the protocol. Repeat RT Therapy Protocol Assessment PRN for score 0-3 or on second treatment, BID, and PRN for scores above 3. No Indications - adjust the frequency to every 6 hours PRN wheezing or bronchospasm, if no treatments needed after 48 hours then discontinue using Per Protocol order mode. If indication present, adjust the RT bronchodilator orders based on the Bronchodilator Assessment Score as indicated below. Use Inhaler orders unless patient has one or more of the following: on home nebulizer, not able to hold breath for 10 seconds, is not alert and oriented, cannot activate and use MDI correctly, or respiratory rate 25 breaths per minute or more, then use the equivalent nebulizer order(s) with same Frequency and PRN reasons based on the score. If a patient is on this medication at home then do not decrease Frequency below that used at home.     0-3 - enter or revise RT bronchodilator order(s) to equivalent RT Bronchodilator order with Frequency of every 4 hours PRN for wheezing or increased work of breathing using Per Protocol order mode. 4-6 - enter or revise RT Bronchodilator order(s) to two equivalent RT bronchodilator orders with one order with BID Frequency and one order with Frequency of every 4 hours PRN wheezing or increased work of breathing using Per Protocol order mode. 7-10 - enter or revise RT Bronchodilator order(s) to two equivalent RT bronchodilator orders with one order with TID Frequency and one order with Frequency of every 4 hours PRN wheezing or increased work of breathing using Per Protocol order mode. 11-13 - enter or revise RT Bronchodilator order(s) to one equivalent RT bronchodilator order with QID Frequency and an Albuterol order with Frequency of every 4 hours PRN wheezing or increased work of breathing using Per Protocol order mode. Greater than 13 - enter or revise RT Bronchodilator order(s) to one equivalent RT bronchodilator order with every 4 hours Frequency and an Albuterol order with Frequency of every 2 hours PRN wheezing or increased work of breathing using Per Protocol order mode. RT to enter RT Home Evaluation for COPD & MDI Assessment order using Per Protocol order mode.     Electronically signed by Vincenzo Davis RCP on 11/6/2022 at 7:55 AM

## 2022-11-06 NOTE — RT PROTOCOL NOTE
RT Inhaler-Nebulizer Bronchodilator Protocol Note    There is a bronchodilator order in the chart from a provider indicating to follow the RT Bronchodilator Protocol and there is an Initiate RT Inhaler-Nebulizer Bronchodilator Protocol order as well (see protocol at bottom of note). CXR Findings:  No results found. The findings from the last RT Protocol Assessment were as follows:   History Pulmonary Disease: Chronic pulmonary disease  Respiratory Pattern: Regular pattern and RR 12-20 bpm  Breath Sounds: Slightly diminished and/or crackles  Cough: Strong, spontaneous, non-productive  Indication for Bronchodilator Therapy: On home bronchodilators  Bronchodilator Assessment Score: 4    Aerosolized bronchodilator medication orders have been revised according to the RT Inhaler-Nebulizer Bronchodilator Protocol below. Respiratory Therapist to perform RT Therapy Protocol Assessment initially then follow the protocol. Repeat RT Therapy Protocol Assessment PRN for score 0-3 or on second treatment, BID, and PRN for scores above 3. No Indications - adjust the frequency to every 6 hours PRN wheezing or bronchospasm, if no treatments needed after 48 hours then discontinue using Per Protocol order mode. If indication present, adjust the RT bronchodilator orders based on the Bronchodilator Assessment Score as indicated below. Use Inhaler orders unless patient has one or more of the following: on home nebulizer, not able to hold breath for 10 seconds, is not alert and oriented, cannot activate and use MDI correctly, or respiratory rate 25 breaths per minute or more, then use the equivalent nebulizer order(s) with same Frequency and PRN reasons based on the score. If a patient is on this medication at home then do not decrease Frequency below that used at home.     0-3 - enter or revise RT bronchodilator order(s) to equivalent RT Bronchodilator order with Frequency of every 4 hours PRN for wheezing or increased work of breathing using Per Protocol order mode. 4-6 - enter or revise RT Bronchodilator order(s) to two equivalent RT bronchodilator orders with one order with BID Frequency and one order with Frequency of every 4 hours PRN wheezing or increased work of breathing using Per Protocol order mode. 7-10 - enter or revise RT Bronchodilator order(s) to two equivalent RT bronchodilator orders with one order with TID Frequency and one order with Frequency of every 4 hours PRN wheezing or increased work of breathing using Per Protocol order mode. 11-13 - enter or revise RT Bronchodilator order(s) to one equivalent RT bronchodilator order with QID Frequency and an Albuterol order with Frequency of every 4 hours PRN wheezing or increased work of breathing using Per Protocol order mode. Greater than 13 - enter or revise RT Bronchodilator order(s) to one equivalent RT bronchodilator order with every 4 hours Frequency and an Albuterol order with Frequency of every 2 hours PRN wheezing or increased work of breathing using Per Protocol order mode. RT to enter RT Home Evaluation for COPD & MDI Assessment order using Per Protocol order mode.     Electronically signed by Lorna Kehr, RCP on 11/5/2022 at 9:02 PM

## 2022-11-06 NOTE — FLOWSHEET NOTE
11/06/22 1719   Safe Environment   Safety Measures Other (comment)  (pt resting in bed with eyes closed no s/s distreass noted)

## 2022-11-06 NOTE — PLAN OF CARE
Problem: Discharge Planning  Goal: Discharge to home or other facility with appropriate resources  11/6/2022 1108 by Ethel Macias RN  Outcome: Progressing  11/6/2022 0142 by Ted Leavitt RN  Outcome: Progressing     Problem: Discharge Planning  Goal: Discharge to home or other facility with appropriate resources  11/6/2022 1108 by Ethel Macias RN  Outcome: Progressing  11/6/2022 0142 by Ted Leavitt RN  Outcome: Progressing     Problem: Skin/Tissue Integrity  Goal: Absence of new skin breakdown  Description: 1. Monitor for areas of redness and/or skin breakdown  2. Assess vascular access sites hourly  3. Every 4-6 hours minimum:  Change oxygen saturation probe site  4. Every 4-6 hours:  If on nasal continuous positive airway pressure, respiratory therapy assess nares and determine need for appliance change or resting period. 11/6/2022 1108 by Ethel Macias RN  Outcome: Progressing  11/6/2022 0142 by Ted Leavitt RN  Outcome: Progressing     Problem: Skin/Tissue Integrity  Goal: Absence of new skin breakdown  Description: 1. Monitor for areas of redness and/or skin breakdown  2. Assess vascular access sites hourly  3. Every 4-6 hours minimum:  Change oxygen saturation probe site  4. Every 4-6 hours:  If on nasal continuous positive airway pressure, respiratory therapy assess nares and determine need for appliance change or resting period.   11/6/2022 1108 by Ethel Macias RN  Outcome: Progressing  11/6/2022 0142 by Ted Leavitt RN  Outcome: Progressing     Problem: Safety - Adult  Goal: Free from fall injury  11/6/2022 1108 by Ethel Macias RN  Outcome: Progressing  11/6/2022 0142 by Ted Laevitt RN  Outcome: Progressing     Problem: Safety - Adult  Goal: Free from fall injury  11/6/2022 1108 by Ethel Macias RN  Outcome: Progressing  11/6/2022 0142 by Ted Leavitt RN  Outcome: Progressing     Problem: ABCDS Injury Assessment  Goal: Absence of physical injury  11/6/2022 1108 by Ethel Macias RN  Outcome: Progressing  11/6/2022 0142 by Kamryn Saleh RN  Outcome: Progressing     Problem: ABCDS Injury Assessment  Goal: Absence of physical injury  11/6/2022 1108 by Brad Morales RN  Outcome: Progressing  11/6/2022 0142 by Kamryn Saleh RN  Outcome: Progressing

## 2022-11-07 PROCEDURE — 6360000002 HC RX W HCPCS

## 2022-11-07 PROCEDURE — 97116 GAIT TRAINING THERAPY: CPT

## 2022-11-07 PROCEDURE — 2700000000 HC OXYGEN THERAPY PER DAY

## 2022-11-07 PROCEDURE — 6370000000 HC RX 637 (ALT 250 FOR IP): Performed by: INTERNAL MEDICINE

## 2022-11-07 PROCEDURE — 6360000002 HC RX W HCPCS: Performed by: INTERNAL MEDICINE

## 2022-11-07 PROCEDURE — 6370000000 HC RX 637 (ALT 250 FOR IP): Performed by: STUDENT IN AN ORGANIZED HEALTH CARE EDUCATION/TRAINING PROGRAM

## 2022-11-07 PROCEDURE — 6370000000 HC RX 637 (ALT 250 FOR IP)

## 2022-11-07 PROCEDURE — 2580000003 HC RX 258: Performed by: INTERNAL MEDICINE

## 2022-11-07 PROCEDURE — 97162 PT EVAL MOD COMPLEX 30 MIN: CPT

## 2022-11-07 PROCEDURE — 94760 N-INVAS EAR/PLS OXIMETRY 1: CPT

## 2022-11-07 PROCEDURE — 2580000003 HC RX 258

## 2022-11-07 PROCEDURE — 1200000003 HC TELEMETRY R&B

## 2022-11-07 PROCEDURE — 94640 AIRWAY INHALATION TREATMENT: CPT

## 2022-11-07 PROCEDURE — 99232 SBSQ HOSP IP/OBS MODERATE 35: CPT | Performed by: INTERNAL MEDICINE

## 2022-11-07 RX ADMIN — METOPROLOL TARTRATE 12.5 MG: 25 TABLET, FILM COATED ORAL at 22:26

## 2022-11-07 RX ADMIN — PREDNISONE 40 MG: 20 TABLET ORAL at 10:20

## 2022-11-07 RX ADMIN — SODIUM CHLORIDE, PRESERVATIVE FREE 10 ML: 5 INJECTION INTRAVENOUS at 10:18

## 2022-11-07 RX ADMIN — APIXABAN 5 MG: 5 TABLET, FILM COATED ORAL at 10:18

## 2022-11-07 RX ADMIN — METOPROLOL TARTRATE 12.5 MG: 25 TABLET, FILM COATED ORAL at 10:18

## 2022-11-07 RX ADMIN — Medication 1 CAPSULE: at 10:18

## 2022-11-07 RX ADMIN — ALBUTEROL SULFATE 2.5 MG: 2.5 SOLUTION RESPIRATORY (INHALATION) at 21:42

## 2022-11-07 RX ADMIN — Medication 1 TABLET: at 10:18

## 2022-11-07 RX ADMIN — LEVOTHYROXINE SODIUM 88 MCG: 0.09 TABLET ORAL at 06:06

## 2022-11-07 RX ADMIN — SODIUM CHLORIDE, PRESERVATIVE FREE 10 ML: 5 INJECTION INTRAVENOUS at 22:26

## 2022-11-07 RX ADMIN — ALLOPURINOL 200 MG: 100 TABLET ORAL at 22:26

## 2022-11-07 RX ADMIN — CEFTRIAXONE SODIUM 1000 MG: 1 INJECTION, POWDER, FOR SOLUTION INTRAMUSCULAR; INTRAVENOUS at 18:32

## 2022-11-07 RX ADMIN — ALBUTEROL SULFATE 2.5 MG: 2.5 SOLUTION RESPIRATORY (INHALATION) at 08:43

## 2022-11-07 RX ADMIN — GUAIFENESIN 600 MG: 600 TABLET, EXTENDED RELEASE ORAL at 10:18

## 2022-11-07 RX ADMIN — APIXABAN 5 MG: 5 TABLET, FILM COATED ORAL at 22:26

## 2022-11-07 RX ADMIN — ATORVASTATIN CALCIUM 10 MG: 10 TABLET, FILM COATED ORAL at 22:26

## 2022-11-07 RX ADMIN — AZITHROMYCIN MONOHYDRATE 250 MG: 250 TABLET ORAL at 10:18

## 2022-11-07 RX ADMIN — ALBUTEROL SULFATE 2.5 MG: 2.5 SOLUTION RESPIRATORY (INHALATION) at 14:07

## 2022-11-07 RX ADMIN — GUAIFENESIN 600 MG: 600 TABLET, EXTENDED RELEASE ORAL at 22:26

## 2022-11-07 NOTE — FLOWSHEET NOTE
11/07/22 0978   Safe Environment   Safety Measures   (VIrtual RN rounds complete)   PT sitting upright in bed, No needs identified at this time. No acute distress noted. Acosta Honeycutt

## 2022-11-07 NOTE — DISCHARGE INSTR - COC
Continuity of Care Form    Patient Name: Yola Macias   :  1935  MRN:  [id number]    Admit date:  2022  Discharge date:  2022    Code Status Order: Limited   Advance Directives:     Admitting Physician:  Portillo Canales MD  PCP: Tim Horowitz MD    Discharging Nurse: [facility] Unit/Room#: 6K-03/003-A  Discharging Unit Phone Number: [phone]    Emergency Contact:   Extended Emergency Contact Information  Primary Emergency Contact: Jose Benavides   [place] of [address]  Phone: [phone]  Relation: Child  Secondary Emergency Contact: [address] of [address] [facility] Phone: [phone]  Mobile Phone: [phone]  Relation: Child    Past Surgical History:  Past Surgical History:   Procedure Laterality Date    ABDOMINAL EXPLORATION SURGERY  2013    Release of [place]    APPENDECTOMY      BACK INJECTION Bilateral 2021    bilateral sacroiliac joint injection performed by Carlos Bardales DO at [place] N/A 2022    B/L SI joint block performed by Carlos Bardales DO at [address] N/A 2020    BRONCHOSCOPY FLUOROSCOPY performed by Gene Salazar MD at [address] [place] [address] 153 Bilateral 1990 ?      SECTION  6857,1605    DILATION AND CURETTAGE OF UTERUS  1127,3467,2577    EXCISION OF PARATHYROID MASS Right 2013    rt parathyroidectomy (excision of rt inferior parathyroid mass) exploration of neck     FOOT SURGERY      left    FOOT SURGERY Left 2017    Dr Rhetta Hamman ([address] [place] [address])      JOINT REPLACEMENT  12    left knee    JOINT REPLACEMENT  2014    right knee    MALIGNANT SKIN LESION EXCISION Left 2017    BCC DORSAL FOOT WITH GRAFT & FS    PAIN MANAGEMENT PROCEDURE Left 2022    sacroiliac joint radiofrequency ablation, LEFT side first performed by Caitlyn Jay JESU Colin DO at [facility] Right 7/19/2022    sacroiliac joint Radiofrequency Ablations, right side performed by Frank Jacinto DO at [address] [place] [address] (CERVIX REMOVED)  [address] ?     TOTAL KNEE ARTHROPLASTY Right January 6th, 2014    [facility]    UPPER GASTROINTESTINAL ENDOSCOPY N/A 7/25/2022    EGD performed by Shellie Milian MD at [facility]       Immunization History:   Immunization History   Administered Date(s) Administered    COVID-19, MODERNA BLUE border, Primary or Immunocompromised, (age 12y+), IM, 100 mcg/0.5mL 01/14/2021, 02/11/2021, 11/01/2021, 05/03/2022    COVID-19, PFIZER Bivalent BOOSTER, (age 12y+), IM, 30 mcg/0.3 mL dose 10/23/2022    FLUZONE 3 YEARS AND OVER 09/05/2014    Influenza A (W2H5-09) Vaccine PF IM 01/11/2010    Influenza Vaccine, unspecified formulation 09/07/2016    Influenza Virus Vaccine 10/11/2012, 09/10/2013, 09/18/2017    Influenza Whole 09/08/2015    Influenza, FLUAD, (age 72 y+), Adjuvanted, 0.5mL 09/21/2022    Influenza, FLUCELVAX, (age 10 mo+), MDCK, MDV, 0.5mL 08/16/2019    Influenza, FLUZONE (age 72 y+), High Dose, 0.7mL 09/12/2019    Influenza, High Dose (Fluzone 65 yrs and older) 09/27/2018    Influenza, Triv, inactivated, subunit, adjuvanted, IM (Fluad 65 yrs and older) 09/12/2019, 09/08/2020    PPD Test 04/03/2017, 01/16/2020, 10/03/2020, 03/09/2022    Pneumococcal Conjugate 13-valent (Gaexrhs96) 11/06/2013    Pneumococcal Polysaccharide (Xrwttejhr90) 10/24/2007    Td, unspecified formulation 02/24/2001    Tdap (Boostrix, Adacel) 05/17/2013    Zoster Live (Zostavax) 06/05/2008    Zoster Recombinant (Shingrix) 08/16/2019, 10/03/2019       Active Problems:  Patient Active Problem List   Diagnosis Code    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 ([facility] N25.81    Hypercalcemia E83.52    Osteoarthritis M19.90    Non-toxic nodular goiter E04.9    Essential hypertension I10    Diverticulosis of colon K57.30    Hypokalemia E87.6    Hypothyroidism E03.9    Osteopenia M85.80    Bronchiectasis without acute exacerbation (HCC) J47.9    Hyponatremia E87.1    Pure hypercholesterolemia E78.00    Left thyroid nodule E04.1    Hyperthyroidism E05.90    Lung infiltrate on CT R91.8    Dyslipidemia E78.5    Elevated serum free T4 level R79.89    Elevated TSH R79.89    Cough in adult R05.9    Nasal polyp J33.9    Abnormal CT of the chest R93.89    Vitamin D deficiency E55.9    Acute hypoxemic respiratory failure (HCC) J96.01    Pneumonia J18.9    Moderate malnutrition (HCC) E44.0    Pulmonary fibrosis (HCC) J84.10    SIRS (systemic inflammatory response syndrome) ([facility] R65.10    Community acquired pneumonia of right lung J18.9    Typical atrial flutter (HCC) I48.3    Septic arthritis (HCC) M00.9    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    Pseudogout of left knee M11.262    Shortness of breath R06.02       Isolation/Infection:   Isolation            No Isolation          Patient Infection Status       Infection Onset Added Last Indicated Last Indicated By Review Planned Expiration Resolved Resolved By    None active    Resolved    Pulmonary Tuberculosis (Rule Out) 11/06/22 11/06/22 11/07/22 Culture with Smear, Acid Fast Bacillius (Ordered)   11/07/22 Phil Ornelas RN    COVID-19 (Rule Out) 11/04/22 11/04/22 11/04/22 COVID-19 & Influenza Combo (Ordered)   11/04/22 Rule-Out Test Resulted    COVID-19 (Rule Out) 09/29/20 09/29/20 09/29/20 COVID-19 (Ordered)   09/29/20 Rule-Out Test Resulted    COVID-19 (Rule Out) 09/25/20 09/25/20 09/25/20 COVID-19 (Ordered)   09/25/20 Rule-Out Test Resulted            Nurse Assessment:  Last Vital Signs: /68   Pulse 96   Temp 97.5 °F (36.4 °C) (Oral)   Resp 18   Ht 5' 4\" (1.626 m)   Wt 148 lb 4.8 oz (67.3 kg)   SpO2 91%   BMI 25.46 kg/m²     Last documented pain score (0-10 scale):    Last Weight:   Wt Readings from Last 1 Encounters: 11/09/22 148 lb 4.8 oz (67.3 kg)     Mental Status:  oriented, alert, coherent, logical, thought processes intact, and able to concentrate and follow conversation    IV Access:  - None    Nursing Mobility/ADLs:  Walking   Assisted  Transfer  Assisted  Bathing  Assisted  Dressing  Assisted  Toileting  Assisted  Feeding  [facility] Delivery   whole    Wound Care Documentation and Therapy:        Elimination:  Continence: Bowel: Yes  Bladder: Yes  Urinary Catheter: None   Colostomy/Ileostomy/Ileal Conduit: No       Date of Last BM: 11/08/2022    Intake/Output Summary (Last 24 hours) at 11/9/2022 0941  Last data filed at 11/9/2022 0802  Gross per 24 hour   Intake 370 ml   Output 600 ml   Net -230 ml     I/O last 3 completed shifts: In: 959 [P.O.:660; I.V.:10]  Out: 600 [Urine:600]    Safety Concerns: At Risk for Falls    Impairments/Disabilities:      Vision    Nutrition Therapy:  Current Nutrition Therapy:   - Oral Diet:  General    Routes of Feeding: Oral  Liquids: Thin Liquids  Daily Fluid Restriction: no  Last Modified Barium Swallow with Video (Video Swallowing Test): not done    Treatments at the Time of Hospital Discharge:   Respiratory Treatments: Albuterol Nebulizer, Albuterol Sulfate Inhaler  Oxygen Therapy:  is on oxygen at 4 L/min per nasal cannula.   Ventilator:    - No ventilator support    Rehab Therapies: Physical Therapy and Occupational Therapy  Weight Bearing Status/Restrictions: No weight bearing restrictions  Other Medical Equipment (for information only, NOT a DME order):  walker, bath bench  Other Treatments: none    Patient's personal belongings (please select all that are sent with patient):  Glasses    RN SIGNATURE:  Electronically signed by Reinier Carvajal RN on 11/9/22 at 11:12 AM EST    CASE MANAGEMENT/SOCIAL WORK SECTION    Inpatient Status Date: 11/4/22    Readmission Risk Assessment Score:  Readmission Risk              Risk of Unplanned Readmission: 15           Discharging to Facility/ Agency   Name: [facility], [address]  [address]  Fax:[phone]    Dialysis Facility (if applicable)   Name:  Address:  Dialysis Schedule:  Phone:  Fax:    / signature: Electronically signed by CIARA Tamez on 11/7/22 at 12:58 PM EST    PHYSICIAN SECTION    Prognosis: Fair    Condition at Discharge: Stable    Rehab Potential (if transferring to Rehab): Fair    Recommended Labs or Other Treatments After Discharge: none    Physician Certification: I certify the above information and transfer of Juve Garcia  is necessary for the continuing treatment of the diagnosis listed and that she requires [place] for less 30 days.      Update Admission H&P: No change in H&P    PHYSICIAN SIGNATURE:  Electronically signed by Allyssa Calzada MD on 11/9/22 at 2:50 PM EST

## 2022-11-07 NOTE — PLAN OF CARE
Problem: Respiratory - Adult  Goal: Clear lung sounds  11/7/2022 0849 by Gregory Marquez RCP  Outcome: Progressing  Note: Patient receiving breathing treatments to maintain and manage respiratory status. Will be continued as ordered to improve aeration.

## 2022-11-07 NOTE — PROGRESS NOTES
[facility]      Patient - Mahamed Byrne   MRN -  [id number]   [facility]   - 1935      Date of Admission -  2022  6:20 AM  Date of evaluation -  2022  Room - --A   Hospital Day - [address], [place] Primary Care Physician - Shad Quiroga MD     Problem List      Active Hospital Problems    Diagnosis Date Noted    Shortness of breath [R06.02] 2022     Priority: Medium     Reason for Consult    Hx Pulmonary fibrosis, worsening SOB  HPI   History Obtained From: Patient and electronic medical record. Mahamed Byrne is a 80 y.o. female lifetime nonsmoker with history of ILD/pulmonary fibrosis and PAF, presented to [facility] ED with complaints or worsening dyspnea and increased work of breathing. On arrival to ED saturating >94% on 4L NC, her baseline. Work up generally negative for acute findings. Admitted for further work up. On evaluation, patient states she started noticing worsening shortness of breath with increased work of breathing with minimal activity in the last week that has gotten worse. Endorses associated fatigue with lightheadedness and reduced PO intake related to her inability to ambulate and cook for herself. Reports her pulse ox showing oxygen at 60% on 4L NC when walking at home. Lives alone. Usually able to care for herself. Has been on oxygen due to ILD, 4L on ambulation and usually titrate down during rest and at night. Has been at baseline otherwise, no chest pain, fever, chills, nausea, vomiting, diarrhea. No sick contact. Vaccinated for BNI Video. CT chest 2022: The severe intralobular septal thickening and bronchiectasis with honeycombing most pronounced in the right upper lobe is unchanged. Scattered subpleural scarring and groundglass opacities in the left lung are mild similar to the 2020 comparison.      Per chart review:  Connective tissue work up results:  Date: 2017  SABAS( Antinuclear antibody):  Negative-none detected. Fungal serologies- Negative                   RA ( Rheumatoid factor):  Negative. <10                                                          ACE(  level):    Negative-22                               ESR ( Sed rate): 9               Connective Tissue Labs  Date: 1/11/19     SABAS with reflex-None detected.   Anti RNP-1- Normal.  Anti Lexie-0- normal  Anti-SSB-0- normal  AntiSSA-1- normal  AntiCentromere- 4- Normal  Scleroderma AB-24-normal  ACE-10- normal  ESR-101 elevated  Rheumatoid Factor-13 WNL                   Past 24 hours   -On 4 LPM via NC   -Reports SOB stable at this time   -Reports ongoing anxiety   All other systems reviewed     PMHx   Past Medical History      Diagnosis Date    Anemia     normal on pre-op 5/2012 and 12/2/13    Backache     h/o    Bronchiectasis ([address] 2013    Bursitis     bilateral shoulders    Constipation     Diverticulosis of colon     HTN (hypertension)     Hypercalcemia     slightly elevated at 10.8 pre-op 12/2/13    Hyperlipidemia     Nodular goiter     bx negative    OA (osteoarthritis)     bilateral thumbs - sees Dr. Mike Monson    Osteopenia 11/6/2013    Parathyroid adenoma 4/30/2013    Pneumonia of right upper lobe due to infectious organism     Pneumonitis     Dr. Cole Romano in 7/2010 - bx negative    Pulmonary Mycobacterium avium complex (MAC) infection ([address]     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 ([address]     had one parathyroid removed - now follows with Dr. Curtis Alexander    Sinusitis     with seasonal allergies    Vitamin D deficiency 05/2018      Past Surgical History        Procedure Laterality Date    ABDOMINAL EXPLORATION SURGERY  01/02/2013    Release of KATHERINE TAMAYO-Dr. Teresa Liu    APPENDECTOMY  1961    BACK INJECTION Bilateral 12/28/2021    bilateral sacroiliac joint injection performed by Katheleen Hamman, DO at [facility] N/A 2/22/2022    B/L SI joint block performed by Katheleen Hamman, DO at [facility] OR    BRONCHOSCOPY N/A 2020    BRONCHOSCOPY FLUOROSCOPY performed by Lola Skinner MD at [address] Bilateral 1990 ?  SECTION  2565,0067    DILATION AND CURETTAGE OF UTERUS  [id number],6581,[id number]    EXCISION OF PARATHYROID MASS Right 2013    rt parathyroidectomy (excision of rt inferior parathyroid mass) exploration of neck     FOOT SURGERY      left    FOOT SURGERY Left 2017    Dr Juan Her ([address] [place] [address])      JOINT REPLACEMENT  12    left knee    JOINT REPLACEMENT  2014    right knee    MALIGNANT SKIN LESION EXCISION Left 2017    BCC DORSAL FOOT WITH GRAFT & FS    PAIN MANAGEMENT PROCEDURE Left 2022    sacroiliac joint radiofrequency ablation, LEFT side first performed by Margo Sahu DO at [facility] Right 2022    sacroiliac joint Radiofrequency Ablations, right side performed by Margo Sahu DO at [address] [place] [address] (CERVIX REMOVED)  [address] ?     TOTAL KNEE ARTHROPLASTY Right 2014    OIO    UPPER GASTROINTESTINAL ENDOSCOPY N/A 2022    EGD performed by Carla Greer MD at [facility]     Meds    Current Medications    albuterol  2.5 mg Nebulization TID    predniSONE  40 mg Oral Daily    guaiFENesin  600 mg Oral BID    cefTRIAXone (ROCEPHIN) IV  1,000 mg IntraVENous Q24H    sodium chloride flush  5-40 mL IntraVENous 2 times per day    allopurinol  200 mg Oral Daily    apixaban  5 mg Oral BID    azithromycin  250 mg Oral Daily    [Held by provider] budesonide  0.5 mg Nebulization BID    lactobacillus  1 capsule Oral Daily    levothyroxine  88 mcg Oral Daily    metoprolol tartrate  12.5 mg Oral BID    multivitamin  1 tablet Oral Daily    atorvastatin  10 mg Oral Nightly     sodium chloride flush, sodium chloride, ondansetron **OR** ondansetron, polyethylene glycol, acetaminophen **OR** acetaminophen, albuterol sulfate HFA  IV Drips/Infusions   sodium chloride       Home Medications  Medications Prior to Admission: azithromycin (ZITHROMAX) 250 MG tablet, Take 1 tablet by mouth daily  simvastatin (ZOCOR) 20 MG tablet, TAKE 1 TABLET BY MOUTH AT  NIGHT  apixaban (ELIQUIS) 5 MG TABS tablet, TAKE 1 TABLET BY MOUTH  TWICE DAILY  albuterol sulfate HFA (PROAIR HFA) 108 (90 Base) MCG/ACT inhaler, Inhale 2 puffs into the lungs every 4 hours as needed for Wheezing or Shortness of Breath  levothyroxine (SYNTHROID) 88 MCG tablet, TAKE 1 TABLET BY MOUTH ONCE DAILY  allopurinol (ZYLOPRIM) 100 MG tablet, Take 2 tablets by mouth daily  budesonide (PULMICORT) 0.5 MG/2ML nebulizer suspension, Take 2 mLs by nebulization 2 times daily  metoprolol tartrate (LOPRESSOR) 25 MG tablet, take 1/2 tablet by mouth twice a day  Lactobacillus (PROBIOTIC ACIDOPHILUS) CAPS, Take 1 capsule by mouth daily   MULTIPLE VITAMIN PO, Take 1 tablet by mouth daily   Diet    ADULT DIET; Regular  Allergies    Colchicine, Levaquin [levofloxacin], Oxycodone, Percocet [oxycodone-acetaminophen], Rifampin, Sulfa antibiotics, and Cefuroxime  Sleep History    Never diagnosed with sleep apnea in the past.  Vitals     height is 5' 4\" (1.626 m) and weight is 155 lb 10.3 oz (70.6 kg). Her oral temperature is 97.8 °F (36.6 °C). Her blood pressure is 123/69 and her pulse is 88. Her respiration is 16 and oxygen saturation is 92%. Body mass index is 26.72 kg/m². SUPPLEMENTAL O2: O2 Flow Rate (L/min): 4 L/min     I/O      Intake/Output Summary (Last 24 hours) at 11/7/2022 1158  Last data filed at 11/7/2022 0635  Gross per 24 hour   Intake 660 ml   Output 280 ml   Net 380 ml       I/O last 3 completed shifts:   In: 900 [P.O.:900]  Out: 280 [Urine:280]   Patient Vitals for the past 96 hrs (Last 3 readings):   Weight   11/05/22 0346 155 lb 10.3 oz (70.6 kg)   11/04/22 1500 155 lb 3.3 oz (70.4 kg)         Exam   Physical Exam Constitutional: No distress on O2 per NC. Patient appears elderly. Head: Normocephalic and atraumatic. Mouth/Throat: Oropharynx is clear and moist.  No oral thrush. Eyes: Conjunctivae are normal. Pupils are equal, round. No scleral icterus. Neck: Neck supple. No tracheal deviation present. Cardiovascular: Afib. No peripheral edema  Pulmonary/Chest: Normal effort with bilateral air entry, faint posterior rales No stridor. No respiratory distress. Patient exhibits no tenderness. Abdominal: Soft. Bowel sounds audible. No distension or tenderness to palp. Musculoskeletal: Moves all extremities  Neurological: Patient is alert and oriented to person, place, and time. Labs  - Old records and notes have been reviewed in [facility]   ABG  Lab Results   Component Value Date/Time    PH 7.39 11/06/2022 10:57 AM    PO2 67 11/06/2022 10:57 AM    PCO2 48 11/06/2022 10:57 AM    HCO3 29 11/06/2022 10:57 AM    O2SAT 93 11/06/2022 10:57 AM     Lab Results   Component Value Date/Time    IFIO2 6 11/06/2022 10:57 AM     CBC  Recent Labs     11/05/22  0641   WBC 6.9   RBC 4.28   HGB 11.3*   HCT 35.7*   MCV 83.4   MCH 26.4   MCHC 31.7*      MPV 10.6        BMP  No results for input(s): NA, K, CL, CO2, BUN, CREATININE, GLUCOSE, MG, PHOS, CALCIUM, IONCA, MG in the last 72 hours. LFT  No results for input(s): AST, ALT, ALB, BILITOT, ALKPHOS, LIPASE in the last 72 hours. Invalid input(s): AMYLASE  TROP  Lab Results   Component Value Date/Time    TROPONINT < 0.010 11/04/2022 08:05 AM    TROPONINT < 0.010 09/30/2020 09:00 AM    TROPONINT < 0.010 09/25/2020 05:16 PM     BNP  No results for input(s): BNP in the last 72 hours. Lactic Acid  No results for input(s): LACTA in the last 72 hours. INR  No results for input(s): INR, PROTIME in the last 72 hours. PTT  No results for input(s): APTT in the last 72 hours. Glucose  No results for input(s): POCGLU in the last 72 hours.   UA   No results for input(s): SPECGRAV, Walkiersten Love, CLARITYU, MUCUS, PROTEINU, BLOODU, RBCUA, WBCUA, BACTERIA, NITRU, GLUCOSEU, BILIRUBINUR, UROBILINOGEN, KETUA, LABCAST, LABCASTTY, AMORPHOS in the last 72 hours. Invalid input(s): CRYSTALS  . PFTs           Sleep studies   none    Cultures    Flu and COVID negative    EKG     11/4/2022    Sinus rhythm with Premature atrial complexes  Left anterior fascicular block  Cannot rule out Anterior infarct , age undetermined  Abnormal ECG  When compared with ECG of 04-NOV-2022 06:30,  Premature atrial complexes are now Present  Left anterior fascicular block is now Present    Echocardiogram    Summary   Ejection fraction is visually estimated at 55%. Overall left ventricular function is normal.   Aortic valve appears tricuspid. Aortic valve leaflets are somewhat thickened. Aortic valve leaflets are Mildly calcified. There is a small localized posterior pericardial effusion noted. Signature      ----------------------------------------------------------------   Electronically signed by Jerel Barton MD (Interpreting   physician) on 09/30/2020 at 03:42 PM   ----------------------------------------------------------------  Radiology    CXR     XR CHEST PORTABLE     11/6/2022     FINDINGS: Severe fibrotic changes are again seen throughout the right lung with some volume loss. Persistent milder fibrotic changes are present in the left lung. The cardiac silhouette and pulmonary vasculature are within normal limits. There is no significant pleural effusion or pneumothorax. Visualized portions of the upper abdomen are within normal limits. The osseous structures are intact. No acute fractures or suspicious osseous lesions. Impression:  Extensive fibrotic changes are again present throughout the lungs. Overall appearance of the chest is similar to the previous study. 11/4/2022      Moderately severe fibrotic changes in the right lung with milder fibrotic changes in the left lung.  No definite new abnormalities. CT Scans  (See actual reports for details)  CT chest wo contrast 09/21/2022  The previously seen more numerous and more prominent multifocal groundglass opacities in the left lung have improved. The severe interstitial fibrosis, honeycombing, bronchiectasis most pronounced in the right upper lobe is unchanged and overall the    appearance of the chest is similar to the 2020 comparison. No pulmonary mass or nodule is observed. CT chest wo contrast 11/05/2022  1. Severe interstitial fibrotic changes are seen throughout the right hemithorax with extensive honeycombing which is most severe in the right upper lobe. Bronchiectasis is seen bilaterally although significantly more severe on the right side. 2. There are diffuse groundglass and nodular infiltrates throughout the left hemithorax. Assessment   -Acute on chronic hypoxic respiratory failure: unclear etiology. Baseline 1 LPM at rest and 5 LPM with exertion. Low suspicion for infectious etiology with lack of fever, leukocytosis, productive cough. No sick contacts. COVID and Flu neg. CXR with no new obvious consolidation or infiltrates. CXR appears unchanged compared to previous CXR in 05/2022. No signs of volume overload present. Suspect progression of underlying ILD. ILD/pulmonary fibrosis of uncertain etiology: Restrictive lung defect on PFT. Follows with Piper Noguera NP at pulmonology office. Sees Pulm at [facility] - GUEVARA Henley MD  -Her work up for connective tissue diseases are negative- ? UIP Vs Post inflammatory scarring from her previous hx of Mycobacterium avium/intracellulare infection of lungs- She refused to go for open lung biopsy with VATS. She was treated by Dr. Neto Melendez MD for Mycobacterium AVM intracellular infection for 13months.  Her antibiotics (Rifampin per chart) were discontinued due to development of optic neutitis and transient vision loss.   -Hx MAC 2017 with RUL scarring/honecombing  -Allergic Rhinitis  -PAF on Eliquis and Lopressor  -Hypothyroidism on Synthroid  -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    Plan   -Duonebs q4h  -Prednisone 40 mg daily for total 5 days   -Continue Rocephin for possible ELIGIO pneumonia, tolerating well had reported history of reaction to cefuroxime with cramping  -Continue home Zithromax as prescribed per pulmonologist at [address], [address] [place] [address] Loop with patient confirms not interested in Bronchoscopy at this time due to risks and possible need for mechanical ventilation. Recommend earlier appointment with [facility] for evaluation of progressive ILD, patient agrees with this plan   -CM note reviewed planning ECF for rehab    Questions and concerns addressed. Plan discussed with Dr. Glenn Jordan. Electronically signed by   JASMEET Hargrove CNP on 11/7/2022 at 11:58 AM     Addendum by Dr. Glenn Jordan MD:  Patient seen by me independently including key components of medical care. Face to face evaluation and examination was performed. Case discussed with Mr. Juan C Mack CNP  Italicized font, if present,  represents changes to the note made by me. More than 50% of the encounter time involved with patient care by the Pulmonary & Critical care service team spent by me . Please see my modifications mentioned below:  She is on 4LPM via nasal cannula. Follow up as above. Jany Michel educated about my impression and plan. She verbalizes understanding.     Electronically signed by   Sherryle Rued, MD on 11/7/2022 at 5:52 PM

## 2022-11-07 NOTE — CARE COORDINATION
DISCHARGE/PLANNING EVALUATION  11/7/22, 10:46 AM EST    Reason for Referral: interested in SNF  Mental Status: pt is alert and oriented   Decision Making: pt is capable of making own decisions   Family/Social/Home Environment: pt resides at home alone in an entry level condo. Pt is independent with personal care, cooking and light housekeeping. Pt does drive locally. Pt has 2 sons that live out of state ([address]). Current Services including food security, transportation and housekeeping: see above  Current Equipment:home O2, cane  Payment Source:Medicare/[employer]   Concerns or Barriers to Discharge: none noted  Post-acute [facility]) provider list was provided to patient. Patient was informed of their freedom to choose [facility] provider. Discussed and offered to show the patient the relevant [facility] Providers quality and resource use measures on Medicare Compare web site via computer based on patient's goals of care and treatment preferences. Questions regarding selection process were answered. Teach Back Method used with pt regarding care plan   Patient verbalize understanding of the plan of care and contribute to goal setting. Patient goals, treatment preferences and discharge plan: SW met with pt, discussed discharge POC. Pt is requesting ECF for rehab. Pt would like referral to 1. [facility], 2. The [address] [place] [address], 1. [facility]. SW left message with Doraquyen Byrd at [facility], await call back. Electronically signed by CIARA Ny on 11/7/2022 at 10:46 AM     11:17 AM update:  received call from Thomas Golfs with [facility], referral completed. Will need to wait for PT/OT notes. 12:56 PM update:  SW received call from Dora Hamburg with [facility], they are able to accept pt. Pt updated.

## 2022-11-07 NOTE — PROGRESS NOTES
[facility]  INPATIENT PHYSICAL THERAPY  EVALUATION  [facility] RENAL TELEMETRY 6K - [id number]    Time In: 1450  Time Out: 1515  Timed Code Treatment Minutes: 16 Minutes  Minutes: 25          Date: 2022  Patient Name: Cheyenne Brown,  Gender:  female        MRN: [id number]  : 1935  (80 y.o.)      Referring Practitioner: JASMEET Alfaro CNP  Diagnosis: shortness of breath  Additional Pertinent Hx: Per EMR \"80year-old female primary history of pulmonary fibrosis, hypertension, hyperlipidemia, hypothyroid, typical a fib who presented to the emergency department via EMS for shortness of breath. Patient states since last weekend she has had increased shortness of breath wears 4 L O2 at home at times pulse ox got down to 60s. She said increased weakness decreased appetite, patient's been nauseated, positive for chills positive for headache due to dehydration per patient. Patient states starting last night having shortness of breath while lying down. Patient denies fever vomiting or diarrhea denies any heart palpitations. Patient admitted for shortness of breath\"     Restrictions/Precautions:  Restrictions/Precautions: General Precautions, Fall Risk  Position Activity Restriction  Other position/activity restrictions: 4 L of O2 at baseline, monitor O2    Subjective:  Chart Reviewed: Yes  Patient assessed for rehabilitation services?: Yes  Family / Caregiver Present: No  Subjective: OK to see pt per nursing. Pt in bed when PT arrived, agreeable to PT session, pleasant and cooperative, willing to sit in bedside chair.     General:  Overall Orientation Status: Within Normal Limits  Orientation Level: Oriented X4  Vision: Impaired  Vision Exceptions: Wears glasses at all times  Hearing: Within functional limits       Pain: denies    Vitals: Oxygen: 4 L of O2, decreased to 71-74% following amb, required about 2-3 min to recover with PT bumping O2 to 5 L to help recover, amb another time on 5 L , decreased to 71% following gait, nursing notified, back on 4 L at end of session. Social/Functional History:    Lives With: Alone  Type of Home: House  Home Layout: One level  Home Access: Stairs to enter with rails  Entrance Stairs - Number of Steps: 2 LACY  Entrance Stairs - Rails: Right  Home Equipment: Corina Apolonia, rolling, Oxygen, Wheelchair-manual, Grab bars     Bathroom Shower/Tub: Walk-in shower  Bathroom Toilet: Handicap height  Bathroom Equipment: Shower chair, Grab bars in shower, Grab bars around toilet       ADL Assistance: Independent  Homemaking Assistance: Needs assistance  Ambulation Assistance: Independent  Transfer Assistance: Independent    Active : Yes (short distances/drive thrus)  Occupation: Retired  Additional Comments: Pt reports she has a friend who helps assist with cooking and cleaning tasks, a few hours 1x/week for groceries, and cleaning.     OBJECTIVE:  Range of Motion:  Bilateral Lower Extremity: WNL    Strength:  Bilateral Lower Extremity: Impaired - grossly deconditioned    Balance:  Static Sitting Balance:  Supervision  Dynamic Sitting Balance: Stand By Assistance  Static Standing Balance: Stand By Assistance, [place] for support in standing, sitting to monitor O2 and recover from walking  Bed Mobility:  Supine to Sit: Supervision, Stand By Assistance, X 1, with head of bed raised, with rail  Good demo noted  Transfers:  Sit to Stand: Stand By Assistance, [address], X 1, cues for hand placement, with verbal cues  Stand to Sit:Stand By Assistance, [address]  RW for support, cues for safety, no LOB noted, increased attempts from lower surface  Ambulation:  Stand By Assistance, [employer] Assistance, X 1, with cues for safety, with increased time for completion  Distance: 30 feet, 18 feet  Surface: Level Tile  Device:Rolling Walker  Gait Deviations:  Slow Jaimee, Decreased Step Length Bilaterally, and Decreased Gait Speed  Cues for safety and PT to manage O2    Functional Outcome Measures: Completed  -PAC Inpatient Mobility Raw Score : 18  AM-PAC Inpatient T-Scale Score : 43.63    ASSESSMENT:  Activity Tolerance:  Patient tolerance of  treatment: fair. Reduced endurance, rest breaks required      Treatment Initiated: Treatment and education initiated within context of evaluation. Evaluation time included review of current medical information, gathering information related to past medical, social and functional history, completion of standardized testing, formal and informal observation of tasks, assessment of data and development of plan of care and goals. Treatment time included skilled education and facilitation of tasks to increase safety and independence with functional mobility for improved independence and quality of life. Assessment: Body Structures, Functions, Activity Limitations Requiring Skilled Therapeutic Intervention: Decreased functional mobility , Decreased endurance, Decreased posture, Decreased balance, Decreased strength, Decreased ADL status  Assessment: Pt presents with the deficits above, requires 1 person assist for functional tasks with use of walker for support, rest breaks required due to drop in O2 and increased time for recovery. Pt requires skilled PT services to increase IND with functional tasks and return home safely. Therapy Prognosis: Good    Requires PT Follow-Up: Yes    Discharge Recommendations:  Discharge Recommendations: Continue to assess pending progress, Patient would benefit from continued therapy after discharge, Subacute/Skilled Nursing Facility    Patient Education:      .     Patient Education  Education Given To: Patient  Education Provided: Role of Therapy, Plan of Care, Equipment  Education Provided Comments: d/c planning,-planning SNF and then home  Education Method: Verbal  Education Outcome: Verbalized understanding, Continued education needed, Demonstrated understanding       Equipment Recommendations:  Equipment Needed: No    Plan:  Current Treatment Recommendations: Balance training, Gait training, Stair training, Functional mobility training, Strengthening, Equipment evaluation, education, & procurement, Neuromuscular re-education, Transfer training, Safety education & training, Endurance training, Patient/Caregiver education & training, Therapeutic activities, Home exercise program  General Plan:  (5x GM)    Goals:  Patient Goals : \"get better and more IND\"  Short Term Goals  Time Frame for Short Term Goals: by discharge  Short Term Goal 1: Pt will amb for >50 feet with LRAD For support with S to progress with mobility. Short Term Goal 2: Pt will demo S for transfers with LRAD for support to progress with mobility. Short Term Goal 3: Pt will demo IND with bed mobility tasks with bed flat to progress with mobility. Short Term Goal 4: Pt will tolerate 10-20 reps of ther ex to increase overall mobility. Long Term Goals  Time Frame for Long Term Goals : NA due to short ELOS    Following session, patient left in safe position with all fall risk precautions in place. In bedside chair, all needs and call light in reach.

## 2022-11-07 NOTE — CARE COORDINATION
11/7/22, 1:03 PM EST    DISCHARGE PLANNING EVALUATION       TASH spoke with Devendra Clayton from [facility] and they are able to accept at discharge. SW updated Patient and she is hopeful for Wednesday.

## 2022-11-07 NOTE — PROGRESS NOTES
Progress note   Internal Medicine Specialities             Patient:  Krystina Huang  YOB: 1935    MRN: [id number]   Acct:  [de-identified]   6K-03/003-A  Primary Care Physician: Linda Carranza MD    Admit Date: 11/4/2022           Subjective: Pt returned from restroom. Pt with exertion with this activity. NC increased to 6L from 4L. Pt at this time does not want bronchoscopy due possibly having to on a vent. Pt wanting to do physical therapy before being discharged. Pt denies any chest pain. SOB with talking patient and after doing activity. Pt states she is better with less anxiety than yesterday. Objective:      Physical Exam:    Vitals:Patient Vitals for the past 24 hrs:   BP Temp Temp src Pulse Resp SpO2   11/07/22 1015 123/69 97.8 °F (36.6 °C) Oral 88 16 92 %   11/07/22 0843 -- -- -- 88 18 90 %   11/07/22 0350 137/62 97.5 °F (36.4 °C) Oral 77 16 92 %   11/07/22 0030 123/61 97.8 °F (36.6 °C) Oral 67 18 96 %   11/06/22 2047 -- -- -- 80 24 92 %   11/06/22 2001 (!) 146/63 97.9 °F (36.6 °C) Oral 76 18 91 %   11/06/22 1604 131/60 98 °F (36.7 °C) Oral 81 18 97 %     Weight: Weight: 155 lb 10.3 oz (70.6 kg)     24 hour intake/output:  Intake/Output Summary (Last 24 hours) at 11/7/2022 1408  Last data filed at 11/7/2022 5908  Gross per 24 hour   Intake 540 ml   Output 280 ml   Net 260 ml       General appearance - alert   Eyes - pupils equal and reactive, extraocular eye movements intact  Mouth - mucous membranes moist, pharynx normal without lesions  Neck - supple, no significant adenopathy  Chest - Bilateral air entry, diminished 6LNC  Heart - normal rate  Abdomen - soft, nontender, nondistended, no masses or organomegaly, pos bs.   Neurological - alert, oriented, normal speech, no focal findings or movement disorder noted  Musculoskeletal - no joint tenderness, deformity or swelling  Extremities - peripheral pulses normal, no pedal edema, no clubbing or cyanosis  Skin - normal coloration and turgor, no rashes, no suspicious skin lesions noted    Review of Labs and Diagnostic Testing:    CBC:   Recent Labs     11/05/22  0641   WBC 6.9   HGB 11.3*   HCT 35.7*   MCV 83.4        BMP: No results for input(s): NA, K, CL, CO2, PHOS, BUN, CREATININE, CALCIUM, GLUCOSE in the last 72 hours. PT/INR: No results for input(s): PROTIME, INR in the last 72 hours. APTT: No results for input(s): APTT in the last 72 hours. Lipids: No results for input(s): ALKPHOS, ALT, AST, BILITOT, BILIDIR, LABALBU, AMYLASE, LIPASE in the last 72 hours. Troponin: No results for input(s): TROPONINT in the last 72 hours. Imaging:  [unfilled]    EKG:      Diet: ADULT DIET; Regular        Data:   Scheduled Meds: Scheduled Meds:   albuterol  2.5 mg Nebulization TID    predniSONE  40 mg Oral Daily    guaiFENesin  600 mg Oral BID    cefTRIAXone (ROCEPHIN) IV  1,000 mg IntraVENous Q24H    sodium chloride flush  5-40 mL IntraVENous 2 times per day    allopurinol  200 mg Oral Daily    apixaban  5 mg Oral BID    azithromycin  250 mg Oral Daily    lactobacillus  1 capsule Oral Daily    levothyroxine  88 mcg Oral Daily    metoprolol tartrate  12.5 mg Oral BID    multivitamin  1 tablet Oral Daily    atorvastatin  10 mg Oral Nightly     Continuous Infusions:   sodium chloride       PRN Meds:.sodium chloride flush, sodium chloride, ondansetron **OR** ondansetron, polyethylene glycol, acetaminophen **OR** acetaminophen, albuterol sulfate HFA  Continuous Infusions:   sodium chloride           Assessment   Acute on chronic resp failure with pulm fibrosis  HTN   Hypothyroidism  A Fib on anticoag    Plan   Pulmonary following  Continue oxygen management  Bronchodilators  PT/OT  Continue ATB- Reobtain resp culture, last sputum did not have enough  Pt on Eliquis for A fib  Discharge to ECF when stable.      Electronically signed by JASMEET Flores CNP on 11/7/2022 at 2:08 PM    Assessment and plan of care discussed with supervising physician, Dr Wandy Ramirez.    Pt seen and examined by me  Breathing improved   On 4L NC  Cont bronchodilators  Electronically signed by Josiah Antonio MD on 11/8/2022 at 7:59 AM

## 2022-11-07 NOTE — CARE COORDINATION
Case Management Assessment  Initial Evaluation    Date/Time of Evaluation: 11/7/2022 7:15 AM  Assessment Completed by: Tien Dias RN    If patient is discharged prior to next notation, then this note serves as note for discharge by case management. Patient Name: Dufm Gosselin                   YOB: 1935  Diagnosis: Shortness of breath [R06.02]  Pulmonary fibrosis ([address] [J84.10]                   Date / Time: 11/4/2022  6:20 AM    Patient Admission Status: Inpatient     Current PCP: Bryant Coats MD  PCP verified by CM? Chart Reviewed: Yes      Patient Orientation:      Patient Cognition:    History Provided by:      Hospitalization in the last 30 days (Readmission):  No    If yes, Readmission Assessment in  Navigator will be completed. Advance Directives:     Code Status: Limited     Primary Decision Maker: Jose Benavides Child - [phone]    Secondary Decision Maker: [id number] Andrew Duran  Child - [phone]    Discharge Planning  Patient lives with: Alone Type of Home: Apartment   Primary Caregiver:    Patient Support Systems include: Family Members, Friends/Neighbors, Children   Current Financial resources:    Current community resources:    Current services prior to admission: None   Type of Home Care services:  None    ADLS  Prior functional level:    Current functional level:        Family can provide assistance at DC: Would you like Case Management to discuss the discharge plan with any other family members/significant others, and if so, who?     Plans to Return to Present Housing:    Other Identified Issues/Barriers to RETURNING to current housing: patient interested in [facility]  Potential Assistance needed at discharge: N/A  Patient expects to discharge to: [address] [place] [address] for transportation at discharge:      Financial  Payor: MEDICARE / Plan: MEDICARE PART A AND B / Product Type: *No Product type* /     Does insurance require precert for SNF: No    Potential assistance Purchasing Medications:    Meds-to-Beds request:        [facility] ([facility]) - [address] [facility] [phone] - F [phone]  [address] [place] [address] 100  [facility] [address]  Phone: [phone] Fax: [phone] [address] [facility] #[id number] - [place], [address] [address] [facility] [phone]  [address]  [address] [facility] [address]  Phone: [phone] Fax: [phone] Delivery ([facility]) - Yocasta [address] [phone] [facility] [phone]  [facility] [address]  Phone: [phone] Fax: [phone]      Factors facilitating achievement of predicted outcomes: Family support    Barriers to discharge: Limited family support and Decreased endurance    Additional Case Management Notes: O2 at baseline of 4L, 92-96%. IV Rocephin, nebs, prednisone. PT/OT evals. IS. Cultures needed for AF[employer]. Pulm following. Procedure: none    The Plan for Transition of Care is related to the following treatment goals of Shortness of breath [R06.02]  Pulmonary fibrosis ([address] [J84.10]    Patient Goals/Plan/Treatment Preferences: Mando Elizabeth is from home alone. She uses home oxygen at 4L continuously, 2L at hs. She is interested in [facility] for skilled stay. Transportation/Food Security/Housekeeping Addressed: No issues identified.      Tasha Bedoya RN  Case Management Department

## 2022-11-07 NOTE — FLOWSHEET NOTE
11/07/22 9201   Safe Environment   Safety Measures   (Virtual RN rounds complete)   PT sitting upright in bed, No needs identified at this time. No acute distress noted. [facility]

## 2022-11-07 NOTE — PROGRESS NOTES
Physician Progress Note      PATIENT:               Fabby Tidwell  CSN #:                  [id number]  :                       1935  ADMIT DATE:       2022 6:20 AM  DISCH DATE:  RESPONDING  PROVIDER #:        Ashley Arroyo MD          QUERY TEXT:    Patient admitted with pulmonary fibrosis/ILD. Noted documentation of acute   respiratory failure in pulmonary consult note. In order to support the   diagnosis of acute respiratory failure, please include additional clinical   indicators in your documentation. Or please document if the diagnosis of   acute respiratory failure has been ruled out after further study. The medical record reflects the following:    Risk Factors: ILD/pulmonary fibrosis  Clinical Indicators: presents with SOB ongoing for week, per patient pulse ox   dropped into the 60's with ambulation at home, on arrival patient on 5L NC   pulse ox 96%, has been on baseline 4L NC since, noted tachypnea per flowsheet   RR 22-35, noted tachypnea with activity, per flow sheet no pulse ox <93%  Treatment: 4 L O2 per NC (baseline), Solu Medrol, Duoneb    Acute Respiratory Failure Clinical Indicators per [employer] Training Guide and   Quick Reference Guide:  pO2 < 60 mmHg or SpO2 (pulse oximetry) < 91% breathing room air  pCO2 > 50 and pH < 7.35  P/F ratio (pO2 / FIO2) < 300  pO2 decrease or pCO2 increase by 10 mmHg from baseline (if known)  Supplemental oxygen of 40% or more  Presence of respiratory distress, tachypnea, dyspnea, shortness of breath,   wheezing  Unable to speak in complete sentences  Use of accessory muscles to breathe  Extreme anxiety and feeling of impending doom  Tripod position  Confusion/altered mental status/obtunded    Thank you!     Chris Dinero, CRCR  RN Clinical   P: [phone]  Options provided:  -- Acute respiratory failure as evidenced by (please add clinical indicators),   .  -- Acute respiratory failure that resolved prior to arrival and now with   chronic respiratory failure  -- Acute Respiratory Failure ruled out after study and Chronic Respiratory   Failure confirmed  -- Acute Respiratory Failure ruled out after study  -- Other - I will add my own diagnosis  -- Disagree - Not applicable / Not valid  -- Disagree - Clinically unable to determine / Unknown  -- Refer to Clinical Documentation Reviewer    PROVIDER RESPONSE TEXT:    Check with pulmonary    Query created by: Mehran Paiz on 11/5/2022 8:09 AM      QUERY TEXT:    Patient admitted with pulmonary fibrosis/ILD, noted to have paroxysmal atrial   fibrillation and is maintained on Eliquis. If possible, please document in   progress notes and discharge summary if you are evaluating and/or treating any   of the following: The medical record reflects the following:    Risk Factors: PAF on Eliquis, 81 yo female, hx of HTN  Clinical Indicators: [id number] score 4, maintained on Eliquis  Treatment: Eliquis, bleeding precautions    Thank you! Lovette Alma, Severiano Bloomer, CRCR  RN Clinical   P: [phone]  Options provided:  -- Secondary hypercoagulable state in a patient with atrial fibrillation  -- Anticoagulation is prophylactic treatment only. -- Other - I will add my own diagnosis  -- Disagree - Not applicable / Not valid  -- Disagree - Clinically unable to determine / Unknown  -- Refer to Clinical Documentation Reviewer    PROVIDER RESPONSE TEXT:    This patient has secondary hypercoagulable state in a patient with atrial   fibrillation.     Query created by: Mehran Paiz on 11/5/2022 8:24 AM      Electronically signed by:  Josiah Antonio MD 11/7/2022 10:41 AM

## 2022-11-08 PROCEDURE — 6370000000 HC RX 637 (ALT 250 FOR IP): Performed by: NURSE PRACTITIONER

## 2022-11-08 PROCEDURE — 97166 OT EVAL MOD COMPLEX 45 MIN: CPT

## 2022-11-08 PROCEDURE — 97530 THERAPEUTIC ACTIVITIES: CPT

## 2022-11-08 PROCEDURE — 2580000003 HC RX 258

## 2022-11-08 PROCEDURE — 6370000000 HC RX 637 (ALT 250 FOR IP)

## 2022-11-08 PROCEDURE — 94640 AIRWAY INHALATION TREATMENT: CPT

## 2022-11-08 PROCEDURE — 1200000003 HC TELEMETRY R&B

## 2022-11-08 PROCEDURE — 6360000002 HC RX W HCPCS

## 2022-11-08 PROCEDURE — 99232 SBSQ HOSP IP/OBS MODERATE 35: CPT | Performed by: INTERNAL MEDICINE

## 2022-11-08 PROCEDURE — 97110 THERAPEUTIC EXERCISES: CPT

## 2022-11-08 PROCEDURE — 6360000002 HC RX W HCPCS: Performed by: INTERNAL MEDICINE

## 2022-11-08 PROCEDURE — 6370000000 HC RX 637 (ALT 250 FOR IP): Performed by: INTERNAL MEDICINE

## 2022-11-08 PROCEDURE — 97535 SELF CARE MNGMENT TRAINING: CPT

## 2022-11-08 RX ORDER — CEFDINIR 300 MG/1
300 CAPSULE ORAL EVERY 12 HOURS SCHEDULED
Status: DISCONTINUED | OUTPATIENT
Start: 2022-11-08 | End: 2022-11-09

## 2022-11-08 RX ORDER — ALBUTEROL SULFATE 2.5 MG/3ML
2.5 SOLUTION RESPIRATORY (INHALATION) 2 TIMES DAILY
Status: DISCONTINUED | OUTPATIENT
Start: 2022-11-08 | End: 2022-11-09 | Stop reason: HOSPADM

## 2022-11-08 RX ADMIN — GUAIFENESIN 600 MG: 600 TABLET, EXTENDED RELEASE ORAL at 09:03

## 2022-11-08 RX ADMIN — CEFDINIR 300 MG: 300 CAPSULE ORAL at 13:39

## 2022-11-08 RX ADMIN — AZITHROMYCIN MONOHYDRATE 250 MG: 250 TABLET ORAL at 09:04

## 2022-11-08 RX ADMIN — ALLOPURINOL 200 MG: 100 TABLET ORAL at 22:12

## 2022-11-08 RX ADMIN — ALBUTEROL SULFATE 2.5 MG: 2.5 SOLUTION RESPIRATORY (INHALATION) at 17:48

## 2022-11-08 RX ADMIN — SODIUM CHLORIDE, PRESERVATIVE FREE 10 ML: 5 INJECTION INTRAVENOUS at 09:04

## 2022-11-08 RX ADMIN — SODIUM CHLORIDE, PRESERVATIVE FREE 10 ML: 5 INJECTION INTRAVENOUS at 22:12

## 2022-11-08 RX ADMIN — GUAIFENESIN 600 MG: 600 TABLET, EXTENDED RELEASE ORAL at 22:13

## 2022-11-08 RX ADMIN — APIXABAN 5 MG: 5 TABLET, FILM COATED ORAL at 22:12

## 2022-11-08 RX ADMIN — METOPROLOL TARTRATE 12.5 MG: 25 TABLET, FILM COATED ORAL at 22:12

## 2022-11-08 RX ADMIN — LEVOTHYROXINE SODIUM 88 MCG: 0.09 TABLET ORAL at 05:41

## 2022-11-08 RX ADMIN — ATORVASTATIN CALCIUM 10 MG: 10 TABLET, FILM COATED ORAL at 22:12

## 2022-11-08 RX ADMIN — Medication 1 CAPSULE: at 09:03

## 2022-11-08 RX ADMIN — PREDNISONE 40 MG: 20 TABLET ORAL at 09:03

## 2022-11-08 RX ADMIN — Medication 1 TABLET: at 09:03

## 2022-11-08 RX ADMIN — APIXABAN 5 MG: 5 TABLET, FILM COATED ORAL at 09:03

## 2022-11-08 RX ADMIN — ALBUTEROL SULFATE 2.5 MG: 2.5 SOLUTION RESPIRATORY (INHALATION) at 07:41

## 2022-11-08 RX ADMIN — METOPROLOL TARTRATE 12.5 MG: 25 TABLET, FILM COATED ORAL at 09:03

## 2022-11-08 NOTE — PROGRESS NOTES
Progress note   Internal Medicine Specialities             Patient:  Cheyenne Brown  YOB: 1935    MRN: [id number]   Acct:  [de-identified]   6K-03/003-A  Primary Care Physician: Alvaro Kennedy MD    Admit Date: 11/4/2022           Subjective: Pt back in bed just returned from restroom, pt states very taxing for her and continued SOB. Pt on 4L NC. Pt continues to feel weak when ambulating. Pt denies any chest pain, continued cough, denies n/v/d no BM since Saturday, states not unusual for her routine. Objective:      Physical Exam:    Vitals:Patient Vitals for the past 24 hrs:   BP Temp Temp src Pulse Resp SpO2 Weight   11/08/22 1153 (!) 115/59 98.1 °F (36.7 °C) Oral 73 16 92 % --   11/08/22 0856 123/63 98.2 °F (36.8 °C) Oral 89 18 95 % --   11/08/22 0530 (!) 131/53 97.7 °F (36.5 °C) Oral 62 16 95 % --   11/08/22 0445 -- -- -- -- -- -- 150 lb (68 kg)   11/08/22 0130 138/62 97.6 °F (36.4 °C) Oral 74 16 94 % --   11/07/22 2142 -- -- -- 89 16 92 % --   11/07/22 2000 130/69 97.6 °F (36.4 °C) Oral 79 18 93 % --     Weight: Weight: 150 lb (68 kg)     24 hour intake/output:  Intake/Output Summary (Last 24 hours) at 11/8/2022 1253  Last data filed at 11/8/2022 5905  Gross per 24 hour   Intake 10 ml   Output --   Net 10 ml       General appearance - alert, ill appearing, exertion after activity. Eyes - pupils equal and reactive, extraocular eye movements intact  Mouth - mucous membranes moist, pharynx normal without lesions  Neck - supple, no significant adenopathy  Chest - Bilateral air entry, lower posterior rales. 4L NC  Heart - normal rate   Abdomen - soft, nontender, nondistended, no masses or organomegaly, pos bs.   Neurological - alert, oriented, normal speech, no focal findings or movement disorder noted  Musculoskeletal - no joint tenderness, deformity or swelling  Extremities - peripheral pulses normal, no pedal edema, no clubbing or cyanosis  Skin - normal coloration and turgor, no rashes, no suspicious skin lesions noted    Review of Labs and Diagnostic Testing:    CBC: No results for input(s): WBC, HGB, HCT, MCV, PLT in the last 72 hours. BMP: No results for input(s): NA, K, CL, CO2, PHOS, BUN, CREATININE, CALCIUM, GLUCOSE in the last 72 hours. PT/INR: No results for input(s): PROTIME, INR in the last 72 hours. APTT: No results for input(s): APTT in the last 72 hours. Lipids: No results for input(s): ALKPHOS, ALT, AST, BILITOT, BILIDIR, LABALBU, AMYLASE, LIPASE in the last 72 hours. Troponin: No results for input(s): TROPONINT in the last 72 hours. Imaging:  [unfilled]    EKG:      Diet: ADULT DIET; Regular        Data:   Scheduled Meds: Scheduled Meds:   albuterol  2.5 mg Nebulization BID    cefdinir  300 mg Oral 2 times per day    guaiFENesin  600 mg Oral BID    sodium chloride flush  5-40 mL IntraVENous 2 times per day    allopurinol  200 mg Oral Daily    apixaban  5 mg Oral BID    azithromycin  250 mg Oral Daily    lactobacillus  1 capsule Oral Daily    levothyroxine  88 mcg Oral Daily    metoprolol tartrate  12.5 mg Oral BID    multivitamin  1 tablet Oral Daily    atorvastatin  10 mg Oral Nightly     Continuous Infusions:   sodium chloride       PRN Meds:.sodium chloride flush, sodium chloride, ondansetron **OR** ondansetron, polyethylene glycol, acetaminophen **OR** acetaminophen, albuterol sulfate HFA  Continuous Infusions:   sodium chloride           Assessment   Acute on chronic resp failure with pulm fibrosis  HTN   Hypothyroidism  A Fib on anticoag    Plan   Pulmonary following  Prednisone completed  Oral ATB  Oxygen management  PT/OT  Glycolax as needed  Probable dc tomorrow ECF    Electronically signed by JASMEET Nichols CNP on 11/8/2022 at 12:53 PM    Assessment and plan of care discussed with supervising physician, Dr Rosalinda Valladares. I have discussed the case, including pertinent history and exam findings with the BRIAN/resident.  I have seen and examined the patient and the key elements of the encounter have been performed by me.  I agree with the assessment, plan and orders as documented by the NP.

## 2022-11-08 NOTE — PROGRESS NOTES
[facility]  INPATIENT PHYSICAL THERAPY  DAILY NOTE  STRZ RENAL TELEMETRY 6K - [id number]-[id number]  Time In: 5116  Time Out: 1336  Timed Code Treatment Minutes: 30 Minutes  Minutes: 30          Date: 2022  Patient Name: Smith Omalley,  Gender:  female        MRN: [id number]  : 1935  (80 y.o.)     Referring Practitioner: JASMEET Camarillo CNP  Diagnosis: shortness of breath  Additional Pertinent Hx: Per EMR \"80year-old female primary history of pulmonary fibrosis, hypertension, hyperlipidemia, hypothyroid, typical a fib who presented to the emergency department via EMS for shortness of breath. Patient states since last weekend she has had increased shortness of breath wears 4 L O2 at home at times pulse ox got down to 60s. She said increased weakness decreased appetite, patient's been nauseated, positive for chills positive for headache due to dehydration per patient. Patient states starting last night having shortness of breath while lying down. Patient denies fever vomiting or diarrhea denies any heart palpitations. Patient admitted for shortness of breath\"     Prior Level of Function:  Lives With: Alone  Type of Home: Condo  Home Layout: One level  Home Access: Stairs to enter with rails  Entrance Stairs - Number of Steps: 2  Entrance Stairs - Rails: Right  Home Equipment: Dea Case, rolling, Oxygen, Wheelchair-manual, Grab bars   Bathroom Shower/Tub: Walk-in shower  Bathroom Toilet: Handicap height  Bathroom Equipment: Shower chair, Grab bars in shower, Grab bars around toilet  Bathroom Accessibility: Walker accessible    Judson Donohue Help From: Tunde Umanzor (assists with mail, trash to curb)  ADL Assistance: Independent  Homemaking Assistance: Needs assistance  Ambulation Assistance: Independent  Transfer Assistance: Independent  Active :  Yes  Additional Comments: Pt reports she has a friend who helps assist with cooking and cleaning tasks, a few hours 1x/week for groceries, and cleaning. uses an AD off and on. Restrictions/Precautions:  Restrictions/Precautions: General Precautions, Fall Risk  Position Activity Restriction  Other position/activity restrictions: 4 L of O2 at baseline, monitor O2     SUBJECTIVE: ok to see pt per nursing. Pt in bed when PT arrived, agreeable to PT session, requesting to use the restroom during session. Pt required increased time for tasks due to rest breaks and increased SOB. PAIN: denies    Vitals: Oxygen: 4 L of O2, decreased to 70%, increased to 5 L of O2 to  help recover, took about 3-4 min to recover. Pt was 85-92% while completing exercises. OBJECTIVE:  Bed Mobility:  Supine to Sit: Stand By Assistance, X 1, with head of bed raised, with rail  Sit to Supine: Stand By Assistance, X 1, with head of bed raised, with rail   Scooting: Maximum Assistance, X 2 to scoot up in bed    Transfers:  Sit to Stand: Stand By Assistance, X 1, with verbal cues  Stand to Sit:Stand By Assistance, X 1, with verbal cues  RW for support, cues for safety. Pt completed transfers from various surfaces and heights, no LOB noted  Ambulation:  Stand By Assistance, Contact Guard Assistance, X 1, with cues for safety, with verbal cues , with increased time for completion  Distance: 18 feet x2  Surface: Level Tile  Device:Rolling Walker  Gait Deviations:  Slow Jaimee, Decreased Step Length Bilaterally, and Decreased Gait Speed  No LOB, cues for safety  Balance:  Static Sitting Balance:  Supervision  Dynamic Sitting Balance: Stand By Assistance  Static Standing Balance: Stand By Assistance, [place] for support in standing, seated on EOB to prepare for mobility and recover from gait. Exercise:  Patient was guided in 1 set(s) 10 reps of exercise to both lower extremities. Ankle pumps, Glut sets, Quad sets, Heelslides, Hip abduction/adduction, and Straight leg raises. Exercises were completed for increased independence with functional mobility.  VC for proper technique of exercises with good demo. Rest breaks required    Functional Outcome Measures: Completed  AM-PAC Inpatient Mobility Raw Score : 18  AM-PAC Inpatient T-Scale Score : 43.63    ASSESSMENT:  Assessment: Patient progressing toward established goals. Activity Tolerance:  Patient tolerance of  treatment: fair. Increased SOB, decreased endurance     Equipment Recommendations:Equipment Needed: No  Discharge Recommendations: Subacte/Skilled Nursing Facility  Plan: Current Treatment Recommendations: Balance training, Gait training, Stair training, Functional mobility training, Strengthening, Equipment evaluation, education, & procurement, Neuromuscular re-education, Transfer training, Safety education & training, Endurance training, Patient/Caregiver education & training, Therapeutic activities, Home exercise program  General Plan:  (5x GM)    Patient Education  Patient Education: Plan of Care, [employer] Mobility, Equipment Education, Transfers, Gait, Use of Gait [place], Verbal Exercise Instruction, Activity Pacing, Pursed Lip Breathing,  - Patient Verbalized Understanding, - Patient Requires Continued Education    Goals:  Patient Goals : \"get better and more IND\"  Short Term Goals  Time Frame for Short Term Goals: by discharge  Short Term Goal 1: Pt will amb for >50 feet with LRAD For support with S to progress with mobility. Short Term Goal 2: Pt will demo S for transfers with LRAD for support to progress with mobility. Short Term Goal 3: Pt will demo IND with bed mobility tasks with bed flat to progress with mobility. Short Term Goal 4: Pt will tolerate 10-20 reps of ther ex to increase overall mobility. Long Term Goals  Time Frame for Long Term Goals : NA due to short ELOS    Following session, patient left in safe position with all fall risk precautions in place. Back in bed, all needs and call light in reach, alarm on.

## 2022-11-08 NOTE — PLAN OF CARE
Problem: Respiratory - Adult  Goal: Clear lung sounds  11/8/2022 0749 by Davey Aparicio RCP  Outcome: Progressing     Problem: Respiratory - Adult  Goal: Achieves optimal ventilation and oxygenation  11/8/2022 0749 by Yandel Joel RCP  Outcome: Progressing

## 2022-11-08 NOTE — PROGRESS NOTES
11/08/22 0746   RT Protocol   History Pulmonary Disease 2   Respiratory pattern 2   Breath sounds 2   Cough 0   Indications for Bronchodilator Therapy On home bronchodilators   Bronchodilator Assessment Score 6

## 2022-11-08 NOTE — PLAN OF CARE
Problem: Discharge Planning  Goal: Discharge to home or other facility with appropriate resources  11/8/2022 1115 by Jessica Danielson RN  Outcome: Progressing  Flowsheets (Taken 11/8/2022 0857)  Discharge to home or other facility with appropriate resources:   Identify barriers to discharge with patient and caregiver   Arrange for needed discharge resources and transportation as appropriate   Identify discharge learning needs (meds, wound care, etc)  Note: Patient plans to discharge to an ECF for short-term therapy once medically stable. 11/8/2022 0322 by Mima Merrill RN  Outcome: Progressing     Problem: Skin/Tissue Integrity  Goal: Absence of new skin breakdown  Description: 1. Monitor for areas of redness and/or skin breakdown  2. Assess vascular access sites hourly  3. Every 4-6 hours minimum:  Change oxygen saturation probe site  4. Every 4-6 hours:  If on nasal continuous positive airway pressure, respiratory therapy assess nares and determine need for appliance change or resting period. 11/8/2022 1115 by Jessica Danielson RN  Outcome: Progressing  Note: No new skin break down noted at this time. Encouraged patient to reposition self in bed. 11/8/2022 0322 by Mima Merrill RN  Outcome: Progressing     Problem: Safety - Adult  Goal: Free from fall injury  11/8/2022 1115 by Jessica Danielson RN  Outcome: Progressing  Flowsheets (Taken 11/7/2022 0650 by Renuka Macias RN)  Free From Fall Injury: Instruct family/caregiver on patient safety  Note: No falls noted this shift. Continue falling star program. Bed alarm on, bed in low position. Call light and personal belongings in reach. Patient uses call light appropriately.    11/8/2022 0322 by Mima Merrill RN  Outcome: Progressing     Problem: ABCDS Injury Assessment  Goal: Absence of physical injury  11/8/2022 1115 by Jessica Danielson RN  Outcome: Progressing  Flowsheets (Taken 11/8/2022 1115)  Absence of Physical Injury: Implement safety measures based on patient assessment  11/8/2022 0322 by Fior Grijalva RN  Outcome: Progressing     Problem: Respiratory - Adult  Goal: Clear lung sounds  11/8/2022 1115 by Wilmer Mac RN  Outcome: Progressing  11/8/2022 0749 by Davey Aparicio RCP  Outcome: Progressing  11/8/2022 0322 by Fior Grijalva RN  Outcome: Progressing  Goal: Achieves optimal ventilation and oxygenation  11/8/2022 1115 by Wilmer Mac RN  Outcome: Progressing  Flowsheets (Taken 11/8/2022 0857)  Achieves optimal ventilation and oxygenation:   Assess for changes in respiratory status   Assess for changes in mentation and behavior   Position to facilitate oxygenation and minimize respiratory effort   Assess and instruct to report shortness of breath or any respiratory difficulty  Note: Baseline of 4L nasal cannula  11/8/2022 0749 by Davey Aparicio RCP  Outcome: Progressing  11/8/2022 0322 by Fior Grijalva RN  Outcome: Progressing  Flowsheets (Taken 11/8/2022 0322)  Achieves optimal ventilation and oxygenation:   Assess for changes in respiratory status   Assess for changes in mentation and behavior   Position to facilitate oxygenation and minimize respiratory effort   Oxygen supplementation based on oxygen saturation or arterial blood gases   Assess and instruct to report shortness of breath or any respiratory difficulty   Respiratory therapy support as indicated     Problem: Nutrition Deficit:  Goal: Optimize nutritional status  11/8/2022 1115 by Wilmer Mac RN  Outcome: Progressing  Flowsheets (Taken 11/8/2022 0322 by Fior Grijalva RN)  Nutrient intake appropriate for improving, restoring, or maintaining nutritional needs:   Assess nutritional status and recommend course of action   Monitor oral intake, labs, and treatment plans   Recommend appropriate diets, oral nutritional supplements, and vitamin/mineral supplements   Recommend, monitor, and adjust tube feedings and TPN/PPN based on assessed needs   Provide specific nutrition education to patient or family as appropriate  11/8/2022 0322 by Amina Pretty RN  Outcome: Progressing  Flowsheets (Taken 11/8/2022 0373)  Nutrient intake appropriate for improving, restoring, or maintaining nutritional needs:   Assess nutritional status and recommend course of action   Monitor oral intake, labs, and treatment plans   Recommend appropriate diets, oral nutritional supplements, and vitamin/mineral supplements   Recommend, monitor, and adjust tube feedings and TPN/PPN based on assessed needs   Provide specific nutrition education to patient or family as appropriate     Problem: Chronic Conditions and Co-morbidities  Goal: Patient's chronic conditions and co-morbidity symptoms are monitored and maintained or improved  11/8/2022 1115 by Eboni Krueger RN  Outcome: Progressing  [address] (Taken 11/8/2022 0857)  Care Plan - Patient's Chronic Conditions and Co-Morbidity Symptoms are Monitored and Maintained or Improved:   Monitor and assess patient's chronic conditions and comorbid symptoms for stability, deterioration, or improvement   Collaborate with multidisciplinary team to address chronic and comorbid conditions and prevent exacerbation or deterioration   Update acute care plan with appropriate goals if chronic or comorbid symptoms are exacerbated and prevent overall improvement and discharge  Note: Afib  11/8/2022 0322 by Amina Pretty RN  Outcome: Progressing  Flowsheets (Taken 11/8/2022 0322)  Care Plan - Patient's Chronic Conditions and Co-Morbidity Symptoms are Monitored and Maintained or Improved:   Monitor and assess patient's chronic conditions and comorbid symptoms for stability, deterioration, or improvement   Collaborate with multidisciplinary team to address chronic and comorbid conditions and prevent exacerbation or deterioration   Update acute care plan with appropriate goals if chronic or comorbid symptoms are exacerbated and prevent overall improvement and discharge     Problem: needed   Obtain physical therapy/occupational therapy consults as needed  11/8/2022 0322 by Rafat Bazzi RN  Outcome: Progressing  Flowsheets (Taken 11/8/2022 0322)  Return ADL Status to a Safe Level of Function:   Administer medication as ordered   Assess activities of daily living deficits and provide assistive devices as needed   Obtain physical therapy/occupational therapy consults as needed   Assist and instruct patient to increase activity and self care as tolerated     Problem: Gastrointestinal - Adult  Goal: Maintains or returns to baseline bowel function  11/8/2022 1115 by Jed Jaffe RN  Outcome: Progressing  [address] (Taken 11/8/2022 0857)  Maintains or returns to baseline bowel function: Assess bowel function  11/8/2022 0322 by Rafat Bazzi RN  Outcome: Progressing  Flowsheets (Taken 11/8/2022 0322)  Maintains or returns to baseline bowel function:   Assess bowel function   Encourage oral fluids to ensure adequate hydration   Administer ordered medications as needed   Encourage mobilization and activity   Nutrition consult to assist patient with appropriate food choices  Goal: Maintains adequate nutritional intake  11/8/2022 1115 by Jed Jaffe RN  Outcome: Progressing  Flowsheets (Taken 11/8/2022 0857)  Maintains adequate nutritional intake: Monitor percentage of each meal consumed  11/8/2022 0322 by Rafat Bazzi RN  Outcome: Progressing  Flowsheets (Taken 11/8/2022 0322)  Maintains adequate nutritional intake:   Monitor percentage of each meal consumed   Assist with meals as needed   Identify factors contributing to decreased intake, treat as appropriate   Monitor intake and output, weight and lab values   Obtain nutritional consult as needed     Problem: Metabolic/Fluid and Electrolytes - Adult  Goal: Electrolytes maintained within normal limits  11/8/2022 1115 by Jed Jaffe RN  Outcome: Progressing  Flowsheets (Taken 11/8/2022 0857)  Electrolytes maintained within normal limits:   Monitor labs and assess patient for signs and symptoms of electrolyte imbalances   Administer electrolyte replacement as ordered   Monitor response to electrolyte replacements, including repeat lab results as appropriate  11/8/2022 0322 by Gigi Gonzalez RN  Outcome: Progressing  Flowsheets (Taken 11/8/2022 0322)  Electrolytes maintained within normal limits:   Monitor labs and assess patient for signs and symptoms of electrolyte imbalances   Administer electrolyte replacement as ordered   Monitor response to electrolyte replacements, including repeat lab results as appropriate   Fluid restriction as ordered   Instruct patient on fluid and nutrition restrictions as appropriate  Goal: Hemodynamic stability and optimal renal function maintained  11/8/2022 1115 by Imelda Mcmullen RN  Outcome: Progressing  Flowsheets (Taken 11/8/2022 0857)  Hemodynamic stability and optimal renal function maintained:   Monitor labs and assess for signs and symptoms of volume excess or deficit   Monitor urine specific gravity, serum osmolarity and serum sodium as indicated or ordered   Monitor intake, output and patient weight  11/8/2022 0322 by Gigi Gonzalez RN  Outcome: Progressing  Flowsheets (Taken 11/8/2022 0322)  Hemodynamic stability and optimal renal function maintained:   Monitor labs and assess for signs and symptoms of volume excess or deficit   Monitor intake, output and patient weight   Monitor response to interventions for patient's volume status, including labs, urine output, blood pressure (other measures as available)   Encourage oral intake as appropriate   Instruct patient on fluid and nutrition restrictions as appropriate     Care plan reviewed with patient. Patient verbalizes understanding of plan of care and contributes to goal setting.

## 2022-11-08 NOTE — PROGRESS NOTES
[facility] 60  INPATIENT OCCUPATIONAL THERAPY  [facility] RENAL TELEMETRY 6K  EVALUATION    Time:   Time In: 1039  Time Out: 1002  Timed Code Treatment Minutes: 23 Minutes  Minutes: 33          Date: 2022  Patient Name: Cheyenne Brown,   Gender: female      MRN: [id number]  : 1935  (80 y.o.)  Referring Practitioner: JULIO CÉSAR Malagon  Diagnosis: shortness of breath  Additional Pertinent Hx: per chart review; 66-year-old female primary history of pulmonary fibrosis, hypertension, hyperlipidemia, hypothyroid, typical a fib who presented to the emergency department via EMS for shortness of breath. Patient states since last weekend she has had increased shortness of breath wears 4 L O2 at home at times pulse ox got down to 60s. She said increased weakness decreased appetite, patient's been nauseated, positive for chills positive for headache due to dehydration per patient. Patient states starting last night having shortness of breath while lying down. Patient denies fever vomiting or diarrhea denies any heart palpitations. Restrictions/Precautions:  Restrictions/Precautions: General Precautions, Fall Risk  Position Activity Restriction  Other position/activity restrictions: 4 L of O2 at baseline, monitor O2    Subjective  Chart Reviewed: Yes, Orders, Progress Notes, History and Physical  Patient assessed for rehabilitation services?: Yes  Family / Caregiver Present: No    Subjective: RN approved session, patient supine in bed with head elevated upon OT arrival and agreeable to eval. patient A & O x 4. Pain: 0/10:     Vitals: Oxygen: on 4 L O2 while supine in bed, increased to 5 L during bed mobility due to appearing SOB. O2 at 81% once seated EOB. Patient mainly breathing thru mouth with cues for pursed lip breathing tech and required cues t/o for correct tech.  Patient became SOB and needed extended rest breaks b/t every simple task t/o eval. Sats mainly stayed at 85% t/o eval. O2 turned back to 4 L when rested in recliner. Social/Functional History:  Lives With: Alone  Type of Home: Condo  Home Layout: One level  Home Access: Stairs to enter with rails  Entrance Stairs - Number of Steps: 2  Entrance Stairs - Rails: Right  Home Equipment: Corina Apolonia, rolling, Oxygen, Wheelchair-manual, Grab bars   Bathroom Shower/Tub: Walk-in shower  Bathroom Toilet: Handicap height  Bathroom Equipment: Shower chair, Grab bars in shower, Grab bars around toilet  Bathroom Accessibility: Walker accessible    Phyllistine Alvaro Help From: [employer] (assists with mail, trash to curb)  ADL Assistance: Independent  Homemaking Assistance: Needs assistance  Ambulation Assistance: Independent  Transfer Assistance: Independent    Active : Yes  Occupation: Retired  Additional Comments: Pt reports she has a friend who helps assist with cooking and cleaning tasks, a few hours 1x/week for groceries, and cleaning. uses an AD off and on. VISION:Corrected    HEARING:  WFL    COGNITION: Decreased Insight and Decreased Problem Solving    RANGE OF MOTION:  Bilateral Upper Extremity:  WFL    STRENGTH:  Bilateral Upper Extremity:  shldr 4-/5 elbow flex 4-/5 ext 3+/5  (F-)    SENSATION:   WFL    ADL:   Grooming: Minimal Assistance. To s/u toothbrush and completed oral care seated in recliner with feet up  Lower Extremity Dressing: Maximum Assistance. To don/doff slipper socks; patient states she does not wear socks at home and wears slip on shoes. MOD A to removed urine saturated depend while supine in bed  [address] In standing to wipe down skin after removing urine saturated depend . BALANCE:  Sitting Balance:  Stand By Assistance. Standing Balance: Contact Guard Assistance. With use of 2 w/w for support    BED MOBILITY:  Supine to Sit: Stand By Assistance with increased time and use of bed rails    TRANSFERS:  Sit to Stand:  Contact Guard Assistance.  Cues for hand placement and sequencing FUNCTIONAL MOBILITY:  Assistive Device: Rolling Walker  Assist Level:  Contact Guard Assistance. Distance:  from EOB to recliner         Activity Tolerance:  Patient tolerance of  treatment: fair. O2 dependent, low O2 sats with simple tasks, sedentary, de-conditioned, weakness      Assessment:  Assessment: patient demonstrates overall de-conditioning and extreme SOB with low O2 sats with simple tasks impacting her ability to complete ADLs and functional transfers as prior. patient would benefit from continued, skilled OT to increase activity tolerance, UB strength, ease and (I) with ADLs and functional transfers with edu in energy conservation tech to safely transition to prior living environment, decrease caregiver burden and increase occupational performance. Performance deficits / Impairments: Decreased functional mobility , Decreased ADL status, Decreased endurance, Decreased strength  Prognosis: Fair  REQUIRES OT FOLLOW-UP: Yes  Decision Making: Medium Complexity    Treatment Initiated: Treatment and education initiated within context of evaluation. Evaluation time included review of current medical information, gathering information related to past medical, social and functional history, completion of standardized testing, formal and informal observation of tasks, assessment of data and development of plan of care and goals. Treatment time included skilled education and facilitation of tasks to increase safety and independence with ADL's for improved functional independence and quality of life.     Discharge Recommendations:  Continue to assess pending progress, Patient would benefit from continued therapy after discharge, [address] [facility], [address] [facility] with OT    Patient Education:     Patient Education  Education Given To: Patient  Education Provided: Role of Therapy, Transfer Training, Plan of Care, Fall Prevention Strategies, Precautions, ADL Adaptive Strategies  Education Provided Comments: importance of increasing activity, pursed lip breathing tech  Barriers to Learning: None    Equipment Recommendations:  Equipment Needed: Yes  Mobility Devices: ADL Assistive Devices    Plan:  Times Per Week: 5x  Times Per Day: Once a day  Current Treatment Recommendations: Strengthening, Balance training, Functional mobility training, Endurance training, Safety education & training, Neuromuscular re-education, Patient/Caregiver education & training, Self-Care / ADL, Equipment evaluation, education, & procurement. See long-term goal time frame for expected duration of plan of care. If no long-term goals established, a short length of stay is anticipated. Goals:  Patient goals : return home at [facility]  Short Term Goals  Time Frame for Short Term Goals: by discharge  Short Term Goal 1: patient will tolerate 2 min functional standing with one-two hand release with (S) and O2 >/= 90% to increase activity tolerance for self care routine. Short Term Goal 2: patient will functionally ambulate to/from BR with (S) and 0-1 cues for O2 tubing mgmt and sequencing. Short Term Goal 3: patient will complete ADL routine with (S) and edu in energy conservation tech to increase ADL success. Short Term Goal 4: patient will tolerate moderate resistive UB exer to increase UB strength for functional transfers. Following session, patient left in safe position with all fall risk precautions in place.

## 2022-11-08 NOTE — RT PROTOCOL NOTE
RT Inhaler-Nebulizer Bronchodilator Protocol Note    There is a bronchodilator order in the chart from a provider indicating to follow the RT Bronchodilator Protocol and there is an Initiate RT Inhaler-Nebulizer Bronchodilator Protocol order as well (see protocol at bottom of note). CXR Findings:  XR CHEST PORTABLE    Result Date: 11/6/2022  Extensive fibrotic changes are again present throughout the lungs. Overall appearance of the chest is similar to the previous study. **This report has been created using voice recognition software. It may contain minor errors which are inherent in voice recognition technology. ** Final report electronically signed by Dr Jordyn Chase on 11/6/2022 9:58 AM      The findings from the last RT Protocol Assessment were as follows:   History Pulmonary Disease: Chronic pulmonary disease  Respiratory Pattern: Dyspnea on exertion or RR 21-25 bpm  Breath Sounds: Slightly diminished and/or crackles  Cough: Strong, spontaneous, non-productive  Indication for Bronchodilator Therapy: On home bronchodilators  Bronchodilator Assessment Score: 6    Aerosolized bronchodilator medication orders have been revised according to the RT Inhaler-Nebulizer Bronchodilator Protocol below. Respiratory Therapist to perform RT Therapy Protocol Assessment initially then follow the protocol. Repeat RT Therapy Protocol Assessment PRN for score 0-3 or on second treatment, BID, and PRN for scores above 3. No Indications - adjust the frequency to every 6 hours PRN wheezing or bronchospasm, if no treatments needed after 48 hours then discontinue using Per Protocol order mode. If indication present, adjust the RT bronchodilator orders based on the Bronchodilator Assessment Score as indicated below.   Use Inhaler orders unless patient has one or more of the following: on home nebulizer, not able to hold breath for 10 seconds, is not alert and oriented, cannot activate and use MDI correctly, or respiratory rate 25 breaths per minute or more, then use the equivalent nebulizer order(s) with same Frequency and PRN reasons based on the score. If a patient is on this medication at home then do not decrease Frequency below that used at home. 0-3 - enter or revise RT bronchodilator order(s) to equivalent RT Bronchodilator order with Frequency of every 4 hours PRN for wheezing or increased work of breathing using Per Protocol order mode. 4-6 - enter or revise RT Bronchodilator order(s) to two equivalent RT bronchodilator orders with one order with BID Frequency and one order with Frequency of every 4 hours PRN wheezing or increased work of breathing using Per Protocol order mode. 7-10 - enter or revise RT Bronchodilator order(s) to two equivalent RT bronchodilator orders with one order with TID Frequency and one order with Frequency of every 4 hours PRN wheezing or increased work of breathing using Per Protocol order mode. 11-13 - enter or revise RT Bronchodilator order(s) to one equivalent RT bronchodilator order with QID Frequency and an Albuterol order with Frequency of every 4 hours PRN wheezing or increased work of breathing using Per Protocol order mode. Greater than 13 - enter or revise RT Bronchodilator order(s) to one equivalent RT bronchodilator order with every 4 hours Frequency and an Albuterol order with Frequency of every 2 hours PRN wheezing or increased work of breathing using Per Protocol order mode. RT to enter RT Home Evaluation for COPD & MDI Assessment order using Per Protocol order mode.     Electronically signed by Susanna Bender RCP on 11/8/2022 at 7:47 AM

## 2022-11-08 NOTE — PLAN OF CARE
Problem: Discharge Planning  Goal: Discharge to home or other facility with appropriate resources  Outcome: Progressing     Problem: Skin/Tissue Integrity  Goal: Absence of new skin breakdown  Description: 1. Monitor for areas of redness and/or skin breakdown  2. Assess vascular access sites hourly  3. Every 4-6 hours minimum:  Change oxygen saturation probe site  4. Every 4-6 hours:  If on nasal continuous positive airway pressure, respiratory therapy assess nares and determine need for appliance change or resting period.   Outcome: Progressing     Problem: Safety - Adult  Goal: Free from fall injury  Outcome: Progressing     Problem: ABCDS Injury Assessment  Goal: Absence of physical injury  Outcome: Progressing     Problem: Respiratory - Adult  Goal: Clear lung sounds  Outcome: Progressing  Goal: Achieves optimal ventilation and oxygenation  Outcome: Progressing  Flowsheets (Taken 11/8/2022 0322)  Achieves optimal ventilation and oxygenation:   Assess for changes in respiratory status   Assess for changes in mentation and behavior   Position to facilitate oxygenation and minimize respiratory effort   Oxygen supplementation based on oxygen saturation or arterial blood gases   Assess and instruct to report shortness of breath or any respiratory difficulty   Respiratory therapy support as indicated     Problem: Musculoskeletal - Adult  Goal: Return mobility to safest level of function  Outcome: Progressing  Flowsheets (Taken 11/8/2022 0322)  Return Mobility to Safest Level of Function:   Assess patient stability and activity tolerance for standing, transferring and ambulating with or without assistive devices   Assist with transfers and ambulation using safe patient handling equipment as needed   Apply continuous passive motion per provider or physical therapy orders to increase flexion toward goal   Instruct patient/family in ordered activity level  Goal: Maintain proper alignment of affected body part  Outcome: Progressing  Flowsheets (Taken 11/8/2022 0322)  Maintain proper alignment of affected body part:   Support and protect limb and body alignment per provider's orders   Instruct and reinforce with patient and family use of appropriate assistive device and precautions (e.g. spinal or hip dislocation precautions)  Goal: Return ADL status to a safe level of function  Outcome: Progressing  Flowsheets (Taken 11/8/2022 0322)  Return ADL Status to a Safe Level of Function:   Administer medication as ordered   Assess activities of daily living deficits and provide assistive devices as needed   Obtain physical therapy/occupational therapy consults as needed   Assist and instruct patient to increase activity and self care as tolerated     Problem: Gastrointestinal - Adult  Goal: Maintains or returns to baseline bowel function  Outcome: Progressing  Flowsheets (Taken 11/8/2022 0322)  Maintains or returns to baseline bowel function:   Assess bowel function   Encourage oral fluids to ensure adequate hydration   Administer ordered medications as needed   Encourage mobilization and activity   Nutrition consult to assist patient with appropriate food choices  Goal: Maintains adequate nutritional intake  Outcome: Progressing  Flowsheets (Taken 11/8/2022 0322)  Maintains adequate nutritional intake:   Monitor percentage of each meal consumed   Assist with meals as needed   Identify factors contributing to decreased intake, treat as appropriate   Monitor intake and output, weight and lab values   Obtain nutritional consult as needed     Problem: Metabolic/Fluid and Electrolytes - Adult  Goal: Electrolytes maintained within normal limits  Outcome: Progressing  Flowsheets (Taken 11/8/2022 0322)  Electrolytes maintained within normal limits:   Monitor labs and assess patient for signs and symptoms of electrolyte imbalances   Administer electrolyte replacement as ordered   Monitor response to electrolyte replacements, including repeat lab results as appropriate   Fluid restriction as ordered   Instruct patient on fluid and nutrition restrictions as appropriate  Goal: Hemodynamic stability and optimal renal function maintained  Outcome: Progressing  Flowsheets (Taken 11/8/2022 0322)  Hemodynamic stability and optimal renal function maintained:   Monitor labs and assess for signs and symptoms of volume excess or deficit   Monitor intake, output and patient weight   Monitor response to interventions for patient's volume status, including labs, urine output, blood pressure (other measures as available)   Encourage oral intake as appropriate   Instruct patient on fluid and nutrition restrictions as appropriate     Problem: Nutrition Deficit:  Goal: Optimize nutritional status  Outcome: Progressing  Flowsheets (Taken 11/8/2022 0322)  Nutrient intake appropriate for improving, restoring, or maintaining nutritional needs:   Assess nutritional status and recommend course of action   Monitor oral intake, labs, and treatment plans   Recommend appropriate diets, oral nutritional supplements, and vitamin/mineral supplements   Recommend, monitor, and adjust tube feedings and TPN/PPN based on assessed needs   Provide specific nutrition education to patient or family as appropriate     Problem: Chronic Conditions and Co-morbidities  Goal: Patient's chronic conditions and co-morbidity symptoms are monitored and maintained or improved  Outcome: Progressing  Flowsheets (Taken 11/8/2022 0322)  Care Plan - Patient's Chronic Conditions and Co-Morbidity Symptoms are Monitored and Maintained or Improved:   Monitor and assess patient's chronic conditions and comorbid symptoms for stability, deterioration, or improvement   Collaborate with multidisciplinary team to address chronic and comorbid conditions and prevent exacerbation or deterioration   Update acute care plan with appropriate goals if chronic or comorbid symptoms are exacerbated and prevent overall improvement and discharge

## 2022-11-08 NOTE — FLOWSHEET NOTE
11/08/22 1019   Safe Environment   Safety Measures   (Virtual RN rounds complete)   PT sitting up in bedside chair, no needs identified at this time

## 2022-11-08 NOTE — PROGRESS NOTES
Assessment: This was a spiritual care encounter as a part of rounds. Patient, Lupe Staley, was oriented and alert. Patient appeared calm, then tearful as she talked about decisional conflict as she tries to make adecision about whether or not to have a procedure. Patient expressed discomfort with wanting to listen to medical advice but also wanting to make decisions in line with her values and desires. Interventions:   provided active listening, provided empathic listening and facilitated storytelling/legacy review and theological reflection with patient.  provided prayer and provided information regarding  services and availability. Outcomes:  Patient expressed spiritual distress as she shared the cognitive knowledge that God is with her but the feeling that she is waiting for God's peace as she makes big decisions. Patient participated in a legacy review and expressed gratitude for having a \"sounding board\" as she processes a big medical decision. Plan:  Spiritual care team will remain available to support patient, PRN. [facility]. [address] [place] [address] [place], [address]  [phone]

## 2022-11-08 NOTE — PROGRESS NOTES
[facility]      Patient - Susie Lua   MRN -  [id number]   [facility]   - 1935      Date of Admission -  2022  6:20 AM  Date of evaluation -  2022  Room - --A   Hospital Day - [address], [place] Primary Care Physician - Shreya Velasquez MD     Problem List      Active Hospital Problems    Diagnosis Date Noted    Shortness of breath [R06.02] 2022     Priority: Medium     Reason for Consult    Of interstitial lung disease worsening shortness of breath  HPI   History Obtained From: Patient and electronic medical record. Susie Lua is a 80 y.o. female lifetime nonsmoker with history of ILD/pulmonary fibrosis and PAF, presented to [facility] ED with complaints or worsening dyspnea and increased work of breathing. On arrival to ED saturating >94% on 4L NC, her baseline. Work up generally negative for acute findings. Admitted for further work up. On evaluation, patient states she started noticing worsening shortness of breath with increased work of breathing with minimal activity in the last week that has gotten worse. Endorses associated fatigue with lightheadedness and reduced PO intake related to her inability to ambulate and cook for herself. Reports her pulse ox showing oxygen at 60% on 4L NC when walking at home. Lives alone. Usually able to care for herself. Has been on oxygen due to ILD, 4L on ambulation and usually titrate down during rest and at night. Has been at baseline otherwise, no chest pain, fever, chills, nausea, vomiting, diarrhea. No sick contact. Vaccinated for [employer]. CT chest 2022: The severe intralobular septal thickening and bronchiectasis with honeycombing most pronounced in the right upper lobe is unchanged. Scattered subpleural scarring and groundglass opacities in the left lung are mild similar to the 2020 comparison.      Per chart review:  Connective tissue work up results:  Date: 4/03/2017  SABAS( Antinuclear antibody):  Negative-none detected. Fungal serologies- Negative                   RA ( Rheumatoid factor):  Negative. <10                                                          ACE(  level):    Negative-22                               ESR ( Sed rate): 9               Connective Tissue Labs  Date: 1/11/19     SABAS with reflex-None detected.   Anti RNP-1- Normal.  Anti Lexie-0- normal  Anti-SSB-0- normal  AntiSSA-1- normal  AntiCentromere- 4- Normal  Scleroderma AB-24-normal  ACE-10- normal  ESR-101 elevated  Rheumatoid Factor-13 WNL                   Past 24 hours   -On 4 L/min via nasal cannula  -Afebrile reports shortness of breath improved this morning  -Noted LERMA with ambulation to chair as expected in ILD  All other systems reviewed     PMHx   Past Medical History      Diagnosis Date    Anemia     normal on pre-op 5/2012 and 12/2/13    Backache     h/o    Bronchiectasis ([address] 2013    Bursitis     bilateral shoulders    Constipation     Diverticulosis of colon     HTN (hypertension)     Hypercalcemia     slightly elevated at 10.8 pre-op 12/2/13    Hyperlipidemia     Nodular goiter     bx negative    OA (osteoarthritis)     bilateral thumbs - sees Dr. Oma Pierce    Osteopenia 11/6/2013    Parathyroid adenoma 4/30/2013    Pneumonia of right upper lobe due to infectious organism     Pneumonitis     Dr. Linda Subramanian in 7/2010 - bx negative    Pulmonary Mycobacterium avium complex (MAC) infection ([address]     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 ([address]     had one parathyroid removed - now follows with Dr. Page Major    Sinusitis     with seasonal allergies    Vitamin D deficiency 05/2018      Past Surgical History        Procedure Laterality Date    ABDOMINAL EXPLORATION SURGERY  01/02/2013    Release of KATHERINE TAMAYO-Dr. Candie Oliveros    APPENDECTOMY  1961    BACK INJECTION Bilateral 12/28/2021    bilateral sacroiliac joint injection performed by Christ Parra DO at [address] [facility] BACK INJECTION N/A 2022    B/L SI joint block performed by Nimisha Newman DO at [facility] 21 N/A 2020    BRONCHOSCOPY FLUOROSCOPY performed by Fercho White MD at [address] [place] [address] 153 Bilateral 1990 ?  SECTION  0421,4341    DILATION AND CURETTAGE OF UTERUS  4653,0581,1940    EXCISION OF PARATHYROID MASS Right 2013    rt parathyroidectomy (excision of rt inferior parathyroid mass) exploration of neck     FOOT SURGERY      left    FOOT SURGERY Left 2017    Dr Henrry Bush ([address] [place] [address])      JOINT REPLACEMENT  12    left knee    JOINT REPLACEMENT  2014    right knee    MALIGNANT SKIN LESION EXCISION Left 2017    BCC DORSAL FOOT WITH GRAFT & FS    PAIN MANAGEMENT PROCEDURE Left 2022    sacroiliac joint radiofrequency ablation, LEFT side first performed by Nimisha Newman DO at [facility] Right 2022    sacroiliac joint Radiofrequency Ablations, right side performed by Nimisha Newman DO at [address] [place] [address] (CERVIX REMOVED)  [address] ?     TOTAL KNEE ARTHROPLASTY Right 2014    OIO    UPPER GASTROINTESTINAL ENDOSCOPY N/A 2022    EGD performed by Brii Chao MD at [facility]     Meds    Current Medications    albuterol  2.5 mg Nebulization BID    cefdinir  300 mg Oral 2 times per day    guaiFENesin  600 mg Oral BID    sodium chloride flush  5-40 mL IntraVENous 2 times per day    allopurinol  200 mg Oral Daily    apixaban  5 mg Oral BID    azithromycin  250 mg Oral Daily    lactobacillus  1 capsule Oral Daily    levothyroxine  88 mcg Oral Daily    metoprolol tartrate  12.5 mg Oral BID    multivitamin  1 tablet Oral Daily    atorvastatin  10 mg Oral Nightly     sodium chloride flush, sodium chloride, ondansetron **OR** ondansetron, polyethylene glycol, acetaminophen **OR** acetaminophen, albuterol sulfate HFA  IV Drips/Infusions   sodium chloride       Home Medications  Medications Prior to Admission: azithromycin (ZITHROMAX) 250 MG tablet, Take 1 tablet by mouth daily  simvastatin (ZOCOR) 20 MG tablet, TAKE 1 TABLET BY MOUTH AT  NIGHT  apixaban (ELIQUIS) 5 MG TABS tablet, TAKE 1 TABLET BY MOUTH  TWICE DAILY  albuterol sulfate HFA (PROAIR HFA) 108 (90 Base) MCG/ACT inhaler, Inhale 2 puffs into the lungs every 4 hours as needed for Wheezing or Shortness of Breath  levothyroxine (SYNTHROID) 88 MCG tablet, TAKE 1 TABLET BY MOUTH ONCE DAILY  allopurinol (ZYLOPRIM) 100 MG tablet, Take 2 tablets by mouth daily  budesonide (PULMICORT) 0.5 MG/2ML nebulizer suspension, Take 2 mLs by nebulization 2 times daily  metoprolol tartrate (LOPRESSOR) 25 MG tablet, take 1/2 tablet by mouth twice a day  Lactobacillus (PROBIOTIC ACIDOPHILUS) CAPS, Take 1 capsule by mouth daily   MULTIPLE VITAMIN PO, Take 1 tablet by mouth daily   Diet    ADULT DIET; Regular  Allergies    Colchicine, Levaquin [levofloxacin], Oxycodone, Percocet [oxycodone-acetaminophen], Rifampin, Sulfa antibiotics, and Cefuroxime  Sleep History    Never diagnosed with sleep apnea in the past.  Vitals     height is 5' 4\" (1.626 m) and weight is 150 lb (68 kg). Her oral temperature is 98.2 °F (36.8 °C). Her blood pressure is 123/63 and her pulse is 89. Her respiration is 18 and oxygen saturation is 95%. Body mass index is 25.75 kg/m². SUPPLEMENTAL O2: O2 Flow Rate (L/min): 4 L/min     I/O      Intake/Output Summary (Last 24 hours) at 11/8/2022 1151  Last data filed at 11/8/2022 0903  Gross per 24 hour   Intake 10 ml   Output --   Net 10 ml       I/O last 3 completed shifts:   In: 120 [P.O.:120]  Out: 280 [Urine:280]   Patient Vitals for the past 96 hrs (Last 3 readings):   Weight   11/08/22 0445 150 lb (68 kg)   11/05/22 0346 155 lb 10.3 oz (70.6 kg)   11/04/22 1500 155 lb 3.3 oz (70.4 kg)         Exam   Physical Exam   Constitutional: No distress on O2 per NC. Patient appears elderly. Head: Normocephalic and atraumatic. Mouth/Throat: Oropharynx is clear and moist.  No oral thrush. Eyes: Conjunctivae are normal. Pupils are equal, round. No scleral icterus. Neck: Neck supple. No tracheal deviation present. Cardiovascular: A. fib no peripheral edema. Pulmonary/Chest: Normal effort with bilateral air entry, posterior rales. No stridor. No respiratory distress. Patient exhibits no tenderness. Abdominal: Soft. Bowel sounds audible. No distension or tenderness to palp. Musculoskeletal: Moves all extremities  Neurological: Patient is alert and oriented to person place and time. Labs  - Old records and notes have been reviewed in [facility]   ABG  Lab Results   Component Value Date/Time    PH 7.39 11/06/2022 10:57 AM    PO2 67 11/06/2022 10:57 AM    PCO2 48 11/06/2022 10:57 AM    HCO3 29 11/06/2022 10:57 AM    O2SAT 93 11/06/2022 10:57 AM     Lab Results   Component Value Date/Time    IFIO2 6 11/06/2022 10:57 AM     CBC  No results for input(s): WBC, RBC, HGB, HCT, MCV, MCH, MCHC, RDW, PLT, MPV in the last 72 hours. BMP  No results for input(s): NA, K, CL, CO2, BUN, CREATININE, GLUCOSE, MG, PHOS, CALCIUM, IONCA, MG in the last 72 hours. LFT  No results for input(s): AST, ALT, ALB, BILITOT, ALKPHOS, LIPASE in the last 72 hours. Invalid input(s): AMYLASE  TROP  Lab Results   Component Value Date/Time    TROPONINT < 0.010 11/04/2022 08:05 AM    TROPONINT < 0.010 09/30/2020 09:00 AM    TROPONINT < 0.010 09/25/2020 05:16 PM     BNP  No results for input(s): BNP in the last 72 hours. Lactic Acid  No results for input(s): LACTA in the last 72 hours. INR  No results for input(s): INR, PROTIME in the last 72 hours. PTT  No results for input(s): APTT in the last 72 hours. Glucose  No results for input(s): POCGLU in the last 72 hours.   UA   No results for input(s): SPECGRAV, PHUR, COLORU, CLARITYU, MUCUS, Darin Dolphin, RBCUA, WBCUA, BACTERIA, NITRU, GLUCOSEU, BILIRUBINUR, UROBILINOGEN, KETUA, LABCAST, LABCASTTY, AMORPHOS in the last 72 hours. Invalid input(s): CRYSTALS  . PFTs           Sleep studies   none    Cultures    Flu and COVID negative    EKG     11/4/2022    Sinus rhythm with Premature atrial complexes  Left anterior fascicular block  Cannot rule out Anterior infarct , age undetermined  Abnormal ECG  When compared with ECG of 04-NOV-2022 06:30,  Premature atrial complexes are now Present  Left anterior fascicular block is now Present    Echocardiogram    Summary   Ejection fraction is visually estimated at 55%. Overall left ventricular function is normal.   Aortic valve appears tricuspid. Aortic valve leaflets are somewhat thickened. Aortic valve leaflets are Mildly calcified. There is a small localized posterior pericardial effusion noted. Signature      ----------------------------------------------------------------   Electronically signed by Everett Schwarz MD (Interpreting   physician) on 09/30/2020 at 03:42 PM   ----------------------------------------------------------------  Radiology    CXR     XR CHEST PORTABLE     11/6/2022     FINDINGS: Severe fibrotic changes are again seen throughout the right lung with some volume loss. Persistent milder fibrotic changes are present in the left lung. The cardiac silhouette and pulmonary vasculature are within normal limits. There is no significant pleural effusion or pneumothorax. Visualized portions of the upper abdomen are within normal limits. The osseous structures are intact. No acute fractures or suspicious osseous lesions. Impression:  Extensive fibrotic changes are again present throughout the lungs. Overall appearance of the chest is similar to the previous study. 11/4/2022      Moderately severe fibrotic changes in the right lung with milder fibrotic changes in the left lung.  No definite new abnormalities. CT Scans  (See actual reports for details)  CT chest wo contrast 09/21/2022  The previously seen more numerous and more prominent multifocal groundglass opacities in the left lung have improved. The severe interstitial fibrosis, honeycombing, bronchiectasis most pronounced in the right upper lobe is unchanged and overall the    appearance of the chest is similar to the 2020 comparison. No pulmonary mass or nodule is observed. CT chest wo contrast 11/05/2022  1. Severe interstitial fibrotic changes are seen throughout the right hemithorax with extensive honeycombing which is most severe in the right upper lobe. Bronchiectasis is seen bilaterally although significantly more severe on the right side. 2. There are diffuse groundglass and nodular infiltrates throughout the left hemithorax. Assessment   -Acute on chronic hypoxic respiratory failure: unclear etiology. Baseline 1 LPM at rest and 5 LPM with exertion. Low suspicion for infectious etiology with lack of fever, leukocytosis, productive cough. No sick contacts. COVID and Flu neg. CXR with no new obvious consolidation or infiltrates. CXR appears unchanged compared to previous CXR in 05/2022. No signs of volume overload present. Suspect progression of underlying ILD. ILD/pulmonary fibrosis of uncertain etiology: Restrictive lung defect on PFT. Follows with Corky Tom NP at pulmonology office. [facility] - GUEVARA Saavedra MD  -Her work up for connective tissue diseases are negative- ? UIP Vs Post inflammatory scarring from her previous hx of Mycobacterium avium/intracellulare infection of lungs- She refused to go for open lung biopsy with VATS. She was treated by Dr. Jazzy De Los Santos MD for Mycobacterium AVM intracellular infection for 13months.  Her antibiotics (Rifampin per chart) were discontinued due to development of optic neutitis and transient vision loss.   -Hx MAC 2017 with RUL scarring/honecombing  -Allergic Rhinitis-under control  -PAF-on Eliquis and Lopressor  -Hypothyroidism- on Synthroid  -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    Plan   -Duonebs q4h  -Prednisone 40 mg daily completed 5 days   -Convert Rocephin to omnicef 300 mg PO BID for possible ELIGIO pneumonia, tolerated Rocephin well has reported history of reaction to cefuroxime with cramping and diarrhea spoke about this with patient she reports it only occurred once and she has never been on again discussed treating with alternative agent Omnicef patient agreeable to excepting risks  -Continue home Zithromax as prescribed per pulmonologist at [facility] with patient confirms not interested in Bronchoscopy at this time due to risks and possible need for mechanical ventilation. Recommend earlier appointment with [facility] for evaluation of progressive ILD in person not televisit, patient agrees with this plan   -No home O2 eval necessary planning for discharge to [facility]    Questions and concerns addressed. Plan discussed with Dr. Tamanna Ames. Electronically signed by   JASMEET Garcia CNP on 11/8/2022 at 11:51 AM     Addendum by Dr. Tamanna Ames MD:  Patient seen by me independently including key components of medical care. Face to face evaluation and examination was performed. Case discussed with Mr. Estelle Vieira CNP  Italicized font, if present,  represents changes to the note made by me. More than 50% of the encounter time involved with patient care by the Pulmonary & Critical care service team spent by me . Please see my modifications mentioned below:  She feels better  She is on 4 L of oxygen on nasal cannula-Her baseline. Will to move around with the help of rolling walker  Follow with the [facility] after discharge.     Electronically signed by   Luz Maria Varma MD on 11/8/2022 at 5:41 PM

## 2022-11-09 VITALS
TEMPERATURE: 98.2 F | DIASTOLIC BLOOD PRESSURE: 73 MMHG | OXYGEN SATURATION: 92 % | WEIGHT: 148.3 LBS | HEART RATE: 79 BPM | HEIGHT: 64 IN | RESPIRATION RATE: 18 BRPM | BODY MASS INDEX: 25.32 KG/M2 | SYSTOLIC BLOOD PRESSURE: 131 MMHG

## 2022-11-09 LAB
INFLUENZA A: NOT DETECTED
INFLUENZA B: NOT DETECTED
SARS-COV-2 RNA, RT PCR: NOT DETECTED

## 2022-11-09 PROCEDURE — 6370000000 HC RX 637 (ALT 250 FOR IP): Performed by: NURSE PRACTITIONER

## 2022-11-09 PROCEDURE — 87636 SARSCOV2 & INF A&B AMP PRB: CPT

## 2022-11-09 PROCEDURE — 6370000000 HC RX 637 (ALT 250 FOR IP): Performed by: INTERNAL MEDICINE

## 2022-11-09 PROCEDURE — 94760 N-INVAS EAR/PLS OXIMETRY 1: CPT

## 2022-11-09 PROCEDURE — 2700000000 HC OXYGEN THERAPY PER DAY

## 2022-11-09 PROCEDURE — 94640 AIRWAY INHALATION TREATMENT: CPT

## 2022-11-09 PROCEDURE — 6360000002 HC RX W HCPCS: Performed by: INTERNAL MEDICINE

## 2022-11-09 PROCEDURE — 6370000000 HC RX 637 (ALT 250 FOR IP)

## 2022-11-09 PROCEDURE — 99232 SBSQ HOSP IP/OBS MODERATE 35: CPT | Performed by: INTERNAL MEDICINE

## 2022-11-09 PROCEDURE — 2580000003 HC RX 258

## 2022-11-09 RX ORDER — CEFDINIR 300 MG/1
300 CAPSULE ORAL EVERY 12 HOURS SCHEDULED
Status: DISCONTINUED | OUTPATIENT
Start: 2022-11-09 | End: 2022-11-09 | Stop reason: HOSPADM

## 2022-11-09 RX ORDER — CEFDINIR 300 MG/1
300 CAPSULE ORAL EVERY 12 HOURS SCHEDULED
Qty: 6 CAPSULE | Refills: 0 | DISCHARGE
Start: 2022-11-09 | End: 2022-11-12

## 2022-11-09 RX ADMIN — CEFDINIR 300 MG: 300 CAPSULE ORAL at 08:02

## 2022-11-09 RX ADMIN — SODIUM CHLORIDE, PRESERVATIVE FREE 10 ML: 5 INJECTION INTRAVENOUS at 08:02

## 2022-11-09 RX ADMIN — ALBUTEROL SULFATE 2.5 MG: 2.5 SOLUTION RESPIRATORY (INHALATION) at 09:45

## 2022-11-09 RX ADMIN — Medication 1 TABLET: at 08:02

## 2022-11-09 RX ADMIN — Medication 1 CAPSULE: at 08:02

## 2022-11-09 RX ADMIN — GUAIFENESIN 600 MG: 600 TABLET, EXTENDED RELEASE ORAL at 08:02

## 2022-11-09 RX ADMIN — METOPROLOL TARTRATE 12.5 MG: 25 TABLET, FILM COATED ORAL at 08:02

## 2022-11-09 RX ADMIN — APIXABAN 5 MG: 5 TABLET, FILM COATED ORAL at 08:02

## 2022-11-09 RX ADMIN — LEVOTHYROXINE SODIUM 88 MCG: 0.09 TABLET ORAL at 05:17

## 2022-11-09 RX ADMIN — AZITHROMYCIN MONOHYDRATE 250 MG: 250 TABLET ORAL at 08:02

## 2022-11-09 NOTE — FLOWSHEET NOTE
11/09/22 0933   Safe Environment   Safety Measures Other (comment)  (Virtual nurse round complete)   Virtual nurse introduced via audio. Paitent permits camera. Patient sitting up in bed, awake and alert. States is waiting on discharge to inpatient physical therapy. Denies any needs at this time. Call light within reach.

## 2022-11-09 NOTE — PLAN OF CARE
Problem: Discharge Planning  Goal: Discharge to home or other facility with appropriate resources  11/9/2022 1019 by Nadia Mac RN  Outcome: Progressing  Flowsheets (Taken 11/9/2022 0757)  Discharge to home or other facility with appropriate resources:   Identify barriers to discharge with patient and caregiver   Arrange for needed discharge resources and transportation as appropriate   Identify discharge learning needs (meds, wound care, etc)  Note: Patient plans to discharge to an ECF for Rehab once medically stable. 11/9/2022 0030 by Kady Roberto RN  Outcome: Progressing  Flowsheets (Taken 11/8/2022 0857 by Nadia Mac RN)  Discharge to home or other facility with appropriate resources:   Identify barriers to discharge with patient and caregiver   Arrange for needed discharge resources and transportation as appropriate   Identify discharge learning needs (meds, wound care, etc)     Problem: Skin/Tissue Integrity  Goal: Absence of new skin breakdown  Description: 1. Monitor for areas of redness and/or skin breakdown  2. Assess vascular access sites hourly  3. Every 4-6 hours minimum:  Change oxygen saturation probe site  4. Every 4-6 hours:  If on nasal continuous positive airway pressure, respiratory therapy assess nares and determine need for appliance change or resting period. 11/9/2022 1019 by Nadia Mac RN  Outcome: Progressing  Note: No new skin break down noted at this time. Encouraged patient to reposition self in bed. 11/9/2022 0030 by Kady Roberto RN  Outcome: Progressing     Problem: Safety - Adult  Goal: Free from fall injury  11/9/2022 1019 by Nadia Mac RN  Outcome: Progressing  Flowsheets (Taken 11/7/2022 0650 by Ashleigh Wang RN)  Free From Fall Injury: Instruct family/caregiver on patient safety  Note: No falls noted this shift. Continue falling star program. Bed alarm on, bed in low position. Call light and personal belongings in reach.   Patient uses call light appropriately.    11/9/2022 0030 by Arnold Conde RN  Outcome: Progressing  Flowsheets (Taken 11/7/2022 0650 by Josselin Heredia RN)  Free From Fall Injury: Instruct family/caregiver on patient safety     Problem: ABCDS Injury Assessment  Goal: Absence of physical injury  11/9/2022 1019 by Charanjit Stroud RN  Outcome: Progressing  Flowsheets (Taken 11/8/2022 1115)  Absence of Physical Injury: Implement safety measures based on patient assessment  11/9/2022 0030 by Arnold Conde RN  Outcome: Progressing  Flowsheets (Taken 11/8/2022 1115 by Charanjit Stroud RN)  Absence of Physical Injury: Implement safety measures based on patient assessment     Problem: Respiratory - Adult  Goal: Clear lung sounds  11/9/2022 1019 by Charanjit Stroud RN  Outcome: Progressing  11/9/2022 0948 by Fernando Arce RCP  Outcome: Progressing  11/9/2022 0030 by Arnold Conde RN  Outcome: Progressing  Goal: Achieves optimal ventilation and oxygenation  11/9/2022 1019 by Charanjit Stroud RN  Outcome: Progressing  Flowsheets (Taken 11/9/2022 0757)  Achieves optimal ventilation and oxygenation:   Assess for changes in respiratory status   Assess for changes in mentation and behavior   Position to facilitate oxygenation and minimize respiratory effort   Assess and instruct to report shortness of breath or any respiratory difficulty  11/9/2022 0030 by Arnold Conde RN  Outcome: Progressing  Flowsheets (Taken 11/8/2022 0857 by Charanjit Stroud RN)  Achieves optimal ventilation and oxygenation:   Assess for changes in respiratory status   Assess for changes in mentation and behavior   Position to facilitate oxygenation and minimize respiratory effort   Assess and instruct to report shortness of breath or any respiratory difficulty     Problem: Nutrition Deficit:  Goal: Optimize nutritional status  11/9/2022 1019 by Charanjit Stroud RN  Outcome: Progressing  Flowsheets (Taken 11/9/2022 1019)  Nutrient intake appropriate for improving, restoring, or maintaining nutritional needs:   Assess nutritional status and recommend course of action   Monitor oral intake, labs, and treatment plans   Recommend appropriate diets, oral nutritional supplements, and vitamin/mineral supplements  11/9/2022 0030 by Yaquelin Magaña RN  Outcome: Progressing  Flowsheets (Taken 11/8/2022 0322)  Nutrient intake appropriate for improving, restoring, or maintaining nutritional needs:   Assess nutritional status and recommend course of action   Monitor oral intake, labs, and treatment plans   Recommend appropriate diets, oral nutritional supplements, and vitamin/mineral supplements   Recommend, monitor, and adjust tube feedings and TPN/PPN based on assessed needs   Provide specific nutrition education to patient or family as appropriate     Problem: Chronic Conditions and Co-morbidities  Goal: Patient's chronic conditions and co-morbidity symptoms are monitored and maintained or improved  11/9/2022 1019 by Chris Bautista RN  Outcome: Progressing  [address] (Taken 11/9/2022 0757)  Care Plan - Patient's Chronic Conditions and Co-Morbidity Symptoms are Monitored and Maintained or Improved:   Monitor and assess patient's chronic conditions and comorbid symptoms for stability, deterioration, or improvement   Collaborate with multidisciplinary team to address chronic and comorbid conditions and prevent exacerbation or deterioration   Update acute care plan with appropriate goals if chronic or comorbid symptoms are exacerbated and prevent overall improvement and discharge  11/9/2022 0030 by Yaquelin Magaña RN  Outcome: Progressing  [address] (Taken 11/8/2022 0857 by Chris Batuista RN)  Care Plan - Patient's Chronic Conditions and Co-Morbidity Symptoms are Monitored and Maintained or Improved:   Monitor and assess patient's chronic conditions and comorbid symptoms for stability, deterioration, or improvement   Collaborate with multidisciplinary team to address chronic and comorbid conditions and prevent exacerbation or deterioration   Update acute care plan with appropriate goals if chronic or comorbid symptoms are exacerbated and prevent overall improvement and discharge     Problem: Musculoskeletal - Adult  Goal: Return mobility to safest level of function  11/9/2022 1019 by Vicky Zuniga RN  Outcome: Progressing  Flowsheets (Taken 11/9/2022 0757)  Return Mobility to Safest Level of Function:   Assess patient stability and activity tolerance for standing, transferring and ambulating with or without assistive devices   Assist with transfers and ambulation using safe patient handling equipment as needed  11/9/2022 0030 by Monse Ramirez RN  Outcome: Progressing  Flowsheets (Taken 11/8/2022 0857 by Vicky Zuniga RN)  Return Mobility to Safest Level of Function:   Assess patient stability and activity tolerance for standing, transferring and ambulating with or without assistive devices   Assist with transfers and ambulation using safe patient handling equipment as needed   Ensure adequate protection for wounds/incisions during mobilization  Goal: Maintain proper alignment of affected body part  11/9/2022 1019 by Vicky Zuniga RN  Outcome: Progressing  Flowsheets (Taken 11/9/2022 0757)  Maintain proper alignment of affected body part: Support and protect limb and body alignment per provider's orders  11/9/2022 0030 by Monse Ramirez RN  Outcome: Progressing  Flowsheets (Taken 11/8/2022 0857 by Vicky Zuniga RN)  Maintain proper alignment of affected body part: Support and protect limb and body alignment per provider's orders  Goal: Return ADL status to a safe level of function  11/9/2022 1019 by Vicky Zuniga RN  Outcome: Progressing  Flowsheets (Taken 11/9/2022 0757)  Return ADL Status to a Safe Level of Function: Administer medication as ordered  11/9/2022 0030 by Monse Ramirez RN  Outcome: Progressing  [address] (Taken 11/8/2022 0857 by Vicky Zuniga RN)  Return ADL Status to a Safe Level of Function:   Administer medication as ordered   Assess activities of daily living deficits and provide assistive devices as needed   Obtain physical therapy/occupational therapy consults as needed     Problem: Gastrointestinal - Adult  Goal: Maintains or returns to baseline bowel function  11/9/2022 1019 by Camila Page RN  Outcome: Progressing  [address] (Taken 11/9/2022 0757)  Maintains or returns to baseline bowel function: Assess bowel function  11/9/2022 0030 by Spike Corea RN  Outcome: Progressing  Flowsheets (Taken 11/8/2022 0857 by Camila Page RN)  Maintains or returns to baseline bowel function: Assess bowel function  Goal: Maintains adequate nutritional intake  11/9/2022 1019 by Camila Page RN  Outcome: Progressing  Flowsheets (Taken 11/9/2022 0757)  Maintains adequate nutritional intake: Monitor percentage of each meal consumed  11/9/2022 0030 by Spike Corea RN  Outcome: Progressing  Flowsheets (Taken 11/8/2022 0857 by Camila Page RN)  Maintains adequate nutritional intake: Monitor percentage of each meal consumed     Problem: Metabolic/Fluid and Electrolytes - Adult  Goal: Electrolytes maintained within normal limits  11/9/2022 1019 by Camila Page RN  Outcome: Progressing  Flowsheets (Taken 11/9/2022 0757)  Electrolytes maintained within normal limits:   Monitor labs and assess patient for signs and symptoms of electrolyte imbalances   Administer electrolyte replacement as ordered   Monitor response to electrolyte replacements, including repeat lab results as appropriate  11/9/2022 0030 by Spike Corea RN  Outcome: Progressing  Flowsheets (Taken 11/8/2022 0857 by Camila Page RN)  Electrolytes maintained within normal limits:   Monitor labs and assess patient for signs and symptoms of electrolyte imbalances   Administer electrolyte replacement as ordered   Monitor response to electrolyte replacements, including repeat lab results as appropriate  Goal: Hemodynamic stability and optimal renal function maintained  11/9/2022 1019 by John Gilmore RN  Outcome: Progressing  Flowsheets (Taken 11/9/2022 0757)  Hemodynamic stability and optimal renal function maintained:   Monitor labs and assess for signs and symptoms of volume excess or deficit   Monitor intake, output and patient weight   Monitor urine specific gravity, serum osmolarity and serum sodium as indicated or ordered  11/9/2022 0030 by Edna Myrick RN  Outcome: Progressing  Flowsheets (Taken 11/8/2022 0857 by John Gilmore RN)  Hemodynamic stability and optimal renal function maintained:   Monitor labs and assess for signs and symptoms of volume excess or deficit   Monitor urine specific gravity, serum osmolarity and serum sodium as indicated or ordered   Monitor intake, output and patient weight     Care plan reviewed with patient. Patient verbalizes understanding of plan of care and contributes to goal setting.

## 2022-11-09 NOTE — FLOWSHEET NOTE
11/09/22 1401   Safe Environment   Safety Measures Other (comment)  (Virtual nurse round complete)   Patient permits camera. Patient sitting up in chair, dressed. States will be discharged soon. Denies any needs at this time. Call light within reach.

## 2022-11-09 NOTE — FLOWSHEET NOTE
11/09/22 1433   Treatment Team Notification   Reason for Communication Evaluate   Team Member Name [facility]   Treatment Team Role Attending Provider   Method of Communication Secure Message   Response Waiting for response   Notification Time [phone]     Patient has discharge orders in. Provider part of the [address] just needs to be finished. This RN reached out to provider about completing the physician section.     Provider called this RN, stated he would do the [address] shortly

## 2022-11-09 NOTE — PLAN OF CARE
Problem: Discharge Planning  Goal: Discharge to home or other facility with appropriate resources  11/9/2022 0030 by Rylee Beverly RN  Outcome: Progressing  Flowsheets (Taken 11/8/2022 0857 by Neo Washington RN)  Discharge to home or other facility with appropriate resources:   Identify barriers to discharge with patient and caregiver   Arrange for needed discharge resources and transportation as appropriate   Identify discharge learning needs (meds, wound care, etc)  11/8/2022 1115 by Neo Washington RN  Outcome: Progressing  Flowsheets (Taken 11/8/2022 0857)  Discharge to home or other facility with appropriate resources:   Identify barriers to discharge with patient and caregiver   Arrange for needed discharge resources and transportation as appropriate   Identify discharge learning needs (meds, wound care, etc)  Note: Patient plans to discharge to an ECF for short-term therapy once medically stable. Problem: Skin/Tissue Integrity  Goal: Absence of new skin breakdown  Description: 1. Monitor for areas of redness and/or skin breakdown  2. Assess vascular access sites hourly  3. Every 4-6 hours minimum:  Change oxygen saturation probe site  4. Every 4-6 hours:  If on nasal continuous positive airway pressure, respiratory therapy assess nares and determine need for appliance change or resting period. 11/9/2022 0030 by Rylee Beverly RN  Outcome: Progressing  11/8/2022 1115 by Neo Washington RN  Outcome: Progressing  Note: No new skin break down noted at this time. Encouraged patient to reposition self in bed.       Problem: Safety - Adult  Goal: Free from fall injury  11/9/2022 0030 by Rylee Beverly RN  Outcome: Progressing  Flowsheets (Taken 11/7/2022 0650 by Sapna Vann RN)  Free From Fall Injury: Instruct family/caregiver on patient safety  11/8/2022 1115 by Neo Washington RN  Outcome: Progressing  Flowsheets (Taken 11/7/2022 0650 by Sapna Vann RN)  Free From Fall Injury: Instruct family/caregiver on patient safety  Note: No falls noted this shift. Continue falling star program. Bed alarm on, bed in low position. Call light and personal belongings in reach. Patient uses call light appropriately.       Problem: ABCDS Injury Assessment  Goal: Absence of physical injury  11/9/2022 0030 by Verenice Reyes RN  Outcome: Progressing  Flowsheets (Taken 11/8/2022 1115 by Trever Helton RN)  Absence of Physical Injury: Implement safety measures based on patient assessment  11/8/2022 1115 by Trever Helton RN  Outcome: Progressing  Flowsheets (Taken 11/8/2022 1115)  Absence of Physical Injury: Implement safety measures based on patient assessment     Problem: Respiratory - Adult  Goal: Clear lung sounds  11/9/2022 0030 by Verenice Reyes RN  Outcome: Progressing  11/8/2022 1115 by Trever Helton RN  Outcome: Progressing  Goal: Achieves optimal ventilation and oxygenation  11/9/2022 0030 by Verenice Reyes RN  Outcome: Progressing  Flowsheets (Taken 11/8/2022 0857 by Trever Helton RN)  Achieves optimal ventilation and oxygenation:   Assess for changes in respiratory status   Assess for changes in mentation and behavior   Position to facilitate oxygenation and minimize respiratory effort   Assess and instruct to report shortness of breath or any respiratory difficulty  11/8/2022 1115 by Trever Helton RN  Outcome: Progressing  Flowsheets (Taken 11/8/2022 0857)  Achieves optimal ventilation and oxygenation:   Assess for changes in respiratory status   Assess for changes in mentation and behavior   Position to facilitate oxygenation and minimize respiratory effort   Assess and instruct to report shortness of breath or any respiratory difficulty  Note: Baseline of 4L nasal cannula     Problem: Musculoskeletal - Adult  Goal: Return mobility to safest level of function  11/9/2022 0030 by Verenice Reyes RN  Outcome: Progressing  Flowsheets (Taken 11/8/2022 0857 by Trever Helton RN)  Return Mobility to Safest Level of Function:   Assess patient stability and activity tolerance for standing, transferring and ambulating with or without assistive devices   Assist with transfers and ambulation using safe patient handling equipment as needed   Ensure adequate protection for wounds/incisions during mobilization  11/8/2022 1115 by Jed Jaffe RN  Outcome: Progressing  Flowsheets (Taken 11/8/2022 0857)  Return Mobility to Safest Level of Function:   Assess patient stability and activity tolerance for standing, transferring and ambulating with or without assistive devices   Assist with transfers and ambulation using safe patient handling equipment as needed   Ensure adequate protection for wounds/incisions during mobilization  Goal: Maintain proper alignment of affected body part  11/9/2022 0030 by Rafat Bazzi RN  Outcome: Progressing  Flowsheets (Taken 11/8/2022 0857 by Jed Jaffe RN)  Maintain proper alignment of affected body part: Support and protect limb and body alignment per provider's orders  11/8/2022 1115 by Jed Jaffe RN  Outcome: Progressing  Flowsheets (Taken 11/8/2022 0857)  Maintain proper alignment of affected body part: Support and protect limb and body alignment per provider's orders  Goal: Return ADL status to a safe level of function  11/9/2022 0030 by Rafat Bazzi RN  Outcome: Progressing  Flowsheets (Taken 11/8/2022 0857 by Jed Jaffe RN)  Return ADL Status to a Safe Level of Function:   Administer medication as ordered   Assess activities of daily living deficits and provide assistive devices as needed   Obtain physical therapy/occupational therapy consults as needed  11/8/2022 1115 by Jed Jaffe RN  Outcome: Progressing  Flowsheets (Taken 11/8/2022 0857)  Return ADL Status to a Safe Level of Function:   Administer medication as ordered   Assess activities of daily living deficits and provide assistive devices as needed   Obtain physical therapy/occupational therapy consults as needed     Problem: Gastrointestinal - Adult  Goal: Maintains or returns to baseline bowel function  11/9/2022 0030 by Kady Roberto RN  Outcome: Progressing  Flowsheets (Taken 11/8/2022 0857 by Nadia Mac RN)  Maintains or returns to baseline bowel function: Assess bowel function  11/8/2022 1115 by Nadia Mac RN  Outcome: Progressing  [address] (Taken 11/8/2022 0857)  Maintains or returns to baseline bowel function: Assess bowel function  Goal: Maintains adequate nutritional intake  11/9/2022 0030 by Kady Roberto RN  Outcome: Progressing  Flowsheets (Taken 11/8/2022 0857 by Nadia Mac RN)  Maintains adequate nutritional intake: Monitor percentage of each meal consumed  11/8/2022 1115 by Nadia Mac RN  Outcome: Progressing  Flowsheets (Taken 11/8/2022 0857)  Maintains adequate nutritional intake: Monitor percentage of each meal consumed     Problem: Metabolic/Fluid and Electrolytes - Adult  Goal: Electrolytes maintained within normal limits  11/9/2022 0030 by Kady Roberto RN  Outcome: Progressing  Flowsheets (Taken 11/8/2022 0857 by Nadia Mac RN)  Electrolytes maintained within normal limits:   Monitor labs and assess patient for signs and symptoms of electrolyte imbalances   Administer electrolyte replacement as ordered   Monitor response to electrolyte replacements, including repeat lab results as appropriate  11/8/2022 1115 by Nadia Mac RN  Outcome: Progressing  [address] (Taken 11/8/2022 0857)  Electrolytes maintained within normal limits:   Monitor labs and assess patient for signs and symptoms of electrolyte imbalances   Administer electrolyte replacement as ordered   Monitor response to electrolyte replacements, including repeat lab results as appropriate  Goal: Hemodynamic stability and optimal renal function maintained  11/9/2022 0030 by Kady Roberto RN  Outcome: Progressing  Flowsheets (Taken 11/8/2022 0857 by Nadia Mac RN)  Hemodynamic stability and optimal renal function maintained:   Monitor labs and assess for signs and symptoms of volume excess or deficit   Monitor urine specific gravity, serum osmolarity and serum sodium as indicated or ordered   Monitor intake, output and patient weight  11/8/2022 1115 by Denilson Cerda RN  Outcome: Progressing  Flowsheets (Taken 11/8/2022 0857)  Hemodynamic stability and optimal renal function maintained:   Monitor labs and assess for signs and symptoms of volume excess or deficit   Monitor urine specific gravity, serum osmolarity and serum sodium as indicated or ordered   Monitor intake, output and patient weight     Problem: Nutrition Deficit:  Goal: Optimize nutritional status  11/9/2022 0030 by Apolonia Prakash RN  Outcome: Progressing  Flowsheets (Taken 11/8/2022 0322)  Nutrient intake appropriate for improving, restoring, or maintaining nutritional needs:   Assess nutritional status and recommend course of action   Monitor oral intake, labs, and treatment plans   Recommend appropriate diets, oral nutritional supplements, and vitamin/mineral supplements   Recommend, monitor, and adjust tube feedings and TPN/PPN based on assessed needs   Provide specific nutrition education to patient or family as appropriate  11/8/2022 1115 by Denilson Cerda RN  Outcome: Progressing  Flowsheets (Taken 11/8/2022 0322 by Apolonia Prakash RN)  Nutrient intake appropriate for improving, restoring, or maintaining nutritional needs:   Assess nutritional status and recommend course of action   Monitor oral intake, labs, and treatment plans   Recommend appropriate diets, oral nutritional supplements, and vitamin/mineral supplements   Recommend, monitor, and adjust tube feedings and TPN/PPN based on assessed needs   Provide specific nutrition education to patient or family as appropriate     Problem: Chronic Conditions and Co-morbidities  Goal: Patient's chronic conditions and co-morbidity symptoms are monitored and maintained or improved  11/9/2022 0030 by Naomie Greco RN  Outcome: Progressing  Flowsheets (Taken 11/8/2022 0857 by Jesus Manuel Cuevas RN)  Care Plan - Patient's Chronic Conditions and Co-Morbidity Symptoms are Monitored and Maintained or Improved:   Monitor and assess patient's chronic conditions and comorbid symptoms for stability, deterioration, or improvement   Collaborate with multidisciplinary team to address chronic and comorbid conditions and prevent exacerbation or deterioration   Update acute care plan with appropriate goals if chronic or comorbid symptoms are exacerbated and prevent overall improvement and discharge  11/8/2022 1115 by Jesus Manuel Cuevas RN  Outcome: Progressing  [address] (Taken 11/8/2022 0857)  Care Plan - Patient's Chronic Conditions and Co-Morbidity Symptoms are Monitored and Maintained or Improved:   Monitor and assess patient's chronic conditions and comorbid symptoms for stability, deterioration, or improvement   Collaborate with multidisciplinary team to address chronic and comorbid conditions and prevent exacerbation or deterioration   Update acute care plan with appropriate goals if chronic or comorbid symptoms are exacerbated and prevent overall improvement and discharge  Note: [employer]

## 2022-11-09 NOTE — PROGRESS NOTES
[facility]      Patient - Haroon Doe   MRN -  [id number]   [facility]   - 1935      Date of Admission -  2022  6:20 AM  Date of evaluation -  2022  Room - --A   Hospital Day - 428 Jorge Childs MD Primary Care Physician - Nina Billingsley MD     Problem List      Active Hospital Problems    Diagnosis Date Noted    Shortness of breath [R06.02] 2022     Priority: Medium     Reason for Consult    Hx of ILD worsening of SOB  HPI   History Obtained From: Patient and electronic medical record. Haroon Doe is a 80 y.o. female lifetime nonsmoker with history of ILD/pulmonary fibrosis and PAF, presented to [facility] ED with complaints or worsening dyspnea and increased work of breathing. On arrival to ED saturating >94% on 4L NC, her baseline. Work up generally negative for acute findings. Admitted for further work up. On evaluation, patient states she started noticing worsening shortness of breath with increased work of breathing with minimal activity in the last week that has gotten worse. Endorses associated fatigue with lightheadedness and reduced PO intake related to her inability to ambulate and cook for herself. Reports her pulse ox showing oxygen at 60% on 4L NC when walking at home. Lives alone. Usually able to care for herself. Has been on oxygen due to ILD, 4L on ambulation and usually titrate down during rest and at night. Has been at baseline otherwise, no chest pain, fever, chills, nausea, vomiting, diarrhea. No sick contact. Vaccinated for exozet. CT chest 2022: The severe intralobular septal thickening and bronchiectasis with honeycombing most pronounced in the right upper lobe is unchanged. Scattered subpleural scarring and groundglass opacities in the left lung are mild similar to the 2020 comparison.      Per chart review:  Connective tissue work up results:  Date: 2017  SABAS( Antinuclear antibody):  Negative-none detected. Fungal serologies- Negative                   RA ( Rheumatoid factor):  Negative. <10                                                          ACE(  level):    Negative-22                               ESR ( Sed rate): 9               Connective Tissue Labs  Date: 1/11/19     SABAS with reflex-None detected.   Anti RNP-1- Normal.  Anti Lexie-0- normal  Anti-SSB-0- normal  AntiSSA-1- normal  AntiCentromere- 4- Normal  Scleroderma AB-24-normal  ACE-10- normal  ESR-101 elevated  Rheumatoid Factor-13 WNL                   Past 24 hours   -On 4 LPM via NC   -Reports no nausea cramping or diarrhea after taking omnicef  -Feels SOB stable, noted LERMA  All other systems reviewed     PMHx   Past Medical History      Diagnosis Date    Anemia     normal on pre-op 5/2012 and 12/2/13    Backache     h/o    Bronchiectasis ([address] 2013    Bursitis     bilateral shoulders    Constipation     Diverticulosis of colon     HTN (hypertension)     Hypercalcemia     slightly elevated at 10.8 pre-op 12/2/13    Hyperlipidemia     Nodular goiter     bx negative    OA (osteoarthritis)     bilateral thumbs - sees Dr. Oma Pierce    Osteopenia 11/6/2013    Parathyroid adenoma 4/30/2013    Pneumonia of right upper lobe due to infectious organism     Pneumonitis     Dr. Linda Subramanian in 7/2010 - bx negative    Pulmonary Mycobacterium avium complex (MAC) infection ([address]     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 ([address]     had one parathyroid removed - now follows with Dr. Page Major    Sinusitis     with seasonal allergies    Vitamin D deficiency 05/2018      Past Surgical History        Procedure Laterality Date    ABDOMINAL EXPLORATION SURGERY  01/02/2013    Release of KATHERINE TAMAYO-Dr. Candie Oliveros    APPENDECTOMY  1961    BACK INJECTION Bilateral 12/28/2021    bilateral sacroiliac joint injection performed by Christ Parra DO at [facility] N/A 2/22/2022    B/L SI joint block performed by Mei Larson DO at [address] N/A 2020    BRONCHOSCOPY FLUOROSCOPY performed by Nicky Page MD at [address] Bilateral 1990 ?  SECTION  9625,6247    DILATION AND CURETTAGE OF UTERUS  [id number],9193    EXCISION OF PARATHYROID MASS Right 2013    rt parathyroidectomy (excision of rt inferior parathyroid mass) exploration of neck     FOOT SURGERY      left    FOOT SURGERY Left 2017    Dr Kameron Cleaning ([address] [place] [address])      JOINT REPLACEMENT  12    left knee    JOINT REPLACEMENT  2014    right knee    MALIGNANT SKIN LESION EXCISION Left 2017    BCC DORSAL FOOT WITH GRAFT & FS    PAIN MANAGEMENT PROCEDURE Left 2022    sacroiliac joint radiofrequency ablation, LEFT side first performed by Mei Larson DO at [facility] Right 2022    sacroiliac joint Radiofrequency Ablations, right side performed by Mei Larson DO at [address] [place] [address] (CERVIX REMOVED)  [address] ?     TOTAL KNEE ARTHROPLASTY Right 2014    OIO    UPPER GASTROINTESTINAL ENDOSCOPY N/A 2022    EGD performed by Vineet Hinojosa MD at [facility]     Meds    Current Medications    albuterol  2.5 mg Nebulization BID    cefdinir  300 mg Oral 2 times per day    guaiFENesin  600 mg Oral BID    sodium chloride flush  5-40 mL IntraVENous 2 times per day    allopurinol  200 mg Oral Daily    apixaban  5 mg Oral BID    azithromycin  250 mg Oral Daily    lactobacillus  1 capsule Oral Daily    levothyroxine  88 mcg Oral Daily    metoprolol tartrate  12.5 mg Oral BID    multivitamin  1 tablet Oral Daily    atorvastatin  10 mg Oral Nightly     sodium chloride flush, sodium chloride, ondansetron **OR** ondansetron, polyethylene glycol, acetaminophen **OR** acetaminophen, albuterol sulfate HFA  IV Drips/Infusions   sodium chloride       Home Medications  Medications Prior to Admission: azithromycin (ZITHROMAX) 250 MG tablet, Take 1 tablet by mouth daily  simvastatin (ZOCOR) 20 MG tablet, TAKE 1 TABLET BY MOUTH AT  NIGHT  apixaban (ELIQUIS) 5 MG TABS tablet, TAKE 1 TABLET BY MOUTH  TWICE DAILY  albuterol sulfate HFA (PROAIR HFA) 108 (90 Base) MCG/ACT inhaler, Inhale 2 puffs into the lungs every 4 hours as needed for Wheezing or Shortness of Breath  levothyroxine (SYNTHROID) 88 MCG tablet, TAKE 1 TABLET BY MOUTH ONCE DAILY  allopurinol (ZYLOPRIM) 100 MG tablet, Take 2 tablets by mouth daily  budesonide (PULMICORT) 0.5 MG/2ML nebulizer suspension, Take 2 mLs by nebulization 2 times daily  metoprolol tartrate (LOPRESSOR) 25 MG tablet, take 1/2 tablet by mouth twice a day  Lactobacillus (PROBIOTIC ACIDOPHILUS) CAPS, Take 1 capsule by mouth daily   MULTIPLE VITAMIN PO, Take 1 tablet by mouth daily   Diet    ADULT DIET; Regular  Allergies    Colchicine, Levaquin [levofloxacin], Oxycodone, Percocet [oxycodone-acetaminophen], Rifampin, Sulfa antibiotics, and Cefuroxime  Sleep History    Never diagnosed with sleep apnea in the past.  Vitals     height is 5' 4\" (1.626 m) and weight is 148 lb 4.8 oz (67.3 kg). Her oral temperature is 97.5 °F (36.4 °C). Her blood pressure is 126/68 and her pulse is 96. Her respiration is 18 and oxygen saturation is 91%. Body mass index is 25.46 kg/m². SUPPLEMENTAL O2: O2 Flow Rate (L/min): 4 L/min     I/O      Intake/Output Summary (Last 24 hours) at 11/9/2022 0934  Last data filed at 11/9/2022 0802  Gross per 24 hour   Intake 370 ml   Output 600 ml   Net -230 ml       I/O last 3 completed shifts: In: 670 [P.O.:660; I.V.:10]  Out: 600 [Urine:600]   Patient Vitals for the past 96 hrs (Last 3 readings):   Weight   11/09/22 0100 148 lb 4.8 oz (67.3 kg)   11/08/22 0445 150 lb (68 kg)         Exam   Physical Exam   Constitutional: No distress on O2 per NC. Patient appears elderly.   Head: Normocephalic and atraumatic. Mouth/Throat: Oropharynx is clear and moist.  No oral thrush. Eyes: Conjunctivae are normal. Pupils are equal, round. No scleral icterus. Neck: Neck supple. No tracheal deviation present. Cardiovascular: Afib. No peripheral edema  Pulmonary/Chest: Normal effort with bilateral air entry, posterior rales No stridor. No respiratory distress. Patient exhibits no tenderness. Abdominal: Soft. Bowel sounds audible. No distension or tenderness to palp. Musculoskeletal: Moves all extremities  Neurological: Patient is alert and follows simple commands . Labs  - Old records and notes have been reviewed in [facility]   ABG  Lab Results   Component Value Date/Time    PH 7.39 11/06/2022 10:57 AM    PO2 67 11/06/2022 10:57 AM    PCO2 48 11/06/2022 10:57 AM    HCO3 29 11/06/2022 10:57 AM    O2SAT 93 11/06/2022 10:57 AM     Lab Results   Component Value Date/Time    IFIO2 6 11/06/2022 10:57 AM     CBC  No results for input(s): WBC, RBC, HGB, HCT, MCV, MCH, MCHC, RDW, PLT, MPV in the last 72 hours. BMP  No results for input(s): NA, K, CL, CO2, BUN, CREATININE, GLUCOSE, MG, PHOS, CALCIUM, IONCA, MG in the last 72 hours. LFT  No results for input(s): AST, ALT, ALB, BILITOT, ALKPHOS, LIPASE in the last 72 hours. Invalid input(s): AMYLASE  TROP  Lab Results   Component Value Date/Time    TROPONINT < 0.010 11/04/2022 08:05 AM    TROPONINT < 0.010 09/30/2020 09:00 AM    TROPONINT < 0.010 09/25/2020 05:16 PM     BNP  No results for input(s): BNP in the last 72 hours. Lactic Acid  No results for input(s): LACTA in the last 72 hours. INR  No results for input(s): INR, PROTIME in the last 72 hours. PTT  No results for input(s): APTT in the last 72 hours. Glucose  No results for input(s): POCGLU in the last 72 hours.   UA   No results for input(s): SPECGRAV, [facility] [address], [address], 18 [facility], [address] [facility] [address] 2000 [facility], [address] [facility] 298, [facility] 994, 12 [facility], [address], LABCAST, LABCASTTY, AMORPHOS in the last 72 hours. Invalid input(s): CRYSTALS  . PFTs           Sleep studies   none    Cultures    Flu and COVID negative    EKG     11/4/2022    Sinus rhythm with Premature atrial complexes  Left anterior fascicular block  Cannot rule out Anterior infarct , age undetermined  Abnormal ECG  When compared with ECG of 04-NOV-2022 06:30,  Premature atrial complexes are now Present  Left anterior fascicular block is now Present    Echocardiogram    Summary   Ejection fraction is visually estimated at 55%. Overall left ventricular function is normal.   Aortic valve appears tricuspid. Aortic valve leaflets are somewhat thickened. Aortic valve leaflets are Mildly calcified. There is a small localized posterior pericardial effusion noted. Signature      ----------------------------------------------------------------   Electronically signed by Izaiah Boss MD (Interpreting   physician) on 09/30/2020 at 03:42 PM   ----------------------------------------------------------------  Radiology    CXR     XR CHEST PORTABLE     11/6/2022     FINDINGS: Severe fibrotic changes are again seen throughout the right lung with some volume loss. Persistent milder fibrotic changes are present in the left lung. The cardiac silhouette and pulmonary vasculature are within normal limits. There is no significant pleural effusion or pneumothorax. Visualized portions of the upper abdomen are within normal limits. The osseous structures are intact. No acute fractures or suspicious osseous lesions. Impression:  Extensive fibrotic changes are again present throughout the lungs. Overall appearance of the chest is similar to the previous study. 11/4/2022      Moderately severe fibrotic changes in the right lung with milder fibrotic changes in the left lung. No definite new abnormalities.     CT Scans  (See actual reports for details)  CT chest wo contrast 09/21/2022  The previously seen more numerous and more prominent multifocal groundglass opacities in the left lung have improved. The severe interstitial fibrosis, honeycombing, bronchiectasis most pronounced in the right upper lobe is unchanged and overall the    appearance of the chest is similar to the 2020 comparison. No pulmonary mass or nodule is observed. CT chest wo contrast 11/05/2022  1. Severe interstitial fibrotic changes are seen throughout the right hemithorax with extensive honeycombing which is most severe in the right upper lobe. Bronchiectasis is seen bilaterally although significantly more severe on the right side. 2. There are diffuse groundglass and nodular infiltrates throughout the left hemithorax. Assessment   -Acute on chronic hypoxic respiratory failure: unclear etiology. Baseline 1 LPM at rest and 5 LPM with exertion. Low suspicion for infectious etiology with lack of fever, leukocytosis, productive cough. No sick contacts. COVID and Flu neg. CXR with no new obvious consolidation or infiltrates. CXR appears unchanged compared to previous CXR in 05/2022. No signs of volume overload present. Suspect progression of underlying ILD. ILD/pulmonary fibrosis of uncertain etiology: Restrictive lung defect on PFT. Follows with Amanda Khoury NP at pulmonology office. Sees Pulm at [facility] - GUEVARA Hernandez MD  -Her work up for connective tissue diseases are negative- ? UIP Vs Post inflammatory scarring from her previous hx of Mycobacterium avium/intracellulare infection of lungs- She refused to go for open lung biopsy with VATS. She was treated by Dr. Valarie Chavez MD for Mycobacterium AVM intracellular infection for 13months.  Her antibiotics (Rifampin per chart) were discontinued due to development of optic neutitis and transient vision loss.   -Hx MAC 2017 with RUL scarring/honecombing  -Allergic Rhinitis-under control  -PAF-on Eliquis and Lopressor  -Hypothyroidism- on Synthroid  -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    Plan   -Prednisone 40 mg daily completed 5 days   -No side effect of nausea, cramping or diarrhea noted on omnicef complete 7 day total course 300 mg BID, completed 3 days Rocephin  -Continue home Zithromax as prescribed per pulmonologist at [facility] with patient confirms not interested in Bronchoscopy at this time due to risks and possible need for mechanical ventilation. Recommend earlier appointment with [facility] for evaluation of progressive ILD in person not televisit, patient agrees with this plan   -No home O2 eval necessary planning for discharge to [facility]  -Advised RN to call and schedule with [facility] Pulmonologist Dr. Dewayne Novak for eval of ILD with progression in hypoxia and SOB  -Stable from Pulmonary standpoint for transfer to [facility] Keep scheduled follow-up with our clinic     Questions and concerns addressed. Plan discussed with Dr. Carmen Blair. Electronically signed by   JASMEET Garsia CNP on 11/9/2022 at 9:34 AM     Addendum by Dr. Carmen Blair MD:  Patient seen by me independently including key components of medical care. Face to face evaluation and examination was performed. Case discussed with Mr. Valadezon ROBBI Trotter  Italicized font, if present,  represents changes to the note made by me. More than 50% of the encounter time involved with patient care by the Pulmonary & Critical care service team spent by me . Please see my modifications mentioned below:  She is on 4 L of oxygen via nasal cannula  Improving shortness of breath.     Electronically signed by   Ciera Salmon MD on 11/9/2022 at 2:45 PM

## 2022-11-09 NOTE — DISCHARGE SUMMARY
Discharge Summary  11/9/2022  11:08 AM    Patient:  Marlene Walsh  YOB: 1935    MRN: [id number]   Acct: [de-identified]   [id number]   Primary Care Physician: Ja Carnes MD    Admit date:  11/4/2022    Discharge date:  11/9/2022    Discharge Diagnoses:    Acute on chronic resp failure with pulm fibrosis  HTN   Hypothyroidism  A Fib on anticoag       Discharge Medications:        Medication List        START taking these medications      cefdinir 300 MG capsule  Commonly known as: OMNICEF  Take 1 capsule by mouth every 12 hours for 6 doses            CONTINUE taking these medications      albuterol sulfate  (90 Base) MCG/ACT inhaler  Commonly known as: ProAir HFA  Inhale 2 puffs into the lungs every 4 hours as needed for Wheezing or Shortness of Breath     allopurinol 100 MG tablet  Commonly known as: ZYLOPRIM  Take 2 tablets by mouth daily     apixaban 5 MG Tabs tablet  Commonly known as: Eliquis  TAKE 1 TABLET BY MOUTH  TWICE DAILY     azithromycin 250 MG tablet  Commonly known as: ZITHROMAX  Take 1 tablet by mouth daily     budesonide 0.5 MG/2ML nebulizer suspension  Commonly known as: PULMICORT  Take 2 mLs by nebulization 2 times daily     levothyroxine 88 MCG tablet  Commonly known as: SYNTHROID  TAKE 1 TABLET BY MOUTH ONCE DAILY     metoprolol tartrate 25 MG tablet  Commonly known as: LOPRESSOR  take 1/2 tablet by mouth twice a day     MULTIPLE VITAMIN PO     Probiotic Acidophilus Caps     simvastatin 20 MG tablet  Commonly known as: ZOCOR  TAKE 1 TABLET BY MOUTH AT  NIGHT               Where to Get Your Medications        Information about where to get these medications is not yet available    Ask your nurse or doctor about these medications  cefdinir 300 MG capsule        Scheduled Meds: Scheduled Meds:   cefdinir  300 mg Oral 2 times per day    albuterol  2.5 mg Nebulization BID    guaiFENesin  600 mg Oral BID    sodium chloride flush  5-40 mL IntraVENous 2 times per day allopurinol  200 mg Oral Daily    apixaban  5 mg Oral BID    azithromycin  250 mg Oral Daily    lactobacillus  1 capsule Oral Daily    levothyroxine  88 mcg Oral Daily    metoprolol tartrate  12.5 mg Oral BID    multivitamin  1 tablet Oral Daily    atorvastatin  10 mg Oral Nightly     Continuous Infusions:   sodium chloride       PRN Meds:.sodium chloride flush, sodium chloride, ondansetron **OR** ondansetron, polyethylene glycol, acetaminophen **OR** acetaminophen, albuterol sulfate HFA  Continuous Infusions:   sodium chloride         Intake/Output Summary (Last 24 hours) at 11/9/2022 1108  Last data filed at 11/9/2022 0802  Gross per 24 hour   Intake 370 ml   Output 600 ml   Net -230 ml       Diet:  ADULT DIET; Regular    Activity:  Activity as tolerated (Patient may move about with assist as indicated or with supervision.)    Follow-up:  as needed with Daryn Mckenna MD,  with [facility] provider, patient following up with second opinion at [facility], they will call her with appt time.      Consultants:  Pulmonary    Procedures:  patient not interested in Bronchoscopy     Diagnostic Test:    Objective:  Lab Results   Component Value Date/Time    WBC 6.9 11/05/2022 06:41 AM    RBC 4.28 11/05/2022 06:41 AM    RBC 3.45 06/08/2012 05:30 AM    HGB 11.3 11/05/2022 06:41 AM    HCT 35.7 11/05/2022 06:41 AM    MCV 83.4 11/05/2022 06:41 AM    MCH 26.4 11/05/2022 06:41 AM    MCHC 31.7 11/05/2022 06:41 AM    RDW 14.9 12/06/2017 09:00 AM     11/05/2022 06:41 AM    MPV 10.6 11/05/2022 06:41 AM     Lab Results   Component Value Date/Time     11/04/2022 08:05 AM    K 4.5 11/04/2022 08:05 AM    K 5.2 03/08/2022 02:23 PM    CL 95 11/04/2022 08:05 AM    CO2 31 11/04/2022 08:05 AM    BUN 16 11/04/2022 08:05 AM    CREATININE 0.7 11/04/2022 08:05 AM    GLUCOSE 99 11/04/2022 08:05 AM    GLUCOSE 93 06/08/2012 05:30 AM    CALCIUM 9.9 11/04/2022 08:05 AM     Lab Results   Component Value Date/Time    CALCIUM 9.9 11/04/2022 08:05 AM     No results found for: IONCA  Lab Results   Component Value Date/Time    MG 2.1 09/30/2020 09:00 AM     Lab Results   Component Value Date/Time    PHOS 4.1 06/16/2016 07:55 AM     No results found for: BNP  Lab Results   Component Value Date/Time    ALKPHOS 89 03/15/2022 06:18 PM    ALT 11 03/15/2022 06:18 PM    AST 18 03/15/2022 06:18 PM    PROT 8.1 03/15/2022 06:18 PM    BILITOT 0.5 03/15/2022 06:18 PM    BILIDIR <0.2 01/04/2022 08:41 AM    LABALBU 4.1 03/15/2022 06:18 PM    LABALBU 3.5 06/08/2012 05:30 AM     Lab Results   Component Value Date/Time    LACTA 1.5 03/15/2022 11:47 PM     No results found for: AMYLASE  Lab Results   Component Value Date/Time    LIPASE 26.0 03/15/2022 11:47 PM     Lab Results   Component Value Date/Time    CHOL 186 01/04/2022 08:41 AM    TRIG 76 01/04/2022 08:41 AM    HDL 97 01/04/2022 08:41 AM    LDLCALC 74 01/04/2022 08:41 AM     No results for input(s): POCGLU in the last 72 hours. No results for input(s): CKTOTAL, CKMB, TROPONINI in the last 72 hours. Lab Results   Component Value Date/Time    LABA1C 5.2 09/19/2018 11:39 AM     Lab Results   Component Value Date/Time    INR 1.94 03/15/2022 06:17 PM     No results found for: PHART, PO2ART, SII7DLR, JMU4EDC, [facility] Course: 40-year-old female primary history of pulmonary fibrosis, hypertension, hyperlipidemia, hypothyroid, typical a fib who presented to the emergency department via EMS for shortness of breath. Patient states since last weekend she has had increased shortness of breath wears 4 L O2 at home at times pulse ox got down to 60s. She said increased weakness decreased appetite, patient's been nauseated, positive for chills positive for headache due to dehydration per patient. Patient states starting last night having shortness of breath while lying down.     EKG- 11/4/2022   Sinus rhythm with Premature atrial complexes  Left anterior fascicular block  Cannot rule out Anterior infarct , age undetermined  Abnormal ECG  When compared with ECG of 04-NOV-2022 06:30,  Premature atrial complexes are now Present  Left anterior fascicular block is now Present    ECHO-  Ejection fraction is visually estimated at 55%. Overall left ventricular function is normal.   Aortic valve appears tricuspid. Aortic valve leaflets are somewhat thickened. Aortic valve leaflets are Mildly calcified. There is a small localized posterior pericardial effusion noted. CT chest wo contrast 11/05/2022  1. Severe interstitial fibrotic changes are seen throughout the right hemithorax with extensive honeycombing which is most severe in the right upper lobe. Bronchiectasis is seen bilaterally although significantly more severe on the right side. 2. There are diffuse groundglass and nodular infiltrates throughout the left hemithorax     CXR- 11/6/22 Severe fibrotic changes are again seen throughout the right lung with some volume loss. Persistent milder fibrotic changes are present in the left lung. The cardiac silhouette and pulmonary vasculature are within normal limits. There is no significant pleural effusion or pneumothorax. Visualized portions of the upper abdomen are within normal limits. The osseous structures are intact. No acute fractures or suspicious osseous lesions. Impression:  Extensive fibrotic changes are again present throughout the lungs. Overall appearance of the chest is similar to the previous study.        Vitals: /68   Pulse 96   Temp 97.5 °F (36.4 °C) (Oral)   Resp 18   Ht 5' 4\" (1.626 m)   Wt 148 lb 4.8 oz (67.3 kg)   SpO2 97%   BMI 25.46 kg/m²     Physical Exam:  General appearance - alert, well appearing 4L NC  Eyes - pupils equal and reactive, extraocular eye movements intact  Neck - supple, no significant adenopathy  Chest - normal effort on 4L NC, bilateral air entry  Heart - normal rate  Abdomen - soft, nontender, nondistended, no masses or organomegaly pos bs:   Neurological - alert, oriented, normal speech, no focal findings or movement disorder noted  Extremities - peripheral pulses normal, no pedal edema,       Disposition: SNF    Condition: Stable    Over 35 minutes spent on encounter      JASMEET Feliciano - CNP, CNP     Assessment and plan of care discussed with supervising physician, Dr Meagan Blood. Copy: Primary Care Physician: Dawson Boyd MD  Internal Medicine    I have discussed the case, including pertinent history and exam findings with the BRIAN/resident. I have seen and examined the patient and the key elements of the encounter have been performed by me. I agree with the assessment, plan and orders as documented by the NP.     Electronically signed by Nubia Gil MD on 11/9/2022 at 2:49 PM

## 2022-11-09 NOTE — PLAN OF CARE
Problem: Respiratory - Adult  Goal: Clear lung sounds  11/9/2022 0948 by Remo Ahumada, RCP  Outcome: Progressing   Patient mutually agreed on goals.

## 2022-11-09 NOTE — CARE COORDINATION
11/9/22, 2:52 PM EST    Patient goals/plan/ treatment preferences discussed by  and . Patient goals/plan/ treatment preferences reviewed with patient/ family. Patient/ family verbalize understanding of discharge plan and are in agreement with goal/plan/treatment preferences. Understanding was demonstrated using the teach back method. AVS provided by RN at time of discharge, which includes all necessary medical information pertaining to the patients current course of illness, treatment, post-discharge goals of care, and treatment preferences. Services At/After Discharge: [place] (SNF), Aide services, Nursing service, OT, and PT       IMM Letter  IMM Letter given to Patient/Family/Significant other/Guardian/POA/by[de-identified] BEENA Qiu  IMM Letter date given[de-identified] 11/09/22  IMM Letter time given[de-identified] 1000     Pt is being discharged today to [facility] under her Medicare benefit. TASH notified Ricky Ordonez with [facility]. Pts friend is transporting pt.   TASH faxed AVS.  RN is aware

## 2022-12-14 ENCOUNTER — APPOINTMENT (OUTPATIENT)
Dept: GENERAL RADIOLOGY | Age: 87
End: 2022-12-14
Payer: MEDICARE

## 2022-12-14 ENCOUNTER — TELEPHONE (OUTPATIENT)
Dept: PULMONOLOGY | Age: 87
End: 2022-12-14

## 2022-12-14 ENCOUNTER — HOSPITAL ENCOUNTER (EMERGENCY)
Age: 87
Discharge: HOME | End: 2022-12-14
Attending: EMERGENCY MEDICINE
Payer: MEDICARE

## 2022-12-14 VITALS
TEMPERATURE: 97.9 F | WEIGHT: 148 LBS | HEART RATE: 76 BPM | OXYGEN SATURATION: 92 % | SYSTOLIC BLOOD PRESSURE: 141 MMHG | RESPIRATION RATE: 27 BRPM | DIASTOLIC BLOOD PRESSURE: 65 MMHG | BODY MASS INDEX: 25.4 KG/M2

## 2022-12-14 DIAGNOSIS — Z87.09 HISTORY OF PULMONARY FIBROSIS: ICD-10-CM

## 2022-12-14 DIAGNOSIS — R06.02 SHORTNESS OF BREATH: Primary | ICD-10-CM

## 2022-12-14 LAB
ALBUMIN SERPL-MCNC: 3.7 G/DL (ref 3.5–5.1)
ALLEN TEST: POSITIVE
ALP BLD-CCNC: 83 U/L (ref 38–126)
ALT SERPL-CCNC: 8 U/L (ref 11–66)
ANION GAP SERPL CALCULATED.3IONS-SCNC: 9 MEQ/L (ref 8–16)
AST SERPL-CCNC: 21 U/L (ref 5–40)
BASE EXCESS (CALCULATED): 6.4 MMOL/L (ref -2.5–2.5)
BASOPHILS # BLD: 0.7 %
BASOPHILS ABSOLUTE: 0.1 THOU/MM3 (ref 0–0.1)
BILIRUB SERPL-MCNC: 0.4 MG/DL (ref 0.3–1.2)
BUN BLDV-MCNC: 11 MG/DL (ref 7–22)
CALCIUM SERPL-MCNC: 10.2 MG/DL (ref 8.5–10.5)
CHLORIDE BLD-SCNC: 100 MEQ/L (ref 98–111)
CO2: 31 MEQ/L (ref 23–33)
COLLECTED BY:: ABNORMAL
CREAT SERPL-MCNC: 0.6 MG/DL (ref 0.4–1.2)
DEVICE: ABNORMAL
EKG ATRIAL RATE: 86 BPM
EKG P AXIS: 73 DEGREES
EKG P-R INTERVAL: 204 MS
EKG Q-T INTERVAL: 400 MS
EKG QRS DURATION: 88 MS
EKG QTC CALCULATION (BAZETT): 478 MS
EKG R AXIS: 123 DEGREES
EKG T AXIS: 23 DEGREES
EKG VENTRICULAR RATE: 86 BPM
EOSINOPHIL # BLD: 2.5 %
EOSINOPHILS ABSOLUTE: 0.3 THOU/MM3 (ref 0–0.4)
ERYTHROCYTE [DISTWIDTH] IN BLOOD BY AUTOMATED COUNT: 16 % (ref 11.5–14.5)
ERYTHROCYTE [DISTWIDTH] IN BLOOD BY AUTOMATED COUNT: 48.9 FL (ref 35–45)
GFR SERPL CREATININE-BSD FRML MDRD: > 60 ML/MIN/1.73M2
GLUCOSE BLD-MCNC: 129 MG/DL (ref 70–108)
HCO3: 32 MMOL/L (ref 23–28)
HCT VFR BLD CALC: 39.5 % (ref 37–47)
HEMOGLOBIN: 12.1 GM/DL (ref 12–16)
IFIO2: 6
IMMATURE GRANS (ABS): 0.06 THOU/MM3 (ref 0–0.07)
IMMATURE GRANULOCYTES: 0.6 %
LYMPHOCYTES # BLD: 6 %
LYMPHOCYTES ABSOLUTE: 0.6 THOU/MM3 (ref 1–4.8)
MCH RBC QN AUTO: 25.8 PG (ref 26–33)
MCHC RBC AUTO-ENTMCNC: 30.6 GM/DL (ref 32.2–35.5)
MCV RBC AUTO: 84.2 FL (ref 81–99)
MONOCYTES # BLD: 7.4 %
MONOCYTES ABSOLUTE: 0.8 THOU/MM3 (ref 0.4–1.3)
NUCLEATED RED BLOOD CELLS: 0 /100 WBC
O2 SATURATION: 99 %
OSMOLALITY CALCULATION: 280.5 MOSMOL/KG (ref 275–300)
PCO2: 47 MMHG (ref 35–45)
PH BLOOD GAS: 7.44 (ref 7.35–7.45)
PLATELET # BLD: 204 THOU/MM3 (ref 130–400)
PMV BLD AUTO: 10.6 FL (ref 9.4–12.4)
PO2: 120 MMHG (ref 71–104)
POTASSIUM SERPL-SCNC: 4.4 MEQ/L (ref 3.5–5.2)
PRO-BNP: 1034 PG/ML (ref 0–1800)
PROCALCITONIN: 0.05 NG/ML (ref 0.01–0.09)
RBC # BLD: 4.69 MILL/MM3 (ref 4.2–5.4)
SEG NEUTROPHILS: 82.8 %
SEGMENTED NEUTROPHILS ABSOLUTE COUNT: 8.6 THOU/MM3 (ref 1.8–7.7)
SODIUM BLD-SCNC: 140 MEQ/L (ref 135–145)
SOURCE, BLOOD GAS: ABNORMAL
TOTAL PROTEIN: 6.9 G/DL (ref 6.1–8)
TROPONIN T: < 0.01 NG/ML
TROPONIN T: < 0.01 NG/ML
WBC # BLD: 10.4 THOU/MM3 (ref 4.8–10.8)

## 2022-12-14 PROCEDURE — 96374 THER/PROPH/DIAG INJ IV PUSH: CPT

## 2022-12-14 PROCEDURE — 84145 PROCALCITONIN (PCT): CPT

## 2022-12-14 PROCEDURE — 83880 ASSAY OF NATRIURETIC PEPTIDE: CPT

## 2022-12-14 PROCEDURE — 93005 ELECTROCARDIOGRAM TRACING: CPT | Performed by: EMERGENCY MEDICINE

## 2022-12-14 PROCEDURE — 80053 COMPREHEN METABOLIC PANEL: CPT

## 2022-12-14 PROCEDURE — 36415 COLL VENOUS BLD VENIPUNCTURE: CPT

## 2022-12-14 PROCEDURE — 71045 X-RAY EXAM CHEST 1 VIEW: CPT

## 2022-12-14 PROCEDURE — 84484 ASSAY OF TROPONIN QUANT: CPT

## 2022-12-14 PROCEDURE — 85025 COMPLETE CBC W/AUTO DIFF WBC: CPT

## 2022-12-14 PROCEDURE — 83970 ASSAY OF PARATHORMONE: CPT

## 2022-12-14 PROCEDURE — 6360000002 HC RX W HCPCS: Performed by: EMERGENCY MEDICINE

## 2022-12-14 PROCEDURE — 36600 WITHDRAWAL OF ARTERIAL BLOOD: CPT

## 2022-12-14 PROCEDURE — 93010 ELECTROCARDIOGRAM REPORT: CPT | Performed by: INTERNAL MEDICINE

## 2022-12-14 PROCEDURE — 99285 EMERGENCY DEPT VISIT HI MDM: CPT | Performed by: EMERGENCY MEDICINE

## 2022-12-14 PROCEDURE — 6370000000 HC RX 637 (ALT 250 FOR IP): Performed by: EMERGENCY MEDICINE

## 2022-12-14 PROCEDURE — 94640 AIRWAY INHALATION TREATMENT: CPT

## 2022-12-14 PROCEDURE — 82803 BLOOD GASES ANY COMBINATION: CPT

## 2022-12-14 RX ORDER — METHYLPREDNISOLONE SODIUM SUCCINATE 125 MG/2ML
125 INJECTION, POWDER, LYOPHILIZED, FOR SOLUTION INTRAMUSCULAR; INTRAVENOUS ONCE
Status: COMPLETED | OUTPATIENT
Start: 2022-12-14 | End: 2022-12-14

## 2022-12-14 RX ORDER — IPRATROPIUM BROMIDE AND ALBUTEROL SULFATE 2.5; .5 MG/3ML; MG/3ML
1 SOLUTION RESPIRATORY (INHALATION) ONCE
Status: COMPLETED | OUTPATIENT
Start: 2022-12-14 | End: 2022-12-14

## 2022-12-14 RX ADMIN — METHYLPREDNISOLONE SODIUM SUCCINATE 125 MG: 125 INJECTION, POWDER, FOR SOLUTION INTRAMUSCULAR; INTRAVENOUS at 10:49

## 2022-12-14 RX ADMIN — IPRATROPIUM BROMIDE AND ALBUTEROL SULFATE 1 AMPULE: .5; 3 SOLUTION RESPIRATORY (INHALATION) at 10:42

## 2022-12-14 ASSESSMENT — PAIN - FUNCTIONAL ASSESSMENT: PAIN_FUNCTIONAL_ASSESSMENT: NONE - DENIES PAIN

## 2022-12-14 NOTE — ED NOTES
Patient to the ED with complaint of SOB. Patient states that she is always short of breath but states that this morning it was worse than normal. She states history of pulmonary fibrosis. She states that she always wears 6 L nasal cannula at home at all times. Patient states that since being placed on non rebreather mask she is feeling almost normal. Dr Roland Lopez at bedside.       Rell Garces RN  12/14/22 6234

## 2022-12-14 NOTE — TELEPHONE ENCOUNTER
Son Camille Hooker called into the office wondering if it was safe for his mother to travel to [address] [place] [address] if her o2 sat is 60-70's on her POC. I spoke with Boykin Burkitt for immediate advice and she advised that patient go to the ER if o2 sat is that low. I notified Camille Hooker to reschedule that appointment and take her to the ER for evaluation. He is in understanding. FYI.

## 2022-12-14 NOTE — ED NOTES
ED nurse-to-nurse bedside report    Chief Complaint   Patient presents with    Shortness of Breath      LOC: alert and orientated to name, place, date  Vital signs   Vitals:    12/14/22 1048 12/14/22 1147 12/14/22 1248 12/14/22 1313   BP: 134/66 (!) 159/117 (!) 161/77 (!) 141/77   Pulse: 78 69 76 77   Resp: 16 29 24 25   Temp:       TempSrc:       SpO2: 99% 99% 95% 93%   Weight:          Pain:    Pain Interventions: NA  Pain Goal: 0  Oxygen: Yes    Current needs required 5L   Telemetry: Yes  LDAs:   Peripheral IV 12/14/22 Left Antecubital (Active)   Site Assessment Clean, dry & intact 12/14/22 0950   Line Status Flushed 12/14/22 0950   Phlebitis Assessment No symptoms 12/14/22 0950   Infiltration Assessment 0 12/14/22 0950   Dressing Status Clean, dry & intact 12/14/22 0950   Dressing Type Transparent 12/14/22 0950     Continuous Infusions:   Mobility: Requires assistance * 1  Pino Fall Risk Score:    Fall Risk 9/14/2021 9/8/2020 9/6/2020 8/24/2020 8/24/2020 10/9/2019 9/30/2019   2 or more falls in past year? no no no no no no no   Fall with injury in past year? no no no no no no no     Fall Interventions: call light in reach, bed in low position with wheels locked, side rails up x2  Report given to: BEREKET Matthew RN  12/14/22 1400

## 2022-12-14 NOTE — ED NOTES
Assumed care at this time. Received report from [facility]. Pt resting in bed alert and vs assessed. Dr. Nora Alvarez at bedside updating pt. Oxygen sat decreased from 6 L NC to 4 L NC to maintain oxygen saturation between 90-93% per Dr. Nora Alvarez. Pt states she wears 4-5 L NC at home. Pt provided with crackers and denies any other needs.  Pt stable at this time     Silvana Macias RN  12/14/22 0373

## 2022-12-14 NOTE — ED PROVIDER NOTES
[address] ENCOUNTER      PATIENT NAME: Earl Cantu  MRN: [id number]  : 1935  LERMA: 2022  PROVIDER: Sade Qureshi MD      CHIEF COMPLAINT       Chief Complaint   Patient presents with    Shortness of Breath       Patient is seen and evaluated in a timely fashion. Nurses Notes are reviewed and I agree except as noted in the HPI. HISTORY OF PRESENT ILLNESS    Earl Cantu is a 80 y.o. female who presents to Emergency Department with Shortness of Breath     Patient is brought in by EMS for evaluation of sudden onset SOB and lower oxygen saturation this morning. Past medical history remarkable for lung fibrosis, baseline on 5 L/min NC. This morning when she was transported from recliner to wheelchair, she felt shortness of breath. She became anxious. SaO2 dropped to 58% on 5L/min NC.  EMS was called. She was put down 15 L/min NRM and her SaO2 improved to 100% and she felt better. On arrival, she was alert oriented x4. She denied SOB. She is not in pain. No fever or chills. No nausea or vomiting. No abdominal pain. No leg swelling. No history of DVT or PE. On Eliquis due to history of A. fib. This HPI was provided by patient.      REVIEW OF SYSTEMS   Ten-point review of systems is negative except those documented in above HPI including constitutional, HEENT, respiratory, cardiovascular, gastrointestinal, genitourinary, musculoskeletal, skin, neurological, hematological and behavioral.      PAST MEDICAL HISTORY    has a past medical history of Anemia, Backache, Bronchiectasis ([address] Bursitis, Constipation, Diverticulosis of colon, HTN (hypertension), Hypercalcemia, Hyperlipidemia, Nodular goiter, OA (osteoarthritis), Osteopenia, Parathyroid adenoma, Pneumonia of right upper lobe due to infectious organism, Pneumonitis, Pulmonary Mycobacterium avium complex (MAC) infection ([address] 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 ([address] Sinusitis, and Vitamin D deficiency. SURGICAL HISTORY      has a past surgical history that includes Appendectomy (); Cataract removal with implant (Bilateral,  ?);  section (3237,0390); Dilation and curettage of uterus (7598,0060,0100); Foot surgery (); Abdominal exploration surgery (2013); Total abdominal hysterectomy w/ bilateral salpingoophorectomy (); Excision of Parathyroid Mass (Right, 2013); Total knee arthroplasty (Right, 2014); Tonsillectomy and adenoidectomy ( ?); joint replacement (12); joint replacement (2014); Hysterectomy; malignant skin lesion excision (Left, 2017); Foot surgery (Left, 2017); bronchoscopy (N/A, 2020); Back Injection (Bilateral, 2021); Back Injection (N/A, 2022); Pain management procedure (Left, 2022); Pain management procedure (Right, 2022); and Upper gastrointestinal endoscopy (N/A, 2022).     CURRENT MEDICATIONS       Discharge Medication List as of 2022  2:26 PM        CONTINUE these medications which have NOT CHANGED    Details   azithromycin (ZITHROMAX) 250 MG tablet Take 1 tablet by mouth daily, Disp-90 tablet, R-3Her pulmonologist at the [facility] wants her on this everydayNormal      simvastatin (ZOCOR) 20 MG tablet TAKE 1 TABLET BY MOUTH AT  NIGHT, Disp-90 tablet, R-1Requesting 1 year supplyNormal      apixaban (ELIQUIS) 5 MG TABS tablet TAKE 1 TABLET BY MOUTH  TWICE DAILY, Disp-180 tablet, R-1Requesting 1 year supplyNormal      albuterol sulfate HFA (PROAIR HFA) 108 (90 Base) MCG/ACT inhaler Inhale 2 puffs into the lungs every 4 hours as needed for Wheezing or Shortness of Breath, Disp-1 each, R-5Normal      levothyroxine (SYNTHROID) 88 MCG tablet TAKE 1 TABLET BY MOUTH ONCE DAILY, Disp-90 tablet, R-3Requesting 1 year supplyNormal      allopurinol (ZYLOPRIM) 100 MG tablet Take 2 tablets by mouth daily, Disp-180 tablet, R-3Normal      budesonide (PULMICORT) 0.5 MG/2ML nebulizer suspension Take 2 mLs by nebulization 2 times daily, Disp-100 each, R-5Normal      metoprolol tartrate (LOPRESSOR) 25 MG tablet take 1/2 tablet by mouth twice a day, Disp-90 tablet, R-5Normal      Lactobacillus (PROBIOTIC ACIDOPHILUS) CAPS Take 1 capsule by mouth daily Historical Med      MULTIPLE VITAMIN PO Take 1 tablet by mouth daily Historical Med             ALLERGIES     is allergic to colchicine, levaquin [levofloxacin], oxycodone, percocet [oxycodone-acetaminophen], rifampin, sulfa antibiotics, and cefuroxime. FAMILY HISTORY     She indicated that her mother is . She indicated that her father is . She indicated that the status of her neg hx is unknown.   family history includes Arthritis in her father and mother; Diabetes in her mother; High Blood Pressure in her father and mother; Other in her father; Thyroid Disease in her mother. SOCIAL HISTORY      reports that she has never smoked. She has never used smokeless tobacco. She reports current alcohol use. She reports that she does not use drugs. PHYSICAL EXAM      weight is 148 lb (67.1 kg). Her oral temperature is 97.9 °F (36.6 °C). Her blood pressure is 141/65 (abnormal) and her pulse is 76. Her respiration is 27 and oxygen saturation is 92%. Physical Exam  Vitals and nursing note reviewed. Constitutional:       Appearance: She is well-developed. She is not diaphoretic. HENT:      Head: Normocephalic and atraumatic. Nose: Nose normal.   Eyes:      General: No scleral icterus. Right eye: No discharge. Left eye: No discharge. Conjunctiva/sclera: Conjunctivae normal.      Pupils: Pupils are equal, round, and reactive to light. Neck:      Vascular: No JVD. Trachea: No tracheal deviation. Cardiovascular:      Rate and Rhythm: Normal rate. Rhythm irregular. Heart sounds: Normal heart sounds. No murmur heard. No friction rub. No gallop.    Pulmonary:      Effort: Pulmonary effort is normal. No respiratory distress. Breath sounds: No stridor. Examination of the right-middle field reveals decreased breath sounds and rales. Examination of the left-middle field reveals decreased breath sounds and rales. Examination of the right-lower field reveals decreased breath sounds, rhonchi and rales. Examination of the left-lower field reveals decreased breath sounds, rhonchi and rales. Decreased breath sounds, rhonchi and rales present. No wheezing. Chest:      Chest wall: No tenderness. Abdominal:      General: Bowel sounds are normal. There is no distension. Palpations: Abdomen is soft. There is no mass. Tenderness: There is no abdominal tenderness. There is no guarding or rebound. Hernia: No hernia is present. Musculoskeletal:         General: No tenderness or deformity. Cervical back: Normal range of motion and neck supple. Lymphadenopathy:      Cervical: No cervical adenopathy. Skin:     General: Skin is warm and dry. Capillary Refill: Capillary refill takes less than 2 seconds. Coloration: Skin is not pale. Findings: No erythema or rash. Neurological:      Mental Status: She is alert and oriented to person, place, and time. Cranial Nerves: No cranial nerve deficit. Sensory: No sensory deficit. Motor: No abnormal muscle tone. Coordination: Coordination normal.      Deep Tendon Reflexes: Reflexes normal.   Psychiatric:         Behavior: Behavior normal.         Thought Content: Thought content normal.         Judgment: Judgment normal.     ANCILLARY TEST RESULTS   EKG:    Interpreted by me  Sinus rhythm, marked sinus arrhythmia, ventricular rate 86 bpm, OR interval 204 ms, QRS duration 88 ms,  ms, no ST elevation or acute T wave    LAB RESULTS:  Results for orders placed or performed during the hospital encounter of 12/14/22   Blood Gas, Arterial   Result Value Ref Range    pH, Blood Gas 7.44 7.35 - 7.45    PCO2 47 (H) 35 - 45 mmhg PO2 120 (H) 71 - 104 mmhg    HCO3 32 (H) 23 - 28 mmol/l    Base Excess (Calculated) 6.4 (H) -2.5 - 2.5 mmol/l    O2 Sat 99 %    IFIO2 6     DEVICE Cannula     Dustin Test Positive     Source: R Brach     COLLECTED BY: [id number]    CBC with Auto Differential   Result Value Ref Range    WBC 10.4 4.8 - 10.8 thou/mm3    RBC 4.69 4.20 - 5.40 mill/mm3    Hemoglobin 12.1 12.0 - 16.0 gm/dl    Hematocrit 39.5 37.0 - 47.0 %    MCV 84.2 81.0 - 99.0 fL    MCH 25.8 (L) 26.0 - 33.0 pg    MCHC 30.6 (L) 32.2 - 35.5 gm/dl    RDW-CV 16.0 (H) 11.5 - 14.5 %    RDW-SD 48.9 (H) 35.0 - 45.0 fL    Platelets 770 576 - 154 thou/mm3    MPV 10.6 9.4 - 12.4 fL    Seg Neutrophils 82.8 %    Lymphocytes 6.0 %    Monocytes 7.4 %    Eosinophils 2.5 %    Basophils 0.7 %    Immature Granulocytes 0.6 %    Segs Absolute 8.6 (H) 1.8 - 7.7 thou/mm3    Lymphocytes Absolute 0.6 (L) 1.0 - 4.8 thou/mm3    Monocytes Absolute 0.8 0.4 - 1.3 thou/mm3    Eosinophils Absolute 0.3 0.0 - 0.4 thou/mm3    Basophils Absolute 0.1 0.0 - 0.1 thou/mm3    Immature Grans (Abs) 0.06 0.00 - 0.07 thou/mm3    nRBC 0 /100 wbc   Comprehensive Metabolic Panel   Result Value Ref Range    Glucose 129 (H) 70 - 108 mg/dL    Creatinine 0.6 0.4 - 1.2 mg/dL    BUN 11 7 - 22 mg/dL    Sodium 140 135 - 145 meq/L    Potassium 4.4 3.5 - 5.2 meq/L    Chloride 100 98 - 111 meq/L    CO2 31 23 - 33 meq/L    Calcium 10.2 8.5 - 10.5 mg/dL    AST 21 5 - 40 U/L    Alkaline Phosphatase 83 38 - 126 U/L    Total Protein 6.9 6.1 - 8.0 g/dL    Albumin 3.7 3.5 - 5.1 g/dL    Total Bilirubin 0.4 0.3 - 1.2 mg/dL    ALT 8 (L) 11 - 66 U/L   Brain Natriuretic Peptide   Result Value Ref Range    Pro-BNP 1034.0 0.0 - 1800.0 pg/mL   Troponin   Result Value Ref Range    Troponin T < 0.010 ng/ml   Procalcitonin   Result Value Ref Range    Procalcitonin 0.05 0.01 - 0.09 ng/mL   Anion Gap   Result Value Ref Range    Anion Gap 9.0 8.0 - 16.0 meq/L   Glomerular Filtration Rate, Estimated   Result Value Ref Range    Est, Glom Filt Rate >60 >60 ml/min/1.73m2   Osmolality   Result Value Ref Range    Osmolality Calc 280.5 275.0 - 300.0 mOsmol/kg   Troponin   Result Value Ref Range    Troponin T < 0.010 ng/ml       RADIOLOGY REPORTS  XR CHEST PORTABLE   Final Result   1. Underlying fibrotic changes in both lungs. Acute on chronic process is not excluded. Clinical correlation is recommended. **This report has been created using voice recognition software. It may contain minor errors which are inherent in voice recognition technology. **      Final report electronically signed by Dr Ashlie Kohler on 12/14/2022 10:30 AM          ED COURSE AND MEDICAL DECISION MAKING (MDM)     DDx: Includes but not limited to: Pneumonia, bronchitis, CHF, PE, COPD, asthma, pulmonary edema, pleural effusion, AMI, URI, influenza, sinusitis, allergies, anxiety    PLAN: Large-bore IV, basic labs, chest x-ray, DuoNeb, Solu-Medrol, EKG, ABG    ED Course as of 12/14/22 1842   Wed Dec 14, 2022   0950 Patient is seen and evaluated. [JET]      ED Course User Index  [JET] Maureen Valiente MD       MDM:     No more hypoxia episode in ED. SaO2 stays above 93% or above on 4 L/min NC. Chest x-ray shows stable lung fibrosis changes. ABG on baseline oxygen supply is unremarkable. Troponin negative x2. Patient is reassured. Discharged with pulmonology follow-up in 1 week. Medications   ipratropium-albuterol (DUONEB) nebulizer solution 1 ampule (1 ampule Inhalation Given 12/14/22 1042)   methylPREDNISolone sodium (SOLU-MEDROL) injection 125 mg (125 mg IntraVENous Given 12/14/22 1049)       Vitals:    12/14/22 1248 12/14/22 1313 12/14/22 1418 12/14/22 1454   BP: (!) 161/77 (!) 141/77 (!) 141/65    Pulse: 76 77 74 76   Resp: 24 25 30 27   Temp:       TempSrc:       SpO2: 95% 93% 93% 92%   Weight:           PROCEDURE   None    CONSULT   None    CRITICAL CARE TIME   None    FINAL IMPRESSION AND DISPOSITION      1. Shortness of breath    2.  History of pulmonary fibrosis DISPOSITION Decision To Discharge 12/14/2022 02:26:00 PM    PATIENT REFERRED TO:  MD Ruchi Arroyo  [address] [facility],[address]  [place] [address]  [phone]    In 3 days  ED discharge follow up  [address]:  Discharge Medication List as of 12/14/2022  2:26 PM        (Please note that portions of this note were completed with a voice recognition program.  Efforts were made to edit the dictations but occasionally words aremis-transcribed.)  MD Serenity Carey MD  12/14/22 7130

## 2022-12-14 NOTE — ED NOTES
RN at bedside. Pt states that aout 1230 her stomach started to hurt, she felt a hot flash and now her chest feels tighter than before. No distress noted. Pt states she feels anxious.  Will speak with dr. Tanvir Rubio RN  12/14/22 8375

## 2022-12-15 ENCOUNTER — OFFICE VISIT (OUTPATIENT)
Dept: FAMILY MEDICINE CLINIC | Age: 87
End: 2022-12-15

## 2022-12-15 ENCOUNTER — TELEPHONE (OUTPATIENT)
Dept: FAMILY MEDICINE CLINIC | Age: 87
End: 2022-12-15

## 2022-12-15 VITALS
BODY MASS INDEX: 25.4 KG/M2 | SYSTOLIC BLOOD PRESSURE: 120 MMHG | RESPIRATION RATE: 14 BRPM | HEIGHT: 64 IN | HEART RATE: 78 BPM | DIASTOLIC BLOOD PRESSURE: 80 MMHG

## 2022-12-15 DIAGNOSIS — J84.10 PULMONARY FIBROSIS (HCC): ICD-10-CM

## 2022-12-15 DIAGNOSIS — J47.9 BRONCHIECTASIS WITHOUT COMPLICATION (HCC): Primary | ICD-10-CM

## 2022-12-15 DIAGNOSIS — M11.20 PSEUDOGOUT: ICD-10-CM

## 2022-12-15 DIAGNOSIS — N25.81 SECONDARY HYPERPARATHYROIDISM (HCC): ICD-10-CM

## 2022-12-15 PROBLEM — R26.2 DIFFICULTY IN WALKING, NOT ELSEWHERE CLASSIFIED: Status: ACTIVE | Noted: 2022-03-08

## 2022-12-15 PROBLEM — E78.5 HYPERLIPIDEMIA, UNSPECIFIED: Status: ACTIVE | Noted: 2020-01-14

## 2022-12-15 PROBLEM — M85.80 OTHER SPECIFIED DISORDERS OF BONE DENSITY AND STRUCTURE, UNSPECIFIED SITE: Status: ACTIVE | Noted: 2022-11-09

## 2022-12-15 PROBLEM — M62.81 MUSCLE WEAKNESS (GENERALIZED): Status: ACTIVE | Noted: 2020-10-02

## 2022-12-15 PROBLEM — R27.8 OTHER LACK OF COORDINATION: Status: ACTIVE | Noted: 2022-03-08

## 2022-12-15 PROBLEM — E04.1 NONTOXIC SINGLE THYROID NODULE: Status: ACTIVE | Noted: 2022-11-09

## 2022-12-15 LAB — PTH INTACT: 35.4 PG/ML (ref 15–65)

## 2022-12-15 RX ORDER — PREDNISONE 2.5 MG
2.5 TABLET ORAL
Qty: 30 TABLET | Refills: 0 | Status: SHIPPED | OUTPATIENT
Start: 2022-12-15 | End: 2023-01-03

## 2022-12-15 RX ORDER — BUSPIRONE HYDROCHLORIDE 5 MG/1
5 TABLET ORAL 3 TIMES DAILY
COMMUNITY
Start: 2022-12-10 | End: 2022-12-16 | Stop reason: SDUPTHER

## 2022-12-15 RX ORDER — PREDNISONE 20 MG/1
TABLET ORAL
Qty: 10 TABLET | Refills: 0 | Status: SHIPPED | OUTPATIENT
Start: 2022-12-15

## 2022-12-15 RX ORDER — HYDROXYZINE HYDROCHLORIDE 25 MG/1
25 TABLET, FILM COATED ORAL PRN
COMMUNITY
Start: 2022-12-10

## 2022-12-15 RX ORDER — SERTRALINE HYDROCHLORIDE 25 MG/1
25 TABLET, FILM COATED ORAL DAILY
COMMUNITY
Start: 2022-12-10 | End: 2022-12-16 | Stop reason: SDUPTHER

## 2022-12-15 NOTE — TELEPHONE ENCOUNTER
Called [facility] and spoke to staff and they aren't able to fax us paperwork for 10liter o2 concentrator. We just need an order faxed to them telling them her 02 needs.   Fax to [phone]

## 2022-12-15 NOTE — PATIENT INSTRUCTIONS
Begin some miralax instead of the fiber supplement  Let us know if you do not hear from the Palliative care nurse  Begin the prednisone 2.5 mg daily and for an attack the 20 mg for two days  We will get the oxygen concentrator ordered  Stop the allopurinol  We will cancel the 12/22/2022 appointment

## 2022-12-15 NOTE — PROGRESS NOTES
Sandra Benavides (:  1935) is a 87 y.o. female,Established patient, here for evaluation of the following chief complaint(s):  Other (Follow up from nursing [facility] con/)         ASSESSMENT/PLAN:   Diagnosis Orders   1. Bronchiectasis without complication (HCC)  Amb External Referral To Palliative Care      2. Pulmonary fibrosis (HCC)  Amb External Referral To Palliative Care      3. 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 (HCC)  PTH, Intact      4. Pseudogout  predniSONE (DELTASONE) 2.5 MG tablet    predniSONE (DELTASONE) 20 MG tablet               Begin some miralax instead of the fiber supplement  Let us know if you do not hear from the Palliative care nurse  Begin the prednisone 2.5 mg daily and for an attack the 20 mg for two days  We will get the oxygen concentrator ordered  Stop the allopurinol  We will cancel the 2022 appointment    Subjective   SUBJECTIVE/OBJECTIVE:  HPI  We reviewed the last hospital stay and ED visit  She is interested in pallietive care  Home health is seeing her  She is going to do a zoom call with the [facility]  Review of Systems   Constitutional:  Positive for fatigue.   HENT:  Negative for sinus pressure.    Eyes:  Negative for visual disturbance.   Respiratory:  Positive for cough, chest tightness and shortness of breath.    Cardiovascular:  Negative for chest pain.   Gastrointestinal:  Negative for constipation.   Genitourinary: Negative.    Musculoskeletal:  Positive for arthralgias, back pain, gait problem and myalgias.   Skin:  Negative for rash.   Neurological:  Positive for weakness. Negative for headaches.    The patient's medications, allergies, past medical problems, surgical, social, and family histories were reviewed and updated as needed.      Objective   Physical Exam  Constitutional:       Appearance: Normal appearance. She is well-developed.   HENT:      Head: Normocephalic and atraumatic.   Eyes:      General: No scleral icterus.     Conjunctiva/sclera:  Conjunctivae normal.   Neck:      Trachea: No tracheal deviation.   Cardiovascular:      Rate and Rhythm: Normal rate and regular rhythm.      Heart sounds: Normal heart sounds.   Pulmonary:      Effort: Pulmonary effort is normal.      Breath sounds: Rales (dry sounding throughout) present.   Musculoskeletal:         General: Deformity (arthrically deformed to knees and hands) present.   Skin:     General: Skin is warm and dry.   Neurological:      General: No focal deficit present.      Mental Status: She is alert.   Psychiatric:         Behavior: Behavior normal.    Blood pressure 120/80, pulse 78, resp. rate 14, height 5' 4\" (1.626 m), not currently breastfeeding.            An electronic signature was used to authenticate this note.    --Tacho Ny MD

## 2022-12-15 NOTE — PROGRESS NOTES
Referral sent to [facility] home health and hospice and they will call patient to schedule.

## 2022-12-15 NOTE — TELEPHONE ENCOUNTER
----- Message from Masood Carlin MD sent at 12/15/2022  1:45 PM EST -----  Let her know the parathyroid hormone level continues to look well.

## 2022-12-16 ENCOUNTER — TELEPHONE (OUTPATIENT)
Dept: FAMILY MEDICINE CLINIC | Age: 87
End: 2022-12-16

## 2022-12-16 NOTE — TELEPHONE ENCOUNTER
I called her son Pamela Goldstein and left a voice mail. I called [facility] and spoke to Ramo Nicole and the . We spoke about how the portable oxygen concentrator does not go to 6 liters. The manager suggested that the [facility] therapist go to her home and check the pulse ox with the concentrator and with the present home concentrator. I told her that the vast majority of the time, the 5 liter and under concentrator would work fine but just in the rare times she has an exacerbation is when she would need a higher flow.

## 2022-12-16 NOTE — TELEPHONE ENCOUNTER
In order to bring the 10liters concentrator  , patient has to return portable oxygen cart that she has now. Patient said she doesn't want to return until she can get another portable oxygen cart bought. Patient said she would let them know after she works out how to get one.

## 2022-12-20 RX ORDER — BUSPIRONE HYDROCHLORIDE 5 MG/1
TABLET ORAL
Qty: 270 TABLET | Refills: 3 | Status: SHIPPED | OUTPATIENT
Start: 2022-12-20 | End: 2023-01-11

## 2022-12-20 NOTE — TELEPHONE ENCOUNTER
Per Verbal order of  Sent prescription to pharmacy for pt.    Date of last visit:  12/15/2022  Date of next visit:  Visit date not found    Requested Prescriptions     Signed Prescriptions Disp Refills    busPIRone (BUSPAR) 5 MG tablet 270 tablet 3     Sig: take 1 tablet by mouth three times a day     Authorizing Provider: GABRIEL Diaz     Ordering User: Nga Taylor

## 2022-12-24 NOTE — PROGRESS NOTES
Pt admitted to 4K24 via via cart/stretcher from ED. Complaints: Shortness of breath. IV none infusing into the antecubital left, condition patent and no redness at a rate of 0 mls/ hour. IV site free of s/s of infection or infiltration. Vital signs obtained. Assessment and data collection initiated. Two nurse skin assessment performed by Ankita Silvestre and Stephen Schwarz RN. Oriented to room. Policies and procedures for 4K explained. All questions answered with no further questions at this time. Fall prevention and safety brochure discussed with patient. Bed alarm on. Call light in reach. Oriented to room. Gagandeep Delaney RN 9/26/2020 4:25 AM     Explained patients right to have family, representative or physician notified of their admission. Patient has Declined for physician to be notified. Patient has Declined for family/representative to be notified. Patient would like family notified once per shift?  No Patient with hypophosphatemia, likely due to persistent diarrhea.  Likely contributing to overall weakness and falls.  - Replenish PRN

## 2023-01-03 ENCOUNTER — TELEPHONE (OUTPATIENT)
Dept: FAMILY MEDICINE CLINIC | Age: 88
End: 2023-01-03

## 2023-01-03 ENCOUNTER — OFFICE VISIT (OUTPATIENT)
Dept: PULMONOLOGY | Age: 88
End: 2023-01-03
Payer: MEDICARE

## 2023-01-03 VITALS
BODY MASS INDEX: 24.79 KG/M2 | TEMPERATURE: 97.3 F | HEIGHT: 64 IN | WEIGHT: 145.2 LBS | SYSTOLIC BLOOD PRESSURE: 110 MMHG | HEART RATE: 98 BPM | DIASTOLIC BLOOD PRESSURE: 68 MMHG

## 2023-01-03 DIAGNOSIS — Z09 HOSPITAL DISCHARGE FOLLOW-UP: ICD-10-CM

## 2023-01-03 DIAGNOSIS — J84.9 ILD (INTERSTITIAL LUNG DISEASE) (HCC): ICD-10-CM

## 2023-01-03 DIAGNOSIS — J84.10 PULMONARY FIBROSIS (HCC): Primary | ICD-10-CM

## 2023-01-03 DIAGNOSIS — J98.4 HONEYCOMB LUNG: ICD-10-CM

## 2023-01-03 PROCEDURE — 1036F TOBACCO NON-USER: CPT | Performed by: NURSE PRACTITIONER

## 2023-01-03 PROCEDURE — G8484 FLU IMMUNIZE NO ADMIN: HCPCS | Performed by: NURSE PRACTITIONER

## 2023-01-03 PROCEDURE — G8420 CALC BMI NORM PARAMETERS: HCPCS | Performed by: NURSE PRACTITIONER

## 2023-01-03 PROCEDURE — G8427 DOCREV CUR MEDS BY ELIG CLIN: HCPCS | Performed by: NURSE PRACTITIONER

## 2023-01-03 PROCEDURE — 1090F PRES/ABSN URINE INCON ASSESS: CPT | Performed by: NURSE PRACTITIONER

## 2023-01-03 PROCEDURE — 99213 OFFICE O/P EST LOW 20 MIN: CPT | Performed by: NURSE PRACTITIONER

## 2023-01-03 PROCEDURE — 1123F ACP DISCUSS/DSCN MKR DOCD: CPT | Performed by: NURSE PRACTITIONER

## 2023-01-03 RX ORDER — PREDNISONE 2.5 MG
5 TABLET ORAL
COMMUNITY
Start: 2023-01-03 | End: 2023-01-11

## 2023-01-03 RX ORDER — PREDNISONE 1 MG/1
5 TABLET ORAL DAILY
Qty: 30 TABLET | Refills: 0 | Status: SHIPPED | OUTPATIENT
Start: 2023-01-03 | End: 2023-02-02

## 2023-01-03 ASSESSMENT — ENCOUNTER SYMPTOMS
SHORTNESS OF BREATH: 1
ALLERGIC/IMMUNOLOGIC NEGATIVE: 1
COUGH: 1
WHEEZING: 0
BACK PAIN: 1
SINUS PRESSURE: 0
CHEST TIGHTNESS: 1
COUGH: 0
GASTROINTESTINAL NEGATIVE: 1
SHORTNESS OF BREATH: 1
CONSTIPATION: 0
EYES NEGATIVE: 1

## 2023-01-03 NOTE — PROGRESS NOTES
[facility]    Patient: Betsy Traore, 80 y.o.   : 1935  1/3/2023    Pt of Dr. Daniel Siddiqui   Patient presents with    Follow-up     F/u after hospitalizations for worsening lung conditions / SOB        HPI pulled from Dr. David Skinner inpatient note:   Natalia Martinez is a 80 y.o. female lifetime nonsmoker with history of ILD/pulmonary fibrosis and PAF, presented to [facility] ED with complaints or worsening dyspnea and increased work of breathing. On arrival to ED saturating >94% on 4L NC, her baseline. Work up generally negative for acute findings. Admitted for further work up. On evaluation, patient states she started noticing worsening shortness of breath with increased work of breathing with minimal activity in the last week that has gotten worse. Endorses associated fatigue with lightheadedness and reduced PO intake related to her inability to ambulate and cook for herself. Reports her pulse ox showing oxygen at 60% on 4L NC when walking at home. Lives alone. Usually able to care for herself. Has been on oxygen due to ILD, 4L on ambulation and usually titrate down during rest and at night. Has been at baseline otherwise, no chest pain, fever, chills, nausea, vomiting, diarrhea. No sick contact. Vaccinated for Amadou. CT chest 2022: The severe intralobular septal thickening and bronchiectasis with honeycombing most pronounced in the right upper lobe is unchanged. Scattered subpleural scarring and groundglass opacities in the left lung are mild similar to the 2020 comparison. [facility] is here for follow up for hospital admission in November.  Admitted 22-22  Severe ILD/pulmonary fibrosis - refused any biopsy or VATS procedures in the past   Chronic home oxygen use - presents today in [facility] and [facility] on home oxygen at 5LPM  Never a smoker   All nebulizer treatments currently   Limited code status currently   Followed with Dr. Siena Darden at [facility] pulmonary as well he has since retired - on Nicole daily from him  States she is not interested in seeing someone new there  Currently does have Palliative care in place patient is very realistic on the progression of her disease  Patients son bought her new nebulizer machine and states he fixed her oxygen machine- they are not happy with [employer] and wanting to switch DME companies     Progress History:   Since last visit any new medical issues? No  New ER or hospital visits? Yes as described above   Any new or changes in medicines? No  Using inhalers? Yes   Are they helpful?  Yes   Flu vaccine UTD   Pneumonia vaccine- series completed   Covid vaccine- vaccine done x 2 and 2 boosters     Past Medical hx   PMH:  Past Medical History:   Diagnosis Date    Anemia     normal on pre-op 5/2012 and 12/2/13    Backache     h/o    Bronchiectasis ([facility] 2013    Bursitis     bilateral shoulders    Constipation     Diverticulosis of colon     HTN (hypertension)     Hypercalcemia     slightly elevated at 10.8 pre-op 12/2/13    Hyperlipidemia     Nodular goiter     bx negative    OA (osteoarthritis)     bilateral thumbs - sees Dr. Momo Maza    Osteopenia 11/6/2013    Parathyroid adenoma 4/30/2013    Pneumonia of right upper lobe due to infectious organism     Pneumonitis     Dr. Lorna Everett in 7/2010 - bx negative    Pulmonary Mycobacterium avium complex (MAC) infection ([address]     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 ([address]     had one parathyroid removed - now follows with Dr. Angely Syed    Sinusitis     with seasonal allergies    Vitamin D deficiency 05/2018     SURGICAL HISTORY:  Past Surgical History:   Procedure Laterality Date    ABDOMINAL EXPLORATION SURGERY  01/02/2013    Release of KATHERINE TAMAYO-Dr. Maite Amado    APPENDECTOMY  1961    BACK INJECTION Bilateral 12/28/2021    bilateral sacroiliac joint injection performed by Karen Villagran DO at [facility] N/A 2/22/2022 B/L SI joint block performed by Vane Hanson, DO at [address] N/A 2020    BRONCHOSCOPY FLUOROSCOPY performed by Nghia Lai MD at [address] Bilateral 1990 ?  SECTION  2599,1632    DILATION AND CURETTAGE OF UTERUS  1445,4850,9450    EXCISION OF PARATHYROID MASS Right 2013    rt parathyroidectomy (excision of rt inferior parathyroid mass) exploration of neck     FOOT SURGERY      left    FOOT SURGERY Left 2017    Dr Be Suarez ([address] [place] [address])      JOINT REPLACEMENT  12    left knee    JOINT REPLACEMENT  2014    right knee    MALIGNANT SKIN LESION EXCISION Left 2017    BCC DORSAL FOOT WITH GRAFT & FS    PAIN MANAGEMENT PROCEDURE Left 2022    sacroiliac joint radiofrequency ablation, LEFT side first performed by Vane Hanson, DO at [facility] Right 2022    sacroiliac joint Radiofrequency Ablations, right side performed by Vane Hanson, DO at [address] [place] [address] (CERVIX REMOVED)  [address] ? TOTAL KNEE ARTHROPLASTY Right 2014    OIO    UPPER GASTROINTESTINAL ENDOSCOPY N/A 2022    EGD performed by Cyndei Méndez MD at [facility]     SOCIAL HISTORY:  Social History     Tobacco Use    Smoking status: Never    Smokeless tobacco: Never   Vaping Use    Vaping Use: Never used   Substance Use Topics    Alcohol use:  Yes     Alcohol/week: 0.0 standard drinks     Comment: socially-1 glassof wine 2-3 times a month    Drug use: No     ALLERGIES:  Allergies   Allergen Reactions    Colchicine Diarrhea    Levaquin [Levofloxacin] Other (See Comments)     Hallucinations    Oxycodone     Percocet [Oxycodone-Acetaminophen] Nausea Only and Other (See Comments)     Affects memory    Rifampin Other (See Comments)     Lost half of vision    Sulfa Antibiotics Other (See Comments)     hallucinations    Cefuroxime Diarrhea, Nausea And Vomiting and Other (See Comments)     cramping     FAMILY HISTORY:  Family History   Problem Relation Age of Onset    Diabetes Mother     Thyroid Disease Mother     Arthritis Mother     High Blood Pressure Mother     Arthritis Father     High Blood Pressure Father     Other Father         hay fever    Breast Cancer Neg Hx     Ovarian Cancer Neg Hx      CURRENT MEDICATIONS:  Current Outpatient Medications   Medication Sig Dispense Refill    busPIRone (BUSPAR) 5 MG tablet take 1 tablet by mouth three times a day (Patient not taking: Reported on 1/3/2023) 270 tablet 3    sertraline (ZOLOFT) 25 MG tablet Take 1 tablet by mouth daily (Patient not taking: Reported on 1/3/2023) 90 tablet 3    hydrOXYzine HCl (ATARAX) 25 MG tablet 25 mg every 12 hours      predniSONE (DELTASONE) 2.5 MG tablet Take 1 tablet by mouth Daily with supper 30 tablet 0    predniSONE (DELTASONE) 20 MG tablet Take one right at the onset of a pseudogout flare and repeat the next day. 10 tablet 0    Oxygen Concentrator She requires a high flow oxygen concentrator that can deliver up to 10 liters per minute. She should increase her baseline flow to 6 liters for shortness of breath.  1 each 0    azithromycin (ZITHROMAX) 250 MG tablet Take 1 tablet by mouth daily 90 tablet 3    simvastatin (ZOCOR) 20 MG tablet TAKE 1 TABLET BY MOUTH AT  NIGHT 90 tablet 1    apixaban (ELIQUIS) 5 MG TABS tablet TAKE 1 TABLET BY MOUTH  TWICE DAILY 180 tablet 1    albuterol sulfate HFA (PROAIR HFA) 108 (90 Base) MCG/ACT inhaler Inhale 2 puffs into the lungs every 4 hours as needed for Wheezing or Shortness of Breath 1 each 5    levothyroxine (SYNTHROID) 88 MCG tablet TAKE 1 TABLET BY MOUTH ONCE DAILY 90 tablet 3    budesonide (PULMICORT) 0.5 MG/2ML nebulizer suspension Take 2 mLs by nebulization 2 times daily 100 each 5    metoprolol tartrate (LOPRESSOR) 25 MG tablet take 1/2 tablet by mouth twice a day 90 tablet 5    Lactobacillus (PROBIOTIC ACIDOPHILUS) CAPS Take 1 capsule by mouth daily       MULTIPLE VITAMIN PO Take 1 tablet by mouth daily        Current Facility-Administered Medications   Medication Dose Route Frequency Provider Last Rate Last Admin    triamcinolone acetonide (KENALOG-40) injection 80 mg  80 mg Intra-artICUlar Once DO KEKE Gottlieb   Review of Systems   Constitutional:  Positive for fatigue. Negative for chills and fever. HENT: Negative. Negative for congestion. Eyes: Negative. Respiratory:  Positive for shortness of breath (with exertion). Negative for cough and wheezing. Cardiovascular: Negative. Negative for chest pain and leg swelling. Gastrointestinal: Negative. Endocrine: Negative. Genitourinary: Negative. Musculoskeletal: Negative. Allergic/Immunologic: Negative. Neurological: Negative. Hematological: Negative. Psychiatric/Behavioral: Negative. Negative for sleep disturbance. Physical exam   /68 (Site: Left Upper Arm, Position: Sitting, Cuff Size: Medium Adult)   Pulse 98   Temp 97.3 °F (36.3 °C) (Infrared)   Ht 5' 4\" (1.626 m)   Wt 145 lb 3.2 oz (65.9 kg)   BMI 24.92 kg/m²    Wt Readings from Last 3 Encounters:   01/03/23 145 lb 3.2 oz (65.9 kg)   12/14/22 148 lb (67.1 kg)   11/09/22 148 lb 4.8 oz (67.3 kg)       Physical Exam  Vitals and nursing note reviewed. Constitutional:       Appearance: She is well-developed. HENT:      Head: Normocephalic and atraumatic. Eyes:      Conjunctiva/sclera: Conjunctivae normal.      Pupils: Pupils are equal, round, and reactive to light. Neck:      Vascular: No JVD. Cardiovascular:      Rate and Rhythm: Normal rate and regular rhythm. Heart sounds: Normal heart sounds. No murmur heard. No friction rub. No gallop. Pulmonary:      Effort: Pulmonary effort is normal. No respiratory distress. Breath sounds: Normal breath sounds. No wheezing or rales. Abdominal:      General: Bowel sounds are normal.      Palpations: Abdomen is soft. Musculoskeletal:         General: Normal range of motion. Cervical back: Normal range of motion and neck supple. Skin:     General: Skin is warm and dry. Capillary Refill: Capillary refill takes less than 2 seconds. Neurological:      Mental Status: She is alert and oriented to person, place, and time. Motor: Weakness present. Gait: Gait abnormal.   Psychiatric:         Behavior: Behavior normal.         Thought Content: Thought content normal.         Judgment: Judgment normal.        Results   Lung Nodule Screening     [] Qualifies    [x] Does not qualify   [] Declined    [] Completed    The USPSTF recommends annual screening for lung cancer with low-dose computed tomography (LDCT) in adults aged 48 to [de-identified] years who have a 30 pack-year smoking history and currently smoke or have quit within the past 20 years. Screening should be discontinued once a person has not smoked for 20 years or develops a health problem that substantially limits life expectancy or the ability or willingness to have curative lung surgery. 12/14/2022     XR CHEST PORTABLE     FINDINGS:   Extensive reticular nodular opacities are seen in both lungs relating to underlying fibrotic changes. Right lung volume loss is seen. Cardiac silhouette is not enlarged. No pleural effusion. No pneumothorax. No acute bony abnormality. 1. Underlying fibrotic changes in both lungs. Acute on chronic process is not excluded. Clinical correlation is recommended. CT chest wo contrast 11/05/2022  1. Severe interstitial fibrotic changes are seen throughout the right hemithorax with extensive honeycombing which is most severe in the right upper lobe. Bronchiectasis is seen bilaterally although significantly more severe on the right side. 2. There are diffuse groundglass and nodular infiltrates throughout the left hemithorax. Assessment      Diagnosis Orders   1. Pulmonary fibrosis ([facility]        2. ILD (interstitial lung disease) ([facility]        3. Honeycomb lung        4.  Hospital discharge follow-up              Plan   -Continue Pulmicort neb BID  -Continue Albuterol neb PRN for SOB/wheezing  -Continue Zithromax daily   -Limited CODE noted with assistance from Palliative Care   -Continue home oxygen as previously ordered   -Advised to maintain pneumonia vaccine with PCP and to take flu vaccine this coming season.  -Advised patient to call office with any changes, questions, or concerns regarding respiratory status    Will see Herber duckworth in: 6 months    Negin MancusoVander[facility] Transplant Center  1/3/2023

## 2023-01-03 NOTE — TELEPHONE ENCOUNTER
Son called checking on [employer] form we received through fax. Son stated form needs completed and faxed ASAP for pt to get daily home health help.     Please fill out form,fax and call son Michelle Cruz to P/U a copy of form

## 2023-01-04 NOTE — TELEPHONE ENCOUNTER
Date of last visit:  12/15/2022  Date of next visit:  Visit date not found    Requested Prescriptions     Pending Prescriptions Disp Refills    metoprolol tartrate (LOPRESSOR) 25 MG tablet 90 tablet 5     Sig: take 1/2 tablet by mouth twice a day

## 2023-01-09 ENCOUNTER — TELEPHONE (OUTPATIENT)
Dept: FAMILY MEDICINE CLINIC | Age: 88
End: 2023-01-09

## 2023-01-09 DIAGNOSIS — H91.10 PRESBYCUSIS, UNSPECIFIED LATERALITY: Primary | ICD-10-CM

## 2023-01-09 NOTE — TELEPHONE ENCOUNTER
Patient called stating that she has an appt Friday with Audiology [facility] for a hearing evaluation due to her recent hearing loss bilateral ears. Please place order.     Please call Sandra

## 2023-01-11 ENCOUNTER — OFFICE VISIT (OUTPATIENT)
Dept: CARDIOLOGY CLINIC | Age: 88
End: 2023-01-11
Payer: MEDICARE

## 2023-01-11 VITALS
DIASTOLIC BLOOD PRESSURE: 66 MMHG | HEIGHT: 64 IN | BODY MASS INDEX: 24.75 KG/M2 | SYSTOLIC BLOOD PRESSURE: 115 MMHG | HEART RATE: 71 BPM | WEIGHT: 145 LBS

## 2023-01-11 DIAGNOSIS — I10 PRIMARY HYPERTENSION: ICD-10-CM

## 2023-01-11 DIAGNOSIS — I48.21 PERMANENT ATRIAL FIBRILLATION (HCC): Primary | ICD-10-CM

## 2023-01-11 PROCEDURE — G8427 DOCREV CUR MEDS BY ELIG CLIN: HCPCS | Performed by: NUCLEAR MEDICINE

## 2023-01-11 PROCEDURE — 1123F ACP DISCUSS/DSCN MKR DOCD: CPT | Performed by: NUCLEAR MEDICINE

## 2023-01-11 PROCEDURE — G8420 CALC BMI NORM PARAMETERS: HCPCS | Performed by: NUCLEAR MEDICINE

## 2023-01-11 PROCEDURE — G8484 FLU IMMUNIZE NO ADMIN: HCPCS | Performed by: NUCLEAR MEDICINE

## 2023-01-11 PROCEDURE — 99214 OFFICE O/P EST MOD 30 MIN: CPT | Performed by: NUCLEAR MEDICINE

## 2023-01-11 PROCEDURE — 1090F PRES/ABSN URINE INCON ASSESS: CPT | Performed by: NUCLEAR MEDICINE

## 2023-01-11 PROCEDURE — 1036F TOBACCO NON-USER: CPT | Performed by: NUCLEAR MEDICINE

## 2023-01-11 NOTE — PROGRESS NOTES
[address] [facility] [address] .  [address] [place] [address]  Dept: [phone]  Dept Fax: [phone]  Loc: [phone]    Visit Date: 3/30/1965    Liliam Richards is a 80 y.o. female who presents todayfor:  Chief Complaint   Patient presents with    1 Year Follow Up    Shortness of Breath    Atrial Fibrillation   Severe pulmonary fibrosis  A fib is stable for now  Some tachycardia  No chest pain  Baseline dyspnea  No dizziness  No syncope          HPI:  HPI  Past Medical History:   Diagnosis Date    Anemia     normal on pre-op 5/2012 and 12/2/13    Backache     h/o    Bronchiectasis ([address] 2013    Bursitis     bilateral shoulders    Constipation     Diverticulosis of colon     HTN (hypertension)     Hypercalcemia     slightly elevated at 10.8 pre-op 12/2/13    Hyperlipidemia     Nodular goiter     bx negative    OA (osteoarthritis)     bilateral thumbs - sees Dr. Spenser Sheikh    Osteopenia 11/6/2013    Parathyroid adenoma 4/30/2013    Pneumonia of right upper lobe due to infectious organism     Pneumonitis     Dr. Monty Nunez in 7/2010 - bx negative    Pulmonary Mycobacterium avium complex (MAC) infection ([address]     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 ([address]     had one parathyroid removed - now follows with Dr. Derek Enriqueulding    Sinusitis     with seasonal allergies    Vitamin D deficiency 05/2018      Past Surgical History:   Procedure Laterality Date    ABDOMINAL EXPLORATION SURGERY  01/02/2013    Release of KATHERINE TAMAYO-Dr. Jessica Díaz    BACK INJECTION Bilateral 12/28/2021    bilateral sacroiliac joint injection performed by Schuyler Sever, DO at [place] N/A 2/22/2022    B/L SI joint block performed by Schuyler Sever, DO at [address] N/A 1/8/2020    BRONCHOSCOPY FLUOROSCOPY performed by Ramo Park MD at [address] Bilateral 1990 ?  SECTION  0877,7971    DILATION AND CURETTAGE OF UTERUS  7451,8737,4712    EXCISION OF PARATHYROID MASS Right 2013    rt parathyroidectomy (excision of rt inferior parathyroid mass) exploration of neck     FOOT SURGERY      left    FOOT SURGERY Left 2017    Dr Sonja Jerry ([address] [place] [address])      JOINT REPLACEMENT  12    left knee    JOINT REPLACEMENT  2014    right knee    MALIGNANT SKIN LESION EXCISION Left 2017    BCC DORSAL FOOT WITH GRAFT & FS    PAIN MANAGEMENT PROCEDURE Left 2022    sacroiliac joint radiofrequency ablation, LEFT side first performed by Laura Snowden DO at [facility] Right 2022    sacroiliac joint Radiofrequency Ablations, right side performed by Laura Snowden DO at [address] [place] [address] (CERVIX REMOVED)  [address] ? TOTAL KNEE ARTHROPLASTY Right 2014    OIO    UPPER GASTROINTESTINAL ENDOSCOPY N/A 2022    EGD performed by Adam Torre MD at [facility]     Family History   Problem Relation Age of Onset    Diabetes Mother     Thyroid Disease Mother     Arthritis Mother     High Blood Pressure Mother     Arthritis Father     High Blood Pressure Father     Other Father         hay fever    Breast Cancer Neg Hx     Ovarian Cancer Neg Hx      Social History     Tobacco Use    Smoking status: Never    Smokeless tobacco: Never   Substance Use Topics    Alcohol use:  Yes     Alcohol/week: 0.0 standard drinks     Comment: socially-1 glassof wine 2-3 times a month      Current Outpatient Medications   Medication Sig Dispense Refill    metoprolol tartrate (LOPRESSOR) 25 MG tablet take 1/2 tablet by mouth twice a day 90 tablet 3    predniSONE (DELTASONE) 5 MG tablet Take 1 tablet by mouth daily 30 tablet 0    hydrOXYzine HCl (ATARAX) 25 MG tablet 25 mg every 12 hours      predniSONE (DELTASONE) 20 MG tablet Take one right at the onset of a pseudogout flare and repeat the next day. 10 tablet 0    Oxygen Concentrator She requires a high flow oxygen concentrator that can deliver up to 10 liters per minute. She should increase her baseline flow to 6 liters for shortness of breath.  1 each 0    azithromycin (ZITHROMAX) 250 MG tablet Take 1 tablet by mouth daily 90 tablet 3    simvastatin (ZOCOR) 20 MG tablet TAKE 1 TABLET BY MOUTH AT  NIGHT 90 tablet 1    apixaban (ELIQUIS) 5 MG TABS tablet TAKE 1 TABLET BY MOUTH  TWICE DAILY 180 tablet 1    albuterol sulfate HFA (PROAIR HFA) 108 (90 Base) MCG/ACT inhaler Inhale 2 puffs into the lungs every 4 hours as needed for Wheezing or Shortness of Breath 1 each 5    budesonide (PULMICORT) 0.5 MG/2ML nebulizer suspension Take 2 mLs by nebulization 2 times daily 100 each 5    Lactobacillus (PROBIOTIC ACIDOPHILUS) CAPS Take 1 capsule by mouth daily       MULTIPLE VITAMIN PO Take 1 tablet by mouth daily        Current Facility-Administered Medications   Medication Dose Route Frequency Provider Last Rate Last Admin    triamcinolone acetonide (KENALOG-40) injection 80 mg  80 mg Intra-artICUlar Once Jeramie Colin, DO         Allergies   Allergen Reactions    Colchicine Diarrhea    Levaquin [Levofloxacin] Other (See Comments)     Hallucinations    Oxycodone     Percocet [Oxycodone-Acetaminophen] Nausea Only and Other (See Comments)     Affects memory    Rifampin Other (See Comments)     Lost half of vision    Sulfa Antibiotics Other (See Comments)     hallucinations    Cefuroxime Diarrhea, Nausea And Vomiting and Other (See Comments)     cramping     Health Maintenance   Topic Date Due    Annual Wellness Visit (AWV)  09/09/2021    Lipids  01/04/2023    DTaP/Tdap/Td vaccine (2 - Td or Tdap) 05/17/2023    Depression Screen  06/23/2023    Flu vaccine  Completed    Shingles vaccine  Completed    Pneumococcal 65+ years Vaccine  Completed    COVID-19 Vaccine  Completed    Hepatitis A vaccine  Aged Out Hib vaccine  Aged Out    Meningococcal (ACWY) vaccine  Aged Out       Subjective:  Review of Systems  General:   No fever, no chills, some fatigue or weight loss  Pulmonary:    Baseline  dyspnea, no wheezing  Cardiac:    Denies recent chest pain,   GI:     No nausea or vomiting, no abdominal pain  Neuro:     No dizziness or light headedness,   Musculoskeletal:  No recent active issues  Extremities:   No edema, no obvious claudication     Objective:  Physical Exam  /66   Pulse 71   Ht 5' 4\" (1.626 m)   Wt 145 lb (65.8 kg)   BMI 24.89 kg/m²   General:   Well developed, well nourished  Lungs:    Decreased air   Heart:    Normal S1 S2, Slight murmur. no rubs, no gallops  Abdomen:   Soft, non tender, no organomegalies, positive bowel sounds  Extremities:   No edema, no cyanosis, good peripheral pulses  Neurological:   Awake, alert, oriented. No obvious focal deficits  Musculoskelatal:  No obvious deformities    Assessment:      Diagnosis Orders   1. Permanent atrial fibrillation ([address]        2. Primary hypertension        As above  Cardiac fair for now     Plan:  No follow-ups on file. Discussed eliquis   Might consider lower dose   She is 145 lbs !!!  Continue risk factor modification and medical management  Thank you for allowing me to participate in the care of your patient. Please don't hesitate to contact me regarding any further issues related to the patient care    Orders Placed:  No orders of the defined types were placed in this encounter. Medications Prescribed:  No orders of the defined types were placed in this encounter. Discussed use, benefit, and side effects of prescribed medications. All patient questions answered. Pt voicedunderstanding. Instructed to continue current medications, diet and exercise. Continue risk factor modification and medical management. Patient agreed with treatment plan. Follow up as directed.     Electronically signedby Yang Leger MD on 1/11/2023 at 12:32 PM

## 2023-01-13 ENCOUNTER — HOSPITAL ENCOUNTER (OUTPATIENT)
Dept: AUDIOLOGY | Age: 88
Discharge: HOME | End: 2023-01-13
Payer: MEDICARE

## 2023-01-13 PROCEDURE — 92567 TYMPANOMETRY: CPT | Performed by: AUDIOLOGIST

## 2023-01-13 PROCEDURE — 92557 COMPREHENSIVE HEARING TEST: CPT | Performed by: AUDIOLOGIST

## 2023-01-13 NOTE — PROGRESS NOTES
AUDIOLOGICAL EVALUATION      REASON FOR TESTING:  Audiometric evaluation per the request of Ravin Mitchell MD, due to the diagnosis of hearing loss. The patient reports concerns for decreased hearing sensitivity and aural fullness, greater in the right ear. Her most recent audiogram at this clinic on 02/28/2018 indicated a mild sloping to moderately-severe, symmetrical, sensorineural hearing loss, bilaterally. She was fit with binaural BTE/VAUGHN hearing aids on 3/12/18 but has been unable to wear them recently due to physical conflict with oxygen, glasses, and face mask. The patient reports recent cold/upper respiratory symptoms within the past couple of weeks. She denies any current otalgia or otorrhea but describes her right ear as feeling \"plugged. \" She presents today in a wheelchair and using oxygen, accompanied by her son. OTOSCOPY: Clear external ear canals and visible tympanic membranes, bilaterally. AUDIOGRAM          Reliability: Fair - high ambient noise due to oxygen machine. COMMENTS: Mild sloping to moderately-severe sensorineural hearing loss in the left ear and mild sloping to severe mixed hearing loss in the right ear. Speech reception thresholds are in general agreement with pure tone averages, bilaterally. Word recognition scores are fair at 70% in the right ear and good at 88% in the left ear, with speech presented at 80dB and 70dB, respectively. Tympanometry indicates a normal ear canal volume, peak pressure and compliance in the left ear. The right tympanogram shows a normal ear canal volume with no measurable compliance, suggesting middle ear dysfunction. RECOMMENDATION(S):   Follow up with referring provider regarding today's results and middle ear dysfunction. ENT referral could be considered due to abnormal tympanogram and conductive component in the right ear, per discretion of physician.   Repeat audiogram following medical management/resolution of middle ear status or in 6-12 months to rule out progression. Continue use of binaural amplification. Recommend patient return to audiology following medical management to address hearing aid fit and comfort concerns. Call for appointment.

## 2023-01-16 DIAGNOSIS — H69.80 DYSFUNCTION OF EUSTACHIAN TUBE, UNSPECIFIED LATERALITY: Primary | ICD-10-CM

## 2023-01-16 RX ORDER — FLUTICASONE PROPIONATE 50 MCG
1 SPRAY, SUSPENSION (ML) NASAL DAILY
Qty: 32 G | Refills: 0 | Status: SHIPPED | OUTPATIENT
Start: 2023-01-16

## 2023-01-16 RX ORDER — AZITHROMYCIN 250 MG/1
TABLET, FILM COATED ORAL
Qty: 1 PACKET | Refills: 0 | Status: SHIPPED | OUTPATIENT
Start: 2023-01-16 | End: 2023-01-26

## 2023-02-01 DIAGNOSIS — M11.20 PSEUDOGOUT: ICD-10-CM

## 2023-02-01 NOTE — TELEPHONE ENCOUNTER
The pharmacy is  requesting a refill of the below medication which has been pended for you:     Requested Prescriptions     Pending Prescriptions Disp Refills    predniSONE (DELTASONE) 5 MG tablet [Pharmacy Med Name: PREDNISONE 5 MG TABLET] 30 tablet 0     Sig: take 1 tablet by mouth once daily       Last Appointment Date: 12/15/2022  Next Appointment Date: Visit date not found    Allergies   Allergen Reactions    Colchicine Diarrhea    Levaquin [Levofloxacin] Other (See Comments)     Hallucinations    Oxycodone     Percocet [Oxycodone-Acetaminophen] Nausea Only and Other (See Comments)     Affects memory    Rifampin Other (See Comments)     Lost half of vision    Sulfa Antibiotics Other (See Comments)     hallucinations    Cefuroxime Diarrhea, Nausea And Vomiting and Other (See Comments)     cramping

## 2023-02-02 RX ORDER — PREDNISONE 1 MG/1
7.5 TABLET ORAL DAILY
Qty: 30 TABLET | Refills: 0 | Status: SHIPPED | OUTPATIENT
Start: 2023-02-02 | End: 2023-02-22

## 2023-02-02 NOTE — TELEPHONE ENCOUNTER
Cherri Gomez informed by Phone  patient says that it is doing so so  wonders if a higher dose would help. Esperanza Haney

## 2023-02-16 DIAGNOSIS — M11.20 PSEUDOGOUT: Primary | ICD-10-CM

## 2023-02-16 RX ORDER — PREDNISONE 10 MG/1
10 TABLET ORAL DAILY
Qty: 30 TABLET | Refills: 0 | Status: SHIPPED | OUTPATIENT
Start: 2023-02-16 | End: 2023-03-18 | Stop reason: SDUPTHER

## 2023-02-16 RX ORDER — PREDNISONE 10 MG/1
10 TABLET ORAL DAILY
Qty: 30 TABLET | Refills: 0 | Status: SHIPPED | OUTPATIENT
Start: 2023-02-16 | End: 2023-02-16 | Stop reason: SDUPTHER

## 2023-03-04 DIAGNOSIS — E78.00 PURE HYPERCHOLESTEROLEMIA: ICD-10-CM

## 2023-03-06 NOTE — TELEPHONE ENCOUNTER
The pharmacy is  requesting a refill of the below medication which has been pended for you:     Requested Prescriptions     Pending Prescriptions Disp Refills    simvastatin (ZOCOR) 20 MG tablet [Pharmacy Med Name: Simvastatin 20 MG Oral Tablet] 90 tablet 3     Sig: TAKE 1 TABLET BY MOUTH AT  NIGHT       Last Appointment Date: 12/15/2022  Next Appointment Date: Visit date not found    Allergies   Allergen Reactions    Colchicine Diarrhea    Levaquin [Levofloxacin] Other (See Comments)     Hallucinations    Oxycodone     Percocet [Oxycodone-Acetaminophen] Nausea Only and Other (See Comments)     Affects memory    Rifampin Other (See Comments)     Lost half of vision    Sulfa Antibiotics Other (See Comments)     hallucinations    Cefuroxime Diarrhea, Nausea And Vomiting and Other (See Comments)     cramping

## 2023-03-07 DIAGNOSIS — E78.00 PURE HYPERCHOLESTEROLEMIA: Primary | ICD-10-CM

## 2023-03-08 RX ORDER — SIMVASTATIN 20 MG
TABLET ORAL
Qty: 90 TABLET | Refills: 3 | OUTPATIENT
Start: 2023-03-08

## 2023-03-09 ENCOUNTER — OFFICE VISIT (OUTPATIENT)
Dept: PHYSICAL MEDICINE AND REHAB | Age: 88
End: 2023-03-09

## 2023-03-09 ENCOUNTER — TELEPHONE (OUTPATIENT)
Dept: PHYSICAL MEDICINE AND REHAB | Age: 88
End: 2023-03-09

## 2023-03-09 VITALS
SYSTOLIC BLOOD PRESSURE: 130 MMHG | HEIGHT: 64 IN | WEIGHT: 145 LBS | BODY MASS INDEX: 24.75 KG/M2 | DIASTOLIC BLOOD PRESSURE: 80 MMHG

## 2023-03-09 DIAGNOSIS — G89.4 CHRONIC PAIN SYNDROME: ICD-10-CM

## 2023-03-09 DIAGNOSIS — M79.18 MYOFASCIAL PAIN: ICD-10-CM

## 2023-03-09 DIAGNOSIS — M47.816 LUMBAR SPONDYLOSIS: Primary | ICD-10-CM

## 2023-03-09 DIAGNOSIS — M51.36 DEGENERATIVE DISC DISEASE, LUMBAR: ICD-10-CM

## 2023-03-09 DIAGNOSIS — Z79.01 ANTICOAGULATED: ICD-10-CM

## 2023-03-09 NOTE — PROGRESS NOTES
Chronic Pain/PM&R Clinic Note     Encounter Date:3/9/23    Subjective:   Chief Complaint:   Chief Complaint   Patient presents with    Follow-up     [facility] Scottie Granados regarding coming in and discussing other options since cant have same day procedures. See convo on jan 23 and 25th. Due to health issues         History of Present Illness:   Batsheva Dickinson is a 80 y.o. female seen in the clinic initially on 12/8/21 upon request from Dr. Ortiz Alvarado MD for her history of chronic low back pain. Patient states she sustained a fall in October of 2020 when she tripped over her oxygen tubing but she did not have any pain initially after her fall. She has medical history of A. fib (on Eliquis and pulmonary fibrosis (on 4 L continuous O2). On 12/24/2020 patient states she woke up and could not get out of bed. Patient states ever since then her back feels \"brittle. \" Patient states she was seen at [facility] and received two epidural steroid injections which provided no relief. Patient states the past two weeks she had had pain in her left buttock that is stabbing and radiates into her left leg, making her feel like her leg may give out. Patient is concerned about the possibility of further falls due to this. Patient states she lives alone and she does not use any assistive devices. Patient states she has never fallen in the past apart from 10/2022 when she tripped over her oxygen tubing. Patient is on 2L of oxygen for her history of pulmonary fibrosis. Patient also has a history of Afib and is currently on Eliquis. Patient states pain is worse first thing in the morning. She also states she will occasionally wake up in the middle of the night with pain in bilateral legs, pain seems to be in the back of bilateral legs. Patient denies pain, numbness or tingling that radiates into her legs during the day.  Patient states she completed home health PT in the spring of 2021 which provided minimal relief only on the day of the therapy. Today, 03/09/2023, patient presents for planned follow up on chronic low back pain. She never completed the lumbar facet injections as she was hospitalized. She states the interstitial lung disease is now in both lungs and she has increased oxygen needs. She is wondering what her options are since she is unable to lay on her stomach. She states she is unable to lay on her stomach because she cannot breathe. She is currently living at home with home care 24/7. She finds it extremely difficult to go to office visits because it is absolutely exhausting. She denies any new leg paraesthesias, saddle anesthesia, focal leg weakness, or bowel/bladder incontinence.      History of Interventions:   Surgery: No previous lumbar surgeries  Injections: Lumbar epidural x 2 at [facility] (02/2021, 03/2021) - No relief  B/L SI RFA - near 100% relief     Current Treatment Medications:   Tylenol arthritis PRN    Historical Treatment Medications:   Tramadol - makes her \"loopy\"    Imaging:    Lumbar MRI 01/12/2021        Past Medical History:   Diagnosis Date    Anemia     normal on pre-op 5/2012 and 12/2/13    Backache     h/o    Bronchiectasis ([address] 2013    Bursitis     bilateral shoulders    Constipation     Diverticulosis of colon     HTN (hypertension)     Hypercalcemia     slightly elevated at 10.8 pre-op 12/2/13    Hyperlipidemia     Nodular goiter     bx negative    OA (osteoarthritis)     bilateral thumbs - sees Dr. Ignacio Rico    Osteopenia 11/6/2013    Parathyroid adenoma 4/30/2013    Pneumonia of right upper lobe due to infectious organism     Pneumonitis     Dr. Orlando Srinivasan in 7/2010 - bx negative    Pulmonary Mycobacterium avium complex (MAC) infection ([address]     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 ([address]     had one parathyroid removed - now follows with Dr. Carrie Motley    Sinusitis     with seasonal allergies    Vitamin D deficiency 05/2018       Past Surgical History:   Procedure Laterality Date    ABDOMINAL EXPLORATION SURGERY  2013    Release of [place] T[place]  [facility] [facility] [facility] [address]    BACK INJECTION Bilateral 2021    bilateral sacroiliac joint injection performed by Ana Maria Bailey DO at [facility] N/A 2022    B/L SI joint block performed by Ana Maria Bailey DO at [address] N/A 2020    BRONCHOSCOPY FLUOROSCOPY performed by Markell Marquez MD at [address] Bilateral 1990 ?  SECTION  1441,9856    DILATION AND CURETTAGE OF UTERUS  2207,4164,5692    EXCISION OF PARATHYROID MASS Right 2013    rt parathyroidectomy (excision of rt inferior parathyroid mass) exploration of neck     FOOT SURGERY      left    FOOT SURGERY Left 2017    Dr Nigel Brewster ([address] [place] [address])      JOINT REPLACEMENT  12    left knee    JOINT REPLACEMENT  2014    right knee    MALIGNANT SKIN LESION EXCISION Left 2017    BCC DORSAL FOOT WITH GRAFT & FS    PAIN MANAGEMENT PROCEDURE Left 2022    sacroiliac joint radiofrequency ablation, LEFT side first performed by Ana Maria Bailey DO at [facility] Right 2022    sacroiliac joint Radiofrequency Ablations, right side performed by Ana Maria Bailey DO at [address] [place] [address] (CERVIX REMOVED)  [address] ?     TOTAL KNEE ARTHROPLASTY Right 2014    [facility]    UPPER GASTROINTESTINAL ENDOSCOPY N/A 2022    EGD performed by Akanksha Dewey MD at [facility]       Family History   Problem Relation Age of Onset    Diabetes Mother     Thyroid Disease Mother     Arthritis Mother     High Blood Pressure Mother     Arthritis Father     High Blood Pressure Father     Other Father         hay fever    Breast Cancer Neg Hx     Ovarian Cancer Neg Hx        Medications & Allergies:   Current Outpatient Medications Medication Instructions    albuterol sulfate HFA (PROAIR HFA) 108 (90 Base) MCG/ACT inhaler 2 puffs, Inhalation, EVERY 4 HOURS PRN    apixaban (ELIQUIS) 5 MG TABS tablet TAKE 1 TABLET BY MOUTH  TWICE DAILY    azithromycin (ZITHROMAX) 250 mg, Oral, DAILY    budesonide (PULMICORT) 500 mcg, Nebulization, 2 TIMES DAILY    fluticasone (FLONASE) 50 MCG/ACT nasal spray 1 spray, Each Nostril, DAILY    hydrOXYzine HCl (ATARAX) 25 mg, PRN    Lactobacillus (PROBIOTIC ACIDOPHILUS) CAPS 1 capsule, Oral, DAILY    metoprolol tartrate (LOPRESSOR) 25 MG tablet take 1/2 tablet by mouth twice a day    MULTIPLE VITAMIN PO 1 tablet, Oral, DAILY    Oxygen Concentrator She requires a high flow oxygen concentrator that can deliver up to 10 liters per minute. She should increase her baseline flow to 6 liters for shortness of breath. predniSONE (DELTASONE) 20 MG tablet Take one right at the onset of a pseudogout flare and repeat the next day.     predniSONE (DELTASONE) 10 mg, Oral, DAILY    simvastatin (ZOCOR) 20 MG tablet TAKE 1 TABLET BY MOUTH AT  NIGHT       Allergies   Allergen Reactions    Colchicine Diarrhea    Levaquin [Levofloxacin] Other (See Comments)     Hallucinations    Oxycodone     Percocet [Oxycodone-Acetaminophen] Nausea Only and Other (See Comments)     Affects memory    Rifampin Other (See Comments)     Lost half of vision    Sulfa Antibiotics Other (See Comments)     hallucinations    Cefuroxime Diarrhea, Nausea And Vomiting and Other (See Comments)     cramping       Review of Systems:   Constitutional: negative for weight changes or fevers  Genitourinary: negative for bowel/bladder incontinence   Musculoskeletal: positive for low back pain  Neurological: negative for any leg weakness or numbness/tingling  Behavioral/Psych: negative for anxiety/depression   All other systems reviewed and are negative    Objective:     Vitals:    03/09/23 1241   BP: 130/80     Constitutional: Pleasant, no acute distress   Head: Normocephalic, atraumatic   Eyes: Conjunctivae normal   Neck: Supple, symmetrical   Lungs: Normal respiratory effort, non-labored breathing   Cardiovascular: Limbs warm and well perfused   Abdomen: Non-protruded   Musculoskeletal: Muscle bulk symmetric, no atrophy, no gross deformities   · Lower Extremities: ROM WNL. · Thorax: No paraspinal tenderness bilaterally. No scoliosis or kyphosis. · Lumbar Spine: Decreased ROM. Lumbar paraspinals tender to palpation bilaterally. SLR neg bilaterally. VIKA positive bilaterally. GAENSLEN positive bilaterally. SI joint distraction test positive bilaterally. Positive facet loading bilaterally. Neurological: Cranial nerves II-XII grossly intact. · Gait - Antalgic gait. Ambulates without assistive device. · Motor: No focal motor deficits in lower limbs  Skin: No rashes or lesions present   Psychological: Cooperative, no exaggerated pain behaviors       Assessment:    Diagnosis Orders   1. Lumbar spondylosis        2. Degenerative disc disease, lumbar        3. Myofascial pain        4. Chronic pain syndrome        5. Anticoagulated              Shanti Torres is a 80 y. o.female presenting to the pain clinic for evaluation of chronic low back pain. She has had successful injections in the past, including SI RFA and  subacromial bursa injections. She continues to deal with facet mediated pain. I have set her up for a lumbar facet steroid injection at bilateral L3/4 and L4/5. She will need to hold her Eliquis, se we will request clearance from  [facility]. She will have to lay on her side for this procedure. I told her as long as symptoms do not change I can see her through virtual visits if necessary. Plan: The following treatment recommendations and plan were discussed in detail with Shanti Torres. Imaging:   I have reviewed patients imaging of lumbar MRI. Analgesics:   Patient is taking Acetaminophen.  Patient informed that the maximum amount of acetaminophen taken on a regular basis should only be 4000 mg per day. Adjuvants: For continued chronic pain with associated musculoskeletal component, the patient is advised to continue Tylenol arthritis. Interventions: In presence of lumbar axial back pain and with physical exam consistent for facetal pain, the option lumbar facet steroid injections at bilateral L3/L4 and L4/L5 was chosen. The risks and benefits were discussed in detail with the patient. Patient wants to proceed with the injection. Anticoagulation/NPO Recommendations:   Patient will need to hold Eliquis x 3 days prior to the procedure. Dr. Chioma Raymond  Patient does not have to be NPO prior to the procedure. We will perform a LOCAL injection. Multidisciplinary Pain Management:   In the presence of complex, chronic, and multi-factorial pain, the importance of a multidisciplinary approach to pain management in the patients management regimen was emphasized and discussed in great detail.    PHYSICAL THERAPY: Patient is advised to continue hip stretches as instructed in clinic    Referrals:   None    Prescriptions Written This Visit:   None    Follow-up: For lumbar facet inj      JASMEET Forde - CNP  Interventional Pain Management/PM&R   [place]

## 2023-03-10 ENCOUNTER — TELEPHONE (OUTPATIENT)
Dept: CARDIOLOGY CLINIC | Age: 88
End: 2023-03-10

## 2023-03-10 ENCOUNTER — TELEPHONE (OUTPATIENT)
Dept: FAMILY MEDICINE CLINIC | Age: 88
End: 2023-03-10

## 2023-03-10 NOTE — TELEPHONE ENCOUNTER
[facility] called back. Stated that they are not able to draw labs on patient as she is not a current patient of theirs.

## 2023-03-10 NOTE — TELEPHONE ENCOUNTER
Pt is needing clearance for facet steroid injection with [facility]. Pt last seen 1-11-23 by Dr. Marjorie Greco. [facility] is asking to hold Eliquis 3 days prior to procedure.

## 2023-03-10 NOTE — TELEPHONE ENCOUNTER
Regarding: FW: Fasting blood test        ----- Message -----  From: Katia Tracey LPN  Sent: 0/41/4951   9:32 AM EST  To: Masood Carlin MD  Subject: Fasting blood test                               ----- Message from Katia Tracey LPN sent at 8/86/6739  9:32 AM EST -----       ----- Message from Ori Joyce to Masood Carlin MD sent at 3/10/2023  9:30 AM -----   The Director of [facility] suggested I should be eligible for [facility] nurses to come here with palliative care like therapy did?      ----- Message -----       From:Dr. Masood Carlin       Sent:3/9/2023  7:59 PM EST         To:Sandra Villar    Subject:Fasting blood test    I don't know of any lab which would come to the home to draw blood. If the aide can tell us a lab that does, we could check with them.      ----- Message -----       From:Sandra Villar       Sent:3/8/2023  2:18 PM EST         To:Patient Medical Advice Request Message List    Subject:Fasting blood test    Thank You. They are with [facility]      ----- Message -----       From:Dr. Masood Carlin       Sent:3/8/2023  2:04 PM EST         To:Sandra Benavides    Subject:Fasting blood test    Sonu Flores,    Which agency does the aide work for so we can check with them regarding their ability to draw blood at home. Sincerely,  Siddharth Daniels      ----- Message -----       From:Sandra Villar       Sent:3/8/2023  9:58 AM EST         To:Dr. Masood Carlin    Subject:Fasting blood test    My home health aide says it might be possible for me to have the blood test done here at home? It is very difficult for me to go out before 11 or so & that is a long time to fast from the night before.

## 2023-03-13 NOTE — TELEPHONE ENCOUNTER
Let her know that we checked with [facility] and they are not able to draw her blood. Ask her to call her palliative care nurse and see if she would be able to.

## 2023-03-16 ENCOUNTER — PATIENT MESSAGE (OUTPATIENT)
Dept: PHYSICAL MEDICINE AND REHAB | Age: 88
End: 2023-03-16

## 2023-03-17 NOTE — TELEPHONE ENCOUNTER
Pt. Contacted. Voiced her concerns about procedure preporation such as turning quickly and moving from room to room. Advised her to inform them of any concerns or needs when she is admitted. [address] notified.

## 2023-03-18 DIAGNOSIS — M11.20 PSEUDOGOUT: ICD-10-CM

## 2023-03-20 RX ORDER — PREDNISONE 10 MG/1
10 TABLET ORAL DAILY
Qty: 90 TABLET | Refills: 1 | Status: SHIPPED | OUTPATIENT
Start: 2023-03-20

## 2023-03-27 ENCOUNTER — PREP FOR PROCEDURE (OUTPATIENT)
Dept: PHYSICAL MEDICINE AND REHAB | Age: 88
End: 2023-03-27

## 2023-03-27 NOTE — H&P
presents for planned follow up on chronic low back pain. She never completed the lumbar facet injections as she was hospitalized. She states the interstitial lung disease is now in both lungs and she has increased oxygen needs. She is wondering what her options are since she is unable to lay on her stomach. She states she is unable to lay on her stomach because she cannot breathe. She is currently living at home with home care 24/7. She finds it extremely difficult to go to office visits because it is absolutely exhausting. She denies any new leg paraesthesias, saddle anesthesia, focal leg weakness, or bowel/bladder incontinence.      History of Interventions:   Surgery: No previous lumbar surgeries  Injections: Lumbar epidural x 2 at [facility] (02/2021, 03/2021) - No relief  B/L SI RFA - near 100% relief     Current Treatment Medications:   Tylenol arthritis PRN    Historical Treatment Medications:   Tramadol - makes her \"loopy\"    Imaging:    Lumbar MRI 01/12/2021        Past Medical History:   Diagnosis Date    Anemia     normal on pre-op 5/2012 and 12/2/13    Backache     h/o    Bronchiectasis ([address] 2013    Bursitis     bilateral shoulders    Constipation     Diverticulosis of colon     HTN (hypertension)     Hypercalcemia     slightly elevated at 10.8 pre-op 12/2/13    Hyperlipidemia     Nodular goiter     bx negative    OA (osteoarthritis)     bilateral thumbs - sees Dr. Eduard Johnston    Osteopenia 11/6/2013    Parathyroid adenoma 4/30/2013    Pneumonia of right upper lobe due to infectious organism     Pneumonitis     Dr. Kaylyn Menjivar in 7/2010 - bx negative    Pulmonary Mycobacterium avium complex (MAC) infection ([address]     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 ([address]     had one parathyroid removed - now follows with Dr. Doc Moran    Sinusitis     with seasonal allergies    Vitamin D deficiency 05/2018       Past Surgical History:   Procedure Laterality Date    ABDOMINAL EXPLORATION SURGERY  01/02/2013    Release of KATHERINE TAMAYO-

## 2023-03-28 ENCOUNTER — HOSPITAL ENCOUNTER (OUTPATIENT)
Age: 88
Setting detail: OUTPATIENT SURGERY
Discharge: HOME | End: 2023-03-28
Attending: ANESTHESIOLOGY | Admitting: ANESTHESIOLOGY
Payer: MEDICARE

## 2023-03-28 ENCOUNTER — APPOINTMENT (OUTPATIENT)
Dept: GENERAL RADIOLOGY | Age: 88
End: 2023-03-28
Attending: ANESTHESIOLOGY
Payer: MEDICARE

## 2023-03-28 VITALS
OXYGEN SATURATION: 92 % | SYSTOLIC BLOOD PRESSURE: 166 MMHG | DIASTOLIC BLOOD PRESSURE: 73 MMHG | HEIGHT: 64 IN | HEART RATE: 51 BPM | WEIGHT: 160 LBS | TEMPERATURE: 97.2 F | RESPIRATION RATE: 16 BRPM | BODY MASS INDEX: 27.31 KG/M2

## 2023-03-28 PROCEDURE — 64493 INJ PARAVERT F JNT L/S 1 LEV: CPT | Performed by: ANESTHESIOLOGY

## 2023-03-28 PROCEDURE — 64494 INJ PARAVERT F JNT L/S 2 LEV: CPT | Performed by: ANESTHESIOLOGY

## 2023-03-28 PROCEDURE — 6360000002 HC RX W HCPCS: Performed by: ANESTHESIOLOGY

## 2023-03-28 PROCEDURE — 3600000057 HC PAIN LEVEL 4 ADDL 15 MIN: Performed by: ANESTHESIOLOGY

## 2023-03-28 PROCEDURE — 7100000010 HC PHASE II RECOVERY - FIRST 15 MIN: Performed by: ANESTHESIOLOGY

## 2023-03-28 PROCEDURE — 2709999900 HC NON-CHARGEABLE SUPPLY: Performed by: ANESTHESIOLOGY

## 2023-03-28 PROCEDURE — 3600000056 HC PAIN LEVEL 4 BASE: Performed by: ANESTHESIOLOGY

## 2023-03-28 PROCEDURE — 3209999900 FLUORO FOR SURGICAL PROCEDURES

## 2023-03-28 PROCEDURE — 2500000003 HC RX 250 WO HCPCS: Performed by: ANESTHESIOLOGY

## 2023-03-28 RX ORDER — LIDOCAINE HYDROCHLORIDE 10 MG/ML
INJECTION, SOLUTION EPIDURAL; INFILTRATION; INTRACAUDAL; PERINEURAL PRN
Status: DISCONTINUED | OUTPATIENT
Start: 2023-03-28 | End: 2023-03-28 | Stop reason: ALTCHOICE

## 2023-03-28 RX ORDER — DEXAMETHASONE SODIUM PHOSPHATE 4 MG/ML
INJECTION, SOLUTION INTRA-ARTICULAR; INTRALESIONAL; INTRAMUSCULAR; INTRAVENOUS; SOFT TISSUE PRN
Status: DISCONTINUED | OUTPATIENT
Start: 2023-03-28 | End: 2023-03-28 | Stop reason: ALTCHOICE

## 2023-03-28 RX ORDER — BUPIVACAINE HYDROCHLORIDE 2.5 MG/ML
INJECTION, SOLUTION EPIDURAL; INFILTRATION; INTRACAUDAL PRN
Status: DISCONTINUED | OUTPATIENT
Start: 2023-03-28 | End: 2023-03-28 | Stop reason: ALTCHOICE

## 2023-03-28 ASSESSMENT — PAIN - FUNCTIONAL ASSESSMENT
PAIN_FUNCTIONAL_ASSESSMENT: 0-10
PAIN_FUNCTIONAL_ASSESSMENT: PREVENTS OR INTERFERES SOME ACTIVE ACTIVITIES AND ADLS

## 2023-03-28 ASSESSMENT — PAIN DESCRIPTION - DESCRIPTORS: DESCRIPTORS: ACHING;SPASM

## 2023-03-28 ASSESSMENT — PAIN SCALES - GENERAL: PAINLEVEL_OUTOF10: 0

## 2023-03-28 NOTE — PROCEDURES
Pre-operative Diagnosis: Lumbar facet pain     Post-operative Diagnosis: Lumbar facet pain     Procedure: Bilateral L3/L4 and L4/L5 lumbar facet steroid injections     Procedure Description:  After having obtained a signed informed consent, the patient was taken to the fluoroscopy suite where he was placed in the prone position. The patient's back was prepped with chloraprep, and was draped in a sterile fashion. A total of 1 cc of  1 % lidocaine was used to anesthetize the skin and underlying tissues at the desired levels. Under fluoroscopic guidance in an oblique view, 22-gauge 3-1/2 inch spinal needle(s) were advanced under fluoroscopic guidance in a coaxial view to lie within the facet joint(s) at the L3/L4 and L4/L5 level on the RIGHT. There were no paresthesias, heme, or CSF aspiration. Needle position was confirmed using an oblique view. After negative aspiration, 2.5 mg of dexamethasone plus 0.75 cc of 0.25 % bupivacaine was injected at each level. The needle(s) were removed without any complication. The same procedure was repeated on the contralateral side. The patient tolerated the procedure well and was transported to the recovery room and observed for 15 minutes prior to being discharged in ambulatory fashion.     Procedural Complications: None  Estimated Blood Loss: 0 mL        Jeramie Colin DO  Interventional Pain Management/PM&R   [facility] and [facility]

## 2023-03-28 NOTE — PROGRESS NOTES
1335 Patient arrived to phase II via cart. Spontaneous respiraitons even and unlabored. Placed on monitor--VSS. Report received from [id number] Assessment completed. Patient is alert and oriented x4. Patient denies pain--will monitor. Injection sites clean and dry. 1338 Pt. Denies needs for snack and drink, and states readiness to be discharged. [address] pt. To get dressed, and deny needs for assistance. 1345 Pt. Taken to private vehicle via wheelchair with Family at her side.

## 2023-03-30 NOTE — TELEPHONE ENCOUNTER
I spoke to Ivan on the phone and we discussed the form being so to cover the in home help she has. I spoke to Amna Clark about stopping the buspar as she seems sedated by it as well as the zoloft. The prednisone at 2.5 mg is not helping so I asked her to increase it to 5 mg with supper daily. We reviewed how she has the hydroxyzine for anxiety. No

## 2023-04-04 ENCOUNTER — TELEPHONE (OUTPATIENT)
Dept: FAMILY MEDICINE CLINIC | Age: 88
End: 2023-04-04

## 2023-04-04 NOTE — TELEPHONE ENCOUNTER
We can ask Margaux for a M60 tank for her. She could call medicare as there could be an appeal process for her to change oxygen suppliers.

## 2023-04-04 NOTE — TELEPHONE ENCOUNTER
If she were to change oxygen providers what would be the reason to use someone besides shannon? Has she spoke to her electric company as they may give her house priority status during an outage. There would likely be a form I would need to complete for that. With an extended outage would she be able to have a family member get her and take her to their home or some other place with power?

## 2023-04-04 NOTE — TELEPHONE ENCOUNTER
----- Message from Laura Benavides sent at 4/4/2023  3:24 PM EDT -----  Regarding: Oxygen supply  Contact: [phone]  During last weekends power outage I discovered that my supplier [employer] does not replace empty tanks weekends & I wont receive replacements until Wednesday. If the power had stayed off as it did in [place] a few miles away I would have run out Saturday. We had been told previously that Medicare prohibits changing providers, but this causes me a lot of anxiety. I was also told I might get a larger M 60 tank if you would request it , which wouldnt solve the problem but would buy time. I would also appreciate any suggestions or solutions you might have.  Thank you, Zaria Moon

## 2023-04-04 NOTE — TELEPHONE ENCOUNTER
Shaheen Nuñez informed by Phone. I will contact [facility] on thurs. And patient to call medicare about switching o2 suppliers. Sharif Snyder

## 2023-04-04 NOTE — TELEPHONE ENCOUNTER
Patient said she has had nothing but poor service from [employer] and that's why she wants to change. Her nebulizer broke and was still under warranty and they refused to replace it. She says she doesn't have anyone around that she can go to if power is off. She is going to check with electric company. Was wondering if a m60 day tank was an option?

## 2023-04-30 DIAGNOSIS — J84.10 PULMONARY FIBROSIS (HCC): ICD-10-CM

## 2023-04-30 DIAGNOSIS — J47.9 BRONCHIECTASIS WITHOUT COMPLICATION (HCC): ICD-10-CM

## 2023-05-01 DIAGNOSIS — E03.9 HYPOTHYROIDISM, UNSPECIFIED TYPE: ICD-10-CM

## 2023-05-01 RX ORDER — BUDESONIDE 0.5 MG/2ML
0.5 INHALANT ORAL 2 TIMES DAILY
Qty: 100 EACH | Refills: 5 | Status: SHIPPED | OUTPATIENT
Start: 2023-05-01

## 2023-05-01 RX ORDER — LEVOTHYROXINE SODIUM 88 UG/1
TABLET ORAL
Qty: 90 TABLET | Refills: 3 | OUTPATIENT
Start: 2023-05-01

## 2023-05-01 NOTE — TELEPHONE ENCOUNTER
Called and spoke with patient- she stated the Levothyroxine was Discontinued so she is not taking it.

## 2023-05-01 NOTE — TELEPHONE ENCOUNTER
Date of last visit:  12/15/2022  Date of next visit:  5/1/2023    Requested Prescriptions     Pending Prescriptions Disp Refills    budesonide (PULMICORT) 0.5 MG/2ML nebulizer suspension 100 each 5     Sig: Take 2 mLs by nebulization 2 times daily

## 2023-05-01 NOTE — TELEPHONE ENCOUNTER
The pharmacy is requesting a refill of the below medication which has been pended for you:     Requested Prescriptions     Pending Prescriptions Disp Refills    levothyroxine (SYNTHROID) 88 MCG tablet [Pharmacy Med Name: Levothyroxine Sodium 88 MCG Oral Tablet] 90 tablet 3     Sig: TAKE 1 TABLET BY MOUTH ONCE DAILY       Last Appointment Date: 12/15/2022  Next Appointment Date: Visit date not found    Allergies   Allergen Reactions    Colchicine Diarrhea    Levaquin [Levofloxacin] Other (See Comments)     Hallucinations    Oxycodone     Percocet [Oxycodone-Acetaminophen] Nausea Only and Other (See Comments)     Affects memory    Rifampin Other (See Comments)     Lost half of vision    Sulfa Antibiotics Other (See Comments)     hallucinations    Cefuroxime Diarrhea, Nausea And Vomiting and Other (See Comments)     cramping

## 2023-05-02 ENCOUNTER — TELEPHONE (OUTPATIENT)
Dept: FAMILY MEDICINE CLINIC | Age: 88
End: 2023-05-02

## 2023-05-02 NOTE — TELEPHONE ENCOUNTER
Pt called stating pharmacy is requesting an DWO form completed before they will fill     budesonide (PULMICORT) 0.5 MG/2ML nebulizer suspension    Please call pt once addressed [phone]    I called pharmacy they are going to fax us the [phone] form and they stated pt need's an new RX for this medication

## 2023-05-15 RX ORDER — BECLOMETHASONE DIPROPIONATE MONOHYDRATE 42 UG/1
2 SPRAY, SUSPENSION NASAL 2 TIMES DAILY
Qty: 1 EACH | Refills: 3 | Status: SHIPPED | OUTPATIENT
Start: 2023-05-15 | End: 2023-07-11

## 2023-05-25 ENCOUNTER — TELEPHONE (OUTPATIENT)
Dept: FAMILY MEDICINE CLINIC | Age: 88
End: 2023-05-25

## 2023-05-25 RX ORDER — BECLOMETHASONE DIPROPIONATE MONOHYDRATE 42 UG/1
2 SPRAY, SUSPENSION NASAL 2 TIMES DAILY
Qty: 1 EACH | Refills: 3 | OUTPATIENT
Start: 2023-05-25

## 2023-05-25 NOTE — TELEPHONE ENCOUNTER
I will send the Rx.  Could you call the RA and see if you can give the GoodRx numbers to them for her

## 2023-05-25 NOTE — TELEPHONE ENCOUNTER
Received fax from insurance. Beconase nasal spray not covered through insurance.   Please send alternative to [facility]

## 2023-07-11 ENCOUNTER — OFFICE VISIT (OUTPATIENT)
Dept: PULMONOLOGY | Age: 88
End: 2023-07-11
Payer: MEDICARE

## 2023-07-11 VITALS
HEART RATE: 64 BPM | OXYGEN SATURATION: 91 % | SYSTOLIC BLOOD PRESSURE: 124 MMHG | BODY MASS INDEX: 28.13 KG/M2 | DIASTOLIC BLOOD PRESSURE: 72 MMHG | WEIGHT: 164.8 LBS | TEMPERATURE: 97.3 F | HEIGHT: 64 IN

## 2023-07-11 DIAGNOSIS — J47.9 BRONCHIECTASIS WITHOUT COMPLICATION (HCC): ICD-10-CM

## 2023-07-11 DIAGNOSIS — J84.10 PULMONARY FIBROSIS (HCC): Primary | ICD-10-CM

## 2023-07-11 PROCEDURE — 99213 OFFICE O/P EST LOW 20 MIN: CPT | Performed by: NURSE PRACTITIONER

## 2023-07-11 PROCEDURE — G8427 DOCREV CUR MEDS BY ELIG CLIN: HCPCS | Performed by: NURSE PRACTITIONER

## 2023-07-11 PROCEDURE — 1090F PRES/ABSN URINE INCON ASSESS: CPT | Performed by: NURSE PRACTITIONER

## 2023-07-11 PROCEDURE — G8417 CALC BMI ABV UP PARAM F/U: HCPCS | Performed by: NURSE PRACTITIONER

## 2023-07-11 PROCEDURE — 1123F ACP DISCUSS/DSCN MKR DOCD: CPT | Performed by: NURSE PRACTITIONER

## 2023-07-11 PROCEDURE — 1036F TOBACCO NON-USER: CPT | Performed by: NURSE PRACTITIONER

## 2023-07-11 ASSESSMENT — ENCOUNTER SYMPTOMS
GASTROINTESTINAL NEGATIVE: 1
EYES NEGATIVE: 1
ALLERGIC/IMMUNOLOGIC NEGATIVE: 1
SHORTNESS OF BREATH: 1
COUGH: 0
WHEEZING: 0

## 2023-07-11 NOTE — PROGRESS NOTES
[facility]    Patient: Cira Luke, 80 y.o.   : 1935    Pt of Dr. Inocencia Connolly     Chief Complaint   Patient presents with    Follow-up     6mo Pulmonary Fibrosis f/u w/o testing. Notes her breathing is getting worse with exertion. \"Is just a little worse than my last visit. \"        HPI  Damián Ward is here for follow up for her pulmonary fibrosis. Previously followed with [facility] but patient is no longer going too long of a drive   Zithromax daily   Pulmicort neb BID - currently pulmonary medication prescribed per PCP  Albuterol PRN  Home oxygen use last 6MWT done 22  SOB with exertion only - denies any worsening   More issues with sinus congestion takes allergy pill daily OTC  Was on 3% nebulizer for mucous and to help decrease coughing     Progress History:   Since last visit any new medical issues? No  New ER or hospital visits? No  Any new or changes in medicines? No  Using inhalers? Yes   Are they helpful?  Yes   Flu vaccine- NA  Pneumonia vaccine- UTD  Covid vaccine- UTD    Past Medical hx   PMH:  Past Medical History:   Diagnosis Date    Anemia     normal on pre-op 2012 and 13    Backache     h/o    Bronchiectasis ([address])     Bursitis     bilateral shoulders    Constipation     Diverticulosis of colon     HTN (hypertension)     Hypercalcemia     slightly elevated at 10.8 pre-op 13    Hyperlipidemia     Nodular goiter     bx negative    OA (osteoarthritis)     bilateral thumbs - sees Dr. Kristian Edmond    Osteopenia 2013    Parathyroid adenoma 2013    Pneumonia of right upper lobe due to infectious organism     Pneumonitis     Dr. Magen Black in 2010 - bx negative    Pulmonary Mycobacterium avium complex (MAC) infection ([address])     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 ([address])     had one parathyroid removed - now follows with Dr. Katina Manning    Sinusitis     with seasonal allergies    Vitamin D deficiency 2018

## 2023-09-12 ENCOUNTER — OFFICE VISIT (OUTPATIENT)
Dept: FAMILY MEDICINE CLINIC | Age: 88
End: 2023-09-12

## 2023-09-12 VITALS
HEART RATE: 68 BPM | DIASTOLIC BLOOD PRESSURE: 60 MMHG | BODY MASS INDEX: 28.29 KG/M2 | HEIGHT: 64 IN | RESPIRATION RATE: 24 BRPM | SYSTOLIC BLOOD PRESSURE: 116 MMHG

## 2023-09-12 DIAGNOSIS — M11.20 PSEUDOGOUT: ICD-10-CM

## 2023-09-12 DIAGNOSIS — Z00.00 MEDICARE ANNUAL WELLNESS VISIT, SUBSEQUENT: Primary | ICD-10-CM

## 2023-09-12 DIAGNOSIS — J84.10 PULMONARY FIBROSIS (HCC): ICD-10-CM

## 2023-09-12 DIAGNOSIS — J47.9 BRONCHIECTASIS WITHOUT COMPLICATION (HCC): ICD-10-CM

## 2023-09-12 PROCEDURE — G8427 DOCREV CUR MEDS BY ELIG CLIN: HCPCS | Performed by: FAMILY MEDICINE

## 2023-09-12 PROCEDURE — G8417 CALC BMI ABV UP PARAM F/U: HCPCS | Performed by: FAMILY MEDICINE

## 2023-09-12 PROCEDURE — 1036F TOBACCO NON-USER: CPT | Performed by: FAMILY MEDICINE

## 2023-09-12 PROCEDURE — 99213 OFFICE O/P EST LOW 20 MIN: CPT | Performed by: FAMILY MEDICINE

## 2023-09-12 PROCEDURE — 1090F PRES/ABSN URINE INCON ASSESS: CPT | Performed by: FAMILY MEDICINE

## 2023-09-12 PROCEDURE — G0439 PPPS, SUBSEQ VISIT: HCPCS | Performed by: FAMILY MEDICINE

## 2023-09-12 PROCEDURE — 1123F ACP DISCUSS/DSCN MKR DOCD: CPT | Performed by: FAMILY MEDICINE

## 2023-09-12 RX ORDER — BUDESONIDE 0.5 MG/2ML
0.5 INHALANT ORAL 2 TIMES DAILY
Qty: 100 EACH | Refills: 5 | Status: SHIPPED | OUTPATIENT
Start: 2023-09-12

## 2023-09-12 RX ORDER — AZITHROMYCIN 250 MG/1
250 TABLET, FILM COATED ORAL DAILY
Qty: 90 TABLET | Refills: 3 | Status: SHIPPED | OUTPATIENT
Start: 2023-09-12

## 2023-09-12 RX ORDER — PREDNISONE 10 MG/1
10 TABLET ORAL DAILY
Qty: 90 TABLET | Refills: 3 | Status: SHIPPED | OUTPATIENT
Start: 2023-09-12

## 2023-09-12 SDOH — ECONOMIC STABILITY: INCOME INSECURITY: HOW HARD IS IT FOR YOU TO PAY FOR THE VERY BASICS LIKE FOOD, HOUSING, MEDICAL CARE, AND HEATING?: NOT VERY HARD

## 2023-09-12 SDOH — ECONOMIC STABILITY: HOUSING INSECURITY
IN THE LAST 12 MONTHS, WAS THERE A TIME WHEN YOU DID NOT HAVE A STEADY PLACE TO SLEEP OR SLEPT IN A SHELTER (INCLUDING NOW)?: NO

## 2023-09-12 SDOH — ECONOMIC STABILITY: FOOD INSECURITY: WITHIN THE PAST 12 MONTHS, YOU WORRIED THAT YOUR FOOD WOULD RUN OUT BEFORE YOU GOT MONEY TO BUY MORE.: NEVER TRUE

## 2023-09-12 SDOH — ECONOMIC STABILITY: FOOD INSECURITY: WITHIN THE PAST 12 MONTHS, THE FOOD YOU BOUGHT JUST DIDN'T LAST AND YOU DIDN'T HAVE MONEY TO GET MORE.: NEVER TRUE

## 2023-09-12 ASSESSMENT — PATIENT HEALTH QUESTIONNAIRE - PHQ9
1. LITTLE INTEREST OR PLEASURE IN DOING THINGS: 0
SUM OF ALL RESPONSES TO PHQ QUESTIONS 1-9: 0
2. FEELING DOWN, DEPRESSED OR HOPELESS: 0
SUM OF ALL RESPONSES TO PHQ9 QUESTIONS 1 & 2: 0
SUM OF ALL RESPONSES TO PHQ QUESTIONS 1-9: 0

## 2023-09-12 ASSESSMENT — LIFESTYLE VARIABLES
HOW OFTEN DO YOU HAVE A DRINK CONTAINING ALCOHOL: NEVER
HOW MANY STANDARD DRINKS CONTAINING ALCOHOL DO YOU HAVE ON A TYPICAL DAY: PATIENT DOES NOT DRINK

## 2023-09-12 NOTE — PATIENT INSTRUCTIONS
may be subject to a deductible, co-insurance, and/or copay. Some of these benefits include a comprehensive review of your medical history including lifestyle, illnesses that may run in your family, and various assessments and screenings as appropriate. After reviewing your medical record and screening and assessments performed today your provider may have ordered immunizations, labs, imaging, and/or referrals for you. A list of these orders (if applicable) as well as your Preventive Care list are included within your After Visit Summary for your review. Other Preventive Recommendations:    A preventive eye exam performed by an eye specialist is recommended every 1-2 years to screen for glaucoma; cataracts, macular degeneration, and other eye disorders. A preventive dental visit is recommended every 6 months. Try to get at least 150 minutes of exercise per week or 10,000 steps per day on a pedometer . Order or download the FREE \"Exercise & Physical Activity: Your Everyday Guide\" from [employer]. Call 5-944.513.4345 or search [employer] online. You need 0126-9286 mg of calcium and 7940-9237 IU of vitamin D per day. It is possible to meet your calcium requirement with diet alone, but a vitamin D supplement is usually necessary to meet this goal.  When exposed to the sun, use a sunscreen that protects against both UVA and UVB radiation with an SPF of 30 or greater. Reapply every 2 to 3 hours or after sweating, drying off with a towel, or swimming. Always wear a seat belt when traveling in a car. Always wear a helmet when riding a bicycle or motorcycle.

## 2023-09-12 NOTE — PROGRESS NOTES
Medicare Annual Wellness Visit    Steven Leal is here for Medicare AWV    Assessment & Plan   Bronchiectasis without complication ([address])  -     azithromycin (ZITHROMAX) 250 MG tablet; Take 1 tablet by mouth daily, Disp-90 tablet, R-3Her pul[facility] at the [facility] wants her on this everydayNormal  -     budesonide (PULMICORT) 0.5 MG/2ML nebulizer suspension; Take 2 mLs by nebulization 2 times daily, Disp-100 each, R-5Normal  Pseudogout  -     predniSONE (DELTASONE) 10 MG tablet; Take 1 tablet by mouth daily, Disp-90 tablet, R-3Normal  Pulmonary fibrosis (HCC)  -     budesonide (PULMICORT) 0.5 MG/2ML nebulizer suspension; Take 2 mLs by nebulization 2 times daily, Disp-100 each, R-5Normal    Recommendations for Preventive Services Due: see orders and patient instructions/AVS.  Recommended screening schedule for the next 5-10 years is provided to the patient in written form: see Patient Instructions/AVS.     No follow-ups on file. Subjective   The following acute and/or chronic problems were also addressed today:  She states that there can be some sharp pains in the upper abd there will be a bowel movement later that helps some what. She will take gas ex that help slightly and burping will help for a time  No particular food seems to cause it. She's fine at night. The pseudogout has been controlled with the current prednisone dose    Patient's complete Health Risk Assessment and screening values have been reviewed and are found in Flowsheets. The following problems were reviewed today and where indicated follow up appointments were made and/or referrals ordered. Positive Risk Factor Screenings with Interventions:              Self-assessment of health:   In general, how would you say your health is?: (!) Poor    Interventions:  She is stable           ADL's:   Patient reports needing help with:  Select all that apply: (!) Grooming, Bathing, Walking/Balance  Select all that apply: Joya Mejia Alar Island Pedicle Flap Text: The defect edges were debeveled with a #15 scalpel blade.  Given the location of the defect, shape of the defect and the proximity to the alar rim an island pedicle advancement flap was deemed most appropriate.  Using a sterile surgical marker, an appropriate advancement flap was drawn incorporating the defect, outlining the appropriate donor tissue and placing the expected incisions within the nasal ala running parallel to the alar rim. The area thus outlined was incised with a #15 scalpel blade.  The skin margins were undermined minimally to an appropriate distance in all directions around the primary defect and laterally outward around the island pedicle utilizing iris scissors.  There was minimal undermining beneath the pedicle flap.

## 2023-10-05 ENCOUNTER — PATIENT MESSAGE (OUTPATIENT)
Dept: FAMILY MEDICINE CLINIC | Age: 88
End: 2023-10-05

## 2023-10-05 NOTE — TELEPHONE ENCOUNTER
Please check [facility] medical supplies to see if they can supply that---- I don't think they do. If they don't check with who she gets her oxygen through. If they don't you could check with [facility] perhaps.

## 2023-10-06 DIAGNOSIS — J84.10 PULMONARY FIBROSIS (HCC): ICD-10-CM

## 2023-10-06 DIAGNOSIS — J47.9 BRONCHIECTASIS WITHOUT COMPLICATION (HCC): ICD-10-CM

## 2023-10-06 RX ORDER — BUDESONIDE 0.5 MG/2ML
0.5 INHALANT ORAL 2 TIMES DAILY
Qty: 100 EACH | Refills: 5 | Status: SHIPPED | OUTPATIENT
Start: 2023-10-06

## 2023-10-06 NOTE — TELEPHONE ENCOUNTER
Let her know I called [facility] and the meds are mailed to her. She may be getting it on Tuesday. She could call them to discuss how they charge for it.

## 2023-10-06 NOTE — TELEPHONE ENCOUNTER
I spoke to Yulissa and told her that I saw that [facility] closes in less than 30 min and she had no one to get it anyway. I told her I would fax the needed info and hopefully she could get it Monday. She thought she would be fine during that time.

## 2023-10-09 NOTE — TELEPHONE ENCOUNTER
Patient called back stating that she contacted [facility] and they have not received the fax. Margaux Irene S Bernard with [facility] and they received a black page and that was all.     I re-faxed the information to [address] [place] [address] informed

## 2023-11-03 NOTE — TELEPHONE ENCOUNTER
The pharmacy is requesting a refill of the below medication which has been pended for you:     Requested Prescriptions     Pending Prescriptions Disp Refills    metoprolol tartrate (LOPRESSOR) 25 MG tablet [Pharmacy Med Name: Metoprolol Tartrate 25 MG Oral Tablet] 180 tablet 1     Sig: TAKE ONE-HALF TABLET BY MOUTH  TWICE DAILY       Last Appointment Date: 9/12/2023  Next Appointment Date: Visit date not found    Allergies   Allergen Reactions    Colchicine Diarrhea    Levaquin [Levofloxacin] Other (See Comments)     Hallucinations    Oxycodone     Percocet [Oxycodone-Acetaminophen] Nausea Only and Other (See Comments)     Affects memory    Rifampin Other (See Comments)     Lost half of vision    Sulfa Antibiotics Other (See Comments)     hallucinations    Cefuroxime Diarrhea, Nausea And Vomiting and Other (See Comments)     cramping

## 2023-12-01 ENCOUNTER — PATIENT MESSAGE (OUTPATIENT)
Dept: FAMILY MEDICINE CLINIC | Age: 88
End: 2023-12-01

## 2023-12-01 NOTE — TELEPHONE ENCOUNTER
From: Moo Carballo  To: Dr. Nimisha Hutchinson: 12/1/2023 8:05 AM EST  Subject: Nebulizer equipment     I again need to replace my nebulizer and Nic Navarrete has ordered one but needs you to FAX a script for it to [phone] for order # [id number]. Thank you & Soraya Victor.  William Clark

## 2023-12-14 DIAGNOSIS — M11.20 PSEUDOGOUT: ICD-10-CM

## 2023-12-14 RX ORDER — PREDNISONE 20 MG/1
TABLET ORAL
Qty: 10 TABLET | Refills: 0 | Status: SHIPPED | OUTPATIENT
Start: 2023-12-14

## 2023-12-14 NOTE — TELEPHONE ENCOUNTER
Date of last visit:  9/12/2023  Date of next visit:  Visit date not found    Requested Prescriptions     Signed Prescriptions Disp Refills    predniSONE (DELTASONE) 20 MG tablet 10 tablet 0     Sig: TAKE 1 TABLET AT THE ONSET OF PSEUDOGOUT FLARE AND REPEAT THE NEXT DAY     Authorizing Provider: Shawn Bowman     Ordering User: SHAWN [address] over script to Pharmacy per verbal order from Dr Daniel Constantino.

## 2024-01-05 DIAGNOSIS — J84.10 PULMONARY FIBROSIS (HCC): ICD-10-CM

## 2024-01-05 DIAGNOSIS — J47.9 BRONCHIECTASIS WITHOUT COMPLICATION (HCC): ICD-10-CM

## 2024-01-08 RX ORDER — ALBUTEROL SULFATE 90 UG/1
2 AEROSOL, METERED RESPIRATORY (INHALATION) EVERY 4 HOURS PRN
Qty: 1 EACH | Refills: 5 | Status: SHIPPED | OUTPATIENT
Start: 2024-01-08 | End: 2025-01-07

## 2024-01-12 NOTE — PROGRESS NOTES
[address] [address]  PHYSICAL THERAPY MISSED TREATMENT NOTE  ACUTE CARE  [facility]       Pt out of room for bronch at time of second attempt        Missed Treatment  Kady Núñez PTA [id number] (4) no impairment

## 2024-01-16 ENCOUNTER — TELEPHONE (OUTPATIENT)
Dept: FAMILY MEDICINE CLINIC | Age: 89
End: 2024-01-16

## 2024-01-16 NOTE — TELEPHONE ENCOUNTER
Received [employer] form through mail re doctor to fill out and fax when completed.    Form scanned and placed on cart

## 2024-01-23 NOTE — PROGRESS NOTES
[facility] PROFESSIONAL SERVICES  [facility] [place] [address] [address]   [address]   [facility] [address]   Dept: [phone]   Dept Fax: [phone]   Loc: [phone]      Chief Complaint   Patient presents with    Follow-up     No cardiac complaints      Cardiologist:  Dr. Wang  87 yo female presents for f/u. Hx of severe pulm fibrosis, afib, sob.     Does not notice any palpitations, sob, chest pain. No swelling. She is worried about weight gain noted here in the office today. Feels she has gained about 15 lbs from last time she was here but feeling well. Her breathing is normal for her, no swelling, appetite is good. No other changes lately.     General:   No fever, no chills, no weight loss, no fatigue  Pulmonary:    No dyspnea, no wheezing  Cardiac:    Denies recent chest pain   GI:     No nausea or vomiting, no abdominal pain  Neuro:     No dizziness or light headedness  Musculoskeletal:  No recent active issues  Extremities:   No edema      Past Medical History:   Diagnosis Date    Anemia     normal on pre-op 5/2012 and 12/2/13    Backache     h/o    Bronchiectasis (HCC) 2013    Bursitis     bilateral shoulders    Constipation     Diverticulosis of colon     HTN (hypertension)     Hypercalcemia     slightly elevated at 10.8 pre-op 12/2/13    Hyperlipidemia     Nodular goiter     bx negative    OA (osteoarthritis)     bilateral thumbs - sees Dr. Patel    Osteopenia 11/6/2013    Parathyroid adenoma 4/30/2013    Pneumonia of right upper lobe due to infectious organism     Pneumonitis     Dr. Hanson, bronch in 7/2010 - bx negative    Pulmonary Mycobacterium avium complex (MAC) infection (HCC)     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 (HCC)     had one parathyroid removed - now follows with Dr. Rosas    Sinusitis     with seasonal allergies    Vitamin D deficiency 05/2018       Allergies   Allergen Reactions    Colchicine Diarrhea    Levaquin [Levofloxacin] Other (See Comments)     Hallucinations

## 2024-01-24 DIAGNOSIS — J84.10 PULMONARY FIBROSIS (HCC): ICD-10-CM

## 2024-01-24 DIAGNOSIS — J47.9 BRONCHIECTASIS WITHOUT COMPLICATION (HCC): ICD-10-CM

## 2024-01-24 RX ORDER — ALBUTEROL SULFATE 90 UG/1
2 AEROSOL, METERED RESPIRATORY (INHALATION) EVERY 4 HOURS PRN
Qty: 1 EACH | Refills: 5 | Status: SHIPPED | OUTPATIENT
Start: 2024-01-24 | End: 2025-01-23

## 2024-01-24 NOTE — TELEPHONE ENCOUNTER
Received refill request for albuterol sulfate hfa. Medication was last ordered by tiffany choudhary. Medication was last ordered on 1-8-24 with 5 refills.   Patient was last seen in the office 7-11-23. Patient has a scheduled follow up 7-11-24.   Medication needs to be sent to [facility].

## 2024-01-25 ENCOUNTER — OFFICE VISIT (OUTPATIENT)
Dept: CARDIOLOGY CLINIC | Age: 89
End: 2024-01-25
Payer: MEDICARE

## 2024-01-25 ENCOUNTER — TELEPHONE (OUTPATIENT)
Dept: FAMILY MEDICINE CLINIC | Age: 89
End: 2024-01-25

## 2024-01-25 VITALS
DIASTOLIC BLOOD PRESSURE: 76 MMHG | BODY MASS INDEX: 28.89 KG/M2 | WEIGHT: 169.2 LBS | HEIGHT: 64 IN | SYSTOLIC BLOOD PRESSURE: 153 MMHG | HEART RATE: 55 BPM

## 2024-01-25 DIAGNOSIS — I48.0 PAF (PAROXYSMAL ATRIAL FIBRILLATION) (HCC): Primary | ICD-10-CM

## 2024-01-25 PROCEDURE — 1036F TOBACCO NON-USER: CPT | Performed by: STUDENT IN AN ORGANIZED HEALTH CARE EDUCATION/TRAINING PROGRAM

## 2024-01-25 PROCEDURE — 1123F ACP DISCUSS/DSCN MKR DOCD: CPT | Performed by: STUDENT IN AN ORGANIZED HEALTH CARE EDUCATION/TRAINING PROGRAM

## 2024-01-25 PROCEDURE — G8484 FLU IMMUNIZE NO ADMIN: HCPCS | Performed by: STUDENT IN AN ORGANIZED HEALTH CARE EDUCATION/TRAINING PROGRAM

## 2024-01-25 PROCEDURE — G8427 DOCREV CUR MEDS BY ELIG CLIN: HCPCS | Performed by: STUDENT IN AN ORGANIZED HEALTH CARE EDUCATION/TRAINING PROGRAM

## 2024-01-25 PROCEDURE — 93000 ELECTROCARDIOGRAM COMPLETE: CPT | Performed by: STUDENT IN AN ORGANIZED HEALTH CARE EDUCATION/TRAINING PROGRAM

## 2024-01-25 PROCEDURE — G8417 CALC BMI ABV UP PARAM F/U: HCPCS | Performed by: STUDENT IN AN ORGANIZED HEALTH CARE EDUCATION/TRAINING PROGRAM

## 2024-01-25 PROCEDURE — 1090F PRES/ABSN URINE INCON ASSESS: CPT | Performed by: STUDENT IN AN ORGANIZED HEALTH CARE EDUCATION/TRAINING PROGRAM

## 2024-01-25 PROCEDURE — 99213 OFFICE O/P EST LOW 20 MIN: CPT | Performed by: STUDENT IN AN ORGANIZED HEALTH CARE EDUCATION/TRAINING PROGRAM

## 2024-01-25 NOTE — TELEPHONE ENCOUNTER
Pt's daughter stopped in req doctor to fill out forms from [employer].    Please fax form once completed to  [phone]

## 2024-01-31 DIAGNOSIS — J84.10 PULMONARY FIBROSIS (HCC): ICD-10-CM

## 2024-01-31 DIAGNOSIS — J47.9 BRONCHIECTASIS WITHOUT COMPLICATION (HCC): ICD-10-CM

## 2024-01-31 RX ORDER — ALBUTEROL SULFATE 90 UG/1
2 AEROSOL, METERED RESPIRATORY (INHALATION) EVERY 4 HOURS PRN
Qty: 1 EACH | Refills: 1 | Status: SHIPPED | OUTPATIENT
Start: 2024-01-31 | End: 2025-01-30

## 2024-06-01 DIAGNOSIS — J84.10 PULMONARY FIBROSIS (HCC): ICD-10-CM

## 2024-06-01 DIAGNOSIS — J47.9 BRONCHIECTASIS WITHOUT COMPLICATION (HCC): ICD-10-CM

## 2024-06-03 RX ORDER — ALBUTEROL SULFATE 90 UG/1
AEROSOL, METERED RESPIRATORY (INHALATION)
Qty: 51 G | Refills: 3 | Status: SHIPPED | OUTPATIENT
Start: 2024-06-03

## 2024-06-03 NOTE — TELEPHONE ENCOUNTER
Received refill request for albuterol hfa. Medication was last ordered by tiffany choudhary. Medication was last ordered on 1-31-24 with 1 refills.   Patient was last seen in the office 7-11-23. Patient has a scheduled follow up 6-25-24.   [facility]

## 2024-06-04 ENCOUNTER — PATIENT MESSAGE (OUTPATIENT)
Dept: PULMONOLOGY | Age: 89
End: 2024-06-04

## 2024-06-04 DIAGNOSIS — J84.10 PULMONARY FIBROSIS (HCC): ICD-10-CM

## 2024-06-04 DIAGNOSIS — J47.9 BRONCHIECTASIS WITHOUT COMPLICATION (HCC): ICD-10-CM

## 2024-06-04 NOTE — TELEPHONE ENCOUNTER
From: Sandra Benavides  To: Karen Green  Sent: 6/4/2024 11:05 AM EDT  Subject: Albuterol Sulfate Inhalation Aerosol    I am out but [facility] says they can’t refill it without a new script. Thank you, Sandra Benavides

## 2024-06-05 NOTE — TELEPHONE ENCOUNTER
Date of last visit:  9/12/2023  Date of next visit:  Visit date not found    Requested Prescriptions     Pending Prescriptions Disp Refills    budesonide (RINOCORT AQUA) 32 MCG/ACT nasal spray [Pharmacy Med Name: BUDESONIDE 32 MCG NASAL SPRAY] 1 each 5     Sig: INSTILL 2 SPRAYS INTO EACH NOSTRIL ONCE A DAY

## 2024-06-25 ENCOUNTER — OFFICE VISIT (OUTPATIENT)
Dept: PULMONOLOGY | Age: 89
End: 2024-06-25
Payer: MEDICARE

## 2024-06-25 VITALS
TEMPERATURE: 97.8 F | BODY MASS INDEX: 29.04 KG/M2 | HEIGHT: 64 IN | OXYGEN SATURATION: 90 % | DIASTOLIC BLOOD PRESSURE: 72 MMHG | HEART RATE: 72 BPM | SYSTOLIC BLOOD PRESSURE: 124 MMHG

## 2024-06-25 DIAGNOSIS — M11.20 PSEUDOGOUT: ICD-10-CM

## 2024-06-25 DIAGNOSIS — J84.10 PULMONARY FIBROSIS (HCC): Primary | ICD-10-CM

## 2024-06-25 DIAGNOSIS — J47.9 BRONCHIECTASIS WITHOUT COMPLICATION (HCC): ICD-10-CM

## 2024-06-25 PROBLEM — J96.01 ACUTE HYPOXEMIC RESPIRATORY FAILURE (HCC): Status: RESOLVED | Noted: 2020-01-06 | Resolved: 2024-06-25

## 2024-06-25 PROCEDURE — 1123F ACP DISCUSS/DSCN MKR DOCD: CPT | Performed by: NURSE PRACTITIONER

## 2024-06-25 PROCEDURE — G8427 DOCREV CUR MEDS BY ELIG CLIN: HCPCS | Performed by: NURSE PRACTITIONER

## 2024-06-25 PROCEDURE — 99213 OFFICE O/P EST LOW 20 MIN: CPT | Performed by: NURSE PRACTITIONER

## 2024-06-25 PROCEDURE — 1090F PRES/ABSN URINE INCON ASSESS: CPT | Performed by: NURSE PRACTITIONER

## 2024-06-25 PROCEDURE — 1036F TOBACCO NON-USER: CPT | Performed by: NURSE PRACTITIONER

## 2024-06-25 PROCEDURE — G8417 CALC BMI ABV UP PARAM F/U: HCPCS | Performed by: NURSE PRACTITIONER

## 2024-06-25 RX ORDER — ALBUTEROL SULFATE 90 UG/1
2 AEROSOL, METERED RESPIRATORY (INHALATION) EVERY 4 HOURS PRN
Qty: 1 EACH | Refills: 3 | Status: SHIPPED | OUTPATIENT
Start: 2024-06-25

## 2024-06-25 RX ORDER — SIMETHICONE 80 MG
80 TABLET,CHEWABLE ORAL EVERY 6 HOURS PRN
COMMUNITY

## 2024-06-25 ASSESSMENT — ENCOUNTER SYMPTOMS
GASTROINTESTINAL NEGATIVE: 1
COUGH: 0
ALLERGIC/IMMUNOLOGIC NEGATIVE: 1
SHORTNESS OF BREATH: 1
EYES NEGATIVE: 1
WHEEZING: 0

## 2024-06-25 NOTE — PROGRESS NOTES
Normal range of motion.      Cervical back: Normal range of motion and neck supple.   Skin:     General: Skin is warm and dry.      Capillary Refill: Capillary refill takes less than 2 seconds.   Neurological:      Mental Status: She is alert and oriented to person, place, and time.      Motor: Weakness present.      Gait: Gait abnormal.   Psychiatric:         Behavior: Behavior normal.         Thought Content: Thought content normal.         Judgment: Judgment normal.          Results   Lung Nodule Screening     [] Qualifies    [x] Does not qualify   [] Declined    [] Completed    The USPSTF recommends annual screening for lung cancer with low-dose computed tomography (LDCT) in adults aged 50 to 80 years who have a 30 pack-year smoking history and currently smoke or have quit within the past 20 years. Screening should be discontinued once a person has not smoked for 20 years or develops a health problem that substantially limits life expectancy or the ability or willingness to have curative lung surgery.     - No testing to review     Assessment      Diagnosis Orders   1. Pulmonary fibrosis (HCC)  albuterol sulfate HFA (PROVENTIL;VENTOLIN;PROAIR) 108 (90 Base) MCG/ACT inhaler      2. Bronchiectasis without complication (HCC)  albuterol sulfate HFA (PROVENTIL;VENTOLIN;PROAIR) 108 (90 Base) MCG/ACT inhaler      3. Pseudogout              Plan   -Continue Pulmicort BID- rinsing mouth after use   -Continue Albuterol PRN   -Activity as tolerated   -Discussed using Albuterol neb then Pulmicort neb  -Advised to maintain pneumonia vaccine with PCP and to take flu vaccine this coming season.  -Advised patient to call office with any changes, questions, or concerns regarding respiratory status    Will see Sandra Benavides back in: 1 year    Karen Green CNP  6/25/2024

## 2024-07-08 ENCOUNTER — TELEPHONE (OUTPATIENT)
Dept: FAMILY MEDICINE CLINIC | Age: 89
End: 2024-07-08

## 2024-07-08 DIAGNOSIS — M54.50 ACUTE LOW BACK PAIN, UNSPECIFIED BACK PAIN LATERALITY, UNSPECIFIED WHETHER SCIATICA PRESENT: Primary | ICD-10-CM

## 2024-07-08 NOTE — TELEPHONE ENCOUNTER
Called dr. Marie's office and left message about patient's pain agreement and back pain not relieved by lidocaine and tylenol

## 2024-07-08 NOTE — TELEPHONE ENCOUNTER
Regarding: FW: Back pain  Contact: [phone]        ----- Message -----  From: Karen Mars LPN  Sent: 7/8/2024   8:42 AM EDT  To: Tacho Ny MD  Subject: Back pain                                        ----- Message from Karen Mars LPN sent at 7/8/2024  8:42 AM EDT -----       ----- Message from Sandra Benavides to Tacho Ny MD sent at 7/7/2024  6:23 PM -----   I am experiencing a flare up of lower back pain but can’t be seen by Dr Marie in Pain Management until 9/5. I am using Lidocaine patches but is there anything I can take besides Acetaminophen as it doesn’t really help?  Thanks, Sandra Benavides

## 2024-07-08 NOTE — TELEPHONE ENCOUNTER
Regarding: FW: Back pain  Contact: [phone]        ----- Message -----  From: Karen Mars LPN  Sent: 7/8/2024   8:42 AM EDT  To: Tacho Ny MD  Subject: Back pain                                        ----- Message from Karen Mars LPN sent at 7/8/2024  8:42 AM EDT -----       ----- Message from Sandra Benavides to Tacho Ny MD sent at 7/7/2024  6:23 PM -----   I am experiencing a flare up of lower back pain but can’t be seen by Dr Marie in Pain Management until 9/5. I am using Lidocaine patches but is there anything I can take besides Acetaminophen as it doesn’t really help?  Thanks, Sandra Benavides

## 2024-07-09 RX ORDER — TRAMADOL HYDROCHLORIDE 50 MG/1
50 TABLET ORAL EVERY 6 HOURS PRN
Qty: 28 TABLET | Refills: 0 | Status: SHIPPED | OUTPATIENT
Start: 2024-07-09 | End: 2024-07-16

## 2024-07-09 NOTE — TELEPHONE ENCOUNTER
So if I give her a scheduled pain med will that violate her pain agreement? Check with [facility] to be certain.

## 2024-07-09 NOTE — TELEPHONE ENCOUNTER
Butch from Pain Management Dr Marie office called and Sandra Benavides has not been seen in their office her  last visit was a VV on 4-11-23, she does not have a current pain contract. They can not write a script for pain, her last visit was 9-12-23 with Doc.N.         Note

## 2024-07-09 NOTE — TELEPHONE ENCOUNTER
Let Sandra know that I will send a Rx for some tramadol that she has used in the past for pain. I will send a week's worth and if that doesn't help or she needs more she should be seen.

## 2024-08-09 DIAGNOSIS — J47.9 BRONCHIECTASIS WITHOUT COMPLICATION (HCC): ICD-10-CM

## 2024-08-09 DIAGNOSIS — J84.10 PULMONARY FIBROSIS (HCC): ICD-10-CM

## 2024-08-09 RX ORDER — BUDESONIDE 0.5 MG/2ML
INHALANT ORAL
Qty: 60 EACH | Refills: 11 | Status: SHIPPED | OUTPATIENT
Start: 2024-08-09

## 2024-08-09 NOTE — TELEPHONE ENCOUNTER
Date of last visit:  9/12/2023  Date of next visit:  Visit date not found    Requested Prescriptions     Pending Prescriptions Disp Refills    budesonide (PULMICORT) 0.5 MG/2ML nebulizer suspension [Pharmacy Med Name: budesonide 0.5 mg/2 mL suspension for nebulization] 60 each 11     Sig: USE 1 VIAL  IN  NEBULIZER TWICE  DAILY - rinse mouth after treatment

## 2024-08-19 RX ORDER — AMOXICILLIN 500 MG/1
2000 CAPSULE ORAL ONCE
Qty: 4 CAPSULE | Refills: 3 | Status: SHIPPED | OUTPATIENT
Start: 2024-08-19 | End: 2024-08-19

## 2024-08-31 ASSESSMENT — PATIENT HEALTH QUESTIONNAIRE - PHQ9
10. IF YOU CHECKED OFF ANY PROBLEMS, HOW DIFFICULT HAVE THESE PROBLEMS MADE IT FOR YOU TO DO YOUR WORK, TAKE CARE OF THINGS AT HOME, OR GET ALONG WITH OTHER PEOPLE: SOMEWHAT DIFFICULT
1. LITTLE INTEREST OR PLEASURE IN DOING THINGS: MORE THAN HALF THE DAYS
7. TROUBLE CONCENTRATING ON THINGS, SUCH AS READING THE NEWSPAPER OR WATCHING TELEVISION: NOT AT ALL
SUM OF ALL RESPONSES TO PHQ QUESTIONS 1-9: 11
SUM OF ALL RESPONSES TO PHQ9 QUESTIONS 1 & 2: 4
SUM OF ALL RESPONSES TO PHQ QUESTIONS 1-9: 11
8. MOVING OR SPEAKING SO SLOWLY THAT OTHER PEOPLE COULD HAVE NOTICED. OR THE OPPOSITE, BEING SO FIGETY OR RESTLESS THAT YOU HAVE BEEN MOVING AROUND A LOT MORE THAN USUAL: SEVERAL DAYS
10. IF YOU CHECKED OFF ANY PROBLEMS, HOW DIFFICULT HAVE THESE PROBLEMS MADE IT FOR YOU TO DO YOUR WORK, TAKE CARE OF THINGS AT HOME, OR GET ALONG WITH OTHER PEOPLE: SOMEWHAT DIFFICULT
6. FEELING BAD ABOUT YOURSELF - OR THAT YOU ARE A FAILURE OR HAVE LET YOURSELF OR YOUR FAMILY DOWN: NOT AT ALL
3. TROUBLE FALLING OR STAYING ASLEEP: NOT AT ALL
7. TROUBLE CONCENTRATING ON THINGS, SUCH AS READING THE NEWSPAPER OR WATCHING TELEVISION: NOT AT ALL
4. FEELING TIRED OR HAVING LITTLE ENERGY: NEARLY EVERY DAY
9. THOUGHTS THAT YOU WOULD BE BETTER OFF DEAD, OR OF HURTING YOURSELF: NOT AT ALL
3. TROUBLE FALLING OR STAYING ASLEEP: NOT AT ALL
SUM OF ALL RESPONSES TO PHQ QUESTIONS 1-9: 11
1. LITTLE INTEREST OR PLEASURE IN DOING THINGS: MORE THAN HALF THE DAYS
2. FEELING DOWN, DEPRESSED OR HOPELESS: MORE THAN HALF THE DAYS
9. THOUGHTS THAT YOU WOULD BE BETTER OFF DEAD, OR OF HURTING YOURSELF: NOT AT ALL
5. POOR APPETITE OR OVEREATING: NEARLY EVERY DAY
4. FEELING TIRED OR HAVING LITTLE ENERGY: NEARLY EVERY DAY
5. POOR APPETITE OR OVEREATING: NEARLY EVERY DAY
SUM OF ALL RESPONSES TO PHQ9 QUESTIONS 1 & 2: 4
6. FEELING BAD ABOUT YOURSELF - OR THAT YOU ARE A FAILURE OR HAVE LET YOURSELF OR YOUR FAMILY DOWN: NOT AT ALL
2. FEELING DOWN, DEPRESSED OR HOPELESS: MORE THAN HALF THE DAYS
SUM OF ALL RESPONSES TO PHQ QUESTIONS 1-9: 11
8. MOVING OR SPEAKING SO SLOWLY THAT OTHER PEOPLE COULD HAVE NOTICED. OR THE OPPOSITE - BEING SO FIDGETY OR RESTLESS THAT YOU HAVE BEEN MOVING AROUND A LOT MORE THAN USUAL: SEVERAL DAYS
SUM OF ALL RESPONSES TO PHQ QUESTIONS 1-9: 11

## 2024-09-03 ENCOUNTER — OFFICE VISIT (OUTPATIENT)
Dept: FAMILY MEDICINE CLINIC | Age: 89
End: 2024-09-03

## 2024-09-03 VITALS
DIASTOLIC BLOOD PRESSURE: 60 MMHG | RESPIRATION RATE: 22 BRPM | HEIGHT: 64 IN | SYSTOLIC BLOOD PRESSURE: 116 MMHG | HEART RATE: 68 BPM | BODY MASS INDEX: 29.04 KG/M2

## 2024-09-03 DIAGNOSIS — M46.1 OSTEOARTHRITIS OF RIGHT SACROILIAC JOINT (HCC): ICD-10-CM

## 2024-09-03 DIAGNOSIS — F32.A ANXIETY AND DEPRESSION: ICD-10-CM

## 2024-09-03 DIAGNOSIS — E44.0 MODERATE MALNUTRITION (HCC): Primary | Chronic | ICD-10-CM

## 2024-09-03 DIAGNOSIS — F41.9 ANXIETY AND DEPRESSION: ICD-10-CM

## 2024-09-03 DIAGNOSIS — I10 ESSENTIAL HYPERTENSION: ICD-10-CM

## 2024-09-03 DIAGNOSIS — K86.89 PANCREATIC INSUFFICIENCY: ICD-10-CM

## 2024-09-03 DIAGNOSIS — J84.10 PULMONARY FIBROSIS (HCC): ICD-10-CM

## 2024-09-03 RX ORDER — HYDROXYZINE HYDROCHLORIDE 10 MG/1
5 TABLET, FILM COATED ORAL EVERY 8 HOURS PRN
Qty: 30 TABLET | Refills: 0 | Status: SHIPPED | OUTPATIENT
Start: 2024-09-03 | End: 2024-09-13

## 2024-09-03 RX ORDER — SERTRALINE HYDROCHLORIDE 25 MG/1
12.5 TABLET, FILM COATED ORAL DAILY
Qty: 30 TABLET | Refills: 0 | Status: SHIPPED | OUTPATIENT
Start: 2024-09-03

## 2024-09-03 RX ORDER — FEXOFENADINE HYDROCHLORIDE 180 MG/1
180 TABLET, FILM COATED ORAL DAILY
COMMUNITY
Start: 2024-09-03

## 2024-09-03 ASSESSMENT — ENCOUNTER SYMPTOMS
SINUS PRESSURE: 0
BACK PAIN: 1
CONSTIPATION: 0
SHORTNESS OF BREATH: 1
RHINORRHEA: 1
COUGH: 1

## 2024-09-03 NOTE — PROGRESS NOTES
Sandra Benavides (:  1935) is a 89 y.o. female,Established patient, here for evaluation of the following chief complaint(s):  Hypertension and Hypothyroidism         Assessment & Plan  Moderate malnutrition (HCC)            Pancreatic insufficiency            Pulmonary fibrosis (HCC)            Anxiety and depression            Osteoarthritis of right sacroiliac joint (HCC)            Essential hypertension            Let us know if you do not hear from [employer] this week  Stop the loradidine and use the brand name allegra instead  Let us know if the Zenpep is too expensive and we can see about getting it from the company  See me in a month         Subjective   HPI  She is here with her son and we discussed how she becomes anxious in anticipation of having to exert even just to walk across the room  There is anxiety too the comes with just getting news of something or general infromation.  We dicussed the hydroxazine being too strong at even 12.5 mg. I told her we could try just 5 mg for breakthrough and low dose zoloft for daily use.  She used to have a rhythm with walking and breathing but has lost that  She had used [employer] PT for the above and would like to do that again.  We reviewed the homebound criteria and she meets that.  We discussed possible pulmonary rehab.  The zenpep worked well for the abd pains and she wanted more.  She has noticed worsening congestion in the head till mid morning. She will blow out thick creamy to clear mucus and some dried blood too  The SI joint trouble is still there and she is going to pain management soon  Review of Systems   Constitutional:  Positive for fatigue.   HENT:  Positive for nosebleeds and rhinorrhea. Negative for sinus pressure.    Eyes:  Negative for visual disturbance.   Respiratory:  Positive for cough and shortness of breath.    Cardiovascular:  Negative for chest pain.   Gastrointestinal:  Negative for constipation.

## 2024-09-03 NOTE — PATIENT INSTRUCTIONS
Let us know if you do not hear from [facility] this week  Stop the loradidine and use the brand name allegra instead  Let us know if the Zenpep is too expensive and we can see about getting it from the company  See me in a month

## 2024-09-04 ENCOUNTER — TELEPHONE (OUTPATIENT)
Dept: FAMILY MEDICINE CLINIC | Age: 89
End: 2024-09-04

## 2024-09-05 ENCOUNTER — OFFICE VISIT (OUTPATIENT)
Dept: PHYSICAL MEDICINE AND REHAB | Age: 89
End: 2024-09-05
Payer: MEDICARE

## 2024-09-05 VITALS
WEIGHT: 169 LBS | HEIGHT: 64 IN | SYSTOLIC BLOOD PRESSURE: 118 MMHG | BODY MASS INDEX: 28.85 KG/M2 | DIASTOLIC BLOOD PRESSURE: 78 MMHG

## 2024-09-05 DIAGNOSIS — M79.18 MYOFASCIAL PAIN: ICD-10-CM

## 2024-09-05 DIAGNOSIS — J47.9 BRONCHIECTASIS WITHOUT COMPLICATION (HCC): ICD-10-CM

## 2024-09-05 DIAGNOSIS — M51.36 DEGENERATIVE DISC DISEASE, LUMBAR: ICD-10-CM

## 2024-09-05 DIAGNOSIS — G89.4 CHRONIC PAIN SYNDROME: ICD-10-CM

## 2024-09-05 DIAGNOSIS — M47.816 LUMBAR SPONDYLOSIS: Primary | ICD-10-CM

## 2024-09-05 DIAGNOSIS — J84.10 PULMONARY FIBROSIS (HCC): ICD-10-CM

## 2024-09-05 PROCEDURE — G8417 CALC BMI ABV UP PARAM F/U: HCPCS | Performed by: ANESTHESIOLOGY

## 2024-09-05 PROCEDURE — 1090F PRES/ABSN URINE INCON ASSESS: CPT | Performed by: ANESTHESIOLOGY

## 2024-09-05 PROCEDURE — G8427 DOCREV CUR MEDS BY ELIG CLIN: HCPCS | Performed by: ANESTHESIOLOGY

## 2024-09-05 PROCEDURE — 99214 OFFICE O/P EST MOD 30 MIN: CPT | Performed by: ANESTHESIOLOGY

## 2024-09-05 PROCEDURE — 1123F ACP DISCUSS/DSCN MKR DOCD: CPT | Performed by: ANESTHESIOLOGY

## 2024-09-05 PROCEDURE — 1036F TOBACCO NON-USER: CPT | Performed by: ANESTHESIOLOGY

## 2024-09-05 RX ORDER — BUDESONIDE 0.5 MG/2ML
INHALANT ORAL
Qty: 60 EACH | Refills: 11 | Status: CANCELLED | OUTPATIENT
Start: 2024-09-05

## 2024-09-05 NOTE — TELEPHONE ENCOUNTER
Spoke with pharmacy and she has an RX there for the nasal spray which is what the patient is wanting.  She did refuse to pick it up due to cost.  Pharmacist said they have a [facility] brand over the counter for around $24.00 that she could purchase.    Spoke with Sandra and let her know and she will talk with [employer] regarding this.

## 2024-09-05 NOTE — TELEPHONE ENCOUNTER
Date of last visit:  9/3/2024  Date of next visit:  10/11/2024    Requested Prescriptions     Pending Prescriptions Disp Refills    budesonide (PULMICORT) 0.5 MG/2ML nebulizer suspension 60 each 11

## 2024-09-05 NOTE — PROGRESS NOTES
Functionality Assessment/Goals Worksheet     On a scale of 0 (Does not Interfere) to 10 (Completely Interferes)     1.  Which number describes how during the past week pain has interfered with           the following:  A.  General Activity:  3  B.  Mood: 2  C.  Walking Ability:  3  D.  Normal Work (Includes both work outside the home and housework):  0  E.  Relations with Other People:   3  F.  Sleep:   1  G.  Enjoyment of Life:   3    2.  Patient Prefers to Take their Pain Medications:     []  On a regular basis   [x]  Only when necessary    []  Does not take pain medications    3.  What are the Patient's Goals/Expectations for Visiting Pain Management?     []  Learn about my pain    [x]  Receive Medication   []  Physical Therapy     []  Treat Depression   []  Receive Injections    []  Treat Sleep   [x]  Deal with Anxiety and Stress   []  Treat Opoid Dependence/Addiction   []  Other:                                
sulfate HFA (PROVENTIL;VENTOLIN;PROAIR) 108 (90 Base) MCG/ACT inhaler 2 puffs, Inhalation, EVERY 4 HOURS PRN    Allegra Allergy 180 mg, Oral, DAILY    apixaban (ELIQUIS) 5 MG TABS tablet TAKE 1 TABLET BY MOUTH TWICE  DAILY    azithromycin (ZITHROMAX) 250 mg, Oral, DAILY    budesonide (PULMICORT) 0.5 MG/2ML nebulizer suspension USE 1 VIAL  IN  NEBULIZER TWICE  DAILY - rinse mouth after treatment    budesonide (RINOCORT AQUA) 32 MCG/ACT nasal spray 2 sprays, Each Nostril, DAILY    hydrOXYzine HCl (ATARAX) 5 mg, Oral, EVERY 8 HOURS PRN    Lactobacillus (PROBIOTIC ACIDOPHILUS) CAPS 1 capsule, Oral, DAILY    metoprolol tartrate (LOPRESSOR) 25 MG tablet TAKE ONE-HALF TABLET BY MOUTH  TWICE DAILY    Oxygen Concentrator She requires a high flow oxygen concentrator that can deliver up to 10 liters per minute. She should increase her baseline flow to 6 liters for shortness of breath.    Pancrelipase, Lip-Prot-Amyl, (ZENPEP) 63491-610550 units CPEP 80,000 Units, Oral, 3 TIMES DAILY WITH MEALS    predniSONE (DELTASONE) 10 mg, Oral, DAILY    sertraline (ZOLOFT) 12.5 mg, Oral, DAILY    simethicone (MYLICON) 80 mg, Oral, EVERY 6 HOURS PRN       Allergies   Allergen Reactions    Colchicine Diarrhea    Levaquin [Levofloxacin] Other (See Comments)     Hallucinations    Oxycodone     Percocet [Oxycodone-Acetaminophen] Nausea Only and Other (See Comments)     Affects memory    Rifampin Other (See Comments)     Lost half of vision    Sulfa Antibiotics Other (See Comments)     hallucinations    Cefuroxime Diarrhea, Nausea And Vomiting and Other (See Comments)     cramping       Review of Systems:   Constitutional: negative for weight changes or fevers  Genitourinary: negative for bowel/bladder incontinence   Musculoskeletal: positive for low back pain  Neurological: negative for any leg weakness or numbness/tingling  Behavioral/Psych: negative for anxiety/depression   All other systems reviewed and are negative    Objective:     Vitals:

## 2024-09-14 DIAGNOSIS — M11.20 PSEUDOGOUT: ICD-10-CM

## 2024-09-16 RX ORDER — PREDNISONE 10 MG/1
10 TABLET ORAL DAILY
Qty: 90 TABLET | Refills: 3 | Status: SHIPPED | OUTPATIENT
Start: 2024-09-16

## 2024-09-24 ENCOUNTER — TELEPHONE (OUTPATIENT)
Dept: FAMILY MEDICINE CLINIC | Age: 89
End: 2024-09-24

## 2024-09-24 NOTE — TELEPHONE ENCOUNTER
Nargis from [facility] called.  Oxygen today at rest waw 85% 4 1/2 liters.  When she stood she dropped down to 89% and heart rate went to 104.  Never was higher than 89% today at all.  Did increase O2 to 5lpm    She is wondering if the 02 needs to be increased, if so please contact Sandra.    Nargis can be reached at

## 2024-09-25 NOTE — TELEPHONE ENCOUNTER
Let Sandra know that I sent a message to Karen Green with the pulmonary office to see what they can do so that you're more comfortable.

## 2024-09-26 ENCOUNTER — TELEPHONE (OUTPATIENT)
Dept: PULMONOLOGY | Age: 89
End: 2024-09-26

## 2024-09-27 ENCOUNTER — APPOINTMENT (OUTPATIENT)
Dept: CT IMAGING | Age: 89
DRG: 196 | End: 2024-09-27
Payer: MEDICARE

## 2024-09-27 ENCOUNTER — APPOINTMENT (OUTPATIENT)
Age: 89
DRG: 196 | End: 2024-09-27
Attending: STUDENT IN AN ORGANIZED HEALTH CARE EDUCATION/TRAINING PROGRAM
Payer: MEDICARE

## 2024-09-27 ENCOUNTER — HOSPITAL ENCOUNTER (INPATIENT)
Age: 89
LOS: 10 days | Discharge: HOSPICE | DRG: 196 | End: 2024-10-07
Attending: EMERGENCY MEDICINE | Admitting: STUDENT IN AN ORGANIZED HEALTH CARE EDUCATION/TRAINING PROGRAM
Payer: MEDICARE

## 2024-09-27 ENCOUNTER — APPOINTMENT (OUTPATIENT)
Dept: GENERAL RADIOLOGY | Age: 89
DRG: 196 | End: 2024-09-27
Payer: MEDICARE

## 2024-09-27 DIAGNOSIS — R07.89 CHEST DISCOMFORT: ICD-10-CM

## 2024-09-27 DIAGNOSIS — J84.9 ILD (INTERSTITIAL LUNG DISEASE) (HCC): ICD-10-CM

## 2024-09-27 DIAGNOSIS — R06.02 SHORTNESS OF BREATH: Primary | ICD-10-CM

## 2024-09-27 DIAGNOSIS — J84.170 INTERSTITIAL LUNG DISEASE WITH PROGRESSIVE FIBROTIC PHENOTYPE IN DISEASES CLASSIFIED ELSEWHERE (HCC): ICD-10-CM

## 2024-09-27 PROBLEM — J96.01 ACUTE RESPIRATORY FAILURE WITH HYPOXIA: Status: ACTIVE | Noted: 2024-09-27

## 2024-09-27 LAB
ALBUMIN SERPL BCG-MCNC: 3.9 G/DL (ref 3.5–5.1)
ALP SERPL-CCNC: 57 U/L (ref 38–126)
ALT SERPL W/O P-5'-P-CCNC: 12 U/L (ref 11–66)
ANION GAP SERPL CALC-SCNC: 10 MEQ/L (ref 8–16)
AST SERPL-CCNC: 23 U/L (ref 5–40)
BASOPHILS ABSOLUTE: 0.1 THOU/MM3 (ref 0–0.1)
BASOPHILS NFR BLD AUTO: 0.5 %
BILIRUB CONJ SERPL-MCNC: 0.2 MG/DL (ref 0.1–13.8)
BILIRUB SERPL-MCNC: 0.5 MG/DL (ref 0.3–1.2)
BUN SERPL-MCNC: 12 MG/DL (ref 7–22)
CALCIUM SERPL-MCNC: 9.4 MG/DL (ref 8.5–10.5)
CHLORIDE SERPL-SCNC: 96 MEQ/L (ref 98–111)
CO2 SERPL-SCNC: 36 MEQ/L (ref 23–33)
CREAT SERPL-MCNC: 0.7 MG/DL (ref 0.4–1.2)
DEPRECATED RDW RBC AUTO: 42.5 FL (ref 35–45)
ECHO AV CUSP MM: 1.7 CM
ECHO AV PEAK GRADIENT: 10 MMHG
ECHO AV PEAK VELOCITY: 1.6 M/S
ECHO AV VELOCITY RATIO: 0.69
ECHO BSA: 1.81 M2
ECHO LA AREA 2C: 11 CM2
ECHO LA AREA 4C: 12.6 CM2
ECHO LA DIAMETER INDEX: 1.8 CM/M2
ECHO LA DIAMETER: 3.2 CM
ECHO LA MAJOR AXIS: 5 CM
ECHO LA MINOR AXIS: 4.1 CM
ECHO LA VOL BP: 27 ML (ref 22–52)
ECHO LA VOL MOD A2C: 24 ML (ref 22–52)
ECHO LA VOL MOD A4C: 26 ML (ref 22–52)
ECHO LA VOL/BSA BIPLANE: 15 ML/M2 (ref 16–34)
ECHO LA VOLUME INDEX MOD A2C: 13 ML/M2 (ref 16–34)
ECHO LA VOLUME INDEX MOD A4C: 15 ML/M2 (ref 16–34)
ECHO LV E' LATERAL VELOCITY: 4.6 CM/S
ECHO LV E' SEPTAL VELOCITY: 4.1 CM/S
ECHO LV EF PHYSICIAN: 50 %
ECHO LV FRACTIONAL SHORTENING: 29 % (ref 28–44)
ECHO LV INTERNAL DIMENSION DIASTOLE INDEX: 2.36 CM/M2
ECHO LV INTERNAL DIMENSION DIASTOLIC: 4.2 CM (ref 3.9–5.3)
ECHO LV INTERNAL DIMENSION SYSTOLIC INDEX: 1.69 CM/M2
ECHO LV INTERNAL DIMENSION SYSTOLIC: 3 CM
ECHO LV ISOVOLUMETRIC RELAXATION TIME (IVRT): 99 MS
ECHO LV IVSD: 0.7 CM (ref 0.6–0.9)
ECHO LV MASS 2D: 85.1 G (ref 67–162)
ECHO LV MASS INDEX 2D: 47.8 G/M2 (ref 43–95)
ECHO LV POSTERIOR WALL DIASTOLIC: 0.7 CM (ref 0.6–0.9)
ECHO LV RELATIVE WALL THICKNESS RATIO: 0.33
ECHO LVOT PEAK GRADIENT: 4 MMHG
ECHO LVOT PEAK VELOCITY: 1.1 M/S
ECHO MV A VELOCITY: 1.13 M/S
ECHO MV E DECELERATION TIME (DT): 375 MS
ECHO MV E VELOCITY: 0.63 M/S
ECHO MV E/A RATIO: 0.56
ECHO MV E/E' LATERAL: 13.7
ECHO MV E/E' RATIO (AVERAGED): 14.53
ECHO MV E/E' SEPTAL: 15.37
ECHO PULMONARY ARTERY SYSTOLIC PRESSURE (PASP): 65 MMHG
ECHO PV MAX VELOCITY: 1.1 M/S
ECHO PV PEAK GRADIENT: 5 MMHG
ECHO RV INTERNAL DIMENSION: 2.4 CM
ECHO RV TAPSE: 1.5 CM (ref 1.7–?)
ECHO TV E WAVE: 0.7 M/S
ECHO TV REGURGITANT MAX VELOCITY: 4.09 M/S
ECHO TV REGURGITANT PEAK GRADIENT: 67 MMHG
EKG ATRIAL RATE: 73 BPM
EKG ATRIAL RATE: 76 BPM
EKG P AXIS: 37 DEGREES
EKG P AXIS: 40 DEGREES
EKG P-R INTERVAL: 170 MS
EKG P-R INTERVAL: 192 MS
EKG Q-T INTERVAL: 392 MS
EKG Q-T INTERVAL: 394 MS
EKG QRS DURATION: 86 MS
EKG QRS DURATION: 92 MS
EKG QTC CALCULATION (BAZETT): 431 MS
EKG QTC CALCULATION (BAZETT): 443 MS
EKG R AXIS: 141 DEGREES
EKG R AXIS: 145 DEGREES
EKG T AXIS: -6 DEGREES
EKG T AXIS: 75 DEGREES
EKG VENTRICULAR RATE: 73 BPM
EKG VENTRICULAR RATE: 76 BPM
EOSINOPHIL NFR BLD AUTO: 2.5 %
EOSINOPHILS ABSOLUTE: 0.3 THOU/MM3 (ref 0–0.4)
ERYTHROCYTE [DISTWIDTH] IN BLOOD BY AUTOMATED COUNT: 13.2 % (ref 11.5–14.5)
FLUAV RNA RESP QL NAA+PROBE: NOT DETECTED
FLUBV RNA RESP QL NAA+PROBE: NOT DETECTED
GFR SERPL CREATININE-BSD FRML MDRD: 82 ML/MIN/1.73M2
GLUCOSE SERPL-MCNC: 117 MG/DL (ref 70–108)
HCT VFR BLD AUTO: 41.6 % (ref 37–47)
HGB BLD-MCNC: 12.8 GM/DL (ref 12–16)
IMM GRANULOCYTES # BLD AUTO: 0.08 THOU/MM3 (ref 0–0.07)
IMM GRANULOCYTES NFR BLD AUTO: 0.6 %
LYMPHOCYTES ABSOLUTE: 1.1 THOU/MM3 (ref 1–4.8)
LYMPHOCYTES NFR BLD AUTO: 8.5 %
MAGNESIUM SERPL-MCNC: 1.7 MG/DL (ref 1.6–2.4)
MCH RBC QN AUTO: 27.4 PG (ref 26–33)
MCHC RBC AUTO-ENTMCNC: 30.8 GM/DL (ref 32.2–35.5)
MCV RBC AUTO: 89.1 FL (ref 81–99)
MONOCYTES ABSOLUTE: 1.6 THOU/MM3 (ref 0.4–1.3)
MONOCYTES NFR BLD AUTO: 13 %
NEUTROPHILS ABSOLUTE: 9.4 THOU/MM3 (ref 1.8–7.7)
NEUTROPHILS NFR BLD AUTO: 74.9 %
NRBC BLD AUTO-RTO: 0 /100 WBC
NT-PROBNP SERPL IA-MCNC: 5271 PG/ML (ref 0–449)
OSMOLALITY SERPL CALC.SUM OF ELEC: 283.9 MOSMOL/KG (ref 275–300)
PLATELET # BLD AUTO: 236 THOU/MM3 (ref 130–400)
PMV BLD AUTO: 10.7 FL (ref 9.4–12.4)
POTASSIUM SERPL-SCNC: 3.5 MEQ/L (ref 3.5–5.2)
PROCALCITONIN SERPL IA-MCNC: 0.06 NG/ML (ref 0.01–0.09)
PROCALCITONIN SERPL IA-MCNC: 0.06 NG/ML (ref 0.01–0.09)
PROT SERPL-MCNC: 6.9 G/DL (ref 6.1–8)
RBC # BLD AUTO: 4.67 MILL/MM3 (ref 4.2–5.4)
SARS-COV-2 RNA RESP QL NAA+PROBE: NOT DETECTED
SODIUM SERPL-SCNC: 142 MEQ/L (ref 135–145)
TROPONIN, HIGH SENSITIVITY: 32 NG/L (ref 0–12)
TROPONIN, HIGH SENSITIVITY: 32 NG/L (ref 0–12)
TROPONIN, HIGH SENSITIVITY: 34 NG/L (ref 0–12)
WBC # BLD AUTO: 12.5 THOU/MM3 (ref 4.8–10.8)

## 2024-09-27 PROCEDURE — 83880 ASSAY OF NATRIURETIC PEPTIDE: CPT

## 2024-09-27 PROCEDURE — 36415 COLL VENOUS BLD VENIPUNCTURE: CPT

## 2024-09-27 PROCEDURE — 2700000000 HC OXYGEN THERAPY PER DAY

## 2024-09-27 PROCEDURE — 87636 SARSCOV2 & INF A&B AMP PRB: CPT

## 2024-09-27 PROCEDURE — 6360000002 HC RX W HCPCS: Performed by: STUDENT IN AN ORGANIZED HEALTH CARE EDUCATION/TRAINING PROGRAM

## 2024-09-27 PROCEDURE — 96374 THER/PROPH/DIAG INJ IV PUSH: CPT

## 2024-09-27 PROCEDURE — 6370000000 HC RX 637 (ALT 250 FOR IP): Performed by: STUDENT IN AN ORGANIZED HEALTH CARE EDUCATION/TRAINING PROGRAM

## 2024-09-27 PROCEDURE — 84484 ASSAY OF TROPONIN QUANT: CPT

## 2024-09-27 PROCEDURE — 93005 ELECTROCARDIOGRAM TRACING: CPT | Performed by: EMERGENCY MEDICINE

## 2024-09-27 PROCEDURE — 99223 1ST HOSP IP/OBS HIGH 75: CPT | Performed by: STUDENT IN AN ORGANIZED HEALTH CARE EDUCATION/TRAINING PROGRAM

## 2024-09-27 PROCEDURE — 99285 EMERGENCY DEPT VISIT HI MDM: CPT

## 2024-09-27 PROCEDURE — 93005 ELECTROCARDIOGRAM TRACING: CPT | Performed by: STUDENT IN AN ORGANIZED HEALTH CARE EDUCATION/TRAINING PROGRAM

## 2024-09-27 PROCEDURE — 2580000003 HC RX 258: Performed by: STUDENT IN AN ORGANIZED HEALTH CARE EDUCATION/TRAINING PROGRAM

## 2024-09-27 PROCEDURE — 93010 ELECTROCARDIOGRAM REPORT: CPT | Performed by: NUCLEAR MEDICINE

## 2024-09-27 PROCEDURE — 93306 TTE W/DOPPLER COMPLETE: CPT

## 2024-09-27 PROCEDURE — 87641 MR-STAPH DNA AMP PROBE: CPT

## 2024-09-27 PROCEDURE — 84145 PROCALCITONIN (PCT): CPT

## 2024-09-27 PROCEDURE — 2580000003 HC RX 258

## 2024-09-27 PROCEDURE — 94640 AIRWAY INHALATION TREATMENT: CPT

## 2024-09-27 PROCEDURE — 83735 ASSAY OF MAGNESIUM: CPT

## 2024-09-27 PROCEDURE — 93306 TTE W/DOPPLER COMPLETE: CPT | Performed by: NUCLEAR MEDICINE

## 2024-09-27 PROCEDURE — 6360000004 HC RX CONTRAST MEDICATION: Performed by: EMERGENCY MEDICINE

## 2024-09-27 PROCEDURE — 80053 COMPREHEN METABOLIC PANEL: CPT

## 2024-09-27 PROCEDURE — 6360000002 HC RX W HCPCS

## 2024-09-27 PROCEDURE — 94761 N-INVAS EAR/PLS OXIMETRY MLT: CPT

## 2024-09-27 PROCEDURE — 85025 COMPLETE CBC W/AUTO DIFF WBC: CPT

## 2024-09-27 PROCEDURE — 2140000000 HC CCU INTERMEDIATE R&B

## 2024-09-27 PROCEDURE — 71275 CT ANGIOGRAPHY CHEST: CPT

## 2024-09-27 PROCEDURE — 71045 X-RAY EXAM CHEST 1 VIEW: CPT

## 2024-09-27 PROCEDURE — 82248 BILIRUBIN DIRECT: CPT

## 2024-09-27 PROCEDURE — 5A0945A ASSISTANCE WITH RESPIRATORY VENTILATION, 24-96 CONSECUTIVE HOURS, HIGH NASAL FLOW/VELOCITY: ICD-10-PCS | Performed by: INTERNAL MEDICINE

## 2024-09-27 RX ORDER — SODIUM CHLORIDE 0.9 % (FLUSH) 0.9 %
5-40 SYRINGE (ML) INJECTION EVERY 12 HOURS SCHEDULED
Status: DISCONTINUED | OUTPATIENT
Start: 2024-09-27 | End: 2024-10-07 | Stop reason: HOSPADM

## 2024-09-27 RX ORDER — HYDROXYZINE HYDROCHLORIDE 10 MG/1
5 TABLET, FILM COATED ORAL EVERY 8 HOURS PRN
Status: DISCONTINUED | OUTPATIENT
Start: 2024-09-27 | End: 2024-10-07 | Stop reason: HOSPADM

## 2024-09-27 RX ORDER — ACETAMINOPHEN 325 MG/1
650 TABLET ORAL EVERY 6 HOURS PRN
Status: DISCONTINUED | OUTPATIENT
Start: 2024-09-27 | End: 2024-10-07 | Stop reason: HOSPADM

## 2024-09-27 RX ORDER — LACTOBACILLUS RHAMNOSUS GG 10B CELL
1 CAPSULE ORAL DAILY
Status: DISCONTINUED | OUTPATIENT
Start: 2024-09-28 | End: 2024-10-07 | Stop reason: HOSPADM

## 2024-09-27 RX ORDER — IPRATROPIUM BROMIDE AND ALBUTEROL SULFATE 2.5; .5 MG/3ML; MG/3ML
1 SOLUTION RESPIRATORY (INHALATION) EVERY 4 HOURS PRN
Status: DISCONTINUED | OUTPATIENT
Start: 2024-09-27 | End: 2024-09-27

## 2024-09-27 RX ORDER — SERTRALINE HYDROCHLORIDE 25 MG/1
12.5 TABLET, FILM COATED ORAL DAILY
Status: DISCONTINUED | OUTPATIENT
Start: 2024-09-28 | End: 2024-10-07 | Stop reason: HOSPADM

## 2024-09-27 RX ORDER — ACETAMINOPHEN 650 MG/1
650 SUPPOSITORY RECTAL EVERY 6 HOURS PRN
Status: DISCONTINUED | OUTPATIENT
Start: 2024-09-27 | End: 2024-10-07 | Stop reason: HOSPADM

## 2024-09-27 RX ORDER — SODIUM CHLORIDE 9 MG/ML
INJECTION, SOLUTION INTRAVENOUS PRN
Status: DISCONTINUED | OUTPATIENT
Start: 2024-09-27 | End: 2024-10-07 | Stop reason: HOSPADM

## 2024-09-27 RX ORDER — GUAIFENESIN/DEXTROMETHORPHAN 100-10MG/5
5 SYRUP ORAL EVERY 4 HOURS PRN
Status: DISCONTINUED | OUTPATIENT
Start: 2024-09-27 | End: 2024-10-07 | Stop reason: HOSPADM

## 2024-09-27 RX ORDER — IPRATROPIUM BROMIDE AND ALBUTEROL SULFATE 2.5; .5 MG/3ML; MG/3ML
1 SOLUTION RESPIRATORY (INHALATION) 2 TIMES DAILY
Status: DISCONTINUED | OUTPATIENT
Start: 2024-09-28 | End: 2024-09-30

## 2024-09-27 RX ORDER — HYDROXYZINE HYDROCHLORIDE 25 MG/1
25 TABLET, FILM COATED ORAL 3 TIMES DAILY PRN
Status: ON HOLD | COMMUNITY
End: 2024-10-07 | Stop reason: HOSPADM

## 2024-09-27 RX ORDER — CETIRIZINE HYDROCHLORIDE 5 MG/1
5 TABLET ORAL DAILY PRN
Status: DISCONTINUED | OUTPATIENT
Start: 2024-09-27 | End: 2024-10-07 | Stop reason: HOSPADM

## 2024-09-27 RX ORDER — POTASSIUM CHLORIDE 7.45 MG/ML
10 INJECTION INTRAVENOUS PRN
Status: DISCONTINUED | OUTPATIENT
Start: 2024-09-27 | End: 2024-10-07 | Stop reason: HOSPADM

## 2024-09-27 RX ORDER — ALBUTEROL SULFATE 90 UG/1
2 INHALANT RESPIRATORY (INHALATION) EVERY 4 HOURS PRN
Status: DISCONTINUED | OUTPATIENT
Start: 2024-09-27 | End: 2024-10-07 | Stop reason: HOSPADM

## 2024-09-27 RX ORDER — ONDANSETRON 4 MG/1
4 TABLET, ORALLY DISINTEGRATING ORAL EVERY 8 HOURS PRN
Status: DISCONTINUED | OUTPATIENT
Start: 2024-09-27 | End: 2024-10-07 | Stop reason: HOSPADM

## 2024-09-27 RX ORDER — POTASSIUM CHLORIDE 1500 MG/1
40 TABLET, EXTENDED RELEASE ORAL PRN
Status: DISCONTINUED | OUTPATIENT
Start: 2024-09-27 | End: 2024-10-07 | Stop reason: HOSPADM

## 2024-09-27 RX ORDER — ONDANSETRON 2 MG/ML
4 INJECTION INTRAMUSCULAR; INTRAVENOUS EVERY 6 HOURS PRN
Status: DISCONTINUED | OUTPATIENT
Start: 2024-09-27 | End: 2024-10-07 | Stop reason: HOSPADM

## 2024-09-27 RX ORDER — IOPAMIDOL 755 MG/ML
80 INJECTION, SOLUTION INTRAVASCULAR
Status: COMPLETED | OUTPATIENT
Start: 2024-09-27 | End: 2024-09-27

## 2024-09-27 RX ORDER — POLYETHYLENE GLYCOL 3350 17 G/17G
17 POWDER, FOR SOLUTION ORAL DAILY PRN
Status: DISCONTINUED | OUTPATIENT
Start: 2024-09-27 | End: 2024-10-07 | Stop reason: HOSPADM

## 2024-09-27 RX ORDER — MAGNESIUM SULFATE IN WATER 40 MG/ML
2000 INJECTION, SOLUTION INTRAVENOUS PRN
Status: DISCONTINUED | OUTPATIENT
Start: 2024-09-27 | End: 2024-10-07 | Stop reason: HOSPADM

## 2024-09-27 RX ORDER — SODIUM CHLORIDE 0.9 % (FLUSH) 0.9 %
5-40 SYRINGE (ML) INJECTION PRN
Status: DISCONTINUED | OUTPATIENT
Start: 2024-09-27 | End: 2024-10-07 | Stop reason: HOSPADM

## 2024-09-27 RX ORDER — SIMETHICONE 80 MG
80 TABLET,CHEWABLE ORAL EVERY 6 HOURS PRN
Status: DISCONTINUED | OUTPATIENT
Start: 2024-09-27 | End: 2024-10-07 | Stop reason: HOSPADM

## 2024-09-27 RX ORDER — AZITHROMYCIN 250 MG/1
250 TABLET, FILM COATED ORAL DAILY
Status: DISCONTINUED | OUTPATIENT
Start: 2024-09-28 | End: 2024-10-07 | Stop reason: HOSPADM

## 2024-09-27 RX ORDER — BUDESONIDE 0.5 MG/2ML
1 INHALANT ORAL
Status: DISCONTINUED | OUTPATIENT
Start: 2024-09-27 | End: 2024-10-07 | Stop reason: HOSPADM

## 2024-09-27 RX ORDER — METOPROLOL TARTRATE 25 MG/1
12.5 TABLET, FILM COATED ORAL 2 TIMES DAILY
Status: DISCONTINUED | OUTPATIENT
Start: 2024-09-27 | End: 2024-10-07 | Stop reason: HOSPADM

## 2024-09-27 RX ADMIN — METOPROLOL TARTRATE 12.5 MG: 25 TABLET, FILM COATED ORAL at 20:47

## 2024-09-27 RX ADMIN — APIXABAN 5 MG: 5 TABLET, FILM COATED ORAL at 20:47

## 2024-09-27 RX ADMIN — PIPERACILLIN AND TAZOBACTAM 4500 MG: 4; .5 INJECTION, POWDER, FOR SOLUTION INTRAVENOUS at 16:27

## 2024-09-27 RX ADMIN — PIPERACILLIN AND TAZOBACTAM 4500 MG: 4; .5 INJECTION, POWDER, FOR SOLUTION INTRAVENOUS at 20:56

## 2024-09-27 RX ADMIN — SODIUM CHLORIDE, PRESERVATIVE FREE 10 ML: 5 INJECTION INTRAVENOUS at 20:48

## 2024-09-27 RX ADMIN — IOPAMIDOL 80 ML: 755 INJECTION, SOLUTION INTRAVENOUS at 11:38

## 2024-09-27 RX ADMIN — WATER 40 MG: 1 INJECTION INTRAMUSCULAR; INTRAVENOUS; SUBCUTANEOUS at 11:40

## 2024-09-27 RX ADMIN — BUDESONIDE 1000 MCG: 0.5 INHALANT RESPIRATORY (INHALATION) at 22:16

## 2024-09-27 ASSESSMENT — PAIN - FUNCTIONAL ASSESSMENT
PAIN_FUNCTIONAL_ASSESSMENT: NONE - DENIES PAIN

## 2024-09-27 NOTE — ED NOTES
Pt resting on cot engaging with conversation without any difficulties. Admitting provider at bedside.

## 2024-09-27 NOTE — ED NOTES
Pt presents to the ED with complaints of SOB. Pt reports she has been SOB for over a week. Pt wears 4.5 L at baseline but has been increasing to 5.5. Pt arrived to the ED with pulse ox of 65% on baseline 4.5 L NC. Pt arrived to the ED with pursed lips. Pulse ox increased to 85% on 6 L NC. Pt respirations are even but labored. Pt  reports she has a cough in the morning when she first wakes up.

## 2024-09-27 NOTE — PROCEDURES
PROCEDURE NOTE  Date: 9/27/2024   Name: Sandra Benavides  YOB: 1935    Procedures    12 lead EKG completed. Results handed to RN

## 2024-09-27 NOTE — ED PROVIDER NOTES
[facility] EMERGENCY DEPT  EMERGENCY DEPARTMENT ENCOUNTER          Pt Name: Sandra Benavides  MRN: [id number]  Birthdate 1935  Date of evaluation: 9/27/2024  Physician: Flynn Randall MD  Supervising Attending Physician: Marco Antonio Gaitan DO       CHIEF COMPLAINT       Chief Complaint   Patient presents with    Shortness of Breath         HISTORY OF PRESENT ILLNESS    HPI  Sandra Benavides is a 89 y.o. female who presents to the emergency department from home, by private vehicle for evaluation of shortness of breath.  The patient is present in the room with her daughter.  The patient reports that she has had shortness of breath that started in July.  Of note, the patient does have a history of pulmonary fibrosis and sees Dr. Karen Green.  The patient takes daily azithromycin, albuterol, prednisone, and ProAir.  The patient is on 4 and half liters of home oxygen.  The patient reports that the shortness of breath started in July, but has been progressively getting worse over the past week.  During her acute episode of shortness of breath, the patient daughter reports that the patient is tachypneic and anxious.  Spotcheck of pulse oximetry at home revealed a lowest value of 55% prior to arrival to the ED today.  In addition, the patient has had decreased activity tolerance, with the daughter stating that the patient was unable to get out of the wheelchair today.  The patient also has a history of atrial fibrillation, is a patient of Dr. Dixon, and is on Eliquis and metoprolol for this condition.  The patient denies any sick contacts, chest pain, fevers, or chills.  The patient denies any recent travel, any swelling of the bilateral lower extremities, and states that she has never had a DVT in the past.  The patient has no other acute complaints at this time.      REVIEW OF SYSTEMS   Review of Systems      PAST MEDICAL AND SURGICAL HISTORY     Past Medical History:   Diagnosis Date    Anemia

## 2024-09-27 NOTE — ED NOTES
ED to inpatient nurses report      Chief Complaint:  Chief Complaint   Patient presents with    Shortness of Breath     Present to ED from: home    MOA:     LOC: alert and orientated to name, place, date  Mobility: Requires assistance * 1  Oxygen Baseline: 4.5 L NC    Current needs required: HHF     Code Status:   Prior    What abnormal results were found and what did you give/do to treat them? steroid  Any procedures or intervention occur? none    Mental Status:  Level of Consciousness: Alert (0)    Psych Assessment:        Vitals:  Patient Vitals for the past 24 hrs:   BP Temp Temp src Pulse Resp SpO2 Height Weight   09/27/24 1248 (!) 167/61 -- -- 60 22 100 % -- --   09/27/24 1216 -- -- -- 61 18 99 % -- --   09/27/24 1142 (!) 158/84 -- -- 65 24 100 % -- --   09/27/24 1108 (!) 144/71 -- -- 67 23 99 % -- --   09/27/24 1036 130/71 -- -- 67 24 100 % -- --   09/27/24 1033 -- -- -- 58 24 100 % -- --   09/27/24 1013 -- -- -- -- -- 100 % -- --   09/27/24 1010 (!) 149/67 97.7 °F (36.5 °C) Oral 64 26 (!) 85 % 1.626 m (5' 4\") 72.6 kg (160 lb)   09/27/24 1005 -- -- -- -- -- (!) 65 % -- --        LDAs:   Peripheral IV 09/27/24 Right Antecubital (Active)   Site Assessment Clean, dry & intact 09/27/24 1248   Line Status Flushed;Blood return noted;Specimen collected 09/27/24 1019       Ambulatory Status:  No data recorded    Diagnosis:  DISPOSITION Decision To Admit 09/27/2024 12:39:23 PM   Final diagnoses:   Shortness of breath        Consults:  None     Pain Score:  Pain Assessment  Pain Assessment: None - Denies Pain    C-SSRS:        Sepsis Screening:       Roberto Fall Risk:       Swallow Screening        Preferred Language:   English      ALLERGIES     Colchicine, Levaquin [levofloxacin], Oxycodone, Percocet [oxycodone-acetaminophen], Rifampin, Sulfa antibiotics, and Cefuroxime    SURGICAL HISTORY       Past Surgical History:   Procedure Laterality Date    ABDOMINAL EXPLORATION SURGERY  01/02/2013    Release of [place]

## 2024-09-27 NOTE — ED NOTES
This RN in to round. RR regular and unlabored. Pt remains alert and oriented. Call light in reach. She is updated on her room assignment. Voices no further needs at this time.

## 2024-09-28 LAB
ANION GAP SERPL CALC-SCNC: 10 MEQ/L (ref 8–16)
BASOPHILS ABSOLUTE: 0 THOU/MM3 (ref 0–0.1)
BASOPHILS NFR BLD AUTO: 0.3 %
BUN SERPL-MCNC: 13 MG/DL (ref 7–22)
CALCIUM SERPL-MCNC: 8.8 MG/DL (ref 8.5–10.5)
CHLORIDE SERPL-SCNC: 99 MEQ/L (ref 98–111)
CO2 SERPL-SCNC: 33 MEQ/L (ref 23–33)
CREAT SERPL-MCNC: 0.7 MG/DL (ref 0.4–1.2)
DEPRECATED RDW RBC AUTO: 42.8 FL (ref 35–45)
EOSINOPHIL NFR BLD AUTO: 0.3 %
EOSINOPHILS ABSOLUTE: 0 THOU/MM3 (ref 0–0.4)
ERYTHROCYTE [DISTWIDTH] IN BLOOD BY AUTOMATED COUNT: 13.1 % (ref 11.5–14.5)
GFR SERPL CREATININE-BSD FRML MDRD: 82 ML/MIN/1.73M2
GLUCOSE SERPL-MCNC: 105 MG/DL (ref 70–108)
HCT VFR BLD AUTO: 37.3 % (ref 37–47)
HGB BLD-MCNC: 11.5 GM/DL (ref 12–16)
IMM GRANULOCYTES # BLD AUTO: 0.06 THOU/MM3 (ref 0–0.07)
IMM GRANULOCYTES NFR BLD AUTO: 1 %
LYMPHOCYTES ABSOLUTE: 0.7 THOU/MM3 (ref 1–4.8)
LYMPHOCYTES NFR BLD AUTO: 11.3 %
MCH RBC QN AUTO: 27.6 PG (ref 26–33)
MCHC RBC AUTO-ENTMCNC: 30.8 GM/DL (ref 32.2–35.5)
MCV RBC AUTO: 89.4 FL (ref 81–99)
MONOCYTES ABSOLUTE: 0.8 THOU/MM3 (ref 0.4–1.3)
MONOCYTES NFR BLD AUTO: 13.4 %
MRSA DNA SPEC QL NAA+PROBE: NEGATIVE
NEUTROPHILS ABSOLUTE: 4.5 THOU/MM3 (ref 1.8–7.7)
NEUTROPHILS NFR BLD AUTO: 73.7 %
NRBC BLD AUTO-RTO: 0 /100 WBC
PLATELET # BLD AUTO: 185 THOU/MM3 (ref 130–400)
PMV BLD AUTO: 10.8 FL (ref 9.4–12.4)
POTASSIUM SERPL-SCNC: 3.6 MEQ/L (ref 3.5–5.2)
RBC # BLD AUTO: 4.17 MILL/MM3 (ref 4.2–5.4)
SODIUM SERPL-SCNC: 142 MEQ/L (ref 135–145)
WBC # BLD AUTO: 6.1 THOU/MM3 (ref 4.8–10.8)

## 2024-09-28 PROCEDURE — 6360000002 HC RX W HCPCS: Performed by: STUDENT IN AN ORGANIZED HEALTH CARE EDUCATION/TRAINING PROGRAM

## 2024-09-28 PROCEDURE — 2140000000 HC CCU INTERMEDIATE R&B

## 2024-09-28 PROCEDURE — 94761 N-INVAS EAR/PLS OXIMETRY MLT: CPT

## 2024-09-28 PROCEDURE — 94669 MECHANICAL CHEST WALL OSCILL: CPT

## 2024-09-28 PROCEDURE — 99223 1ST HOSP IP/OBS HIGH 75: CPT | Performed by: INTERNAL MEDICINE

## 2024-09-28 PROCEDURE — 2700000000 HC OXYGEN THERAPY PER DAY

## 2024-09-28 PROCEDURE — 6370000000 HC RX 637 (ALT 250 FOR IP): Performed by: STUDENT IN AN ORGANIZED HEALTH CARE EDUCATION/TRAINING PROGRAM

## 2024-09-28 PROCEDURE — 2580000003 HC RX 258: Performed by: STUDENT IN AN ORGANIZED HEALTH CARE EDUCATION/TRAINING PROGRAM

## 2024-09-28 PROCEDURE — 85025 COMPLETE CBC W/AUTO DIFF WBC: CPT

## 2024-09-28 PROCEDURE — 94640 AIRWAY INHALATION TREATMENT: CPT

## 2024-09-28 PROCEDURE — 36415 COLL VENOUS BLD VENIPUNCTURE: CPT

## 2024-09-28 PROCEDURE — 80048 BASIC METABOLIC PNL TOTAL CA: CPT

## 2024-09-28 PROCEDURE — 99233 SBSQ HOSP IP/OBS HIGH 50: CPT | Performed by: INTERNAL MEDICINE

## 2024-09-28 RX ADMIN — BUDESONIDE 1000 MCG: 0.5 INHALANT RESPIRATORY (INHALATION) at 16:22

## 2024-09-28 RX ADMIN — WATER 40 MG: 1 INJECTION INTRAMUSCULAR; INTRAVENOUS; SUBCUTANEOUS at 08:59

## 2024-09-28 RX ADMIN — AZITHROMYCIN DIHYDRATE 250 MG: 250 TABLET ORAL at 08:58

## 2024-09-28 RX ADMIN — IPRATROPIUM BROMIDE AND ALBUTEROL SULFATE 1 DOSE: .5; 3 SOLUTION RESPIRATORY (INHALATION) at 16:17

## 2024-09-28 RX ADMIN — BUDESONIDE 1000 MCG: 0.5 INHALANT RESPIRATORY (INHALATION) at 08:32

## 2024-09-28 RX ADMIN — METOPROLOL TARTRATE 12.5 MG: 25 TABLET, FILM COATED ORAL at 08:59

## 2024-09-28 RX ADMIN — SERTRALINE 12.5 MG: 25 TABLET, FILM COATED ORAL at 08:59

## 2024-09-28 RX ADMIN — PIPERACILLIN AND TAZOBACTAM 4500 MG: 4; .5 INJECTION, POWDER, FOR SOLUTION INTRAVENOUS at 06:02

## 2024-09-28 RX ADMIN — APIXABAN 5 MG: 5 TABLET, FILM COATED ORAL at 08:59

## 2024-09-28 RX ADMIN — SODIUM CHLORIDE, PRESERVATIVE FREE 10 ML: 5 INJECTION INTRAVENOUS at 08:59

## 2024-09-28 RX ADMIN — ACETAMINOPHEN 650 MG: 325 TABLET ORAL at 20:22

## 2024-09-28 RX ADMIN — APIXABAN 5 MG: 5 TABLET, FILM COATED ORAL at 20:22

## 2024-09-28 RX ADMIN — SODIUM CHLORIDE, PRESERVATIVE FREE 10 ML: 5 INJECTION INTRAVENOUS at 20:22

## 2024-09-28 RX ADMIN — Medication 1 CAPSULE: at 08:58

## 2024-09-28 RX ADMIN — METOPROLOL TARTRATE 12.5 MG: 25 TABLET, FILM COATED ORAL at 20:21

## 2024-09-28 RX ADMIN — PIPERACILLIN AND TAZOBACTAM 4500 MG: 4; .5 INJECTION, POWDER, FOR SOLUTION INTRAVENOUS at 11:46

## 2024-09-28 RX ADMIN — IPRATROPIUM BROMIDE AND ALBUTEROL SULFATE 1 DOSE: .5; 3 SOLUTION RESPIRATORY (INHALATION) at 08:32

## 2024-09-28 RX ADMIN — PIPERACILLIN AND TAZOBACTAM 4500 MG: 4; .5 INJECTION, POWDER, FOR SOLUTION INTRAVENOUS at 20:27

## 2024-09-28 NOTE — CONSULTS
40 mg IntraVENous Daily    piperacillin-tazobactam  4,500 mg IntraVENous Q8H    ipratropium 0.5 mg-albuterol 2.5 mg  1 Dose Inhalation BID     albuterol sulfate HFA, hydrOXYzine HCl, simethicone, sodium chloride flush, sodium chloride, potassium chloride **OR** potassium alternative oral replacement **OR** potassium chloride, magnesium sulfate, ondansetron **OR** ondansetron, polyethylene glycol, acetaminophen **OR** acetaminophen, cetirizine, guaiFENesin-dextromethorphan  IV Drips/Infusions   sodium chloride       Vitals    Vitals    height is 1.626 m (5' 4\") and weight is 72.6 kg (160 lb). Her oral temperature is 98.5 °F (36.9 °C). Her blood pressure is 122/68 and her pulse is 74. Her respiration is 22 and oxygen saturation is 91%.     O2 Flow Rate (L/min): 50 L/min (decreased)  I/O    Intake/Output Summary (Last 24 hours) at 9/28/2024 1248  Last data filed at 9/28/2024 1153  Gross per 24 hour   Intake 265.23 ml   Output 400 ml   Net -134.77 ml     Patient Vitals for the past 96 hrs (Last 3 readings):   Weight   09/27/24 1010 72.6 kg (160 lb)     Exam   Constitutional: Patient appears moderately built and moderately nourished.   Head: Normocephalic and atraumatic.   Mouth/Throat: Oropharynx is clear and moist.  No oral thrush.   Eyes: Conjunctivae are normal. Pupils are equal, round, and reactive to light. No scleral icterus.   Neck: Neck supple. No JVD or tracheal deviation present.   Cardiovascular: Regular rate, regular rhythm, S1 and S2 with no murmur.  No peripheral edema  Pulmonary/Chest: Diminished breath sounds bilateral    Abdominal: Soft. Bowel sounds audible. No distension or tenderness to palp  Musculoskeletal: Moves all extremities  Lymphadenopathy:  No cervical adenopathy.   Neurological: Patient is alert and oriented to person, place, and time.   Skin: Skin is warm and dry.      Labs   ABG  Lab Results   Component Value Date/Time    PH 7.44 12/14/2022 10:41 AM    PO2 120 12/14/2022 10:41 AM    PCO2

## 2024-09-29 LAB
ANION GAP SERPL CALC-SCNC: 12 MEQ/L (ref 8–16)
BASOPHILS ABSOLUTE: 0 THOU/MM3 (ref 0–0.1)
BASOPHILS NFR BLD AUTO: 0.2 %
BUN SERPL-MCNC: 16 MG/DL (ref 7–22)
CALCIUM SERPL-MCNC: 8.6 MG/DL (ref 8.5–10.5)
CHLORIDE SERPL-SCNC: 99 MEQ/L (ref 98–111)
CO2 SERPL-SCNC: 32 MEQ/L (ref 23–33)
CREAT SERPL-MCNC: 0.8 MG/DL (ref 0.4–1.2)
DEPRECATED RDW RBC AUTO: 43.7 FL (ref 35–45)
EKG ATRIAL RATE: 47 BPM
EKG P AXIS: 56 DEGREES
EKG P-R INTERVAL: 196 MS
EKG Q-T INTERVAL: 476 MS
EKG QRS DURATION: 88 MS
EKG QTC CALCULATION (BAZETT): 421 MS
EKG R AXIS: 109 DEGREES
EKG T AXIS: -53 DEGREES
EKG VENTRICULAR RATE: 47 BPM
EOSINOPHIL NFR BLD AUTO: 1 %
EOSINOPHILS ABSOLUTE: 0.1 THOU/MM3 (ref 0–0.4)
ERYTHROCYTE [DISTWIDTH] IN BLOOD BY AUTOMATED COUNT: 13.1 % (ref 11.5–14.5)
GFR SERPL CREATININE-BSD FRML MDRD: 70 ML/MIN/1.73M2
GLUCOSE SERPL-MCNC: 94 MG/DL (ref 70–108)
HCT VFR BLD AUTO: 36.8 % (ref 37–47)
HGB BLD-MCNC: 11.1 GM/DL (ref 12–16)
IMM GRANULOCYTES # BLD AUTO: 0.09 THOU/MM3 (ref 0–0.07)
IMM GRANULOCYTES NFR BLD AUTO: 1 %
LYMPHOCYTES ABSOLUTE: 1.1 THOU/MM3 (ref 1–4.8)
LYMPHOCYTES NFR BLD AUTO: 12.7 %
MCH RBC QN AUTO: 27.5 PG (ref 26–33)
MCHC RBC AUTO-ENTMCNC: 30.2 GM/DL (ref 32.2–35.5)
MCV RBC AUTO: 91.3 FL (ref 81–99)
MONOCYTES ABSOLUTE: 1.1 THOU/MM3 (ref 0.4–1.3)
MONOCYTES NFR BLD AUTO: 12.4 %
NEUTROPHILS ABSOLUTE: 6.3 THOU/MM3 (ref 1.8–7.7)
NEUTROPHILS NFR BLD AUTO: 72.7 %
NRBC BLD AUTO-RTO: 0 /100 WBC
PLATELET # BLD AUTO: 191 THOU/MM3 (ref 130–400)
PMV BLD AUTO: 10.9 FL (ref 9.4–12.4)
POTASSIUM SERPL-SCNC: 3.4 MEQ/L (ref 3.5–5.2)
RBC # BLD AUTO: 4.03 MILL/MM3 (ref 4.2–5.4)
SODIUM SERPL-SCNC: 143 MEQ/L (ref 135–145)
TROPONIN, HIGH SENSITIVITY: 34 NG/L (ref 0–12)
WBC # BLD AUTO: 8.7 THOU/MM3 (ref 4.8–10.8)

## 2024-09-29 PROCEDURE — 2580000003 HC RX 258: Performed by: STUDENT IN AN ORGANIZED HEALTH CARE EDUCATION/TRAINING PROGRAM

## 2024-09-29 PROCEDURE — 6360000002 HC RX W HCPCS: Performed by: STUDENT IN AN ORGANIZED HEALTH CARE EDUCATION/TRAINING PROGRAM

## 2024-09-29 PROCEDURE — 36415 COLL VENOUS BLD VENIPUNCTURE: CPT

## 2024-09-29 PROCEDURE — 93010 ELECTROCARDIOGRAM REPORT: CPT | Performed by: NUCLEAR MEDICINE

## 2024-09-29 PROCEDURE — 84484 ASSAY OF TROPONIN QUANT: CPT

## 2024-09-29 PROCEDURE — 2700000000 HC OXYGEN THERAPY PER DAY

## 2024-09-29 PROCEDURE — 99233 SBSQ HOSP IP/OBS HIGH 50: CPT | Performed by: FAMILY MEDICINE

## 2024-09-29 PROCEDURE — 99233 SBSQ HOSP IP/OBS HIGH 50: CPT | Performed by: INTERNAL MEDICINE

## 2024-09-29 PROCEDURE — 85025 COMPLETE CBC W/AUTO DIFF WBC: CPT

## 2024-09-29 PROCEDURE — 6370000000 HC RX 637 (ALT 250 FOR IP): Performed by: STUDENT IN AN ORGANIZED HEALTH CARE EDUCATION/TRAINING PROGRAM

## 2024-09-29 PROCEDURE — 80048 BASIC METABOLIC PNL TOTAL CA: CPT

## 2024-09-29 PROCEDURE — 2140000000 HC CCU INTERMEDIATE R&B

## 2024-09-29 PROCEDURE — 94640 AIRWAY INHALATION TREATMENT: CPT

## 2024-09-29 PROCEDURE — 94761 N-INVAS EAR/PLS OXIMETRY MLT: CPT

## 2024-09-29 PROCEDURE — 93005 ELECTROCARDIOGRAM TRACING: CPT | Performed by: INTERNAL MEDICINE

## 2024-09-29 RX ORDER — DICYCLOMINE HYDROCHLORIDE 10 MG/1
10 CAPSULE ORAL 4 TIMES DAILY PRN
Status: DISCONTINUED | OUTPATIENT
Start: 2024-09-29 | End: 2024-10-07 | Stop reason: HOSPADM

## 2024-09-29 RX ADMIN — POLYETHYLENE GLYCOL 3350 17 G: 17 POWDER, FOR SOLUTION ORAL at 08:57

## 2024-09-29 RX ADMIN — SODIUM CHLORIDE, PRESERVATIVE FREE 10 ML: 5 INJECTION INTRAVENOUS at 20:19

## 2024-09-29 RX ADMIN — AZITHROMYCIN DIHYDRATE 250 MG: 250 TABLET ORAL at 08:54

## 2024-09-29 RX ADMIN — IPRATROPIUM BROMIDE AND ALBUTEROL SULFATE 1 DOSE: .5; 3 SOLUTION RESPIRATORY (INHALATION) at 20:51

## 2024-09-29 RX ADMIN — APIXABAN 5 MG: 5 TABLET, FILM COATED ORAL at 20:19

## 2024-09-29 RX ADMIN — SERTRALINE 12.5 MG: 25 TABLET, FILM COATED ORAL at 08:54

## 2024-09-29 RX ADMIN — CETIRIZINE HYDROCHLORIDE 5 MG: 5 TABLET ORAL at 08:54

## 2024-09-29 RX ADMIN — WATER 40 MG: 1 INJECTION INTRAMUSCULAR; INTRAVENOUS; SUBCUTANEOUS at 08:55

## 2024-09-29 RX ADMIN — PIPERACILLIN AND TAZOBACTAM 4500 MG: 4; .5 INJECTION, POWDER, FOR SOLUTION INTRAVENOUS at 20:24

## 2024-09-29 RX ADMIN — BUDESONIDE 1000 MCG: 0.5 INHALANT RESPIRATORY (INHALATION) at 20:51

## 2024-09-29 RX ADMIN — IPRATROPIUM BROMIDE AND ALBUTEROL SULFATE 1 DOSE: .5; 3 SOLUTION RESPIRATORY (INHALATION) at 10:28

## 2024-09-29 RX ADMIN — Medication 1 CAPSULE: at 08:53

## 2024-09-29 RX ADMIN — APIXABAN 5 MG: 5 TABLET, FILM COATED ORAL at 08:54

## 2024-09-29 RX ADMIN — PIPERACILLIN AND TAZOBACTAM 4500 MG: 4; .5 INJECTION, POWDER, FOR SOLUTION INTRAVENOUS at 12:32

## 2024-09-29 RX ADMIN — METOPROLOL TARTRATE 12.5 MG: 25 TABLET, FILM COATED ORAL at 08:54

## 2024-09-29 RX ADMIN — ACETAMINOPHEN 650 MG: 325 TABLET ORAL at 08:57

## 2024-09-29 RX ADMIN — SODIUM CHLORIDE, PRESERVATIVE FREE 10 ML: 5 INJECTION INTRAVENOUS at 08:53

## 2024-09-29 RX ADMIN — BUDESONIDE 250 MCG: 0.5 INHALANT RESPIRATORY (INHALATION) at 10:28

## 2024-09-29 RX ADMIN — POTASSIUM CHLORIDE 40 MEQ: 1500 TABLET, EXTENDED RELEASE ORAL at 08:58

## 2024-09-29 RX ADMIN — ACETAMINOPHEN 650 MG: 325 TABLET ORAL at 20:26

## 2024-09-29 RX ADMIN — PIPERACILLIN AND TAZOBACTAM 4500 MG: 4; .5 INJECTION, POWDER, FOR SOLUTION INTRAVENOUS at 04:28

## 2024-09-29 RX ADMIN — METOPROLOL TARTRATE 12.5 MG: 25 TABLET, FILM COATED ORAL at 20:19

## 2024-09-29 ASSESSMENT — PAIN SCALES - GENERAL
PAINLEVEL_OUTOF10: 1
PAINLEVEL_OUTOF10: 3
PAINLEVEL_OUTOF10: 1

## 2024-09-29 ASSESSMENT — PAIN DESCRIPTION - LOCATION: LOCATION: KNEE

## 2024-09-29 ASSESSMENT — PAIN DESCRIPTION - ORIENTATION: ORIENTATION: RIGHT;LEFT

## 2024-09-29 ASSESSMENT — PAIN - FUNCTIONAL ASSESSMENT: PAIN_FUNCTIONAL_ASSESSMENT: ACTIVITIES ARE NOT PREVENTED

## 2024-09-29 ASSESSMENT — PAIN DESCRIPTION - DESCRIPTORS: DESCRIPTORS: ACHING;CRAMPING

## 2024-09-29 NOTE — PROCEDURES
PROCEDURE NOTE  Date: 9/29/2024   Name: Snadra Benavides  YOB: 1935    Procedures    12 lead EKG completed. Results handed to Viviana CUBA

## 2024-09-30 LAB
ANION GAP SERPL CALC-SCNC: 9 MEQ/L (ref 8–16)
BASOPHILS ABSOLUTE: 0 THOU/MM3 (ref 0–0.1)
BASOPHILS NFR BLD AUTO: 0.3 %
BUN SERPL-MCNC: 14 MG/DL (ref 7–22)
CALCIUM SERPL-MCNC: 8.5 MG/DL (ref 8.5–10.5)
CHLORIDE SERPL-SCNC: 102 MEQ/L (ref 98–111)
CO2 SERPL-SCNC: 32 MEQ/L (ref 23–33)
CREAT SERPL-MCNC: 0.7 MG/DL (ref 0.4–1.2)
DEPRECATED RDW RBC AUTO: 44.6 FL (ref 35–45)
EOSINOPHIL NFR BLD AUTO: 3 %
EOSINOPHILS ABSOLUTE: 0.3 THOU/MM3 (ref 0–0.4)
ERYTHROCYTE [DISTWIDTH] IN BLOOD BY AUTOMATED COUNT: 13 % (ref 11.5–14.5)
GFR SERPL CREATININE-BSD FRML MDRD: 82 ML/MIN/1.73M2
GLUCOSE SERPL-MCNC: 79 MG/DL (ref 70–108)
HCT VFR BLD AUTO: 38.4 % (ref 37–47)
HGB BLD-MCNC: 11.5 GM/DL (ref 12–16)
IMM GRANULOCYTES # BLD AUTO: 0.11 THOU/MM3 (ref 0–0.07)
IMM GRANULOCYTES NFR BLD AUTO: 1.2 %
LYMPHOCYTES ABSOLUTE: 1.5 THOU/MM3 (ref 1–4.8)
LYMPHOCYTES NFR BLD AUTO: 16.4 %
MCH RBC QN AUTO: 28 PG (ref 26–33)
MCHC RBC AUTO-ENTMCNC: 29.9 GM/DL (ref 32.2–35.5)
MCV RBC AUTO: 93.7 FL (ref 81–99)
MONOCYTES ABSOLUTE: 1.2 THOU/MM3 (ref 0.4–1.3)
MONOCYTES NFR BLD AUTO: 12.8 %
NEUTROPHILS ABSOLUTE: 6.2 THOU/MM3 (ref 1.8–7.7)
NEUTROPHILS NFR BLD AUTO: 66.3 %
NRBC BLD AUTO-RTO: 0 /100 WBC
PLATELET # BLD AUTO: 182 THOU/MM3 (ref 130–400)
PMV BLD AUTO: 10.8 FL (ref 9.4–12.4)
POTASSIUM SERPL-SCNC: 3.8 MEQ/L (ref 3.5–5.2)
RBC # BLD AUTO: 4.1 MILL/MM3 (ref 4.2–5.4)
SODIUM SERPL-SCNC: 143 MEQ/L (ref 135–145)
WBC # BLD AUTO: 9.3 THOU/MM3 (ref 4.8–10.8)

## 2024-09-30 PROCEDURE — 6370000000 HC RX 637 (ALT 250 FOR IP): Performed by: STUDENT IN AN ORGANIZED HEALTH CARE EDUCATION/TRAINING PROGRAM

## 2024-09-30 PROCEDURE — 99233 SBSQ HOSP IP/OBS HIGH 50: CPT | Performed by: INTERNAL MEDICINE

## 2024-09-30 PROCEDURE — 2700000000 HC OXYGEN THERAPY PER DAY

## 2024-09-30 PROCEDURE — 2580000003 HC RX 258: Performed by: STUDENT IN AN ORGANIZED HEALTH CARE EDUCATION/TRAINING PROGRAM

## 2024-09-30 PROCEDURE — 36415 COLL VENOUS BLD VENIPUNCTURE: CPT

## 2024-09-30 PROCEDURE — 99233 SBSQ HOSP IP/OBS HIGH 50: CPT | Performed by: FAMILY MEDICINE

## 2024-09-30 PROCEDURE — 82955 ASSAY OF G6PD ENZYME: CPT

## 2024-09-30 PROCEDURE — 85025 COMPLETE CBC W/AUTO DIFF WBC: CPT

## 2024-09-30 PROCEDURE — 6370000000 HC RX 637 (ALT 250 FOR IP): Performed by: NURSE PRACTITIONER

## 2024-09-30 PROCEDURE — 2140000000 HC CCU INTERMEDIATE R&B

## 2024-09-30 PROCEDURE — 94640 AIRWAY INHALATION TREATMENT: CPT

## 2024-09-30 PROCEDURE — 6360000002 HC RX W HCPCS: Performed by: STUDENT IN AN ORGANIZED HEALTH CARE EDUCATION/TRAINING PROGRAM

## 2024-09-30 PROCEDURE — 94761 N-INVAS EAR/PLS OXIMETRY MLT: CPT

## 2024-09-30 PROCEDURE — 80048 BASIC METABOLIC PNL TOTAL CA: CPT

## 2024-09-30 RX ORDER — IPRATROPIUM BROMIDE AND ALBUTEROL SULFATE 2.5; .5 MG/3ML; MG/3ML
1 SOLUTION RESPIRATORY (INHALATION)
Status: DISCONTINUED | OUTPATIENT
Start: 2024-09-30 | End: 2024-10-07 | Stop reason: HOSPADM

## 2024-09-30 RX ADMIN — PIPERACILLIN AND TAZOBACTAM 4500 MG: 4; .5 INJECTION, POWDER, FOR SOLUTION INTRAVENOUS at 12:35

## 2024-09-30 RX ADMIN — IPRATROPIUM BROMIDE AND ALBUTEROL SULFATE 1 DOSE: .5; 3 SOLUTION RESPIRATORY (INHALATION) at 12:17

## 2024-09-30 RX ADMIN — IPRATROPIUM BROMIDE AND ALBUTEROL SULFATE 1 DOSE: .5; 3 SOLUTION RESPIRATORY (INHALATION) at 08:29

## 2024-09-30 RX ADMIN — SODIUM CHLORIDE, PRESERVATIVE FREE 10 ML: 5 INJECTION INTRAVENOUS at 10:05

## 2024-09-30 RX ADMIN — APIXABAN 5 MG: 5 TABLET, FILM COATED ORAL at 20:54

## 2024-09-30 RX ADMIN — SODIUM CHLORIDE, PRESERVATIVE FREE 10 ML: 5 INJECTION INTRAVENOUS at 20:54

## 2024-09-30 RX ADMIN — METOPROLOL TARTRATE 12.5 MG: 25 TABLET, FILM COATED ORAL at 20:54

## 2024-09-30 RX ADMIN — IPRATROPIUM BROMIDE AND ALBUTEROL SULFATE 1 DOSE: .5; 3 SOLUTION RESPIRATORY (INHALATION) at 21:37

## 2024-09-30 RX ADMIN — APIXABAN 5 MG: 5 TABLET, FILM COATED ORAL at 10:04

## 2024-09-30 RX ADMIN — METOPROLOL TARTRATE 12.5 MG: 25 TABLET, FILM COATED ORAL at 10:04

## 2024-09-30 RX ADMIN — IPRATROPIUM BROMIDE AND ALBUTEROL SULFATE 1 DOSE: .5; 3 SOLUTION RESPIRATORY (INHALATION) at 17:18

## 2024-09-30 RX ADMIN — WATER 40 MG: 1 INJECTION INTRAMUSCULAR; INTRAVENOUS; SUBCUTANEOUS at 10:05

## 2024-09-30 RX ADMIN — BUDESONIDE 1000 MCG: 0.5 INHALANT RESPIRATORY (INHALATION) at 08:29

## 2024-09-30 RX ADMIN — Medication 1 CAPSULE: at 10:07

## 2024-09-30 RX ADMIN — AZITHROMYCIN DIHYDRATE 250 MG: 250 TABLET ORAL at 10:04

## 2024-09-30 RX ADMIN — BUDESONIDE 1000 MCG: 0.5 INHALANT RESPIRATORY (INHALATION) at 21:37

## 2024-09-30 RX ADMIN — PIPERACILLIN AND TAZOBACTAM 4500 MG: 4; .5 INJECTION, POWDER, FOR SOLUTION INTRAVENOUS at 20:57

## 2024-09-30 RX ADMIN — SERTRALINE 12.5 MG: 25 TABLET, FILM COATED ORAL at 10:04

## 2024-09-30 RX ADMIN — PIPERACILLIN AND TAZOBACTAM 4500 MG: 4; .5 INJECTION, POWDER, FOR SOLUTION INTRAVENOUS at 04:54

## 2024-09-30 RX ADMIN — CETIRIZINE HYDROCHLORIDE 5 MG: 5 TABLET ORAL at 10:04

## 2024-09-30 RX ADMIN — ACETAMINOPHEN 650 MG: 325 TABLET ORAL at 22:34

## 2024-09-30 ASSESSMENT — PAIN DESCRIPTION - ORIENTATION: ORIENTATION: RIGHT;LEFT

## 2024-09-30 ASSESSMENT — PAIN DESCRIPTION - LOCATION: LOCATION: KNEE

## 2024-09-30 ASSESSMENT — PAIN - FUNCTIONAL ASSESSMENT: PAIN_FUNCTIONAL_ASSESSMENT: ACTIVITIES ARE NOT PREVENTED

## 2024-09-30 ASSESSMENT — PAIN DESCRIPTION - DESCRIPTORS: DESCRIPTORS: ACHING

## 2024-09-30 ASSESSMENT — PAIN SCALES - GENERAL
PAINLEVEL_OUTOF10: 1
PAINLEVEL_OUTOF10: 3

## 2024-09-30 NOTE — CARE COORDINATION
Case Management Assessment Initial Evaluation    Date/Time of Evaluation: 2024 8:34 AM  Assessment Completed by: Roxana Reed RN    If patient is discharged prior to next notation, then this note serves as note for discharge by case management.    Patient Name: Sandra Benavides                   YOB: 1935  Diagnosis: Shortness of breath [R06.02]  Chest discomfort [R07.89]  Acute respiratory failure with hypoxia [J96.01]                   Date / Time: 2024  9:58 AM  Location: [address] [place] [address]     Patient Admission Status: Inpatient   Readmission Risk Low 0-14, Mod 15-19), High > 20: Readmission Risk Score: 13.3    Current PCP: Tacho Ny MD  Health Care Decision Makers:   Primary Decision Maker: Jose Benavides - [phone]    Secondary Decision Maker: Randall Benavides - [phone]    Additional Case Management Notes: Admitted through ED with SOB. Pt reports her O2 sats dropped to 55% at home. Consulted Pulmonology. Palliative Care consulted. HHF O2, 30L and 42% FiO2. Sats 97%. Zithromax po daily. Pulmicort bid. DuoNeb bid. Solumedrol iv daily. Zosyn iv q8hr.     Procedures:    Echo: EF 50-55%. Right ventricle is moderately dilated. Severe pulmonary hypertension present.     Imagin/27 PCXR:   1. Normal heart size. No effusion.  2. Moderate interstitial fibrosis involving the right lung diffusely. Mild  interstitial fibrosis involving the left lung.  3. Overall appearance of the chest has improved since prior. The possibility of mild superimposed interstitial pneumonitis or edema in either lung cannot  definitely be excluded.   CTA chest:   1. No filling defects are visualized within the pulmonary arterial vasculature  to suggest the presence of pulmonary embolus. The pulmonary arterial trunk is dilated measuring up to 3.5 cm in diameter which can indicate pulmonary arterial hypertension.    2. CT findings suggestive of severe interstitial fibrosis,

## 2024-09-30 NOTE — RT PROTOCOL NOTE
RT Inhaler-Nebulizer Bronchodilator Protocol Note    There is a bronchodilator order in the chart from a provider indicating to follow the RT Bronchodilator Protocol and there is an “Initiate RT Inhaler-Nebulizer Bronchodilator Protocol” order as well (see protocol at bottom of note).    CXR Findings:  XR CHEST PORTABLE    Result Date: 9/27/2024  1. Normal heart size. No effusion. 2. Moderate interstitial fibrosis involving the right lung diffusely. Mild interstitial fibrosis involving the left lung. 3. Overall appearance of the chest has improved since prior. The possibility of mild superimposed interstitial pneumonitis or edema in either lung cannot definitely be excluded. **This report has been created using voice recognition software.  It may contain minor errors which are inherent in voice recognition technology.** Electronically signed by Dr. Hieu Khan      The findings from the last RT Protocol Assessment were as follows:   History Pulmonary Disease: Chronic pulmonary disease  Respiratory Pattern: Dyspnea on exertion or RR 21-25 bpm  Breath Sounds: Slightly diminished and/or crackles  Cough: Strong, spontaneous, non-productive  Indication for Bronchodilator Therapy:    Bronchodilator Assessment Score: 6    Aerosolized bronchodilator medication orders have been revised according to the RT Inhaler-Nebulizer Bronchodilator Protocol below.    Respiratory Therapist to perform RT Therapy Protocol Assessment initially then follow the protocol.  Repeat RT Therapy Protocol Assessment PRN for score 0-3 or on second treatment, BID, and PRN for scores above 3.    No Indications - adjust the frequency to every 6 hours PRN wheezing or bronchospasm, if no treatments needed after 48 hours then discontinue using Per Protocol order mode.     If indication present, adjust the RT bronchodilator orders based on the Bronchodilator Assessment Score as indicated below.  Use Inhaler orders unless patient has one or more of the 
RT Inhaler-Nebulizer Bronchodilator Protocol Note    There is a bronchodilator order in the chart from a provider indicating to follow the RT Bronchodilator Protocol and there is an “Initiate RT Inhaler-Nebulizer Bronchodilator Protocol” order as well (see protocol at bottom of note).    CXR Findings:  XR CHEST PORTABLE    Result Date: 9/27/2024  1. Normal heart size. No effusion. 2. Moderate interstitial fibrosis involving the right lung diffusely. Mild interstitial fibrosis involving the left lung. 3. Overall appearance of the chest has improved since prior. The possibility of mild superimposed interstitial pneumonitis or edema in either lung cannot definitely be excluded. **This report has been created using voice recognition software.  It may contain minor errors which are inherent in voice recognition technology.** Electronically signed by Dr. Hieu Khan      The findings from the last RT Protocol Assessment were as follows:   History Pulmonary Disease: Chronic pulmonary disease (fibrosis)  Respiratory Pattern: Regular pattern and RR 12-20 bpm  Breath Sounds: Slightly diminished and/or crackles  Cough: Strong, spontaneous, non-productive  Indication for Bronchodilator Therapy: Decreased or absent breath sounds  Bronchodilator Assessment Score: 4    Aerosolized bronchodilator medication orders have been revised according to the RT Inhaler-Nebulizer Bronchodilator Protocol below.    Respiratory Therapist to perform RT Therapy Protocol Assessment initially then follow the protocol.  Repeat RT Therapy Protocol Assessment PRN for score 0-3 or on second treatment, BID, and PRN for scores above 3.    No Indications - adjust the frequency to every 6 hours PRN wheezing or bronchospasm, if no treatments needed after 48 hours then discontinue using Per Protocol order mode.     If indication present, adjust the RT bronchodilator orders based on the Bronchodilator Assessment Score as indicated below.  Use Inhaler orders 
RT Inhaler-Nebulizer Bronchodilator Protocol Note    There is a bronchodilator order in the chart from a provider indicating to follow the RT Bronchodilator Protocol and there is an “Initiate RT Inhaler-Nebulizer Bronchodilator Protocol” order as well (see protocol at bottom of note).    CXR Findings:  XR CHEST PORTABLE    Result Date: 9/27/2024  1. Normal heart size. No effusion. 2. Moderate interstitial fibrosis involving the right lung diffusely. Mild interstitial fibrosis involving the left lung. 3. Overall appearance of the chest has improved since prior. The possibility of mild superimposed interstitial pneumonitis or edema in either lung cannot definitely be excluded. **This report has been created using voice recognition software.  It may contain minor errors which are inherent in voice recognition technology.** Electronically signed by Dr. Hieu Khan      The findings from the last RT Protocol Assessment were as follows:   History Pulmonary Disease: None or smoker <15 pack years  Respiratory Pattern: Regular pattern and RR 12-20 bpm  Breath Sounds: Slightly diminished and/or crackles  Cough: Strong, spontaneous, non-productive  Indication for Bronchodilator Therapy:    Bronchodilator Assessment Score: 2    Aerosolized bronchodilator medication orders have been revised according to the RT Inhaler-Nebulizer Bronchodilator Protocol below.    Respiratory Therapist to perform RT Therapy Protocol Assessment initially then follow the protocol.  Repeat RT Therapy Protocol Assessment PRN for score 0-3 or on second treatment, BID, and PRN for scores above 3.    No Indications - adjust the frequency to every 6 hours PRN wheezing or bronchospasm, if no treatments needed after 48 hours then discontinue using Per Protocol order mode.     If indication present, adjust the RT bronchodilator orders based on the Bronchodilator Assessment Score as indicated below.  Use Inhaler orders unless patient has one or more of the 
RT Inhaler-Nebulizer Bronchodilator Protocol Note    There is a bronchodilator order in the chart from a provider indicating to follow the RT Bronchodilator Protocol and there is an “Initiate RT Inhaler-Nebulizer Bronchodilator Protocol” order as well (see protocol at bottom of note).    CXR Findings:  XR CHEST PORTABLE    Result Date: 9/27/2024  1. Normal heart size. No effusion. 2. Moderate interstitial fibrosis involving the right lung diffusely. Mild interstitial fibrosis involving the left lung. 3. Overall appearance of the chest has improved since prior. The possibility of mild superimposed interstitial pneumonitis or edema in either lung cannot definitely be excluded. **This report has been created using voice recognition software.  It may contain minor errors which are inherent in voice recognition technology.** Electronically signed by Dr. Hieu hKan      The findings from the last RT Protocol Assessment were as follows:   History Pulmonary Disease: Chronic pulmonary disease (fibrosis)  Respiratory Pattern: Regular pattern and RR 12-20 bpm  Breath Sounds: Slightly diminished and/or crackles  Cough: Strong, spontaneous, non-productive  Indication for Bronchodilator Therapy:    Bronchodilator Assessment Score: 4    Aerosolized bronchodilator medication orders have been revised according to the RT Inhaler-Nebulizer Bronchodilator Protocol below.    Respiratory Therapist to perform RT Therapy Protocol Assessment initially then follow the protocol.  Repeat RT Therapy Protocol Assessment PRN for score 0-3 or on second treatment, BID, and PRN for scores above 3.    No Indications - adjust the frequency to every 6 hours PRN wheezing or bronchospasm, if no treatments needed after 48 hours then discontinue using Per Protocol order mode.     If indication present, adjust the RT bronchodilator orders based on the Bronchodilator Assessment Score as indicated below.  Use Inhaler orders unless patient has one or more 
following: on home nebulizer, not able to hold breath for 10 seconds, is not alert and oriented, cannot activate and use MDI correctly, or respiratory rate 25 breaths per minute or more, then use the equivalent nebulizer order(s) with same Frequency and PRN reasons based on the score.  If a patient is on this medication at home then do not decrease Frequency below that used at home.    0-3 - enter or revise RT bronchodilator order(s) to equivalent RT Bronchodilator order with Frequency of every 4 hours PRN for wheezing or increased work of breathing using Per Protocol order mode.        4-6 - enter or revise RT Bronchodilator order(s) to two equivalent RT bronchodilator orders with one order with BID Frequency and one order with Frequency of every 4 hours PRN wheezing or increased work of breathing using Per Protocol order mode.        7-10 - enter or revise RT Bronchodilator order(s) to two equivalent RT bronchodilator orders with one order with TID Frequency and one order with Frequency of every 4 hours PRN wheezing or increased work of breathing using Per Protocol order mode.       11-13 - enter or revise RT Bronchodilator order(s) to one equivalent RT bronchodilator order with QID Frequency and an Albuterol order with Frequency of every 4 hours PRN wheezing or increased work of breathing using Per Protocol order mode.      Greater than 13 - enter or revise RT Bronchodilator order(s) to one equivalent RT bronchodilator order with every 4 hours Frequency and an Albuterol order with Frequency of every 2 hours PRN wheezing or increased work of breathing using Per Protocol order mode.     RT to enter RT Home Evaluation for COPD & MDI Assessment order using Per Protocol order mode.    Electronically signed by Nargis Wild RCP on 9/28/2024 at 4:19 PM  
increased work of breathing using Per Protocol order mode.        4-6 - enter or revise RT Bronchodilator order(s) to two equivalent RT bronchodilator orders with one order with BID Frequency and one order with Frequency of every 4 hours PRN wheezing or increased work of breathing using Per Protocol order mode.        7-10 - enter or revise RT Bronchodilator order(s) to two equivalent RT bronchodilator orders with one order with TID Frequency and one order with Frequency of every 4 hours PRN wheezing or increased work of breathing using Per Protocol order mode.       11-13 - enter or revise RT Bronchodilator order(s) to one equivalent RT bronchodilator order with QID Frequency and an Albuterol order with Frequency of every 4 hours PRN wheezing or increased work of breathing using Per Protocol order mode.      Greater than 13 - enter or revise RT Bronchodilator order(s) to one equivalent RT bronchodilator order with every 4 hours Frequency and an Albuterol order with Frequency of every 2 hours PRN wheezing or increased work of breathing using Per Protocol order mode.     RT to enter RT Home Evaluation for COPD & MDI Assessment order using Per Protocol order mode.    Electronically signed by Waleska Holliday RCP on 9/30/2024 at 8:36 AM

## 2024-09-30 NOTE — PALLIATIVE CARE
Initial Evaluation        Patient:   Sandra Benavides  YOB: 1935  Age:  89 y.o.  Room:  [facility]35/035-A  MRN:  [id number]   Acct: [id number]    Date of Admission:  9/27/2024  9:58 AM  Date of Service:  9/30/2024  Completed By:  Trang Winkler RN        Reason for Palliative Care Evaluation:-   Goals of Care     Current Concerns   Denies any symptoms at this time     Palliative Performance Scale   50%  Mainly sit/lie; Extensive disease; Can't do any work; Considerable assist; intake normal or reduced; LOC full/confusion     History    Patient with history of interstitial lung disease, HTN, hypothyroidism, Afib and anxiety.  Patient admitted with shortness of breath.     Goals of Care Discussions and Plan         Advance Care Planning   Goals of Care/Advance Care Planning (ACP) Conversation    Date of Conversation: 09/30/24    Individuals present for the conversation: Patient with decision making capacity     ACP documents on file prior to discussion:  -Living Will  -Portable DNR    Previously completed document/s not on file: Not discussed.    Healthcare Power of /Healthcare Surrogate Decision Makers:  Per [place] hierarchy of surrogate decision makers: Sons: Jose and Randall    Conversation Summary: Patient resting in bed.  Patient is alert and oriented to all spheres.  Palliative care introduced.  Patient shares that pulmonology has recommended to her to proceed only with palliative care.  Patel Clay CNP in at this time to discuss patient's condition and options: bronchoscopy to obtain specimens (risks and benefits explained) versus steroids with risk of underlying fungal infection that could progress rapidly with steroids.  Patient feels somewhat overwhelmed with the discussion and she is requesting that pulmonology updates her sons also.      Resuscitation Status: Limited Limited Code details: Intubation/Re-intubation No; Defibrillation/Cardioversion No; Chest Compressions No; Resuscitative

## 2024-10-01 DIAGNOSIS — F41.9 ANXIETY AND DEPRESSION: ICD-10-CM

## 2024-10-01 DIAGNOSIS — F32.A ANXIETY AND DEPRESSION: ICD-10-CM

## 2024-10-01 LAB
ANION GAP SERPL CALC-SCNC: 9 MEQ/L (ref 8–16)
BUN SERPL-MCNC: 17 MG/DL (ref 7–22)
CALCIUM SERPL-MCNC: 9.1 MG/DL (ref 8.5–10.5)
CHLORIDE SERPL-SCNC: 99 MEQ/L (ref 98–111)
CO2 SERPL-SCNC: 38 MEQ/L (ref 23–33)
CREAT SERPL-MCNC: 0.8 MG/DL (ref 0.4–1.2)
GFR SERPL CREATININE-BSD FRML MDRD: 70 ML/MIN/1.73M2
GLUCOSE SERPL-MCNC: 79 MG/DL (ref 70–108)
MAGNESIUM SERPL-MCNC: 1.8 MG/DL (ref 1.6–2.4)
POTASSIUM SERPL-SCNC: 3.4 MEQ/L (ref 3.5–5.2)
SODIUM SERPL-SCNC: 146 MEQ/L (ref 135–145)

## 2024-10-01 PROCEDURE — 94640 AIRWAY INHALATION TREATMENT: CPT

## 2024-10-01 PROCEDURE — 6370000000 HC RX 637 (ALT 250 FOR IP): Performed by: STUDENT IN AN ORGANIZED HEALTH CARE EDUCATION/TRAINING PROGRAM

## 2024-10-01 PROCEDURE — 80048 BASIC METABOLIC PNL TOTAL CA: CPT

## 2024-10-01 PROCEDURE — 6370000000 HC RX 637 (ALT 250 FOR IP): Performed by: NURSE PRACTITIONER

## 2024-10-01 PROCEDURE — 6360000002 HC RX W HCPCS: Performed by: STUDENT IN AN ORGANIZED HEALTH CARE EDUCATION/TRAINING PROGRAM

## 2024-10-01 PROCEDURE — 36415 COLL VENOUS BLD VENIPUNCTURE: CPT

## 2024-10-01 PROCEDURE — 2140000000 HC CCU INTERMEDIATE R&B

## 2024-10-01 PROCEDURE — 99233 SBSQ HOSP IP/OBS HIGH 50: CPT | Performed by: INTERNAL MEDICINE

## 2024-10-01 PROCEDURE — 6360000002 HC RX W HCPCS: Performed by: INTERNAL MEDICINE

## 2024-10-01 PROCEDURE — 2580000003 HC RX 258: Performed by: STUDENT IN AN ORGANIZED HEALTH CARE EDUCATION/TRAINING PROGRAM

## 2024-10-01 PROCEDURE — 2700000000 HC OXYGEN THERAPY PER DAY

## 2024-10-01 PROCEDURE — 94761 N-INVAS EAR/PLS OXIMETRY MLT: CPT

## 2024-10-01 PROCEDURE — 99232 SBSQ HOSP IP/OBS MODERATE 35: CPT | Performed by: FAMILY MEDICINE

## 2024-10-01 PROCEDURE — 83735 ASSAY OF MAGNESIUM: CPT

## 2024-10-01 RX ORDER — BUMETANIDE 0.25 MG/ML
1 INJECTION INTRAMUSCULAR; INTRAVENOUS ONCE
Status: COMPLETED | OUTPATIENT
Start: 2024-10-01 | End: 2024-10-01

## 2024-10-01 RX ORDER — HYDROXYZINE HYDROCHLORIDE 10 MG/1
5 TABLET, FILM COATED ORAL EVERY 8 HOURS PRN
Qty: 30 TABLET | Refills: 1 | Status: SHIPPED | OUTPATIENT
Start: 2024-10-01 | End: 2024-10-11

## 2024-10-01 RX ORDER — FUROSEMIDE 20 MG
20 TABLET ORAL 2 TIMES DAILY
Status: DISCONTINUED | OUTPATIENT
Start: 2024-10-01 | End: 2024-10-02

## 2024-10-01 RX ADMIN — Medication 1 CAPSULE: at 08:57

## 2024-10-01 RX ADMIN — IPRATROPIUM BROMIDE AND ALBUTEROL SULFATE 1 DOSE: .5; 3 SOLUTION RESPIRATORY (INHALATION) at 07:37

## 2024-10-01 RX ADMIN — APIXABAN 5 MG: 5 TABLET, FILM COATED ORAL at 20:10

## 2024-10-01 RX ADMIN — BUDESONIDE 1000 MCG: 0.5 INHALANT RESPIRATORY (INHALATION) at 20:25

## 2024-10-01 RX ADMIN — METOPROLOL TARTRATE 12.5 MG: 25 TABLET, FILM COATED ORAL at 20:10

## 2024-10-01 RX ADMIN — IPRATROPIUM BROMIDE AND ALBUTEROL SULFATE 1 DOSE: .5; 3 SOLUTION RESPIRATORY (INHALATION) at 11:18

## 2024-10-01 RX ADMIN — IPRATROPIUM BROMIDE AND ALBUTEROL SULFATE 1 DOSE: .5; 3 SOLUTION RESPIRATORY (INHALATION) at 20:18

## 2024-10-01 RX ADMIN — METOPROLOL TARTRATE 12.5 MG: 25 TABLET, FILM COATED ORAL at 08:57

## 2024-10-01 RX ADMIN — PIPERACILLIN AND TAZOBACTAM 4500 MG: 4; .5 INJECTION, POWDER, FOR SOLUTION INTRAVENOUS at 13:48

## 2024-10-01 RX ADMIN — BUMETANIDE 1 MG: 0.25 INJECTION INTRAMUSCULAR; INTRAVENOUS at 08:58

## 2024-10-01 RX ADMIN — APIXABAN 5 MG: 5 TABLET, FILM COATED ORAL at 08:57

## 2024-10-01 RX ADMIN — AZITHROMYCIN DIHYDRATE 250 MG: 250 TABLET ORAL at 08:57

## 2024-10-01 RX ADMIN — SODIUM CHLORIDE, PRESERVATIVE FREE 10 ML: 5 INJECTION INTRAVENOUS at 08:58

## 2024-10-01 RX ADMIN — SERTRALINE 12.5 MG: 25 TABLET, FILM COATED ORAL at 08:55

## 2024-10-01 RX ADMIN — BUDESONIDE 1000 MCG: 0.5 INHALANT RESPIRATORY (INHALATION) at 07:37

## 2024-10-01 RX ADMIN — IPRATROPIUM BROMIDE AND ALBUTEROL SULFATE 1 DOSE: .5; 3 SOLUTION RESPIRATORY (INHALATION) at 15:00

## 2024-10-01 RX ADMIN — PIPERACILLIN AND TAZOBACTAM 4500 MG: 4; .5 INJECTION, POWDER, FOR SOLUTION INTRAVENOUS at 04:39

## 2024-10-01 RX ADMIN — FUROSEMIDE 20 MG: 20 TABLET ORAL at 17:31

## 2024-10-01 RX ADMIN — PIPERACILLIN AND TAZOBACTAM 4500 MG: 4; .5 INJECTION, POWDER, FOR SOLUTION INTRAVENOUS at 20:16

## 2024-10-01 RX ADMIN — SODIUM CHLORIDE, PRESERVATIVE FREE 10 ML: 5 INJECTION INTRAVENOUS at 20:11

## 2024-10-01 RX ADMIN — WATER 40 MG: 1 INJECTION INTRAMUSCULAR; INTRAVENOUS; SUBCUTANEOUS at 13:53

## 2024-10-01 RX ADMIN — ACETAMINOPHEN 650 MG: 325 TABLET ORAL at 20:10

## 2024-10-01 NOTE — PALLIATIVE CARE
Follow Up / Progress Note        Patient:   Sandra Benavides  YOB: 1935  Age:  89 y.o.  Room:  [address] [place] [address]  MRN:  [id number]         Family/Patient Discussion:  Follow up with patient after yesterday's conversation.  Patient does NOT wish to proceed with bronchoscopy and accepts the risks of steroids without the information that the bronchoscopy would provide.  Patient states that she is feeling better at this time and denies any needs.  Emotional support provided.      Plan/Follow-Up:  Discussed case with primary RN, Reyna.  Palliative care remains available if further needs arise and staff may call prn.         Electronically signed by Trang Winkler RN on 10/1/2024 at 12:47 PM             Palliative Care Office: [phone]

## 2024-10-01 NOTE — TELEPHONE ENCOUNTER
The pharmacy is  requesting a refill of the below medication which has been pended for you:     Requested Prescriptions     Pending Prescriptions Disp Refills    hydrOXYzine HCl (ATARAX) 10 MG tablet [Pharmacy Med Name: HYDROXYZINE HCL 10 MG TABLET] 30 tablet 0     Sig: TAKE 0.5 TABLETS BY MOUTH EVERY 8 HOURS AS NEEDED FOR ITCHING       Last Appointment Date: 9/3/2024  Next Appointment Date: 10/11/2024    Allergies   Allergen Reactions    Colchicine Diarrhea    Ethambutol     Levaquin [Levofloxacin] Other (See Comments)     Hallucinations    Oxycodone     Percocet [Oxycodone-Acetaminophen] Nausea Only and Other (See Comments)     Affects memory    Rifampin Other (See Comments)     Lost half of vision    Sulfa Antibiotics Other (See Comments)     hallucinations    Cefuroxime Diarrhea, Nausea And Vomiting and Other (See Comments)     cramping

## 2024-10-01 NOTE — CARE COORDINATION
Discharge Planning Update:     Hospital day: 4  Location: 3B-35/035-A     Barriers to Discharge:  Order received for LTACH referral. Referral made to [facility].     SIGNED:  Roxana Reed RN   10/1/2024, 10:17 AM

## 2024-10-02 LAB
ANION GAP SERPL CALC-SCNC: 16 MEQ/L (ref 8–16)
BUN SERPL-MCNC: 18 MG/DL (ref 7–22)
CALCIUM SERPL-MCNC: 8.9 MG/DL (ref 8.5–10.5)
CHLORIDE SERPL-SCNC: 98 MEQ/L (ref 98–111)
CO2 SERPL-SCNC: 32 MEQ/L (ref 23–33)
CREAT SERPL-MCNC: 0.8 MG/DL (ref 0.4–1.2)
G6PD RBC-CCNC: NORMAL
GFR SERPL CREATININE-BSD FRML MDRD: 70 ML/MIN/1.73M2
GLUCOSE SERPL-MCNC: 98 MG/DL (ref 70–108)
NT-PROBNP SERPL IA-MCNC: 2375 PG/ML (ref 0–449)
POTASSIUM SERPL-SCNC: 3.8 MEQ/L (ref 3.5–5.2)
SODIUM SERPL-SCNC: 146 MEQ/L (ref 135–145)

## 2024-10-02 PROCEDURE — 99232 SBSQ HOSP IP/OBS MODERATE 35: CPT | Performed by: INTERNAL MEDICINE

## 2024-10-02 PROCEDURE — 6360000002 HC RX W HCPCS: Performed by: STUDENT IN AN ORGANIZED HEALTH CARE EDUCATION/TRAINING PROGRAM

## 2024-10-02 PROCEDURE — 80048 BASIC METABOLIC PNL TOTAL CA: CPT

## 2024-10-02 PROCEDURE — 6370000000 HC RX 637 (ALT 250 FOR IP)

## 2024-10-02 PROCEDURE — 2140000000 HC CCU INTERMEDIATE R&B

## 2024-10-02 PROCEDURE — 6370000000 HC RX 637 (ALT 250 FOR IP): Performed by: NURSE PRACTITIONER

## 2024-10-02 PROCEDURE — 2580000003 HC RX 258: Performed by: STUDENT IN AN ORGANIZED HEALTH CARE EDUCATION/TRAINING PROGRAM

## 2024-10-02 PROCEDURE — 83880 ASSAY OF NATRIURETIC PEPTIDE: CPT

## 2024-10-02 PROCEDURE — 94640 AIRWAY INHALATION TREATMENT: CPT

## 2024-10-02 PROCEDURE — 6360000002 HC RX W HCPCS

## 2024-10-02 PROCEDURE — 99233 SBSQ HOSP IP/OBS HIGH 50: CPT | Performed by: INTERNAL MEDICINE

## 2024-10-02 PROCEDURE — 94761 N-INVAS EAR/PLS OXIMETRY MLT: CPT

## 2024-10-02 PROCEDURE — 2700000000 HC OXYGEN THERAPY PER DAY

## 2024-10-02 PROCEDURE — 2580000003 HC RX 258

## 2024-10-02 PROCEDURE — 36415 COLL VENOUS BLD VENIPUNCTURE: CPT

## 2024-10-02 PROCEDURE — 6370000000 HC RX 637 (ALT 250 FOR IP): Performed by: STUDENT IN AN ORGANIZED HEALTH CARE EDUCATION/TRAINING PROGRAM

## 2024-10-02 RX ORDER — PREDNISONE 20 MG/1
40 TABLET ORAL DAILY
Status: COMPLETED | OUTPATIENT
Start: 2024-10-03 | End: 2024-10-04

## 2024-10-02 RX ORDER — FUROSEMIDE 20 MG
20 TABLET ORAL DAILY
Status: DISCONTINUED | OUTPATIENT
Start: 2024-10-03 | End: 2024-10-04

## 2024-10-02 RX ORDER — LISINOPRIL 5 MG/1
5 TABLET ORAL DAILY
Status: DISCONTINUED | OUTPATIENT
Start: 2024-10-02 | End: 2024-10-07 | Stop reason: HOSPADM

## 2024-10-02 RX ORDER — HYDRALAZINE HYDROCHLORIDE 20 MG/ML
5 INJECTION INTRAMUSCULAR; INTRAVENOUS EVERY 8 HOURS PRN
Status: DISCONTINUED | OUTPATIENT
Start: 2024-10-02 | End: 2024-10-07 | Stop reason: HOSPADM

## 2024-10-02 RX ORDER — PREDNISONE 20 MG/1
20 TABLET ORAL DAILY
Status: DISCONTINUED | OUTPATIENT
Start: 2024-10-12 | End: 2024-10-07 | Stop reason: HOSPADM

## 2024-10-02 RX ADMIN — IPRATROPIUM BROMIDE AND ALBUTEROL SULFATE 1 DOSE: .5; 3 SOLUTION RESPIRATORY (INHALATION) at 15:59

## 2024-10-02 RX ADMIN — IPRATROPIUM BROMIDE AND ALBUTEROL SULFATE 1 DOSE: .5; 3 SOLUTION RESPIRATORY (INHALATION) at 07:52

## 2024-10-02 RX ADMIN — IPRATROPIUM BROMIDE AND ALBUTEROL SULFATE 1 DOSE: .5; 3 SOLUTION RESPIRATORY (INHALATION) at 22:20

## 2024-10-02 RX ADMIN — PIPERACILLIN AND TAZOBACTAM 4500 MG: 4; .5 INJECTION, POWDER, FOR SOLUTION INTRAVENOUS at 20:54

## 2024-10-02 RX ADMIN — METOPROLOL TARTRATE 12.5 MG: 25 TABLET, FILM COATED ORAL at 19:55

## 2024-10-02 RX ADMIN — METOPROLOL TARTRATE 12.5 MG: 25 TABLET, FILM COATED ORAL at 09:29

## 2024-10-02 RX ADMIN — APIXABAN 5 MG: 5 TABLET, FILM COATED ORAL at 19:55

## 2024-10-02 RX ADMIN — PIPERACILLIN AND TAZOBACTAM 4500 MG: 4; .5 INJECTION, POWDER, FOR SOLUTION INTRAVENOUS at 04:40

## 2024-10-02 RX ADMIN — ACETAMINOPHEN 650 MG: 325 TABLET ORAL at 19:56

## 2024-10-02 RX ADMIN — FUROSEMIDE 20 MG: 20 TABLET ORAL at 09:30

## 2024-10-02 RX ADMIN — WATER 40 MG: 1 INJECTION INTRAMUSCULAR; INTRAVENOUS; SUBCUTANEOUS at 09:30

## 2024-10-02 RX ADMIN — LISINOPRIL 5 MG: 5 TABLET ORAL at 10:35

## 2024-10-02 RX ADMIN — Medication 1 CAPSULE: at 09:30

## 2024-10-02 RX ADMIN — APIXABAN 5 MG: 5 TABLET, FILM COATED ORAL at 09:30

## 2024-10-02 RX ADMIN — SERTRALINE 12.5 MG: 25 TABLET, FILM COATED ORAL at 09:30

## 2024-10-02 RX ADMIN — BUDESONIDE 1000 MCG: 0.5 INHALANT RESPIRATORY (INHALATION) at 22:18

## 2024-10-02 RX ADMIN — PIPERACILLIN AND TAZOBACTAM 4500 MG: 4; .5 INJECTION, POWDER, FOR SOLUTION INTRAVENOUS at 15:20

## 2024-10-02 RX ADMIN — AZITHROMYCIN DIHYDRATE 250 MG: 250 TABLET ORAL at 09:30

## 2024-10-02 RX ADMIN — BUDESONIDE 1000 MCG: 0.5 INHALANT RESPIRATORY (INHALATION) at 07:52

## 2024-10-02 RX ADMIN — SODIUM CHLORIDE, PRESERVATIVE FREE 10 ML: 5 INJECTION INTRAVENOUS at 09:30

## 2024-10-02 NOTE — CARE COORDINATION
DISCHARGE PLANNING EVALUATION  10/2/24, 3:40 PM EDT    Reason for Referral: Discharge planning  Decision Maker: Patient is able to make her own decisions.   Current Services: She has a aid through [employer] Monday through Friday 9-5 and on the weekend.  She is current with [facility] for PT only.  Agreeable to add an aid.   New Services Requested: Would like to add a nurse to [facility].    Family/ Social/ Home environment: From home alone.  Has aids and friends who are supportive.  Has two sons that both live out of state.    Payment Source:Medicare  Transportation at Discharge: aid   Post-acute (PAC) provider list was provided to patient. Patient was informed of their freedom to choose PAC provider. Discussed and offered to show the patient the relevant PAC Providers quality and resource use measures on Medicare Compare web site via computer based on patient's goals of care and treatment preferences. Questions regarding selection process were answered.      Teach Back Method used with Sandra regarding care plan and discharge planning.  Patient verbalized understanding of the plan of care and contribute to goal setting.       Patient preferences and discharge plan: Sandra plans to return home with current services including aids from [facility] and [facility] for RN and PT.  Denies other needs at this time.      Electronically signed by CIARA Fregoso on 10/2/2024 at 3:40 PM

## 2024-10-02 NOTE — CARE COORDINATION
10/2/24, 12:45 PM EDT    DISCHARGE ON GOING EVALUATION    Sandra MCKEON Benavides       [facility] day: 5  Location: [address] [place] [address] Reason for admit: Shortness of breath [R06.02]  Chest discomfort [R07.89]  Acute respiratory failure with hypoxia [J96.01]     Procedures:   9/27 Echo: EF 50-55%. Right ventricle is moderately dilated. Severe pulmonary hypertension present.     Imaging since last note: None    Barriers to Discharge: Hospitalist and Pulmonology following. Palliative Care. O2 weaned down to 4-5L. (Baseline is 4L). LTACH referral but pt is no longer meeting requirements. Sodium 146. BNP 2375.0. Zithromax po daily. Pulmicort bid. Lasix po bid. DuoNebs q4hr WA. Zosyn iv q8hr. Starting Prednisone. Spoke with Pulmonology, feel pt will be ready for discharge tomorrow.     PCP: Tacho Ny MD  Readmission Risk Score: 14.3    Patient Goals/Plan/Treatment Preferences: Lives at home alone. Has a private caregiver from [employer] that comes daily (M-F 9-3; shorter hours on S/S). Has home O2 from [employer], 4L ATC. She is also current with [facility] for PT. SW consulted.

## 2024-10-03 ENCOUNTER — TELEPHONE (OUTPATIENT)
Dept: PULMONOLOGY | Age: 89
End: 2024-10-03

## 2024-10-03 PROBLEM — I27.20 SEVERE PULMONARY HYPERTENSION (HCC): Status: ACTIVE | Noted: 2024-10-03

## 2024-10-03 PROBLEM — J84.9 ILD (INTERSTITIAL LUNG DISEASE) (HCC): Status: ACTIVE | Noted: 2024-10-03

## 2024-10-03 LAB
ALBUMIN SERPL BCG-MCNC: 3.4 G/DL (ref 3.5–5.1)
ALP SERPL-CCNC: 36 U/L (ref 38–126)
ALT SERPL W/O P-5'-P-CCNC: 26 U/L (ref 11–66)
ANION GAP SERPL CALC-SCNC: 9 MEQ/L (ref 8–16)
AST SERPL-CCNC: 28 U/L (ref 5–40)
BASOPHILS ABSOLUTE: 0 THOU/MM3 (ref 0–0.1)
BASOPHILS NFR BLD AUTO: 0.2 %
BILIRUB CONJ SERPL-MCNC: 0.2 MG/DL (ref 0.1–13.8)
BILIRUB SERPL-MCNC: 0.4 MG/DL (ref 0.3–1.2)
BUN SERPL-MCNC: 21 MG/DL (ref 7–22)
CALCIUM SERPL-MCNC: 8.7 MG/DL (ref 8.5–10.5)
CHLORIDE SERPL-SCNC: 97 MEQ/L (ref 98–111)
CO2 SERPL-SCNC: 37 MEQ/L (ref 23–33)
CREAT SERPL-MCNC: 0.8 MG/DL (ref 0.4–1.2)
DEPRECATED RDW RBC AUTO: 45.6 FL (ref 35–45)
EOSINOPHIL NFR BLD AUTO: 2.4 %
EOSINOPHILS ABSOLUTE: 0.3 THOU/MM3 (ref 0–0.4)
ERYTHROCYTE [DISTWIDTH] IN BLOOD BY AUTOMATED COUNT: 13.5 % (ref 11.5–14.5)
GFR SERPL CREATININE-BSD FRML MDRD: 70 ML/MIN/1.73M2
GLUCOSE SERPL-MCNC: 89 MG/DL (ref 70–108)
HCT VFR BLD AUTO: 36.9 % (ref 37–47)
HGB BLD-MCNC: 10.8 GM/DL (ref 12–16)
IMM GRANULOCYTES # BLD AUTO: 0.13 THOU/MM3 (ref 0–0.07)
IMM GRANULOCYTES NFR BLD AUTO: 1.2 %
LYMPHOCYTES ABSOLUTE: 1.6 THOU/MM3 (ref 1–4.8)
LYMPHOCYTES NFR BLD AUTO: 14.6 %
MAGNESIUM SERPL-MCNC: 1.9 MG/DL (ref 1.6–2.4)
MCH RBC QN AUTO: 27.1 PG (ref 26–33)
MCHC RBC AUTO-ENTMCNC: 29.3 GM/DL (ref 32.2–35.5)
MCV RBC AUTO: 92.7 FL (ref 81–99)
MONOCYTES ABSOLUTE: 1.6 THOU/MM3 (ref 0.4–1.3)
MONOCYTES NFR BLD AUTO: 15.3 %
NEUTROPHILS ABSOLUTE: 7.1 THOU/MM3 (ref 1.8–7.7)
NEUTROPHILS NFR BLD AUTO: 66.3 %
NRBC BLD AUTO-RTO: 0 /100 WBC
PLATELET # BLD AUTO: 197 THOU/MM3 (ref 130–400)
PMV BLD AUTO: 10.7 FL (ref 9.4–12.4)
POTASSIUM SERPL-SCNC: 3.2 MEQ/L (ref 3.5–5.2)
PROT SERPL-MCNC: 5.6 G/DL (ref 6.1–8)
RBC # BLD AUTO: 3.98 MILL/MM3 (ref 4.2–5.4)
SODIUM SERPL-SCNC: 143 MEQ/L (ref 135–145)
WBC # BLD AUTO: 10.7 THOU/MM3 (ref 4.8–10.8)

## 2024-10-03 PROCEDURE — 99233 SBSQ HOSP IP/OBS HIGH 50: CPT | Performed by: INTERNAL MEDICINE

## 2024-10-03 PROCEDURE — 97535 SELF CARE MNGMENT TRAINING: CPT

## 2024-10-03 PROCEDURE — 97163 PT EVAL HIGH COMPLEX 45 MIN: CPT

## 2024-10-03 PROCEDURE — 6360000002 HC RX W HCPCS: Performed by: STUDENT IN AN ORGANIZED HEALTH CARE EDUCATION/TRAINING PROGRAM

## 2024-10-03 PROCEDURE — 85025 COMPLETE CBC W/AUTO DIFF WBC: CPT

## 2024-10-03 PROCEDURE — 2140000000 HC CCU INTERMEDIATE R&B

## 2024-10-03 PROCEDURE — 2580000003 HC RX 258: Performed by: STUDENT IN AN ORGANIZED HEALTH CARE EDUCATION/TRAINING PROGRAM

## 2024-10-03 PROCEDURE — 97166 OT EVAL MOD COMPLEX 45 MIN: CPT

## 2024-10-03 PROCEDURE — 97530 THERAPEUTIC ACTIVITIES: CPT

## 2024-10-03 PROCEDURE — 82248 BILIRUBIN DIRECT: CPT

## 2024-10-03 PROCEDURE — 6370000000 HC RX 637 (ALT 250 FOR IP)

## 2024-10-03 PROCEDURE — 2700000000 HC OXYGEN THERAPY PER DAY

## 2024-10-03 PROCEDURE — 6370000000 HC RX 637 (ALT 250 FOR IP): Performed by: STUDENT IN AN ORGANIZED HEALTH CARE EDUCATION/TRAINING PROGRAM

## 2024-10-03 PROCEDURE — 36415 COLL VENOUS BLD VENIPUNCTURE: CPT

## 2024-10-03 PROCEDURE — 6370000000 HC RX 637 (ALT 250 FOR IP): Performed by: NURSE PRACTITIONER

## 2024-10-03 PROCEDURE — 6360000002 HC RX W HCPCS

## 2024-10-03 PROCEDURE — 94761 N-INVAS EAR/PLS OXIMETRY MLT: CPT

## 2024-10-03 PROCEDURE — 83735 ASSAY OF MAGNESIUM: CPT

## 2024-10-03 PROCEDURE — 2580000003 HC RX 258

## 2024-10-03 PROCEDURE — 2580000003 HC RX 258: Performed by: INTERNAL MEDICINE

## 2024-10-03 PROCEDURE — 80053 COMPREHEN METABOLIC PANEL: CPT

## 2024-10-03 PROCEDURE — 94640 AIRWAY INHALATION TREATMENT: CPT

## 2024-10-03 RX ORDER — SODIUM CHLORIDE FOR INHALATION 3 %
4 VIAL, NEBULIZER (ML) INHALATION 2 TIMES DAILY
Status: DISCONTINUED | OUTPATIENT
Start: 2024-10-03 | End: 2024-10-07 | Stop reason: HOSPADM

## 2024-10-03 RX ORDER — PIRFENIDONE 267 MG/1
CAPSULE ORAL
Qty: 603 CAPSULE | Refills: 0 | Status: CANCELLED | OUTPATIENT
Start: 2024-10-03 | End: 2024-12-16

## 2024-10-03 RX ORDER — NINTEDANIB 150 MG/1
1 CAPSULE ORAL EVERY 12 HOURS
Qty: 60 CAPSULE | Refills: 11 | Status: SHIPPED | OUTPATIENT
Start: 2024-10-03 | End: 2025-10-03

## 2024-10-03 RX ADMIN — METOPROLOL TARTRATE 12.5 MG: 25 TABLET, FILM COATED ORAL at 09:38

## 2024-10-03 RX ADMIN — PREDNISONE 40 MG: 20 TABLET ORAL at 09:40

## 2024-10-03 RX ADMIN — IPRATROPIUM BROMIDE AND ALBUTEROL SULFATE 1 DOSE: .5; 3 SOLUTION RESPIRATORY (INHALATION) at 19:39

## 2024-10-03 RX ADMIN — AZITHROMYCIN DIHYDRATE 250 MG: 250 TABLET ORAL at 09:37

## 2024-10-03 RX ADMIN — SERTRALINE 12.5 MG: 25 TABLET, FILM COATED ORAL at 09:40

## 2024-10-03 RX ADMIN — PIPERACILLIN AND TAZOBACTAM 4500 MG: 4; .5 INJECTION, POWDER, FOR SOLUTION INTRAVENOUS at 05:07

## 2024-10-03 RX ADMIN — LISINOPRIL 5 MG: 5 TABLET ORAL at 09:38

## 2024-10-03 RX ADMIN — SODIUM CHLORIDE, PRESERVATIVE FREE 10 ML: 5 INJECTION INTRAVENOUS at 20:12

## 2024-10-03 RX ADMIN — Medication 4 ML: at 19:55

## 2024-10-03 RX ADMIN — BUDESONIDE 1000 MCG: 0.5 INHALANT RESPIRATORY (INHALATION) at 07:27

## 2024-10-03 RX ADMIN — APIXABAN 5 MG: 5 TABLET, FILM COATED ORAL at 20:12

## 2024-10-03 RX ADMIN — SODIUM CHLORIDE, PRESERVATIVE FREE 10 ML: 5 INJECTION INTRAVENOUS at 10:00

## 2024-10-03 RX ADMIN — METOPROLOL TARTRATE 12.5 MG: 25 TABLET, FILM COATED ORAL at 20:12

## 2024-10-03 RX ADMIN — POTASSIUM BICARBONATE 40 MEQ: 782 TABLET, EFFERVESCENT ORAL at 06:29

## 2024-10-03 RX ADMIN — IPRATROPIUM BROMIDE AND ALBUTEROL SULFATE 1 DOSE: .5; 3 SOLUTION RESPIRATORY (INHALATION) at 07:27

## 2024-10-03 RX ADMIN — IPRATROPIUM BROMIDE AND ALBUTEROL SULFATE 1 DOSE: .5; 3 SOLUTION RESPIRATORY (INHALATION) at 11:35

## 2024-10-03 RX ADMIN — APIXABAN 5 MG: 5 TABLET, FILM COATED ORAL at 09:36

## 2024-10-03 RX ADMIN — FUROSEMIDE 20 MG: 20 TABLET ORAL at 09:39

## 2024-10-03 RX ADMIN — ACETAMINOPHEN 650 MG: 325 TABLET ORAL at 20:11

## 2024-10-03 RX ADMIN — BUDESONIDE 1000 MCG: 0.5 INHALANT RESPIRATORY (INHALATION) at 19:44

## 2024-10-03 RX ADMIN — Medication 1 CAPSULE: at 09:38

## 2024-10-03 RX ADMIN — IPRATROPIUM BROMIDE AND ALBUTEROL SULFATE 1 DOSE: .5; 3 SOLUTION RESPIRATORY (INHALATION) at 15:16

## 2024-10-03 NOTE — TELEPHONE ENCOUNTER
----- Message from Patel Clay, JASMEET - CNP sent at 10/3/2024 11:11 AM EDT -----     1 month follow-up with Karen to review LFT    Thank you,   Patel Clay

## 2024-10-03 NOTE — PALLIATIVE CARE
Follow Up / Progress Note        Patient:   Sandra Benavides  YOB: 1935  Age:  89 y.o.  Room:  [address] [place] [address]  MRN:  [id number]               Plan/Follow-Up:  Chart reviewed and no needs identified for palliative care.  Staff may call prn if needs arise.         Electronically signed by Trang Winkler RN on 10/3/2024 at 7:08 AM             Palliative Care Office: [phone]

## 2024-10-03 NOTE — DISCHARGE INSTRUCTIONS
Prescription for Ofev (nintedanib) was given to Pulmonary office staff for prior authorization to be processed it will be sent to a specialty pharmacy and the office will communicate further with you regarding this via telephone. After starting this medicine you will need a blood draw to check Liver Function obtain this prior to your appt with Karen if you have started on the above mentioned medication. Please call the Pulmonary office with any questions

## 2024-10-03 NOTE — PALLIATIVE CARE
Follow Up / Progress Note        Patient:   Sandra Benavides  YOB: 1935  Age:  89 y.o.  Room:  [address] [place] [address]  MRN:  [id number]           Received a phone call from primary RNJoyce as the MD brought up hospice to the patient and she is requesting to speak to this RN.    Family/Patient Discussion:  Met with the patient and her caregiver.  Patient shares that Dr. Gu has suggested hospice.  Patient expresses much anxiety about this.  Discussed with patient that the MD doesn't make this suggestion lightly.  Discussed with patient the fortune of the ability to get the HFNC weaned this time.  Shared that the next time may not be so tam and may spend final days in the hospital.  Discussed what the resource of hospice can offer.  Encouraged patient to listen to an informational meeting and to ask any questions regarding any fears.  Much emotional support provided.      Plan/Follow-Up:  Updated primary RNJoyce.  Updated Dr. Miranda and she will place the hospice order.  Hospice notified of the consult.         Electronically signed by Trang Winkler RN on 10/3/2024 at 8:47 AM             Palliative Care Office: [phone]

## 2024-10-03 NOTE — CARE COORDINATION
10/3/24, 2:49 PM EDT    DISCHARGE ON GOING EVALUATION    Sandra MCKEON Benavides       [facility] day: 6  Location: [address] [place] [address]A Reason for admit: Shortness of breath [R06.02]  Chest discomfort [R07.89]  Acute respiratory failure with hypoxia [J96.01]     Procedures:   9/27 Echo: EF 50-55%. Right ventricle is moderately dilated. Severe pulmonary hypertension present.     Imaging since last note: None    Barriers to Discharge: Hospitalist and Pulmonology following. Palliative Care. O2 was increased to 6L with activity, weaned down to 4L now. Sats 99%. Hospice consulted for informational meeting. Zithromax po daily. Pulmicort bid. DuoNeb q4hr WA. Prednisone daily. PT/OT.     PCP: Tacho Ny MD  Readmission Risk Score: 15.6    Patient Goals/Plan/Treatment Preferences: Lives at home alone. Has a private caregiver from [employer] that comes daily (M-F 9-3; shorter hours on S/S). Has home O2 from [facility], 4L ATC. She is also current with [facility] for PT. SW following.

## 2024-10-04 LAB
ANION GAP SERPL CALC-SCNC: 7 MEQ/L (ref 8–16)
BUN SERPL-MCNC: 23 MG/DL (ref 7–22)
CALCIUM SERPL-MCNC: 8.6 MG/DL (ref 8.5–10.5)
CHLORIDE SERPL-SCNC: 102 MEQ/L (ref 98–111)
CO2 SERPL-SCNC: 38 MEQ/L (ref 23–33)
CREAT SERPL-MCNC: 0.8 MG/DL (ref 0.4–1.2)
GFR SERPL CREATININE-BSD FRML MDRD: 70 ML/MIN/1.73M2
GLUCOSE SERPL-MCNC: 87 MG/DL (ref 70–108)
POTASSIUM SERPL-SCNC: 3.4 MEQ/L (ref 3.5–5.2)
SODIUM SERPL-SCNC: 147 MEQ/L (ref 135–145)

## 2024-10-04 PROCEDURE — 99233 SBSQ HOSP IP/OBS HIGH 50: CPT | Performed by: STUDENT IN AN ORGANIZED HEALTH CARE EDUCATION/TRAINING PROGRAM

## 2024-10-04 PROCEDURE — 6370000000 HC RX 637 (ALT 250 FOR IP)

## 2024-10-04 PROCEDURE — 36415 COLL VENOUS BLD VENIPUNCTURE: CPT

## 2024-10-04 PROCEDURE — 80048 BASIC METABOLIC PNL TOTAL CA: CPT

## 2024-10-04 PROCEDURE — 94761 N-INVAS EAR/PLS OXIMETRY MLT: CPT

## 2024-10-04 PROCEDURE — 2580000003 HC RX 258: Performed by: INTERNAL MEDICINE

## 2024-10-04 PROCEDURE — 6370000000 HC RX 637 (ALT 250 FOR IP): Performed by: NURSE PRACTITIONER

## 2024-10-04 PROCEDURE — 2580000003 HC RX 258: Performed by: STUDENT IN AN ORGANIZED HEALTH CARE EDUCATION/TRAINING PROGRAM

## 2024-10-04 PROCEDURE — 6370000000 HC RX 637 (ALT 250 FOR IP): Performed by: STUDENT IN AN ORGANIZED HEALTH CARE EDUCATION/TRAINING PROGRAM

## 2024-10-04 PROCEDURE — 6360000002 HC RX W HCPCS: Performed by: STUDENT IN AN ORGANIZED HEALTH CARE EDUCATION/TRAINING PROGRAM

## 2024-10-04 PROCEDURE — 94640 AIRWAY INHALATION TREATMENT: CPT

## 2024-10-04 PROCEDURE — 99232 SBSQ HOSP IP/OBS MODERATE 35: CPT | Performed by: INTERNAL MEDICINE

## 2024-10-04 PROCEDURE — 2700000000 HC OXYGEN THERAPY PER DAY

## 2024-10-04 PROCEDURE — 2140000000 HC CCU INTERMEDIATE R&B

## 2024-10-04 RX ORDER — FUROSEMIDE 20 MG
20 TABLET ORAL EVERY OTHER DAY
Status: DISCONTINUED | OUTPATIENT
Start: 2024-10-06 | End: 2024-10-07 | Stop reason: HOSPADM

## 2024-10-04 RX ADMIN — Medication 1 CAPSULE: at 10:08

## 2024-10-04 RX ADMIN — BUDESONIDE 1000 MCG: 0.5 INHALANT RESPIRATORY (INHALATION) at 07:05

## 2024-10-04 RX ADMIN — IPRATROPIUM BROMIDE AND ALBUTEROL SULFATE 1 DOSE: .5; 3 SOLUTION RESPIRATORY (INHALATION) at 21:25

## 2024-10-04 RX ADMIN — AZITHROMYCIN DIHYDRATE 250 MG: 250 TABLET ORAL at 10:08

## 2024-10-04 RX ADMIN — ACETAMINOPHEN 650 MG: 325 TABLET ORAL at 21:15

## 2024-10-04 RX ADMIN — SODIUM CHLORIDE, PRESERVATIVE FREE 10 ML: 5 INJECTION INTRAVENOUS at 10:12

## 2024-10-04 RX ADMIN — IPRATROPIUM BROMIDE AND ALBUTEROL SULFATE 1 DOSE: .5; 3 SOLUTION RESPIRATORY (INHALATION) at 11:50

## 2024-10-04 RX ADMIN — SODIUM CHLORIDE, PRESERVATIVE FREE 10 ML: 5 INJECTION INTRAVENOUS at 21:11

## 2024-10-04 RX ADMIN — Medication 4 ML: at 21:25

## 2024-10-04 RX ADMIN — BUDESONIDE 1000 MCG: 0.5 INHALANT RESPIRATORY (INHALATION) at 21:24

## 2024-10-04 RX ADMIN — APIXABAN 5 MG: 5 TABLET, FILM COATED ORAL at 21:11

## 2024-10-04 RX ADMIN — IPRATROPIUM BROMIDE AND ALBUTEROL SULFATE 1 DOSE: .5; 3 SOLUTION RESPIRATORY (INHALATION) at 07:05

## 2024-10-04 RX ADMIN — LISINOPRIL 5 MG: 5 TABLET ORAL at 10:10

## 2024-10-04 RX ADMIN — METOPROLOL TARTRATE 12.5 MG: 25 TABLET, FILM COATED ORAL at 10:11

## 2024-10-04 RX ADMIN — SERTRALINE 12.5 MG: 25 TABLET, FILM COATED ORAL at 10:09

## 2024-10-04 RX ADMIN — IPRATROPIUM BROMIDE AND ALBUTEROL SULFATE 1 DOSE: .5; 3 SOLUTION RESPIRATORY (INHALATION) at 15:49

## 2024-10-04 RX ADMIN — FUROSEMIDE 20 MG: 20 TABLET ORAL at 10:10

## 2024-10-04 RX ADMIN — METOPROLOL TARTRATE 12.5 MG: 25 TABLET, FILM COATED ORAL at 21:11

## 2024-10-04 RX ADMIN — PREDNISONE 40 MG: 20 TABLET ORAL at 10:10

## 2024-10-04 RX ADMIN — Medication 4 ML: at 07:05

## 2024-10-04 RX ADMIN — APIXABAN 5 MG: 5 TABLET, FILM COATED ORAL at 10:16

## 2024-10-04 ASSESSMENT — PAIN DESCRIPTION - LOCATION: LOCATION: KNEE

## 2024-10-04 ASSESSMENT — PAIN SCALES - GENERAL
PAINLEVEL_OUTOF10: 0
PAINLEVEL_OUTOF10: 1
PAINLEVEL_OUTOF10: 1

## 2024-10-04 ASSESSMENT — PAIN - FUNCTIONAL ASSESSMENT: PAIN_FUNCTIONAL_ASSESSMENT: ACTIVITIES ARE NOT PREVENTED

## 2024-10-04 ASSESSMENT — PAIN DESCRIPTION - ORIENTATION: ORIENTATION: RIGHT;LEFT

## 2024-10-04 NOTE — SIGNIFICANT EVENT
Discussed with patient at bedside after she had time to share with family her desire to transition to hospice. She is feeling peaceful with her decision and feels comfortable right now.     She expressed her desire to change her CODE STATUS to DNR-CC. This was discussed at length and patient was clear that she would like to make this change. The patient has decision making capacity -- she understands the situation and consequences, is able to reason through the risks/benefits/alternatives of treatment options and communicate her wishes voluntarily. CODE STATUS changed to DNR-CC.    Electronically signed by Dara Miranda DO on 10/4/2024 at 5:35 PM

## 2024-10-04 NOTE — TELEPHONE ENCOUNTER
Patel I received a call from Jose Benavides (Child) [phone]. He states that you tried calling him but he missed the call and it is urgent. I asked if it was Patel that left a voicemail or someone from our office and he states that it was Patel. I explained that the only thing I can see here in the chart is that was schedule a 1 month hospital follow up for the patient. Jose is requesting you to call him back at [phone]. I sent you a perfect serve regarding this as well. Please advise thanks!

## 2024-10-05 LAB
ANION GAP SERPL CALC-SCNC: 7 MEQ/L (ref 8–16)
BUN SERPL-MCNC: 25 MG/DL (ref 7–22)
CALCIUM SERPL-MCNC: 8.8 MG/DL (ref 8.5–10.5)
CHLORIDE SERPL-SCNC: 100 MEQ/L (ref 98–111)
CO2 SERPL-SCNC: 41 MEQ/L (ref 23–33)
CREAT SERPL-MCNC: 0.7 MG/DL (ref 0.4–1.2)
GFR SERPL CREATININE-BSD FRML MDRD: 82 ML/MIN/1.73M2
GLUCOSE SERPL-MCNC: 90 MG/DL (ref 70–108)
POTASSIUM SERPL-SCNC: 3.9 MEQ/L (ref 3.5–5.2)
SODIUM SERPL-SCNC: 148 MEQ/L (ref 135–145)

## 2024-10-05 PROCEDURE — 2580000003 HC RX 258: Performed by: STUDENT IN AN ORGANIZED HEALTH CARE EDUCATION/TRAINING PROGRAM

## 2024-10-05 PROCEDURE — 99232 SBSQ HOSP IP/OBS MODERATE 35: CPT | Performed by: STUDENT IN AN ORGANIZED HEALTH CARE EDUCATION/TRAINING PROGRAM

## 2024-10-05 PROCEDURE — 94640 AIRWAY INHALATION TREATMENT: CPT

## 2024-10-05 PROCEDURE — 2580000003 HC RX 258: Performed by: INTERNAL MEDICINE

## 2024-10-05 PROCEDURE — 80048 BASIC METABOLIC PNL TOTAL CA: CPT

## 2024-10-05 PROCEDURE — 6360000002 HC RX W HCPCS: Performed by: STUDENT IN AN ORGANIZED HEALTH CARE EDUCATION/TRAINING PROGRAM

## 2024-10-05 PROCEDURE — 36415 COLL VENOUS BLD VENIPUNCTURE: CPT

## 2024-10-05 PROCEDURE — 94761 N-INVAS EAR/PLS OXIMETRY MLT: CPT

## 2024-10-05 PROCEDURE — 6370000000 HC RX 637 (ALT 250 FOR IP): Performed by: NURSE PRACTITIONER

## 2024-10-05 PROCEDURE — 1200000003 HC TELEMETRY R&B

## 2024-10-05 PROCEDURE — 6370000000 HC RX 637 (ALT 250 FOR IP): Performed by: STUDENT IN AN ORGANIZED HEALTH CARE EDUCATION/TRAINING PROGRAM

## 2024-10-05 PROCEDURE — 6370000000 HC RX 637 (ALT 250 FOR IP)

## 2024-10-05 PROCEDURE — 2700000000 HC OXYGEN THERAPY PER DAY

## 2024-10-05 RX ADMIN — SERTRALINE 12.5 MG: 25 TABLET, FILM COATED ORAL at 08:14

## 2024-10-05 RX ADMIN — BUDESONIDE 1000 MCG: 0.5 INHALANT RESPIRATORY (INHALATION) at 21:29

## 2024-10-05 RX ADMIN — APIXABAN 5 MG: 5 TABLET, FILM COATED ORAL at 21:12

## 2024-10-05 RX ADMIN — IPRATROPIUM BROMIDE AND ALBUTEROL SULFATE 1 DOSE: .5; 3 SOLUTION RESPIRATORY (INHALATION) at 16:21

## 2024-10-05 RX ADMIN — PREDNISONE 30 MG: 20 TABLET ORAL at 08:13

## 2024-10-05 RX ADMIN — AZITHROMYCIN DIHYDRATE 250 MG: 250 TABLET ORAL at 08:14

## 2024-10-05 RX ADMIN — SODIUM CHLORIDE, PRESERVATIVE FREE 10 ML: 5 INJECTION INTRAVENOUS at 20:16

## 2024-10-05 RX ADMIN — IPRATROPIUM BROMIDE AND ALBUTEROL SULFATE 1 DOSE: .5; 3 SOLUTION RESPIRATORY (INHALATION) at 21:29

## 2024-10-05 RX ADMIN — LISINOPRIL 5 MG: 5 TABLET ORAL at 08:13

## 2024-10-05 RX ADMIN — METOPROLOL TARTRATE 12.5 MG: 25 TABLET, FILM COATED ORAL at 21:11

## 2024-10-05 RX ADMIN — APIXABAN 5 MG: 5 TABLET, FILM COATED ORAL at 08:14

## 2024-10-05 RX ADMIN — Medication 4 ML: at 09:02

## 2024-10-05 RX ADMIN — IPRATROPIUM BROMIDE AND ALBUTEROL SULFATE 1 DOSE: .5; 3 SOLUTION RESPIRATORY (INHALATION) at 09:02

## 2024-10-05 RX ADMIN — Medication 1 CAPSULE: at 08:13

## 2024-10-05 RX ADMIN — Medication 4 ML: at 21:29

## 2024-10-05 RX ADMIN — BUDESONIDE 1000 MCG: 0.5 INHALANT RESPIRATORY (INHALATION) at 09:07

## 2024-10-05 RX ADMIN — METOPROLOL TARTRATE 12.5 MG: 25 TABLET, FILM COATED ORAL at 08:14

## 2024-10-05 RX ADMIN — ACETAMINOPHEN 650 MG: 325 TABLET ORAL at 23:07

## 2024-10-05 RX ADMIN — SODIUM CHLORIDE, PRESERVATIVE FREE 10 ML: 5 INJECTION INTRAVENOUS at 08:15

## 2024-10-06 PROBLEM — R94.31 ABNORMAL EKG: Status: ACTIVE | Noted: 2024-10-06

## 2024-10-06 PROBLEM — R10.9 INTERMITTENT ABDOMINAL PAIN: Status: ACTIVE | Noted: 2024-10-06

## 2024-10-06 PROBLEM — I48.0 PAF (PAROXYSMAL ATRIAL FIBRILLATION) (HCC): Status: ACTIVE | Noted: 2024-10-06

## 2024-10-06 PROBLEM — J96.21 ACUTE ON CHRONIC RESPIRATORY FAILURE WITH HYPOXIA: Status: ACTIVE | Noted: 2024-09-27

## 2024-10-06 PROBLEM — D68.69 SECONDARY HYPERCOAGULABLE STATE (HCC): Status: ACTIVE | Noted: 2024-10-06

## 2024-10-06 PROBLEM — F41.9 ANXIETY DISORDER: Status: ACTIVE | Noted: 2024-10-06

## 2024-10-06 PROBLEM — D64.9 NORMOCYTIC ANEMIA: Status: ACTIVE | Noted: 2024-10-06

## 2024-10-06 PROBLEM — J47.1 BRONCHIECTASIS WITH (ACUTE) EXACERBATION (HCC): Status: ACTIVE | Noted: 2019-03-01

## 2024-10-06 PROBLEM — R00.1 SINUS BRADYCARDIA: Status: ACTIVE | Noted: 2024-10-06

## 2024-10-06 PROBLEM — E87.0 HYPERNATREMIA: Status: ACTIVE | Noted: 2024-10-06

## 2024-10-06 PROCEDURE — 1200000003 HC TELEMETRY R&B

## 2024-10-06 PROCEDURE — 94640 AIRWAY INHALATION TREATMENT: CPT

## 2024-10-06 PROCEDURE — 6370000000 HC RX 637 (ALT 250 FOR IP): Performed by: NURSE PRACTITIONER

## 2024-10-06 PROCEDURE — 2580000003 HC RX 258: Performed by: STUDENT IN AN ORGANIZED HEALTH CARE EDUCATION/TRAINING PROGRAM

## 2024-10-06 PROCEDURE — 6370000000 HC RX 637 (ALT 250 FOR IP): Performed by: STUDENT IN AN ORGANIZED HEALTH CARE EDUCATION/TRAINING PROGRAM

## 2024-10-06 PROCEDURE — 94761 N-INVAS EAR/PLS OXIMETRY MLT: CPT

## 2024-10-06 PROCEDURE — 2700000000 HC OXYGEN THERAPY PER DAY

## 2024-10-06 PROCEDURE — 6370000000 HC RX 637 (ALT 250 FOR IP)

## 2024-10-06 PROCEDURE — 6360000002 HC RX W HCPCS: Performed by: STUDENT IN AN ORGANIZED HEALTH CARE EDUCATION/TRAINING PROGRAM

## 2024-10-06 PROCEDURE — 2580000003 HC RX 258: Performed by: INTERNAL MEDICINE

## 2024-10-06 PROCEDURE — 99232 SBSQ HOSP IP/OBS MODERATE 35: CPT | Performed by: STUDENT IN AN ORGANIZED HEALTH CARE EDUCATION/TRAINING PROGRAM

## 2024-10-06 RX ORDER — TRAMADOL HYDROCHLORIDE 50 MG/1
25 TABLET ORAL ONCE
Status: COMPLETED | OUTPATIENT
Start: 2024-10-06 | End: 2024-10-06

## 2024-10-06 RX ADMIN — BUDESONIDE 1000 MCG: 0.5 INHALANT RESPIRATORY (INHALATION) at 08:49

## 2024-10-06 RX ADMIN — Medication 4 ML: at 19:58

## 2024-10-06 RX ADMIN — ACETAMINOPHEN 650 MG: 325 TABLET ORAL at 19:21

## 2024-10-06 RX ADMIN — AZITHROMYCIN DIHYDRATE 250 MG: 250 TABLET ORAL at 07:59

## 2024-10-06 RX ADMIN — SODIUM CHLORIDE, PRESERVATIVE FREE 10 ML: 5 INJECTION INTRAVENOUS at 08:20

## 2024-10-06 RX ADMIN — APIXABAN 5 MG: 5 TABLET, FILM COATED ORAL at 20:41

## 2024-10-06 RX ADMIN — APIXABAN 5 MG: 5 TABLET, FILM COATED ORAL at 07:59

## 2024-10-06 RX ADMIN — FUROSEMIDE 20 MG: 20 TABLET ORAL at 07:59

## 2024-10-06 RX ADMIN — METOPROLOL TARTRATE 12.5 MG: 25 TABLET, FILM COATED ORAL at 07:59

## 2024-10-06 RX ADMIN — IPRATROPIUM BROMIDE AND ALBUTEROL SULFATE 1 DOSE: .5; 3 SOLUTION RESPIRATORY (INHALATION) at 19:57

## 2024-10-06 RX ADMIN — LISINOPRIL 5 MG: 5 TABLET ORAL at 07:59

## 2024-10-06 RX ADMIN — IPRATROPIUM BROMIDE AND ALBUTEROL SULFATE 1 DOSE: .5; 3 SOLUTION RESPIRATORY (INHALATION) at 08:48

## 2024-10-06 RX ADMIN — IPRATROPIUM BROMIDE AND ALBUTEROL SULFATE 1 DOSE: .5; 3 SOLUTION RESPIRATORY (INHALATION) at 12:35

## 2024-10-06 RX ADMIN — Medication 4 ML: at 08:49

## 2024-10-06 RX ADMIN — Medication 1 CAPSULE: at 08:22

## 2024-10-06 RX ADMIN — BUDESONIDE 1000 MCG: 0.5 INHALANT RESPIRATORY (INHALATION) at 19:57

## 2024-10-06 RX ADMIN — SODIUM CHLORIDE, PRESERVATIVE FREE 10 ML: 5 INJECTION INTRAVENOUS at 20:42

## 2024-10-06 RX ADMIN — SERTRALINE 12.5 MG: 25 TABLET, FILM COATED ORAL at 07:59

## 2024-10-06 RX ADMIN — PREDNISONE 30 MG: 20 TABLET ORAL at 07:59

## 2024-10-06 RX ADMIN — ACETAMINOPHEN 650 MG: 325 TABLET ORAL at 13:24

## 2024-10-06 RX ADMIN — METOPROLOL TARTRATE 12.5 MG: 25 TABLET, FILM COATED ORAL at 20:41

## 2024-10-06 RX ADMIN — IPRATROPIUM BROMIDE AND ALBUTEROL SULFATE 1 DOSE: .5; 3 SOLUTION RESPIRATORY (INHALATION) at 16:29

## 2024-10-06 RX ADMIN — TRAMADOL HYDROCHLORIDE 25 MG: 50 TABLET ORAL at 21:04

## 2024-10-06 ASSESSMENT — PAIN - FUNCTIONAL ASSESSMENT: PAIN_FUNCTIONAL_ASSESSMENT: ACTIVITIES ARE NOT PREVENTED

## 2024-10-06 ASSESSMENT — PAIN DESCRIPTION - DESCRIPTORS
DESCRIPTORS: SHARP
DESCRIPTORS: ACHING;DISCOMFORT

## 2024-10-06 ASSESSMENT — PAIN DESCRIPTION - ORIENTATION
ORIENTATION: RIGHT
ORIENTATION: RIGHT
ORIENTATION: ANTERIOR
ORIENTATION: RIGHT;LEFT
ORIENTATION: RIGHT

## 2024-10-06 ASSESSMENT — PAIN DESCRIPTION - LOCATION
LOCATION: KNEE
LOCATION: FOOT
LOCATION: KNEE

## 2024-10-06 ASSESSMENT — PAIN DESCRIPTION - ONSET: ONSET: ON-GOING

## 2024-10-06 ASSESSMENT — PAIN SCALES - GENERAL
PAINLEVEL_OUTOF10: 3
PAINLEVEL_OUTOF10: 3
PAINLEVEL_OUTOF10: 5
PAINLEVEL_OUTOF10: 5
PAINLEVEL_OUTOF10: 4
PAINLEVEL_OUTOF10: 0

## 2024-10-06 ASSESSMENT — PAIN DESCRIPTION - FREQUENCY: FREQUENCY: INTERMITTENT

## 2024-10-06 ASSESSMENT — PAIN DESCRIPTION - PAIN TYPE: TYPE: CHRONIC PAIN

## 2024-10-07 VITALS
RESPIRATION RATE: 16 BRPM | BODY MASS INDEX: 30.34 KG/M2 | WEIGHT: 177.7 LBS | OXYGEN SATURATION: 96 % | DIASTOLIC BLOOD PRESSURE: 64 MMHG | TEMPERATURE: 97.8 F | HEIGHT: 64 IN | SYSTOLIC BLOOD PRESSURE: 141 MMHG | HEART RATE: 64 BPM

## 2024-10-07 PROCEDURE — 2580000003 HC RX 258: Performed by: INTERNAL MEDICINE

## 2024-10-07 PROCEDURE — 6370000000 HC RX 637 (ALT 250 FOR IP)

## 2024-10-07 PROCEDURE — 6360000002 HC RX W HCPCS: Performed by: STUDENT IN AN ORGANIZED HEALTH CARE EDUCATION/TRAINING PROGRAM

## 2024-10-07 PROCEDURE — 2700000000 HC OXYGEN THERAPY PER DAY

## 2024-10-07 PROCEDURE — 6370000000 HC RX 637 (ALT 250 FOR IP): Performed by: NURSE PRACTITIONER

## 2024-10-07 PROCEDURE — 2580000003 HC RX 258: Performed by: STUDENT IN AN ORGANIZED HEALTH CARE EDUCATION/TRAINING PROGRAM

## 2024-10-07 PROCEDURE — 97530 THERAPEUTIC ACTIVITIES: CPT

## 2024-10-07 PROCEDURE — 94761 N-INVAS EAR/PLS OXIMETRY MLT: CPT

## 2024-10-07 PROCEDURE — 6370000000 HC RX 637 (ALT 250 FOR IP): Performed by: STUDENT IN AN ORGANIZED HEALTH CARE EDUCATION/TRAINING PROGRAM

## 2024-10-07 PROCEDURE — 97535 SELF CARE MNGMENT TRAINING: CPT

## 2024-10-07 PROCEDURE — 99239 HOSP IP/OBS DSCHRG MGMT >30: CPT | Performed by: INTERNAL MEDICINE

## 2024-10-07 PROCEDURE — 94640 AIRWAY INHALATION TREATMENT: CPT

## 2024-10-07 RX ORDER — HYDROXYZINE HYDROCHLORIDE 10 MG/1
5 TABLET, FILM COATED ORAL EVERY 8 HOURS PRN
DISCHARGE
Start: 2024-10-07 | End: 2024-10-17

## 2024-10-07 RX ORDER — POLYETHYLENE GLYCOL 3350 17 G/17G
17 POWDER, FOR SOLUTION ORAL DAILY PRN
DISCHARGE
Start: 2024-10-07 | End: 2024-11-06

## 2024-10-07 RX ORDER — DICYCLOMINE HYDROCHLORIDE 10 MG/1
10 CAPSULE ORAL 4 TIMES DAILY PRN
DISCHARGE
Start: 2024-10-07

## 2024-10-07 RX ORDER — ACETAMINOPHEN 325 MG/1
650 TABLET ORAL EVERY 6 HOURS PRN
DISCHARGE
Start: 2024-10-07

## 2024-10-07 RX ORDER — PREDNISONE 20 MG/1
TABLET ORAL
DISCHARGE
Start: 2024-10-08 | End: 2024-10-18

## 2024-10-07 RX ORDER — FUROSEMIDE 20 MG
20 TABLET ORAL EVERY OTHER DAY
DISCHARGE
Start: 2024-10-08

## 2024-10-07 RX ORDER — GUAIFENESIN/DEXTROMETHORPHAN 100-10MG/5
5 SYRUP ORAL EVERY 4 HOURS PRN
DISCHARGE
Start: 2024-10-07 | End: 2024-10-17

## 2024-10-07 RX ORDER — LISINOPRIL 5 MG/1
5 TABLET ORAL DAILY
DISCHARGE
Start: 2024-10-08

## 2024-10-07 RX ADMIN — LISINOPRIL 5 MG: 5 TABLET ORAL at 08:25

## 2024-10-07 RX ADMIN — IPRATROPIUM BROMIDE AND ALBUTEROL SULFATE 1 DOSE: .5; 3 SOLUTION RESPIRATORY (INHALATION) at 08:38

## 2024-10-07 RX ADMIN — Medication 1 CAPSULE: at 08:25

## 2024-10-07 RX ADMIN — APIXABAN 5 MG: 5 TABLET, FILM COATED ORAL at 08:25

## 2024-10-07 RX ADMIN — SODIUM CHLORIDE, PRESERVATIVE FREE 10 ML: 5 INJECTION INTRAVENOUS at 08:26

## 2024-10-07 RX ADMIN — IPRATROPIUM BROMIDE AND ALBUTEROL SULFATE 1 DOSE: .5; 3 SOLUTION RESPIRATORY (INHALATION) at 12:13

## 2024-10-07 RX ADMIN — Medication 4 ML: at 08:38

## 2024-10-07 RX ADMIN — BUDESONIDE 1000 MCG: 0.5 INHALANT RESPIRATORY (INHALATION) at 08:38

## 2024-10-07 RX ADMIN — SERTRALINE 12.5 MG: 25 TABLET, FILM COATED ORAL at 08:26

## 2024-10-07 RX ADMIN — PREDNISONE 30 MG: 20 TABLET ORAL at 08:26

## 2024-10-07 RX ADMIN — DICLOFENAC SODIUM 2 G: 10 GEL TOPICAL at 12:53

## 2024-10-07 RX ADMIN — AZITHROMYCIN DIHYDRATE 250 MG: 250 TABLET ORAL at 08:25

## 2024-10-07 ASSESSMENT — PAIN DESCRIPTION - ORIENTATION
ORIENTATION: RIGHT
ORIENTATION: LEFT;RIGHT

## 2024-10-07 ASSESSMENT — PAIN DESCRIPTION - DESCRIPTORS
DESCRIPTORS: ACHING
DESCRIPTORS: ACHING;DISCOMFORT

## 2024-10-07 ASSESSMENT — PAIN DESCRIPTION - LOCATION
LOCATION: KNEE
LOCATION: KNEE

## 2024-10-07 ASSESSMENT — PAIN SCALES - GENERAL
PAINLEVEL_OUTOF10: 3
PAINLEVEL_OUTOF10: 3

## 2024-10-07 NOTE — DISCHARGE SUMMARY
DISCHARGE SUMMARY      Patient Identification:   Sandra Benavides   : 1935  MRN: [id number]   Account: [id number]      Patient's PCP: Tacho Ny MD    Admit Date: 2024     Discharge Date: 10/7/2024      Admitting Physician: Ahita Behl, MD     Discharge Physician: Dara Miranda DO     Discharge Diagnoses:    Acute on chronic hypoxic respiratory failure due to exacerbation of ILD (improved)  -Pulmonology consulted, appreciate recs  - History of ILD/pulmonary fibrosis with bronchiectasis  - At home on 5L O2 on exertion/4L O2 at rest => initially required HHFNC -- weaned to baseline with antibiotics, steroids and diuresis  - CTA chest 2024 revealed severe interstitial fibrosis, bronchiectasis and honeycombing that has progressed with more prominent involvement of the RLL, patchy scattered groundglass opacities more prominent in the left  -  proBNP 5271, WBCs 12.5, procalcitonin 0.06, COVID/flu negative  - Started on Solu-Medrol for pulmonary fibrosis and Zosyn considering worsening bronchiectasis => 10/2 started on prednisone taper, to continue at discharge  -- as patient was unable to give sputum sample and no signs infection (fever, leukocytosis, cough), discontinued antibiotics after 7 days total (zosyn -10/2)  -Is on azithromycin chronically from prior pulmonologist at [facility], to continue  -Also likely contributor is pulmonary hypertension which was noted on echo 2024 --10/1 noted improvement s/p 1 mg IV Bumex => started on Lasix 20 mg BID --> 10/2 decreased to 20 mg daily and electrolytes stable/respiratory status stable -- discharged with 20 mg lasix QOD  - Continue home budesonide, DuoNebs and as needed albuterol  -Continue O2 support with baseline 4L rest/5L activity  -- 10/4 patient decided to switch to Hospice care -- continue current medications for comfort -- will not pursue further disease modifying medications    Interstitial Lung Disease with

## 2024-10-07 NOTE — CARE COORDINATION
10/7/24, 12:09 PM EDT    Patient goals/plan/ treatment preferences discussed by  and .  Patient goals/plan/ treatment preferences reviewed with patient/ family.  Patient/ family verbalize understanding of discharge plan and are in agreement with goal/plan/treatment preferences.  Understanding was demonstrated using the teach back method.  AVS provided by RN at time of discharge, which includes all necessary medical information pertaining to the patients current course of illness, treatment, post-discharge goals of care, and treatment preferences.     Services At/After Discharge: Hospice [facility]    Per chart, pt dismissing to home today with family and continued caregivers at home and will admit to [facility] tomorrow.

## 2024-10-07 NOTE — PLAN OF CARE
Problem: Discharge Planning  Goal: Discharge to home or other facility with appropriate resources  10/3/2024 2203 by Domenica Kimbrough RN  Outcome: Progressing  Flowsheets (Taken 10/3/2024 2203)  Discharge to home or other facility with appropriate resources: Identify barriers to discharge with patient and caregiver     Problem: Safety - Adult  Goal: Free from fall injury  10/3/2024 2203 by Domenica Kimbrough RN  Outcome: Progressing  Flowsheets (Taken 10/3/2024 2203)  Free From Fall Injury: Instruct family/caregiver on patient safety     Problem: ABCDS Injury Assessment  Goal: Absence of physical injury  10/3/2024 2203 by Domenica Kimbrough RN  Outcome: Progressing  Flowsheets (Taken 10/3/2024 2203)  Absence of Physical Injury: Implement safety measures based on patient assessment     Problem: Respiratory - Adult  Goal: Achieves optimal ventilation and oxygenation  10/3/2024 2203 by Domenica Kimbrough RN  Outcome: Progressing  Flowsheets (Taken 10/3/2024 2203)  Achieves optimal ventilation and oxygenation:   Assess for changes in respiratory status   Assess for changes in mentation and behavior     Problem: Respiratory - Adult  Goal: Lung sounds clear or within normal limits for patient  10/3/2024 1943 by Brent Marin RCP  Outcome: Progressing     Problem: Musculoskeletal - Adult  Goal: Return mobility to safest level of function  10/3/2024 2203 by Domenica Kimbrough RN  Outcome: Progressing  Flowsheets (Taken 10/3/2024 2203)  Return Mobility to Safest Level of Function: Assess patient stability and activity tolerance for standing, transferring and ambulating with or without assistive devices     Problem: Musculoskeletal - Adult  Goal: Maintain proper alignment of affected body part  10/3/2024 2203 by Domenica Kimbrough RN  Outcome: Progressing     Problem: Musculoskeletal - Adult  Goal: Return ADL status to a safe level of function  10/3/2024 2203 by Domenica Kimbrough RN  Outcome: Progressing     Problem: Skin/Tissue Integrity  Goal: 
  Problem: Discharge Planning  Goal: Discharge to home or other facility with appropriate resources  10/4/2024 1042 by Danielle Scott RN  Outcome: Progressing  Flowsheets (Taken 10/3/2024 2203 by Domenica Kimbrough RN)  Discharge to home or other facility with appropriate resources: Identify barriers to discharge with patient and caregiver  Note: She is from home alone and plans on returning there at discharge.  She does have home health.    10/3/2024 2203 by Domenica Kimbrough RN  Outcome: Progressing  Flowsheets (Taken 10/3/2024 2203)  Discharge to home or other facility with appropriate resources: Identify barriers to discharge with patient and caregiver     Problem: Safety - Adult  Goal: Free from fall injury  10/4/2024 1042 by Danielle Scott RN  Outcome: Progressing  Flowsheets (Taken 10/3/2024 2203 by Domenica Kimbrough RN)  Free From Fall Injury: Instruct family/caregiver on patient safety  Note: Bed locked & in low position, call light in reach, side-rails up x2, bed/chair alarm utilized, non-slip socks on when ambulating, reminded patient to use call light to call for assistance.    10/3/2024 2203 by Domenica Kimbrough RN  Outcome: Progressing  Flowsheets (Taken 10/3/2024 2203)  Free From Fall Injury: Instruct family/caregiver on patient safety     Problem: ABCDS Injury Assessment  Goal: Absence of physical injury  10/4/2024 1042 by Danielle Scott RN  Outcome: Progressing  Flowsheets (Taken 10/3/2024 2203 by Domenica Kimbrough RN)  Absence of Physical Injury: Implement safety measures based on patient assessment  Note: Bed locked & in low position, call light in reach, side-rails up x2, bed/chair alarm utilized, non-slip socks on when ambulating, reminded patient to use call light to call for assistance.    10/3/2024 2203 by Domenica Kimbrough RN  Outcome: Progressing  Flowsheets (Taken 10/3/2024 2203)  Absence of Physical Injury: Implement safety measures based on patient assessment     Problem: Respiratory - Adult  Goal: 
  Problem: Discharge Planning  Goal: Discharge to home or other facility with appropriate resources  10/6/2024 0929 by Sera Ayon RN  Outcome: Progressing     Problem: Safety - Adult  Goal: Free from fall injury  10/6/2024 0929 by Sera Ayon RN  Outcome: Progressing     Problem: ABCDS Injury Assessment  Goal: Absence of physical injury  10/6/2024 0929 by Sera Ayon RN  Outcome: Progressing     Problem: Respiratory - Adult  Goal: Achieves optimal ventilation and oxygenation  10/6/2024 0929 by Sera Ayon RN  Outcome: Progressing     Problem: Musculoskeletal - Adult  Goal: Return mobility to safest level of function  10/6/2024 0929 by Sera Ayon RN  Outcome: Progressing     Problem: Musculoskeletal - Adult  Goal: Maintain proper alignment of affected body part  10/6/2024 0929 by Sera Ayon RN  Outcome: Progressing     Problem: Musculoskeletal - Adult  Goal: Return ADL status to a safe level of function  10/6/2024 0929 by Sera Ayon RN  Outcome: Progressing     Problem: Skin/Tissue Integrity  Goal: Absence of new skin breakdown  Description: 1.  Monitor for areas of redness and/or skin breakdown  2.  Assess vascular access sites hourly  3.  Every 4-6 hours minimum:  Change oxygen saturation probe site  4.  Every 4-6 hours:  If on nasal continuous positive airway pressure, respiratory therapy assess nares and determine need for appliance change or resting period.  10/6/2024 0929 by Sera Ayon RN  Outcome: Progressing     Problem: Pain  Goal: Verbalizes/displays adequate comfort level or baseline comfort level  10/6/2024 0929 by Sera Ayon RN  Outcome: Progressing     Care plan reviewed with patient , patient verbalized understanding of plan of care and contributed to goal setting.     
  Problem: Discharge Planning  Goal: Discharge to home or other facility with appropriate resources  9/29/2024 0949 by Aida Reeves RN  Outcome: Progressing  9/29/2024 0226 by Viviana Mac RN  Outcome: Progressing  Flowsheets (Taken 9/27/2024 1544 by Ruby López, RN)  Discharge to home or other facility with appropriate resources:   Identify barriers to discharge with patient and caregiver   Identify discharge learning needs (meds, wound care, etc)   Refer to discharge planning if patient needs post-hospital services based on physician order or complex needs related to functional status, cognitive ability or social support system     Problem: Safety - Adult  Goal: Free from fall injury  9/29/2024 0949 by Aida Reeves RN  Outcome: Progressing  9/29/2024 0226 by Viviana Mac RN  Outcome: Progressing  Flowsheets (Taken 9/29/2024 0226)  Free From Fall Injury:   Instruct family/caregiver on patient safety   Based on caregiver fall risk screen, instruct family/caregiver to ask for assistance with transferring infant if caregiver noted to have fall risk factors     Problem: ABCDS Injury Assessment  Goal: Absence of physical injury  9/29/2024 0949 by Aida Reeves RN  Outcome: Progressing  9/29/2024 0226 by Viviana Mac RN  Outcome: Progressing  Flowsheets (Taken 9/29/2024 0226)  Absence of Physical Injury: Implement safety measures based on patient assessment     Problem: Respiratory - Adult  Goal: Achieves optimal ventilation and oxygenation  9/29/2024 0949 by Aida Reeves RN  Outcome: Progressing  9/29/2024 0440 by Sulma Heard, RCP  Outcome: Progressing  9/29/2024 0226 by Viviana Mac, RN  Outcome: Progressing  Flowsheets (Taken 9/27/2024 1613 by Irina Vela, RN)  Achieves optimal ventilation and oxygenation:   Assess for changes in respiratory status   Assess for changes in mentation and behavior   Position to facilitate 
  Problem: Discharge Planning  Goal: Discharge to home or other facility with appropriate resources  9/30/2024 1138 by Aida Reeves RN  Outcome: Progressing  9/30/2024 0346 by Viviana Mac RN  Outcome: Progressing  Flowsheets (Taken 9/27/2024 1544 by Ruby López, RN)  Discharge to home or other facility with appropriate resources:   Identify barriers to discharge with patient and caregiver   Identify discharge learning needs (meds, wound care, etc)   Refer to discharge planning if patient needs post-hospital services based on physician order or complex needs related to functional status, cognitive ability or social support system     Problem: Safety - Adult  Goal: Free from fall injury  9/30/2024 1138 by Aida Reeves RN  Outcome: Progressing  9/30/2024 0346 by Viviana Mac RN  Outcome: Progressing  Flowsheets (Taken 9/29/2024 0226)  Free From Fall Injury:   Instruct family/caregiver on patient safety   Based on caregiver fall risk screen, instruct family/caregiver to ask for assistance with transferring infant if caregiver noted to have fall risk factors     Problem: ABCDS Injury Assessment  Goal: Absence of physical injury  9/30/2024 1138 by Aida Reeves RN  Outcome: Progressing  9/30/2024 0346 by Viviana Mac RN  Outcome: Progressing  Flowsheets (Taken 9/29/2024 0226)  Absence of Physical Injury: Implement safety measures based on patient assessment     Problem: Respiratory - Adult  Goal: Achieves optimal ventilation and oxygenation  9/30/2024 1138 by Aida Reeves RN  Outcome: Progressing  9/30/2024 0346 by Viviana Mac RN  Outcome: Progressing  Flowsheets (Taken 9/29/2024 1633 by Noam Hernández RCP)  Achieves optimal ventilation and oxygenation:   Assess for changes in respiratory status   Respiratory therapy support as indicated   Assess and instruct to report shortness of breath or any respiratory difficulty   
  Problem: Discharge Planning  Goal: Discharge to home or other facility with appropriate resources  9/30/2024 1138 by Aida Reeves RN  Outcome: Progressing  9/30/2024 1138 by Aida Reeves RN  Outcome: Progressing  9/30/2024 0346 by Viviana Mac RN  Outcome: Progressing  Flowsheets (Taken 9/27/2024 1544 by Ruby López RN)  Discharge to home or other facility with appropriate resources:   Identify barriers to discharge with patient and caregiver   Identify discharge learning needs (meds, wound care, etc)   Refer to discharge planning if patient needs post-hospital services based on physician order or complex needs related to functional status, cognitive ability or social support system     Problem: Safety - Adult  Goal: Free from fall injury  9/30/2024 1138 by Aida Reeves RN  Outcome: Progressing  9/30/2024 1138 by Aida Reeves RN  Outcome: Progressing  9/30/2024 0346 by Viviana Mac RN  Outcome: Progressing  Flowsheets (Taken 9/29/2024 0226)  Free From Fall Injury:   Instruct family/caregiver on patient safety   Based on caregiver fall risk screen, instruct family/caregiver to ask for assistance with transferring infant if caregiver noted to have fall risk factors     Problem: ABCDS Injury Assessment  Goal: Absence of physical injury  9/30/2024 1138 by Aida Reeves RN  Outcome: Progressing  9/30/2024 1138 by Aida Reeves RN  Outcome: Progressing  9/30/2024 0346 by Viviana Mac RN  Outcome: Progressing  Flowsheets (Taken 9/29/2024 0226)  Absence of Physical Injury: Implement safety measures based on patient assessment     Problem: Respiratory - Adult  Goal: Achieves optimal ventilation and oxygenation  9/30/2024 1138 by Aida Reeves RN  Outcome: Progressing  9/30/2024 1138 by Aida Reeves RN  Outcome: Progressing  9/30/2024 0346 by Viviana Mac RN  Outcome: Progressing  Flowsheets 
  Problem: Discharge Planning  Goal: Discharge to home or other facility with appropriate resources  Outcome: Progressing     Problem: Safety - Adult  Goal: Free from fall injury  Outcome: Progressing     Problem: ABCDS Injury Assessment  Goal: Absence of physical injury  Outcome: Progressing     Problem: Respiratory - Adult  Goal: Achieves optimal ventilation and oxygenation  10/6/2024 2351 by Carole Wan RN  Outcome: Progressing    Goal: Promote optimal lung function  Outcome: Progressing  Goal: Lung sounds clear or within normal limits for patient  Outcome: Progressing     Problem: Musculoskeletal - Adult  Goal: Return mobility to safest level of function  Outcome: Progressing  Goal: Maintain proper alignment of affected body part  Outcome: Progressing  Goal: Return ADL status to a safe level of function  Outcome: Progressing     Problem: Skin/Tissue Integrity  Goal: Absence of new skin breakdown  Description: 1.  Monitor for areas of redness and/or skin breakdown  2.  Assess vascular access sites hourly  3.  Every 4-6 hours minimum:  Change oxygen saturation probe site  4.  Every 4-6 hours:  If on nasal continuous positive airway pressure, respiratory therapy assess nares and determine need for appliance change or resting period.  Outcome: Progressing     Problem: Pain  Goal: Verbalizes/displays adequate comfort level or baseline comfort level  Outcome: Progressing     Care plan reviewed with patient.  Patient verbalize understanding of the plan of care and contribute to goal setting.     
  Problem: Discharge Planning  Goal: Discharge to home or other facility with appropriate resources  Outcome: Progressing  Flowsheets (Taken 10/1/2024 0800 by Irina Vela, RN)  Discharge to home or other facility with appropriate resources: Identify barriers to discharge with patient and caregiver     Problem: Safety - Adult  Goal: Free from fall injury  Outcome: Progressing  Flowsheets (Taken 9/29/2024 0226)  Free From Fall Injury:   Instruct family/caregiver on patient safety   Based on caregiver fall risk screen, instruct family/caregiver to ask for assistance with transferring infant if caregiver noted to have fall risk factors     Problem: ABCDS Injury Assessment  Goal: Absence of physical injury  Outcome: Progressing  Flowsheets (Taken 9/29/2024 0226)  Absence of Physical Injury: Implement safety measures based on patient assessment     Problem: Respiratory - Adult  Goal: Achieves optimal ventilation and oxygenation  Outcome: Progressing  Flowsheets (Taken 10/1/2024 0800 by Irina Vela, RN)  Achieves optimal ventilation and oxygenation: Assess for changes in respiratory status     Problem: Musculoskeletal - Adult  Goal: Return mobility to safest level of function  Outcome: Progressing  Flowsheets (Taken 10/1/2024 0800 by Irina Vela, RN)  Return Mobility to Safest Level of Function: Assess patient stability and activity tolerance for standing, transferring and ambulating with or without assistive devices  Goal: Maintain proper alignment of affected body part  Outcome: Progressing  Flowsheets (Taken 10/1/2024 0800 by Irina Vela, RN)  Maintain proper alignment of affected body part: Support and protect limb and body alignment per provider's orders  Goal: Return ADL status to a safe level of function  Outcome: Progressing  Flowsheets (Taken 10/1/2024 0800 by Irina Vela, RN)  Return ADL Status to a Safe Level of Function: Administer medication as ordered     Problem: Skin/Tissue 
  Problem: Discharge Planning  Goal: Discharge to home or other facility with appropriate resources  Outcome: Progressing  Flowsheets (Taken 10/2/2024 0830 by Irina Vela, RN)  Discharge to home or other facility with appropriate resources: Identify barriers to discharge with patient and caregiver     Problem: Safety - Adult  Goal: Free from fall injury  Outcome: Progressing  Flowsheets (Taken 9/29/2024 0226 by Viviana Mac, RN)  Free From Fall Injury:   Instruct family/caregiver on patient safety   Based on caregiver fall risk screen, instruct family/caregiver to ask for assistance with transferring infant if caregiver noted to have fall risk factors  Note: Bed locked & in low position, call light in reach, side-rails up x2, bed/chair alarm utilized, non-slip socks on when ambulating, reminded patient to use call light to call for assistance.      Problem: ABCDS Injury Assessment  Goal: Absence of physical injury  Outcome: Progressing  Flowsheets (Taken 9/29/2024 0226 by Viviana Mac, RN)  Absence of Physical Injury: Implement safety measures based on patient assessment     Problem: Respiratory - Adult  Goal: Achieves optimal ventilation and oxygenation  10/3/2024 1001 by Joyce Aranda, RN  Outcome: Progressing  Flowsheets (Taken 10/2/2024 2225 by Vale Zaldivar RCP)  Achieves optimal ventilation and oxygenation:   Assess for changes in respiratory status   Position to facilitate oxygenation and minimize respiratory effort   Respiratory therapy support as indicated   Assess for changes in mentation and behavior   Oxygen supplementation based on oxygen saturation or arterial blood gases   Encourage broncho-pulmonary hygiene including cough, deep breathe, incentive spirometry   Assess and instruct to report shortness of breath or any respiratory difficulty     Problem: Musculoskeletal - Adult  Goal: Return mobility to safest level of function  Outcome: Progressing  Flowsheets (Taken 
  Problem: Discharge Planning  Goal: Discharge to home or other facility with appropriate resources  Outcome: Progressing  Flowsheets (Taken 10/3/2024 2203 by Domenica Kimbrough, RN)  Discharge to home or other facility with appropriate resources: Identify barriers to discharge with patient and caregiver     Problem: Safety - Adult  Goal: Free from fall injury  Outcome: Progressing  Flowsheets (Taken 10/3/2024 2203 by Domenica Kimbrough RN)  Free From Fall Injury: Instruct family/caregiver on patient safety     Problem: ABCDS Injury Assessment  Goal: Absence of physical injury  Outcome: Progressing  Flowsheets (Taken 10/3/2024 2203 by Domenica Kimbrough, RN)  Absence of Physical Injury: Implement safety measures based on patient assessment     Problem: Respiratory - Adult  Goal: Achieves optimal ventilation and oxygenation  10/5/2024 0925 by Joyce Aranda RN  Outcome: Progressing  Flowsheets (Taken 10/4/2024 1548 by Ca Kebede RCP)  Achieves optimal ventilation and oxygenation:   Assess for changes in respiratory status   Assess and instruct to report shortness of breath or any respiratory difficulty   Respiratory therapy support as indicated   Oxygen supplementation based on oxygen saturation or arterial blood gases     Problem: Musculoskeletal - Adult  Goal: Return mobility to safest level of function  Outcome: Progressing  Flowsheets (Taken 10/3/2024 2203 by Domenica Kimbrough RN)  Return Mobility to Safest Level of Function: Assess patient stability and activity tolerance for standing, transferring and ambulating with or without assistive devices     Problem: Musculoskeletal - Adult  Goal: Maintain proper alignment of affected body part  Outcome: Progressing  Flowsheets (Taken 10/2/2024 0830 by Irina Vela, RN)  Maintain proper alignment of affected body part: Support and protect limb and body alignment per provider's orders     Problem: Musculoskeletal - Adult  Goal: Return ADL status to a safe level of 
  Problem: Discharge Planning  Goal: Discharge to home or other facility with appropriate resources  Outcome: Progressing  Flowsheets (Taken 9/27/2024 1544 by Ruby López, RN)  Discharge to home or other facility with appropriate resources:   Identify barriers to discharge with patient and caregiver   Identify discharge learning needs (meds, wound care, etc)   Refer to discharge planning if patient needs post-hospital services based on physician order or complex needs related to functional status, cognitive ability or social support system     Problem: Safety - Adult  Goal: Free from fall injury  Outcome: Progressing  Flowsheets (Taken 9/29/2024 0226)  Free From Fall Injury:   Instruct family/caregiver on patient safety   Based on caregiver fall risk screen, instruct family/caregiver to ask for assistance with transferring infant if caregiver noted to have fall risk factors     Problem: ABCDS Injury Assessment  Goal: Absence of physical injury  Outcome: Progressing  Flowsheets (Taken 9/29/2024 0226)  Absence of Physical Injury: Implement safety measures based on patient assessment     Problem: Respiratory - Adult  Goal: Achieves optimal ventilation and oxygenation  9/30/2024 0346 by Viviana Mac, RN  Outcome: Progressing  Flowsheets (Taken 9/29/2024 1633 by Noam Hernández RCP)  Achieves optimal ventilation and oxygenation:   Assess for changes in respiratory status   Respiratory therapy support as indicated   Assess and instruct to report shortness of breath or any respiratory difficulty  9/29/2024 2100 by Sulma Heard RCP  Outcome: Progressing  Flowsheets  Taken 9/29/2024 1633 by Noam Hernández RCP  Achieves optimal ventilation and oxygenation:   Assess for changes in respiratory status   Respiratory therapy support as indicated   Assess and instruct to report shortness of breath or any respiratory difficulty  Taken 9/29/2024 1034 by Noam Hernández RCP  Achieves optimal ventilation 
  Problem: Discharge Planning  Goal: Discharge to home or other facility with appropriate resources  Outcome: Progressing  Flowsheets (Taken 9/27/2024 1544 by Ruby López, RN)  Discharge to home or other facility with appropriate resources:   Identify barriers to discharge with patient and caregiver   Identify discharge learning needs (meds, wound care, etc)   Refer to discharge planning if patient needs post-hospital services based on physician order or complex needs related to functional status, cognitive ability or social support system     Problem: Safety - Adult  Goal: Free from fall injury  Outcome: Progressing  Flowsheets (Taken 9/29/2024 0226)  Free From Fall Injury:   Instruct family/caregiver on patient safety   Based on caregiver fall risk screen, instruct family/caregiver to ask for assistance with transferring infant if caregiver noted to have fall risk factors     Problem: ABCDS Injury Assessment  Goal: Absence of physical injury  Outcome: Progressing  Flowsheets (Taken 9/29/2024 0226)  Absence of Physical Injury: Implement safety measures based on patient assessment     Problem: Respiratory - Adult  Goal: Achieves optimal ventilation and oxygenation  Outcome: Progressing  Flowsheets (Taken 9/27/2024 1613 by Irina Vela, RN)  Achieves optimal ventilation and oxygenation:   Assess for changes in respiratory status   Assess for changes in mentation and behavior   Position to facilitate oxygenation and minimize respiratory effort     Problem: Musculoskeletal - Adult  Goal: Return mobility to safest level of function  Outcome: Progressing  Flowsheets (Taken 9/27/2024 1415 by Irina Vela, RN)  Return Mobility to Safest Level of Function: Assess patient stability and activity tolerance for standing, transferring and ambulating with or without assistive devices  Goal: Maintain proper alignment of affected body part  Outcome: Progressing  Flowsheets (Taken 9/27/2024 1415 by Irina Vela, 
  Problem: Respiratory - Adult  Goal: Achieves optimal ventilation and oxygenation  10/2/2024 2225 by Vale Zaldivar RCP  Outcome: Progressing  Flowsheets  Taken 10/2/2024 2225 by Vale Zaldivar RCP  Achieves optimal ventilation and oxygenation:   Assess for changes in respiratory status   Position to facilitate oxygenation and minimize respiratory effort   Respiratory therapy support as indicated   Assess for changes in mentation and behavior   Oxygen supplementation based on oxygen saturation or arterial blood gases   Encourage broncho-pulmonary hygiene including cough, deep breathe, incentive spirometry   Assess and instruct to report shortness of breath or any respiratory difficulty    
  Problem: Respiratory - Adult  Goal: Achieves optimal ventilation and oxygenation  10/3/2024 0742 by Leslie Chaudhry, RCP  Outcome: Progressing     
  Problem: Respiratory - Adult  Goal: Achieves optimal ventilation and oxygenation  10/3/2024 1943 by Brent Marin RCP  Outcome: Progressing     Problem: Respiratory - Adult  Goal: Lung sounds clear or within normal limits for patient  Outcome: Progressing     
  Problem: Respiratory - Adult  Goal: Achieves optimal ventilation and oxygenation  10/3/2024 2203 by Domenica Kimbrough RN  Outcome: Progressing  Flowsheets (Taken 10/3/2024 2203)  Achieves optimal ventilation and oxygenation:   Assess for changes in respiratory status   Assess for changes in mentation and behavior  10/3/2024 1943 by Brent Marin RCP  Outcome: Progressing  Goal: Lung sounds clear or within normal limits for patient  10/4/2024 0708 by Kaila Partida RCP  Outcome: Progressing  Note: Cont aerosol to improve aeration  Patient mutually agreed on goals.    10/3/2024 1943 by Brent Marin RCP  Outcome: Progressing     
  Problem: Respiratory - Adult  Goal: Achieves optimal ventilation and oxygenation  10/5/2024 2133 by Prabha King RCP  Outcome: Progressing     Problem: Respiratory - Adult  Goal: Promote optimal lung function  10/5/2024 2133 by Prabha King RCP  Outcome: Progressing     Problem: Respiratory - Adult  Goal: Lung sounds clear or within normal limits for patient  10/5/2024 2133 by Prabha King RCP  Outcome: Progressing     Patient mutually agreed on goals.  
  Problem: Respiratory - Adult  Goal: Achieves optimal ventilation and oxygenation  10/6/2024 0852 by Abdoul Sethi, SUYAPA  Outcome: Progressing     Problem: Respiratory - Adult  Goal: Lung sounds clear or within normal limits for patient  10/6/2024 0852 by Abdoul Sethi, TRACIP  Outcome: Progressing   Patient mutually agreed on goals.    
  Problem: Respiratory - Adult  Goal: Achieves optimal ventilation and oxygenation  10/6/2024 2002 by Bárbara Baker RCP  Outcome: Progressing     Problem: Respiratory - Adult  Goal: Promote optimal lung function  Outcome: Progressing     Problem: Respiratory - Adult  Goal: Lung sounds clear or within normal limits for patient  10/6/2024 2002 by Bárbara Baker RCP  Outcome: Progressing   Patient mutually agrees upon goals.   
  Problem: Respiratory - Adult  Goal: Achieves optimal ventilation and oxygenation  10/7/2024 0842 by Deepa Victoria RCP  Outcome: Progressing     Problem: Respiratory - Adult  Goal: Promote optimal lung function  10/7/2024 0842 by Deepa Victoria RCP  Outcome: Progressing     Problem: Respiratory - Adult  Goal: Lung sounds clear or within normal limits for patient  10/7/2024 0842 by Deepa Victoria RCP  Outcome: Progressing     
  Problem: Respiratory - Adult  Goal: Achieves optimal ventilation and oxygenation  9/27/2024 1613 by Irina Vela, RN  Outcome: Progressing  Flowsheets (Taken 9/27/2024 1613)  Achieves optimal ventilation and oxygenation:   Assess for changes in respiratory status   Assess for changes in mentation and behavior   Position to facilitate oxygenation and minimize respiratory effort  9/27/2024 1606 by Viviana Junior RCP  Outcome: Progressing  Flowsheets (Taken 9/27/2024 1605)  Achieves optimal ventilation and oxygenation:   Respiratory therapy support as indicated   Oxygen supplementation based on oxygen saturation or arterial blood gases     
  Problem: Respiratory - Adult  Goal: Achieves optimal ventilation and oxygenation  9/29/2024 0440 by Sulma Heard, SUYAPA  Outcome: Progressing   Remains on HFNC to support breathing and oxygenation. Will continue weaning as tolerated.    Patient mutually agreed on goals.      
  Problem: Respiratory - Adult  Goal: Achieves optimal ventilation and oxygenation  9/29/2024 2100 by Sulma Heard RCP  Outcome: Progressing  Flowsheets  Taken 9/29/2024 1633 by Noam Hernández RCP  Achieves optimal ventilation and oxygenation:   Assess for changes in respiratory status   Respiratory therapy support as indicated   Assess and instruct to report shortness of breath or any respiratory difficulty  Taken 9/29/2024 1034 by Noam Hernández RCP  Achieves optimal ventilation and oxygenation:   Assess for changes in respiratory status   Respiratory therapy support as indicated   Assess and instruct to report shortness of breath or any respiratory difficulty   Remains on HFNC to support breathing and oxygenation. Will continue to wean as tolerated.  Problem: Respiratory - Adult  Goal: Lung sounds clear or within normal limits for patient  Outcome: Progressing    Patient lung sounds are considered normal for their current lung condition. No signs of distress noted. Current treatment regimen appropriate      Patient mutually agreed on goals.      
  Problem: Respiratory - Adult  Goal: Achieves optimal ventilation and oxygenation  Outcome: Progressing   Continue HFNC until oxygen is stable to place back to home settings  
  Problem: Respiratory - Adult  Goal: Achieves optimal ventilation and oxygenation  Outcome: Progressing  Goal: Promote optimal lung function  Outcome: Progressing  Goal: Lung sounds clear or within normal limits for patient  Outcome: Progressing     
  Problem: Respiratory - Adult  Goal: Lung sounds clear or within normal limits for patient  Outcome: Progressing     
  Problem: Respiratory - Adult  Goal: Lung sounds clear or within normal limits for patient  Outcome: Progressing    Continue tx's to improve breath sounds, increase aeration and decrease WOB.  
  Problem: Respiratory - Adult  Goal: Promote optimal lung function  Outcome: Progressing   Patient on HHFNC, weaning as tolerated to maintain SPO2 greater than 90%.   Patient mutually agrees with goal of care   
  Problem: Safety - Adult  Goal: Free from fall injury  10/2/2024 1447 by Irina Vela, RN  Outcome: Progressing  10/2/2024 0345 by Viviana Mac, RN  Outcome: Progressing  Flowsheets (Taken 9/29/2024 0226)  Free From Fall Injury:   Instruct family/caregiver on patient safety   Based on caregiver fall risk screen, instruct family/caregiver to ask for assistance with transferring infant if caregiver noted to have fall risk factors     
Consult received.  Please see SW note dated 10/2/2024.   
Joyce Aranda, RN  Outcome: Progressing  Flowsheets (Taken 10/2/2024 0830 by Irina Vela, RN)  Verbalizes/displays adequate comfort level or baseline comfort level: Encourage patient to monitor pain and request assistance

## 2024-10-07 NOTE — CARE COORDINATION
10/7/24, 7:24 AM EDT    DISCHARGE BARRIERS        Patient transferred to [facility]. Report given to unit Shilpa BIANCHI, regarding discharge plan for this patient.

## 2024-10-07 NOTE — PROGRESS NOTES
Hospitalist Progress Note      Patient:  Sandra Benavides    Unit/Bed:3B-35/035-A  YOB: 1935  MRN: [id number]   Acct: [id number]     PCP: Tacho Ny MD  Date of Admission: 9/27/2024       Chief Complaint: Shortness of breath    Assessment and Plan:-  Acute on chronic hypoxic respiratory failure acute exacerbation of ILD/pulmonary fibrosis with bronchiectasis: Refused any biopsy or VATS procedure in the past.  No smoking history.  Followed by JASMEET Chacon-CNP outpatient. She usually requires 5 liters oxygen on exertion and 4 liter oxygen at rest.  She is requiring high flow nasal cannula on admission.  CTA on admission negative for PE but showed severe interstitial fibrosis, bronchiectasis and honeycombing has progressed with more prominent involvement of the right lower lobe.  9/28 seen by pulm- conintue solumedrol, abs- wean hi flow as able  Elevated troponin: Troponin trend: 32>34>32.  Flat. EKG with sinus rhythm with PACs.  Left posterior fascicular block.  T wave inversions in anterolateral leads and inferior leads.  T wave inversions in lateral leads and inferior leads appear new. No chest pain but has discomfort on left side of chest whenever her oxygen drops.  Type I versus type II NSTEMI.  Suspect likely demand related.  Will get TTE.  Monitor on telemetry.  If has recurrent episodes of chest pain or TTE is concerning, consider cardiology consult.  9/28- REPEAT EKG AND TROP  Paroxysmal atrial fibrillation: Continue home Eliquis and metoprolol.- rate controlled  History of pseudogout: Noted  Anxiety: Continue home sertraline.  Hydroxyzine as needed.  Hypertension: Continue home antihypertensives with hold parameters.  Goals of care: Per discussion, CODE STATUS changed to limited x 4.  Will get palliative on board given her advanced disease.      History Of Present Illness:    Ms. Sandra Benavides is a 89 y.o. female who presents with shortness of 
  [facility]    Patient - Sandra Benavides   MRN -  [id number]   [facility] # - [id number]   - 1935      Date of Admission -  2024  9:58 AM  Date of evaluation -  10/4/2024  Room - 3B-35/035-   Hospital Day - 7  Consulting - Wilbert Kenney DO Primary Care Physician - Tacho Ny MD   Reason for consult    Acute on chronic respiratory failure, Hx ILD  Active Hospital Problem List      Active Hospital Problems    Diagnosis Date Noted    ILD (interstitial lung disease) (HCC) [J84.9] 10/03/2024    Severe pulmonary hypertension (HCC) [I27.20] 10/03/2024    Acute respiratory failure with hypoxia [J96.01] 2024    IPF (idiopathic pulmonary fibrosis) ([facility]) [J84.112]      HPI   Ms. Sandra Benavides is a 89 y.o. with past medical history of ILD diagnosed in 2017 on Prednisone 10 mg daily and Home Oxygen 4 LPM female who presents with worsening shortness of breath.     Symptoms started last week (about 10 days), have been progressively worsening.  Oxygen has been dropping intermittently throughout the day during exertion, taking longer time to rebound to normal. Oxygen was as low as 55%, that's the lowest number that she noted at home.      No chest pain but has discomfort on left side of chest whenever her oxygen drops. No fever or chills. No cough more then usual. Does have lot of congestion in morning which is normal for her. No nausea or vomiting. No abdominal pain. No issues with urination or bm.      No ETOH use. No recent or prior tobacco use or recreational drug use.     She usually requires 5 liters oxygen on exertion and 4 liter oxygen at rest.  She was requiring high flow nasal cannula while at ER, CT showed worsening pulmonary fibrosis, thus hospitalist were contacted for admission.    CT chest showed  (interstitial fibrosis, bronchiectasis, and  honeycombing has progressed with more prominent involvement of the right upper and lower  Lobe)    Patient is 
  PROGRESS NOTE      Patient:  Sandra Benavides  Unit/Bed:3B-35/035-A  YOB: 1935  MRN: [id number]   Acct: [id number]    PCP: Tacho Ny MD    Date of Admission: 9/27/2024 LOS: 3    Date of Evaluation:  9/30/2024    Anticipated Discharge: Pending clinical course    Assessment/Plan:    Acute on chronic hypoxic respiratory failure due to exacerbation of ILD  -Pulmonology consulted, appreciate recs  - History of ILD/pulmonary fibrosis with bronchiectasis  - At home on 5L O2 on exertion/4L O2 at rest => required HHFNC since admission  - CTA chest 9/27/2024 revealed severe interstitial fibrosis, bronchiectasis and honeycombing that has progressed with more prominent involvement of the RLL, patchy scattered groundglass opacities more prominent in the left  - 9/27 proBNP 5271, WBCs 12.5, procalcitonin 0.06, COVID/flu negative  - Started on Solu-Medrol for pulmonary fibrosis and Zosyn considering worsening bronchiectasis => per pulmonology plans to continue Solu-Medrol 1 mg/kg and do slow taper over 6 weeks and add PCP ppx, obtain sputum culture and Zosyn to continue until culture negative  -Is on azithromycin chronically from prior pulmonologist at [facility], continued for current stay  - Continue home budesonide, DuoNebs and as needed albuterol  - MRSA 9/27 screen negative, respiratory cultures pending  -Continue O2 support with HHFNC for nasal cannula for work of breathing and to keep SpO2> 92%, wean to room air as tolerated    Interstitial Lung Disease with pulmonary fibrosis  -follows outpatient with JASMEET Chacon-CNP and has refused biopsy/VATS procedure in the past  - At home on 5L O2 on exertion/4L O2 at rest   -Since 2017, on prednisone 10 mg daily and azithromycin chronically  - Pulmonology consulted, appreciate recommendations    Bronchiectasis  - Thought likely due to history of MAC infection in 2017  - Pulmonology consulted, appreciate recommendations    Elevated troponin  - 
  Pharmacy Note - Extended Infusion Beta-Lactam Dose Adjustment    Piperacillin/Tazobactam 4500 mg q6h extended infusion ordered for the treatment of Community acquired pneumonia. Per [facility] Extended Infusion Beta-Lactam Policy, this will be changed to 4500 mg loading dose followed by 4500 mg q8h extended infusion    Estimated Creatinine Clearance: Estimated Creatinine Clearance: 53 mL/min (based on SCr of 0.7 mg/dL).    Dialysis Status, [place], CKD: Normal    BMI: Body mass index is 27.46 kg/m².    Rationale for Adjustment: Dose adjusted per [facility] Extended Infusion Policy based on renal function and indication. The above medication is renally eliminated and demonstrates time-dependent effects on bacterial eradication. Extended-infusion dosing strategy aims to enhance microbiologic and clinical efficacy.     Pharmacy will monitor renal function daily and adjust dose as necessary.      Please call with any questions.    Thank you,  Bridget Palafox, PharmD, BCPS 9/27/2024 1:41 PM      
  [facility]    Patient - Sandra Benavides   MRN -  [id number]   [facility] # - [id number]   - 1935      Date of Admission -  2024  9:58 AM  Date of evaluation -  2024  Room - -35/035-   Hospital Day - 2  Consulting - Nargis Cardenas MD Primary Care Physician - Tacho Ny MD   Chief Complaint   Acute on chronic hypoxic respiratory failure  Acute exacerbation of ILD  Active Hospital Problem List      Active Hospital Problems    Diagnosis Date Noted    Acute respiratory failure with hypoxia [J96.01] 2024     HPI   Ms. Sandra Benavides is a 89 y.o. with past medical history of ILD diagnosed in 2017 on Prednisone 10 mg daily and Home Oxygen 4 LPM female who presents with worsening shortness of breath.     Symptoms started last week (about 10 days), have been progressively worsening.  Oxygen has been dropping intermittently throughout the day during exertion, taking longer time to rebound to normal. Oxygen was as low as 55%, that's the lowest number that she noted at home.      No chest pain but has discomfort on left side of chest whenever her oxygen drops. No fever or chills. No cough more then usual. Does have lot of congestion in morning which is normal for her. No nausea or vomiting. No abdominal pain. No issues with urination or bm.      No ETOH use. No recent or prior tobacco use or recreational drug use.     She usually requires 5 liters oxygen on exertion and 4 liter oxygen at rest.  She was requiring high flow nasal cannula while at ER, CT showed worsening pulmonary fibrosis, thus hospitalist were contacted for admission.    CT chest showed  (interstitial fibrosis, bronchiectasis, and  honeycombing has progressed with more prominent involvement of the right upper and lower  Lobe)    Patient is never smoker, she used to work as a manager in the hospital  No history of Occupational or environmental exposure  No history of CTD  No COVID  No 
All discharge instructions given to patient and family with no further questions at this time. Patient discharged off unit via wheelchair. Chart contents placed in yellow bin.     
[facility]   PROGRESS NOTE      Patient: Sandra Benavides  Room #: 3B-35/035-A            YOB: 1935  Age: 89 y.o.  Gender: female            Admit Date & Time: 9/27/2024  9:58 AM    Assessment:    The patient was attempted to be visited and is currently in active care. The care is such to inhibit a spiritual care visit at this time.     Interventions:  A the patient was unable to be visited at this time. A prayer was provided outside the patient's room.     Outcomes:  The patient continue to be followed by spiritual care.     Plan:  1.Spiritual care will continue to follow the patient according to [facility] spiritual care SOP.       Electronically signed by Chaplain Corby, on 10/3/2024 at 10:15 AM.  Spiritual Care Department  [facility]  [phone]     10/03/24 1014   Encounter Summary   Encounter Overview/Reason Initial Encounter   Service Provided For Patient   Referral/Consult From [facility]   Support System Family members   Last Encounter  10/03/24   Complexity of Encounter Low   Begin Time 0910   End Time  0915   Total Time Calculated 5 min   Spiritual/Emotional needs   Type Spiritual Support   Assessment/Intervention/Outcome   Assessment Unable to assess   Intervention Prayer (assurance of)/[place]   Outcome Did not respond   Plan and Referrals   Plan/Referrals Continue to visit, (comment)       
Comprehensive Nutrition Assessment    Type and Reason for Visit:  Initial, RD Nutrition Re-Screen/LOS    Nutrition Recommendations/Plan:   Continue current diet -encouraged intake at best efforts      Malnutrition Assessment:  Malnutrition Status:  At risk for malnutrition (Comment) (10/07/24 1019)    Context:  Acute Illness     Findings of the 6 clinical characteristics of malnutrition:  Energy Intake:  Mild decrease in energy intake (Comment)  Weight Loss:  No significant weight loss     Body Fat Loss:  Unable to assess (advanced age)     Muscle Mass Loss:  Unable to assess (advanced age)    Fluid Accumulation:  Mild Generalized   Strength:  Not Performed    Nutrition Assessment:     Pt. nutritionally compromised AEB variable intake since admit.  At risk for further nutrition compromise r/t admit with shortness of breath, acute on chronic respiratory failure due to exacerbation interstitial lung disease and underlying medical condition (HTN, HLD, OA, constipation).       Nutrition Related Findings:    Pt. Report/Treatments/Miscellaneous: Patient seen with family and reports being picky eater but eats well when likes the meal, note report of poor appetite/ intake on admit, shortness of breath which started in July, patient reports pleased to be discharging home today, discussed importance of good nutrition and intake at best efforts, declines ONS due to ONS upsets her stomach     GI Status: BM 10/6   Pertinent Labs: glucose 90  Pertinent Meds: zithromax,, lasix, culturelle, deltasone      Wound Type: None       Current Nutrition Intake & Therapies:    Average Meal Intake: 26-50%, 51-75%, %  Average Supplements Intake:  (declines)  ADULT DIET; Regular    Anthropometric Measures:  Height: 162.6 cm (5' 4\")  Ideal Body Weight (IBW): 120 lbs (55 kg)    Admission Body Weight: 76.4 kg (168 lb 8 oz) (10/1; +1 generalized edema)  Current Body Weight: 80.6 kg (177 lb 11.2 oz) (10/4; no edema),   IBW.    Current BMI 
[facility]  STRZ ONC MED 5K  Occupational Therapy  Daily Note    Discharge Recommendations: Continue to assess pending progress and Home with assist as needed from caregiver  Equipment Recommendations: No        Time In: 1054  Time Out: 1132  Timed Code Treatment Minutes: 38 Minutes  Minutes: 38          Date: 10/7/2024  Patient Name: Sandra Benavides,   Gender: female      Room: [address] [place] [address]  MRN: [id number]  : 1935  (89 y.o.)  Referring Practitioner: Dara iMranda DO  Diagnosis: Acute respiratory failur with hypoxia  Additional Pertinent Hx: 89 y.o. female who presents with shortness of breath. Symptoms started last week (about 10 days), have been progressively worsening.  Oxygen has been dropping intermittently throughout the day during exertion, taking longer time to rebound to normal. Oxygen was as low as 55%, that's the lowest number that she noted at home. MD Impression: Acute on chronic hypoxic respiratory failure acute exacerbation of ILD/pulmonary fibrosis with bronchiectasis    Restrictions/Precautions:  Restrictions/Precautions: Fall Risk, General Precautions  Position Activity Restriction  Other position/activity restrictions: *monitor O2     Social/Functional History:  Lives With: Spouse  Type of Home: House  Home Layout: One level  Home Access: Stairs to enter with rails  Entrance Stairs - Number of Steps: 2  Entrance Stairs - Rails: Right  Home Equipment: Walker - Rolling, Oxygen, Wheelchair - Manual (BSC)   Bathroom Shower/Tub: Walk-in shower  Bathroom Toilet: Handicap height  Bathroom Equipment: Grab bars in shower, Tub transfer bench  Bathroom Accessibility: Accessible       ADL Assistance: Needs assistance  Homemaking Assistance: Needs assistance  Homemaking Responsibilities: No  Ambulation Assistance: Independent  Transfer Assistance: Independent    Active : No  Patient's  Info: Caregiver or family     Additional Comments: Pt has a caregiver 8 hrs/day during 
Hospice met with Sandra and her son Jose, who is here to assist with her care and getting her home where she wishes to be. Sandra is aware of her poor prognosis and wants to spend remainder of what she has in her own home, with her hired caregiver/ [employer] to assist with care Monday thru Friday.   Wishes for [facility] by [facility] for hospice. Will line up equipment needs, supplies, and obtain comfort medication orders form hospice medical director, Dr. Nargis Phelps. Planned admit at 1100 on 10.08.2024 in patient home. She wishes to get home today and settled in prior to someone coming to home. Inquired about medications as she has several at [facility] in [place] waiting . Assured her that admitting nurse will go over these and figure out what is being continued and not- after review with provider. They will wait to pick these medications up.   Updated Dr. Miranda that patient is ready to go and admit tomorrow to hospice, will complete discharge orders along with signing a DNR-CC. Updated primary nurse Yola CUBA, who will fax AVS to hospice office, as well as signed DNR-CC.   Equipment ordered from DME with order entered into EPIC.   
Hospice referral completed with Sandra in her room, offered for her sons to be involved via phone or wait for her caregiver to come back from an appointment. Her sons live out of town in [place] and [place]. Noted to be anxious at beginning of meeting and wanted to ensure someone present with her or with her via phone. She voiced that it was okay to continue with informational meeting as long as nurse would leave phone number for her sons, let her know that nurse would do this.   She was able to be taken off of high flow. Reports breathing is at her normal. Noted shortness of breath with talking and at rest on her normal 4 L/min used at home, at rest.   Discussion had with her about conversation with Pulmonogy. She voiced understanding of her lungs being so bad that there is \"nothing else that can be done'. Encouraged her to breath through anxiety about hearing this news.   She did tell nurse that she does not want to pass in hospital. Asked questions about being at home and if needing 24/hour care. Talked with her about oxygen levels and weakness, if something would happen and she was at home alone. She talked about how expensive it would be for caregiving through night.   Hospice concepts, philosophies, and services explained. She is going to discuss with her sons and give them this nurse's number.   
Occupational Therapy  [facility]  INPATIENT OCCUPATIONAL THERAPY  STRZ CCU-STEPDOWN 3B  EVALUATION      Discharge Recommendations: Continue to assess pending progress, Home with assist PRN, Home with Home health OT  Equipment Recommendations: No      Time In: 1110  Time Out: 1141  Timed Code Treatment Minutes: 23 Minutes  Minutes: 31          Date: 10/3/2024  Patient Name: Sandra Benavides,   Gender: female      MRN: [id number]  : 1935  (89 y.o.)  Referring Practitioner: Dara Miranda DO  Diagnosis: Acute respiratory failur with hypoxia  Additional Pertinent Hx: 89 y.o. female who presents with shortness of breath. Symptoms started last week (about 10 days), have been progressively worsening.  Oxygen has been dropping intermittently throughout the day during exertion, taking longer time to rebound to normal. Oxygen was as low as 55%, that's the lowest number that she noted at home. MD Impression: Acute on chronic hypoxic respiratory failure acute exacerbation of ILD/pulmonary fibrosis with bronchiectasis    Restrictions/Precautions:  Restrictions/Precautions: Fall Risk, General Precautions  Position Activity Restriction  Other position/activity restrictions: *monitor O2    Subjective  Chart Reviewed: Yes, Orders, Progress Notes, History and Physical, Imaging  Patient assessed for rehabilitation services?: Yes  Family / Caregiver Present: No       Pain: 0/10:     Vitals: Oxygen: 93% at rest. O2 drops to 78% after walking, but recovers to 97%  Heart Rate: 94 bpm  6 Lts NC    Social/Functional History:  Lives With: Spouse  Type of Home: House  Home Layout: One level  Home Access: Stairs to enter with rails  Entrance Stairs - Number of Steps: 2  Entrance Stairs - Rails: Right  Home Equipment: Walker - Rolling, Oxygen, Wheelchair - Manual (BSC)   Bathroom Shower/Tub: Walk-in shower  Bathroom Toilet: Handicap height  Bathroom Equipment: Grab bars in shower, Tub transfer bench  Bathroom 
Phone call with son Jose per his request to update on situation.  He has a good understanding of hospice and is aware his mom does not want any treatment and she does not want to die in the hospital.  She has always stated she will never live in a nursing facility or assisted living.  She has current caregivers through [facility] that she is comfortable with and do not want to change that.  We talked about the need for caregiver support, and likelihood of requiring 24 hour care in the future that [facility] may not be able to provide.  He is aware of this.  She does have a life alert that she uses as well.  He has a phone call out to [facility] as he feels they have \"their own\" hospice.  I explained that there are many different agencies to choose and they have the right to research them and choose what will work best for them.  I gave my cell phone number so he can call later with updated.  He states the plan is to discharge her early next week.  
Pt is resting in the chair. Pt denies pain. Call light is within reach.    No change from last round, care will continue.  
Pt is resting up in the chair. Pt has call light within reach.    Vitals:  HR: 59  Resp.: 17  BP: 148/78  Temp.: 98.4  SpO2: 95% on 4 L/min via nasal cannula  Pain: Pt denies pain at this time    Physical Assessment:  Head: A&O x4, PEERLA, pupil size 3mm down to 2mm with consensual response.   Chest: Heart sounds strong, radial pulses strong bilaterally. Lung sounds have a slight crackle. Respirations have good depth, chest symmetrical upon inhalation.  Abdomen: Bowel sounds active in all four quadrants. Abdomen is nondistended. No discomfort upon palpation.  Upper Extremities: Skin slightly pink, warm and dry to the touch. Capillary refill < 3 seconds. Skin turgor > 3 seconds, ~4.5 seconds for elasticity/recoil. Hand grasps strong and equal bilaterally.  Lower Extremities: Skin slightly pink, warm and dry to the touch. Pedal push and pull strong and equal bilaterally.  IV Sites: IV in the Right forearm, site is intact and without obstruction. No fluids running at the time of assessment.    Pt has call light within reach and has been informed of the student's hourly rounds. Pt remains in the chair. Care will continue.  
Pt resting in chair, preparing to have lunch. Call light is within reach.    Pt states pain on a 0-10 scale, pain is at a 3. Requested tylenol after her meal. Primary RN notified.    Vitals:  HR: 78  BP: 144/78  Resp.: 18  SpO2: 90% on 4 L/min via nasal cannula  Temp.: 98.3  Pain: 3/10    Handoff report given to primary RN.  
Received am report from primary nurse Joyce RN at bedside. Patient awake and in bed. Assisted patient to the restroom. Oxygen saturation dropped to 78% when up to use restroom. Oxygen increased from 5 to 6 L. Will continue to monitor.  Tia WOLF [facility]  
[facility]  OCCUPATIONAL THERAPY MISSED TREATMENT NOTE  STRZ CCU-STEPDOWN 3B  3B-35/035-A      Date: 10/4/2024  Patient Name: Sandra Benavides        CSN: [id number]   : 1935  (89 y.o.)  Gender: female   Referring Practitioner: Dara Miranda DO  Diagnosis: Acute respiratory failur with hypoxia         REASON FOR MISSED TREATMENT:  Patient possibly to transition to hospice, will continue to follow for appropriate POC.                    
Report called to 5K nurse Malia. Updated patient on transfer order. Belongings packed up and sent with patient.   
Reported off to primary nurse Joyce CUBA. Patient up in chair respirations unlabored and eating lunch.  Tia CUBARSC  
[facility]  INPATIENT PHYSICAL THERAPY  EVALUATION  [facility] CCU-STEPDOWN 3B - 3B-35/035-A    Discharge Recommendations:Discharge Recommendations: Continue to assess pending progress  Equipment Recommendations: No               Time In: 0837  Time Out: 0853  Timed Code Treatment Minutes: 8 Minutes  Minutes: 16          Date: 10/3/2024  Patient Name: Sandra Benavides,  Gender:  female        MRN: [id number]  : 1935  (89 y.o.)      Referring Practitioner: Dr. MATT Miranda  Diagnosis: SOB  Additional Pertinent Hx: Per H&P: \"Ms. Sandra Benavides is a 89 y.o. female who presents with shortness of breath.     Symptoms started last week (about 10 days), have been progressively worsening.  Oxygen has been dropping intermittently throughout the day during exertion, taking longer time to rebound to normal. Oxygen was as low as 55%, that's the lowest number that she noted at home.      No chest pain but has discomfort on left side of chest whenever her oxygen drops. No fever or chills. No cough more then usual. Does have lot of congestion in morning which is normal for her. No nausea or vomiting. No abdominal pain. No issues with urination or bm.      No ETOH use. No recent or prior tobacco use or recreational drug use.     She usually requires 5 liters oxygen on exertion and 4 liter oxygen at rest.  She was requiring high flow nasal cannula while at ER, CT showed worsening pulmonary fibrosis, thus hospitalist were contacted for admission.\"     Restrictions/Precautions:  Restrictions/Precautions: Fall Risk, General Precautions  Position Activity Restriction  Other position/activity restrictions: *monitor O2    Subjective:  Chart Reviewed: Yes  Patient assessed for rehabilitation services?: Yes  Subjective: pt tearful with Dr. Gu discussing hospice so PT notified palliative care to talk with pt and RN aware. after palliative care with pt-pt motivated to get OOB to chair. pt wanting to walk farther but limited 
at rest   -Since 2017, on prednisone 10 mg daily and azithromycin chronically  - Pulmonology consulted, appreciate recommendations    Bronchiectasis  - Thought likely due to history of MAC infection in 2017  - Pulmonology consulted, appreciate recommendations    Severe pulmonary hypertension  -Noted on echo 9/27/2024, likely due to interstitial lung disease as above  - Treatment of ILD as above per pulmonology  --Continue O2 support with HHFNC or nasal cannula for work of breathing and to keep SpO2> 92%, wean to room air as tolerated  -- 10/1 noted improvement s/p 1 mg IV Bumex => started on Lasix 20 mg twice daily--> 10/2 decreased to 20 mg daily to avoid electrolyte derangements as has had good UOP, improvement in respiratory status    Elevated troponin  - Since arrival troponin elevated but curve flat 32 => 34 => 32  - EKG with sinus rhythm, PACs, left posterior fascicular block, TWI in anterolateral and inferior leads  - No chest pain but discomfort on left side of chest with drop in oxygen  - Echo EF 50 to 55%, LV size normal, moderate dilated LV and RA, severe pulmonary hypertension  - Monitor on telemetry, if recurrent chest pain or changes on telemetry, will consider cardiology consult but likely due to underlying lung disease and resultant pulmonary hypertension    Hypokalemia  - 10/1 noted to be 3.4 after Bumex IV given  - Replacement protocol in place, monitor with BMP    Hypernatremia  - 10/1 sodium 146 after Bumex IV given, will monitor while on diuretics    Metabolic alkalosis  -- likely contraction alkalosis due to IV diuresis  -- 10/1 CO2 38 -> improved to 32 on 10/2 --> monitor with BMP    Paroxysmal atrial fibrillation  - Noted in history, continue home Eliquis and metoprolol  - Monitor on telemetry    History of pseudogout  - Noted, not on medication    Anxiety  - Noted, continue home sertraline with hydroxyzine as needed    Ventral hernia  -- noted on exam, patient reports has been present for a 
clear.   Eyes:      General: No scleral icterus.     Extraocular Movements: Extraocular movements intact.      Conjunctiva/sclera: Conjunctivae normal.   Cardiovascular:      Rate and Rhythm: Normal rate and regular rhythm.      Pulses: Normal pulses.      Heart sounds: Normal heart sounds.   Pulmonary:      Effort: Pulmonary effort is normal. No respiratory distress.      Breath sounds: Rhonchi present. No wheezing or rales.   Abdominal:      General: Abdomen is flat. There is no distension.      Palpations: Abdomen is soft. There is no mass.      Tenderness: There is abdominal tenderness. There is no right CVA tenderness, left CVA tenderness or guarding.      Hernia: A hernia is present.   Musculoskeletal:         General: No swelling. Normal range of motion.      Cervical back: Normal range of motion and neck supple.      Right lower leg: No edema.      Left lower leg: No edema.   Skin:     General: Skin is warm and dry.   Neurological:      General: No focal deficit present.      Mental Status: She is alert and oriented to person, place, and time.   Psychiatric:         Mood and Affect: Mood normal.         Behavior: Behavior normal.         Thought Content: Thought content normal.         Judgment: Judgment normal.        All labs reviewed and interpreted by me:  Labs:   Recent Labs     09/27/24  1010 09/28/24  0449 09/29/24  0545   WBC 12.5* 6.1 8.7   HGB 12.8 11.5* 11.1*   HCT 41.6 37.3 36.8*    185 191     Recent Labs     09/27/24  1010 09/28/24  0449 09/29/24  0545    142 143   K 3.5 3.6 3.4*   CL 96* 99 99   CO2 36* 33 32   BUN 12 13 16   CREATININE 0.7 0.7 0.8   CALCIUM 9.4 8.8 8.6     Recent Labs     09/27/24  1010   AST 23   ALT 12   BILIDIR 0.2   BILITOT 0.5   ALKPHOS 57     No results for input(s): \"INR\" in the last 72 hours.  No results for input(s): \"CKTOTAL\", \"TROPONINT\" in the last 72 hours.    Urinalysis:      Lab Results   Component Value Date/Time    NITRU NEGATIVE 11/04/2022 10:55 
Endorses some shortness of breath with ambulation.      Subjective/HPI:   No significant vents overnight.  This morning patient is doing well.  Denies any complaints.  Does continue endorse shortness of breath with increased ambulation but otherwise not changed from baseline.    PMH, SURGICAL HX, FH, SOCIAL HX reviewed and updated as needed.    Medications:  Reviewed    Infusion Medications    sodium chloride       Scheduled Medications    furosemide  20 mg Oral Every Other Day    sodium chloride (Inhalant)  4 mL Nebulization BID    lisinopril  5 mg Oral Daily    predniSONE  30 mg Oral Daily    Followed by    [START ON 10/12/2024] predniSONE  20 mg Oral Daily    ipratropium 0.5 mg-albuterol 2.5 mg  1 Dose Inhalation Q4H WA RT    azithromycin  250 mg Oral Daily    apixaban  5 mg Oral BID    metoprolol tartrate  12.5 mg Oral BID    budesonide  1 mg Nebulization BID RT    lactobacillus  1 capsule Oral Daily    sertraline  12.5 mg Oral Daily    sodium chloride flush  5-40 mL IntraVENous 2 times per day     PRN Meds: sodium chloride, hydrALAZINE, dicyclomine, albuterol sulfate HFA, hydrOXYzine HCl, simethicone, sodium chloride flush, sodium chloride, potassium chloride **OR** potassium alternative oral replacement **OR** potassium chloride, magnesium sulfate, ondansetron **OR** ondansetron, polyethylene glycol, acetaminophen **OR** acetaminophen, cetirizine, guaiFENesin-dextromethorphan      Intake/Output Summary (Last 24 hours) at 10/6/2024 0916  Last data filed at 10/5/2024 0917  Gross per 24 hour   Intake 200 ml   Output --   Net 200 ml       Exam:  BP (!) 145/79   Pulse 64   Temp 97.6 °F (36.4 °C) (Oral)   Resp 16   Ht 1.626 m (5' 4\")   Wt 80.6 kg (177 lb 11.2 oz)   SpO2 97%   BMI 30.50 kg/m²     Physical Exam  Constitutional:       General: She is not in acute distress.     Appearance: She is normal weight.   HENT:      Head: Normocephalic and atraumatic.      Right Ear: External ear normal.      Left Ear: 
Mouth: Mucous membranes are moist.      Pharynx: Oropharynx is clear.   Eyes:      General: No scleral icterus.     Extraocular Movements: Extraocular movements intact.      Conjunctiva/sclera: Conjunctivae normal.   Cardiovascular:      Rate and Rhythm: Normal rate and regular rhythm.      Pulses: Normal pulses.      Heart sounds: Normal heart sounds.   Pulmonary:      Effort: Pulmonary effort is normal. No respiratory distress.      Breath sounds: No wheezing, rhonchi or rales.   Abdominal:      General: Abdomen is flat. There is no distension.      Palpations: Abdomen is soft. There is no mass.      Tenderness: There is abdominal tenderness. There is no right CVA tenderness, left CVA tenderness or guarding.      Hernia: A hernia is present.   Musculoskeletal:         General: No swelling. Normal range of motion.      Cervical back: Normal range of motion and neck supple.      Right lower leg: No edema.      Left lower leg: No edema.   Skin:     General: Skin is warm and dry.   Neurological:      General: No focal deficit present.      Mental Status: She is alert and oriented to person, place, and time.   Psychiatric:         Mood and Affect: Mood normal.         Behavior: Behavior normal.         Thought Content: Thought content normal.         Judgment: Judgment normal.        All labs reviewed and interpreted by me:  Labs:   Recent Labs     09/29/24  0545 09/30/24  0527   WBC 8.7 9.3   HGB 11.1* 11.5*   HCT 36.8* 38.4    182     Recent Labs     09/29/24  0545 09/30/24  0527    143   K 3.4* 3.8   CL 99 102   CO2 32 32   BUN 16 14   CREATININE 0.8 0.7   CALCIUM 8.6 8.5     No results for input(s): \"AST\", \"ALT\", \"BILIDIR\", \"BILITOT\", \"ALKPHOS\" in the last 72 hours.    No results for input(s): \"INR\" in the last 72 hours.  No results for input(s): \"CKTOTAL\", \"TROPONINT\" in the last 72 hours.    Urinalysis:      Lab Results   Component Value Date/Time    NITRU NEGATIVE 11/04/2022 10:55 AM    WBCUA 2-4 
tenderness, left CVA tenderness or guarding.      Hernia: A hernia is present.   Musculoskeletal:         General: No swelling. Normal range of motion.      Cervical back: Normal range of motion and neck supple.      Right lower leg: No edema.      Left lower leg: No edema.   Skin:     General: Skin is warm and dry.   Neurological:      General: No focal deficit present.      Mental Status: She is alert and oriented to person, place, and time.   Psychiatric:         Mood and Affect: Mood normal.         Behavior: Behavior normal.         Thought Content: Thought content normal.         Judgment: Judgment normal.        All labs reviewed and interpreted by me:  Labs:   Recent Labs     10/03/24  0515   WBC 10.7   HGB 10.8*   HCT 36.9*        Recent Labs     10/01/24  1301 10/02/24  0423 10/03/24  0515   * 146* 143   K 3.4* 3.8 3.2*   CL 99 98 97*   CO2 38* 32 37*   BUN 17 18 21   CREATININE 0.8 0.8 0.8   CALCIUM 9.1 8.9 8.7     Recent Labs     10/03/24  0515   AST 28   ALT 26   BILIDIR 0.2   BILITOT 0.4   ALKPHOS 36*       No results for input(s): \"INR\" in the last 72 hours.  No results for input(s): \"CKTOTAL\", \"TROPONINT\" in the last 72 hours.    Urinalysis:      Lab Results   Component Value Date/Time    NITRU NEGATIVE 11/04/2022 10:55 AM    WBCUA 2-4 11/04/2022 10:55 AM    BACTERIA NONE SEEN 11/04/2022 10:55 AM    RBCUA 10-15 11/04/2022 10:55 AM    BLOODU TRACE 11/04/2022 10:55 AM    GLUCOSEU NEGATIVE 11/04/2022 10:55 AM    GLUCOSEU NEGATIVE 01/06/2020 12:21 PM       All radiology images and reports reviewed and interpreted by me:  Radiology:  CTA Chest W/WO Contrast Pulmonary Embolism  Eval   Final Result   1. No filling defects are visualized within the pulmonary arterial vasculature   to suggest the presence of pulmonary embolus. The pulmonary arterial trunk is   dilated measuring up to 3.5 cm in diameter which can indicate pulmonary arterial   hypertension.      2. CT findings suggestive of severe 
Regular    Microbiology: no  Antibiotics: yes - Zithromax    Steroids: yes - prednisone    Telemetry: [x]Yes / []No  Telemetry Review of past 24 hours:  NSR and sinus tachycardia    LDA: []CVC / []PICC / []Midline / []Dunaway / []Drains / []Mediport / [x]PIV / []None    Labs (still needed?): [x]Yes / []No  IVF (still needed?): []Yes / [x]No    Level of care: [x]Step Down / []Med-Surg  Bed Status: [x]Inpatient / []Observation    DVT Prophylaxis: [] Lovenox / [] Heparin / [] SCDs / [x] Already on Systemic Anticoagulation / [] None     PT/OT: []Yes / [x]No --when off HHFNC    Disposition:    [x] Home       [] TCU       [] Rehab       [] Psych       [] SNF       [] Long Term Care Facility       [] Other-    Code Status: DNR-CC      An electronic signature was used to authenticate this note  - Wiblert Kenney DO PGY-3 on 10/5/2024 at 7:55 AM        
[]No  Telemetry Review of past 24 hours:  NSR and sinus tachycardia    LDA: []CVC / []PICC / []Midline / []Dunaway / []Drains / []Mediport / [x]PIV / []None    Labs (still needed?): [x]Yes / []No  IVF (still needed?): []Yes / [x]No    Level of care: [x]Step Down / []Med-Surg  Bed Status: [x]Inpatient / []Observation    DVT Prophylaxis: [] Lovenox / [] Heparin / [] SCDs / [x] Already on Systemic Anticoagulation / [] None     PT/OT: []Yes / [x]No --when off HHFNC    Disposition:    [x] Home       [] TCU       [] Rehab       [] Psych       [] SNF       [] Long Term Care Facility       [] Other-    Code Status: Limited      An electronic signature was used to authenticate this note  - Dara Miranda DO PGY-3 on 10/4/2024 at 1:05 PM        
growth  PAFs-Negative    Assessment   -Acute on chronic hypoxic respiratory failure due to exacerbation of her interstitial lung disease and volume overload- currently on HFNC  -Acute exacerbation of ILD-  -ILD with pulmonary fibrosis- since 2017 on prednisone 10 mg daily and Home Oxygen 4 LPM-radiologically getting worse  -HFpEF with acute exacerbation- BNP 5271  -Bronchiectasis- likely 2/2 history of MAC infection in 2017  -Hx of MAC  -Afib on Eliquis   Recommendations   -Continue to wean supplemental O2 , SpO2 goal 89-94%- Prior baseline 4 LPM at rest and 5 LPM with exertion   -change Solumedrol 40 mg Daily to prednisone given rapid improvement after initiation of diuresis will begin to taper steroids and monitor response reduce by 10 mg every 3 days then continue on Home Dose of prednisone 10 mg Daily     -Budesonide 1000 mcg BID.  -Duonebs Q4 hrs WA  -Diuresis lasix 20 mg PO Daily follow BMP  -Obtain Sputum Culture if possible   -See 9/30/2024 regarding full details regarding options reviewed patient declines bronchoscopy wishes to continue with steroid treatment verbalizes understanding and accepts risk of possible underlying atypical infection getting worse ie fungal infection and risk or permanent damage and possible death   -Palliative care following limited code x 4   -Broad spectrum antibiotics  -DVT prophylaxis with Eliquis   -PT/OT eval    -Home O2 eval prior to DC    Case discussed with nurse and patient/family.  Questions and concerns addressed.  Meds and Orders reviewed.    Electronically signed by   JASMEET Morales CNP on 10/2/2024 at 10:37 AM    Addendum by Dr. Riccardo MD:  Patient seen by me independently including key components of medical care. Face to face evaluation and examination was performed. Case discussed with Mr. Patel Clay CNP. Agree with Certified nurse practitioner's findings and plan as documented in the Certified nurse practitioner's note. Italicized font, if 
growth  PAFs-Negative    Assessment   -Acute on chronic hypoxic respiratory failure due to exacerbation of her interstitial lung disease- currently on HFNC  -Acute exacerbation of ILD  -ILD with pulmonary fibrosis- since 2017 on prednisone 10 mg daily and Home Oxygen 4 LPM-radiologically getting worse  -Bronchiectasis- likely 2/2 history of MAC infection in 2017  -Hx of MAC  -Afib on Eliquis   Recommendations   -Continue HFNC, SpO2 goal 89-94%  -Continue current Solumedrol dose 40 mg Daily  patient appears to be responding, will do slow taper over 6 weeks   -Will need PCP prophylaxis prior to DC noted allergy to Sulfa   -Send G6PD   -Budesonide 1000 mcg BID.  -Duonebs Q4 hrs WA  -Obtain Sputum Culture if possible   -Discussed with patient regarding ILD exacerbation discussed possible bronchoscopy to rule out atypical infection given Hx of MAC and Bronchiectasis with ILD, explained risk of inability to extubate vent dependence, PTX bleeding and infection at this time patient not interested in bronchoscopy is agreeable to use of steroids voices understanding of possible worsening underlying infection ie possible fungal pneumonia and inability to prevent permanent harm or death if process is present   -Palliative care following discussing GOC and helping coordinate with patient and family for informational meeting timing due to her children living out of state   -Broad spectrum antibiotics, will DC if culture negative in 48-72 hours   -DVT prophylaxis with Eliquis     Case discussed with nurse and patient/family.  Questions and concerns addressed.  Meds and Orders reviewed.    Electronically signed by   JASMEET Morales CNP on 9/30/2024 at 9:03 AM    Addendum by Dr. Riccardo MD:  Patient seen by me independently including key components of medical care. Face to face evaluation and examination was performed. Case discussed with Mr. Patel Clay CNP. Agree with Certified nurse practitioner's findings and plan 
changes to the note made by me. More than 50% of the encounter time involved with patient care by the Pulmonary & Critical care service team spent by me.    Please see my modifications mentioned below:  Patient remained on high flow  She is on 30 L/min with 51% FiO2  Feels better  All cultures were negative so far  Follow BMP in a.m.  Will send a BNP in a.m.  aSndra Benavides educated about my impression and plan. She verbalizes understanding.    Electronically signed by   Nader Gu MD on 10/1/2024 at 7:20 PM         
clearance  - Pulmicort BID  - Cont Daily azithromycin started 12/2020; discussed SEs; improtant to prevent pna (recurrent episodes prior to starting);   - Stopped oral NAC due to heartburn     2. 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 Noted on OSH blood work. Cont ICS. PRN oral steroids with exacerbations.      3. Neutrophilia - ICD9: 288.8, ICD10: D72.9  >80% segs on BAL. Azithromycin can help with this.      4. Ground glass infiltrate  Noted on CT during admission. No pneumonia symptoms.  Will repeat in September.     RTC in 3 months     Written and verbal health teaching given to patient, patient verbalizes understanding and agrees with treatment plan.      .     I personally interviewed, confirmed and edited the above information if obtained by others.      CONTACT INFORMATION:     Dr. Adams [facility]  [facility] [place], [address]   Department of Pulmonology and Critical Care     Electronically signed by   Nader Gu MD on 10/3/2024 at 7:35 PM

## 2024-10-26 DIAGNOSIS — F32.A ANXIETY AND DEPRESSION: ICD-10-CM

## 2024-10-26 DIAGNOSIS — F41.9 ANXIETY AND DEPRESSION: ICD-10-CM

## 2024-10-28 RX ORDER — SERTRALINE HYDROCHLORIDE 25 MG/1
12.5 TABLET, FILM COATED ORAL DAILY
Qty: 30 TABLET | Refills: 5 | Status: SHIPPED | OUTPATIENT
Start: 2024-10-28

## 2024-10-28 NOTE — TELEPHONE ENCOUNTER
Date of last visit:  9/3/2024  Date of next visit:  Visit date not found    Requested Prescriptions     Pending Prescriptions Disp Refills    sertraline (ZOLOFT) 25 MG tablet [Pharmacy Med Name: SERTRALINE HCL 25 MG TABLET] 30 tablet 4     Sig: TAKE 1/2 TABLET BY MOUTH DAILY

## 2024-11-04 ENCOUNTER — OUTSIDE SERVICES (OUTPATIENT)
Age: 89
End: 2024-11-04

## 2024-11-04 NOTE — PROGRESS NOTES
[facility]  Initial Home Visit      Date: 11/4/2024  Name: Sandra Benavides  MRN: [id number]  YOB: 1935  Admit Date to Hospice  Primary Care:  Tacho Ny MD  Pulmonary: Karen ALAMO, Dr. Riccardo HILLS, Dr. Bryan Melvin MD [facility]  Pain: Jeramie Cristi DO    Reason for visit: complicated combination of pulmonary and cardiac conditions and pt does not want to go out to doctors due to infection risk.    Informant: the patyient, pt's daily caregiver, hospice RN Shanna, and the records are reviewed in detail.      Chief Complaint: SOB    History of Present Illness: Sandra Benavides is a 89 y.o. female, who is admitted to [facility] ILD dx 2017 when she also had MAC.  She also has bronchiectasis and was hospitalized and found to have pneumonia  in Septembert although she had not sputum, increased cough or fever.  She just had increased sob and worse hypoxia with O2 sats to 55% at home despite oxygen at her prior baseline of 4-5L. At hospitalization she required heated high flow oxygen and on CT was found to have progressive fibrosis/honeycombing as well as found to have severe pulm HTN on echo.    She is now on baseline 5-6L and can turn it up to 8L with exertion or worse sob.  She denies any leg swellinh.  Her bp was high iin the hospital so was started on lisinapril and given lasix.  She is using vistaril for sob and it doesn't work very well but makes her sleep after taking it..  Is very sob when trying to shower so has given this up and is lathering up on the toilet then only rinsing quickly in the shower all with assist. + escalation where sob worsens anxiety and so forth leading to an exacerbation of sob/anxiety like a panic attack.  She has chronic nasal congestion and allergies and is a long term mouth breather.  She denies chest pain.  She eats well and enjoys food.  She has intermittent crampy abd pain related to a hernia upper abd.  She has episodes at

## 2024-11-15 ENCOUNTER — LAB (OUTPATIENT)
Dept: LAB | Age: 89
End: 2024-11-15

## 2024-11-15 LAB — INR PPP: 1.09 (ref 0.85–1.13)

## 2024-12-26 ENCOUNTER — TELEPHONE (OUTPATIENT)
Dept: FAMILY MEDICINE CLINIC | Age: 88
End: 2024-12-26

## 2024-12-26 NOTE — TELEPHONE ENCOUNTER
We received Updated Physician's Certification Form to be completed via fax from [place]. Blank form scanned into Epic and placed on cart.

## 2025-01-03 NOTE — PROGRESS NOTES
Hospice Certification of Terminal Illness      89 YEAR-OLD WOMAN WITH TERMINAL DIAGNOSIS OF INTERSTITIAL LUNG DISEASE. THIS WAS DIAGNOSED IN 2017.  SHE HAD A HOSPITALIZATION 9/27-10/8 DUE TO AN EXACERBATION AND AT THAT TIME SHE WAS REQUIRING HEATED HIGH FLOW OXYGEN AND SHE HAD O2 SAT DOWN TO 55% AT HOME ON 5L OF O2.  HER CONDITION IS COMPLICATED BY CONCURRENT BRONCHIECTASIS, SEVERE PULMONARY HYPERTENSION, AND  CAT SCAN HAS SHOWN WORSENING PULMONARY FIBROSIS WHICH IS DESCRIBED AS SEVERE, ALONG WITH BRONCHIECTASIS AND HONEYCOMBING.. SHE HAS BEEN ON CHRONIC STEROIDS SINCE 2017.  SHE IS REQUIRING OXYGEN USING 6L at rest and 7L with activity, increased from hospice admission with 4 L AT REST AND 5 L WITH ACTIVITY.   At rest on 6L she runs O2 sat low 90s and on 7L with ambulation sats decrease into the 80s.    HER FUNCTION HAS DECLINED WITH HER PRE hospitalization PPS 80%, NOW DECLINED TO 40% from hospice admission at 50%. SHE HAS HOME INSTEAD AND THESE AIDS ARE WITH HER ALL DAY DOING HER DAILY CARE.  She is using a wheelchair for in home mobility which is declined from 1 assist with walker last period. Additionally, This period she has difficulty with recall and is writing self notes or deferring to caregiver.    WEIGHT IS CURRENTLY 153#, declined from admission at 177 POUNDS.    SHE IS A NEVER SMOKER, GFR 70-82.    COMORBID CONDITIONS NORMOCYTIC ANEMIA, ANXIETY, SEVERE PULMONARY HTN WHICH WAS NOT PRESENT AT ALL ON ECHO 11/2022, BRONCHIECTASIS, AFIB    SHE HAS A DNR CC IN PLACE.  She does not plan to return to the hospital.    I certify this pt has a life expectancy of 6 months or less if the disease follows it's normal expected course.    Nargis Phelps MD  Board Certified Hospice and Palliative Medicine  Hospice Medical Director Certified

## 2025-01-20 ENCOUNTER — OUTSIDE SERVICES (OUTPATIENT)
Age: 89
End: 2025-01-20

## 2025-01-20 NOTE — PROGRESS NOTES
[facility] Progress Note    Date: 1/20/2025  Name: Sandra Benavides  MRN: [id number]  YOB: 1935   Patient's PCP: Tacho Ny MD  Admit Date:       Subjective:   Mrs. Benavides reports she still becomes very sob.  However the escalation into a panic attack with sob and anxiety is much less frequent since on ativan small doses routine.  Worse episodes are with bathing, especially washing hair, transferring to wheelchair, or sometimes when she walks a little when is alone, and dressing and she notes she takes lots of breaks.  She has a caregiver for bathing and washing her hair.  She reports nasal congestion during allergy season in fall- she used a nasal spray daily that she wsa told was like flonase- generic afrin.  She has an albuterol inhaler she occasinally uses.  She denies ever having wheeze.  She currently has not colored sputum, no fever.    Has not been sick with any resp illness.  Pain level is 0.  Bowels are fine, she goes about every other day, stool is not hard, she was not normally a daily person for Bms.   She continues with intermittent abd pain/crampoing related to ventral hernia.  The Levsin SL is helpful.    Objective:    Lorazepam is available for anxiety/sob.  She reports she is taking the 0.25mg po bid scheduled, she occasionally takes an additional dose.  She has not tried the morphine 2.5mg for sob.    Physical Exam:   Vitals: RR 24, T 97.3, 88, 132/78, O2 sat 97% at complete rest on 7L.  General: Comfortable appearing at rest without furrowed brow or grimace at rest.   HEENT: Mucous membranes are moist with only mildly reduced saliva, sclera are clear with lens implants bilat, she is hard of hearing.  Heart: mildly irregular, rate normal, no M.  Lungs:  crackles R lower greater than L lower.  No wheeze, no rhonchi.   Abdomen: soft, bowel sounds present, no tenderness, nondistended,  protruding hernia with mass size of a small tangerine, upper abd

## 2025-03-02 DIAGNOSIS — Z51.5 HOSPICE CARE PATIENT: Primary | ICD-10-CM

## 2025-03-02 RX ORDER — PHENOBARBITAL 30 MG/1
30 TABLET ORAL EVERY 4 HOURS PRN
Qty: 90 TABLET | Refills: 0 | Status: SHIPPED | OUTPATIENT
Start: 2025-03-02 | End: 2025-03-17

## 2025-03-02 RX ORDER — HYOSCYAMINE SULFATE 0.12 MG/1
0.12 TABLET SUBLINGUAL EVERY 4 HOURS PRN
Qty: 90 TABLET | Refills: 0 | Status: SHIPPED | OUTPATIENT
Start: 2025-03-02
